# Patient Record
Sex: FEMALE | Race: WHITE | NOT HISPANIC OR LATINO | URBAN - METROPOLITAN AREA
[De-identification: names, ages, dates, MRNs, and addresses within clinical notes are randomized per-mention and may not be internally consistent; named-entity substitution may affect disease eponyms.]

---

## 2017-01-03 LAB — INR PPP: 3

## 2017-01-22 ENCOUNTER — INPATIENT (INPATIENT)
Facility: HOSPITAL | Age: 82
LOS: 2 days | Discharge: HOME HEALTH | DRG: 291 | End: 2017-01-25
Attending: FAMILY MEDICINE | Admitting: FAMILY MEDICINE
Payer: MEDICARE

## 2017-01-22 VITALS
RESPIRATION RATE: 20 BRPM | WEIGHT: 169.98 LBS | DIASTOLIC BLOOD PRESSURE: 50 MMHG | SYSTOLIC BLOOD PRESSURE: 131 MMHG | OXYGEN SATURATION: 99 % | HEART RATE: 77 BPM | TEMPERATURE: 99 F | HEIGHT: 60 IN

## 2017-01-22 DIAGNOSIS — N19 UNSPECIFIED KIDNEY FAILURE: ICD-10-CM

## 2017-01-22 DIAGNOSIS — R07.89 OTHER CHEST PAIN: ICD-10-CM

## 2017-01-22 DIAGNOSIS — R06.02 SHORTNESS OF BREATH: ICD-10-CM

## 2017-01-22 DIAGNOSIS — I50.9 HEART FAILURE, UNSPECIFIED: ICD-10-CM

## 2017-01-22 LAB
ALBUMIN SERPL ELPH-MCNC: 3.1 G/DL — LOW (ref 3.3–5)
ALP SERPL-CCNC: 119 U/L — SIGNIFICANT CHANGE UP (ref 40–120)
ALT FLD-CCNC: 23 U/L — SIGNIFICANT CHANGE UP (ref 12–78)
ANION GAP SERPL CALC-SCNC: 9 MMOL/L — SIGNIFICANT CHANGE UP (ref 5–17)
APTT BLD: 39.5 SEC — HIGH (ref 27.5–37.4)
AST SERPL-CCNC: 25 U/L — SIGNIFICANT CHANGE UP (ref 15–37)
BASOPHILS # BLD AUTO: 0 K/UL — SIGNIFICANT CHANGE UP (ref 0–0.2)
BASOPHILS NFR BLD AUTO: 0.3 % — SIGNIFICANT CHANGE UP (ref 0–2)
BILIRUB SERPL-MCNC: 0.4 MG/DL — SIGNIFICANT CHANGE UP (ref 0.2–1.2)
BUN SERPL-MCNC: 44 MG/DL — HIGH (ref 7–23)
CALCIUM SERPL-MCNC: 8.9 MG/DL — SIGNIFICANT CHANGE UP (ref 8.5–10.1)
CHLORIDE SERPL-SCNC: 98 MMOL/L — SIGNIFICANT CHANGE UP (ref 96–108)
CK MB BLD-MCNC: 3.7 % — HIGH (ref 0–3.5)
CK MB CFR SERPL CALC: 3 NG/ML — SIGNIFICANT CHANGE UP (ref 0–3.6)
CK SERPL-CCNC: 81 U/L — SIGNIFICANT CHANGE UP (ref 26–192)
CO2 SERPL-SCNC: 31 MMOL/L — SIGNIFICANT CHANGE UP (ref 22–31)
CREAT SERPL-MCNC: 1.8 MG/DL — HIGH (ref 0.5–1.3)
EOSINOPHIL # BLD AUTO: 0.3 K/UL — SIGNIFICANT CHANGE UP (ref 0–0.5)
EOSINOPHIL NFR BLD AUTO: 3.2 % — SIGNIFICANT CHANGE UP (ref 0–6)
GLUCOSE SERPL-MCNC: 170 MG/DL — HIGH (ref 70–99)
HBA1C BLD-MCNC: 7.5 % — HIGH (ref 4–5.6)
HCT VFR BLD CALC: 37.4 % — SIGNIFICANT CHANGE UP (ref 34.5–45)
HGB BLD-MCNC: 11.2 G/DL — LOW (ref 11.5–15.5)
INR BLD: 2.61 RATIO — HIGH (ref 0.88–1.16)
LYMPHOCYTES # BLD AUTO: 1.1 K/UL — SIGNIFICANT CHANGE UP (ref 1–3.3)
LYMPHOCYTES # BLD AUTO: 12.5 % — LOW (ref 13–44)
MCHC RBC-ENTMCNC: 26.5 PG — LOW (ref 27–34)
MCHC RBC-ENTMCNC: 30 GM/DL — LOW (ref 32–36)
MCV RBC AUTO: 88.3 FL — SIGNIFICANT CHANGE UP (ref 80–100)
MONOCYTES # BLD AUTO: 0.5 K/UL — SIGNIFICANT CHANGE UP (ref 0–0.9)
MONOCYTES NFR BLD AUTO: 6.2 % — SIGNIFICANT CHANGE UP (ref 1–9)
NEUTROPHILS # BLD AUTO: 6.7 K/UL — SIGNIFICANT CHANGE UP (ref 1.8–7.4)
NEUTROPHILS NFR BLD AUTO: 77.8 % — HIGH (ref 43–77)
NT-PROBNP SERPL-SCNC: 4756 PG/ML — HIGH (ref 0–450)
PLATELET # BLD AUTO: 264 K/UL — SIGNIFICANT CHANGE UP (ref 150–400)
POTASSIUM SERPL-MCNC: 3.9 MMOL/L — SIGNIFICANT CHANGE UP (ref 3.5–5.3)
POTASSIUM SERPL-SCNC: 3.9 MMOL/L — SIGNIFICANT CHANGE UP (ref 3.5–5.3)
PROT SERPL-MCNC: 7.7 G/DL — SIGNIFICANT CHANGE UP (ref 6–8.3)
PROTHROM AB SERPL-ACNC: 29.2 SEC — HIGH (ref 10–13.1)
RBC # BLD: 4.23 M/UL — SIGNIFICANT CHANGE UP (ref 3.8–5.2)
RBC # FLD: 16.6 % — HIGH (ref 10.3–14.5)
SODIUM SERPL-SCNC: 138 MMOL/L — SIGNIFICANT CHANGE UP (ref 135–145)
TROPONIN I SERPL-MCNC: 0.03 NG/ML — SIGNIFICANT CHANGE UP (ref 0.01–0.04)
TSH SERPL-MCNC: 2.66 UIU/ML — SIGNIFICANT CHANGE UP (ref 0.36–3.74)
WBC # BLD: 8.6 K/UL — SIGNIFICANT CHANGE UP (ref 3.8–10.5)
WBC # FLD AUTO: 8.6 K/UL — SIGNIFICANT CHANGE UP (ref 3.8–10.5)

## 2017-01-22 PROCEDURE — 76770 US EXAM ABDO BACK WALL COMP: CPT | Mod: 26

## 2017-01-22 PROCEDURE — 93010 ELECTROCARDIOGRAM REPORT: CPT

## 2017-01-22 PROCEDURE — 99285 EMERGENCY DEPT VISIT HI MDM: CPT | Mod: 25

## 2017-01-22 PROCEDURE — 71020: CPT | Mod: 26

## 2017-01-22 PROCEDURE — 76775 US EXAM ABDO BACK WALL LIM: CPT | Mod: 26

## 2017-01-22 RX ORDER — PANTOPRAZOLE SODIUM 20 MG/1
40 TABLET, DELAYED RELEASE ORAL
Qty: 0 | Refills: 0 | Status: DISCONTINUED | OUTPATIENT
Start: 2017-01-22 | End: 2017-01-25

## 2017-01-22 RX ORDER — WARFARIN SODIUM 2.5 MG/1
1 TABLET ORAL DAILY
Qty: 0 | Refills: 0 | Status: COMPLETED | OUTPATIENT
Start: 2017-01-22 | End: 2017-01-24

## 2017-01-22 RX ORDER — FUROSEMIDE 40 MG
40 TABLET ORAL
Qty: 0 | Refills: 0 | Status: DISCONTINUED | OUTPATIENT
Start: 2017-01-22 | End: 2017-01-23

## 2017-01-22 RX ORDER — INSULIN LISPRO 100/ML
VIAL (ML) SUBCUTANEOUS
Qty: 0 | Refills: 0 | Status: DISCONTINUED | OUTPATIENT
Start: 2017-01-22 | End: 2017-01-25

## 2017-01-22 RX ORDER — FUROSEMIDE 40 MG
40 TABLET ORAL ONCE
Qty: 0 | Refills: 0 | Status: COMPLETED | OUTPATIENT
Start: 2017-01-22 | End: 2017-01-22

## 2017-01-22 RX ORDER — PROPRANOLOL HCL 160 MG
120 CAPSULE, EXTENDED RELEASE 24HR ORAL DAILY
Qty: 0 | Refills: 0 | Status: DISCONTINUED | OUTPATIENT
Start: 2017-01-22 | End: 2017-01-25

## 2017-01-22 RX ORDER — SIMVASTATIN 20 MG/1
10 TABLET, FILM COATED ORAL AT BEDTIME
Qty: 0 | Refills: 0 | Status: DISCONTINUED | OUTPATIENT
Start: 2017-01-22 | End: 2017-01-25

## 2017-01-22 RX ORDER — MONTELUKAST 4 MG/1
10 TABLET, CHEWABLE ORAL DAILY
Qty: 0 | Refills: 0 | Status: DISCONTINUED | OUTPATIENT
Start: 2017-01-22 | End: 2017-01-25

## 2017-01-22 RX ORDER — NITROGLYCERIN 6.5 MG
1 CAPSULE, EXTENDED RELEASE ORAL DAILY
Qty: 0 | Refills: 0 | Status: DISCONTINUED | OUTPATIENT
Start: 2017-01-22 | End: 2017-01-25

## 2017-01-22 RX ORDER — SODIUM CHLORIDE 9 MG/ML
1000 INJECTION, SOLUTION INTRAVENOUS
Qty: 0 | Refills: 0 | Status: DISCONTINUED | OUTPATIENT
Start: 2017-01-22 | End: 2017-01-25

## 2017-01-22 RX ORDER — SODIUM CHLORIDE 9 MG/ML
3 INJECTION INTRAMUSCULAR; INTRAVENOUS; SUBCUTANEOUS ONCE
Qty: 0 | Refills: 0 | Status: COMPLETED | OUTPATIENT
Start: 2017-01-22 | End: 2017-01-22

## 2017-01-22 RX ORDER — GLUCAGON INJECTION, SOLUTION 0.5 MG/.1ML
1 INJECTION, SOLUTION SUBCUTANEOUS ONCE
Qty: 0 | Refills: 0 | Status: DISCONTINUED | OUTPATIENT
Start: 2017-01-22 | End: 2017-01-25

## 2017-01-22 RX ORDER — DEXTROSE 50 % IN WATER 50 %
1 SYRINGE (ML) INTRAVENOUS ONCE
Qty: 0 | Refills: 0 | Status: DISCONTINUED | OUTPATIENT
Start: 2017-01-22 | End: 2017-01-25

## 2017-01-22 RX ORDER — DEXTROSE 50 % IN WATER 50 %
12.5 SYRINGE (ML) INTRAVENOUS ONCE
Qty: 0 | Refills: 0 | Status: DISCONTINUED | OUTPATIENT
Start: 2017-01-22 | End: 2017-01-25

## 2017-01-22 RX ORDER — DEXTROSE 50 % IN WATER 50 %
25 SYRINGE (ML) INTRAVENOUS ONCE
Qty: 0 | Refills: 0 | Status: DISCONTINUED | OUTPATIENT
Start: 2017-01-22 | End: 2017-01-25

## 2017-01-22 RX ORDER — TIOTROPIUM BROMIDE 18 UG/1
1 CAPSULE ORAL; RESPIRATORY (INHALATION) AT BEDTIME
Qty: 0 | Refills: 0 | Status: DISCONTINUED | OUTPATIENT
Start: 2017-01-22 | End: 2017-01-25

## 2017-01-22 RX ADMIN — Medication 1 PATCH: at 19:59

## 2017-01-22 RX ADMIN — Medication 40 MILLIGRAM(S): at 19:21

## 2017-01-22 RX ADMIN — TIOTROPIUM BROMIDE 1 CAPSULE(S): 18 CAPSULE ORAL; RESPIRATORY (INHALATION) at 20:00

## 2017-01-22 RX ADMIN — WARFARIN SODIUM 1 MILLIGRAM(S): 2.5 TABLET ORAL at 20:36

## 2017-01-22 RX ADMIN — MONTELUKAST 10 MILLIGRAM(S): 4 TABLET, CHEWABLE ORAL at 20:36

## 2017-01-22 RX ADMIN — SIMVASTATIN 10 MILLIGRAM(S): 20 TABLET, FILM COATED ORAL at 22:09

## 2017-01-22 RX ADMIN — SODIUM CHLORIDE 3 MILLILITER(S): 9 INJECTION INTRAMUSCULAR; INTRAVENOUS; SUBCUTANEOUS at 18:29

## 2017-01-22 NOTE — H&P ADULT. - HISTORY OF PRESENT ILLNESS
presented to er after patient felt SOB / STILL for past few days, her lasix was increased lately by her cardiologist.  Also has some chest discomfort.  Denies palpitations.  History of similar previous episodes and was found to have CHF.  No other associated symptoms at this time.  Patient is being admitted for further work up and treatment.

## 2017-01-22 NOTE — PATIENT PROFILE ADULT. - VISION (WITH CORRECTIVE LENSES IF THE PATIENT USUALLY WEARS THEM):
glasses for reading/Partially impaired: cannot see medication labels or newsprint, but can see obstacles in path, and the surrounding layout; can count fingers at arm's length

## 2017-01-22 NOTE — ED ADULT NURSE REASSESSMENT NOTE - NS ED NURSE REASSESS COMMENT FT1
Patient received from Sandra SLADE . Patient is A&Ox4 , laying on the stretcher calmly , no labored breathing noted. Patient denies any pain or discomfort. Pending bed assignment. Will continue to monitor.

## 2017-01-22 NOTE — ED ADULT NURSE NOTE - PMH
Acid reflux    Afib    Asthma    CVA (cerebral infarction)    Diabetes    Hyperlipidemia    Hypertension    Myocardial infarction  1981  Raynaud disease    Renal stones    Spinal stenosis    Upper respiratory infection

## 2017-01-22 NOTE — PATIENT PROFILE ADULT. - ABILITY TO HEAR (WITH HEARING AID OR HEARING APPLIANCE IF NORMALLY USED):
Mildly to Moderately Impaired: difficulty hearing in some environments or speaker may need to increase volume or speak distinctly Mildly to Moderately Impaired: difficulty hearing in some environments or speaker may need to increase volume or speak distinctly/b/l hearing aides sent home with daughter

## 2017-01-22 NOTE — PATIENT PROFILE ADULT. - FALL HARM RISK
bones(Osteoporosis,prev fx,steroid use,metastatic bone ca/coagulation(Bleeding disorder R/T clinical cond/anti-coags)/age(85 years old or older)

## 2017-01-22 NOTE — PATIENT PROFILE ADULT. - PMH
Acid reflux    Afib    Angina effort    Asthma    CVA (cerebral infarction)    Diabetes    GERD (gastroesophageal reflux disease)    Heart failure    Hyperlipidemia    Hypertension    Myocardial infarction  1981  OAB (overactive bladder)    Raynaud disease    Renal stones    Spinal stenosis    Upper respiratory infection

## 2017-01-22 NOTE — H&P ADULT. - PMH
Acid reflux    Afib    Asthma    CVA (cerebral infarction)    Diabetes    Hyperlipidemia    Hypertension    Myocardial infarction  1981  Raynaud disease    Renal stones    Spinal stenosis    Upper respiratory infection Acid reflux    Afib    Angina effort    Asthma    CVA (cerebral infarction)    Diabetes    GERD (gastroesophageal reflux disease)    Heart failure    Hyperlipidemia    Hypertension    Myocardial infarction  1981  OAB (overactive bladder)    Raynaud disease    Renal stones    Spinal stenosis    Upper respiratory infection

## 2017-01-23 LAB
ANION GAP SERPL CALC-SCNC: 10 MMOL/L — SIGNIFICANT CHANGE UP (ref 5–17)
ANION GAP SERPL CALC-SCNC: 10 MMOL/L — SIGNIFICANT CHANGE UP (ref 5–17)
APTT BLD: 36.8 SEC — SIGNIFICANT CHANGE UP (ref 27.5–37.4)
BUN SERPL-MCNC: 40 MG/DL — HIGH (ref 7–23)
BUN SERPL-MCNC: 40 MG/DL — HIGH (ref 7–23)
CALCIUM SERPL-MCNC: 8.7 MG/DL — SIGNIFICANT CHANGE UP (ref 8.5–10.1)
CALCIUM SERPL-MCNC: 9 MG/DL — SIGNIFICANT CHANGE UP (ref 8.5–10.1)
CHLORIDE SERPL-SCNC: 100 MMOL/L — SIGNIFICANT CHANGE UP (ref 96–108)
CHLORIDE SERPL-SCNC: 99 MMOL/L — SIGNIFICANT CHANGE UP (ref 96–108)
CK MB BLD-MCNC: 3.7 % — HIGH (ref 0–3.5)
CK MB CFR SERPL CALC: 2.6 NG/ML — SIGNIFICANT CHANGE UP (ref 0–3.6)
CK SERPL-CCNC: 70 U/L — SIGNIFICANT CHANGE UP (ref 26–192)
CO2 SERPL-SCNC: 30 MMOL/L — SIGNIFICANT CHANGE UP (ref 22–31)
CO2 SERPL-SCNC: 30 MMOL/L — SIGNIFICANT CHANGE UP (ref 22–31)
CREAT SERPL-MCNC: 1.7 MG/DL — HIGH (ref 0.5–1.3)
CREAT SERPL-MCNC: 1.7 MG/DL — HIGH (ref 0.5–1.3)
GLUCOSE SERPL-MCNC: 208 MG/DL — HIGH (ref 70–99)
GLUCOSE SERPL-MCNC: 210 MG/DL — HIGH (ref 70–99)
INR BLD: 2.44 RATIO — HIGH (ref 0.88–1.16)
MAGNESIUM SERPL-MCNC: 1.8 MG/DL — SIGNIFICANT CHANGE UP (ref 1.8–2.4)
PHOSPHATE SERPL-MCNC: 2.6 MG/DL — SIGNIFICANT CHANGE UP (ref 2.5–4.5)
POTASSIUM SERPL-MCNC: 3.5 MMOL/L — SIGNIFICANT CHANGE UP (ref 3.5–5.3)
POTASSIUM SERPL-MCNC: 3.5 MMOL/L — SIGNIFICANT CHANGE UP (ref 3.5–5.3)
POTASSIUM SERPL-SCNC: 3.5 MMOL/L — SIGNIFICANT CHANGE UP (ref 3.5–5.3)
POTASSIUM SERPL-SCNC: 3.5 MMOL/L — SIGNIFICANT CHANGE UP (ref 3.5–5.3)
PROTHROM AB SERPL-ACNC: 27.3 SEC — HIGH (ref 10–13.1)
SODIUM SERPL-SCNC: 139 MMOL/L — SIGNIFICANT CHANGE UP (ref 135–145)
SODIUM SERPL-SCNC: 140 MMOL/L — SIGNIFICANT CHANGE UP (ref 135–145)
TROPONIN I SERPL-MCNC: 0.03 NG/ML — SIGNIFICANT CHANGE UP (ref 0.01–0.04)

## 2017-01-23 PROCEDURE — 93010 ELECTROCARDIOGRAM REPORT: CPT

## 2017-01-23 PROCEDURE — 99223 1ST HOSP IP/OBS HIGH 75: CPT

## 2017-01-23 PROCEDURE — 71010: CPT | Mod: 26

## 2017-01-23 RX ORDER — ASPIRIN/CALCIUM CARB/MAGNESIUM 324 MG
325 TABLET ORAL ONCE
Qty: 0 | Refills: 0 | Status: COMPLETED | OUTPATIENT
Start: 2017-01-23 | End: 2017-01-23

## 2017-01-23 RX ORDER — POTASSIUM CHLORIDE 20 MEQ
10 PACKET (EA) ORAL THREE TIMES A DAY
Qty: 0 | Refills: 0 | Status: DISCONTINUED | OUTPATIENT
Start: 2017-01-23 | End: 2017-01-24

## 2017-01-23 RX ORDER — NITROGLYCERIN 6.5 MG
0.4 CAPSULE, EXTENDED RELEASE ORAL ONCE
Qty: 0 | Refills: 0 | Status: COMPLETED | OUTPATIENT
Start: 2017-01-23 | End: 2017-01-23

## 2017-01-23 RX ORDER — FUROSEMIDE 40 MG
40 TABLET ORAL
Qty: 0 | Refills: 0 | Status: DISCONTINUED | OUTPATIENT
Start: 2017-01-23 | End: 2017-01-23

## 2017-01-23 RX ORDER — MORPHINE SULFATE 50 MG/1
1 CAPSULE, EXTENDED RELEASE ORAL ONCE
Qty: 0 | Refills: 0 | Status: DISCONTINUED | OUTPATIENT
Start: 2017-01-23 | End: 2017-01-23

## 2017-01-23 RX ORDER — FUROSEMIDE 40 MG
40 TABLET ORAL EVERY 8 HOURS
Qty: 0 | Refills: 0 | Status: DISCONTINUED | OUTPATIENT
Start: 2017-01-23 | End: 2017-01-25

## 2017-01-23 RX ADMIN — MORPHINE SULFATE 1 MILLIGRAM(S): 50 CAPSULE, EXTENDED RELEASE ORAL at 04:32

## 2017-01-23 RX ADMIN — Medication: at 12:03

## 2017-01-23 RX ADMIN — PANTOPRAZOLE SODIUM 40 MILLIGRAM(S): 20 TABLET, DELAYED RELEASE ORAL at 05:18

## 2017-01-23 RX ADMIN — Medication: at 17:54

## 2017-01-23 RX ADMIN — Medication 40 MILLIGRAM(S): at 21:38

## 2017-01-23 RX ADMIN — MORPHINE SULFATE 1 MILLIGRAM(S): 50 CAPSULE, EXTENDED RELEASE ORAL at 05:17

## 2017-01-23 RX ADMIN — TIOTROPIUM BROMIDE 1 CAPSULE(S): 18 CAPSULE ORAL; RESPIRATORY (INHALATION) at 21:38

## 2017-01-23 RX ADMIN — Medication 0.4 MILLIGRAM(S): at 04:31

## 2017-01-23 RX ADMIN — WARFARIN SODIUM 1 MILLIGRAM(S): 2.5 TABLET ORAL at 21:38

## 2017-01-23 RX ADMIN — MONTELUKAST 10 MILLIGRAM(S): 4 TABLET, CHEWABLE ORAL at 11:57

## 2017-01-23 RX ADMIN — Medication 10 MILLIEQUIVALENT(S): at 21:38

## 2017-01-23 RX ADMIN — Medication 1 PATCH: at 23:00

## 2017-01-23 RX ADMIN — Medication 1 PATCH: at 11:56

## 2017-01-23 RX ADMIN — Medication: at 08:41

## 2017-01-23 RX ADMIN — Medication 325 MILLIGRAM(S): at 05:18

## 2017-01-23 RX ADMIN — SIMVASTATIN 10 MILLIGRAM(S): 20 TABLET, FILM COATED ORAL at 21:38

## 2017-01-23 RX ADMIN — Medication 1 PATCH: at 08:41

## 2017-01-23 NOTE — DIETITIAN INITIAL EVALUATION ADULT. - ADHERENCE
fair/Usual diet low in sodium and sugar.  Pt states that recent holiday season resulted in pt consuming meals that were probably higher in salt and sugar.

## 2017-01-23 NOTE — PHYSICAL THERAPY INITIAL EVALUATION ADULT - ADL SKILLS, REHAB EVAL
Jennifer Song arrived for his session today. His map was 60, he also c/o neck area pain. We suggested he not exercise today. Will return on Monday. States otherwise he felt fine. independent

## 2017-01-23 NOTE — GOALS OF CARE CONVERSATION - PERSONAL ADVANCE DIRECTIVE - CONVERSATION DETAILS
met pt, with son, verified hcp and living will. she has had discussions w her hcp and no directives to be in place at present. pt remains a full code.  contact # given

## 2017-01-23 NOTE — PHYSICAL THERAPY INITIAL EVALUATION ADULT - ADDITIONAL COMMENTS
Pt lives with her daughter whose private home was recently renovated to be accessible. There are no stairs to enter and no stairs inside. Pt has a rollator walker. Daughter works from home.

## 2017-01-23 NOTE — DIETITIAN INITIAL EVALUATION ADULT. - OTHER INFO
Pt tolerating meals with good po intake, no GI distress or problems chewing or swallowing.  Med hx reviewed.  Pt is insulin dependent and compliant with her MD instructions to check blood sugars 2 x daily with insulin coverage as ordered.  She has had fluctuations in blood sugar over past few months resulting in adjustment to insulin regime.  Pt weighs herself daily due to hx HF.

## 2017-01-24 LAB
ANION GAP SERPL CALC-SCNC: 9 MMOL/L — SIGNIFICANT CHANGE UP (ref 5–17)
APTT BLD: 37.1 SEC — SIGNIFICANT CHANGE UP (ref 27.5–37.4)
BUN SERPL-MCNC: 36 MG/DL — HIGH (ref 7–23)
CALCIUM SERPL-MCNC: 9 MG/DL — SIGNIFICANT CHANGE UP (ref 8.5–10.1)
CHLORIDE SERPL-SCNC: 100 MMOL/L — SIGNIFICANT CHANGE UP (ref 96–108)
CO2 SERPL-SCNC: 31 MMOL/L — SIGNIFICANT CHANGE UP (ref 22–31)
CREAT SERPL-MCNC: 1.7 MG/DL — HIGH (ref 0.5–1.3)
GLUCOSE SERPL-MCNC: 202 MG/DL — HIGH (ref 70–99)
INR BLD: 2.4 RATIO — HIGH (ref 0.88–1.16)
POTASSIUM SERPL-MCNC: 3.9 MMOL/L — SIGNIFICANT CHANGE UP (ref 3.5–5.3)
POTASSIUM SERPL-SCNC: 3.9 MMOL/L — SIGNIFICANT CHANGE UP (ref 3.5–5.3)
PROTHROM AB SERPL-ACNC: 26.9 SEC — HIGH (ref 10–13.1)
SODIUM SERPL-SCNC: 140 MMOL/L — SIGNIFICANT CHANGE UP (ref 135–145)

## 2017-01-24 PROCEDURE — 99233 SBSQ HOSP IP/OBS HIGH 50: CPT

## 2017-01-24 RX ORDER — POTASSIUM CHLORIDE 20 MEQ
20 PACKET (EA) ORAL
Qty: 0 | Refills: 0 | Status: DISCONTINUED | OUTPATIENT
Start: 2017-01-24 | End: 2017-01-25

## 2017-01-24 RX ORDER — ALPRAZOLAM 0.25 MG
0.25 TABLET ORAL THREE TIMES A DAY
Qty: 0 | Refills: 0 | Status: DISCONTINUED | OUTPATIENT
Start: 2017-01-24 | End: 2017-01-25

## 2017-01-24 RX ORDER — OXYCODONE HYDROCHLORIDE 5 MG/1
5 TABLET ORAL AT BEDTIME
Qty: 0 | Refills: 0 | Status: DISCONTINUED | OUTPATIENT
Start: 2017-01-24 | End: 2017-01-25

## 2017-01-24 RX ORDER — ROPINIROLE 8 MG/1
0.25 TABLET, FILM COATED, EXTENDED RELEASE ORAL
Qty: 0 | Refills: 0 | Status: DISCONTINUED | OUTPATIENT
Start: 2017-01-24 | End: 2017-01-25

## 2017-01-24 RX ORDER — ZALEPLON 10 MG
5 CAPSULE ORAL ONCE
Qty: 0 | Refills: 0 | Status: DISCONTINUED | OUTPATIENT
Start: 2017-01-24 | End: 2017-01-24

## 2017-01-24 RX ADMIN — SIMVASTATIN 10 MILLIGRAM(S): 20 TABLET, FILM COATED ORAL at 21:55

## 2017-01-24 RX ADMIN — Medication 1 PATCH: at 11:16

## 2017-01-24 RX ADMIN — Medication 10 MILLIEQUIVALENT(S): at 05:42

## 2017-01-24 RX ADMIN — Medication 2: at 17:04

## 2017-01-24 RX ADMIN — PANTOPRAZOLE SODIUM 40 MILLIGRAM(S): 20 TABLET, DELAYED RELEASE ORAL at 05:42

## 2017-01-24 RX ADMIN — Medication 40 MILLIGRAM(S): at 21:55

## 2017-01-24 RX ADMIN — ROPINIROLE 0.25 MILLIGRAM(S): 8 TABLET, FILM COATED, EXTENDED RELEASE ORAL at 21:54

## 2017-01-24 RX ADMIN — Medication 3: at 12:29

## 2017-01-24 RX ADMIN — Medication 1: at 08:24

## 2017-01-24 RX ADMIN — Medication 1 PATCH: at 23:47

## 2017-01-24 RX ADMIN — Medication 5 MILLIGRAM(S): at 00:37

## 2017-01-24 RX ADMIN — Medication 120 MILLIGRAM(S): at 05:42

## 2017-01-24 RX ADMIN — OXYCODONE HYDROCHLORIDE 5 MILLIGRAM(S): 5 TABLET ORAL at 22:30

## 2017-01-24 RX ADMIN — MONTELUKAST 10 MILLIGRAM(S): 4 TABLET, CHEWABLE ORAL at 11:16

## 2017-01-24 RX ADMIN — Medication 10 MILLIEQUIVALENT(S): at 14:08

## 2017-01-24 RX ADMIN — WARFARIN SODIUM 1 MILLIGRAM(S): 2.5 TABLET ORAL at 21:55

## 2017-01-24 RX ADMIN — Medication 40 MILLIGRAM(S): at 05:42

## 2017-01-24 RX ADMIN — OXYCODONE HYDROCHLORIDE 5 MILLIGRAM(S): 5 TABLET ORAL at 21:54

## 2017-01-24 RX ADMIN — Medication 20 MILLIEQUIVALENT(S): at 17:06

## 2017-01-24 RX ADMIN — TIOTROPIUM BROMIDE 1 CAPSULE(S): 18 CAPSULE ORAL; RESPIRATORY (INHALATION) at 21:55

## 2017-01-24 RX ADMIN — Medication 40 MILLIGRAM(S): at 14:07

## 2017-01-25 ENCOUNTER — TRANSCRIPTION ENCOUNTER (OUTPATIENT)
Age: 82
End: 2017-01-25

## 2017-01-25 VITALS — WEIGHT: 165.13 LBS

## 2017-01-25 LAB
ANION GAP SERPL CALC-SCNC: 7 MMOL/L — SIGNIFICANT CHANGE UP (ref 5–17)
APTT BLD: 38.1 SEC — HIGH (ref 27.5–37.4)
BUN SERPL-MCNC: 39 MG/DL — HIGH (ref 7–23)
CALCIUM SERPL-MCNC: 9 MG/DL — SIGNIFICANT CHANGE UP (ref 8.5–10.1)
CHLORIDE SERPL-SCNC: 101 MMOL/L — SIGNIFICANT CHANGE UP (ref 96–108)
CO2 SERPL-SCNC: 31 MMOL/L — SIGNIFICANT CHANGE UP (ref 22–31)
CREAT SERPL-MCNC: 1.8 MG/DL — HIGH (ref 0.5–1.3)
FERRITIN SERPL-MCNC: 40.1 NG/ML — SIGNIFICANT CHANGE UP (ref 15–150)
FOLATE SERPL-MCNC: 16.2 NG/ML — SIGNIFICANT CHANGE UP (ref 4.8–24.2)
GLUCOSE SERPL-MCNC: 225 MG/DL — HIGH (ref 70–99)
HCT VFR BLD CALC: 33.7 % — LOW (ref 34.5–45)
HGB BLD-MCNC: 10.5 G/DL — LOW (ref 11.5–15.5)
INR BLD: 2.12 RATIO — HIGH (ref 0.88–1.16)
IRON SATN MFR SERPL: 49 UG/DL — SIGNIFICANT CHANGE UP (ref 30–160)
MAGNESIUM SERPL-MCNC: 2.1 MG/DL — SIGNIFICANT CHANGE UP (ref 1.8–2.4)
MCHC RBC-ENTMCNC: 26.8 PG — LOW (ref 27–34)
MCHC RBC-ENTMCNC: 31.2 GM/DL — LOW (ref 32–36)
MCV RBC AUTO: 86.1 FL — SIGNIFICANT CHANGE UP (ref 80–100)
PLATELET # BLD AUTO: 223 K/UL — SIGNIFICANT CHANGE UP (ref 150–400)
POTASSIUM SERPL-MCNC: 4.2 MMOL/L — SIGNIFICANT CHANGE UP (ref 3.5–5.3)
POTASSIUM SERPL-SCNC: 4.2 MMOL/L — SIGNIFICANT CHANGE UP (ref 3.5–5.3)
PROTHROM AB SERPL-ACNC: 23.7 SEC — HIGH (ref 10–13.1)
RBC # BLD: 3.91 M/UL — SIGNIFICANT CHANGE UP (ref 3.8–5.2)
RBC # FLD: 16.5 % — HIGH (ref 10.3–14.5)
SODIUM SERPL-SCNC: 139 MMOL/L — SIGNIFICANT CHANGE UP (ref 135–145)
TSH SERPL-MCNC: 2.98 UIU/ML — SIGNIFICANT CHANGE UP (ref 0.36–3.74)
VIT B12 SERPL-MCNC: 566 PG/ML — SIGNIFICANT CHANGE UP (ref 243–894)
WBC # BLD: 8 K/UL — SIGNIFICANT CHANGE UP (ref 3.8–10.5)
WBC # FLD AUTO: 8 K/UL — SIGNIFICANT CHANGE UP (ref 3.8–10.5)

## 2017-01-25 PROCEDURE — 84443 ASSAY THYROID STIM HORMONE: CPT

## 2017-01-25 PROCEDURE — 96374 THER/PROPH/DIAG INJ IV PUSH: CPT

## 2017-01-25 PROCEDURE — 76770 US EXAM ABDO BACK WALL COMP: CPT

## 2017-01-25 PROCEDURE — 97116 GAIT TRAINING THERAPY: CPT

## 2017-01-25 PROCEDURE — G8979: CPT | Mod: CH

## 2017-01-25 PROCEDURE — 82746 ASSAY OF FOLIC ACID SERUM: CPT

## 2017-01-25 PROCEDURE — 76775 US EXAM ABDO BACK WALL LIM: CPT

## 2017-01-25 PROCEDURE — 85730 THROMBOPLASTIN TIME PARTIAL: CPT

## 2017-01-25 PROCEDURE — 85027 COMPLETE CBC AUTOMATED: CPT

## 2017-01-25 PROCEDURE — 97161 PT EVAL LOW COMPLEX 20 MIN: CPT

## 2017-01-25 PROCEDURE — 99285 EMERGENCY DEPT VISIT HI MDM: CPT | Mod: 25

## 2017-01-25 PROCEDURE — 96376 TX/PRO/DX INJ SAME DRUG ADON: CPT

## 2017-01-25 PROCEDURE — 93005 ELECTROCARDIOGRAM TRACING: CPT

## 2017-01-25 PROCEDURE — 82728 ASSAY OF FERRITIN: CPT

## 2017-01-25 PROCEDURE — 94640 AIRWAY INHALATION TREATMENT: CPT

## 2017-01-25 PROCEDURE — 84484 ASSAY OF TROPONIN QUANT: CPT

## 2017-01-25 PROCEDURE — 71046 X-RAY EXAM CHEST 2 VIEWS: CPT

## 2017-01-25 PROCEDURE — 80053 COMPREHEN METABOLIC PANEL: CPT

## 2017-01-25 PROCEDURE — 83540 ASSAY OF IRON: CPT

## 2017-01-25 PROCEDURE — 85610 PROTHROMBIN TIME: CPT

## 2017-01-25 PROCEDURE — 82607 VITAMIN B-12: CPT

## 2017-01-25 PROCEDURE — 82550 ASSAY OF CK (CPK): CPT

## 2017-01-25 PROCEDURE — 83036 HEMOGLOBIN GLYCOSYLATED A1C: CPT

## 2017-01-25 PROCEDURE — 97530 THERAPEUTIC ACTIVITIES: CPT

## 2017-01-25 PROCEDURE — G8978: CPT | Mod: CH

## 2017-01-25 PROCEDURE — 96372 THER/PROPH/DIAG INJ SC/IM: CPT | Mod: 59

## 2017-01-25 PROCEDURE — 83735 ASSAY OF MAGNESIUM: CPT

## 2017-01-25 PROCEDURE — 84100 ASSAY OF PHOSPHORUS: CPT

## 2017-01-25 PROCEDURE — 96375 TX/PRO/DX INJ NEW DRUG ADDON: CPT

## 2017-01-25 PROCEDURE — 84145 PROCALCITONIN (PCT): CPT

## 2017-01-25 PROCEDURE — G8980: CPT | Mod: CH

## 2017-01-25 PROCEDURE — 80048 BASIC METABOLIC PNL TOTAL CA: CPT

## 2017-01-25 PROCEDURE — 71045 X-RAY EXAM CHEST 1 VIEW: CPT

## 2017-01-25 PROCEDURE — 83880 ASSAY OF NATRIURETIC PEPTIDE: CPT

## 2017-01-25 PROCEDURE — 82553 CREATINE MB FRACTION: CPT

## 2017-01-25 RX ORDER — POTASSIUM CHLORIDE 20 MEQ
1 PACKET (EA) ORAL
Qty: 30 | Refills: 0 | OUTPATIENT
Start: 2017-01-25 | End: 2017-02-24

## 2017-01-25 RX ORDER — FUROSEMIDE 40 MG
1 TABLET ORAL
Qty: 60 | Refills: 0 | OUTPATIENT
Start: 2017-01-25 | End: 2017-02-24

## 2017-01-25 RX ORDER — ROPINIROLE 8 MG/1
1 TABLET, FILM COATED, EXTENDED RELEASE ORAL
Qty: 30 | Refills: 0 | OUTPATIENT
Start: 2017-01-25

## 2017-01-25 RX ADMIN — PANTOPRAZOLE SODIUM 40 MILLIGRAM(S): 20 TABLET, DELAYED RELEASE ORAL at 05:48

## 2017-01-25 RX ADMIN — Medication 1 PATCH: at 11:12

## 2017-01-25 RX ADMIN — Medication 20 MILLIEQUIVALENT(S): at 05:48

## 2017-01-25 RX ADMIN — Medication 1: at 08:06

## 2017-01-25 RX ADMIN — MONTELUKAST 10 MILLIGRAM(S): 4 TABLET, CHEWABLE ORAL at 11:12

## 2017-01-25 RX ADMIN — Medication 40 MILLIGRAM(S): at 14:32

## 2017-01-25 RX ADMIN — Medication 4: at 13:49

## 2017-01-25 RX ADMIN — Medication 120 MILLIGRAM(S): at 05:48

## 2017-01-25 NOTE — DISCHARGE NOTE ADULT - CARE PROVIDERS DIRECT ADDRESSES
[email or web address],DirectAddress_Unknown,DirectAddress_Unknown,DirectAddress_Unknown,DirectAddress_Unknown

## 2017-01-25 NOTE — DISCHARGE NOTE ADULT - HOSPITAL COURSE
admitted for CHF exacerbation  responded well to lasix  RF- kidney function stable  ?RLS - requip per neuro  DC after cardio and neuro clearance

## 2017-01-25 NOTE — DISCHARGE NOTE ADULT - PATIENT PORTAL LINK FT
“You can access the Follow[employer] Patient Portal, offered by [facility], by registering with the following website: [email or web address]”

## 2017-01-25 NOTE — DISCHARGE NOTE ADULT - ABILITY TO HEAR (WITH HEARING AID OR HEARING APPLIANCE IF NORMALLY USED):
b/l hearing aides sent home with daughter/Mildly to Moderately Impaired: difficulty hearing in some environments or speaker may need to increase volume or speak distinctly

## 2017-01-25 NOTE — DISCHARGE NOTE ADULT - CARE PLAN
Principal Discharge DX:	CHF exacerbation  Goal:	breath better  Instructions for follow-up, activity and diet:	follow up with heart Dr. CHAMBERLAIN  Secondary Diagnosis:	Acid reflux  Secondary Diagnosis:	Afib  Secondary Diagnosis:	Chest pain, atypical  Secondary Diagnosis:	Hyperlipidemia  Secondary Diagnosis:	Renal failure

## 2017-01-25 NOTE — DISCHARGE NOTE ADULT - NS AS DC VTE INSTRUCTION
Coumadin/Warfarin - Follow-up monitoring.../Coumadin/Warfarin - Dietary Advice.../Coumadin/Warfarin - Compliance.../Coumadin/Warfarin - Potential for adverse drug reactions and interactions

## 2017-01-25 NOTE — DISCHARGE NOTE ADULT - CARE PROVIDER_API CALL
Skinny Diaz (MD), Cardiovascular Disease; Internal Medicine  [address] [place] [address]  Phone: [phone]  Fax: [phone]    armando zapata  Phone: (   )    -  Fax: (   )    -    Leonel Mcneil), Nephrology  [address] [facility] [address] [place] [id number]  Phone: [phone]  Fax: [phone]    Bakari Kiser (RJ), Neurology  [address] [place] [address]  Phone: [phone]  Fax: [phone]

## 2017-01-25 NOTE — DISCHARGE NOTE ADULT - MEDICATION SUMMARY - MEDICATIONS TO STOP TAKING
I will STOP taking the medications listed below when I get home from the hospital:    oxyCODONE 5 mg oral capsule  --  by mouth once a day (at bedtime)

## 2017-01-25 NOTE — DISCHARGE NOTE ADULT - PROVIDER TOKENS
TOKEN:'[id number]',FREE:[LAST:[benny],FIRST:[armando],PHONE:[(   )    -],FAX:[(   )    -],ADDRESS:[[facility]]],TOKEN:'[id number]',TOKEN:'[id number]'

## 2017-01-25 NOTE — DISCHARGE NOTE ADULT - MEDICATION SUMMARY - MEDICATIONS TO TAKE
I will START or STAY ON the medications listed below when I get home from the hospital:    myrebetriq  -- 25 milligram(s) by mouth once a day  -- Indication: For OAB (overactive bladder)    Nitro-Dur 0.4 mg/hr transdermal film, extended release  -- 1 patch by transdermal patch once a day  -- Indication: For Chest pain, atypical    propranolol 120 mg oral capsule, extended release  -- 1 cap(s) by mouth once a day  -- Indication: For Af    Coumadin 1 mg oral tablet  -- 1 tab(s) by mouth once a day alternating with 2mg  -- every other day with Coumadin 2mg po  -- Indication: For Af    Coumadin 2 mg oral tablet  -- 1 tab(s) by mouth once a day alternating  with 2mg  -- Indication: For Af    NovoLOG FlexPen 100 units/mL subcutaneous solution  -- 7 unit(s) subcutaneous 3 times a day (with meals)  -- Indication: For DM    Levemir 100 units/mL subcutaneous solution  -- 35 unit(s) subcutaneous once a day  -- Indication: For DM    Levemir 100 units/mL subcutaneous solution  -- 18 unit(s) subcutaneous once a day (at bedtime)  -- Indication: For DM    Vytorin 10 mg-10 mg oral tablet  -- 1 tab(s) by mouth once a day  -- Indication: For High cholesterol    rOPINIRole 0.25 mg oral tablet  -- 1 tab(s) by mouth once a day (at bedtime)  -- Indication: For RLS    tiotropium 18 mcg inhalation capsule  -- 1 cap(s) inhaled once a day  -- Indication: For SOB (shortness of breath)    furosemide 40 mg oral tablet  -- 1 tab(s) by mouth 2 times a day  -- Indication: For CHF exacerbation    potassium chloride 20 mEq oral tablet, extended release  -- 1 tab(s) by mouth once a day  -- Indication: For Suplement    omeprazole 40 mg oral delayed release capsule  -- 1 cap(s) by mouth once a day  -- Indication: For GERD (gastroesophageal reflux disease)

## 2017-01-29 ENCOUNTER — EMERGENCY (EMERGENCY)
Facility: HOSPITAL | Age: 82
LOS: 1 days | Discharge: HOME | End: 2017-01-29
Attending: EMERGENCY MEDICINE | Admitting: EMERGENCY MEDICINE
Payer: MEDICARE

## 2017-01-29 VITALS
RESPIRATION RATE: 18 BRPM | OXYGEN SATURATION: 100 % | SYSTOLIC BLOOD PRESSURE: 165 MMHG | HEART RATE: 59 BPM | WEIGHT: 164.91 LBS | TEMPERATURE: 98 F | DIASTOLIC BLOOD PRESSURE: 91 MMHG | HEIGHT: 60 IN

## 2017-01-29 VITALS
RESPIRATION RATE: 16 BRPM | DIASTOLIC BLOOD PRESSURE: 66 MMHG | SYSTOLIC BLOOD PRESSURE: 150 MMHG | HEART RATE: 60 BPM | TEMPERATURE: 98 F | OXYGEN SATURATION: 100 %

## 2017-01-29 DIAGNOSIS — E11.9 TYPE 2 DIABETES MELLITUS WITHOUT COMPLICATIONS: ICD-10-CM

## 2017-01-29 DIAGNOSIS — J45.909 UNSPECIFIED ASTHMA, UNCOMPLICATED: ICD-10-CM

## 2017-01-29 DIAGNOSIS — Z79.4 LONG TERM (CURRENT) USE OF INSULIN: ICD-10-CM

## 2017-01-29 DIAGNOSIS — E78.5 HYPERLIPIDEMIA, UNSPECIFIED: ICD-10-CM

## 2017-01-29 DIAGNOSIS — Z88.1 ALLERGY STATUS TO OTHER ANTIBIOTIC AGENTS STATUS: ICD-10-CM

## 2017-01-29 DIAGNOSIS — M54.9 DORSALGIA, UNSPECIFIED: ICD-10-CM

## 2017-01-29 DIAGNOSIS — I10 ESSENTIAL (PRIMARY) HYPERTENSION: ICD-10-CM

## 2017-01-29 DIAGNOSIS — M48.00 SPINAL STENOSIS, SITE UNSPECIFIED: ICD-10-CM

## 2017-01-29 DIAGNOSIS — K21.9 GASTRO-ESOPHAGEAL REFLUX DISEASE WITHOUT ESOPHAGITIS: ICD-10-CM

## 2017-01-29 DIAGNOSIS — Z79.01 LONG TERM (CURRENT) USE OF ANTICOAGULANTS: ICD-10-CM

## 2017-01-29 DIAGNOSIS — I48.91 UNSPECIFIED ATRIAL FIBRILLATION: ICD-10-CM

## 2017-01-29 DIAGNOSIS — Z86.73 PERSONAL HISTORY OF TRANSIENT ISCHEMIC ATTACK (TIA), AND CEREBRAL INFARCTION WITHOUT RESIDUAL DEFICITS: ICD-10-CM

## 2017-01-29 DIAGNOSIS — I50.9 HEART FAILURE, UNSPECIFIED: ICD-10-CM

## 2017-01-29 DIAGNOSIS — Z87.891 PERSONAL HISTORY OF NICOTINE DEPENDENCE: ICD-10-CM

## 2017-01-29 DIAGNOSIS — R07.9 CHEST PAIN, UNSPECIFIED: ICD-10-CM

## 2017-01-29 DIAGNOSIS — I25.2 OLD MYOCARDIAL INFARCTION: ICD-10-CM

## 2017-01-29 PROBLEM — N32.81 OVERACTIVE BLADDER: Chronic | Status: ACTIVE | Noted: 2017-01-22

## 2017-01-29 PROBLEM — I20.8 OTHER FORMS OF ANGINA PECTORIS: Chronic | Status: ACTIVE | Noted: 2017-01-22

## 2017-01-29 LAB
ALBUMIN SERPL ELPH-MCNC: 3.1 G/DL — LOW (ref 3.3–5)
ALP SERPL-CCNC: 112 U/L — SIGNIFICANT CHANGE UP (ref 40–120)
ALT FLD-CCNC: 16 U/L — SIGNIFICANT CHANGE UP (ref 12–78)
ANION GAP SERPL CALC-SCNC: 11 MMOL/L — SIGNIFICANT CHANGE UP (ref 5–17)
AST SERPL-CCNC: 22 U/L — SIGNIFICANT CHANGE UP (ref 15–37)
BASOPHILS # BLD AUTO: 0.1 K/UL — SIGNIFICANT CHANGE UP (ref 0–0.2)
BASOPHILS NFR BLD AUTO: 0.7 % — SIGNIFICANT CHANGE UP (ref 0–2)
BILIRUB SERPL-MCNC: 0.4 MG/DL — SIGNIFICANT CHANGE UP (ref 0.2–1.2)
BUN SERPL-MCNC: 47 MG/DL — HIGH (ref 7–23)
CALCIUM SERPL-MCNC: 8.8 MG/DL — SIGNIFICANT CHANGE UP (ref 8.5–10.1)
CHLORIDE SERPL-SCNC: 99 MMOL/L — SIGNIFICANT CHANGE UP (ref 96–108)
CK MB BLD-MCNC: 3.6 % — HIGH (ref 0–3.5)
CK MB CFR SERPL CALC: 2.8 NG/ML — SIGNIFICANT CHANGE UP (ref 0–3.6)
CK SERPL-CCNC: 77 U/L — SIGNIFICANT CHANGE UP (ref 26–192)
CO2 SERPL-SCNC: 28 MMOL/L — SIGNIFICANT CHANGE UP (ref 22–31)
CREAT SERPL-MCNC: 2.1 MG/DL — HIGH (ref 0.5–1.3)
EOSINOPHIL # BLD AUTO: 0.2 K/UL — SIGNIFICANT CHANGE UP (ref 0–0.5)
EOSINOPHIL NFR BLD AUTO: 2.6 % — SIGNIFICANT CHANGE UP (ref 0–6)
GLUCOSE SERPL-MCNC: 156 MG/DL — HIGH (ref 70–99)
HCT VFR BLD CALC: 35.4 % — SIGNIFICANT CHANGE UP (ref 34.5–45)
HGB BLD-MCNC: 11 G/DL — LOW (ref 11.5–15.5)
LYMPHOCYTES # BLD AUTO: 1.3 K/UL — SIGNIFICANT CHANGE UP (ref 1–3.3)
LYMPHOCYTES # BLD AUTO: 15.3 % — SIGNIFICANT CHANGE UP (ref 13–44)
MCHC RBC-ENTMCNC: 27.3 PG — SIGNIFICANT CHANGE UP (ref 27–34)
MCHC RBC-ENTMCNC: 31.2 GM/DL — LOW (ref 32–36)
MCV RBC AUTO: 87.5 FL — SIGNIFICANT CHANGE UP (ref 80–100)
MONOCYTES # BLD AUTO: 0.8 K/UL — SIGNIFICANT CHANGE UP (ref 0–0.9)
MONOCYTES NFR BLD AUTO: 8.6 % — SIGNIFICANT CHANGE UP (ref 1–9)
NEUTROPHILS # BLD AUTO: 6.4 K/UL — SIGNIFICANT CHANGE UP (ref 1.8–7.4)
NEUTROPHILS NFR BLD AUTO: 72.8 % — SIGNIFICANT CHANGE UP (ref 43–77)
NT-PROBNP SERPL-SCNC: 2996 PG/ML — HIGH (ref 0–450)
PLATELET # BLD AUTO: 249 K/UL — SIGNIFICANT CHANGE UP (ref 150–400)
POTASSIUM SERPL-MCNC: 3.9 MMOL/L — SIGNIFICANT CHANGE UP (ref 3.5–5.3)
POTASSIUM SERPL-SCNC: 3.9 MMOL/L — SIGNIFICANT CHANGE UP (ref 3.5–5.3)
PROT SERPL-MCNC: 7.7 G/DL — SIGNIFICANT CHANGE UP (ref 6–8.3)
RBC # BLD: 4.05 M/UL — SIGNIFICANT CHANGE UP (ref 3.8–5.2)
RBC # FLD: 17 % — HIGH (ref 10.3–14.5)
SODIUM SERPL-SCNC: 138 MMOL/L — SIGNIFICANT CHANGE UP (ref 135–145)
TROPONIN I SERPL-MCNC: <.015 NG/ML — SIGNIFICANT CHANGE UP (ref 0.01–0.04)
WBC # BLD: 8.8 K/UL — SIGNIFICANT CHANGE UP (ref 3.8–10.5)
WBC # FLD AUTO: 8.8 K/UL — SIGNIFICANT CHANGE UP (ref 3.8–10.5)

## 2017-01-29 PROCEDURE — 82553 CREATINE MB FRACTION: CPT

## 2017-01-29 PROCEDURE — 76700 US EXAM ABDOM COMPLETE: CPT

## 2017-01-29 PROCEDURE — 71250 CT THORAX DX C-: CPT

## 2017-01-29 PROCEDURE — 76705 ECHO EXAM OF ABDOMEN: CPT | Mod: 26

## 2017-01-29 PROCEDURE — 99285 EMERGENCY DEPT VISIT HI MDM: CPT

## 2017-01-29 PROCEDURE — 99284 EMERGENCY DEPT VISIT MOD MDM: CPT | Mod: 25

## 2017-01-29 PROCEDURE — 80053 COMPREHEN METABOLIC PANEL: CPT

## 2017-01-29 PROCEDURE — 76700 US EXAM ABDOM COMPLETE: CPT | Mod: 26

## 2017-01-29 PROCEDURE — 76705 ECHO EXAM OF ABDOMEN: CPT

## 2017-01-29 PROCEDURE — 93005 ELECTROCARDIOGRAM TRACING: CPT

## 2017-01-29 PROCEDURE — 83880 ASSAY OF NATRIURETIC PEPTIDE: CPT

## 2017-01-29 PROCEDURE — 71045 X-RAY EXAM CHEST 1 VIEW: CPT

## 2017-01-29 PROCEDURE — 71010: CPT | Mod: 26

## 2017-01-29 PROCEDURE — 82550 ASSAY OF CK (CPK): CPT

## 2017-01-29 PROCEDURE — 71250 CT THORAX DX C-: CPT | Mod: 26

## 2017-01-29 PROCEDURE — 85027 COMPLETE CBC AUTOMATED: CPT

## 2017-01-29 PROCEDURE — 84484 ASSAY OF TROPONIN QUANT: CPT

## 2017-01-29 NOTE — ED PROVIDER NOTE - PROGRESS NOTE DETAILS
dr schumacher spoke with dr berman who will see pt. -WOODY Farley pt seen by dr berman who advised ct and US. Reevaluated patient at bedside.  Patient feeling much improved.  Discussed the results of all diagnostic testing in ED and copies of all reports given.   An opportunity to ask questions was given.  Discussed the importance of prompt, close medical follow-up.  Patient will return with any changes, concerns or persistent / worsening symptoms.  Understanding of all instructions verbalized.  All results were explained to patient and family and a copy of all available results given.

## 2017-01-29 NOTE — ED ADULT NURSE NOTE - OBJECTIVE STATEMENT
received pt in bed #15B Pt A&Ox3 c/o pain on/off in back between shoulder blades since yesterday pt denies any pain @ this time denies n/v/d. EKG done CM placed Will monitor

## 2017-01-29 NOTE — ED PROVIDER NOTE - ATTENDING CONTRIBUTION TO CARE
87 yo F p/w atypical chest discomfort x past ~ 1 - 2 weeks (same as when in hospital for recent admit). Now with mild upper back pain. No abd pain. No n/v/d. Mild dyspnea, although now improved. NO recent travel. No trauma. No LE edema. NO numb/ting/focal weak. no ortiz / easy fatigue. No other co.  Exam with no acute findings. CTA bl, no W/R/R. RRR s1s2, no MRG. abd soft nt. No other acute findigns.  Pt seen by cardio, check CT non-cont chest, US abd and if no acute pt can fu as outpt.

## 2017-01-29 NOTE — ED PROVIDER NOTE - PHYSICAL EXAMINATION
Spine- no midline or paraspinal tenderness of cspine, thoracic spine or lumbar spine. No signs of back trauma, no masses, no abrasions, no lacerations, no redness, no bruising.  Neck- supple, no midline tenderness to palpation, + FROM,  no abrasions, no ecchymosis.  Neuro- EOM intact, no nystagmus. Pt able to straight leg raise BL. NVI, good distal pulses x 4 extremities, capillary refill <2 sec x 4 extremities, sensation intact throughout, 5/5 motor x 4 extremities. DTRs normal x 4 extremities.

## 2017-01-29 NOTE — ED PROVIDER NOTE - OBJECTIVE STATEMENT
87 y/o female presents to ED c/o chest pain x 2 days. States she feels the pain in between both her shoulder blades. Admits to occasional sob but states that is because of her CHF, states sob is improved since last hospital visit last week. Denies any other complaints. States she otherwise feels good. Denies n/v, f/c, numbness, tingling, dizziness, lightheadedness, visual changes. 87 y/o female presents to ED c/o chest pain x 2 days. States she feels the pain in between both her shoulder blades. Admits to occasional sob but states that is because of her CHF, states sob is improved since last hospital visit last week. Denies any other complaints. States she otherwise feels good. Denies n/v, f/c, numbness, tingling, dizziness, lightheadedness, visual changes. Denies recent fall or trauma. Denies urinary or bowel incontinence. Denies saddle paresthesia.

## 2017-01-31 DIAGNOSIS — N18.3 CHRONIC KIDNEY DISEASE, STAGE 3 (MODERATE): ICD-10-CM

## 2017-01-31 DIAGNOSIS — Z88.8 ALLERGY STATUS TO OTHER DRUGS, MEDICAMENTS AND BIOLOGICAL SUBSTANCES STATUS: ICD-10-CM

## 2017-01-31 DIAGNOSIS — J43.9 EMPHYSEMA, UNSPECIFIED: ICD-10-CM

## 2017-01-31 DIAGNOSIS — Z79.4 LONG TERM (CURRENT) USE OF INSULIN: ICD-10-CM

## 2017-01-31 DIAGNOSIS — J45.909 UNSPECIFIED ASTHMA, UNCOMPLICATED: ICD-10-CM

## 2017-01-31 DIAGNOSIS — I25.2 OLD MYOCARDIAL INFARCTION: ICD-10-CM

## 2017-01-31 DIAGNOSIS — Z87.442 PERSONAL HISTORY OF URINARY CALCULI: ICD-10-CM

## 2017-01-31 DIAGNOSIS — Z86.73 PERSONAL HISTORY OF TRANSIENT ISCHEMIC ATTACK (TIA), AND CEREBRAL INFARCTION WITHOUT RESIDUAL DEFICITS: ICD-10-CM

## 2017-01-31 DIAGNOSIS — E11.9 TYPE 2 DIABETES MELLITUS WITHOUT COMPLICATIONS: ICD-10-CM

## 2017-01-31 DIAGNOSIS — R07.89 OTHER CHEST PAIN: ICD-10-CM

## 2017-01-31 DIAGNOSIS — N32.81 OVERACTIVE BLADDER: ICD-10-CM

## 2017-01-31 DIAGNOSIS — G25.81 RESTLESS LEGS SYNDROME: ICD-10-CM

## 2017-01-31 DIAGNOSIS — I13.0 HYPERTENSIVE HEART AND CHRONIC KIDNEY DISEASE WITH HEART FAILURE AND STAGE 1 THROUGH STAGE 4 CHRONIC KIDNEY DISEASE, OR UNSPECIFIED CHRONIC KIDNEY DISEASE: ICD-10-CM

## 2017-01-31 DIAGNOSIS — Z87.891 PERSONAL HISTORY OF NICOTINE DEPENDENCE: ICD-10-CM

## 2017-01-31 DIAGNOSIS — K21.9 GASTRO-ESOPHAGEAL REFLUX DISEASE WITHOUT ESOPHAGITIS: ICD-10-CM

## 2017-01-31 DIAGNOSIS — I50.23 ACUTE ON CHRONIC SYSTOLIC (CONGESTIVE) HEART FAILURE: ICD-10-CM

## 2017-01-31 DIAGNOSIS — I73.00 RAYNAUD'S SYNDROME WITHOUT GANGRENE: ICD-10-CM

## 2017-01-31 DIAGNOSIS — I48.0 PAROXYSMAL ATRIAL FIBRILLATION: ICD-10-CM

## 2017-01-31 DIAGNOSIS — Z79.01 LONG TERM (CURRENT) USE OF ANTICOAGULANTS: ICD-10-CM

## 2017-01-31 DIAGNOSIS — E78.5 HYPERLIPIDEMIA, UNSPECIFIED: ICD-10-CM

## 2017-01-31 DIAGNOSIS — I25.10 ATHEROSCLEROTIC HEART DISEASE OF NATIVE CORONARY ARTERY WITHOUT ANGINA PECTORIS: ICD-10-CM

## 2017-01-31 DIAGNOSIS — Z88.3 ALLERGY STATUS TO OTHER ANTI-INFECTIVE AGENTS: ICD-10-CM

## 2017-02-01 ENCOUNTER — NON-APPOINTMENT (OUTPATIENT)
Age: 82
End: 2017-02-01

## 2017-02-01 ENCOUNTER — APPOINTMENT (OUTPATIENT)
Dept: CARDIOLOGY | Facility: CLINIC | Age: 82
End: 2017-02-01

## 2017-02-01 VITALS
WEIGHT: 163 LBS | HEART RATE: 69 BPM | SYSTOLIC BLOOD PRESSURE: 150 MMHG | HEIGHT: 60 IN | OXYGEN SATURATION: 97 % | BODY MASS INDEX: 32 KG/M2 | DIASTOLIC BLOOD PRESSURE: 71 MMHG

## 2017-02-01 LAB — INR PPP: 2.3 RATIO

## 2017-02-01 RX ORDER — MIRABEGRON 25 MG/1
25 TABLET, FILM COATED, EXTENDED RELEASE ORAL
Refills: 0 | Status: ACTIVE | COMMUNITY

## 2017-02-01 RX ORDER — POTASSIUM CHLORIDE 20 MEQ
20 TABLET, EXT RELEASE, PARTICLES/CRYSTALS ORAL
Refills: 0 | Status: ACTIVE | COMMUNITY

## 2017-02-01 RX ORDER — ROPINIROLE 0.25 MG/1
0.25 TABLET, FILM COATED ORAL
Refills: 0 | Status: ACTIVE | COMMUNITY

## 2017-02-02 LAB
ANION GAP SERPL CALC-SCNC: 14 MMOL/L
BUN SERPL-MCNC: 55 MG/DL
CALCIUM SERPL-MCNC: 9.7 MG/DL
CHLORIDE SERPL-SCNC: 96 MMOL/L
CO2 SERPL-SCNC: 28 MMOL/L
CREAT SERPL-MCNC: 1.98 MG/DL
GLUCOSE SERPL-MCNC: 109 MG/DL
POTASSIUM SERPL-SCNC: 4.6 MMOL/L
SODIUM SERPL-SCNC: 138 MMOL/L

## 2017-02-24 LAB — INR PPP: 2.4

## 2017-03-15 LAB — INR PPP: 1.8

## 2017-03-23 LAB — INR PPP: 2

## 2017-04-03 LAB — INR PPP: 2.7

## 2017-04-20 LAB — INR PPP: 2.1

## 2017-05-05 LAB — INR PPP: 1.8

## 2017-05-17 LAB — INR PPP: 3.4

## 2017-05-25 ENCOUNTER — RX RENEWAL (OUTPATIENT)
Age: 82
End: 2017-05-25

## 2017-06-06 LAB — INR PPP: 2.2

## 2017-06-23 LAB — INR PPP: 2.5

## 2017-06-26 ENCOUNTER — RX RENEWAL (OUTPATIENT)
Age: 82
End: 2017-06-26

## 2017-07-11 LAB — INR PPP: 2.7

## 2017-07-27 LAB — INR PPP: 2

## 2017-08-09 ENCOUNTER — APPOINTMENT (OUTPATIENT)
Dept: CARDIOLOGY | Facility: CLINIC | Age: 82
End: 2017-08-09

## 2017-08-14 LAB — INR PPP: 2.3

## 2017-08-29 LAB — INR PPP: 2.4

## 2017-09-15 LAB — INR PPP: 2.3

## 2017-10-02 LAB — INR PPP: 2.3

## 2017-10-05 ENCOUNTER — APPOINTMENT (OUTPATIENT)
Dept: CARDIOLOGY | Facility: CLINIC | Age: 82
End: 2017-10-05
Payer: MEDICARE

## 2017-10-05 VITALS
DIASTOLIC BLOOD PRESSURE: 70 MMHG | HEART RATE: 71 BPM | WEIGHT: 160 LBS | BODY MASS INDEX: 31.41 KG/M2 | SYSTOLIC BLOOD PRESSURE: 130 MMHG | HEIGHT: 60 IN | OXYGEN SATURATION: 96 %

## 2017-10-05 PROCEDURE — 99214 OFFICE O/P EST MOD 30 MIN: CPT

## 2017-10-17 LAB — INR PPP: 2.4

## 2017-11-02 LAB — INR PPP: 2.5

## 2017-11-13 ENCOUNTER — APPOINTMENT (OUTPATIENT)
Dept: CARDIOLOGY | Facility: CLINIC | Age: 82
End: 2017-11-13
Payer: MEDICARE

## 2017-11-13 PROCEDURE — 93306 TTE W/DOPPLER COMPLETE: CPT

## 2017-11-17 ENCOUNTER — MEDICATION RENEWAL (OUTPATIENT)
Age: 82
End: 2017-11-17

## 2017-11-20 LAB — INR PPP: 2.4

## 2017-11-22 ENCOUNTER — RX RENEWAL (OUTPATIENT)
Age: 82
End: 2017-11-22

## 2017-12-04 LAB — INR PPP: 2.7

## 2017-12-20 NOTE — DIETITIAN INITIAL EVALUATION ADULT. - FACTORS AFF FOOD INTAKE
Detail Level: Detailed
Add 31243 Cpt? (Important Note: In 2017 The Use Of 43982 Is Being Tracked By Cms To Determine Future Global Period Reimbursement For Global Periods): yes
none

## 2018-01-05 ENCOUNTER — CHART COPY (OUTPATIENT)
Age: 83
End: 2018-01-05

## 2018-01-08 LAB — INR PPP: 2.4

## 2018-01-22 ENCOUNTER — NON-APPOINTMENT (OUTPATIENT)
Age: 83
End: 2018-01-22

## 2018-01-22 ENCOUNTER — APPOINTMENT (OUTPATIENT)
Dept: CARDIOLOGY | Facility: CLINIC | Age: 83
End: 2018-01-22
Payer: MEDICARE

## 2018-01-22 VITALS
DIASTOLIC BLOOD PRESSURE: 73 MMHG | HEART RATE: 64 BPM | WEIGHT: 161 LBS | BODY MASS INDEX: 31.61 KG/M2 | SYSTOLIC BLOOD PRESSURE: 167 MMHG | HEIGHT: 60 IN

## 2018-01-22 DIAGNOSIS — N18.9 CHRONIC KIDNEY DISEASE, UNSPECIFIED: ICD-10-CM

## 2018-01-22 PROCEDURE — 99215 OFFICE O/P EST HI 40 MIN: CPT

## 2018-01-22 PROCEDURE — 93000 ELECTROCARDIOGRAM COMPLETE: CPT

## 2018-01-25 ENCOUNTER — MEDICATION RENEWAL (OUTPATIENT)
Age: 83
End: 2018-01-25

## 2018-01-29 LAB — INR PPP: 2.9

## 2018-02-14 LAB — INR PPP: 2.5

## 2018-03-12 LAB — INR PPP: 2.3

## 2018-03-30 LAB — INR PPP: 2.9

## 2018-04-12 ENCOUNTER — INPATIENT (INPATIENT)
Facility: HOSPITAL | Age: 83
LOS: 1 days | Discharge: HOSPICE | DRG: 552 | End: 2018-04-14
Attending: FAMILY MEDICINE | Admitting: FAMILY MEDICINE
Payer: MEDICARE

## 2018-04-12 ENCOUNTER — RX RENEWAL (OUTPATIENT)
Age: 83
End: 2018-04-12

## 2018-04-12 VITALS
TEMPERATURE: 98 F | RESPIRATION RATE: 12 BRPM | WEIGHT: 154.1 LBS | OXYGEN SATURATION: 95 % | HEART RATE: 87 BPM | DIASTOLIC BLOOD PRESSURE: 77 MMHG | SYSTOLIC BLOOD PRESSURE: 162 MMHG

## 2018-04-12 DIAGNOSIS — R53.1 WEAKNESS: ICD-10-CM

## 2018-04-12 LAB
ALBUMIN SERPL ELPH-MCNC: 3 G/DL — LOW (ref 3.3–5)
ALP SERPL-CCNC: 480 U/L — HIGH (ref 40–120)
ALT FLD-CCNC: 54 U/L — SIGNIFICANT CHANGE UP (ref 12–78)
ANION GAP SERPL CALC-SCNC: 10 MMOL/L — SIGNIFICANT CHANGE UP (ref 5–17)
APPEARANCE UR: CLEAR — SIGNIFICANT CHANGE UP
APTT BLD: 55.1 SEC — HIGH (ref 27.5–37.4)
AST SERPL-CCNC: 234 U/L — HIGH (ref 15–37)
BASOPHILS # BLD AUTO: 0 K/UL — SIGNIFICANT CHANGE UP (ref 0–0.2)
BASOPHILS NFR BLD AUTO: 0.3 % — SIGNIFICANT CHANGE UP (ref 0–2)
BILIRUB SERPL-MCNC: 0.6 MG/DL — SIGNIFICANT CHANGE UP (ref 0.2–1.2)
BILIRUB UR-MCNC: NEGATIVE — SIGNIFICANT CHANGE UP
BUN SERPL-MCNC: 44 MG/DL — HIGH (ref 7–23)
CALCIUM SERPL-MCNC: 9 MG/DL — SIGNIFICANT CHANGE UP (ref 8.5–10.1)
CHLORIDE SERPL-SCNC: 99 MMOL/L — SIGNIFICANT CHANGE UP (ref 96–108)
CK MB BLD-MCNC: 3.7 % — HIGH (ref 0–3.5)
CK MB CFR SERPL CALC: 3.4 NG/ML — SIGNIFICANT CHANGE UP (ref 0–3.6)
CK SERPL-CCNC: 91 U/L — SIGNIFICANT CHANGE UP (ref 26–192)
CO2 SERPL-SCNC: 28 MMOL/L — SIGNIFICANT CHANGE UP (ref 22–31)
COLOR SPEC: YELLOW — SIGNIFICANT CHANGE UP
CREAT SERPL-MCNC: 2 MG/DL — HIGH (ref 0.5–1.3)
DIFF PNL FLD: ABNORMAL
EOSINOPHIL # BLD AUTO: 0.1 K/UL — SIGNIFICANT CHANGE UP (ref 0–0.5)
EOSINOPHIL NFR BLD AUTO: 0.8 % — SIGNIFICANT CHANGE UP (ref 0–6)
GLUCOSE SERPL-MCNC: 135 MG/DL — HIGH (ref 70–99)
GLUCOSE UR QL: NEGATIVE — SIGNIFICANT CHANGE UP
HCT VFR BLD CALC: 43.9 % — SIGNIFICANT CHANGE UP (ref 34.5–45)
HGB BLD-MCNC: 13.7 G/DL — SIGNIFICANT CHANGE UP (ref 11.5–15.5)
INR BLD: 3.26 RATIO — HIGH (ref 0.88–1.16)
KETONES UR-MCNC: NEGATIVE — SIGNIFICANT CHANGE UP
LACTATE SERPL-SCNC: 1.8 MMOL/L — SIGNIFICANT CHANGE UP (ref 0.7–2)
LEUKOCYTE ESTERASE UR-ACNC: NEGATIVE — SIGNIFICANT CHANGE UP
LYMPHOCYTES # BLD AUTO: 1.5 K/UL — SIGNIFICANT CHANGE UP (ref 1–3.3)
LYMPHOCYTES # BLD AUTO: 13.1 % — SIGNIFICANT CHANGE UP (ref 13–44)
MCHC RBC-ENTMCNC: 29.5 PG — SIGNIFICANT CHANGE UP (ref 27–34)
MCHC RBC-ENTMCNC: 31.3 GM/DL — LOW (ref 32–36)
MCV RBC AUTO: 94.4 FL — SIGNIFICANT CHANGE UP (ref 80–100)
MONOCYTES # BLD AUTO: 0.6 K/UL — SIGNIFICANT CHANGE UP (ref 0–0.9)
MONOCYTES NFR BLD AUTO: 5.7 % — SIGNIFICANT CHANGE UP (ref 1–9)
NEUTROPHILS # BLD AUTO: 9.1 K/UL — HIGH (ref 1.8–7.4)
NEUTROPHILS NFR BLD AUTO: 80.1 % — HIGH (ref 43–77)
NITRITE UR-MCNC: NEGATIVE — SIGNIFICANT CHANGE UP
NT-PROBNP SERPL-SCNC: 5615 PG/ML — HIGH (ref 0–450)
PH UR: 8 — SIGNIFICANT CHANGE UP (ref 5–8)
PLATELET # BLD AUTO: 230 K/UL — SIGNIFICANT CHANGE UP (ref 150–400)
POTASSIUM SERPL-MCNC: 4.1 MMOL/L — SIGNIFICANT CHANGE UP (ref 3.5–5.3)
POTASSIUM SERPL-SCNC: 4.1 MMOL/L — SIGNIFICANT CHANGE UP (ref 3.5–5.3)
PROT SERPL-MCNC: 8.3 G/DL — SIGNIFICANT CHANGE UP (ref 6–8.3)
PROT UR-MCNC: NEGATIVE — SIGNIFICANT CHANGE UP
PROTHROM AB SERPL-ACNC: 36.4 SEC — HIGH (ref 9.8–12.7)
RBC # BLD: 4.65 M/UL — SIGNIFICANT CHANGE UP (ref 3.8–5.2)
RBC # FLD: 15.9 % — HIGH (ref 10.3–14.5)
SODIUM SERPL-SCNC: 137 MMOL/L — SIGNIFICANT CHANGE UP (ref 135–145)
SP GR SPEC: 1.01 — SIGNIFICANT CHANGE UP (ref 1.01–1.02)
TROPONIN I SERPL-MCNC: 0.04 NG/ML — SIGNIFICANT CHANGE UP (ref 0.01–0.04)
UROBILINOGEN FLD QL: NEGATIVE — SIGNIFICANT CHANGE UP
WBC # BLD: 11.4 K/UL — HIGH (ref 3.8–10.5)
WBC # FLD AUTO: 11.4 K/UL — HIGH (ref 3.8–10.5)

## 2018-04-12 PROCEDURE — 99285 EMERGENCY DEPT VISIT HI MDM: CPT

## 2018-04-12 PROCEDURE — 93010 ELECTROCARDIOGRAM REPORT: CPT

## 2018-04-12 PROCEDURE — 71045 X-RAY EXAM CHEST 1 VIEW: CPT | Mod: 26

## 2018-04-12 PROCEDURE — 73502 X-RAY EXAM HIP UNI 2-3 VIEWS: CPT | Mod: 26,RT

## 2018-04-12 RX ORDER — DEXTROSE 50 % IN WATER 50 %
1 SYRINGE (ML) INTRAVENOUS ONCE
Qty: 0 | Refills: 0 | Status: DISCONTINUED | OUTPATIENT
Start: 2018-04-12 | End: 2018-04-14

## 2018-04-12 RX ORDER — DEXTROSE 50 % IN WATER 50 %
25 SYRINGE (ML) INTRAVENOUS ONCE
Qty: 0 | Refills: 0 | Status: DISCONTINUED | OUTPATIENT
Start: 2018-04-12 | End: 2018-04-14

## 2018-04-12 RX ORDER — TIOTROPIUM BROMIDE 18 UG/1
1 CAPSULE ORAL; RESPIRATORY (INHALATION) DAILY
Qty: 0 | Refills: 0 | Status: DISCONTINUED | OUTPATIENT
Start: 2018-04-12 | End: 2018-04-14

## 2018-04-12 RX ORDER — PROPRANOLOL HCL 160 MG
120 CAPSULE, EXTENDED RELEASE 24HR ORAL DAILY
Qty: 0 | Refills: 0 | Status: DISCONTINUED | OUTPATIENT
Start: 2018-04-12 | End: 2018-04-14

## 2018-04-12 RX ORDER — INSULIN LISPRO 100/ML
VIAL (ML) SUBCUTANEOUS
Qty: 0 | Refills: 0 | Status: DISCONTINUED | OUTPATIENT
Start: 2018-04-12 | End: 2018-04-14

## 2018-04-12 RX ORDER — DEXTROSE 50 % IN WATER 50 %
12.5 SYRINGE (ML) INTRAVENOUS ONCE
Qty: 0 | Refills: 0 | Status: DISCONTINUED | OUTPATIENT
Start: 2018-04-12 | End: 2018-04-14

## 2018-04-12 RX ORDER — ROPINIROLE 8 MG/1
0.25 TABLET, FILM COATED, EXTENDED RELEASE ORAL AT BEDTIME
Qty: 0 | Refills: 0 | Status: DISCONTINUED | OUTPATIENT
Start: 2018-04-12 | End: 2018-04-14

## 2018-04-12 RX ORDER — SODIUM CHLORIDE 9 MG/ML
500 INJECTION INTRAMUSCULAR; INTRAVENOUS; SUBCUTANEOUS ONCE
Qty: 0 | Refills: 0 | Status: COMPLETED | OUTPATIENT
Start: 2018-04-12 | End: 2018-04-12

## 2018-04-12 RX ORDER — GLUCAGON INJECTION, SOLUTION 0.5 MG/.1ML
1 INJECTION, SOLUTION SUBCUTANEOUS ONCE
Qty: 0 | Refills: 0 | Status: DISCONTINUED | OUTPATIENT
Start: 2018-04-12 | End: 2018-04-14

## 2018-04-12 RX ORDER — INSULIN LISPRO 100/ML
5 VIAL (ML) SUBCUTANEOUS
Qty: 0 | Refills: 0 | Status: DISCONTINUED | OUTPATIENT
Start: 2018-04-12 | End: 2018-04-14

## 2018-04-12 RX ORDER — SODIUM CHLORIDE 9 MG/ML
1000 INJECTION, SOLUTION INTRAVENOUS
Qty: 0 | Refills: 0 | Status: DISCONTINUED | OUTPATIENT
Start: 2018-04-12 | End: 2018-04-14

## 2018-04-12 RX ORDER — SODIUM CHLORIDE 9 MG/ML
1000 INJECTION INTRAMUSCULAR; INTRAVENOUS; SUBCUTANEOUS
Qty: 0 | Refills: 0 | Status: DISCONTINUED | OUTPATIENT
Start: 2018-04-12 | End: 2018-04-14

## 2018-04-12 RX ORDER — PANTOPRAZOLE SODIUM 20 MG/1
40 TABLET, DELAYED RELEASE ORAL
Qty: 0 | Refills: 0 | Status: DISCONTINUED | OUTPATIENT
Start: 2018-04-12 | End: 2018-04-14

## 2018-04-12 RX ORDER — ACETAMINOPHEN 500 MG
650 TABLET ORAL EVERY 6 HOURS
Qty: 0 | Refills: 0 | Status: DISCONTINUED | OUTPATIENT
Start: 2018-04-12 | End: 2018-04-12

## 2018-04-12 RX ORDER — NITROGLYCERIN 6.5 MG
1 CAPSULE, EXTENDED RELEASE ORAL DAILY
Qty: 0 | Refills: 0 | Status: DISCONTINUED | OUTPATIENT
Start: 2018-04-12 | End: 2018-04-14

## 2018-04-12 RX ORDER — INSULIN GLARGINE 100 [IU]/ML
10 INJECTION, SOLUTION SUBCUTANEOUS AT BEDTIME
Qty: 0 | Refills: 0 | Status: DISCONTINUED | OUTPATIENT
Start: 2018-04-12 | End: 2018-04-14

## 2018-04-12 RX ORDER — SIMVASTATIN 20 MG/1
10 TABLET, FILM COATED ORAL AT BEDTIME
Qty: 0 | Refills: 0 | Status: DISCONTINUED | OUTPATIENT
Start: 2018-04-12 | End: 2018-04-12

## 2018-04-12 RX ORDER — INSULIN LISPRO 100/ML
VIAL (ML) SUBCUTANEOUS AT BEDTIME
Qty: 0 | Refills: 0 | Status: DISCONTINUED | OUTPATIENT
Start: 2018-04-12 | End: 2018-04-14

## 2018-04-12 RX ORDER — INSULIN GLARGINE 100 [IU]/ML
20 INJECTION, SOLUTION SUBCUTANEOUS EVERY MORNING
Qty: 0 | Refills: 0 | Status: DISCONTINUED | OUTPATIENT
Start: 2018-04-12 | End: 2018-04-14

## 2018-04-12 RX ADMIN — INSULIN GLARGINE 10 UNIT(S): 100 INJECTION, SOLUTION SUBCUTANEOUS at 22:46

## 2018-04-12 RX ADMIN — SODIUM CHLORIDE 500 MILLILITER(S): 9 INJECTION INTRAMUSCULAR; INTRAVENOUS; SUBCUTANEOUS at 21:37

## 2018-04-12 RX ADMIN — Medication 1 PATCH: at 22:46

## 2018-04-12 NOTE — ED ADULT NURSE NOTE - OBJECTIVE STATEMENT
89yo female walked into ED, pt c/o "im so weak, I can barely stand" pt denies any pain/ dizziness, nausea, diarrhea. pt has son at the bed side

## 2018-04-12 NOTE — H&P ADULT - NSHPLABSRESULTS_GEN_ALL_CORE
12.5   9.7   )-----------( 175      ( 2018 06:58 )             39.3     2018 06:58    143    |  104    |  41     ----------------------------<  84     3.8     |  31     |  1.70     Ca    9.0        2018 06:58  Phos  2.7       2018 06:58  Mg     1.8       2018 06:58    TPro  7.1    /  Alb  2.6    /  TBili  0.7    /  DBili  .20    /  AST  212    /  ALT  47     /  AlkPhos  393    2018 06:58    LIVER FUNCTIONS - ( 2018 06:58 )  Alb: 2.6 g/dL / Pro: 7.1 g/dL / ALK PHOS: 393 U/L / ALT: 47 U/L / AST: 212 U/L / GGT: x           PT/INR - ( 2018 06:58 )   PT: 30.8 sec;   INR: 2.77 ratio         PTT - ( 2018 20:45 )  PTT:55.1 sec  CAPILLARY BLOOD GLUCOSE      POCT Blood Glucose.: 228 mg/dL (2018 21:43)  POCT Blood Glucose.: 146 mg/dL (2018 16:35)  POCT Blood Glucose.: 158 mg/dL (2018 11:49)  POCT Blood Glucose.: 100 mg/dL (2018 08:00)  POCT Blood Glucose.: 118 mg/dL (2018 22:09)    CARDIAC MARKERS ( 2018 20:45 )  .044 ng/mL / x     / 91 U/L / x     / 3.4 ng/mL      Urinalysis Basic - ( 2018 20:45 )    Color: Yellow / Appearance: Clear / S.010 / pH: x  Gluc: x / Ketone: Negative  / Bili: Negative / Urobili: Negative   Blood: x / Protein: Negative / Nitrite: Negative   Leuk Esterase: Negative / RBC: 0-2 /HPF / WBC 0-2   Sq Epi: x / Non Sq Epi: Occasional / Bacteria: Negative

## 2018-04-12 NOTE — H&P ADULT - HISTORY OF PRESENT ILLNESS
Allison Gunter is an 89 YO W female brought to ER because of generalized weakness for 2 days. Patient states she is unable to stand due to weakness.  Normally walks with a walker. She denies fever or chills. denies chest pain. +sob, which patient states is chronic. No abdominal pain, vomiting or diarrhea.  She c/o pain to right hip/groin since this am, radiating down right leg. Denies any fall or back pain. No dysuria or hematuria. Patient states she recently took doxycycline for sinus infection which she believes resulted in dehydration. She had similar episodes in the past. Allison Gunter is an 89 YO W female brought to ER because of generalized weakness for 2 days. Patient states she is unable to stand due to weakness.  Normally walks with a walker. She denies fever or chills.  No chest pain.  Patient has SOB, which she states is chronic. No abdominal pain, vomiting or diarrhea.  She c/o pain to right hip/groin since this am, radiating down right leg. Denies any fall or back pain. No dysuria or hematuria. Patient states she recently took doxycycline for sinus infection which she believes resulted in dehydration. She had similar episodes in the past.

## 2018-04-12 NOTE — ED PROVIDER NOTE - PROGRESS NOTE DETAILS
patient able to ambulate; but still weak  dehydration, with renal insufficiency  hydration  d./w Dr. Giron who will admit on behalf of Dr. Go

## 2018-04-12 NOTE — ED PROVIDER NOTE - OBJECTIVE STATEMENT
89 yo female with hx htn, dm, a fib, cad, chf c/o generalized weakness x 2 days. Patient states she is unable to stand due to weakness. normally walks with a walker. denies fever or chills. denies chest pain. +sob, which patient states is chronic. denies abdominal pain, vomiting or diarrhea. c/o pain to right hip/groin since this am, radiating down right leg. denies any fall. denies back pain. denies dysuria or hematuria. patient states she recently took doxycycline for sinus infeciton, which she believes resulted in dehydration. patient reports similar episodes in the past  pmd [employer]  cards Emily

## 2018-04-12 NOTE — H&P ADULT - NEUROLOGICAL DETAILS
cranial nerves intact/no spontaneous movement/deep reflexes intact/superficial reflexes intact/alert and oriented x 3

## 2018-04-12 NOTE — H&P ADULT - PROBLEM SELECTOR PROBLEM 4
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

## 2018-04-13 ENCOUNTER — TRANSCRIPTION ENCOUNTER (OUTPATIENT)
Age: 83
End: 2018-04-13

## 2018-04-13 DIAGNOSIS — N32.81 OVERACTIVE BLADDER: ICD-10-CM

## 2018-04-13 DIAGNOSIS — E11.52 TYPE 2 DIABETES MELLITUS WITH DIABETIC PERIPHERAL ANGIOPATHY WITH GANGRENE: ICD-10-CM

## 2018-04-13 DIAGNOSIS — R26.81 UNSTEADINESS ON FEET: ICD-10-CM

## 2018-04-13 DIAGNOSIS — R53.1 WEAKNESS: ICD-10-CM

## 2018-04-13 DIAGNOSIS — I10 ESSENTIAL (PRIMARY) HYPERTENSION: ICD-10-CM

## 2018-04-13 DIAGNOSIS — I48.2 CHRONIC ATRIAL FIBRILLATION: ICD-10-CM

## 2018-04-13 LAB
ALBUMIN SERPL ELPH-MCNC: 2.6 G/DL — LOW (ref 3.3–5)
ALP SERPL-CCNC: 393 U/L — HIGH (ref 40–120)
ALT FLD-CCNC: 47 U/L — SIGNIFICANT CHANGE UP (ref 12–78)
ANION GAP SERPL CALC-SCNC: 8 MMOL/L — SIGNIFICANT CHANGE UP (ref 5–17)
AST SERPL-CCNC: 212 U/L — HIGH (ref 15–37)
BASOPHILS # BLD AUTO: 0 K/UL — SIGNIFICANT CHANGE UP (ref 0–0.2)
BASOPHILS NFR BLD AUTO: 0.3 % — SIGNIFICANT CHANGE UP (ref 0–2)
BILIRUB DIRECT SERPL-MCNC: 0.2 MG/DL — SIGNIFICANT CHANGE UP (ref 0.05–0.2)
BILIRUB INDIRECT FLD-MCNC: 0.5 MG/DL — SIGNIFICANT CHANGE UP (ref 0.2–1)
BILIRUB SERPL-MCNC: 0.7 MG/DL — SIGNIFICANT CHANGE UP (ref 0.2–1.2)
BUN SERPL-MCNC: 41 MG/DL — HIGH (ref 7–23)
CALCIUM SERPL-MCNC: 9 MG/DL — SIGNIFICANT CHANGE UP (ref 8.5–10.1)
CHLORIDE SERPL-SCNC: 104 MMOL/L — SIGNIFICANT CHANGE UP (ref 96–108)
CHOLEST SERPL-MCNC: 139 MG/DL — SIGNIFICANT CHANGE UP (ref 10–199)
CO2 SERPL-SCNC: 31 MMOL/L — SIGNIFICANT CHANGE UP (ref 22–31)
CREAT SERPL-MCNC: 1.7 MG/DL — HIGH (ref 0.5–1.3)
EOSINOPHIL # BLD AUTO: 0.1 K/UL — SIGNIFICANT CHANGE UP (ref 0–0.5)
EOSINOPHIL NFR BLD AUTO: 0.8 % — SIGNIFICANT CHANGE UP (ref 0–6)
GLUCOSE SERPL-MCNC: 84 MG/DL — SIGNIFICANT CHANGE UP (ref 70–99)
HBA1C BLD-MCNC: 7.5 % — HIGH (ref 4–5.6)
HCT VFR BLD CALC: 39.3 % — SIGNIFICANT CHANGE UP (ref 34.5–45)
HDLC SERPL-MCNC: 38 MG/DL — LOW (ref 40–125)
HGB BLD-MCNC: 12.5 G/DL — SIGNIFICANT CHANGE UP (ref 11.5–15.5)
INR BLD: 2.77 RATIO — HIGH (ref 0.88–1.16)
LIPID PNL WITH DIRECT LDL SERPL: 72 MG/DL — SIGNIFICANT CHANGE UP
LYMPHOCYTES # BLD AUTO: 1.1 K/UL — SIGNIFICANT CHANGE UP (ref 1–3.3)
LYMPHOCYTES # BLD AUTO: 11.4 % — LOW (ref 13–44)
MAGNESIUM SERPL-MCNC: 1.8 MG/DL — SIGNIFICANT CHANGE UP (ref 1.6–2.6)
MCHC RBC-ENTMCNC: 29.9 PG — SIGNIFICANT CHANGE UP (ref 27–34)
MCHC RBC-ENTMCNC: 31.9 GM/DL — LOW (ref 32–36)
MCV RBC AUTO: 93.8 FL — SIGNIFICANT CHANGE UP (ref 80–100)
MONOCYTES # BLD AUTO: 0.8 K/UL — SIGNIFICANT CHANGE UP (ref 0–0.9)
MONOCYTES NFR BLD AUTO: 8.3 % — SIGNIFICANT CHANGE UP (ref 1–9)
NEUTROPHILS # BLD AUTO: 7.7 K/UL — HIGH (ref 1.8–7.4)
NEUTROPHILS NFR BLD AUTO: 79.3 % — HIGH (ref 43–77)
PHOSPHATE SERPL-MCNC: 2.7 MG/DL — SIGNIFICANT CHANGE UP (ref 2.5–4.5)
PLATELET # BLD AUTO: 175 K/UL — SIGNIFICANT CHANGE UP (ref 150–400)
POTASSIUM SERPL-MCNC: 3.8 MMOL/L — SIGNIFICANT CHANGE UP (ref 3.5–5.3)
POTASSIUM SERPL-SCNC: 3.8 MMOL/L — SIGNIFICANT CHANGE UP (ref 3.5–5.3)
PROT SERPL-MCNC: 7.1 G/DL — SIGNIFICANT CHANGE UP (ref 6–8.3)
PROTHROM AB SERPL-ACNC: 30.8 SEC — HIGH (ref 9.8–12.7)
RBC # BLD: 4.19 M/UL — SIGNIFICANT CHANGE UP (ref 3.8–5.2)
RBC # FLD: 15.8 % — HIGH (ref 10.3–14.5)
SODIUM SERPL-SCNC: 143 MMOL/L — SIGNIFICANT CHANGE UP (ref 135–145)
T3 SERPL-MCNC: 130 NG/DL — SIGNIFICANT CHANGE UP (ref 80–200)
T4 AB SER-ACNC: 9.8 UG/DL — SIGNIFICANT CHANGE UP (ref 4.6–12)
TOTAL CHOLESTEROL/HDL RATIO MEASUREMENT: 3.7 RATIO — SIGNIFICANT CHANGE UP (ref 3.3–7.1)
TRIGL SERPL-MCNC: 143 MG/DL — SIGNIFICANT CHANGE UP (ref 10–149)
TSH SERPL-MCNC: 1.5 UIU/ML — SIGNIFICANT CHANGE UP (ref 0.36–3.74)
WBC # BLD: 9.7 K/UL — SIGNIFICANT CHANGE UP (ref 3.8–10.5)
WBC # FLD AUTO: 9.7 K/UL — SIGNIFICANT CHANGE UP (ref 3.8–10.5)

## 2018-04-13 PROCEDURE — 72148 MRI LUMBAR SPINE W/O DYE: CPT | Mod: 26

## 2018-04-13 PROCEDURE — 72100 X-RAY EXAM L-S SPINE 2/3 VWS: CPT | Mod: 26

## 2018-04-13 PROCEDURE — 76700 US EXAM ABDOM COMPLETE: CPT | Mod: 26

## 2018-04-13 RX ORDER — WARFARIN SODIUM 2.5 MG/1
1 TABLET ORAL DAILY
Qty: 0 | Refills: 0 | Status: DISCONTINUED | OUTPATIENT
Start: 2018-04-13 | End: 2018-04-14

## 2018-04-13 RX ADMIN — Medication 1 PATCH: at 12:42

## 2018-04-13 RX ADMIN — Medication 5 UNIT(S): at 17:10

## 2018-04-13 RX ADMIN — Medication 2: at 08:00

## 2018-04-13 RX ADMIN — Medication 120 MILLIGRAM(S): at 05:34

## 2018-04-13 RX ADMIN — INSULIN GLARGINE 20 UNIT(S): 100 INJECTION, SOLUTION SUBCUTANEOUS at 08:00

## 2018-04-13 RX ADMIN — SODIUM CHLORIDE 40 MILLILITER(S): 9 INJECTION INTRAMUSCULAR; INTRAVENOUS; SUBCUTANEOUS at 05:33

## 2018-04-13 RX ADMIN — ROPINIROLE 0.25 MILLIGRAM(S): 8 TABLET, FILM COATED, EXTENDED RELEASE ORAL at 20:57

## 2018-04-13 RX ADMIN — WARFARIN SODIUM 1 MILLIGRAM(S): 2.5 TABLET ORAL at 20:57

## 2018-04-13 RX ADMIN — Medication 5 UNIT(S): at 08:00

## 2018-04-13 RX ADMIN — Medication 5 UNIT(S): at 12:19

## 2018-04-13 RX ADMIN — INSULIN GLARGINE 10 UNIT(S): 100 INJECTION, SOLUTION SUBCUTANEOUS at 22:43

## 2018-04-13 RX ADMIN — Medication 2: at 12:18

## 2018-04-13 RX ADMIN — Medication 24 MILLIGRAM(S): at 17:10

## 2018-04-13 RX ADMIN — PANTOPRAZOLE SODIUM 40 MILLIGRAM(S): 20 TABLET, DELAYED RELEASE ORAL at 05:33

## 2018-04-13 NOTE — DISCHARGE NOTE ADULT - HOSPITAL COURSE
admitted for weakness  found to have TRISH  gentle IV hydration per renal  medrol for sciatica  DC after PT and renal clearance

## 2018-04-13 NOTE — DISCHARGE NOTE ADULT - CARE PROVIDER_API CALL
Kwadwo Cannon), Medicine  [address] [facility]  [address] [place] [id number]  Phone: [phone]  Fax: [phone]    Skinny Daiz), Cardiovascular Disease; Internal Medicine  [address] [place] [address]  Phone: [phone]  Fax: [phone]    armando zapata / [facility]  Phone: (   )    -  Fax: (   )    -    Bakari Kiser (RJ), Neurology  [address] [place] [address]  Phone: [phone]  Fax: [phone]

## 2018-04-13 NOTE — DISCHARGE NOTE ADULT - MEDICATION SUMMARY - MEDICATIONS TO TAKE
I will START or STAY ON the medications listed below when I get home from the hospital:    myrebetriq  -- 25 milligram(s) by mouth once a day  -- Indication: For OAB (overactive bladder)    Medrol Dosepak 4 mg oral tablet  -- 1 packet(s) by mouth once   dispense 1 packet  take as directed on packet  -- It is very important that you take or use this exactly as directed.  Do not skip doses or discontinue unless directed by your doctor.  Obtain medical advice before taking any non-prescription drugs as some may affect the action of this medication.  Take with food or milk.    -- Indication: For sciatica    Nitro-Dur 0.4 mg/hr transdermal film, extended release  -- 1 patch by transdermal patch once a day  -- Indication: For Essential hypertension    propranolol 120 mg oral capsule, extended release  -- 1 cap(s) by mouth once a day  -- Indication: For Essential hypertension    Coumadin 1 mg oral tablet  -- 1 tab(s) by mouth once a day alternating with 2mg  -- every other day with Coumadin 2mg po  -- Indication: For Chronic atrial fibrillation    Coumadin 2 mg oral tablet  -- 1 tab(s) by mouth once a day alternating  with 2mg  -- Indication: For Chronic atrial fibrillation    NovoLOG FlexPen 100 units/mL subcutaneous solution  -- 7 unit(s) subcutaneous 3 times a day (with meals)  -- Indication: For DM    Levemir 100 units/mL subcutaneous solution  -- 35 unit(s) subcutaneous once a day  -- Indication: For DM    Levemir 100 units/mL subcutaneous solution  -- 18 unit(s) subcutaneous once a day (at bedtime)  -- Indication: For DM    Vytorin 10 mg-10 mg oral tablet  -- 1 tab(s) by mouth once a day  -- Indication: For high lipids    rOPINIRole 0.25 mg oral tablet  -- 1 tab(s) by mouth once a day (at bedtime)  -- Indication: For home med    tiotropium 18 mcg inhalation capsule  -- 1 cap(s) inhaled once a day  -- Indication: For breathing    furosemide 40 mg oral tablet  -- 1 tab(s) by mouth once a day   -- Indication: For HF    potassium chloride 20 mEq oral tablet, extended release  -- 1 tab(s) by mouth once a day  -- Indication: For suplement    omeprazole 40 mg oral delayed release capsule  -- 1 cap(s) by mouth once a day  -- Indication: For gerd

## 2018-04-13 NOTE — CONSULT NOTE ADULT - ASSESSMENT
·	Prerenal azotemia, CKD 3  ·	Hypertension  ·	Diabetes  ·	Weakness, ? Unsteady gait.     Gentle IV hydration. Improving renal indices. To continue current treatment. Enourage PO intake as tolerated.   Check repeat labs and monitor renal function trend. Physical therapy.   Avoid nephrotoxic meds as possible. Monitor BP trend. Titrate BP meds as needed. Salt restriction.   Monitor blood sugar levels. Coverage as needed. Dietary restriction.  D/w pt's daughter at bedside.   Further recommendations pending clinical course. Thank you for the courtesy of this referral.

## 2018-04-13 NOTE — CONSULT NOTE ADULT - SUBJECTIVE AND OBJECTIVE BOX
Patient is a 89y old  Female who presents with a chief complaint of Weakness (2018 00:34)    HPI:  Allison Gunter is an 88 YO W female brought to ER because of generalized weakness for 2 days. Patient states she is unable to stand due to weakness.  Normally walks with a walker. She denies fever or chills. denies chest pain. +sob, which patient states is chronic. No abdominal pain, vomiting or diarrhea.  She c/o pain to right hip/groin since this am, radiating down right leg. Denies any fall or back pain. No dysuria or hematuria. Patient states she recently took doxycycline for sinus infection which she believes resulted in dehydration. She had similar episodes in the past. (2018 22:24)    Renal consult called for CKD 3.       PAST MEDICAL HISTORY:  OAB (overactive bladder)  GERD (gastroesophageal reflux disease)  Angina effort  Heart failure  Upper respiratory infection  Diabetes  Renal stones  Myocardial infarction  Spinal stenosis  CVA (cerebral infarction)  Afib  Raynaud disease  Acid reflux  Hypertension  Hyperlipidemia  Asthma      PAST SURGICAL HISTORY:  Cataract  S/P hysterectomy      FAMILY HISTORY:  No pertinent family history in first degree relatives      SOCIAL HISTORY: No smoking or alcohol use     Allergies    Levaquin (Other)  ramipril (Other)    Intolerances      Home Medications:  Coumadin 1 mg oral tablet: 1 tab(s) orally once a day alternating with 2mg (2016 21:44)  Coumadin 2 mg oral tablet: 1 tab(s) orally once a day alternating  with 2mg (2016 21:43)  Levemir 100 units/mL subcutaneous solution: 35 unit(s) subcutaneous once a day (2016 21:39)  Levemir 100 units/mL subcutaneous solution: 18 unit(s) subcutaneous once a day (at bedtime) (2016 21:42)  myrebetriq: 25 milligram(s) orally once a day (2016 16:45)  Nitro-Dur 0.4 mg/hr transdermal film, extended release: 1 patch transdermal once a day (2016 13:22)  NovoLOG FlexPen 100 units/mL subcutaneous solution: 7 unit(s) subcutaneous 3 times a day (with meals) (2016 16:45)  omeprazole 40 mg oral delayed release capsule: 1 cap(s) orally once a day (2016 16:45)  tiotropium 18 mcg inhalation capsule: 1 cap(s) inhaled once a day (01 Dec 2016 14:59)  Vytorin 10 mg-10 mg oral tablet: 1 tab(s) orally once a day (2014 18:15)    MEDICATIONS  (STANDING):  dextrose 5%. 1000 milliLiter(s) (50 mL/Hr) IV Continuous <Continuous>  dextrose 50% Injectable 12.5 Gram(s) IV Push once  dextrose 50% Injectable 25 Gram(s) IV Push once  dextrose 50% Injectable 25 Gram(s) IV Push once  insulin glargine Injectable (LANTUS) 10 Unit(s) SubCutaneous at bedtime  insulin glargine Injectable (LANTUS) 20 Unit(s) SubCutaneous every morning  insulin lispro (HumaLOG) corrective regimen sliding scale   SubCutaneous three times a day before meals  insulin lispro (HumaLOG) corrective regimen sliding scale   SubCutaneous at bedtime  insulin lispro Injectable (HumaLOG) 5 Unit(s) SubCutaneous three times a day before meals  nitroglycerin    Patch 0.4 mG/Hr(s) 1 patch Transdermal daily  pantoprazole    Tablet 40 milliGRAM(s) Oral before breakfast  propranolol  milliGRAM(s) Oral daily  rOPINIRole 0.25 milliGRAM(s) Oral at bedtime  sodium chloride 0.9%. 1000 milliLiter(s) (40 mL/Hr) IV Continuous <Continuous>  tiotropium 18 MICROgram(s) Capsule 1 Capsule(s) Inhalation daily  warfarin 1 milliGRAM(s) Oral daily    MEDICATIONS  (PRN):  dextrose Gel 1 Dose(s) Oral once PRN Blood Glucose LESS THAN 70 milliGRAM(s)/deciliter  glucagon  Injectable 1 milliGRAM(s) IntraMuscular once PRN Glucose LESS THAN 70 milligrams/deciliter      REVIEW OF SYSTEMS:  General: NAD, Feeling weak  Respiratory: No cough, SOB  Cardiovascular: No CP or Palpitations	  Gastrointestinal: No nausea, Vomiting. No diarrhea  Genitourinary: No urinary complaints	  Musculoskeletal: No leg swelling, No new rash or lesions	  all other systems negative    T(F): 98.4 (18 @ 05:23), Max: 99.4 (18 @ 20:30)  HR: 74 (18 @ 05:23) (58 - 87)  BP: 136/64 (18 @ 05:23) (129/66 - 162/77)  RR: 18 (18 @ 05:23) ( - )  SpO2: 98% (18 @ 05:23) (95% - 99%)  Wt(kg): --    PHYSICAL EXAM:  General: NAD  Respiratory: b/l air entry  Cardiovascular: S1 S2  Gastrointestinal: soft  Extremities: no edema            143  |  104  |  41<H>  ----------------------------<  84  3.8   |  31  |  1.70<H>    Ca    9.0      2018 06:58  Phos  2.7       Mg     1.8         TPro  7.1  /  Alb  2.6<L>  /  TBili  0.7  /  DBili  .20  /  AST  212<H>  /  ALT  47  /  AlkPhos  393<H>                            12.5   9.7   )-----------( 175      ( 2018 06:58 )             39.3       Potassium, Serum: 3.8 mmol/L ( @ 06:58)  Blood Urea Nitrogen, Serum: 41 mg/dL ( @ 06:58)  Calcium, Total Serum: 9.0 mg/dL ( @ 06:58)  Hemoglobin: 12.5 g/dL ( @ 06:58)      Creatinine, Serum: 1.70 ( @ 06:58)  Creatinine, Serum: 2.00 ( @ 20:45)      Urinalysis Basic - ( 2018 20:45 )    Color: Yellow / Appearance: Clear / S.010 / pH: x  Gluc: x / Ketone: Negative  / Bili: Negative / Urobili: Negative   Blood: x / Protein: Negative / Nitrite: Negative   Leuk Esterase: Negative / RBC: 0-2 /HPF / WBC 0-2   Sq Epi: x / Non Sq Epi: Occasional / Bacteria: Negative      LIVER FUNCTIONS - ( 2018 06:58 )  Alb: 2.6 g/dL / Pro: 7.1 g/dL / ALK PHOS: 393 U/L / ALT: 47 U/L / AST: 212 U/L / GGT: x           CARDIAC MARKERS ( 2018 20:45 )  .044 ng/mL / x     / 91 U/L / x     / 3.4 ng/mL      Creatine Kinase, Serum: 91 U/L (18 @ 20:45)          I&O's Detail    2018 07:01  -  2018 07:00  --------------------------------------------------------  IN:    sodium chloride 0.9%.: 350 mL  Total IN: 350 mL    OUT:  Total OUT: 0 mL    Total NET: 350 mL

## 2018-04-13 NOTE — PHYSICAL THERAPY INITIAL EVALUATION ADULT - PERTINENT HX OF CURRENT PROBLEM, REHAB EVAL
Allison Gunter is an 89 YO W female brought to ER because of generalized weakness for 2 days. Patient states she is unable to stand due to weakness.  Normally walks with a walker. She denies fever or chills. denies chest pain. +sob, which patient states is chronic.

## 2018-04-13 NOTE — DISCHARGE NOTE ADULT - CARE PROVIDERS DIRECT ADDRESSES
,DirectAddress_Unknown,[email or web address],DirectAddress_Unknown,DirectAddress_Unknown

## 2018-04-13 NOTE — GOALS OF CARE CONVERSATION - PERSONAL ADVANCE DIRECTIVE - CONVERSATION DETAILS
met pt, confirmed hcp from prior hospitalization EMR. daughter, Yue is hcp, she is going away today. pt son, Behzad is alt hcp. son to be visiting in a while, to speak w pt and son regarding pt wishes. pt indicated she may want to be dnr. contact # given, PC will follow

## 2018-04-13 NOTE — DISCHARGE NOTE ADULT - PATIENT PORTAL LINK FT
You can access [facility], offered by [facility], by registering with the following website: [email or web address]

## 2018-04-13 NOTE — DISCHARGE NOTE ADULT - NS AS DC STROKE ED MATERIALS
Need for Followup After Discharge/Call 911 for Stroke/Risk Factors for Stroke/Atrial Fibrillation and Diabetes are risk factors for strokes/Stroke Education Booklet/Prescribed Medications/Stroke Warning Signs and Symptoms Atrial Fibrillation and Diabetes are risk factors for strokes

## 2018-04-13 NOTE — PATIENT PROFILE ADULT. - ABILITY TO HEAR (WITH HEARING AID OR HEARING APPLIANCE IF NORMALLY USED):
Mildly to Moderately Impaired: difficulty hearing in some environments or speaker may need to increase volume or speak distinctly/b/l hearing aides

## 2018-04-13 NOTE — DISCHARGE NOTE ADULT - CARE PLAN
Principal Discharge DX:	Weakness  Goal:	free from falling  Assessment and plan of treatment:	follow up with Neurologist Dr. REED  Secondary Diagnosis:	Acid reflux  Secondary Diagnosis:	Afib  Secondary Diagnosis:	Chronic atrial fibrillation  Secondary Diagnosis:	Essential hypertension  Secondary Diagnosis:	Heart failure  Secondary Diagnosis:	Hyperlipidemia

## 2018-04-13 NOTE — DISCHARGE NOTE ADULT - PROVIDER TOKENS
TOKEN:'[id number]:'[id number]',FREE:[LAST:[benny],FIRST:[armando],PHONE:[(   )    -],FAX:[(   )    -],ADDRESS:[[facility]]],TOKEN:'[id number]'

## 2018-04-14 VITALS
RESPIRATION RATE: 18 BRPM | TEMPERATURE: 98 F | HEART RATE: 80 BPM | DIASTOLIC BLOOD PRESSURE: 52 MMHG | SYSTOLIC BLOOD PRESSURE: 125 MMHG | OXYGEN SATURATION: 97 %

## 2018-04-14 LAB
-  AMIKACIN: SIGNIFICANT CHANGE UP
-  AMOXICILLIN/CLAVULANIC ACID: SIGNIFICANT CHANGE UP
-  AMPICILLIN/SULBACTAM: SIGNIFICANT CHANGE UP
-  AMPICILLIN: SIGNIFICANT CHANGE UP
-  AZTREONAM: SIGNIFICANT CHANGE UP
-  CEFAZOLIN: SIGNIFICANT CHANGE UP
-  CEFEPIME: SIGNIFICANT CHANGE UP
-  CEFOXITIN: SIGNIFICANT CHANGE UP
-  CEFTRIAXONE: SIGNIFICANT CHANGE UP
-  CIPROFLOXACIN: SIGNIFICANT CHANGE UP
-  ERTAPENEM: SIGNIFICANT CHANGE UP
-  GENTAMICIN: SIGNIFICANT CHANGE UP
-  IMIPENEM: SIGNIFICANT CHANGE UP
-  LEVOFLOXACIN: SIGNIFICANT CHANGE UP
-  MEROPENEM: SIGNIFICANT CHANGE UP
-  NITROFURANTOIN: SIGNIFICANT CHANGE UP
-  PIPERACILLIN/TAZOBACTAM: SIGNIFICANT CHANGE UP
-  TIGECYCLINE: SIGNIFICANT CHANGE UP
-  TOBRAMYCIN: SIGNIFICANT CHANGE UP
-  TRIMETHOPRIM/SULFAMETHOXAZOLE: SIGNIFICANT CHANGE UP
ANION GAP SERPL CALC-SCNC: 9 MMOL/L — SIGNIFICANT CHANGE UP (ref 5–17)
BUN SERPL-MCNC: 42 MG/DL — HIGH (ref 7–23)
CALCIUM SERPL-MCNC: 9.4 MG/DL — SIGNIFICANT CHANGE UP (ref 8.5–10.1)
CHLORIDE SERPL-SCNC: 102 MMOL/L — SIGNIFICANT CHANGE UP (ref 96–108)
CO2 SERPL-SCNC: 28 MMOL/L — SIGNIFICANT CHANGE UP (ref 22–31)
CREAT SERPL-MCNC: 1.8 MG/DL — HIGH (ref 0.5–1.3)
CULTURE RESULTS: SIGNIFICANT CHANGE UP
GLUCOSE SERPL-MCNC: 209 MG/DL — HIGH (ref 70–99)
METHOD TYPE: SIGNIFICANT CHANGE UP
ORGANISM # SPEC MICROSCOPIC CNT: SIGNIFICANT CHANGE UP
ORGANISM # SPEC MICROSCOPIC CNT: SIGNIFICANT CHANGE UP
POTASSIUM SERPL-MCNC: 4.5 MMOL/L — SIGNIFICANT CHANGE UP (ref 3.5–5.3)
POTASSIUM SERPL-SCNC: 4.5 MMOL/L — SIGNIFICANT CHANGE UP (ref 3.5–5.3)
SODIUM SERPL-SCNC: 139 MMOL/L — SIGNIFICANT CHANGE UP (ref 135–145)
SPECIMEN SOURCE: SIGNIFICANT CHANGE UP

## 2018-04-14 PROCEDURE — G8979: CPT | Mod: CK

## 2018-04-14 PROCEDURE — 87086 URINE CULTURE/COLONY COUNT: CPT

## 2018-04-14 PROCEDURE — 96372 THER/PROPH/DIAG INJ SC/IM: CPT

## 2018-04-14 PROCEDURE — 93005 ELECTROCARDIOGRAM TRACING: CPT

## 2018-04-14 PROCEDURE — 84100 ASSAY OF PHOSPHORUS: CPT

## 2018-04-14 PROCEDURE — 83036 HEMOGLOBIN GLYCOSYLATED A1C: CPT

## 2018-04-14 PROCEDURE — 85730 THROMBOPLASTIN TIME PARTIAL: CPT

## 2018-04-14 PROCEDURE — 99285 EMERGENCY DEPT VISIT HI MDM: CPT | Mod: 25

## 2018-04-14 PROCEDURE — 84443 ASSAY THYROID STIM HORMONE: CPT

## 2018-04-14 PROCEDURE — 84480 ASSAY TRIIODOTHYRONINE (T3): CPT

## 2018-04-14 PROCEDURE — 93306 TTE W/DOPPLER COMPLETE: CPT

## 2018-04-14 PROCEDURE — 97530 THERAPEUTIC ACTIVITIES: CPT

## 2018-04-14 PROCEDURE — 80053 COMPREHEN METABOLIC PANEL: CPT

## 2018-04-14 PROCEDURE — 93306 TTE W/DOPPLER COMPLETE: CPT | Mod: 26

## 2018-04-14 PROCEDURE — 83605 ASSAY OF LACTIC ACID: CPT

## 2018-04-14 PROCEDURE — 84436 ASSAY OF TOTAL THYROXINE: CPT

## 2018-04-14 PROCEDURE — 85610 PROTHROMBIN TIME: CPT

## 2018-04-14 PROCEDURE — 97116 GAIT TRAINING THERAPY: CPT

## 2018-04-14 PROCEDURE — 80048 BASIC METABOLIC PNL TOTAL CA: CPT

## 2018-04-14 PROCEDURE — 36415 COLL VENOUS BLD VENIPUNCTURE: CPT

## 2018-04-14 PROCEDURE — 83735 ASSAY OF MAGNESIUM: CPT

## 2018-04-14 PROCEDURE — 73502 X-RAY EXAM HIP UNI 2-3 VIEWS: CPT

## 2018-04-14 PROCEDURE — 80061 LIPID PANEL: CPT

## 2018-04-14 PROCEDURE — 71045 X-RAY EXAM CHEST 1 VIEW: CPT

## 2018-04-14 PROCEDURE — 82550 ASSAY OF CK (CPK): CPT

## 2018-04-14 PROCEDURE — G8978: CPT | Mod: CK

## 2018-04-14 PROCEDURE — 84484 ASSAY OF TROPONIN QUANT: CPT

## 2018-04-14 PROCEDURE — 87186 SC STD MICRODIL/AGAR DIL: CPT

## 2018-04-14 PROCEDURE — 97162 PT EVAL MOD COMPLEX 30 MIN: CPT

## 2018-04-14 PROCEDURE — 85027 COMPLETE CBC AUTOMATED: CPT

## 2018-04-14 PROCEDURE — 87040 BLOOD CULTURE FOR BACTERIA: CPT

## 2018-04-14 PROCEDURE — 76700 US EXAM ABDOM COMPLETE: CPT

## 2018-04-14 PROCEDURE — 72148 MRI LUMBAR SPINE W/O DYE: CPT

## 2018-04-14 PROCEDURE — 82962 GLUCOSE BLOOD TEST: CPT

## 2018-04-14 PROCEDURE — 80076 HEPATIC FUNCTION PANEL: CPT

## 2018-04-14 PROCEDURE — 72100 X-RAY EXAM L-S SPINE 2/3 VWS: CPT

## 2018-04-14 PROCEDURE — 83880 ASSAY OF NATRIURETIC PEPTIDE: CPT

## 2018-04-14 PROCEDURE — 82553 CREATINE MB FRACTION: CPT

## 2018-04-14 PROCEDURE — G8980: CPT | Mod: CK

## 2018-04-14 PROCEDURE — 81001 URINALYSIS AUTO W/SCOPE: CPT

## 2018-04-14 RX ORDER — FUROSEMIDE 40 MG
1 TABLET ORAL
Qty: 30 | Refills: 0 | OUTPATIENT
Start: 2018-04-14 | End: 2018-05-13

## 2018-04-14 RX ORDER — WARFARIN SODIUM 2.5 MG/1
1.5 TABLET ORAL DAILY
Qty: 0 | Refills: 0 | Status: DISCONTINUED | OUTPATIENT
Start: 2018-04-14 | End: 2018-04-14

## 2018-04-14 RX ADMIN — Medication 5 UNIT(S): at 17:18

## 2018-04-14 RX ADMIN — Medication 4 MILLIGRAM(S): at 17:32

## 2018-04-14 RX ADMIN — Medication 1 PATCH: at 01:55

## 2018-04-14 RX ADMIN — Medication 1 PATCH: at 14:18

## 2018-04-14 RX ADMIN — Medication 4 MILLIGRAM(S): at 14:18

## 2018-04-14 RX ADMIN — PANTOPRAZOLE SODIUM 40 MILLIGRAM(S): 20 TABLET, DELAYED RELEASE ORAL at 05:51

## 2018-04-14 RX ADMIN — Medication 2: at 12:02

## 2018-04-14 RX ADMIN — Medication 5 UNIT(S): at 12:02

## 2018-04-14 RX ADMIN — INSULIN GLARGINE 20 UNIT(S): 100 INJECTION, SOLUTION SUBCUTANEOUS at 10:52

## 2018-04-14 RX ADMIN — Medication 5 UNIT(S): at 08:21

## 2018-04-14 RX ADMIN — Medication 4: at 17:18

## 2018-04-14 RX ADMIN — Medication 4: at 08:21

## 2018-04-14 RX ADMIN — Medication 4 MILLIGRAM(S): at 08:21

## 2018-04-14 RX ADMIN — Medication 120 MILLIGRAM(S): at 05:51

## 2018-04-14 NOTE — PROGRESS NOTE ADULT - SUBJECTIVE AND OBJECTIVE BOX
Neurology Follow up note    ALLIOSN [id number]    HPI:  Allison Gunter is an 89 YO W female brought to ER because of generalized weakness for 2 days. Patient states she is unable to stand due to weakness.  Normally walks with a walker. She denies fever or chills.  No chest pain.  Patient has SOB, which she states is chronic. No abdominal pain, vomiting or diarrhea.  She c/o pain to right hip/groin since this am, radiating down right leg. Denies any fall or back pain. No dysuria or hematuria. Patient states she recently took doxycycline for sinus infection which she believes resulted in dehydration. She had similar episodes in the past. (2018 22:24)      Interval History - back, hip and right thigh pain better.    Patient is seen, chart was reviewed and case was discussed with the treatment team.  Pt is not in any distress.   Lying on bed comfortably.   No events reported overnight.   No clinical seizure was reported.  Sitting on chair bed comfortably.    is at bedside.    Vital Signs Last 24 Hrs  T(C): 36.5 (2018 05:00), Max: 36.8 (2018 21:32)  T(F): 97.7 (2018 05:00), Max: 98.3 (2018 21:32)  HR: 70 (2018 05:00) (70 - 80)  BP: 147/76 (2018 05:00) (97/58 - 147/76)  BP(mean): --  RR: 18 (2018 05:00) (18 - 18)  SpO2: 96% (2018 05:00) (95% - 96%)        REVIEW OF SYSTEMS:    Constitutional: No fever, weight loss or fatigue  Eyes: No eye pain, visual disturbances, or discharge  ENT:  No difficulty hearing, tinnitus, vertigo; No sinus or throat pain  Neck: No pain or stiffness  Respiratory: No cough, wheezing, chills or hemoptysis  Cardiovascular: No chest pain, palpitations, shortness of breath, dizziness or leg swelling  Gastrointestinal: No abdominal or epigastric pain. No nausea, vomiting or hematemesis; No diarrhea or constipation. No melena or hematochezia.  Genitourinary: No dysuria, frequency, hematuria or  Neurological: No headaches, loss of strength, numbness or tremors  Psychiatric: No depression, anxiety, mood swings or difficulty sleeping    Skin: No itching, burning, rashes or lesions   Lymph Nodes: No enlarged glands  Endocrine: No heat or cold intolerance; No hair loss, No h/o diabetes or thyroid dysfunction  Allergy and Immunologic: No hives or eczema    On Neurological Examination:    Mental Status - Pt is alert, awake, oriented X3.  Follows commands well and able to answer questions appropriately.Mood and affect  normal    Speech -  Normal.     Cranial Nerves - Pupils 3 mm equal and reactive to light, extraocular eye movements intact. Pt has no visual field deficit.  Pt has no facial asymmetry. Facial sensation is intact.Tongue - is in midline.    Muscle tone - atrophy.    Motor Exam - 5-/5 of UE ; 4/5 OF LE.    Sensory Exam -Pt withdraws all extremities equally on stimulation.     coordination:    Finger to nose: normal.    Deep tendon Reflexes - 2 plus all over.    Neck Supple -  Yes.     MEDICATIONS    dextrose 5%. 1000 milliLiter(s) IV Continuous <Continuous>  dextrose 50% Injectable 12.5 Gram(s) IV Push once  dextrose 50% Injectable 25 Gram(s) IV Push once  dextrose 50% Injectable 25 Gram(s) IV Push once  dextrose Gel 1 Dose(s) Oral once PRN  glucagon  Injectable 1 milliGRAM(s) IntraMuscular once PRN  insulin glargine Injectable (LANTUS) 10 Unit(s) SubCutaneous at bedtime  insulin glargine Injectable (LANTUS) 20 Unit(s) SubCutaneous every morning  insulin lispro (HumaLOG) corrective regimen sliding scale   SubCutaneous three times a day before meals  insulin lispro (HumaLOG) corrective regimen sliding scale   SubCutaneous at bedtime  insulin lispro Injectable (HumaLOG) 5 Unit(s) SubCutaneous three times a day before meals  methylPREDNISolone   Oral   methylPREDNISolone 4 milliGRAM(s) Oral before breakfast  methylPREDNISolone 4 milliGRAM(s) Oral after lunch  methylPREDNISolone 4 milliGRAM(s) Oral after dinner  methylPREDNISolone 8 milliGRAM(s) Oral at bedtime  nitroglycerin    Patch 0.4 mG/Hr(s) 1 patch Transdermal daily  pantoprazole    Tablet 40 milliGRAM(s) Oral before breakfast  propranolol  milliGRAM(s) Oral daily  rOPINIRole 0.25 milliGRAM(s) Oral at bedtime  sodium chloride 0.9%. 1000 milliLiter(s) IV Continuous <Continuous>  tiotropium 18 MICROgram(s) Capsule 1 Capsule(s) Inhalation daily  warfarin 1.5 milliGRAM(s) Oral daily      Allergies    Levaquin (Other)  ramipril (Other)    Intolerances        LABS:  CBC Full  -  ( 2018 06:58 )  WBC Count : 9.7 K/uL  Hemoglobin : 12.5 g/dL  Hematocrit : 39.3 %  Platelet Count - Automated : 175 K/uL  Mean Cell Volume : 93.8 fl  Mean Cell Hemoglobin : 29.9 pg  Mean Cell Hemoglobin Concentration : 31.9 gm/dL  Auto Neutrophil # : 7.7 K/uL  Auto Lymphocyte # : 1.1 K/uL  Auto Monocyte # : 0.8 K/uL      Urinalysis Basic - ( 2018 20:45 )    Color: Yellow / Appearance: Clear / S.010 / pH: x  Gluc: x / Ketone: Negative  / Bili: Negative / Urobili: Negative   Blood: x / Protein: Negative / Nitrite: Negative   Leuk Esterase: Negative / RBC: 0-2 /HPF / WBC 0-2   Sq Epi: x / Non Sq Epi: Occasional / Bacteria: Negative          139  |  102  |  42<H>  ----------------------------<  209<H>  4.5   |  28  |  1.80<H>    Ca    9.4      2018 10:04  Phos  2.7       Mg     1.8         TPro  7.1  /  Alb  2.6<L>  /  TBili  0.7  /  DBili  .20  /  AST  212<H>  /  ALT  47  /  AlkPhos  393<H>      Hemoglobin A1C:     Vitamin B12     RADIOLOGY  < from: MR Lumbar Spine No Cont (18 @ 18:22) >    EXAM:  MR SPINE LUMBAR                            PROCEDURE DATE:  2018          INTERPRETATION:  INDICATION:  Back pain. Spinal stenosis.  TECHNIQUE:  Multiplanar lumbar imaging was conducted using a 1.5 Emilia   magnet.  T1 and T2 techniqueswere incorporated.    COMPARISON EXAMINATION:  No prior    FINDINGS:  ALIGNMENT:  There are subluxations and degenerative changes most   prevalent from L3 to S1. Also there is a mild thoracolumbar scoliosis..  VERTEBRAL BODIES:  There is fatty marrowconversion. Discogenic endplate   changes are noted at multiple levels. No acute fracture or destructive   lesion is noted.  DISC SPACES:   L1-2:   A bulge is noted  L2-3:   A small subligamentous herniation is noted.  L3-4:   A broad-based herniation is noted with moderate thickening of   facets and ligaments, left greater than right. There is moderate stenosis.  L4-5:  A broad-based herniation is noted with facet and ligamentous   thickening. There is mild to moderate canal narrowing.   L5-S1: A central herniation is noted.  POSTERIOR ELEMENTS:  Thickened from L3 to S1.  CANAL AND FORAMINA:  Narrowed from L3 to S1 most stenotic at L3-4.  INTRADURAL SPACE:  No intradural abnormality.  The distal cord is   unremarkable in contour and signal.  MISCELLANEOUS:     The prevertebral and paraspinal soft tissues are   unremarkable.    IMPRESSION:        1. Multilevel disc disease and degenerative changes. Herniations are   noted from L2 to S1.  2. Facet and ligamentous thickening most prominent from L3 to S1 with   moderate stenosis at L3-4 and mild to moderate recess and foraminal   narrowing at L4-S1.                JACKSON WOMACK M.D., ATTENDING RADIOLOGIST  This document has been electronically signed. 2018  6:38PM           ASSESSMENT AND PLAN:      LUMBAR RADICULOPATHY  DUE LUMBAR SPINAL STENOSIS.  BACK/HIP  PAIN BETTER WITH STEROID.    Physical therapy evaluation.  OOB to chair/ambulation with assistance only.  Advanced care planning was discussed with family.  Pain is accessed and addressed.  Plan of care was discussed with family. Questions answered.  Would continue to follow.
neuro cons dict.  right hip/leg/back pain likely  lumbar radiculopathy.  mri of lumbar spine.
Patient is a 89y old  Female who presents with a chief complaint of Weakness (2018 00:34)      INTERVAL /OVERNIGHT EVENTS: still feels weak    MEDICATIONS  (STANDING):  dextrose 5%. 1000 milliLiter(s) (50 mL/Hr) IV Continuous <Continuous>  dextrose 50% Injectable 12.5 Gram(s) IV Push once  dextrose 50% Injectable 25 Gram(s) IV Push once  dextrose 50% Injectable 25 Gram(s) IV Push once  insulin glargine Injectable (LANTUS) 10 Unit(s) SubCutaneous at bedtime  insulin glargine Injectable (LANTUS) 20 Unit(s) SubCutaneous every morning  insulin lispro (HumaLOG) corrective regimen sliding scale   SubCutaneous three times a day before meals  insulin lispro (HumaLOG) corrective regimen sliding scale   SubCutaneous at bedtime  insulin lispro Injectable (HumaLOG) 5 Unit(s) SubCutaneous three times a day before meals  nitroglycerin    Patch 0.4 mG/Hr(s) 1 patch Transdermal daily  pantoprazole    Tablet 40 milliGRAM(s) Oral before breakfast  propranolol  milliGRAM(s) Oral daily  rOPINIRole 0.25 milliGRAM(s) Oral at bedtime  sodium chloride 0.9%. 1000 milliLiter(s) (40 mL/Hr) IV Continuous <Continuous>  tiotropium 18 MICROgram(s) Capsule 1 Capsule(s) Inhalation daily  warfarin 1 milliGRAM(s) Oral daily    MEDICATIONS  (PRN):  dextrose Gel 1 Dose(s) Oral once PRN Blood Glucose LESS THAN 70 milliGRAM(s)/deciliter  glucagon  Injectable 1 milliGRAM(s) IntraMuscular once PRN Glucose LESS THAN 70 milligrams/deciliter      Allergies    Levaquin (Other)  ramipril (Other)    Intolerances        REVIEW OF SYSTEMS:  CONSTITUTIONAL: No fever, weight loss, or fatigue  EYES: No eye pain, visual disturbances, or discharge  ENMT:  No difficulty hearing, tinnitus, vertigo; No sinus or throat pain  NECK: No pain or stiffness  RESPIRATORY: No cough, wheezing, chills or hemoptysis; No shortness of breath  CARDIOVASCULAR: No chest pain, palpitations, dizziness, or leg swelling  GASTROINTESTINAL: No abdominal or epigastric pain. No nausea, vomiting, or hematemesis; No diarrhea or constipation. No melena or hematochezia.  GENITOURINARY: No dysuria, frequency, hematuria, or incontinence  NEUROLOGICAL: No headaches, memory loss, loss of strength, numbness, or tremors  SKIN: No itching, burning, rashes, or lesions   LYMPH NODES: No enlarged glands  ENDOCRINE: No heat or cold intolerance; No hair loss; No polydipsia or polyuria  MUSCULOSKELETAL: No joint pain or swelling; No muscle, back, or extremity pain  PSYCHIATRIC: No depression, anxiety, mood swings, or difficulty sleeping  HEME/LYMPH: No easy bruising, or bleeding gums  ALLERGY AND IMMUNOLOGIC: No hives or eczema    Vital Signs Last 24 Hrs  T(C): 37 (2018 13:03), Max: 37.4 (2018 20:30)  T(F): 98.6 (2018 13:03), Max: 99.4 (2018 20:30)  HR: 66 (:03) (58 - 87)  BP: 102/69 (2018 13:03) (102/69 - 162/77)  BP(mean): --  RR: 17 (2018 13:03) (12 - 18)  SpO2: 96% (2018 13:03) (95% - 99%)    PHYSICAL EXAM:  GENERAL: NAD, well-groomed, well-developed  HEAD:  Atraumatic, Normocephalic  EYES: EOMI, PERRLA, conjunctiva and sclera clear  ENMT: No tonsillar erythema, exudates, or enlargement; Moist mucous membranes, Good dentition, No lesions  NECK: Supple, No JVD, Normal thyroid  NERVOUS SYSTEM:  Alert & Oriented X3, Good concentration; Motor Strength 5/5 B/L upper and lower extremities; DTRs 2+ intact and symmetric  CHEST/LUNG: Clear to auscultation bilaterally; No rales, rhonchi, wheezing, or rubs  HEART: Regular rate and rhythm; No murmurs, rubs, or gallops  ABDOMEN: Soft, Nontender, Nondistended; Bowel sounds present  EXTREMITIES:  2+ Peripheral Pulses, No clubbing, cyanosis, or edema  LYMPH: No lymphadenopathy noted  SKIN: No rashes or lesions    LABS:                        12.5   9.7   )-----------( 175      ( 2018 06:58 )             39.3     2018 06:58    143    |  104    |  41     ----------------------------<  84     3.8     |  31     |  1.70     Ca    9.0        2018 06:58  Phos  2.7       2018 06:58  Mg     1.8       2018 06:58    TPro  7.1    /  Alb  2.6    /  TBili  0.7    /  DBili  .20    /  AST  212    /  ALT  47     /  AlkPhos  393    2018 06:58    PT/INR - ( 2018 06:58 )   PT: 30.8 sec;   INR: 2.77 ratio         PTT - ( 2018 20:45 )  PTT:55.1 sec  Urinalysis Basic - ( 2018 20:45 )    Color: Yellow / Appearance: Clear / S.010 / pH: x  Gluc: x / Ketone: Negative  / Bili: Negative / Urobili: Negative   Blood: x / Protein: Negative / Nitrite: Negative   Leuk Esterase: Negative / RBC: 0-2 /HPF / WBC 0-2   Sq Epi: x / Non Sq Epi: Occasional / Bacteria: Negative      CAPILLARY BLOOD GLUCOSE      POCT Blood Glucose.: 158 mg/dL (2018 11:49)  POCT Blood Glucose.: 100 mg/dL (2018 08:00)  POCT Blood Glucose.: 118 mg/dL (2018 22:09)      RADIOLOGY & ADDITIONAL TESTS:    Notes Reviewed:  [x ] YES  [ ] NO    Care Discussed with Consultants/Other Providers [x ] YES  [ ] NO

## 2018-04-14 NOTE — CHART NOTE - NSCHARTNOTEFT_GEN_A_CORE
Do you have Advance Directives (HCP / LV / Organ donation / Documentation of oral advance Directive):   ( x   )  yes    (      )    NO                                                                            Do you have LV - Living will :                                                                                                                                             (    )  yes    (   x   )   No    Do you have HCP - Health Care Proxy:                                                                                                                            (  x   )  yes   (       ) N0    Do you have DNR- Do Not Resuscitate :                                                                                                                           (      )  yes  (    x    )  No    Do you have DNI- Do Not intubate  :                                                                                                                               (      )  yes   (    x   ) No    Do you have MOLST - Medical orders for Life sustaining treatment  :                                                                    (      ) yes    (   x    ) No    Decision Maker :  (   x  ) Patient     (      )  HCA   (     ) Public Health Law Surrogate     (      ) Surrogate  (       ) Guardian    Goals of Care :  (   x   )   Complete Care     (       ) No Limitations                              (       )   Comfort Care       (       )  Hospice                               (      )   Limited medical Intervention / s    Medical Interventions :   (    x    )   CPR       (        )  DNR                                               (   x     )  Intubation with MV - Mechanical Ventilation  (   x   ) BIPAP/CPAP    (         )   DNI                                               (  x       )  Artificial Nutrition -  IVF, TPN / PPN, Tube Feeds             (         )   No Feeding Tube                                                (   x     ) Use Antibiotics                         (          ) No Antibiotics                                                (    x     ) Blood and Blood Products     (         )   No Blood or Blood products                                                (     x     )  Dialysis                                    (         )  No Dialysis                                                (          )  Medical Management only  (         )  No Invasive Interventions or Surgery  Time spent :                        (   x    ) up to 30 minutes                       (           )   more than 30 minutes  Goals of care by palliative RN reviewed and discussed with patient

## 2018-04-16 LAB — INR PPP: 2.2

## 2018-04-18 LAB
CULTURE RESULTS: SIGNIFICANT CHANGE UP
CULTURE RESULTS: SIGNIFICANT CHANGE UP
SPECIMEN SOURCE: SIGNIFICANT CHANGE UP
SPECIMEN SOURCE: SIGNIFICANT CHANGE UP

## 2018-04-23 ENCOUNTER — APPOINTMENT (OUTPATIENT)
Dept: CARDIOLOGY | Facility: CLINIC | Age: 83
End: 2018-04-23
Payer: MEDICARE

## 2018-04-23 VITALS
HEART RATE: 63 BPM | SYSTOLIC BLOOD PRESSURE: 108 MMHG | HEIGHT: 60 IN | BODY MASS INDEX: 31.02 KG/M2 | DIASTOLIC BLOOD PRESSURE: 68 MMHG | OXYGEN SATURATION: 98 % | WEIGHT: 158 LBS

## 2018-04-23 DIAGNOSIS — I48.0 PAROXYSMAL ATRIAL FIBRILLATION: ICD-10-CM

## 2018-04-23 DIAGNOSIS — I51.9 HEART DISEASE, UNSPECIFIED: ICD-10-CM

## 2018-04-23 PROCEDURE — 99214 OFFICE O/P EST MOD 30 MIN: CPT

## 2018-05-02 ENCOUNTER — CHART COPY (OUTPATIENT)
Age: 83
End: 2018-05-02

## 2018-05-02 LAB — INR PPP: 3

## 2018-05-17 LAB — INR PPP: 3.6

## 2018-06-04 LAB — INR PPP: 2.9

## 2018-06-13 ENCOUNTER — RX RENEWAL (OUTPATIENT)
Age: 83
End: 2018-06-13

## 2018-06-13 RX ORDER — PROPRANOLOL HYDROCHLORIDE 120 MG/1
120 CAPSULE, EXTENDED RELEASE ORAL
Qty: 90 | Refills: 3 | Status: ACTIVE | COMMUNITY
Start: 2017-06-26 | End: 1900-01-01

## 2018-06-21 LAB — INR PPP: 3.7

## 2018-06-25 ENCOUNTER — APPOINTMENT (OUTPATIENT)
Dept: CARDIOLOGY | Facility: CLINIC | Age: 83
End: 2018-06-25
Payer: MEDICARE

## 2018-06-25 VITALS
WEIGHT: 157 LBS | OXYGEN SATURATION: 97 % | HEART RATE: 65 BPM | SYSTOLIC BLOOD PRESSURE: 91 MMHG | DIASTOLIC BLOOD PRESSURE: 55 MMHG | BODY MASS INDEX: 30.82 KG/M2 | HEIGHT: 60 IN

## 2018-06-25 DIAGNOSIS — I10 ESSENTIAL (PRIMARY) HYPERTENSION: ICD-10-CM

## 2018-06-25 DIAGNOSIS — E11.9 TYPE 2 DIABETES MELLITUS W/OUT COMPLICATIONS: ICD-10-CM

## 2018-06-25 DIAGNOSIS — E78.5 HYPERLIPIDEMIA, UNSPECIFIED: ICD-10-CM

## 2018-06-25 DIAGNOSIS — I25.10 ATHEROSCLEROTIC HEART DISEASE OF NATIVE CORONARY ARTERY W/OUT ANGINA PECTORIS: ICD-10-CM

## 2018-06-25 DIAGNOSIS — R06.02 SHORTNESS OF BREATH: ICD-10-CM

## 2018-06-25 PROCEDURE — 99214 OFFICE O/P EST MOD 30 MIN: CPT

## 2018-07-09 LAB — INR PPP: 2.2

## 2018-07-24 LAB — INR PPP: 2.8

## 2018-08-13 ENCOUNTER — RESULT REVIEW (OUTPATIENT)
Age: 83
End: 2018-08-13

## 2018-08-14 ENCOUNTER — INPATIENT (INPATIENT)
Facility: HOSPITAL | Age: 83
LOS: 13 days | Discharge: ANOTHER HOSPITAL | DRG: 854 | End: 2018-08-28
Attending: FAMILY MEDICINE | Admitting: FAMILY MEDICINE
Payer: MEDICARE

## 2018-08-14 VITALS
WEIGHT: 149.91 LBS | SYSTOLIC BLOOD PRESSURE: 151 MMHG | HEIGHT: 60 IN | DIASTOLIC BLOOD PRESSURE: 86 MMHG | RESPIRATION RATE: 18 BRPM | HEART RATE: 82 BPM | TEMPERATURE: 99 F | OXYGEN SATURATION: 94 %

## 2018-08-14 DIAGNOSIS — I48.91 UNSPECIFIED ATRIAL FIBRILLATION: ICD-10-CM

## 2018-08-14 DIAGNOSIS — I10 ESSENTIAL (PRIMARY) HYPERTENSION: ICD-10-CM

## 2018-08-14 DIAGNOSIS — E11.622 TYPE 2 DIABETES MELLITUS WITH OTHER SKIN ULCER: ICD-10-CM

## 2018-08-14 DIAGNOSIS — E11.621 TYPE 2 DIABETES MELLITUS WITH FOOT ULCER: ICD-10-CM

## 2018-08-14 DIAGNOSIS — M25.551 PAIN IN RIGHT HIP: ICD-10-CM

## 2018-08-14 DIAGNOSIS — Z29.9 ENCOUNTER FOR PROPHYLACTIC MEASURES, UNSPECIFIED: ICD-10-CM

## 2018-08-14 DIAGNOSIS — E78.5 HYPERLIPIDEMIA, UNSPECIFIED: ICD-10-CM

## 2018-08-14 LAB
ALBUMIN SERPL ELPH-MCNC: 2.4 G/DL — LOW (ref 3.3–5)
ALP SERPL-CCNC: 416 U/L — HIGH (ref 40–120)
ALT FLD-CCNC: 32 U/L — SIGNIFICANT CHANGE UP (ref 12–78)
ANION GAP SERPL CALC-SCNC: 8 MMOL/L — SIGNIFICANT CHANGE UP (ref 5–17)
APPEARANCE UR: CLEAR — SIGNIFICANT CHANGE UP
AST SERPL-CCNC: 109 U/L — HIGH (ref 15–37)
BASOPHILS # BLD AUTO: 0.02 K/UL — SIGNIFICANT CHANGE UP (ref 0–0.2)
BASOPHILS NFR BLD AUTO: 0.2 % — SIGNIFICANT CHANGE UP (ref 0–2)
BILIRUB SERPL-MCNC: 0.6 MG/DL — SIGNIFICANT CHANGE UP (ref 0.2–1.2)
BILIRUB UR-MCNC: NEGATIVE — SIGNIFICANT CHANGE UP
BUN SERPL-MCNC: 34 MG/DL — HIGH (ref 7–23)
CALCIUM SERPL-MCNC: 9.4 MG/DL — SIGNIFICANT CHANGE UP (ref 8.5–10.1)
CHLORIDE SERPL-SCNC: 93 MMOL/L — LOW (ref 96–108)
CO2 SERPL-SCNC: 32 MMOL/L — HIGH (ref 22–31)
COLOR SPEC: YELLOW — SIGNIFICANT CHANGE UP
CREAT SERPL-MCNC: 1.5 MG/DL — HIGH (ref 0.5–1.3)
DIFF PNL FLD: ABNORMAL
EOSINOPHIL # BLD AUTO: 0.01 K/UL — SIGNIFICANT CHANGE UP (ref 0–0.5)
EOSINOPHIL NFR BLD AUTO: 0.1 % — SIGNIFICANT CHANGE UP (ref 0–6)
GLUCOSE SERPL-MCNC: 68 MG/DL — LOW (ref 70–99)
GLUCOSE UR QL: NEGATIVE — SIGNIFICANT CHANGE UP
HCT VFR BLD CALC: 38.4 % — SIGNIFICANT CHANGE UP (ref 34.5–45)
HGB BLD-MCNC: 12.3 G/DL — SIGNIFICANT CHANGE UP (ref 11.5–15.5)
IMM GRANULOCYTES NFR BLD AUTO: 0.5 % — SIGNIFICANT CHANGE UP (ref 0–1.5)
KETONES UR-MCNC: NEGATIVE — SIGNIFICANT CHANGE UP
LACTATE SERPL-SCNC: 1.4 MMOL/L — SIGNIFICANT CHANGE UP (ref 0.7–2)
LEUKOCYTE ESTERASE UR-ACNC: ABNORMAL
LYMPHOCYTES # BLD AUTO: 0.86 K/UL — LOW (ref 1–3.3)
LYMPHOCYTES # BLD AUTO: 6.5 % — LOW (ref 13–44)
MCHC RBC-ENTMCNC: 29.4 PG — SIGNIFICANT CHANGE UP (ref 27–34)
MCHC RBC-ENTMCNC: 32 GM/DL — SIGNIFICANT CHANGE UP (ref 32–36)
MCV RBC AUTO: 91.9 FL — SIGNIFICANT CHANGE UP (ref 80–100)
MONOCYTES # BLD AUTO: 1.03 K/UL — HIGH (ref 0–0.9)
MONOCYTES NFR BLD AUTO: 7.8 % — SIGNIFICANT CHANGE UP (ref 2–14)
NEUTROPHILS # BLD AUTO: 11.27 K/UL — HIGH (ref 1.8–7.4)
NEUTROPHILS NFR BLD AUTO: 84.9 % — HIGH (ref 43–77)
NITRITE UR-MCNC: NEGATIVE — SIGNIFICANT CHANGE UP
PH UR: 7 — SIGNIFICANT CHANGE UP (ref 5–8)
PLATELET # BLD AUTO: 208 K/UL — SIGNIFICANT CHANGE UP (ref 150–400)
POTASSIUM SERPL-MCNC: 4.3 MMOL/L — SIGNIFICANT CHANGE UP (ref 3.5–5.3)
POTASSIUM SERPL-SCNC: 4.3 MMOL/L — SIGNIFICANT CHANGE UP (ref 3.5–5.3)
PROT SERPL-MCNC: 7.4 G/DL — SIGNIFICANT CHANGE UP (ref 6–8.3)
PROT UR-MCNC: NEGATIVE — SIGNIFICANT CHANGE UP
RBC # BLD: 4.18 M/UL — SIGNIFICANT CHANGE UP (ref 3.8–5.2)
RBC # FLD: 16.8 % — HIGH (ref 10.3–14.5)
SODIUM SERPL-SCNC: 133 MMOL/L — LOW (ref 135–145)
SP GR SPEC: 1 — LOW (ref 1.01–1.02)
UROBILINOGEN FLD QL: NEGATIVE — SIGNIFICANT CHANGE UP
WBC # BLD: 13.25 K/UL — HIGH (ref 3.8–10.5)
WBC # FLD AUTO: 13.25 K/UL — HIGH (ref 3.8–10.5)

## 2018-08-14 PROCEDURE — 73630 X-RAY EXAM OF FOOT: CPT | Mod: 26,LT

## 2018-08-14 PROCEDURE — 73552 X-RAY EXAM OF FEMUR 2/>: CPT | Mod: 26,RT

## 2018-08-14 PROCEDURE — 73502 X-RAY EXAM HIP UNI 2-3 VIEWS: CPT | Mod: 26,RT

## 2018-08-14 PROCEDURE — 93010 ELECTROCARDIOGRAM REPORT: CPT

## 2018-08-14 PROCEDURE — 99285 EMERGENCY DEPT VISIT HI MDM: CPT

## 2018-08-14 PROCEDURE — 71250 CT THORAX DX C-: CPT | Mod: 26

## 2018-08-14 PROCEDURE — 72192 CT PELVIS W/O DYE: CPT | Mod: 26

## 2018-08-14 PROCEDURE — 71045 X-RAY EXAM CHEST 1 VIEW: CPT | Mod: 26

## 2018-08-14 RX ORDER — MORPHINE SULFATE 50 MG/1
2 CAPSULE, EXTENDED RELEASE ORAL EVERY 6 HOURS
Qty: 0 | Refills: 0 | Status: DISCONTINUED | OUTPATIENT
Start: 2018-08-14 | End: 2018-08-21

## 2018-08-14 RX ORDER — WARFARIN SODIUM 2.5 MG/1
2 TABLET ORAL ONCE
Qty: 0 | Refills: 0 | Status: DISCONTINUED | OUTPATIENT
Start: 2018-08-14 | End: 2018-08-14

## 2018-08-14 RX ORDER — FUROSEMIDE 40 MG
40 TABLET ORAL DAILY
Qty: 0 | Refills: 0 | Status: DISCONTINUED | OUTPATIENT
Start: 2018-08-14 | End: 2018-08-15

## 2018-08-14 RX ORDER — PANTOPRAZOLE SODIUM 20 MG/1
40 TABLET, DELAYED RELEASE ORAL
Qty: 0 | Refills: 0 | Status: DISCONTINUED | OUTPATIENT
Start: 2018-08-14 | End: 2018-08-21

## 2018-08-14 RX ORDER — SIMVASTATIN 20 MG/1
10 TABLET, FILM COATED ORAL AT BEDTIME
Qty: 0 | Refills: 0 | Status: DISCONTINUED | OUTPATIENT
Start: 2018-08-14 | End: 2018-08-21

## 2018-08-14 RX ORDER — OXYCODONE HYDROCHLORIDE 5 MG/1
10 TABLET ORAL
Qty: 0 | Refills: 0 | Status: DISCONTINUED | OUTPATIENT
Start: 2018-08-14 | End: 2018-08-21

## 2018-08-14 RX ORDER — PIPERACILLIN AND TAZOBACTAM 4; .5 G/20ML; G/20ML
3.38 INJECTION, POWDER, LYOPHILIZED, FOR SOLUTION INTRAVENOUS ONCE
Qty: 0 | Refills: 0 | Status: COMPLETED | OUTPATIENT
Start: 2018-08-14 | End: 2018-08-15

## 2018-08-14 RX ORDER — VANCOMYCIN HCL 1 G
1000 VIAL (EA) INTRAVENOUS EVERY 24 HOURS
Qty: 0 | Refills: 0 | Status: DISCONTINUED | OUTPATIENT
Start: 2018-08-15 | End: 2018-08-17

## 2018-08-14 RX ORDER — SIMVASTATIN 20 MG/1
1 TABLET, FILM COATED ORAL
Qty: 0 | Refills: 0 | COMMUNITY

## 2018-08-14 RX ORDER — INSULIN LISPRO 100/ML
VIAL (ML) SUBCUTANEOUS
Qty: 0 | Refills: 0 | Status: DISCONTINUED | OUTPATIENT
Start: 2018-08-14 | End: 2018-08-21

## 2018-08-14 RX ORDER — PROPRANOLOL HCL 160 MG
1 CAPSULE, EXTENDED RELEASE 24HR ORAL
Qty: 0 | Refills: 0 | COMMUNITY

## 2018-08-14 RX ORDER — DEXTROSE 50 % IN WATER 50 %
25 SYRINGE (ML) INTRAVENOUS ONCE
Qty: 0 | Refills: 0 | Status: DISCONTINUED | OUTPATIENT
Start: 2018-08-14 | End: 2018-08-21

## 2018-08-14 RX ORDER — DEXTROSE 50 % IN WATER 50 %
15 SYRINGE (ML) INTRAVENOUS ONCE
Qty: 0 | Refills: 0 | Status: DISCONTINUED | OUTPATIENT
Start: 2018-08-14 | End: 2018-08-21

## 2018-08-14 RX ORDER — INSULIN NPH HUM/REG INSULIN HM 70-30/ML
10 VIAL (ML) SUBCUTANEOUS
Qty: 0 | Refills: 0 | Status: DISCONTINUED | OUTPATIENT
Start: 2018-08-14 | End: 2018-08-15

## 2018-08-14 RX ORDER — GLUCAGON INJECTION, SOLUTION 0.5 MG/.1ML
1 INJECTION, SOLUTION SUBCUTANEOUS ONCE
Qty: 0 | Refills: 0 | Status: DISCONTINUED | OUTPATIENT
Start: 2018-08-14 | End: 2018-08-21

## 2018-08-14 RX ORDER — PROPRANOLOL HCL 160 MG
120 CAPSULE, EXTENDED RELEASE 24HR ORAL DAILY
Qty: 0 | Refills: 0 | Status: DISCONTINUED | OUTPATIENT
Start: 2018-08-14 | End: 2018-08-15

## 2018-08-14 RX ORDER — INSULIN NPH HUM/REG INSULIN HM 70-30/ML
25 VIAL (ML) SUBCUTANEOUS
Qty: 0 | Refills: 0 | Status: DISCONTINUED | OUTPATIENT
Start: 2018-08-14 | End: 2018-08-15

## 2018-08-14 RX ORDER — MORPHINE SULFATE 50 MG/1
2 CAPSULE, EXTENDED RELEASE ORAL ONCE
Qty: 0 | Refills: 0 | Status: DISCONTINUED | OUTPATIENT
Start: 2018-08-14 | End: 2018-08-14

## 2018-08-14 RX ORDER — OMEPRAZOLE 10 MG/1
1 CAPSULE, DELAYED RELEASE ORAL
Qty: 0 | Refills: 0 | COMMUNITY

## 2018-08-14 RX ORDER — ONDANSETRON 8 MG/1
4 TABLET, FILM COATED ORAL ONCE
Qty: 0 | Refills: 0 | Status: COMPLETED | OUTPATIENT
Start: 2018-08-14 | End: 2018-08-14

## 2018-08-14 RX ORDER — INSULIN LISPRO 100/ML
VIAL (ML) SUBCUTANEOUS AT BEDTIME
Qty: 0 | Refills: 0 | Status: DISCONTINUED | OUTPATIENT
Start: 2018-08-14 | End: 2018-08-21

## 2018-08-14 RX ORDER — HYDROCODONE BITARTRATE 50 MG/1
1 CAPSULE, EXTENDED RELEASE ORAL
Qty: 0 | Refills: 0 | COMMUNITY

## 2018-08-14 RX ORDER — CHOLECALCIFEROL (VITAMIN D3) 125 MCG
1 CAPSULE ORAL
Qty: 0 | Refills: 0 | COMMUNITY

## 2018-08-14 RX ORDER — NITROGLYCERIN 6.5 MG
1 CAPSULE, EXTENDED RELEASE ORAL DAILY
Qty: 0 | Refills: 0 | Status: DISCONTINUED | OUTPATIENT
Start: 2018-08-14 | End: 2018-08-15

## 2018-08-14 RX ORDER — SODIUM CHLORIDE 9 MG/ML
1000 INJECTION INTRAMUSCULAR; INTRAVENOUS; SUBCUTANEOUS ONCE
Qty: 0 | Refills: 0 | Status: COMPLETED | OUTPATIENT
Start: 2018-08-14 | End: 2018-08-14

## 2018-08-14 RX ORDER — VANCOMYCIN HCL 1 G
VIAL (EA) INTRAVENOUS
Qty: 0 | Refills: 0 | Status: DISCONTINUED | OUTPATIENT
Start: 2018-08-14 | End: 2018-08-17

## 2018-08-14 RX ORDER — VANCOMYCIN HCL 1 G
1000 VIAL (EA) INTRAVENOUS ONCE
Qty: 0 | Refills: 0 | Status: COMPLETED | OUTPATIENT
Start: 2018-08-14 | End: 2018-08-14

## 2018-08-14 RX ORDER — SODIUM CHLORIDE 9 MG/ML
1000 INJECTION, SOLUTION INTRAVENOUS
Qty: 0 | Refills: 0 | Status: DISCONTINUED | OUTPATIENT
Start: 2018-08-14 | End: 2018-08-21

## 2018-08-14 RX ORDER — DEXTROSE 50 % IN WATER 50 %
12.5 SYRINGE (ML) INTRAVENOUS ONCE
Qty: 0 | Refills: 0 | Status: DISCONTINUED | OUTPATIENT
Start: 2018-08-14 | End: 2018-08-21

## 2018-08-14 RX ORDER — CHOLECALCIFEROL (VITAMIN D3) 125 MCG
1000 CAPSULE ORAL DAILY
Qty: 0 | Refills: 0 | Status: DISCONTINUED | OUTPATIENT
Start: 2018-08-14 | End: 2018-08-21

## 2018-08-14 RX ORDER — PIPERACILLIN AND TAZOBACTAM 4; .5 G/20ML; G/20ML
3.38 INJECTION, POWDER, LYOPHILIZED, FOR SOLUTION INTRAVENOUS EVERY 8 HOURS
Qty: 0 | Refills: 0 | Status: DISCONTINUED | OUTPATIENT
Start: 2018-08-15 | End: 2018-08-17

## 2018-08-14 RX ADMIN — OXYCODONE HYDROCHLORIDE 10 MILLIGRAM(S): 5 TABLET ORAL at 22:39

## 2018-08-14 RX ADMIN — ONDANSETRON 4 MILLIGRAM(S): 8 TABLET, FILM COATED ORAL at 20:27

## 2018-08-14 RX ADMIN — Medication 250 MILLIGRAM(S): at 22:57

## 2018-08-14 RX ADMIN — Medication 100 MILLIGRAM(S): at 16:28

## 2018-08-14 RX ADMIN — MORPHINE SULFATE 2 MILLIGRAM(S): 50 CAPSULE, EXTENDED RELEASE ORAL at 20:26

## 2018-08-14 RX ADMIN — Medication 600 MILLIGRAM(S): at 20:21

## 2018-08-14 RX ADMIN — SODIUM CHLORIDE 1000 MILLILITER(S): 9 INJECTION INTRAMUSCULAR; INTRAVENOUS; SUBCUTANEOUS at 20:21

## 2018-08-14 RX ADMIN — SODIUM CHLORIDE 2000 MILLILITER(S): 9 INJECTION INTRAMUSCULAR; INTRAVENOUS; SUBCUTANEOUS at 19:17

## 2018-08-14 RX ADMIN — MORPHINE SULFATE 2 MILLIGRAM(S): 50 CAPSULE, EXTENDED RELEASE ORAL at 22:32

## 2018-08-14 NOTE — ED PROVIDER NOTE - OBJECTIVE STATEMENT
89 y/o F from home with c/o right sided rib pain, hip, and right thigh pain x 3 days.  Pt's daughter states pt usually ambulated with a walker but over the past 3 days she has not been able to stand due to pain.  Pt denies any known trauma.  Pt also has a wound to the left foot treated with oral antibiotics x 5 days with some improvement of symptoms.

## 2018-08-14 NOTE — ED ADULT NURSE NOTE - OBJECTIVE STATEMENT
pt from home, c/o non traumatic right hip/buttocks/groin pain, unable to bear wt or ambulate pt from home, c/o non traumatic right hip/buttocks/groin pain, unable to bear wt or ambulate  "diabetic ulcer noted to left bunion"  pt failed PO ABX 1.5x1.5

## 2018-08-14 NOTE — ED ADULT NURSE NOTE - NSIMPLEMENTINTERV_GEN_ALL_ED
Implemented All Fall with Harm Risk Interventions:  [place] to call system. Call bell, personal items and telephone within reach. Instruct patient to call for assistance. Room bathroom lighting operational. Non-slip footwear when patient is off stretcher. Physically safe environment: no spills, clutter or unnecessary equipment. Stretcher in lowest position, wheels locked, appropriate side rails in place. Provide visual cue, wrist band, yellow gown, etc. Monitor gait and stability. Monitor for mental status changes and reorient to person, place, and time. Review medications for side effects contributing to fall risk. Reinforce activity limits and safety measures with patient and family. Provide visual clues: red socks.

## 2018-08-14 NOTE — H&P ADULT - PROBLEM SELECTOR PLAN 1
Admit  R/O bone mets/neoplasm  Check bone scan  CT A/P r/o mass  Bed rest for now  Pain meds prn  May need bone biopsy  Oncology consult  Further work-up/management pending clinical course.

## 2018-08-14 NOTE — H&P ADULT - HISTORY OF PRESENT ILLNESS
89 y/o F from home with PMH of HTN DM HLD AF PVD angina who came in  c/o right sided rib pain, hip, and right thigh pain x several weeks, worse over the past 3 days.  Pt's daughter states pt usually ambulated with a walker but over the past 3 days she has not even been able to stand due to pain today.  Pt denies any known trauma.  Pt also has a wound to the left foot treated with oral antibiotics (Sivextro) x 5 days with some improvement of symptoms. Patient denies fevers, chills, n/v/d, cp, cough or sob.

## 2018-08-14 NOTE — H&P ADULT - PROBLEM SELECTOR PLAN 2
Pan-culture  IV vanco/zosyn  Check ESR/CRP  Podiatry and ID consults  Further work-up/management pending clinical course.

## 2018-08-15 LAB
% ALBUMIN: 40.6 % — SIGNIFICANT CHANGE UP
% ALPHA 1: 11.9 % — SIGNIFICANT CHANGE UP
% ALPHA 2: 19.3 % — SIGNIFICANT CHANGE UP
% BETA: 12 % — SIGNIFICANT CHANGE UP
% GAMMA: 16.2 % — SIGNIFICANT CHANGE UP
ALBUMIN SERPL ELPH-MCNC: 3 G/DL — LOW (ref 3.6–5.5)
ALBUMIN/GLOB SERPL ELPH: 0.7 RATIO — SIGNIFICANT CHANGE UP
ALPHA1 GLOB SERPL ELPH-MCNC: 0.9 G/DL — HIGH (ref 0.1–0.4)
ALPHA2 GLOB SERPL ELPH-MCNC: 1.4 G/DL — HIGH (ref 0.5–1)
ANION GAP SERPL CALC-SCNC: 9 MMOL/L — SIGNIFICANT CHANGE UP (ref 5–17)
B-GLOBULIN SERPL ELPH-MCNC: 0.9 G/DL — SIGNIFICANT CHANGE UP (ref 0.5–1)
BASOPHILS # BLD AUTO: 0.01 K/UL — SIGNIFICANT CHANGE UP (ref 0–0.2)
BASOPHILS NFR BLD AUTO: 0.1 % — SIGNIFICANT CHANGE UP (ref 0–2)
BUN SERPL-MCNC: 32 MG/DL — HIGH (ref 7–23)
CALCIUM SERPL-MCNC: 9.1 MG/DL — SIGNIFICANT CHANGE UP (ref 8.5–10.1)
CHLORIDE SERPL-SCNC: 94 MMOL/L — LOW (ref 96–108)
CO2 SERPL-SCNC: 31 MMOL/L — SIGNIFICANT CHANGE UP (ref 22–31)
CREAT SERPL-MCNC: 1.7 MG/DL — HIGH (ref 0.5–1.3)
CRP SERPL-MCNC: 21.63 MG/DL — HIGH (ref 0–0.4)
EOSINOPHIL # BLD AUTO: 0.01 K/UL — SIGNIFICANT CHANGE UP (ref 0–0.5)
EOSINOPHIL NFR BLD AUTO: 0.1 % — SIGNIFICANT CHANGE UP (ref 0–6)
ERYTHROCYTE [SEDIMENTATION RATE] IN BLOOD: 92 MM/HR — HIGH (ref 0–20)
GAMMA GLOBULIN: 1.2 G/DL — SIGNIFICANT CHANGE UP (ref 0.6–1.6)
GLUCOSE SERPL-MCNC: 129 MG/DL — HIGH (ref 70–99)
GRAM STN FLD: SIGNIFICANT CHANGE UP
HBA1C BLD-MCNC: 6.7 % — HIGH (ref 4–5.6)
HCT VFR BLD CALC: 36.1 % — SIGNIFICANT CHANGE UP (ref 34.5–45)
HGB BLD-MCNC: 11.6 G/DL — SIGNIFICANT CHANGE UP (ref 11.5–15.5)
IGA FLD-MCNC: 242 MG/DL — SIGNIFICANT CHANGE UP (ref 84–499)
IGG FLD-MCNC: 979 MG/DL — SIGNIFICANT CHANGE UP (ref 610–1660)
IGM SERPL-MCNC: 129 MG/DL — SIGNIFICANT CHANGE UP (ref 35–242)
IMM GRANULOCYTES NFR BLD AUTO: 0.5 % — SIGNIFICANT CHANGE UP (ref 0–1.5)
INR BLD: 3.49 RATIO — HIGH (ref 0.88–1.16)
INR BLD: 3.71 RATIO — HIGH (ref 0.88–1.16)
INTERPRETATION SERPL IFE-IMP: SIGNIFICANT CHANGE UP
KAPPA LC SER QL IFE: 7.18 MG/DL — HIGH (ref 0.33–1.94)
KAPPA LC SER QL IFE: 7.18 MG/DL — HIGH (ref 0.33–1.94)
KAPPA/LAMBDA FREE LIGHT CHAIN RATIO, SERUM: 1.04 RATIO — SIGNIFICANT CHANGE UP (ref 0.26–1.65)
KAPPA/LAMBDA FREE LIGHT CHAIN RATIO, SERUM: 1.04 RATIO — SIGNIFICANT CHANGE UP (ref 0.26–1.65)
LAMBDA LC SER QL IFE: 6.93 MG/DL — HIGH (ref 0.57–2.63)
LAMBDA LC SER QL IFE: 6.93 MG/DL — HIGH (ref 0.57–2.63)
LYMPHOCYTES # BLD AUTO: 0.69 K/UL — LOW (ref 1–3.3)
LYMPHOCYTES # BLD AUTO: 5.9 % — LOW (ref 13–44)
MAGNESIUM SERPL-MCNC: 1.9 MG/DL — SIGNIFICANT CHANGE UP (ref 1.6–2.6)
MCHC RBC-ENTMCNC: 29.4 PG — SIGNIFICANT CHANGE UP (ref 27–34)
MCHC RBC-ENTMCNC: 32.1 GM/DL — SIGNIFICANT CHANGE UP (ref 32–36)
MCV RBC AUTO: 91.6 FL — SIGNIFICANT CHANGE UP (ref 80–100)
METHOD TYPE: SIGNIFICANT CHANGE UP
MONOCYTES # BLD AUTO: 0.91 K/UL — HIGH (ref 0–0.9)
MONOCYTES NFR BLD AUTO: 7.8 % — SIGNIFICANT CHANGE UP (ref 2–14)
MSSA DNA SPEC QL NAA+PROBE: SIGNIFICANT CHANGE UP
NEUTROPHILS # BLD AUTO: 9.95 K/UL — HIGH (ref 1.8–7.4)
NEUTROPHILS NFR BLD AUTO: 85.6 % — HIGH (ref 43–77)
NRBC # BLD: 0 /100 WBCS — SIGNIFICANT CHANGE UP (ref 0–0)
PLATELET # BLD AUTO: 177 K/UL — SIGNIFICANT CHANGE UP (ref 150–400)
POTASSIUM SERPL-MCNC: 3.9 MMOL/L — SIGNIFICANT CHANGE UP (ref 3.5–5.3)
POTASSIUM SERPL-SCNC: 3.9 MMOL/L — SIGNIFICANT CHANGE UP (ref 3.5–5.3)
PROT PATTERN SERPL ELPH-IMP: SIGNIFICANT CHANGE UP
PROT SERPL-MCNC: 7.4 G/DL — SIGNIFICANT CHANGE UP (ref 6–8.3)
PROTHROM AB SERPL-ACNC: 39 SEC — HIGH (ref 9.8–12.7)
PROTHROM AB SERPL-ACNC: 41.5 SEC — HIGH (ref 9.8–12.7)
RBC # BLD: 3.94 M/UL — SIGNIFICANT CHANGE UP (ref 3.8–5.2)
RBC # FLD: 16.8 % — HIGH (ref 10.3–14.5)
SODIUM SERPL-SCNC: 134 MMOL/L — LOW (ref 135–145)
SPECIMEN SOURCE: SIGNIFICANT CHANGE UP
WBC # BLD: 11.63 K/UL — HIGH (ref 3.8–10.5)
WBC # FLD AUTO: 11.63 K/UL — HIGH (ref 3.8–10.5)

## 2018-08-15 PROCEDURE — 78306 BONE IMAGING WHOLE BODY: CPT | Mod: 26

## 2018-08-15 PROCEDURE — 73721 MRI JNT OF LWR EXTRE W/O DYE: CPT | Mod: 26,RT

## 2018-08-15 PROCEDURE — 74176 CT ABD & PELVIS W/O CONTRAST: CPT | Mod: 26

## 2018-08-15 RX ORDER — FUROSEMIDE 40 MG
20 TABLET ORAL DAILY
Qty: 0 | Refills: 0 | Status: DISCONTINUED | OUTPATIENT
Start: 2018-08-17 | End: 2018-08-21

## 2018-08-15 RX ORDER — ASCORBIC ACID 60 MG
500 TABLET,CHEWABLE ORAL DAILY
Qty: 0 | Refills: 0 | Status: CANCELLED | OUTPATIENT
Start: 2018-08-15 | End: 2018-08-21

## 2018-08-15 RX ORDER — INSULIN LISPRO 100/ML
4 VIAL (ML) SUBCUTANEOUS
Qty: 0 | Refills: 0 | Status: DISCONTINUED | OUTPATIENT
Start: 2018-08-16 | End: 2018-08-21

## 2018-08-15 RX ORDER — PROPRANOLOL HCL 160 MG
120 CAPSULE, EXTENDED RELEASE 24HR ORAL DAILY
Qty: 0 | Refills: 0 | Status: DISCONTINUED | OUTPATIENT
Start: 2018-08-15 | End: 2018-08-21

## 2018-08-15 RX ORDER — INSULIN GLARGINE 100 [IU]/ML
12 INJECTION, SOLUTION SUBCUTANEOUS AT BEDTIME
Qty: 0 | Refills: 0 | Status: DISCONTINUED | OUTPATIENT
Start: 2018-08-15 | End: 2018-08-21

## 2018-08-15 RX ORDER — ZINC SULFATE TAB 220 MG (50 MG ZINC EQUIVALENT) 220 (50 ZN) MG
220 TAB ORAL DAILY
Qty: 0 | Refills: 0 | Status: CANCELLED | OUTPATIENT
Start: 2018-08-15 | End: 2018-08-21

## 2018-08-15 RX ORDER — MULTIVIT-MIN/FERROUS GLUCONATE 9 MG/15 ML
1 LIQUID (ML) ORAL DAILY
Qty: 0 | Refills: 0 | Status: CANCELLED | OUTPATIENT
Start: 2018-08-15 | End: 2018-08-21

## 2018-08-15 RX ORDER — LACTOBACILLUS ACIDOPHILUS 100MM CELL
1 CAPSULE ORAL DAILY
Qty: 0 | Refills: 0 | Status: DISCONTINUED | OUTPATIENT
Start: 2018-08-15 | End: 2018-08-16

## 2018-08-15 RX ORDER — MORPHINE SULFATE 50 MG/1
2 CAPSULE, EXTENDED RELEASE ORAL ONCE
Qty: 0 | Refills: 0 | Status: DISCONTINUED | OUTPATIENT
Start: 2018-08-15 | End: 2018-08-15

## 2018-08-15 RX ADMIN — PIPERACILLIN AND TAZOBACTAM 200 GRAM(S): 4; .5 INJECTION, POWDER, LYOPHILIZED, FOR SOLUTION INTRAVENOUS at 01:55

## 2018-08-15 RX ADMIN — Medication 40 MILLIGRAM(S): at 06:17

## 2018-08-15 RX ADMIN — MORPHINE SULFATE 2 MILLIGRAM(S): 50 CAPSULE, EXTENDED RELEASE ORAL at 12:42

## 2018-08-15 RX ADMIN — INSULIN GLARGINE 12 UNIT(S): 100 INJECTION, SOLUTION SUBCUTANEOUS at 22:14

## 2018-08-15 RX ADMIN — PANTOPRAZOLE SODIUM 40 MILLIGRAM(S): 20 TABLET, DELAYED RELEASE ORAL at 06:17

## 2018-08-15 RX ADMIN — Medication 1000 UNIT(S): at 12:44

## 2018-08-15 RX ADMIN — PIPERACILLIN AND TAZOBACTAM 25 GRAM(S): 4; .5 INJECTION, POWDER, LYOPHILIZED, FOR SOLUTION INTRAVENOUS at 17:29

## 2018-08-15 RX ADMIN — PIPERACILLIN AND TAZOBACTAM 25 GRAM(S): 4; .5 INJECTION, POWDER, LYOPHILIZED, FOR SOLUTION INTRAVENOUS at 10:09

## 2018-08-15 RX ADMIN — OXYCODONE HYDROCHLORIDE 10 MILLIGRAM(S): 5 TABLET ORAL at 22:30

## 2018-08-15 RX ADMIN — Medication 120 MILLIGRAM(S): at 06:17

## 2018-08-15 RX ADMIN — Medication 25 UNIT(S): at 08:56

## 2018-08-15 RX ADMIN — Medication 1 PATCH: at 12:44

## 2018-08-15 RX ADMIN — Medication 1: at 17:45

## 2018-08-15 RX ADMIN — Medication 250 MILLIGRAM(S): at 22:13

## 2018-08-15 RX ADMIN — SIMVASTATIN 10 MILLIGRAM(S): 20 TABLET, FILM COATED ORAL at 22:13

## 2018-08-15 RX ADMIN — OXYCODONE HYDROCHLORIDE 10 MILLIGRAM(S): 5 TABLET ORAL at 22:13

## 2018-08-15 RX ADMIN — MORPHINE SULFATE 2 MILLIGRAM(S): 50 CAPSULE, EXTENDED RELEASE ORAL at 13:00

## 2018-08-15 RX ADMIN — MORPHINE SULFATE 2 MILLIGRAM(S): 50 CAPSULE, EXTENDED RELEASE ORAL at 18:19

## 2018-08-15 NOTE — CONSULT NOTE ADULT - ATTENDING COMMENTS
Patient seen and evaluated.  Wound cleansed and flushed with normal saline until all purulent fluid was drained, about 1cc was expressed  Wound dressed with silver alginate, DSD  Recommend MRI to rule out OM  Recommend continuation of IV antibiotics and ID consult  Recommend vascular consult due to non-palpable pulses  Case to be discussed with attending Dr. Killian    Wound care instructions:  1. cleanse wound with normal saline, pat dry  2. apply silver alginate to wound, secure with 4x4 gauze, abd pad and janessa wrap  3. change daily  Please have patient follow up in Dr. Killian's wound care clinic within 1 week of discharge

## 2018-08-15 NOTE — PATIENT PROFILE ADULT. - ABILITY TO HEAR (WITH HEARING AID OR HEARING APPLIANCE IF NORMALLY USED):
hearing aids with pt/Mildly to Moderately Impaired: difficulty hearing in some environments or speaker may need to increase volume or speak distinctly

## 2018-08-15 NOTE — GOALS OF CARE CONVERSATION - PERSONAL ADVANCE DIRECTIVE - CONVERSATION DETAILS
attempted to speak to pt with her son at bedside regarding advance directives but she seemed unable to focus and it is uncertain if pt has capacity at this moment to discuss. The pts son thinks that she would not want CPR. He will discuss this with his family. PC will follow up attempted to speak to pt with her son at bedside regarding advance directives but she seemed unable to focus and it is uncertain if pt has capacity at this moment to discuss. The pts son thinks that she would not want CPR. He will discuss this with his family. PC will follow up    Addendum: 8/16/18: 1400hrs, spoke to son as follow up, at this time pt remains full code. contact # given if need assistance.

## 2018-08-15 NOTE — CONSULT NOTE ADULT - SUBJECTIVE AND OBJECTIVE BOX
Pt is a 89 F, community ambulator who uses a walker as an assistant device. Patient came into the ED yesterday due to R Hip Pain which acutely started around 2 months ago. She states the pain has been recenlty increasing in severity and that in the past 1-2 days she was unable to ambulate due to pain in her RLE. Denies Trauma, numbness or tingling. Patient does have diabetic neuropathy and an ulcer on her left foot that is being treated with antibiotics.         PAST MEDICAL & SURGICAL HISTORY:  OAB (overactive bladder)  GERD (gastroesophageal reflux disease)  Angina effort  Heart failure  Upper respiratory infection  Diabetes  Renal stones  Myocardial infarction: 1981  Spinal stenosis  CVA (cerebral infarction)  Afib  Raynaud disease  Acid reflux  Hypertension  Hyperlipidemia  Asthma  Cataract  S/P hysterectomy    Home Medications:  Coumadin 1 mg oral tablet: 1 tab(s) orally once a day alternating with 2mg (14 Aug 2018 22:02)  ezetimibe 10 mg oral tablet: 1 tab(s) orally once a day (14 Aug 2018 22:02)  HYDROcodone 10 mg oral capsule, extended release: 5 milligram(s) orally once a day (at bedtime) (14 Aug 2018 22:02)  Myrbetriq 25 mg oral tablet, extended release: 1 tab(s) orally once a day (14 Aug 2018 22:02)  Nitro-Dur 0.4 mg/hr transdermal film, extended release: 1 patch transdermal once a day (14 Aug 2018 22:02)  NovoLOG Mix 70/30 FlexPen subcutaneous suspension: 26 unit(s) subcutaneous once a day in the morning (14 Aug 2018 22:02)  NovoLOG Mix 70/30 FlexPen subcutaneous suspension: 11 unit(s) subcutaneous once a day (at bedtime) (14 Aug 2018 22:02)  omeprazole 40 mg oral delayed release capsule: 1 cap(s) orally once a day (14 Aug 2018 22:02)  potassium chloride 10 mEq oral capsule, extended release: 1 cap(s) orally once a day (14 Aug 2018 22:02)  Tradjenta 5 mg oral tablet: 1 tab(s) orally once a day (14 Aug 2018 22:02)  Vitamin D3 1000 intl units oral capsule: 1 cap(s) orally once a day (14 Aug 2018 22:02)  Vytorin 10 mg-10 mg oral tablet: 1 tab(s) orally once a day (14 Aug 2018 22:02)    Allergies    Levaquin (Other)  ramipril (Other)  tetracycline (Hives; Rash)    Intolerances        Imaging:    < from: Xray Hip w/ Pelvis 2 or 3 Views, Right (08.14.18 @ 16:09) >  EXAM:  XR HIP WITH PELV 2-3V RT                          EXAM:  XR FEMUR 2 VIEWS RT                            PROCEDURE DATE:  08/14/2018          INTERPRETATION:  Clinical information: Pain. Evaluate for fracture.    Technique: AP and crosstable lateral views of the right femur. Single AP   view of the pelvis. AP and cross table lateral views of the right hip.    Comparison: None available.    Findings: There is generalized osteopenia, limiting evaluation for acute   fracture. No gross acute fractures or dislocations are noted. Mild   osteoarthritis of the right hip is notable. Arterial vascular   calcifications are visualized.    IMPRESSION: Osteopenia limiting evaluation for acute fracture.    No gross acute fractures or dislocations. If there remains clinical   concern for occult fracture, recommend further evaluation with   cross-sectional CT imaging.              < end of copied text >  < from: NM Bone Imaging Total (08.15.18 @ 11:23) >  EXAM:  NM BONE IMG WHOLE BODY                            PROCEDURE DATE:  08/15/2018          INTERPRETATION:  RADIOPHARMACEUTICAL: 19.4 mCi Tc-99m HDP, I.V.     CLINICAL INFORMATION: 89 year old female with right hip lesion on CT;   referred to evaluate for additional osseous abnormalities.    TECHNIQUE:  Anterior and posterior whole body images were obtained   approximately 2 hours following radiopharmaceutical administration.   Additional static images of the chest and pelvis also were obtained.    COMPARISON: No previous bone scans were available for comparison.    FINDINGS:  There is increased radiopharmaceutical accumulation in the   lower right iliac bone extending through the acetabulum into the ischium,   corresponding in part to the abnormality identified on the CT scan of   8/14/2018. The pubic rami are obscured by bladder activity. There is a   small focus of increased radiopharmaceutical accumulation along the   anterior axillary line of a left mid rib, approximately the fifth. There   is focally increased labeled leukocyte accumulation in the region of the   left great toe. There are degenerative changes in the spine and major   joints. There is physiologic distribution of radiopharmaceutical in the   remainder of the imaged osseous structures.    Both kidneys are visualized and are symmetric in appearance.    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    Probable old trauma left mid rib.    Focally increased radiopharmaceutical uptake in the left great toe is   nonspecific, but given the history of left foot ulcer, if osteomyelitis   is a clinical consideration labeled leukocyte imaging is suggested for   further evaluation.    < from: CT Abdomen and Pelvis No Cont (08.15.18 @ 08:01) >  EXAM:  CT ABDOMEN AND PELVIS                            PROCEDURE DATE:  08/15/2018          INTERPRETATION:  History: Destructive lesion right hip.    CT abdomen and pelvis no contrast. Prior 4/26/2014.    Limited by lack of oral and IV contrast.  Small layering basilar effusions. Prominent interlobular septa at the   lung bases suggests interstitial edema. No substantial coronary artery   calcification. No calcified gallstones or biliary dilatation.  Scattered poorly defined low-attenuation foci throughout the hepatic   parenchyma concerning for metastases. Correlate with contrast-enhanced CT   or MR. Unenhanced pancreas spleen not remarkable.  No adrenal nodules.  No hydronephrosis. Left renal cysts.  Atherosclerotic nonaneurysmal abdominal aorta.  Colonic diverticula, no diverticulitis or other active bowel inflammation.  Trace ascites.  Status post hysterectomy.  Distended urinary bladder without wall thickening.  No aggressive lytic foci right acetabulum and right pubis consistentwith    neoplasm.    Impression:    Limited by lack of oral and IV contrast.    < end of copied text >          < end of copied text >      PE:      Vital Signs Last 24 Hrs  T(C): 37.2 (15 Aug 2018 14:10), Max: 37.2 (14 Aug 2018 22:43)  T(F): 99 (15 Aug 2018 14:10), Max: 99 (15 Aug 2018 14:10)  HR: 80 (15 Aug 2018 14:10) (72 - 83)  BP: 90/55 (15 Aug 2018 14:10) (90/55 - 129/75)  RR: 17 (15 Aug 2018 14:10) (16 - 18)  SpO2: 95% (15 Aug 2018 14:10) (93% - 96%)    RLE:  Unable to SLR, Pain with attempt   Pain with Log Roll  SILT L3-S1  DP1+  TTP over the groin/pubic rami   EHL/FHL/GSC/TA intact  Full ROM Knee  Full passive ROM Hip, elicited pain   No calf tenderness  Decreased sensation to light tough dorsal/volar foot, hx diabetic neuropathy     Secondary Survey: No TTP over bony prominences, SILT, palpable pulses, full/painless range of motion, compartments soft  Pain at Right Hand CMC joint, full ROM 1st Digit Right Hand.

## 2018-08-15 NOTE — PROGRESS NOTE ADULT - SUBJECTIVE AND OBJECTIVE BOX
Patient is a 89y old  Female who presents with a chief complaint of diabetic foot ulcer/ambulatory disfunction (15 Aug 2018 02:29)      INTERVAL /OVERNIGHT EVENTS: denies pain, feels depressed    MEDICATIONS  (STANDING):  cholecalciferol 1000 Unit(s) Oral daily  dextrose 5%. 1000 milliLiter(s) (50 mL/Hr) IV Continuous <Continuous>  dextrose 50% Injectable 12.5 Gram(s) IV Push once  dextrose 50% Injectable 25 Gram(s) IV Push once  dextrose 50% Injectable 25 Gram(s) IV Push once  insulin glargine Injectable (LANTUS) 12 Unit(s) SubCutaneous at bedtime  insulin lispro (HumaLOG) corrective regimen sliding scale   SubCutaneous three times a day before meals  insulin lispro (HumaLOG) corrective regimen sliding scale   SubCutaneous at bedtime  nitroglycerin    Patch 0.4 mG/Hr(s) 1 patch Transdermal daily  oxyCODONE  ER Tablet 10 milliGRAM(s) Oral <User Schedule>  pantoprazole    Tablet 40 milliGRAM(s) Oral before breakfast  piperacillin/tazobactam IVPB. 3.375 Gram(s) IV Intermittent every 8 hours  propranolol  milliGRAM(s) Oral daily  simvastatin 10 milliGRAM(s) Oral at bedtime  vancomycin  IVPB 1000 milliGRAM(s) IV Intermittent every 24 hours  vancomycin  IVPB        MEDICATIONS  (PRN):  dextrose 40% Gel 15 Gram(s) Oral once PRN Blood Glucose LESS THAN 70 milliGRAM(s)/deciLiter  glucagon  Injectable 1 milliGRAM(s) IntraMuscular once PRN Glucose <70 milliGRAM(s)/deciLiter  morphine  - Injectable 2 milliGRAM(s) IV Push every 6 hours PRN Severe Pain (7 - 10)      Allergies    Levaquin (Other)  ramipril (Other)  tetracycline (Hives; Rash)    Intolerances        REVIEW OF SYSTEMS:  CONSTITUTIONAL: No fever, weight loss, or fatigue  EYES: No eye pain, visual disturbances, or discharge  ENMT:  No difficulty hearing, tinnitus, vertigo; No sinus or throat pain  NECK: No pain or stiffness  RESPIRATORY: No cough, wheezing, chills or hemoptysis; No shortness of breath  CARDIOVASCULAR: No chest pain, palpitations, dizziness, or leg swelling  GASTROINTESTINAL: No abdominal or epigastric pain. No nausea, vomiting, or hematemesis; No diarrhea or constipation. No melena or hematochezia.  GENITOURINARY: No dysuria, frequency, hematuria, or incontinence  NEUROLOGICAL: No headaches, memory loss, loss of strength, numbness, or tremors  SKIN: No itching, burning, rashes, or lesions   LYMPH NODES: No enlarged glands  ENDOCRINE: No heat or cold intolerance; No hair loss; No polydipsia or polyuria  MUSCULOSKELETAL: No joint pain or swelling; No muscle, back, or extremity pain  PSYCHIATRIC: No depression, anxiety, mood swings, or difficulty sleeping  HEME/LYMPH: No easy bruising, or bleeding gums  ALLERGY AND IMMUNOLOGIC: No hives or eczema    Vital Signs Last 24 Hrs  T(C): 37.2 (15 Aug 2018 14:10), Max: 37.2 (14 Aug 2018 22:43)  T(F): 99 (15 Aug 2018 14:10), Max: 99 (15 Aug 2018 14:10)  HR: 80 (15 Aug 2018 14:10) (72 - 83)  BP: 90/55 (15 Aug 2018 14:10) (90/55 - 129/75)  BP(mean): --  RR: 17 (15 Aug 2018 14:10) (16 - 18)  SpO2: 95% (15 Aug 2018 14:10) (93% - 96%)    PHYSICAL EXAM:  GENERAL: NAD, well-groomed, well-developed  HEAD:  Atraumatic, Normocephalic  EYES: EOMI, PERRLA, conjunctiva and sclera clear  ENMT: No tonsillar erythema, exudates, or enlargement; Moist mucous membranes, Good dentition, No lesions  NECK: Supple, No JVD, Normal thyroid  NERVOUS SYSTEM:  Alert & Oriented X3, Good concentration; Motor Strength 5/5 B/L upper and lower extremities; DTRs 2+ intact and symmetric  CHEST/LUNG: Clear to auscultation bilaterally; No rales, rhonchi, wheezing, or rubs  HEART: Regular rate and rhythm; No murmurs, rubs, or gallops  ABDOMEN: Soft, Nontender, Nondistended; Bowel sounds present  EXTREMITIES:  2+ Peripheral Pulses, No clubbing, cyanosis, or edema  LYMPH: No lymphadenopathy noted  SKIN: No rashes or lesions    LABS:                        11.6   11.63 )-----------( 177      ( 15 Aug 2018 09:19 )             36.1     15 Aug 2018 09:19    134    |  94     |  32     ----------------------------<  129    3.9     |  31     |  1.70     Ca    9.1        15 Aug 2018 09:19  Mg     1.9       15 Aug 2018 09:19      PT/INR - ( 15 Aug 2018 09:19 )   PT: 41.5 sec;   INR: 3.71 ratio           Urinalysis Basic - ( 14 Aug 2018 16:03 )    Color: Yellow / Appearance: Clear / S.005 / pH: x  Gluc: x / Ketone: Negative  / Bili: Negative / Urobili: Negative   Blood: x / Protein: Negative / Nitrite: Negative   Leuk Esterase: Trace / RBC: 0-2 /HPF / WBC 3-5   Sq Epi: x / Non Sq Epi: Occasional / Bacteria: Moderate      CAPILLARY BLOOD GLUCOSE      POCT Blood Glucose.: 100 mg/dL (15 Aug 2018 11:59)  POCT Blood Glucose.: 86 mg/dL (15 Aug 2018 08:15)  POCT Blood Glucose.: 100 mg/dL (14 Aug 2018 23:41)      RADIOLOGY & ADDITIONAL TESTS:    Notes Reviewed:  [x ] YES  [ ] NO    Care Discussed with Consultants/Other Providers [ x] YES  [ ] NO

## 2018-08-15 NOTE — CONSULT NOTE ADULT - PROBLEM SELECTOR RECOMMENDATION 9
hold twice daily biphasic insulin regimen during hospitalization  start lantus 12 units qhs  start humalog 4 units tid before meals  cont low dose humalog scale coverage  cont cons cho diet; goal bg 100-180 in hosp setting

## 2018-08-15 NOTE — CONSULT NOTE ADULT - SUBJECTIVE AND OBJECTIVE BOX
89yo diabetic female seen bedside for left foot wound which she was self-treating at home for the past week with betadine and bacitracin and also with oral antibiotics (Sivextro) for 5 days. Patient states she would not like any sort of surgical intervention at this time.    ICU Vital Signs Last 24 Hrs  T(C): 36.8 (15 Aug 2018 05:54), Max: 37.2 (14 Aug 2018 22:43)  T(F): 98.3 (15 Aug 2018 05:54), Max: 98.9 (14 Aug 2018 22:43)  HR: 72 (15 Aug 2018 05:54) (72 - 83)  BP: 112/65 (15 Aug 2018 05:54) (110/64 - 151/86)  BP(mean): --  ABP: --  ABP(mean): --  RR: 16 (15 Aug 2018 05:54) (16 - 18)  SpO2: 96% (15 Aug 2018 05:54) (93% - 96%)                          11.6   11.63 )-----------( 177      ( 15 Aug 2018 09:19 )             36.1     08-15    134<L>  |  94<L>  |  32<H>  ----------------------------<  129<H>  3.9   |  31  |  1.70<H>    Ca    9.1      15 Aug 2018 09:19  Mg     1.9     08-15    TPro  7.4  /  Alb  2.4<L>  /  TBili  0.6  /  DBili  x   /  AST  109<H>  /  ALT  32  /  AlkPhos  416<H>  08-14    Objective: left foot focused  Vasc: DP and PT non-palpable; CFT < 3 seconds x5; TG WNL; no edema noted  Derm:  -wound to medial aspect of 1st MPJ measuring approximately 0.5cm x 0.5cm x 0.3cm with probing to bone and mild purulent drainage noted at this time with periwound erythema, hyperkeratotic wound borders and no malodor noted  Neuro: epicritic sensation diminished  Ortho: hallux abducto valgus noted

## 2018-08-15 NOTE — CONSULT NOTE ADULT - ASSESSMENT
·	CKD 3  ·	Diabetes  ·	Hypertension  ·	? Metastatic disease  ·	Diabetic foot ulcer    Renal function close to baseline. On lasix. Will lower dose for now. ? Metastatic disease. On abx.  Monitor vanco levels. Monitor blood sugar levels. Insulin coverage as needed. Dietary restriction.   Monitor BP trend. Titrate BP meds as needed. Salt restriction. Will follow electrolytes and renal function trend.   D/w family at bedside. Further recommendations pending clinical course. Thank you for the courtesy of this referral.

## 2018-08-15 NOTE — CONSULT NOTE ADULT - SUBJECTIVE AND OBJECTIVE BOX
Patient is a 89y old  Female who presents with a chief complaint of diabetic foot ulcer/ambulatory disfunction (15 Aug 2018 02:29)      HPI:  88 y/o F from home with PMH of HTN DM HLD AF PVD angina who came in  c/o right sided rib pain, hip, and right thigh pain x several weeks, worse over the past 3 days.  Pt's daughter states pt usually ambulated with a walker but over the past 3 days she has not even been able to stand due to pain today.  Pt denies any known trauma.  Pt also has a wound to the left foot treated with oral antibiotics (Sivextro) x 5 days with some improvement of symptoms. Patient denies fevers, chills, n/v/d, cp, cough or sob. (14 Aug 2018 22:28)       ROS:    CONSTITUTIONAL: No fever, weight loss, or fatigue  EYES: No eye pain, visual disturbances, or discharge  ENMT:  No difficulty hearing, tinnitus, vertigo; No sinus or throat pain  NECK: No pain or stiffness  BREASTS: No pain, masses, or nipple discharge  RESPIRATORY: No cough, wheezing, chills or hemoptysis; No shortness of breath  CARDIOVASCULAR: No chest pain, palpitations, dizziness, or leg swelling, no decreased exercise tolerance  GASTROINTESTINAL: No abdominal or epigastric pain. No nausea, vomiting, or hematemesis; No diarrhea or constipation. No melena or hematochezia.  GENITOURINARY: No dysuria, frequency, hematuria, or incontinence  NEUROLOGICAL: No headaches, memory loss, loss of strength, numbness, or tremors  SKIN: No itching, burning, rashes, or lesions   LYMPH NODES: No enlargement or tenderness noted  ENDOCRINE: No heat or cold intolerance; No hair loss  MUSCULOSKELETAL: No muscle or back pain  PSYCHIATRIC: No depression, anxiety, mood swings, or difficulty sleeping  HEME/LYMPH: No easy bruising, or bleeding gums  ALLERGY AND IMMUNOLOGIC: No hives or eczema      PAST MEDICAL & SURGICAL HISTORY:  OAB (overactive bladder)  GERD (gastroesophageal reflux disease)  Angina effort  Heart failure  Upper respiratory infection  Diabetes  Renal stones  Myocardial infarction: 1981  Spinal stenosis  CVA (cerebral infarction)  Afib  Raynaud disease  Acid reflux  Hypertension  Hyperlipidemia  Asthma  Cataract  S/P hysterectomy      SOCIAL HISTORY:    FAMILY HISTORY:  No pertinent family history in first degree relatives      MEDICATIONS  (STANDING):  cholecalciferol 1000 Unit(s) Oral daily  dextrose 5%. 1000 milliLiter(s) (50 mL/Hr) IV Continuous <Continuous>  dextrose 50% Injectable 12.5 Gram(s) IV Push once  dextrose 50% Injectable 25 Gram(s) IV Push once  dextrose 50% Injectable 25 Gram(s) IV Push once  furosemide    Tablet 40 milliGRAM(s) Oral daily  insulin glargine Injectable (LANTUS) 12 Unit(s) SubCutaneous at bedtime  insulin lispro (HumaLOG) corrective regimen sliding scale   SubCutaneous three times a day before meals  insulin lispro (HumaLOG) corrective regimen sliding scale   SubCutaneous at bedtime  nitroglycerin    Patch 0.4 mG/Hr(s) 1 patch Transdermal daily  oxyCODONE  ER Tablet 10 milliGRAM(s) Oral <User Schedule>  pantoprazole    Tablet 40 milliGRAM(s) Oral before breakfast  piperacillin/tazobactam IVPB. 3.375 Gram(s) IV Intermittent every 8 hours  propranolol  milliGRAM(s) Oral daily  simvastatin 10 milliGRAM(s) Oral at bedtime  vancomycin  IVPB 1000 milliGRAM(s) IV Intermittent every 24 hours  vancomycin  IVPB        MEDICATIONS  (PRN):  dextrose 40% Gel 15 Gram(s) Oral once PRN Blood Glucose LESS THAN 70 milliGRAM(s)/deciLiter  glucagon  Injectable 1 milliGRAM(s) IntraMuscular once PRN Glucose <70 milliGRAM(s)/deciLiter  morphine  - Injectable 2 milliGRAM(s) IV Push every 6 hours PRN Severe Pain (7 - 10)      Allergies    Levaquin (Other)  ramipril (Other)  tetracycline (Hives; Rash)    Intolerances        Vital Signs Last 24 Hrs  T(C): 36.8 (15 Aug 2018 05:54), Max: 37.2 (14 Aug 2018 22:43)  T(F): 98.3 (15 Aug 2018 05:54), Max: 98.9 (14 Aug 2018 22:43)  HR: 72 (15 Aug 2018 05:54) (72 - 83)  BP: 112/65 (15 Aug 2018 05:54) (110/64 - 151/86)  BP(mean): --  RR: 16 (15 Aug 2018 05:54) (16 - 18)  SpO2: 96% (15 Aug 2018 05:54) (93% - 96%)    PHYSICAL EXAM  General: adult in NAD  HEENT: clear oropharynx, anicteric sclera, pink conjunctivae  Neck: supple  CV: normal S1S2 with no murmur rubs or gallops  Lungs: clear to auscultation, no wheezes, no rhales  Abdomen: soft non-tender non-distended, no hepato/splenomegaly  Ext: no clubbing cyanosis or edema  Skin: no rashes and no petichiae  Neuro: alert and oriented X3 no focal deficits      LABS:    CBC Full  -  ( 15 Aug 2018 09:19 )  WBC Count : 11.63 K/uL  Hemoglobin : 11.6 g/dL  Hematocrit : 36.1 %  Platelet Count - Automated : 177 K/uL  Mean Cell Volume : 91.6 fl  Mean Cell Hemoglobin : 29.4 pg  Mean Cell Hemoglobin Concentration : 32.1 gm/dL  Auto Neutrophil # : 9.95 K/uL  Auto Lymphocyte # : 0.69 K/uL  Auto Monocyte # : 0.91 K/uL  Auto Eosinophil # : 0.01 K/uL  Auto Basophil # : 0.01 K/uL  Auto Neutrophil % : 85.6 %  Auto Lymphocyte % : 5.9 %  Auto Monocyte % : 7.8 %  Auto Eosinophil % : 0.1 %  Auto Basophil % : 0.1 %    08-15    134<L>  |  94<L>  |  32<H>  ----------------------------<  129<H>  3.9   |  31  |  1.70<H>    Ca    9.1      15 Aug 2018 09:19  Mg     1.9     08-15    TPro  7.4  /  Alb  2.4<L>  /  TBili  0.6  /  DBili  x   /  AST  109<H>  /  ALT  32  /  AlkPhos  416<H>  08-14    PT/INR - ( 15 Aug 2018 09:19 )   PT: 41.5 sec;   INR: 3.71 ratio               BLOOD SMEAR INTERPRETATION:  RBC: normocytic , normochromic with poikiloanizocytosis no significant raulauex  WBC:adequate in number , normal morphology  plt: adequate in number no clumps , giant/ large plt     RADIOLOGY & ADDITIONAL STUDIES:  < from: CT Abdomen and Pelvis No Cont (08.15.18 @ 08:01) >  Scattered poorly defined low-attenuation foci throughout the hepatic   parenchyma concerning for metastases. Correlate with contrast-enhanced CT   or MR. Unenhanced pancreas spleen not remarkable.  No adrenal nodules.  No hydronephrosis. Left renal cysts.  Atherosclerotic nonaneurysmal abdominal aorta.  Colonic diverticula, no diverticulitis or other active bowel inflammation.  Trace ascites.  Status post hysterectomy.  Distended urinary bladder without wall thickening.  No aggressive lytic foci right acetabulum and right pubis consistentwith    neoplasm.      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    Probable old trauma left mid rib.    Focally increased radiopharmaceutical uptake in the left great toe is   nonspecific, but given the history of left foot ulcer, if osteomyelitis   is a clinical consideration labeled leukocyte imaging is suggested for   further evaluation.      < end of copied text > Patient is a 89y old  Female who presents with a chief complaint of diabetic foot ulcer/ambulatory disfunction (15 Aug 2018 02:29)      HPI:  88 y/o F from home with PMH of HTN DM HLD AF PVD angina who came in  c/o right sided rib pain, hip, and right thigh pain x several weeks, worse over the past 3 days.  Pt's daughter states pt usually ambulated with a walker but over the past 3 days she has not even been able to stand due to pain today.  Pt denies any known trauma.  Pt also has a wound to the left foot treated with oral antibiotics (Sivextro) x 5 days with some improvement of symptoms. Patient denies fevers, chills, n/v/d, cp, cough or sob. (14 Aug 2018 22:28)       ROS:    CONSTITUTIONAL: No fever,+ weight loss, + fatigue  EYES: No eye pain, visual disturbances, or discharge  ENMT:  No difficulty hearing, tinnitus, vertigo; No sinus or throat pain  NECK: No pain or stiffness  BREASTS: No pain, masses, or nipple discharge  RESPIRATORY: No cough, wheezing, chills or hemoptysis; No shortness of breath  CARDIOVASCULAR: No chest pain, palpitations, dizziness, or leg swelling, no decreased exercise tolerance  GASTROINTESTINAL: No abdominal or epigastric pain. No nausea, vomiting, or hematemesis; No diarrhea or constipation. No melena or hematochezia.  GENITOURINARY: No dysuria, frequency, hematuria, or incontinence  NEUROLOGICAL: No headaches, memory loss, loss of strength, numbness, or tremors  SKIN: No itching, burning, rashes, or lesions   LYMPH NODES: No enlargement or tenderness noted  ENDOCRINE: No heat or cold intolerance; No hair loss  MUSCULOSKELETAL: bone pains  PSYCHIATRIC: No depression, anxiety, mood swings, or difficulty sleeping  HEME/LYMPH: No easy bruising, or bleeding gums  ALLERGY AND IMMUNOLOGIC: No hives or eczema      PAST MEDICAL & SURGICAL HISTORY:  OAB (overactive bladder)  GERD (gastroesophageal reflux disease)  Angina effort  Heart failure  Upper respiratory infection  Diabetes  Renal stones  Myocardial infarction: 1981  Spinal stenosis  CVA (cerebral infarction)  Afib  Raynaud disease  Acid reflux  Hypertension  Hyperlipidemia  Asthma  Cataract  S/P hysterectomy      SOCIAL HISTORY:non smoker    lives with daughtr and son , 5 children     FAMILY HISTORY:  son RCC ,daughter had cancer       MEDICATIONS  (STANDING):  cholecalciferol 1000 Unit(s) Oral daily  dextrose 5%. 1000 milliLiter(s) (50 mL/Hr) IV Continuous <Continuous>  dextrose 50% Injectable 12.5 Gram(s) IV Push once  dextrose 50% Injectable 25 Gram(s) IV Push once  dextrose 50% Injectable 25 Gram(s) IV Push once  furosemide    Tablet 40 milliGRAM(s) Oral daily  insulin glargine Injectable (LANTUS) 12 Unit(s) SubCutaneous at bedtime  insulin lispro (HumaLOG) corrective regimen sliding scale   SubCutaneous three times a day before meals  insulin lispro (HumaLOG) corrective regimen sliding scale   SubCutaneous at bedtime  nitroglycerin    Patch 0.4 mG/Hr(s) 1 patch Transdermal daily  oxyCODONE  ER Tablet 10 milliGRAM(s) Oral <User Schedule>  pantoprazole    Tablet 40 milliGRAM(s) Oral before breakfast  piperacillin/tazobactam IVPB. 3.375 Gram(s) IV Intermittent every 8 hours  propranolol  milliGRAM(s) Oral daily  simvastatin 10 milliGRAM(s) Oral at bedtime  vancomycin  IVPB 1000 milliGRAM(s) IV Intermittent every 24 hours  vancomycin  IVPB        MEDICATIONS  (PRN):  dextrose 40% Gel 15 Gram(s) Oral once PRN Blood Glucose LESS THAN 70 milliGRAM(s)/deciLiter  glucagon  Injectable 1 milliGRAM(s) IntraMuscular once PRN Glucose <70 milliGRAM(s)/deciLiter  morphine  - Injectable 2 milliGRAM(s) IV Push every 6 hours PRN Severe Pain (7 - 10)      Allergies    Levaquin (Other)  ramipril (Other)  tetracycline (Hives; Rash)    Intolerances        Vital Signs Last 24 Hrs  T(C): 36.8 (15 Aug 2018 05:54), Max: 37.2 (14 Aug 2018 22:43)  T(F): 98.3 (15 Aug 2018 05:54), Max: 98.9 (14 Aug 2018 22:43)  HR: 72 (15 Aug 2018 05:54) (72 - 83)  BP: 112/65 (15 Aug 2018 05:54) (110/64 - 151/86)  BP(mean): --  RR: 16 (15 Aug 2018 05:54) (16 - 18)  SpO2: 96% (15 Aug 2018 05:54) (93% - 96%)    PHYSICAL EXAM  General: adult in NAD  HEENT: clear oropharynx, anicteric sclera, pink conjunctivae  Neck: supple  CV: normal S1S2 with no murmur rubs or gallops  Lungs: clear to auscultation, no wheezes, no rhales  Abdomen: soft non-tender non-distended, no hepato/splenomegaly  Ext: no clubbing cyanosis or edema  Skin: no rashes and no petichiae  Neuro: alert and oriented X3 no focal deficits      LABS:    CBC Full  -  ( 15 Aug 2018 09:19 )  WBC Count : 11.63 K/uL  Hemoglobin : 11.6 g/dL  Hematocrit : 36.1 %  Platelet Count - Automated : 177 K/uL  Mean Cell Volume : 91.6 fl  Mean Cell Hemoglobin : 29.4 pg  Mean Cell Hemoglobin Concentration : 32.1 gm/dL  Auto Neutrophil # : 9.95 K/uL  Auto Lymphocyte # : 0.69 K/uL  Auto Monocyte # : 0.91 K/uL  Auto Eosinophil # : 0.01 K/uL  Auto Basophil # : 0.01 K/uL  Auto Neutrophil % : 85.6 %  Auto Lymphocyte % : 5.9 %  Auto Monocyte % : 7.8 %  Auto Eosinophil % : 0.1 %  Auto Basophil % : 0.1 %    08-15    134<L>  |  94<L>  |  32<H>  ----------------------------<  129<H>  3.9   |  31  |  1.70<H>    Ca    9.1      15 Aug 2018 09:19  Mg     1.9     08-15    TPro  7.4  /  Alb  2.4<L>  /  TBili  0.6  /  DBili  x   /  AST  109<H>  /  ALT  32  /  AlkPhos  416<H>  08-14    PT/INR - ( 15 Aug 2018 09:19 )   PT: 41.5 sec;   INR: 3.71 ratio               BLOOD SMEAR INTERPRETATION:  RBC: normocytic , normochromic with mild poikiloanizocytosis no significant raulauex  WBC:adequate in number , normal morphology  plt: adequate in number no clumps , +giant/ large plt     RADIOLOGY & ADDITIONAL STUDIES:  < from: CT Abdomen and Pelvis No Cont (08.15.18 @ 08:01) >  Scattered poorly defined low-attenuation foci throughout the hepatic   parenchyma concerning for metastases. Correlate with contrast-enhanced CT   or MR. Unenhanced pancreas spleen not remarkable.  No adrenal nodules.  No hydronephrosis. Left renal cysts.  Atherosclerotic nonaneurysmal abdominal aorta.  Colonic diverticula, no diverticulitis or other active bowel inflammation.  Trace ascites.  Status post hysterectomy.  Distended urinary bladder without wall thickening.  No aggressive lytic foci right acetabulum and right pubis consistent with    neoplasm.      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    Probable old trauma left mid rib.    Focally increased radiopharmaceutical uptake in the left great toe is   nonspecific, but given the history of left foot ulcer, if osteomyelitis   is a clinical consideration labeled leukocyte imaging is suggested for   further evaluation.      < end of copied text >

## 2018-08-15 NOTE — CONSULT NOTE ADULT - SUBJECTIVE AND OBJECTIVE BOX
Patient is a 89y old  Female who presents with a chief complaint of diabetic foot ulcer/ambulatory disfunction (15 Aug 2018 02:29)      HPI:  88 y/o F from home with PMH of HTN DM HLD AF PVD angina who came in  c/o right sided rib pain, hip, and right thigh pain x several weeks, worse over the past 3 days.  Pt's daughter states pt usually ambulated with a walker but over the past 3 days she has not even been able to stand due to pain today.  Pt denies any known trauma.  Pt also has a wound to the left foot treated with oral antibiotics (Sivextro) x 5 days with some improvement of symptoms. Patient denies fevers, chills, n/v/d, cp, cough or sob. (14 Aug 2018 22:28)      PAST MEDICAL & SURGICAL HISTORY:  OAB (overactive bladder)  GERD (gastroesophageal reflux disease)  Angina effort  Heart failure  Upper respiratory infection  Diabetes  Renal stones  Myocardial infarction:   Spinal stenosis  CVA (cerebral infarction)  Afib  Raynaud disease  Acid reflux  Hypertension  Hyperlipidemia  Asthma  Cataract  S/P hysterectomy            ECHO  FINDINGS:      Home Medications:  Coumadin 1 mg oral tablet: 1 tab(s) orally once a day alternating with 2mg (14 Aug 2018 22:02)  ezetimibe 10 mg oral tablet: 1 tab(s) orally once a day (14 Aug 2018 22:02)  HYDROcodone 10 mg oral capsule, extended release: 5 milligram(s) orally once a day (at bedtime) (14 Aug 2018 22:02)  Myrbetriq 25 mg oral tablet, extended release: 1 tab(s) orally once a day (14 Aug 2018 22:02)  Nitro-Dur 0.4 mg/hr transdermal film, extended release: 1 patch transdermal once a day (14 Aug 2018 22:02)  NovoLOG Mix 70/30 FlexPen subcutaneous suspension: 26 unit(s) subcutaneous once a day in the morning (14 Aug 2018 22:02)  NovoLOG Mix 70/30 FlexPen subcutaneous suspension: 11 unit(s) subcutaneous once a day (at bedtime) (14 Aug 2018 22:02)  omeprazole 40 mg oral delayed release capsule: 1 cap(s) orally once a day (14 Aug 2018 22:02)  potassium chloride 10 mEq oral capsule, extended release: 1 cap(s) orally once a day (14 Aug 2018 22:02)  Tradjenta 5 mg oral tablet: 1 tab(s) orally once a day (14 Aug 2018 22:02)  Vitamin D3 1000 intl units oral capsule: 1 cap(s) orally once a day (14 Aug 2018 22:02)  Vytorin 10 mg-10 mg oral tablet: 1 tab(s) orally once a day (14 Aug 2018 22:02)    MEDICATIONS  (STANDING):  cholecalciferol 1000 Unit(s) Oral daily  dextrose 5%. 1000 milliLiter(s) (50 mL/Hr) IV Continuous <Continuous>  dextrose 50% Injectable 12.5 Gram(s) IV Push once  dextrose 50% Injectable 25 Gram(s) IV Push once  dextrose 50% Injectable 25 Gram(s) IV Push once  furosemide    Tablet 40 milliGRAM(s) Oral daily  insulin lispro (HumaLOG) corrective regimen sliding scale   SubCutaneous three times a day before meals  insulin lispro (HumaLOG) corrective regimen sliding scale   SubCutaneous at bedtime  insulin NPH/regular 70/30 Injectable 25 Unit(s) SubCutaneous before breakfast  insulin NPH/regular 70/30 Injectable 10 Unit(s) SubCutaneous before dinner  nitroglycerin    Patch 0.4 mG/Hr(s) 1 patch Transdermal daily  oxyCODONE  ER Tablet 10 milliGRAM(s) Oral <User Schedule>  pantoprazole    Tablet 40 milliGRAM(s) Oral before breakfast  piperacillin/tazobactam IVPB. 3.375 Gram(s) IV Intermittent every 8 hours  propranolol  milliGRAM(s) Oral daily  simvastatin 10 milliGRAM(s) Oral at bedtime  vancomycin  IVPB 1000 milliGRAM(s) IV Intermittent every 24 hours  vancomycin  IVPB        MEDICATIONS  (PRN):  dextrose 40% Gel 15 Gram(s) Oral once PRN Blood Glucose LESS THAN 70 milliGRAM(s)/deciLiter  glucagon  Injectable 1 milliGRAM(s) IntraMuscular once PRN Glucose <70 milliGRAM(s)/deciLiter  morphine  - Injectable 2 milliGRAM(s) IV Push every 6 hours PRN Severe Pain (7 - 10)      FAMILY HISTORY:  No pertinent family history in first degree relatives          REVIEW OF SYSTEMS    Constitutional: denies fever, chills, diaphoresis   HEENT: denies blurry vision, difficulty hearing  Respiratory: denies SOB, STILL, cough, sputum production, wheezing, hemoptysis  Cardiovascular: denies chest pain, palpitations, SOB, dyspnea, PND, orthopnea, near syncope, syncope, or lower extremity edema.  Gastrointestinal: denies nausea, vomiting, diarrhea, constipation, abdominal pain, melena, hematochezia   Genitourinary: denies dysuria, frequency, urgency, hematuria   Skin/Breast: denies rash, itching  Musculoskeletal: denies myalgias, joint swelling, muscle weakness  Neurologic: denies headache, weakness, dizziness, paresthesias, numbness/tingling  Psychiatric: denies feeling anxious, depressed, suicidal, homicidal thoughts  Hematology/Oncology: denies bruising, tender or enlarged lymph nodes   ROS negative except as noted above    Allergic/Immunologic:	  Allergies    Levaquin (Other)  ramipril (Other)  tetracycline (Hives; Rash)    Intolerances    SOCIAL HISTORY:      Vital Signs Last 24 Hrs  T(C): 36.8 (15 Aug 2018 05:54), Max: 37.2 (14 Aug 2018 22:43)  T(F): 98.3 (15 Aug 2018 05:54), Max: 98.9 (14 Aug 2018 22:43)  HR: 72 (15 Aug 2018 05:54) (72 - 83)  BP: 112/65 (15 Aug 2018 05:54) (110/64 - 151/86)  BP(mean): --  RR: 16 (15 Aug 2018 05:54) (16 - 18)  SpO2: 96% (15 Aug 2018 05:54) (93% - 96%)    PHYSICAL EXAM:    Physical Exam:  General: Well developed, well nourished, NAD  HEENT: NCAT, PERRLA, EOMI bl, moist mucous membranes   Neck: Supple, nontender, no mass  Neurology: A&Ox3, nonfocal, CN II-XII grossly intact, sensation intact, no gait abnormalities   Respiratory: CTA B/L, No W/R/R  CV: RRR, +S1/S2, no murmurs, rubs or gallops  Abdominal: Soft, NT, ND +BSx4  Extremities: No C/C/E, + peripheral pulses  MSK: Normal ROM, no joint erythema or warmth, no joint swelling   Skin: warm, dry, normal color, no rash or abnormal lesions        ECG:    I&O's Detail      LABS:                        11.6   11.63 )-----------( 177      ( 15 Aug 2018 09:19 )             36.1     08-15    134<L>  |  94<L>  |  32<H>  ----------------------------<  129<H>  3.9   |  31  |  1.70<H>    Ca    9.1      15 Aug 2018 09:19  Mg     1.9     08-15    TPro  7.4  /  Alb  2.4<L>  /  TBili  0.6  /  DBili  x   /  AST  109<H>  /  ALT  32  /  AlkPhos  416<H>          PT/INR - ( 15 Aug 2018 09:19 )   PT: 41.5 sec;   INR: 3.71 ratio           Urinalysis Basic - ( 14 Aug 2018 16:03 )    Color: Yellow / Appearance: Clear / S.005 / pH: x  Gluc: x / Ketone: Negative  / Bili: Negative / Urobili: Negative   Blood: x / Protein: Negative / Nitrite: Negative   Leuk Esterase: Trace / RBC: 0-2 /HPF / WBC 3-5   Sq Epi: x / Non Sq Epi: Occasional / Bacteria: Moderate      I&O's Summary    BNP  RADIOLOGY & ADDITIONAL STUDIES: Patient is a 89y old  Female who presents with a chief complaint of diabetic foot ulcer/ambulatory disfunction (15 Aug 2018 02:29)      HPI:  88 y/o F from home with PMH of HTN DM HLD AF PVD angina who came in  c/o right sided rib pain, hip, and right thigh pain x several weeks, worse over the past 3 days after receiving PT. Pt's daughter states pt usually ambulated with a walker but over the past 3 days she has not even been able to stand due to pain today.  Pt denies any known trauma.  Pt also has a wound to the left foot treated with oral antibiotics (Sivextro) x 5 days with some improvement of symptoms. Patient has a cardiac history of HTN managed with propanolol and Afib managed with Coumadin 1mg 5 days a week and 2mg 2 days a week. Report having a cardiology workup including echo and stress test with her cardiologist Dr. Diaz. She last saw cardiology about 4 weeks ago and checks her PT/INR at home every 2 weeks. Admits mild SOB which is worse with activity. Patient is able to walk less than 1/2 city block before becoming symptomatic.  Patient denies fevers, chills, n/v/d, cp, cough       PAST MEDICAL & SURGICAL HISTORY:  OAB (overactive bladder)  GERD (gastroesophageal reflux disease)  Angina effort  Heart failure  Upper respiratory infection  Diabetes  Renal stones  Myocardial infarction:   Spinal stenosis  CVA (cerebral infarction)  Afib  Raynaud disease  Acid reflux  Hypertension  Hyperlipidemia  Asthma  Cataract  S/P hysterectomy      Home Medications:  Coumadin 1 mg oral tablet: 1 tab(s) orally once a day alternating with 2mg (14 Aug 2018 22:02)  ezetimibe 10 mg oral tablet: 1 tab(s) orally once a day (14 Aug 2018 22:02)  HYDROcodone 10 mg oral capsule, extended release: 5 milligram(s) orally once a day (at bedtime) (14 Aug 2018 22:02)  Myrbetriq 25 mg oral tablet, extended release: 1 tab(s) orally once a day (14 Aug 2018 22:02)  Nitro-Dur 0.4 mg/hr transdermal film, extended release: 1 patch transdermal once a day (14 Aug 2018 22:02)  NovoLOG Mix 70/30 FlexPen subcutaneous suspension: 26 unit(s) subcutaneous once a day in the morning (14 Aug 2018 22:02)  NovoLOG Mix 70/30 FlexPen subcutaneous suspension: 11 unit(s) subcutaneous once a day (at bedtime) (14 Aug 2018 22:02)  omeprazole 40 mg oral delayed release capsule: 1 cap(s) orally once a day (14 Aug 2018 22:02)  potassium chloride 10 mEq oral capsule, extended release: 1 cap(s) orally once a day (14 Aug 2018 22:02)  Tradjenta 5 mg oral tablet: 1 tab(s) orally once a day (14 Aug 2018 22:02)  Vitamin D3 1000 intl units oral capsule: 1 cap(s) orally once a day (14 Aug 2018 22:02)  Vytorin 10 mg-10 mg oral tablet: 1 tab(s) orally once a day (14 Aug 2018 22:02)    MEDICATIONS  (STANDING):  cholecalciferol 1000 Unit(s) Oral daily  dextrose 5%. 1000 milliLiter(s) (50 mL/Hr) IV Continuous <Continuous>  dextrose 50% Injectable 12.5 Gram(s) IV Push once  dextrose 50% Injectable 25 Gram(s) IV Push once  dextrose 50% Injectable 25 Gram(s) IV Push once  furosemide    Tablet 40 milliGRAM(s) Oral daily  insulin lispro (HumaLOG) corrective regimen sliding scale   SubCutaneous three times a day before meals  insulin lispro (HumaLOG) corrective regimen sliding scale   SubCutaneous at bedtime  insulin NPH/regular 70/30 Injectable 25 Unit(s) SubCutaneous before breakfast  insulin NPH/regular 70/30 Injectable 10 Unit(s) SubCutaneous before dinner  nitroglycerin    Patch 0.4 mG/Hr(s) 1 patch Transdermal daily  oxyCODONE  ER Tablet 10 milliGRAM(s) Oral <User Schedule>  pantoprazole    Tablet 40 milliGRAM(s) Oral before breakfast  piperacillin/tazobactam IVPB. 3.375 Gram(s) IV Intermittent every 8 hours  propranolol  milliGRAM(s) Oral daily  simvastatin 10 milliGRAM(s) Oral at bedtime  vancomycin  IVPB 1000 milliGRAM(s) IV Intermittent every 24 hours  vancomycin  IVPB        MEDICATIONS  (PRN):  dextrose 40% Gel 15 Gram(s) Oral once PRN Blood Glucose LESS THAN 70 milliGRAM(s)/deciLiter  glucagon  Injectable 1 milliGRAM(s) IntraMuscular once PRN Glucose <70 milliGRAM(s)/deciLiter  morphine  - Injectable 2 milliGRAM(s) IV Push every 6 hours PRN Severe Pain (7 - 10)      FAMILY HISTORY:  No pertinent family history in first degree relatives          REVIEW OF SYSTEMS    Constitutional: denies fever, chills, diaphoresis   HEENT: admits difficulty hearing, denies blurry vision  Respiratory: admits SOB, STILL,  denies cough, sputum production, wheezing, hemoptysis  Cardiovascular: denies chest pain, palpitations, PND, orthopnea, near syncope, syncope, or lower extremity edema.  Gastrointestinal: denies nausea, vomiting, diarrhea, constipation, abdominal pain, melena, hematochezia   Genitourinary: denies dysuria, frequency, urgency, hematuria   Skin/Breast: admits frequent bruising  Musculoskeletal: Admits right sided shoulder pain, hip pain, thigh pain  Neurologic: denies headache, weakness, dizziness, paresthesias, numbness/tingling  Hematology/Oncology: denies bruising, tender or enlarged lymph nodes   ROS negative except as noted above    Allergic/Immunologic:	  Allergies    Levaquin (Other)  ramipril (Other)  tetracycline (Hives; Rash)    Intolerances    Vital Signs Last 24 Hrs  T(C): 36.8 (15 Aug 2018 05:54), Max: 37.2 (14 Aug 2018 22:43)  T(F): 98.3 (15 Aug 2018 05:54), Max: 98.9 (14 Aug 2018 22:43)  HR: 72 (15 Aug 2018 05:54) (72 - 83)  BP: 112/65 (15 Aug 2018 05:54) (110/64 - 151/86)  BP(mean): --  RR: 16 (15 Aug 2018 05:54) (16 - 18)  SpO2: 96% (15 Aug 2018 05:54) (93% - 96%)    Social history: Denies tobacco, alochol or drug use      PHYSICAL EXAM:    Physical Exam:  General: Appears chronically, in distress due to pain  HEENT: NCAT, PERRLA, EOMI bl, moist mucous membranes   Neurology: A&Ox3, nonfocal, CN II-XII grossly intact, sensation intact  Respiratory: Minimal bibasilar rales  CV: RRR, +S1/S2, no murmurs, rubs or gallops  Abdominal: Soft, NT, ND +BSx4  Extremities: No C/C/E, + peripheral pulses  Skin: warm, dry, multiple echymotic bruises         ECG:    I&O's Detail      LABS:                        11.6   11.63 )-----------( 177      ( 15 Aug 2018 09:19 )             36.1     08-15    134<L>  |  94<L>  |  32<H>  ----------------------------<  129<H>  3.9   |  31  |  1.70<H>    Ca    9.1      15 Aug 2018 09:19  Mg     1.9     08-15    TPro  7.4  /  Alb  2.4<L>  /  TBili  0.6  /  DBili  x   /  AST  109<H>  /  ALT  32  /  AlkPhos  416<H>          PT/INR - ( 15 Aug 2018 09:19 )   PT: 41.5 sec;   INR: 3.71 ratio           Urinalysis Basic - ( 14 Aug 2018 16:03 )    Color: Yellow / Appearance: Clear / S.005 / pH: x  Gluc: x / Ketone: Negative  / Bili: Negative / Urobili: Negative   Blood: x / Protein: Negative / Nitrite: Negative   Leuk Esterase: Trace / RBC: 0-2 /HPF / WBC 3-5   Sq Epi: x / Non Sq Epi: Occasional / Bacteria: Moderate      I&O's Summary    BNP  RADIOLOGY & ADDITIONAL STUDIES: Patient is a 89y old  Female who presents with a chief complaint of diabetic foot ulcer/ambulatory disfunction (15 Aug 2018 02:29)      HPI:  89 y/o F from home with PMH of HTN DM HLD AF, MI, PVD angina who came in  c/o right sided rib pain, hip, and right thigh pain x several weeks, worse over the past 3 days after receiving PT. Pt's daughter states pt usually ambulated with a walker but over the past 3 days she has not even been able to stand due to pain today.  Pt denies any known trauma.  Pt also has a wound to the left foot treated with oral antibiotics (Sivextro) x 5 days with some improvement of symptoms. Patient has a cardiac history of HTN managed with propanolol and Afib managed with Coumadin 1mg 5 days a week and 2mg 2 days a week. Report having a cardiology workup including echo and stress test with her cardiologist Dr. Diaz. She last saw cardiology about 4 weeks ago and checks her PT/INR at home every 2 weeks. Admits mild SOB which is worse with activity. Patient is able to walk less than 1/2 city block before becoming symptomatic.  Patient denies fevers, chills, n/v/d, cp, cough       PAST MEDICAL & SURGICAL HISTORY:  OAB (overactive bladder)  GERD (gastroesophageal reflux disease)  Angina effort  Heart failure  Upper respiratory infection  Diabetes  Renal stones  Myocardial infarction:   Spinal stenosis  CVA (cerebral infarction)  Afib  Raynaud disease  Acid reflux  Hypertension  Hyperlipidemia  Asthma  Cataract  S/P hysterectomy      Home Medications:  Coumadin 1 mg oral tablet: 1 tab(s) orally once a day alternating with 2mg (14 Aug 2018 22:02)  ezetimibe 10 mg oral tablet: 1 tab(s) orally once a day (14 Aug 2018 22:02)  HYDROcodone 10 mg oral capsule, extended release: 5 milligram(s) orally once a day (at bedtime) (14 Aug 2018 22:02)  Myrbetriq 25 mg oral tablet, extended release: 1 tab(s) orally once a day (14 Aug 2018 22:02)  Nitro-Dur 0.4 mg/hr transdermal film, extended release: 1 patch transdermal once a day (14 Aug 2018 22:02)  NovoLOG Mix 70/30 FlexPen subcutaneous suspension: 26 unit(s) subcutaneous once a day in the morning (14 Aug 2018 22:02)  NovoLOG Mix 70/30 FlexPen subcutaneous suspension: 11 unit(s) subcutaneous once a day (at bedtime) (14 Aug 2018 22:02)  omeprazole 40 mg oral delayed release capsule: 1 cap(s) orally once a day (14 Aug 2018 22:02)  potassium chloride 10 mEq oral capsule, extended release: 1 cap(s) orally once a day (14 Aug 2018 22:02)  Tradjenta 5 mg oral tablet: 1 tab(s) orally once a day (14 Aug 2018 22:02)  Vitamin D3 1000 intl units oral capsule: 1 cap(s) orally once a day (14 Aug 2018 22:02)  Vytorin 10 mg-10 mg oral tablet: 1 tab(s) orally once a day (14 Aug 2018 22:02)    MEDICATIONS  (STANDING):  cholecalciferol 1000 Unit(s) Oral daily  dextrose 5%. 1000 milliLiter(s) (50 mL/Hr) IV Continuous <Continuous>  dextrose 50% Injectable 12.5 Gram(s) IV Push once  dextrose 50% Injectable 25 Gram(s) IV Push once  dextrose 50% Injectable 25 Gram(s) IV Push once  furosemide    Tablet 40 milliGRAM(s) Oral daily  insulin lispro (HumaLOG) corrective regimen sliding scale   SubCutaneous three times a day before meals  insulin lispro (HumaLOG) corrective regimen sliding scale   SubCutaneous at bedtime  insulin NPH/regular 70/30 Injectable 25 Unit(s) SubCutaneous before breakfast  insulin NPH/regular 70/30 Injectable 10 Unit(s) SubCutaneous before dinner  nitroglycerin    Patch 0.4 mG/Hr(s) 1 patch Transdermal daily  oxyCODONE  ER Tablet 10 milliGRAM(s) Oral <User Schedule>  pantoprazole    Tablet 40 milliGRAM(s) Oral before breakfast  piperacillin/tazobactam IVPB. 3.375 Gram(s) IV Intermittent every 8 hours  propranolol  milliGRAM(s) Oral daily  simvastatin 10 milliGRAM(s) Oral at bedtime  vancomycin  IVPB 1000 milliGRAM(s) IV Intermittent every 24 hours  vancomycin  IVPB        MEDICATIONS  (PRN):  dextrose 40% Gel 15 Gram(s) Oral once PRN Blood Glucose LESS THAN 70 milliGRAM(s)/deciLiter  glucagon  Injectable 1 milliGRAM(s) IntraMuscular once PRN Glucose <70 milliGRAM(s)/deciLiter  morphine  - Injectable 2 milliGRAM(s) IV Push every 6 hours PRN Severe Pain (7 - 10)      FAMILY HISTORY:  No pertinent family history in first degree relatives          REVIEW OF SYSTEMS    Constitutional: denies fever, chills, diaphoresis   HEENT: admits difficulty hearing, denies blurry vision  Respiratory: admits SOB, STILL,  denies cough, sputum production, wheezing, hemoptysis  Cardiovascular: denies chest pain, palpitations, PND, orthopnea, near syncope, syncope, or lower extremity edema.  Gastrointestinal: denies nausea, vomiting, diarrhea, constipation, abdominal pain, melena, hematochezia   Genitourinary: denies dysuria, frequency, urgency, hematuria   Skin/Breast: admits frequent bruising  Musculoskeletal: Admits right sided shoulder pain, hip pain, thigh pain  Neurologic: denies headache, weakness, dizziness, paresthesias, numbness/tingling  Hematology/Oncology: denies bruising, tender or enlarged lymph nodes   ROS negative except as noted above    Allergic/Immunologic:	  Allergies    Levaquin (Other)  ramipril (Other)  tetracycline (Hives; Rash)    Intolerances    Vital Signs Last 24 Hrs  T(C): 36.8 (15 Aug 2018 05:54), Max: 37.2 (14 Aug 2018 22:43)  T(F): 98.3 (15 Aug 2018 05:54), Max: 98.9 (14 Aug 2018 22:43)  HR: 72 (15 Aug 2018 05:54) (72 - 83)  BP: 112/65 (15 Aug 2018 05:54) (110/64 - 151/86)  BP(mean): --  RR: 16 (15 Aug 2018 05:54) (16 - 18)  SpO2: 96% (15 Aug 2018 05:54) (93% - 96%)    Social history: Denies tobacco, alochol or drug use      PHYSICAL EXAM:    Physical Exam:  General: Appears chronically, in distress due to pain  HEENT: NCAT, PERRLA, EOMI bl, moist mucous membranes   Neurology: A&Ox3, nonfocal, CN II-XII grossly intact, sensation intact  Respiratory: Minimal bibasilar rales  CV: RRR, +S1/S2, no murmurs, rubs or gallops  Abdominal: Soft, NT, ND +BSx4  Extremities: No C/C/E, + peripheral pulses  Skin: warm, dry, multiple echymotic bruises                    11.6   11.63 )-----------( 177      ( 15 Aug 2018 09:19 )             36.1     08-15    134<L>  |  94<L>  |  32<H>  ----------------------------<  129<H>  3.9   |  31  |  1.70<H>    Ca    9.1      15 Aug 2018 09:19  Mg     1.9     08-15    TPro  7.4  /  Alb  2.4<L>  /  TBili  0.6  /  DBili  x   /  AST  109<H>  /  ALT  32  /  AlkPhos  416<H>  0814        PT/INR - ( 15 Aug 2018 09:19 )   PT: 41.5 sec;   INR: 3.71 ratio           Urinalysis Basic - ( 14 Aug 2018 16:03 )    Color: Yellow / Appearance: Clear / S.005 / pH: x  Gluc: x / Ketone: Negative  / Bili: Negative / Urobili: Negative   Blood: x / Protein: Negative / Nitrite: Negative   Leuk Esterase: Trace / RBC: 0-2 /HPF / WBC 3-5   Sq Epi: x / Non Sq Epi: Occasional / Bacteria: Moderate      I&O's Summary    BNP  RADIOLOGY & ADDITIONAL STUDIES: Patient is a 89y old  Female who presents with a chief complaint of diabetic foot ulcer/ambulatory disfunction (15 Aug 2018 02:29)      HPI:  89 y/o F from home with PMH of HTN DM HLD PAF on ac, MI, PVD angina who came in  c/o right sided rib pain, hip, and right thigh pain x several weeks, worse over the past 3 days after receiving PT. Pt's daughter states pt usually ambulated with a walker but over the past 3 days she has not even been able to stand due to pain today.  Pt denies any known trauma.  Pt also has a wound to the left foot treated with oral antibiotics (Sivextro) x 5 days with some improvement of symptoms. Patient has a cardiac history of HTN managed with propanolol and Afib managed with Coumadin 1mg 5 days a week and 2mg 2 days a week. Report having a cardiology workup including echo and stress test with her cardiologist Dr. Diaz. She last saw cardiology about 4 weeks ago and checks her PT/INR at home every 2 weeks. Admits mild SOB which is worse with activity. Patient is able to walk less than 1/2 city block before becoming symptomatic.  Patient denies fevers, chills, n/v/d, cp, cough       PAST MEDICAL & SURGICAL HISTORY:  OAB (overactive bladder)  GERD (gastroesophageal reflux disease)  Angina effort  Heart failure  Upper respiratory infection  Diabetes  Renal stones  Myocardial infarction:   Spinal stenosis  CVA (cerebral infarction)  Afib  Raynaud disease  Acid reflux  Hypertension  Hyperlipidemia  Asthma  Cataract  S/P hysterectomy      Home Medications:  Coumadin 1 mg oral tablet: 1 tab(s) orally once a day alternating with 2mg (14 Aug 2018 22:02)  ezetimibe 10 mg oral tablet: 1 tab(s) orally once a day (14 Aug 2018 22:02)  HYDROcodone 10 mg oral capsule, extended release: 5 milligram(s) orally once a day (at bedtime) (14 Aug 2018 22:02)  Myrbetriq 25 mg oral tablet, extended release: 1 tab(s) orally once a day (14 Aug 2018 22:02)  Nitro-Dur 0.4 mg/hr transdermal film, extended release: 1 patch transdermal once a day (14 Aug 2018 22:02)  NovoLOG Mix 70/30 FlexPen subcutaneous suspension: 26 unit(s) subcutaneous once a day in the morning (14 Aug 2018 22:02)  NovoLOG Mix 70/30 FlexPen subcutaneous suspension: 11 unit(s) subcutaneous once a day (at bedtime) (14 Aug 2018 22:02)  omeprazole 40 mg oral delayed release capsule: 1 cap(s) orally once a day (14 Aug 2018 22:02)  potassium chloride 10 mEq oral capsule, extended release: 1 cap(s) orally once a day (14 Aug 2018 22:02)  Tradjenta 5 mg oral tablet: 1 tab(s) orally once a day (14 Aug 2018 22:02)  Vitamin D3 1000 intl units oral capsule: 1 cap(s) orally once a day (14 Aug 2018 22:02)  Vytorin 10 mg-10 mg oral tablet: 1 tab(s) orally once a day (14 Aug 2018 22:02)    MEDICATIONS  (STANDING):  cholecalciferol 1000 Unit(s) Oral daily  dextrose 5%. 1000 milliLiter(s) (50 mL/Hr) IV Continuous <Continuous>  dextrose 50% Injectable 12.5 Gram(s) IV Push once  dextrose 50% Injectable 25 Gram(s) IV Push once  dextrose 50% Injectable 25 Gram(s) IV Push once  furosemide    Tablet 40 milliGRAM(s) Oral daily  insulin lispro (HumaLOG) corrective regimen sliding scale   SubCutaneous three times a day before meals  insulin lispro (HumaLOG) corrective regimen sliding scale   SubCutaneous at bedtime  insulin NPH/regular 70/30 Injectable 25 Unit(s) SubCutaneous before breakfast  insulin NPH/regular 70/30 Injectable 10 Unit(s) SubCutaneous before dinner  nitroglycerin    Patch 0.4 mG/Hr(s) 1 patch Transdermal daily  oxyCODONE  ER Tablet 10 milliGRAM(s) Oral <User Schedule>  pantoprazole    Tablet 40 milliGRAM(s) Oral before breakfast  piperacillin/tazobactam IVPB. 3.375 Gram(s) IV Intermittent every 8 hours  propranolol  milliGRAM(s) Oral daily  simvastatin 10 milliGRAM(s) Oral at bedtime  vancomycin  IVPB 1000 milliGRAM(s) IV Intermittent every 24 hours  vancomycin  IVPB        MEDICATIONS  (PRN):  dextrose 40% Gel 15 Gram(s) Oral once PRN Blood Glucose LESS THAN 70 milliGRAM(s)/deciLiter  glucagon  Injectable 1 milliGRAM(s) IntraMuscular once PRN Glucose <70 milliGRAM(s)/deciLiter  morphine  - Injectable 2 milliGRAM(s) IV Push every 6 hours PRN Severe Pain (7 - 10)      FAMILY HISTORY:  No pertinent family history in first degree relatives          REVIEW OF SYSTEMS    Constitutional: denies fever, chills, diaphoresis   HEENT: admits difficulty hearing, denies blurry vision  Respiratory: admits SOB, STILL,  denies cough, sputum production, wheezing, hemoptysis  Cardiovascular: denies chest pain, palpitations, PND, orthopnea, near syncope, syncope, or lower extremity edema.  Gastrointestinal: denies nausea, vomiting, diarrhea, constipation, abdominal pain, melena, hematochezia   Genitourinary: denies dysuria, frequency, urgency, hematuria   Skin/Breast: admits frequent bruising  Musculoskeletal: Admits right sided shoulder pain, hip pain, thigh pain  Neurologic: denies headache, weakness, dizziness, paresthesias, numbness/tingling  Hematology/Oncology: denies bruising, tender or enlarged lymph nodes   ROS negative except as noted above    Allergic/Immunologic:	  Allergies    Levaquin (Other)  ramipril (Other)  tetracycline (Hives; Rash)    Intolerances    Vital Signs Last 24 Hrs  T(C): 36.8 (15 Aug 2018 05:54), Max: 37.2 (14 Aug 2018 22:43)  T(F): 98.3 (15 Aug 2018 05:54), Max: 98.9 (14 Aug 2018 22:43)  HR: 72 (15 Aug 2018 05:54) (72 - 83)  BP: 112/65 (15 Aug 2018 05:54) (110/64 - 151/86)  BP(mean): --  RR: 16 (15 Aug 2018 05:54) (16 - 18)  SpO2: 96% (15 Aug 2018 05:54) (93% - 96%)    Social history: Denies tobacco, alochol or drug use      PHYSICAL EXAM:    Physical Exam:  General: Appears chronically, in distress due to pain  HEENT: NCAT, PERRLA, EOMI bl, moist mucous membranes   Neurology: A&Ox3, nonfocal, CN II-XII grossly intact, sensation intact  Respiratory: Minimal bibasilar rales  CV: RRR, +S1/S2, no murmurs, rubs or gallops  Abdominal: Soft, NT, ND +BSx4  Extremities: No C/C/E, + peripheral pulses  Skin: warm, dry, multiple echymotic bruises                    11.6   11.63 )-----------( 177      ( 15 Aug 2018 09:19 )             36.1     08-15    134<L>  |  94<L>  |  32<H>  ----------------------------<  129<H>  3.9   |  31  |  1.70<H>    Ca    9.1      15 Aug 2018 09:19  Mg     1.9     08-15    TPro  7.4  /  Alb  2.4<L>  /  TBili  0.6  /  DBili  x   /  AST  109<H>  /  ALT  32  /  AlkPhos  416<H>          PT/INR - ( 15 Aug 2018 09:19 )   PT: 41.5 sec;   INR: 3.71 ratio           Urinalysis Basic - ( 14 Aug 2018 16:03 )    Color: Yellow / Appearance: Clear / S.005 / pH: x  Gluc: x / Ketone: Negative  / Bili: Negative / Urobili: Negative   Blood: x / Protein: Negative / Nitrite: Negative   Leuk Esterase: Trace / RBC: 0-2 /HPF / WBC 3-5   Sq Epi: x / Non Sq Epi: Occasional / Bacteria: Moderate      I&O's Summary  ekg sr first deg avb  BNP  RADIOLOGY & ADDITIONAL STUDIES:    < from: CT Abdomen and Pelvis No Cont (08.15.18 @ 08:01) >    EXAM:  CT ABDOMEN AND PELVIS                            PROCEDURE DATE:  08/15/2018          INTERPRETATION:  History: Destructive lesion right hip.    CT abdomen and pelvis no contrast. Prior 2014.    Limited by lack of oral and IV contrast.  Small layering basilar effusions. Prominent interlobular septa at the   lung bases suggests interstitial edema. No substantial coronary artery   calcification. No calcified gallstones or biliary dilatation.  Scattered poorly defined low-attenuation foci throughout the hepatic   parenchyma concerning for metastases. Correlate with contrast-enhanced CT   or MR. Unenhanced pancreas spleen not remarkable.  No adrenal nodules.  No hydronephrosis. Left renal cysts.  Atherosclerotic nonaneurysmal abdominal aorta.  Colonic diverticula, no diverticulitis or other active bowel inflammation.  Trace ascites.  Status post hysterectomy.  Distended urinary bladder without wall thickening.  No aggressive lytic foci right acetabulum and right pubis consistentwith    neoplasm.    Impression:    Limited by lack of oral and IV contrast.  Inhomogeneous hepatic parenchyma suspicious for metastatic involvement.   Correlate with contrast-enhanced CT or MR.  Destructive osseous lesions right hip and pubis consistent with   neoplastic involvement.  Additional findings as discussed.                  LOCO SANCHEZ M.D., ATTENDING RADIOLOGIST  This document has been electronically signed. Aug 15 2018  9:00AM                < end of copied text >    < from: NM Bone Imaging Total (08.15.18 @ 11:23) >    EXAM:  NM BONE IMG WHOLE BODY                            PROCEDURE DATE:  08/15/2018          INTERPRETATION:  RADIOPHARMACEUTICAL: 19.4 mCi Tc-99m HDP, I.V.     CLINICAL INFORMATION: 89 year old female with right hip lesion on CT;   referred to evaluate for additional osseous abnormalities.    TECHNIQUE:  Anterior and posterior whole body images were obtained   approximately 2 hours following radiopharmaceutical administration.   Additional static images of the chest and pelvis also were obtained.    COMPARISON: No previous bone scans were available for comparison.    FINDINGS:  There is increased radiopharmaceutical accumulation in the   lower right iliac bone extending through the acetabulum into the ischium,   corresponding in part to the abnormality identified on the CT scan of   2018. The pubic rami are obscured by bladder activity. There is a   small focus of increased radiopharmaceutical accumulation along the   anterior axillary line of a left mid rib, approximately the fifth. There   is focally increased labeled leukocyte accumulation in the region of the   left great toe. There are degenerative changes in the spine and major   joints. There is physiologic distribution of radiopharmaceutical in the   remainder of the imaged osseous structures.    Both kidneys are visualized and are symmetric in appearance.    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    Probable old trauma left mid rib.    Focally increased radiopharmaceutical uptake in the left great toe is   nonspecific, but given the history of left foot ulcer, if osteomyelitis   is a clinical consideration labeled leukocyte imaging is suggested for   further evaluation.                  LEONARDO OLIVO M.D., CHIEF OF NUCLEAR MEDICINE  This document has been electronically signed. Aug 15 2018 11:50AM                < end of copied text >

## 2018-08-15 NOTE — CONSULT NOTE ADULT - ASSESSMENT
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       PT/OT      Will discuss with attending and advise if plan changes.

## 2018-08-15 NOTE — CHART NOTE - NSCHARTNOTEFT_GEN_A_CORE
Upon Nutritional Assessment by the Registered Dietitian your patient was determined to meet criteria / has evidence of the following diagnosis/diagnoses:          [ ]  Mild Protein Calorie Malnutrition        [X ]  Moderate Protein Calorie Malnutrition        [ ] Severe Protein Calorie Malnutrition        [ ] Unspecified Protein Calorie Malnutrition        [ ] Underweight / BMI <19        [ ] Morbid Obesity / BMI > 40      Findings as based on:  •  Comprehensive nutrition assessment and consultation  •  Calorie counts (nutrient intake analysis)  •  Food acceptance and intake status from observations by staff  •  Follow up  •  Patient education  •  Intervention secondary to interdisciplinary rounds  •   concerns  po intake < 75% of energy needs for > 7 days  mild to moderate temporal wasting  mild to moderate loss of body fat in tricep region       Treatment:    The following diet has been recommended:  add HS snack to diet rx  Glucerna supplement      PROVIDER Section:     By signing this assessment you are acknowledging and agree with the diagnosis/diagnoses assigned by the Registered Dietitian    Comments:

## 2018-08-15 NOTE — DIETITIAN INITIAL EVALUATION ADULT. - ETIOLOGY
possibly metastatic disease ( being ruled out) , advanced age with multiple medical problems pressure injury  ( stage II buttocks ) and infected L foot ulcer

## 2018-08-15 NOTE — CONSULT NOTE ADULT - ASSESSMENT
Patient is a 89y old  Female who presents with a chief complaint of diabetic foot ulcer/ambulatory disfunction     Called to see patient for possible surgery pending workup    - No clear evidence of acute ischemia  - No evidence of volume overload  - EKG Rate 78 BPM, P-R Interval 250 ms QRS Duration 94 ms    Q-T Interval 374 ms NSR 1st degree AV block  - BP well controlled, continue home BP meds, monitor routine hemodynamics  - monitor and replete lytes, keep K>4, Mg>2  - INR 3.71 today. Hold warfarin   - Bone scan and CT abdomen revealing of possible metastasis in right pelvis and hepatic paranchyma   - Other cardiovascular workup will depend on clinical course.  - All other workup per primary team  - Will follow Patient is a 89y old  Female who presents with a chief complaint of diabetic foot ulcer/ambulatory disfunction     Called to see patient for possible surgery pending workup    - No clear evidence of acute ischemia  - No evidence of volume overload  - EKG compared with previous Rate 78 BPM, P-R Interval 250 ms QRS Duration 94 ms    Q-T Interval 374 ms NSR 1st degree AV block  - BP well controlled, continue home BP meds, monitor routine hemodynamics  - monitor and replete lytes, keep K>4, Mg>2  - INR 3.71 today  - Bone scan and CT abdomen revealing of possible metastasis in right pelvis and hepatic parenchyma   - Other cardiovascular workup will depend on clinical course.  - All other workup per primary team  - Will follow 89y old  Female who presents with a chief complaint of diabetic foot ulcer/ambulatory disfunction     Called to see patient for possible surgery pending workup    - No evidence of acute ischemia  - No evidence of volume overload  - EKG compared with previous Rate 78 BPM, P-R Interval 250 ms QRS Duration 94 ms    Q-T Interval 374 ms NSR 1st degree AV block  - BP well controlled, continue home BP meds, monitor routine hemodynamics  - monitor and replete lytes, keep K>4, Mg>2  - INR 3.71 today, hold ac, goal inr 2-3  - Bone scan and CT abdomen revealing of possible metastasis in right pelvis and hepatic parenchyma. would anticipate that she will need bx for tissue dx from some site.  - Other cardiovascular workup will depend on clinical course.  - All other workup per primary team  -- discussed some results with the son at the bedside, highlighting concerns about malignancy, but with residual uncertainty.  - Will follow

## 2018-08-15 NOTE — CONSULT NOTE ADULT - ASSESSMENT
89 y.o woman with multiple comorbidities presented with several weeks h/./o of bony pain and recent inability to ambulate du to pain in the foot. Ct scan revealed destructive bony lesions concerning for metastasis and liver lesions.  Patient was on coumdin for A fib and is on ab for presumable osteo of the left foot      Consult called for recommendation regarding further workup.      lytic lesions   Cr is stable since 2016  Ca is borderline   Bx is needed   would favor Bx from the liver over Bx from the bone (will be d/w IR)  hold AC prior to Bx ( INR is 3 today)    Leukocytosis is most likely reactive, monitor 89 y.o woman with multiple comorbidities presented with several weeks h/./o of bony pain and recent inability to ambulate du to pain in the foot. Ct scan revealed destructive bony lesions concerning for metastasis and liver lesions.  Patient was on coumdin for A fib and is on ab for presumable osteo of the left foot      Consult called for recommendation regarding further workup.      lytic lesions   Cr is stable since 2016  Ca is borderline   ordered MM panel and CEA  spoke with IR , tentatively scheuled for Bx of the bony lesion 8/17/18  hold AC , may need vit K / FFP ( INR is 3 today)    Leukocytosis is most likely reactive, monitor     Goals of care d/w pt and pt's family ( 2 sons and daughter)

## 2018-08-15 NOTE — DIETITIAN INITIAL EVALUATION ADULT. - OTHER INFO
Elderly female with pmhx as below admitted with R hip, rib and thigh pain , metastatic disease to be ruled out. Pt reports she lives with a daughter, has nka  to food, takes vit D supplement , hasn't been as hungry over the last 3 weeks, eating smaller portions, unable to quantify wt loss. Pt reports she ate 1/2 of entree at lunch, al of cake and a water , anticipates eating dinner. Pt with mild to moderate temporal wasting and mild to  moderate depletion of body fat in tricep region, and based on  diet hx provided a < 75%  of estimated energy needs consumed over th last > 7 days, at a minimum, pt meets criteria of moderate malnutrition in context of acute illness ( further physical exam may reveal acute on chronic or severe malnutrition) . Pt would likely benefit from a nutritional supplement such as Glucerna. This will help ensure adequate macro-and micronutrient intake especially with ^ needs due to a stage II R pressure injury .and an infected L foot ulcer . A1c c/w good control for a person with DM  . Might see improvement in na an cl level as pt continues to receive lasix, K is 3.9 and wnl, would watch closely as this is a potassium wasting diuretic. In view of small appetite at meals , would change diet to include and HS snack

## 2018-08-15 NOTE — CONSULT NOTE ADULT - CONSULT REQUESTED DATE/TIME
15-Aug-2018 11:41
15-Aug-2018
15-Aug-2018
15-Aug-2018 10:57
15-Aug-2018 11:00
15-Aug-2018 14:14
15-Aug-2018 17:37

## 2018-08-15 NOTE — CONSULT NOTE ADULT - SUBJECTIVE AND OBJECTIVE BOX
Patient is a 89y old  Female who presents with a chief complaint of diabetic foot ulcer/ambulatory disfunction (15 Aug 2018 02:29)      Reason For Consult: dm2 uncontrolled    HPI:  88 y/o F from home with PMH of HTN DM HLD AF PVD angina who came in  c/o right sided rib pain, hip, and right thigh pain x several weeks, worse over the past 3 days.  Pt's daughter states pt usually ambulated with a walker but over the past 3 days she has not even been able to stand due to pain today.  Pt denies any known trauma.  Pt also has a wound to the left foot treated with oral antibiotics (Sivextro) x 5 days with some improvement of symptoms. Patient denies fevers, chills, n/v/d, cp, cough or sob. (14 Aug 2018 22:28)      PAST MEDICAL & SURGICAL HISTORY:  OAB (overactive bladder)  GERD (gastroesophageal reflux disease)  Angina effort  Heart failure  Upper respiratory infection  Diabetes  Renal stones  Myocardial infarction: 1981  Spinal stenosis  CVA (cerebral infarction)  Afib  Raynaud disease  Acid reflux  Hypertension  Hyperlipidemia  Asthma  Cataract  S/P hysterectomy      FAMILY HISTORY:  No pertinent family history in first degree relatives        Social History:    MEDICATIONS  (STANDING):  cholecalciferol 1000 Unit(s) Oral daily  dextrose 5%. 1000 milliLiter(s) (50 mL/Hr) IV Continuous <Continuous>  dextrose 50% Injectable 12.5 Gram(s) IV Push once  dextrose 50% Injectable 25 Gram(s) IV Push once  dextrose 50% Injectable 25 Gram(s) IV Push once  furosemide    Tablet 40 milliGRAM(s) Oral daily  insulin lispro (HumaLOG) corrective regimen sliding scale   SubCutaneous three times a day before meals  insulin lispro (HumaLOG) corrective regimen sliding scale   SubCutaneous at bedtime  insulin NPH/regular 70/30 Injectable 25 Unit(s) SubCutaneous before breakfast  insulin NPH/regular 70/30 Injectable 10 Unit(s) SubCutaneous before dinner  nitroglycerin    Patch 0.4 mG/Hr(s) 1 patch Transdermal daily  oxyCODONE  ER Tablet 10 milliGRAM(s) Oral <User Schedule>  pantoprazole    Tablet 40 milliGRAM(s) Oral before breakfast  piperacillin/tazobactam IVPB. 3.375 Gram(s) IV Intermittent every 8 hours  propranolol  milliGRAM(s) Oral daily  simvastatin 10 milliGRAM(s) Oral at bedtime  vancomycin  IVPB 1000 milliGRAM(s) IV Intermittent every 24 hours  vancomycin  IVPB        MEDICATIONS  (PRN):  dextrose 40% Gel 15 Gram(s) Oral once PRN Blood Glucose LESS THAN 70 milliGRAM(s)/deciLiter  glucagon  Injectable 1 milliGRAM(s) IntraMuscular once PRN Glucose <70 milliGRAM(s)/deciLiter  morphine  - Injectable 2 milliGRAM(s) IV Push every 6 hours PRN Severe Pain (7 - 10)        T(C): 36.8 (08-15-18 @ 05:54), Max: 37.2 (08-14-18 @ 22:43)  HR: 72 (08-15-18 @ 05:54) (72 - 83)  BP: 112/65 (08-15-18 @ 05:54) (110/64 - 151/86)  RR: 16 (08-15-18 @ 05:54) (16 - 18)  SpO2: 96% (08-15-18 @ 05:54) (93% - 96%)  Wt(kg): --    PHYSICAL EXAM:  GENERAL: NAD, well-groomed, well-developed  HEAD:  Atraumatic, Normocephalic  NECK: Supple, No JVD, Normal thyroid  CHEST/LUNG: Clear to percussion bilaterally; No rales, rhonchi, wheezing, or rubs  HEART: Regular rate and rhythm; No murmurs, rubs, or gallops  ABDOMEN: Soft, Nontender, Nondistended; Bowel sounds present  EXTREMITIES: left le foot dsg intact    CAPILLARY BLOOD GLUCOSE      POCT Blood Glucose.: 86 mg/dL (15 Aug 2018 08:15)  POCT Blood Glucose.: 100 mg/dL (14 Aug 2018 23:41)                            11.6   11.63 )-----------( 177      ( 15 Aug 2018 09:19 )             36.1       CMP:  08-15 @ 09:19  SGPT --  Albumin --   Alk Phos --   Anion Gap 9   SGOT --   Total Bili --   BUN 32   Calcium Total 9.1   CO2 31   Chloride 94   Creatinine 1.70   eGFR if AA 30   eGFR if non AA 26   Glucose 129   Potassium 3.9   Protein --   Sodium 134      Thyroid Function Tests:      Diabetes Tests: 08-15 @ 07:58 HbA1c 6.7 C-Peptide --         Radiology:

## 2018-08-15 NOTE — CONSULT NOTE ADULT - SUBJECTIVE AND OBJECTIVE BOX
Patient is a 89y old  Female who presents with a chief complaint of diabetic foot ulcer/ambulatory disfunction (15 Aug 2018 02:29)    HPI:  89 y/o F from home with PMH of HTN DM HLD AF PVD angina who came in  c/o right sided rib pain, hip, and right thigh pain x several weeks, worse over the past 3 days.  Pt's daughter states pt usually ambulated with a walker but over the past 3 days she has not even been able to stand due to pain today.  Pt denies any known trauma.  Pt also has a wound to the left foot treated with oral antibiotics (Sivextro) x 5 days with some improvement of symptoms. Patient denies fevers, chills, n/v/d, cp, cough or sob. (14 Aug 2018 22:28)    Renal consult called for CKD 3.       PAST MEDICAL HISTORY:  OAB (overactive bladder)  GERD (gastroesophageal reflux disease)  Angina effort  Heart failure  Upper respiratory infection  Diabetes  Renal stones  Myocardial infarction  Spinal stenosis  CVA (cerebral infarction)  Afib  Raynaud disease  Acid reflux  Hypertension  Hyperlipidemia  Asthma  CKD 3      PAST SURGICAL HISTORY:  Cataract  S/P hysterectomy      FAMILY HISTORY:  No pertinent family history in first degree relatives      SOCIAL HISTORY: No smoking or alcohol use     Allergies    Levaquin (Other)  ramipril (Other)  tetracycline (Hives; Rash)    Intolerances      Home Medications:  Coumadin 1 mg oral tablet: 1 tab(s) orally once a day alternating with 2mg (14 Aug 2018 22:02)  ezetimibe 10 mg oral tablet: 1 tab(s) orally once a day (14 Aug 2018 22:02)  HYDROcodone 10 mg oral capsule, extended release: 5 milligram(s) orally once a day (at bedtime) (14 Aug 2018 22:02)  Myrbetriq 25 mg oral tablet, extended release: 1 tab(s) orally once a day (14 Aug 2018 22:02)  Nitro-Dur 0.4 mg/hr transdermal film, extended release: 1 patch transdermal once a day (14 Aug 2018 22:02)  NovoLOG Mix 70/30 FlexPen subcutaneous suspension: 26 unit(s) subcutaneous once a day in the morning (14 Aug 2018 22:02)  NovoLOG Mix 70/30 FlexPen subcutaneous suspension: 11 unit(s) subcutaneous once a day (at bedtime) (14 Aug 2018 22:02)  omeprazole 40 mg oral delayed release capsule: 1 cap(s) orally once a day (14 Aug 2018 22:02)  potassium chloride 10 mEq oral capsule, extended release: 1 cap(s) orally once a day (14 Aug 2018 22:02)  Tradjenta 5 mg oral tablet: 1 tab(s) orally once a day (14 Aug 2018 22:02)  Vitamin D3 1000 intl units oral capsule: 1 cap(s) orally once a day (14 Aug 2018 22:02)  Vytorin 10 mg-10 mg oral tablet: 1 tab(s) orally once a day (14 Aug 2018 22:02)    MEDICATIONS  (STANDING):  cholecalciferol 1000 Unit(s) Oral daily  dextrose 5%. 1000 milliLiter(s) (50 mL/Hr) IV Continuous <Continuous>  dextrose 50% Injectable 12.5 Gram(s) IV Push once  dextrose 50% Injectable 25 Gram(s) IV Push once  dextrose 50% Injectable 25 Gram(s) IV Push once  furosemide    Tablet 40 milliGRAM(s) Oral daily  insulin glargine Injectable (LANTUS) 12 Unit(s) SubCutaneous at bedtime  insulin lispro (HumaLOG) corrective regimen sliding scale   SubCutaneous three times a day before meals  insulin lispro (HumaLOG) corrective regimen sliding scale   SubCutaneous at bedtime  nitroglycerin    Patch 0.4 mG/Hr(s) 1 patch Transdermal daily  oxyCODONE  ER Tablet 10 milliGRAM(s) Oral <User Schedule>  pantoprazole    Tablet 40 milliGRAM(s) Oral before breakfast  piperacillin/tazobactam IVPB. 3.375 Gram(s) IV Intermittent every 8 hours  propranolol  milliGRAM(s) Oral daily  simvastatin 10 milliGRAM(s) Oral at bedtime  vancomycin  IVPB 1000 milliGRAM(s) IV Intermittent every 24 hours  vancomycin  IVPB        MEDICATIONS  (PRN):  dextrose 40% Gel 15 Gram(s) Oral once PRN Blood Glucose LESS THAN 70 milliGRAM(s)/deciLiter  glucagon  Injectable 1 milliGRAM(s) IntraMuscular once PRN Glucose <70 milliGRAM(s)/deciLiter  morphine  - Injectable 2 milliGRAM(s) IV Push every 6 hours PRN Severe Pain (7 - 10)      REVIEW OF SYSTEMS:  General: NAD  Respiratory: No cough, SOB  Cardiovascular: No CP or Palpitations	  Gastrointestinal: No nausea, Vomiting. No diarrhea  Genitourinary: No urinary complaints	  Musculoskeletal: No leg swelling, No new rash or lesions	      T(F): 99 (08-15-18 @ 14:10), Max: 99 (08-15-18 @ 14:10)  HR: 80 (08-15-18 @ 14:10) (72 - 83)  BP: 90/55 (08-15-18 @ 14:10) (90/55 - 151/86)  RR: 17 (08-15-18 @ 14:10) (16 - 18)  SpO2: 95% (08-15-18 @ 14:10) (93% - 96%)  Wt(kg): --    PHYSICAL EXAM:  General: NAD  Respiratory: b/l air entry  Cardiovascular: S1 S2  Gastrointestinal: soft  Extremities: edema        08-15    134<L>  |  94<L>  |  32<H>  ----------------------------<  129<H>  3.9   |  31  |  1.70<H>    Ca    9.1      15 Aug 2018 09:19  Mg     1.9     08-15    TPro  7.4  /  Alb  2.4<L>  /  TBili  0.6  /  DBili  x   /  AST  109<H>  /  ALT  32  /  AlkPhos  416<H>                            11.6   11.63 )-----------( 177      ( 15 Aug 2018 09:19 )             36.1       Potassium, Serum: 3.9 mmol/L (08-15 @ 09:19)  Blood Urea Nitrogen, Serum: 32 mg/dL (08-15 @ 09:19)  Calcium, Total Serum: 9.1 mg/dL (08-15 @ 09:19)  Hemoglobin: 11.6 g/dL (08-15 @ 09:19)      Creatinine, Serum: 1.70 (08-15 @ 09:19)  Creatinine, Serum: 1.50 ( @ 16:03)      Urinalysis Basic - ( 14 Aug 2018 16:03 )    Color: Yellow / Appearance: Clear / S.005 / pH: x  Gluc: x / Ketone: Negative  / Bili: Negative / Urobili: Negative   Blood: x / Protein: Negative / Nitrite: Negative   Leuk Esterase: Trace / RBC: 0-2 /HPF / WBC 3-5   Sq Epi: x / Non Sq Epi: Occasional / Bacteria: Moderate      LIVER FUNCTIONS - ( 14 Aug 2018 16:03 )  Alb: 2.4 g/dL / Pro: 7.4 g/dL / ALK PHOS: 416 U/L / ALT: 32 U/L / AST: 109 U/L / GGT: x               < from: NM Bone Imaging Total (08.15.18 @ 11:23) >  EXAM:  NM BONE IMG WHOLE BODY                            PROCEDURE DATE:  08/15/2018          INTERPRETATION:  RADIOPHARMACEUTICAL: 19.4 mCi Tc-99m HDP, I.V.     CLINICAL INFORMATION: 89 year old female with right hip lesion on CT;   referred to evaluate for additional osseous abnormalities.    TECHNIQUE:  Anterior and posterior whole body images were obtained   approximately 2 hours following radiopharmaceutical administration.   Additional static images of the chest and pelvis also were obtained.    COMPARISON: No previous bone scans were available for comparison.    FINDINGS:  There is increased radiopharmaceutical accumulation in the   lower right iliac bone extending through the acetabulum into the ischium,   corresponding in part to the abnormality identified on the CT scan of   2018. The pubic rami are obscured by bladder activity. There is a   small focus of increased radiopharmaceutical accumulation along the   anterior axillary line of a left mid rib, approximately the fifth. There   is focally increased labeled leukocyte accumulation in the region of the   left great toe. There are degenerative changes in the spine and major   joints. There is physiologic distribution of radiopharmaceutical in the   remainder of the imaged osseous structures.    Both kidneys are visualized and are symmetric in appearance.    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    Probable old trauma left mid rib.    Focally increased radiopharmaceutical uptake in the left great toe is   nonspecific, but given the history of left foot ulcer, if osteomyelitis   is a clinical consideration labeled leukocyte imaging is suggested for   further evaluation.    < end of copied text >    < from: CT Abdomen and Pelvis No Cont (08.15.18 @ 08:01) >  EXAM:  CT ABDOMEN AND PELVIS                            PROCEDURE DATE:  08/15/2018          INTERPRETATION:  History: Destructive lesion right hip.    CT abdomen and pelvis no contrast. Prior 2014.    Limited by lack of oral and IV contrast.  Small layering basilar effusions. Prominent interlobular septa at the   lung bases suggests interstitial edema. No substantial coronary artery   calcification. No calcified gallstones or biliary dilatation.  Scattered poorly defined low-attenuation foci throughout the hepatic   parenchyma concerning for metastases. Correlate with contrast-enhanced CT   or MR. Unenhanced pancreas spleen not remarkable.  No adrenal nodules.  No hydronephrosis. Left renal cysts.  Atherosclerotic nonaneurysmal abdominal aorta.  Colonic diverticula, no diverticulitis or other active bowel inflammation.  Trace ascites.  Status post hysterectomy.  Distended urinary bladder without wall thickening.  No aggressive lytic foci right acetabulum and right pubis consistentwith    neoplasm.    Impression:    Limited by lack of oral and IV contrast.  Inhomogeneous hepatic parenchyma suspicious for metastatic involvement.   Correlate with contrast-enhanced CT or MR.  Destructive osseous lesions right hip and pubis consistent with   neoplastic involvement.  Additional findings as discussed.    < end of copied text >

## 2018-08-15 NOTE — CONSULT NOTE ADULT - SUBJECTIVE AND OBJECTIVE BOX
Infectious Diseases Consult by William Armendariz MD    Reason for Consult :    HPI:  88 y/o F from home with PMH of HTN DM HLD AF PVD angina who came in  c/o right sided rib pain, hip, and right thigh pain x several weeks, worse over the past 3 days.  Pt's daughter states pt usually ambulated with a walker but over the past 3 days she has not even been able to stand due to pain today.  Pt denies any known trauma.  Pt also has a wound to the left foot treated with oral antibiotics (Sivextro) x 5 days with some improvement of symptoms. Patient denies fevers, chills, n/v/d, cp, cough or sob. She has hx of peripheral neuropathy from DM     She denies any fall , no hx of weight loss       Past Medical & Surgical Hx:  PAST MEDICAL & SURGICAL HISTORY:  OAB (overactive bladder)  GERD (gastroesophageal reflux disease)  Angina effort  Heart failure  Upper respiratory infection  Diabetes  Renal stones  Myocardial infarction:   Spinal stenosis  CVA (cerebral infarction)  Afib  Raynaud disease  Acid reflux  Hypertension  Hyperlipidemia  Asthma  Cataract  S/P hysterectomy      Social History-- Retired lives at home   EtOH: denies  Tobacco: ex smoker quit 40 years ago   Drug Use: denies     FAMILY HISTORY:  Son RCC ,daughter had cancer     Allergies    Levaquin (Other)  ramipril (Other)  tetracycline (Hives; Rash)    Intolerances        Home/ Out patient  Medications :  Home Medications:  Coumadin 1 mg oral tablet: 1 tab(s) orally once a day alternating with 2mg (14 Aug 2018 22:02)  ezetimibe 10 mg oral tablet: 1 tab(s) orally once a day (14 Aug 2018 22:02)  HYDROcodone 10 mg oral capsule, extended release: 5 milligram(s) orally once a day (at bedtime) (14 Aug 2018 22:02)  Myrbetriq 25 mg oral tablet, extended release: 1 tab(s) orally once a day (14 Aug 2018 22:02)  Nitro-Dur 0.4 mg/hr transdermal film, extended release: 1 patch transdermal once a day (14 Aug 2018 22:02)  NovoLOG Mix 70/30 FlexPen subcutaneous suspension: 26 unit(s) subcutaneous once a day in the morning (14 Aug 2018 22:02)  NovoLOG Mix 70/30 FlexPen subcutaneous suspension: 11 unit(s) subcutaneous once a day (at bedtime) (14 Aug 2018 22:02)  omeprazole 40 mg oral delayed release capsule: 1 cap(s) orally once a day (14 Aug 2018 22:02)  potassium chloride 10 mEq oral capsule, extended release: 1 cap(s) orally once a day (14 Aug 2018 22:02)  Tradjenta 5 mg oral tablet: 1 tab(s) orally once a day (14 Aug 2018 22:02)  Vitamin D3 1000 intl units oral capsule: 1 cap(s) orally once a day (14 Aug 2018 22:02)  Vytorin 10 mg-10 mg oral tablet: 1 tab(s) orally once a day (14 Aug 2018 22:02)      Current Inpatient Medications :    ANTIBIOTICS:   piperacillin/tazobactam IVPB. 3.375 Gram(s) IV Intermittent every 8 hours  vancomycin  IVPB 1000 milliGRAM(s) IV Intermittent every 24 hours  vancomycin  IVPB          OTHER RELEVANT MEDICATIONS :  cholecalciferol 1000 Unit(s) Oral daily  dextrose 40% Gel 15 Gram(s) Oral once PRN  dextrose 5%. 1000 milliLiter(s) IV Continuous <Continuous>  dextrose 50% Injectable 12.5 Gram(s) IV Push once  dextrose 50% Injectable 25 Gram(s) IV Push once  dextrose 50% Injectable 25 Gram(s) IV Push once  glucagon  Injectable 1 milliGRAM(s) IntraMuscular once PRN  insulin glargine Injectable (LANTUS) 12 Unit(s) SubCutaneous at bedtime  insulin lispro (HumaLOG) corrective regimen sliding scale   SubCutaneous three times a day before meals  insulin lispro (HumaLOG) corrective regimen sliding scale   SubCutaneous at bedtime  morphine  - Injectable 2 milliGRAM(s) IV Push every 6 hours PRN  nitroglycerin    Patch 0.4 mG/Hr(s) 1 patch Transdermal daily  oxyCODONE  ER Tablet 10 milliGRAM(s) Oral <User Schedule>  pantoprazole    Tablet 40 milliGRAM(s) Oral before breakfast  propranolol  milliGRAM(s) Oral daily  simvastatin 10 milliGRAM(s) Oral at bedtime      ROS:  Unable to obtain due to :     ROS:  CONSTITUTIONAL:  Negative fever or chills, feels weak,. weight loss , poor appetite  EYES:  Negative  blurry vision or double vision  CARDIOVASCULAR:  Negative for chest pain or palpitations  RESPIRATORY:  Negative for cough, wheezing, or SOB   GASTROINTESTINAL:  Negative for nausea, vomiting, diarrhea, constipation, or abdominal pain  GENITOURINARY:  Negative frequency, urgency , dysuria or hematuria   NEUROLOGIC:  No headache, confusion, dizziness, lightheadedness  All other systems were reviewed and are negative          I&O's Detail      Physical Exam:  Vital Signs Last 24 Hrs  T(C): 37.2 (15 Aug 2018 14:10), Max: 37.2 (14 Aug 2018 22:43)  T(F): 99 (15 Aug 2018 14:10), Max: 99 (15 Aug 2018 14:10)  HR: 80 (15 Aug 2018 14:10) (72 - 83)  BP: 90/55 (15 Aug 2018 14:10) (90/55 - 151/86)  BP(mean): --  RR: 17 (15 Aug 2018 14:10) (16 - 18)  SpO2: 95% (15 Aug 2018 14:10) (93% - 96%)  Height (cm): 152.4 ( @ 14:43)  Weight (kg): 68 ( @ 14:43)  BMI (kg/m2): 29.3 ( @ 14:43)  BSA (m2): 1.65 ( @ 14:43)    General: well developed well nourished, in no acute distress  Eyes: sclera anicteric, pupils equal and reactive to light  ENMT: buccal mucosa moist, pharynx not injected  Neck: supple, trachea midline  Lungs: clear, no wheeze/rhonchi  Cardiovascular: regular rate and rhythm, S1 S2  Abdomen: soft, nontender, no organomegaly present, bowel sounds normal  Neurological:  alert and oriented x3, Cranial Nerves II-XII grossly intact  Skin: no increased ecchymosis/petechiae/purpura  Lymph Nodes: no palpable cervical/supraclavicular lymph nodes enlargements  Extremities: no cyanosis/clubbing, left foot- wound to medial aspect of 1st MPJ measuring approximately 0.5cm x 0.5cm x 0.3cm with probing to bone and mild purulent drainage noted at this time with periwound erythema, hyperkeratotic wound borders and no malodor noted. Vasc: DP and PT non-palpable; CFT < 3 seconds x5; TG WNL; no edema noted    Labs:                  11.6   11.63  )----------(  177       ( 15 Aug 2018 09:19 )               36.1      134    |  94     |  32     ----------------------------<  129        ( 15 Aug 2018 09:19 )  3.9     |  31     |  1.70     Ca    9.1        ( 15 Aug 2018 09:19 )  Mg     1.9       ( 15 Aug 2018 09:19 )        PT/INR -  41.5 sec / 3.71 ratio   ( 15 Aug 2018 09:19 )     CAPILLARY BLOOD GLUCOSE      POCT Blood Glucose.: 100 mg/dL (15 Aug 2018 11:59)  POCT Blood Glucose.: 86 mg/dL (15 Aug 2018 08:15)  POCT Blood Glucose.: 100 mg/dL (14 Aug 2018 23:41)      Urinalysis Basic - ( 14 Aug 2018 16:03 )    Color: Yellow / Appearance: Clear / S.005 / pH: x  Gluc: x / Ketone: Negative  / Bili: Negative / Urobili: Negative   Blood: x / Protein: Negative / Nitrite: Negative   Leuk Esterase: Trace / RBC: 0-2 /HPF / WBC 3-5   Sq Epi: x / Non Sq Epi: Occasional / Bacteria: Moderate    Sedimentation Rate, Erythrocyte (08.15.18 @ 09:19)    Sedimentation Rate, Erythrocyte: 92 mm/hr    C-Reactive Protein, Serum (08.15.18 @ 11:49)    C-Reactive Protein, Serum: 21.63 mg/dL      Prothrombin Time and INR, Plasma (08.15.18 @ 09:19)    Prothrombin Time, Plasma: 41.5 sec    INR: 3.71 ratio          RECENT CULTURES:          RADIOLOGY & ADDITIONAL STUDIES:  NM Bone Imaging Total (08.15.18 @ 11:23) >  IMPRESSION: Abnormal bone scan most consistent with neoplasm involving   the right pelvis.    Probable old trauma left mid rib.    Focally increased radiopharmaceutical uptake in the left great toe is   nonspecific, but given the history of left foot ulcer, if osteomyelitis   is a clinical consideration labeled leukocyte imaging is suggested for   further evaluation.    CT Abdomen and Pelvis No Cont (08.15.18 @ 08:01) >  Impression:    Limited by lack of oral and IV contrast.  Inhomogeneous hepatic parenchyma suspicious for metastatic involvement.   Correlate with contrast-enhanced CT or MR.  Destructive osseous lesions right hip and pubis consistent with   neoplastic involvement.  Additional findings as discussed.      CT Chest No Cont (18 @ 18:22) >    IMPRESSION: Trace right pleural effusion. See additional findings as   described above.    CT Pelvis Bony Only No Cont (18 @ 18:40) >   Destructive lesion present in the right hip. Follow-up with MRI to   further evaluate this finding. A portion of the right acetabulum is   absent. Similar findings visible in the right pubic bones, two  locations.    Femoral heads, femoral necks intact. SI joints intact.    Urinary bladder is filled. Diverticulosis present sigmoid region.   Inguinal regions intact. Multilevel disc degenerative disease lumbar   region.    IMPRESSION: Evidence of bone destruction in the right acetabulum, right   pubic bones suspicious for neoplasm. Follow-up with MRI.      Assessment :   89 y.o woman with multiple comorbidities presented with several weeks h/./o of bony pain and recent inability to ambulate du to pain in the foot. Ct scan revealed destructive bony lesions concerning for metastasis and liver lesions. 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    The primary source of malignancy is unclear, she is to have biopsy of the hip lesion on Friday provided INR is below 1.5 . Anticoagulation is stopped . 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  Appropriate use of antibiotics and adverse effects reviewed.      I have discussed the above plan of care with patient and her family in detail. They expressed understanding of the treatment plan . Risks, benefits and alternatives discussed in detail. I have asked if they have any questions or concerns and appropriately addressed them to the best of my ability . Family discussed Goals of care with palliative care RN, family may consider DNR/DNI       >55  minutes spent in direct patient care reviewing  the notes, lab data/ imaging , discussion with multidisciplinary team. All questions were addressed and answered to the best of my capacity .    Thank you for allowing me to participate in the care of your patient .      William Armendariz MD  [phone]

## 2018-08-15 NOTE — PATIENT PROFILE ADULT. - TEACHING/LEARNING LEARNING PREFERENCES
written material/skill demonstration/verbal instruction/group instruction/video/audio/computer/internet/individual instruction/pictorial

## 2018-08-16 LAB
-  AMPICILLIN/SULBACTAM: SIGNIFICANT CHANGE UP
-  CEFAZOLIN: SIGNIFICANT CHANGE UP
-  CLINDAMYCIN: SIGNIFICANT CHANGE UP
-  ERYTHROMYCIN: SIGNIFICANT CHANGE UP
-  GENTAMICIN: SIGNIFICANT CHANGE UP
-  OXACILLIN: SIGNIFICANT CHANGE UP
-  PENICILLIN: SIGNIFICANT CHANGE UP
-  RIFAMPIN: SIGNIFICANT CHANGE UP
-  TETRACYCLINE: SIGNIFICANT CHANGE UP
-  TRIMETHOPRIM/SULFAMETHOXAZOLE: SIGNIFICANT CHANGE UP
-  VANCOMYCIN: SIGNIFICANT CHANGE UP
ANION GAP SERPL CALC-SCNC: 9 MMOL/L — SIGNIFICANT CHANGE UP (ref 5–17)
APTT BLD: 37.7 SEC — HIGH (ref 27.5–37.4)
BASOPHILS # BLD AUTO: 0.03 K/UL — SIGNIFICANT CHANGE UP (ref 0–0.2)
BASOPHILS NFR BLD AUTO: 0.3 % — SIGNIFICANT CHANGE UP (ref 0–2)
BUN SERPL-MCNC: 40 MG/DL — HIGH (ref 7–23)
CALCIUM SERPL-MCNC: 8.9 MG/DL — SIGNIFICANT CHANGE UP (ref 8.5–10.1)
CEA SERPL-MCNC: 3.8 NG/ML — SIGNIFICANT CHANGE UP (ref 0–3.8)
CHLORIDE SERPL-SCNC: 94 MMOL/L — LOW (ref 96–108)
CO2 SERPL-SCNC: 31 MMOL/L — SIGNIFICANT CHANGE UP (ref 22–31)
CREAT SERPL-MCNC: 1.8 MG/DL — HIGH (ref 0.5–1.3)
CULTURE RESULTS: SIGNIFICANT CHANGE UP
EOSINOPHIL # BLD AUTO: 0.04 K/UL — SIGNIFICANT CHANGE UP (ref 0–0.5)
EOSINOPHIL NFR BLD AUTO: 0.3 % — SIGNIFICANT CHANGE UP (ref 0–6)
FERRITIN SERPL-MCNC: 281 NG/ML — HIGH (ref 15–150)
GLUCOSE SERPL-MCNC: 134 MG/DL — HIGH (ref 70–99)
GRAM STN FLD: SIGNIFICANT CHANGE UP
GRAM STN FLD: SIGNIFICANT CHANGE UP
HCT VFR BLD CALC: 34.7 % — SIGNIFICANT CHANGE UP (ref 34.5–45)
HGB BLD-MCNC: 11.2 G/DL — LOW (ref 11.5–15.5)
IMM GRANULOCYTES NFR BLD AUTO: 0.4 % — SIGNIFICANT CHANGE UP (ref 0–1.5)
INR BLD: 2.54 RATIO — HIGH (ref 0.88–1.16)
INR PPP: 4.8
LYMPHOCYTES # BLD AUTO: 1.04 K/UL — SIGNIFICANT CHANGE UP (ref 1–3.3)
LYMPHOCYTES # BLD AUTO: 8.9 % — LOW (ref 13–44)
MCHC RBC-ENTMCNC: 29.6 PG — SIGNIFICANT CHANGE UP (ref 27–34)
MCHC RBC-ENTMCNC: 32.3 GM/DL — SIGNIFICANT CHANGE UP (ref 32–36)
MCV RBC AUTO: 91.6 FL — SIGNIFICANT CHANGE UP (ref 80–100)
METHOD TYPE: SIGNIFICANT CHANGE UP
MONOCYTES # BLD AUTO: 0.97 K/UL — HIGH (ref 0–0.9)
MONOCYTES NFR BLD AUTO: 8.3 % — SIGNIFICANT CHANGE UP (ref 2–14)
NEUTROPHILS # BLD AUTO: 9.56 K/UL — HIGH (ref 1.8–7.4)
NEUTROPHILS NFR BLD AUTO: 81.8 % — HIGH (ref 43–77)
NRBC # BLD: 0 /100 WBCS — SIGNIFICANT CHANGE UP (ref 0–0)
ORGANISM # SPEC MICROSCOPIC CNT: SIGNIFICANT CHANGE UP
ORGANISM # SPEC MICROSCOPIC CNT: SIGNIFICANT CHANGE UP
PLATELET # BLD AUTO: 212 K/UL — SIGNIFICANT CHANGE UP (ref 150–400)
POTASSIUM SERPL-MCNC: 4.4 MMOL/L — SIGNIFICANT CHANGE UP (ref 3.5–5.3)
POTASSIUM SERPL-SCNC: 4.4 MMOL/L — SIGNIFICANT CHANGE UP (ref 3.5–5.3)
PROCALCITONIN SERPL-MCNC: 2.6 NG/ML — HIGH (ref 0–0.04)
PROTHROM AB SERPL-ACNC: 28.2 SEC — HIGH (ref 9.8–12.7)
RBC # BLD: 3.79 M/UL — LOW (ref 3.8–5.2)
RBC # FLD: 16.7 % — HIGH (ref 10.3–14.5)
SODIUM SERPL-SCNC: 134 MMOL/L — LOW (ref 135–145)
SPECIMEN SOURCE: SIGNIFICANT CHANGE UP
VIT B12 SERPL-MCNC: 821 PG/ML — SIGNIFICANT CHANGE UP (ref 232–1245)
WBC # BLD: 11.69 K/UL — HIGH (ref 3.8–10.5)
WBC # FLD AUTO: 11.69 K/UL — HIGH (ref 3.8–10.5)

## 2018-08-16 PROCEDURE — 99232 SBSQ HOSP IP/OBS MODERATE 35: CPT

## 2018-08-16 RX ORDER — LACTOBACILLUS ACIDOPHILUS 100MM CELL
1 CAPSULE ORAL
Qty: 0 | Refills: 0 | Status: DISCONTINUED | OUTPATIENT
Start: 2018-08-16 | End: 2018-08-20

## 2018-08-16 RX ADMIN — Medication 1000 UNIT(S): at 11:15

## 2018-08-16 RX ADMIN — OXYCODONE HYDROCHLORIDE 10 MILLIGRAM(S): 5 TABLET ORAL at 22:00

## 2018-08-16 RX ADMIN — Medication 250 MILLIGRAM(S): at 21:49

## 2018-08-16 RX ADMIN — PIPERACILLIN AND TAZOBACTAM 25 GRAM(S): 4; .5 INJECTION, POWDER, LYOPHILIZED, FOR SOLUTION INTRAVENOUS at 01:04

## 2018-08-16 RX ADMIN — MORPHINE SULFATE 2 MILLIGRAM(S): 50 CAPSULE, EXTENDED RELEASE ORAL at 20:32

## 2018-08-16 RX ADMIN — PIPERACILLIN AND TAZOBACTAM 25 GRAM(S): 4; .5 INJECTION, POWDER, LYOPHILIZED, FOR SOLUTION INTRAVENOUS at 13:18

## 2018-08-16 RX ADMIN — INSULIN GLARGINE 12 UNIT(S): 100 INJECTION, SOLUTION SUBCUTANEOUS at 21:50

## 2018-08-16 RX ADMIN — Medication 1 PATCH: at 00:01

## 2018-08-16 RX ADMIN — Medication 1 TABLET(S): at 11:15

## 2018-08-16 RX ADMIN — Medication 4 UNIT(S): at 12:41

## 2018-08-16 RX ADMIN — Medication 1: at 12:41

## 2018-08-16 RX ADMIN — PIPERACILLIN AND TAZOBACTAM 25 GRAM(S): 4; .5 INJECTION, POWDER, LYOPHILIZED, FOR SOLUTION INTRAVENOUS at 22:58

## 2018-08-16 RX ADMIN — Medication 4 UNIT(S): at 08:49

## 2018-08-16 RX ADMIN — OXYCODONE HYDROCHLORIDE 10 MILLIGRAM(S): 5 TABLET ORAL at 21:49

## 2018-08-16 RX ADMIN — Medication 1 TABLET(S): at 17:28

## 2018-08-16 RX ADMIN — MORPHINE SULFATE 2 MILLIGRAM(S): 50 CAPSULE, EXTENDED RELEASE ORAL at 20:45

## 2018-08-16 RX ADMIN — Medication 2: at 18:14

## 2018-08-16 RX ADMIN — SIMVASTATIN 10 MILLIGRAM(S): 20 TABLET, FILM COATED ORAL at 21:50

## 2018-08-16 RX ADMIN — Medication 4 UNIT(S): at 18:15

## 2018-08-16 NOTE — PROGRESS NOTE ADULT - ASSESSMENT
89y old  Female who presents with a chief complaint of diabetic foot ulcer/ambulatory disfunction     - No evidence of acute ischemia  - No evidence of volume overload  - EKG compared with previous Rate 78 BPM, P-R Interval 250 ms QRS Duration 94 ms    Q-T Interval 374 ms NSR 1st degree AV block  - BP well controlled, continue home BP meds, monitor routine hemodynamics  - monitor and replete lytes, keep K>4, Mg>2  - INR 2.54 today, goal inr 2-3  - MM panel and CEA  - Bone scan and CT abdomen revealing of possible metastasis in right pelvis and hepatic parenchyma. Plan for IR biopsy of bony lesions tentatively 8/17/18.  - Other cardiovascular workup will depend on clinical course.  - All other workup per primary team  - Will follow

## 2018-08-16 NOTE — PROGRESS NOTE ADULT - PROBLEM SELECTOR PLAN 1
cont lantus 12 units qhs  cont humalog 4 units tid before meals  cont mod dose humalog scale coverage  goal bg 100-180 in hosp setting

## 2018-08-16 NOTE — PROGRESS NOTE ADULT - SUBJECTIVE AND OBJECTIVE BOX
Chief Complaint: Patient is a 89y old  Female who presents with a chief complaint of diabetic foot ulcer/ambulatory disfunction     Interval Events: Still c/o pain but improved since yesterday. Scheduled for bone biopsy. Admits chronic SOB which is at baseline since admission. Denies chest pain , palpitations, nausea, vomiting.     Review of Systems:  General: No fevers, chills, weight loss or gain  Skin: No rashes, color changes  Cardiovascular: Admits chronic SOB, no chest pain, orthopnea  Respiratory: No shortness of breath, cough  Gastrointestinal: No nausea, abdominal pain  Genitourinary: No incontinence, pain with urination  Musculoskeletal: Pain and myalgia on rt  Neurological: No headache, weakness  Psychiatric: Upset due to general state of health   Endocrine: No weight loss or gain, increased thirst  All other systems are comprehensively negative.    Physical Exam:  Vitals:        Vital Signs Last 24 Hrs  T(C): 37.1 (16 Aug 2018 05:04), Max: 37.2 (15 Aug 2018 14:10)  T(F): 98.8 (16 Aug 2018 05:04), Max: 99 (15 Aug 2018 14:10)  HR: 77 (16 Aug 2018 05:04) (77 - 80)  BP: 114/62 (16 Aug 2018 05:04) (90/55 - 114/62)  BP(mean): --  RR: 18 (16 Aug 2018 05:04) (17 - 18)  SpO2: 95% (16 Aug 2018 05:04) (95% - 95%)    MEDICATIONS  (STANDING):  cholecalciferol 1000 Unit(s) Oral daily  dextrose 5%. 1000 milliLiter(s) (50 mL/Hr) IV Continuous <Continuous>  dextrose 50% Injectable 12.5 Gram(s) IV Push once  dextrose 50% Injectable 25 Gram(s) IV Push once  dextrose 50% Injectable 25 Gram(s) IV Push once  insulin glargine Injectable (LANTUS) 12 Unit(s) SubCutaneous at bedtime  insulin lispro (HumaLOG) corrective regimen sliding scale   SubCutaneous three times a day before meals  insulin lispro (HumaLOG) corrective regimen sliding scale   SubCutaneous at bedtime  insulin lispro Injectable (HumaLOG) 4 Unit(s) SubCutaneous three times a day before meals  lactobacillus acidophilus 1 Tablet(s) Oral daily  oxyCODONE  ER Tablet 10 milliGRAM(s) Oral <User Schedule>  pantoprazole    Tablet 40 milliGRAM(s) Oral before breakfast  piperacillin/tazobactam IVPB. 3.375 Gram(s) IV Intermittent every 8 hours  propranolol  milliGRAM(s) Oral daily  simvastatin 10 milliGRAM(s) Oral at bedtime  vancomycin  IVPB 1000 milliGRAM(s) IV Intermittent every 24 hours  vancomycin  IVPB        MEDICATIONS  (PRN):  dextrose 40% Gel 15 Gram(s) Oral once PRN Blood Glucose LESS THAN 70 milliGRAM(s)/deciLiter  glucagon  Injectable 1 milliGRAM(s) IntraMuscular once PRN Glucose <70 milliGRAM(s)/deciLiter  morphine  - Injectable 2 milliGRAM(s) IV Push every 6 hours PRN Severe Pain (7 - 10)      EXAM:  NM BONE IMG WHOLE BODY                            PROCEDURE DATE:  08/15/2018          INTERPRETATION:  RADIOPHARMACEUTICAL: 19.4 mCi Tc-99m HDP, I.V.     CLINICAL INFORMATION: 89 year old female with right hip lesion on CT;   referred to evaluate for additional osseous abnormalities.    TECHNIQUE:  Anterior and posterior whole body images were obtained   approximately 2 hours following radiopharmaceutical administration.   Additional static images of the chest and pelvis also were obtained.    COMPARISON: No previous bone scans were available for comparison.    FINDINGS:  There is increased radiopharmaceutical accumulation in the   lower right iliac bone extending through the acetabulum into the ischium,   corresponding in part to the abnormality identified on the CT scan of   8/14/2018. The pubic rami are obscured by bladder activity. There is a   small focus of increased radiopharmaceutical accumulation along the   anterior axillary line of a left mid rib, approximately the fifth. There   is focally increased labeled leukocyte accumulation in the region of the   left great toe. There are degenerative changes in the spine and major   joints. There is physiologic distribution of radiopharmaceutical in the   remainder of the imaged osseous structures.    Both kidneys are visualized and are symmetric in appearance.    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    Probable old trauma left mid rib.    Focally increased radiopharmaceutical uptake in the left great toe is   nonspecific, but given the history of left foot ulcer, if osteomyelitis   is a clinical consideration labeled leukocyte imaging is suggested for   further evaluation.      General: NAD  HEENT: MMM  Neck: No JVD, no carotid bruit  Lungs: CTAB no wheezing   CV: RRR, nl S1/S2, no M/R/G  Abdomen: S/NT/ND, +BS  Extremities: No LE edema, no cyanosis  Neuro: AAOx3, non-focal  Skin: No rash    Labs:                        11.6   11.63 )-----------( 177      ( 15 Aug 2018 09:19 )             36.1     08-16    134<L>  |  94<L>  |  40<H>  ----------------------------<  134<H>  4.4   |  31  |  1.80<H>    Ca    8.9      16 Aug 2018 08:00  Mg     1.9     08-15    TPro  x   /  Alb  3.0<L>  /  TBili  x   /  DBili  x   /  AST  x   /  ALT  x   /  AlkPhos  x   08-15        PT/INR - ( 16 Aug 2018 08:00 )   PT: 28.2 sec;   INR: 2.54 ratio         PTT - ( 16 Aug 2018 08:00 )  PTT:37.7 sec

## 2018-08-16 NOTE — PROGRESS NOTE ADULT - SUBJECTIVE AND OBJECTIVE BOX
ID progress note     Name: DWAYNE DONAHUE  Age: 89y  Gender: Female  MRN: [id number]    Interval History-- events noted, blood cs positive for staph aureus . Seen with podiatry , noted ot have purulent drainage from left hallux , likely has OM an abscess . Son at bedside   Notes reviewed    Past Medical History--  OAB (overactive bladder)  GERD (gastroesophageal reflux disease)  Angina effort  Heart failure  Upper respiratory infection  Diabetes  Renal stones  Myocardial infarction  Spinal stenosis  CVA (cerebral infarction)  Afib  Raynaud disease  Acid reflux  Hypertension  Hyperlipidemia  Asthma  Cataract  S/P hysterectomy      For details regarding the patient's social history, family history, and other miscellaneous elements, please refer the initial infectious diseases consultation and/or the admitting history and physical examination for this admission.    Allergies--  Allergies    Levaquin (Other)  ramipril (Other)  tetracycline (Hives; Rash)    Intolerances        Medications--  Antibiotics:  piperacillin/tazobactam IVPB. 3.375 Gram(s) IV Intermittent every 8 hours  vancomycin  IVPB 1000 milliGRAM(s) IV Intermittent every 24 hours  vancomycin  IVPB        Immunologic:    Other:  cholecalciferol  dextrose 40% Gel PRN  dextrose 5%.  dextrose 50% Injectable  dextrose 50% Injectable  dextrose 50% Injectable  glucagon  Injectable PRN  insulin glargine Injectable (LANTUS)  insulin lispro (HumaLOG) corrective regimen sliding scale  insulin lispro (HumaLOG) corrective regimen sliding scale  insulin lispro Injectable (HumaLOG)  lactobacillus acidophilus  morphine  - Injectable PRN  oxyCODONE  ER Tablet  pantoprazole    Tablet  propranolol LA  simvastatin      Review of Systems--  A 10-point review of systems was obtained.     Pertinent positives and negatives--  Constitutional: No fevers. No Chills. No Rigors.   Cardiovascular: No chest pain. No palpitations.  Respiratory: No shortness of breath. No cough.  Gastrointestinal: No nausea or vomiting. No diarrhea or constipation.   Psychiatric: Pleasant. Appropriate affect.    Review of systems otherwise negative except as previously noted.    Physical Examination--  Vital Signs: T(F): 98.8 (08-16-18 @ 05:04), Max: 99 (08-15-18 @ 14:10)  HR: 77 (08-16-18 @ 05:04)  BP: 114/62 (08-16-18 @ 05:04)  RR: 18 (08-16-18 @ 05:04)  SpO2: 95% (08-16-18 @ 05:04)  Wt(kg): --  General: Nontoxic-appearing Female in no acute distress.  HEENT: AT/NC. PERRL. EOMI. Anicteric. Conjunctiva pink and moist. Oropharynx clear. Dentition fair.  Neck: Not rigid. No sense of mass.  Nodes: None palpable.  Lungs: Clear bilaterally without rales, wheezing or rhonchi  Heart: Regular rate and rhythm. No Murmur. No rub. No gallop. No palpable thrill.  Abdomen: Bowel sounds present and normoactive. Soft. Nondistended. Nontender. No sense of mass. No organomegaly.  Back: No spinal tenderness. No costovertebral angle tenderness.   Extremities: No cyanosis or clubbing. No edema. left foot- wound to medial aspect of 1st MPJ measuring approximately 0.5cm x 0.5cm x 0.3cm with probing to bone and mild purulent drainage noted at this time with periwound erythema, hyperkeratotic wound borders and no malodor noted. Vasc: DP and PT non-palpable; CFT < 3 seconds x5; TG WNL; no edema noted  Skin: Warm. Dry. Good turgor. No rash. No vasculitic stigmata.  Psychiatric: Appropriate affect and mood for situation.         Laboratory Studies--  CBC                        11.2   11.69 )-----------( 212      ( 16 Aug 2018 08:00 )             34.7       Chemistries  08-16    134<L>  |  94<L>  |  40<H>  ----------------------------<  134<H>  4.4   |  31  |  1.80<H>    Ca    8.9      16 Aug 2018 08:00  Mg     1.9     08-15    TPro  x   /  Alb  3.0<L>  /  TBili  x   /  DBili  x   /  AST  x   /  ALT  x   /  AlkPhos  x   08-15    Sedimentation Rate, Erythrocyte (08.15.18 @ 09:19)    Sedimentation Rate, Erythrocyte: 92 mm/hr    CAPILLARY BLOOD GLUCOSE      POCT Blood Glucose.: 141 mg/dL (16 Aug 2018 08:39)  POCT Blood Glucose.: 175 mg/dL (15 Aug 2018 22:12)  POCT Blood Glucose.: 151 mg/dL (15 Aug 2018 17:43)  POCT Blood Glucose.: 100 mg/dL (15 Aug 2018 11:59)    Culture Data    Culture - Other (collected 15 Aug 2018 00:41)  Source: .Other left toe  Preliminary Report (15 Aug 2018 21:07):    Moderate Staphylococcus aureus    Culture - Blood (collected 14 Aug 2018 21:28)  Source: .Blood Blood  Gram Stain (16 Aug 2018 05:57):    Growth in anaerobic bottle: Gram Positive Cocci in Clusters  Preliminary Report (16 Aug 2018 05:57):    Growth in anaerobic bottle: Gram Positive Cocci in Clusters    Culture - Blood (08.14.18 @ 21:27)    -  Staphylococcus aureus: Detec Any isolate of Staphylococcus aureus from a blood culture is NOT considered a contaminant.    Gram Stain:   Growth in aerobic bottle: Gram Positive Cocci in Clusters  Growth in anaerobic bottle: Gram Positive Cocci in Clusters    Specimen Source: .Blood Blood    Organism: Blood Culture PCR    Culture Results:   Growth in aerobic bottle: Gram Positive Cocci in Clusters  "Due to technical problems, Proteus sp. will Not be reported as part of  the BCID panel until further notice"  ***Blood Panel PCR results on this specimen are available  approximately 3 hours after the Gram stain result.***  Gram stain, PCR, and/or culture results may not always  correspond due to difference in methodologies.  ************************************************************  This PCR assay was performed using [employer].  The following targets are tested for: Enterococcus,  vancomycin resistant enterococci, Listeria monocytogenes,  coagulase negative staphylococci, S. aureus,  methicillin resistant S. aureus, Streptococcus agalactiae  (Group B), S. pneumoniae, S.pyogenes (Group A),  Acinetobacter baumannii, Enterobacter cloacae, E. coli,  Klebsiella oxytoca, K. pneumoniae, Proteus sp.,  Serratia marcescens, Haemophilus influenzae,  Neisseria meningitidis, Pseudomonas aeruginosa, Candida  albicans, C. glabrata, C krusei, C parapsilosis,  C. tropicalis and the KPC resistance gene.  Growth in anaerobic bottle: Gram Positive Cocci in Clusters    Organism Identification: Blood Culture PCR    Method Type: PCR          RADIOLOGY:  MR Hip No Cont, Right (08.15.18 @ 19:21) >  Findings:      As noted on the prior CT there is a metastatic lesion involving the   anterior wall of the right acetabulum which extends into the right   superior pubic ramus. This lesion is mildly expansile and measures   approximately 2.4 x 4.1 x 2.2 cm. An additional metastatic lesion is   noted at the right ischial tuberosity. There is also a lesion within the   right inferior pubic ramus. There is prominent edema about the midportion   of the right inferior pubic ramus which is likely related to a   superimposed pathologic stress fracture at this site. There is prominent   soft tissue edema within the right adductor muscles which is likely   related to a combination of reactive changes secondary to pathologic   stress fracture of the right inferior pubic ramus, muscle strain, and/or   possibly extension of metastatic disease. Small nonspecific rounded foci   of hypointense T1 and hyperintense T2 signal are noted within the left   femoral head and the base of the left femoral neck which may be related   to additional metastatic foci. No additional lesions are demonstrated.    There is mild bilateral hip arthrosis. There is moderate pubic symphysis   arthrosis. There is moderate bilateral sacroiliac joint arthrosis. There   is moderate lumbar spondylosis.    There is no evidence of acute tendinous injury. The sciatic nerves are   normal in course, signal, and morphology.    Colonic diverticulosis is noted.    Impression:    Mildly expansile metastatic lesion within the anterior wall of the right   acetabulum with extension to the right superior pubic ramus. Additional   metastatic lesions are noted within the right ischium and midportion of   the right inferior pubic ramus. There is prominent edema within the right   inferior pubic ramus likely related to a superimposed pathologic stress   fracture. Prominent edema throughout the right adductor muscles about the   right inferior pubic ramus is may be related to underlying reactive   edema, muscle strain, and/or extension of metastatic disease.    Nonspecific small lesions within the left femoral head and left femoral   neck which may be related to additional metastatic foci.     Xray Foot AP + Lateral + Oblique, Left (08.14.18 @ 16:07) >  IMPRESSION: Soft tissue swelling around the first metatarsal head along   with a skin defect. Underlying focal osteopenia of the left first   metatarsal head for which osteomyelitis cannot be excluded. Recommend   further evaluation with MRI examination or bone scan.    Assessment :     89 y.o woman with multiple comorbidities presented with several weeks h/o of bony pain and recent inability to ambulate du to pain in the foot. Ct scan revealed destructive bony lesions concerning for metastasis and liver lesions. 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 Noted to have staph aurues bacteremia likely from left hallux abscess    The primary source of malignancy is unclear, she is to have biopsy of the hip lesion on Friday provided INR is below 1.5 . Anticoagulation is stopped . She was on Sivextro at home which was covering MRSA     Plan :   - will repeat blood cs x 2 , will change antibiotics to Ancef if its MSSA   - she needs I& D and resection of the left 1st met head , no need for MRI as clinically she has OM   - get echo to rule out endocarditis   - she is undergoing work up to find the primary cancer , she already has liver and destructive bone mets so she has advanced diseases  - overall prognosis is poor   - goals of care conversation with her Son, does not want aggressive care   - podiatry follow up     Continue with present regime .  Appropriate use of antibiotics and adverse effects reviewed.    I have discussed the above plan of care with patient and her family in detail. They expressed understanding of the treatment plan . Risks, benefits and alternatives discussed in detail. I have asked if they have any questions or concerns and appropriately addressed them to the best of my ability .      > 35 minutes spent in direct patient care reviewing  the notes, lab data/ imaging , discussion with multidisciplinary team. All questions were addressed and answered to the best of my capacity .    Thank you for allowing me to participate in the care of your patient .        William Armendariz MD  [phone]    89 y.o woman with multiple comorbidities presented with several weeks h/./o of bony pain and recent inability to ambulate du to pain in the foot. Ct scan revealed destructive bony lesions concerning for metastasis and liver lesions. 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    The primary source of malignancy is unclear, she is to have biopsy of the hip lesion on Friday provided INR is below 1.5 . Anticoagulation is stopped . 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  Appropriate use of antibiotics and adverse effects reviewed.    89 y.o woman with multiple comorbidities presented with several weeks h/./o of bony pain and recent inability to ambulate du to pain in the foot. Ct scan revealed destructive bony lesions concerning for metastasis and liver lesions. 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    The primary source of malignancy is unclear, she is to have biopsy of the hip lesion on Friday provided INR is below 1.5 . Anticoagulation is stopped . 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  Appropriate use of antibiotics and adverse effects reviewed.

## 2018-08-16 NOTE — PROGRESS NOTE ADULT - SUBJECTIVE AND OBJECTIVE BOX
NEPHROLOGY PROGRESS NOTE    CHIEF COMPLAINT:  CKD    HPI:  Renal function slightly worse.  She is on IV vanco/zosyn for Staph bactremia felt to originate from neuropathic foot ulcer.    ROS:  denies SOB    EXAM:  T(F): 98.8 (18 @ 05:04)  HR: 77 (18 @ 05:04)  BP: 114/62 (18 @ 05:04)  RR: 18 (18 @ 05:04)  SpO2: 95% (18 @ 05:04)    Conversant, in no apparent distress  Normal respiratory effort, lungs clear bilaterally  Heart RRR with no murmur, no peripheral edema         LABS                             11.2   11.69 )-----------( 212      ( 16 Aug 2018 08:00 )             34.7          08-16    134<L>  |  94<L>  |  40<H>  ----------------------------<  134<H>  4.4   |  31  |  1.80<H>    Ca    8.9      16 Aug 2018 08:00  Mg     1.9     08-15    TPro  x   /  Alb  3.0<L>  /  TBili  x   /  DBili  x   /  AST  x   /  ALT  x   /  AlkPhos  x   08-15         Urinalysis Basic - ( 14 Aug 2018 16:03 )    Color: Yellow / Appearance: Clear / S.005 / pH: x  Gluc: x / Ketone: Negative  / Bili: Negative / Urobili: Negative   Blood: x / Protein: Negative / Nitrite: Negative   Leuk Esterase: Trace / RBC: 0-2 /HPF / WBC 3-5   Sq Epi: x / Non Sq Epi: Occasional / Bacteria: Moderate    ASSESSMENT:  1.  TRISH, mild, due to sepsis  2.  CKD - stage 3, baseline Cr 1.5    PLAN:  Continue to monitor daily BMP while inpatient  Monitor vancomycin levels  Will question need for diuretic on daily basis

## 2018-08-16 NOTE — PROGRESS NOTE ADULT - SUBJECTIVE AND OBJECTIVE BOX
Patient is a 89y old  Female who presents with a chief complaint of diabetic foot ulcer/ambulatory disfunction (15 Aug 2018 02:29)      INTERVAL /OVERNIGHT EVENTS: pain better today    MEDICATIONS  (STANDING):  cholecalciferol 1000 Unit(s) Oral daily  dextrose 5%. 1000 milliLiter(s) (50 mL/Hr) IV Continuous <Continuous>  dextrose 50% Injectable 12.5 Gram(s) IV Push once  dextrose 50% Injectable 25 Gram(s) IV Push once  dextrose 50% Injectable 25 Gram(s) IV Push once  insulin glargine Injectable (LANTUS) 12 Unit(s) SubCutaneous at bedtime  insulin lispro (HumaLOG) corrective regimen sliding scale   SubCutaneous three times a day before meals  insulin lispro (HumaLOG) corrective regimen sliding scale   SubCutaneous at bedtime  insulin lispro Injectable (HumaLOG) 4 Unit(s) SubCutaneous three times a day before meals  lactobacillus acidophilus 1 Tablet(s) Oral two times a day with meals  oxyCODONE  ER Tablet 10 milliGRAM(s) Oral <User Schedule>  pantoprazole    Tablet 40 milliGRAM(s) Oral before breakfast  piperacillin/tazobactam IVPB. 3.375 Gram(s) IV Intermittent every 8 hours  propranolol  milliGRAM(s) Oral daily  simvastatin 10 milliGRAM(s) Oral at bedtime  vancomycin  IVPB 1000 milliGRAM(s) IV Intermittent every 24 hours  vancomycin  IVPB        MEDICATIONS  (PRN):  dextrose 40% Gel 15 Gram(s) Oral once PRN Blood Glucose LESS THAN 70 milliGRAM(s)/deciLiter  glucagon  Injectable 1 milliGRAM(s) IntraMuscular once PRN Glucose <70 milliGRAM(s)/deciLiter  morphine  - Injectable 2 milliGRAM(s) IV Push every 6 hours PRN Severe Pain (7 - 10)      Allergies    Levaquin (Other)  ramipril (Other)  tetracycline (Hives; Rash)    Intolerances        REVIEW OF SYSTEMS:  CONSTITUTIONAL: No fever, weight loss, or fatigue  EYES: No eye pain, visual disturbances, or discharge  ENMT:  No difficulty hearing, tinnitus, vertigo; No sinus or throat pain  NECK: No pain or stiffness  RESPIRATORY: No cough, wheezing, chills or hemoptysis; No shortness of breath  CARDIOVASCULAR: No chest pain, palpitations, dizziness, or leg swelling  GASTROINTESTINAL: No abdominal or epigastric pain. No nausea, vomiting, or hematemesis; No diarrhea or constipation. No melena or hematochezia.  GENITOURINARY: No dysuria, frequency, hematuria, or incontinence  NEUROLOGICAL: No headaches, memory loss, loss of strength, numbness, or tremors  SKIN: No itching, burning, rashes, or lesions   LYMPH NODES: No enlarged glands  ENDOCRINE: No heat or cold intolerance; No hair loss; No polydipsia or polyuria  MUSCULOSKELETAL: No joint pain or swelling; No muscle, back, or extremity pain  PSYCHIATRIC: No depression, anxiety, mood swings, or difficulty sleeping  HEME/LYMPH: No easy bruising, or bleeding gums  ALLERGY AND IMMUNOLOGIC: No hives or eczema    Vital Signs Last 24 Hrs  T(C): 37.6 (16 Aug 2018 14:27), Max: 37.6 (16 Aug 2018 14:27)  T(F): 99.6 (16 Aug 2018 14:27), Max: 99.6 (16 Aug 2018 14:27)  HR: 90 (16 Aug 2018 14:27) (77 - 90)  BP: 99/48 (16 Aug 2018 14:27) (99/48 - 114/62)  BP(mean): --  RR: 18 (16 Aug 2018 14:27) (18 - 18)  SpO2: 94% (16 Aug 2018 14:27) (94% - 95%)    PHYSICAL EXAM:  GENERAL: NAD, well-groomed, well-developed  HEAD:  Atraumatic, Normocephalic  EYES: EOMI, PERRLA, conjunctiva and sclera clear  ENMT: No tonsillar erythema, exudates, or enlargement; Moist mucous membranes, Good dentition, No lesions  NECK: Supple, No JVD, Normal thyroid  NERVOUS SYSTEM:  Alert & Oriented X3, Good concentration; Motor Strength 5/5 B/L upper and lower extremities; DTRs 2+ intact and symmetric  CHEST/LUNG: Clear to auscultation bilaterally; No rales, rhonchi, wheezing, or rubs  HEART: Regular rate and rhythm; No murmurs, rubs, or gallops  ABDOMEN: Soft, Nontender, Nondistended; Bowel sounds present  EXTREMITIES:  2+ Peripheral Pulses, No clubbing, cyanosis, or edema  LYMPH: No lymphadenopathy noted  SKIN: No rashes or lesions    LABS:                        11.2   11.69 )-----------( 212      ( 16 Aug 2018 08:00 )             34.7     16 Aug 2018 08:00    134    |  94     |  40     ----------------------------<  134    4.4     |  31     |  1.80     Ca    8.9        16 Aug 2018 08:00    TPro  x      /  Alb  3.0    /  TBili  x      /  DBili  x      /  AST  x      /  ALT  x      /  AlkPhos  x      15 Aug 2018 19:12    PT/INR - ( 16 Aug 2018 08:00 )   PT: 28.2 sec;   INR: 2.54 ratio         PTT - ( 16 Aug 2018 08:00 )  PTT:37.7 sec    CAPILLARY BLOOD GLUCOSE      POCT Blood Glucose.: 159 mg/dL (16 Aug 2018 12:36)  POCT Blood Glucose.: 141 mg/dL (16 Aug 2018 08:39)  POCT Blood Glucose.: 175 mg/dL (15 Aug 2018 22:12)  POCT Blood Glucose.: 151 mg/dL (15 Aug 2018 17:43)      RADIOLOGY & ADDITIONAL TESTS:    Notes Reviewed:  [ x] YES  [ ] NO    Care Discussed with Consultants/Other Providers [x ] YES  [ ] NO

## 2018-08-16 NOTE — PROGRESS NOTE ADULT - ASSESSMENT
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 This is a partial weight bearing area. Once IR biopsy complete, will re address the need for prophylactic surgical intervention.     PT/OT      Will discuss with attending and advise if plan changes.

## 2018-08-16 NOTE — PROGRESS NOTE ADULT - SUBJECTIVE AND OBJECTIVE BOX
Patient seen an examined at bedside. Pain is suboptiomally controlled. No acute overnight events. No other complaints at this time    PE:      Vital Signs Last 24 Hrs  T(C): 37.1 (16 Aug 2018 05:04), Max: 37.2 (15 Aug 2018 14:10)  T(F): 98.8 (16 Aug 2018 05:04), Max: 99 (15 Aug 2018 14:10)  HR: 77 (16 Aug 2018 05:04) (77 - 80)  BP: 114/62 (16 Aug 2018 05:04) (90/55 - 114/62)    RR: 18 (16 Aug 2018 05:04) (17 - 18)  SpO2: 95% (16 Aug 2018 05:04) (95% - 95%) Patient seen an examined at bedside. Pain is suboptiomally controlled. No acute overnight events. No other complaints at this time    PE:      Vital Signs Last 24 Hrs  T(C): 37.1 (16 Aug 2018 05:04), Max: 37.2 (15 Aug 2018 14:10)  T(F): 98.8 (16 Aug 2018 05:04), Max: 99 (15 Aug 2018 14:10)  HR: 77 (16 Aug 2018 05:04) (77 - 80)  BP: 114/62 (16 Aug 2018 05:04) (90/55 - 114/62)    RR: 18 (16 Aug 2018 05:04) (17 - 18)  SpO2: 95% (16 Aug 2018 05:04) (95% - 95%)    RLE:    Unable to SLR, Pain with attempt   Pain with Log Roll  Pain with IR/ER R hip  SILT L3-S1  DP1+  TTP over the groin/pubic rami   EHL/FHL/GSC/TA intact  Full ROM Knee  Full passive ROM Hip, elicited pain   No calf tenderness  Decreased sensation to light tough dorsal/volar foot, hx diabetic neuropathy

## 2018-08-16 NOTE — PROGRESS NOTE ADULT - SUBJECTIVE AND OBJECTIVE BOX
88yo diabetic female seen bedside with attending Dr. Killian for left foot wound. Patient has mild pain to her wound and states she does not want any surgical intervention at this time.    ICU Vital Signs Last 24 Hrs  T(C): 37.1 (16 Aug 2018 05:04), Max: 37.2 (15 Aug 2018 14:10)  T(F): 98.8 (16 Aug 2018 05:04), Max: 99 (15 Aug 2018 14:10)  HR: 77 (16 Aug 2018 05:04) (77 - 80)  BP: 114/62 (16 Aug 2018 05:04) (90/55 - 114/62)  BP(mean): --  ABP: --  ABP(mean): --  RR: 18 (16 Aug 2018 05:04) (17 - 18)  SpO2: 95% (16 Aug 2018 05:04) (95% - 95%)                          11.2   11.69 )-----------( 212      ( 16 Aug 2018 08:00 )             34.7     08-16    134<L>  |  94<L>  |  40<H>  ----------------------------<  134<H>  4.4   |  31  |  1.80<H>    Ca    8.9      16 Aug 2018 08:00  Mg     1.9     08-15    TPro  x   /  Alb  3.0<L>  /  TBili  x   /  DBili  x   /  AST  x   /  ALT  x   /  AlkPhos  x   08-15          Objective: left foot focused  Vasc: DP and PT non-palpable; CFT < 3 seconds x5; TG WNL; no edema noted  Derm:  -wound to medial aspect of 1st MPJ measuring approximately 0.5cm x 0.5cm x 0.3cm with probing to bone and mild purulent drainage noted at this time with periwound erythema which is resolving, hyperkeratotic wound borders and no malodor noted  Neuro: epicritic sensation diminished  Ortho: hallux abducto valgus noted

## 2018-08-16 NOTE — PROGRESS NOTE ADULT - SUBJECTIVE AND OBJECTIVE BOX
CAPILLARY BLOOD GLUCOSE      POCT Blood Glucose.: 141 mg/dL (16 Aug 2018 08:39)  POCT Blood Glucose.: 175 mg/dL (15 Aug 2018 22:12)  POCT Blood Glucose.: 151 mg/dL (15 Aug 2018 17:43)  POCT Blood Glucose.: 100 mg/dL (15 Aug 2018 11:59)      Vital Signs Last 24 Hrs  T(C): 37.1 (16 Aug 2018 05:04), Max: 37.2 (15 Aug 2018 14:10)  T(F): 98.8 (16 Aug 2018 05:04), Max: 99 (15 Aug 2018 14:10)  HR: 77 (16 Aug 2018 05:04) (77 - 80)  BP: 114/62 (16 Aug 2018 05:04) (90/55 - 114/62)  BP(mean): --  RR: 18 (16 Aug 2018 05:04) (17 - 18)  SpO2: 95% (16 Aug 2018 05:04) (95% - 95%)    General: WN/WD NAD  Respiratory: CTA B/L  CV: RRR, S1S2, no murmurs, rubs or gallops  Abdominal: Soft, NT, ND +BS, Last BM  Extremities: No edema, + peripheral pulses    Hemoglobin A1C, Whole Blood: 6.7 % (08-15 @ 07:58)   08-16    134<L>  |  94<L>  |  40<H>  ----------------------------<  134<H>  4.4   |  31  |  1.80<H>    Ca    8.9      16 Aug 2018 08:00  Mg     1.9     08-15    TPro  x   /  Alb  3.0<L>  /  TBili  x   /  DBili  x   /  AST  x   /  ALT  x   /  AlkPhos  x   08-15      dextrose 40% Gel 15 Gram(s) Oral once PRN  dextrose 50% Injectable 12.5 Gram(s) IV Push once  dextrose 50% Injectable 25 Gram(s) IV Push once  dextrose 50% Injectable 25 Gram(s) IV Push once  glucagon  Injectable 1 milliGRAM(s) IntraMuscular once PRN  insulin glargine Injectable (LANTUS) 12 Unit(s) SubCutaneous at bedtime  insulin lispro (HumaLOG) corrective regimen sliding scale   SubCutaneous three times a day before meals  insulin lispro (HumaLOG) corrective regimen sliding scale   SubCutaneous at bedtime  insulin lispro Injectable (HumaLOG) 4 Unit(s) SubCutaneous three times a day before meals  simvastatin 10 milliGRAM(s) Oral at bedtime

## 2018-08-16 NOTE — PROGRESS NOTE ADULT - ASSESSMENT
89 y.o woman with multiple comorbidities presented with several weeks h/./o of bony pain and recent inability to ambulate du to pain in the foot. Ct scan revealed destructive bony lesions concerning for metastasis and liver lesions.  Patient was on coumdin for A fib and is on ab for presumable osteo of the left foot      Consult called for recommendation regarding further workup.      lytic lesions   Cr is stable since 2016  Ca is borderline   ordered MM panel and CEA  spoke with IR , tentatively scheuled for Bx of the bony lesion 8/17/18  hold AC , may need vit K / FFP ( INR is 3 today)    Leukocytosis is most likely reactive, monitor     Goals of care d/w pt and pt's family ( 2 sons and daughter) 89 y.o woman with multiple comorbidities presented with several weeks h/./o of bony pain and recent inability to ambulate du to pain in the foot. Ct scan revealed destructive bony lesions concerning for metastasis and liver lesions.  Patient was on coumdin for A fib and is on ab for presumable osteo of the left foot      Consult called for recommendation regarding further workup.      lytic lesions   Cr is stable since 2016  Ca is borderline   ordered MM panel and CEA  spoke with IR , tentatively scheuled for Bx of the bony lesion 8/17/18  hold AC , may need vit K / FFP ( INR is 3 today)    Leukocytosis is most likely reactive, monitor     Goals of care d/w pt and pt's family ( 2 sons and daughter)   remains clinically stable   IR biopsy in am if INR <1.6,

## 2018-08-16 NOTE — PROGRESS NOTE ADULT - SUBJECTIVE AND OBJECTIVE BOX
Interval History:    Chart reviewed and events noted;   Overnight events:    MEDICATIONS  (STANDING):  cholecalciferol 1000 Unit(s) Oral daily  dextrose 5%. 1000 milliLiter(s) (50 mL/Hr) IV Continuous <Continuous>  dextrose 50% Injectable 12.5 Gram(s) IV Push once  dextrose 50% Injectable 25 Gram(s) IV Push once  dextrose 50% Injectable 25 Gram(s) IV Push once  insulin glargine Injectable (LANTUS) 12 Unit(s) SubCutaneous at bedtime  insulin lispro (HumaLOG) corrective regimen sliding scale   SubCutaneous three times a day before meals  insulin lispro (HumaLOG) corrective regimen sliding scale   SubCutaneous at bedtime  insulin lispro Injectable (HumaLOG) 4 Unit(s) SubCutaneous three times a day before meals  lactobacillus acidophilus 1 Tablet(s) Oral two times a day with meals  oxyCODONE  ER Tablet 10 milliGRAM(s) Oral <User Schedule>  pantoprazole    Tablet 40 milliGRAM(s) Oral before breakfast  piperacillin/tazobactam IVPB. 3.375 Gram(s) IV Intermittent every 8 hours  propranolol  milliGRAM(s) Oral daily  simvastatin 10 milliGRAM(s) Oral at bedtime  vancomycin  IVPB 1000 milliGRAM(s) IV Intermittent every 24 hours  vancomycin  IVPB        MEDICATIONS  (PRN):  dextrose 40% Gel 15 Gram(s) Oral once PRN Blood Glucose LESS THAN 70 milliGRAM(s)/deciLiter  glucagon  Injectable 1 milliGRAM(s) IntraMuscular once PRN Glucose <70 milliGRAM(s)/deciLiter  morphine  - Injectable 2 milliGRAM(s) IV Push every 6 hours PRN Severe Pain (7 - 10)      Vital Signs Last 24 Hrs  T(C): 37.3 (16 Aug 2018 21:01), Max: 37.6 (16 Aug 2018 14:27)  T(F): 99.2 (16 Aug 2018 21:01), Max: 99.6 (16 Aug 2018 14:27)  HR: 87 (16 Aug 2018 21:01) (77 - 90)  BP: 118/75 (16 Aug 2018 21:01) (99/48 - 118/75)  BP(mean): --  RR: 19 (16 Aug 2018 21:01) (18 - 20)  SpO2: 90% (16 Aug 2018 21:01) (90% - 95%)    PHYSICAL EXAM  General: adult in NAD  HEENT: clear oropharynx, anicteric sclera, pink conjunctivae  Neck: supple  CV: normal S1S2 with no murmur rubs or gallops  Lungs: clear to auscultation, no wheezes, no rhales  Abdomen: soft non-tender non-distended, no hepato/splenomegaly  Ext: no clubbing cyanosis or edema  Skin: no rashes and no petichiae  Neuro: alert and oriented X3 no focal deficits      LABS:  CBC Full  -  ( 16 Aug 2018 08:00 )  WBC Count : 11.69 K/uL  Hemoglobin : 11.2 g/dL  Hematocrit : 34.7 %  Platelet Count - Automated : 212 K/uL  Mean Cell Volume : 91.6 fl  Mean Cell Hemoglobin : 29.6 pg  Mean Cell Hemoglobin Concentration : 32.3 gm/dL  Auto Neutrophil # : 9.56 K/uL  Auto Lymphocyte # : 1.04 K/uL  Auto Monocyte # : 0.97 K/uL  Auto Eosinophil # : 0.04 K/uL  Auto Basophil # : 0.03 K/uL  Auto Neutrophil % : 81.8 %  Auto Lymphocyte % : 8.9 %  Auto Monocyte % : 8.3 %  Auto Eosinophil % : 0.3 %  Auto Basophil % : 0.3 %    08-16    134<L>  |  94<L>  |  40<H>  ----------------------------<  134<H>  4.4   |  31  |  1.80<H>    Ca    8.9      16 Aug 2018 08:00  Mg     1.9     08-15    TPro  x   /  Alb  3.0<L>  /  TBili  x   /  DBili  x   /  AST  x   /  ALT  x   /  AlkPhos  x   08-15    PT/INR - ( 16 Aug 2018 08:00 )   PT: 28.2 sec;   INR: 2.54 ratio         PTT - ( 16 Aug 2018 08:00 )  PTT:37.7 sec    fe studies  Ferritin, Serum: 281 ng/mL (08-15 @ 19:09)      WBC trend  11.69 K/uL (08-16-18 @ 08:00)  11.63 K/uL (08-15-18 @ 09:19)  13.25 K/uL (08-14-18 @ 16:03)      Hgb trend  11.2 g/dL (08-16-18 @ 08:00)  11.6 g/dL (08-15-18 @ 09:19)  12.3 g/dL (08-14-18 @ 16:03)      plt trend  212 K/uL (08-16-18 @ 08:00)  177 K/uL (08-15-18 @ 09:19)  208 K/uL (08-14-18 @ 16:03)        RADIOLOGY & ADDITIONAL STUDIES: Interval History:  still with pelvic discomfort    Chart reviewed and events noted;   Overnight events:    MEDICATIONS  (STANDING):  cholecalciferol 1000 Unit(s) Oral daily  dextrose 5%. 1000 milliLiter(s) (50 mL/Hr) IV Continuous <Continuous>  dextrose 50% Injectable 12.5 Gram(s) IV Push once  dextrose 50% Injectable 25 Gram(s) IV Push once  dextrose 50% Injectable 25 Gram(s) IV Push once  insulin glargine Injectable (LANTUS) 12 Unit(s) SubCutaneous at bedtime  insulin lispro (HumaLOG) corrective regimen sliding scale   SubCutaneous three times a day before meals  insulin lispro (HumaLOG) corrective regimen sliding scale   SubCutaneous at bedtime  insulin lispro Injectable (HumaLOG) 4 Unit(s) SubCutaneous three times a day before meals  lactobacillus acidophilus 1 Tablet(s) Oral two times a day with meals  oxyCODONE  ER Tablet 10 milliGRAM(s) Oral <User Schedule>  pantoprazole    Tablet 40 milliGRAM(s) Oral before breakfast  piperacillin/tazobactam IVPB. 3.375 Gram(s) IV Intermittent every 8 hours  propranolol  milliGRAM(s) Oral daily  simvastatin 10 milliGRAM(s) Oral at bedtime  vancomycin  IVPB 1000 milliGRAM(s) IV Intermittent every 24 hours  vancomycin  IVPB        MEDICATIONS  (PRN):  dextrose 40% Gel 15 Gram(s) Oral once PRN Blood Glucose LESS THAN 70 milliGRAM(s)/deciLiter  glucagon  Injectable 1 milliGRAM(s) IntraMuscular once PRN Glucose <70 milliGRAM(s)/deciLiter  morphine  - Injectable 2 milliGRAM(s) IV Push every 6 hours PRN Severe Pain (7 - 10)      Vital Signs Last 24 Hrs  T(C): 37.3 (16 Aug 2018 21:01), Max: 37.6 (16 Aug 2018 14:27)  T(F): 99.2 (16 Aug 2018 21:01), Max: 99.6 (16 Aug 2018 14:27)  HR: 87 (16 Aug 2018 21:01) (77 - 90)  BP: 118/75 (16 Aug 2018 21:01) (99/48 - 118/75)  BP(mean): --  RR: 19 (16 Aug 2018 21:01) (18 - 20)  SpO2: 90% (16 Aug 2018 21:01) (90% - 95%)    PHYSICAL EXAM  General: adult in NAD  HEENT: clear oropharynx, anicteric sclera, pink conjunctivae  Neck: supple  CV: normal S1S2 with no murmur rubs or gallops  Lungs: clear to auscultation, no wheezes, no rhales  Abdomen: soft non-tender non-distended, no hepato/splenomegaly  Ext: no clubbing cyanosis or edema  Skin: no rashes and no petichiae  Neuro: alert and oriented X3 no focal deficits      LABS:  CBC Full  -  ( 16 Aug 2018 08:00 )  WBC Count : 11.69 K/uL  Hemoglobin : 11.2 g/dL  Hematocrit : 34.7 %  Platelet Count - Automated : 212 K/uL  Mean Cell Volume : 91.6 fl  Mean Cell Hemoglobin : 29.6 pg  Mean Cell Hemoglobin Concentration : 32.3 gm/dL  Auto Neutrophil # : 9.56 K/uL  Auto Lymphocyte # : 1.04 K/uL  Auto Monocyte # : 0.97 K/uL  Auto Eosinophil # : 0.04 K/uL  Auto Basophil # : 0.03 K/uL  Auto Neutrophil % : 81.8 %  Auto Lymphocyte % : 8.9 %  Auto Monocyte % : 8.3 %  Auto Eosinophil % : 0.3 %  Auto Basophil % : 0.3 %    08-16    134<L>  |  94<L>  |  40<H>  ----------------------------<  134<H>  4.4   |  31  |  1.80<H>    Ca    8.9      16 Aug 2018 08:00  Mg     1.9     08-15    TPro  x   /  Alb  3.0<L>  /  TBili  x   /  DBili  x   /  AST  x   /  ALT  x   /  AlkPhos  x   08-15    PT/INR - ( 16 Aug 2018 08:00 )   PT: 28.2 sec;   INR: 2.54 ratio         PTT - ( 16 Aug 2018 08:00 )  PTT:37.7 sec    fe studies  Ferritin, Serum: 281 ng/mL (08-15 @ 19:09)      WBC trend  11.69 K/uL (08-16-18 @ 08:00)  11.63 K/uL (08-15-18 @ 09:19)  13.25 K/uL (08-14-18 @ 16:03)      Hgb trend  11.2 g/dL (08-16-18 @ 08:00)  11.6 g/dL (08-15-18 @ 09:19)  12.3 g/dL (08-14-18 @ 16:03)      plt trend  212 K/uL (08-16-18 @ 08:00)  177 K/uL (08-15-18 @ 09:19)  208 K/uL (08-14-18 @ 16:03)        RADIOLOGY & ADDITIONAL STUDIES:

## 2018-08-17 DIAGNOSIS — E11.65 TYPE 2 DIABETES MELLITUS WITH HYPERGLYCEMIA: ICD-10-CM

## 2018-08-17 DIAGNOSIS — R78.81 BACTEREMIA: ICD-10-CM

## 2018-08-17 LAB
-  AMPICILLIN/SULBACTAM: SIGNIFICANT CHANGE UP
-  CEFAZOLIN: SIGNIFICANT CHANGE UP
-  CLINDAMYCIN: SIGNIFICANT CHANGE UP
-  ERYTHROMYCIN: SIGNIFICANT CHANGE UP
-  GENTAMICIN: SIGNIFICANT CHANGE UP
-  OXACILLIN: SIGNIFICANT CHANGE UP
-  PENICILLIN: SIGNIFICANT CHANGE UP
-  RIFAMPIN: SIGNIFICANT CHANGE UP
-  TETRACYCLINE: SIGNIFICANT CHANGE UP
-  TRIMETHOPRIM/SULFAMETHOXAZOLE: SIGNIFICANT CHANGE UP
-  VANCOMYCIN: SIGNIFICANT CHANGE UP
APTT BLD: 32.5 SEC — SIGNIFICANT CHANGE UP (ref 27.5–37.4)
BASOPHILS # BLD AUTO: 0.01 K/UL — SIGNIFICANT CHANGE UP (ref 0–0.2)
BASOPHILS NFR BLD AUTO: 0.1 % — SIGNIFICANT CHANGE UP (ref 0–2)
CULTURE RESULTS: SIGNIFICANT CHANGE UP
EOSINOPHIL # BLD AUTO: 0.06 K/UL — SIGNIFICANT CHANGE UP (ref 0–0.5)
EOSINOPHIL NFR BLD AUTO: 0.6 % — SIGNIFICANT CHANGE UP (ref 0–6)
HCT VFR BLD CALC: 33 % — LOW (ref 34.5–45)
HGB BLD-MCNC: 10.6 G/DL — LOW (ref 11.5–15.5)
IMM GRANULOCYTES NFR BLD AUTO: 0.4 % — SIGNIFICANT CHANGE UP (ref 0–1.5)
INR BLD: 1.63 RATIO — HIGH (ref 0.88–1.16)
LACTATE SERPL-SCNC: 1.8 MMOL/L — SIGNIFICANT CHANGE UP (ref 0.7–2)
LYMPHOCYTES # BLD AUTO: 0.77 K/UL — LOW (ref 1–3.3)
LYMPHOCYTES # BLD AUTO: 7.6 % — LOW (ref 13–44)
MCHC RBC-ENTMCNC: 29.2 PG — SIGNIFICANT CHANGE UP (ref 27–34)
MCHC RBC-ENTMCNC: 32.1 GM/DL — SIGNIFICANT CHANGE UP (ref 32–36)
MCV RBC AUTO: 90.9 FL — SIGNIFICANT CHANGE UP (ref 80–100)
METHOD TYPE: SIGNIFICANT CHANGE UP
MONOCYTES # BLD AUTO: 0.93 K/UL — HIGH (ref 0–0.9)
MONOCYTES NFR BLD AUTO: 9.2 % — SIGNIFICANT CHANGE UP (ref 2–14)
NEUTROPHILS # BLD AUTO: 8.32 K/UL — HIGH (ref 1.8–7.4)
NEUTROPHILS NFR BLD AUTO: 82.1 % — HIGH (ref 43–77)
ORGANISM # SPEC MICROSCOPIC CNT: SIGNIFICANT CHANGE UP
PLATELET # BLD AUTO: 204 K/UL — SIGNIFICANT CHANGE UP (ref 150–400)
PROTHROM AB SERPL-ACNC: 17.9 SEC — HIGH (ref 9.8–12.7)
RBC # BLD: 3.63 M/UL — LOW (ref 3.8–5.2)
RBC # FLD: 16.3 % — HIGH (ref 10.3–14.5)
SPECIMEN SOURCE: SIGNIFICANT CHANGE UP
WBC # BLD: 10.13 K/UL — SIGNIFICANT CHANGE UP (ref 3.8–10.5)
WBC # FLD AUTO: 10.13 K/UL — SIGNIFICANT CHANGE UP (ref 3.8–10.5)

## 2018-08-17 PROCEDURE — 93306 TTE W/DOPPLER COMPLETE: CPT | Mod: 26

## 2018-08-17 PROCEDURE — 71045 X-RAY EXAM CHEST 1 VIEW: CPT | Mod: 26

## 2018-08-17 PROCEDURE — 99232 SBSQ HOSP IP/OBS MODERATE 35: CPT

## 2018-08-17 RX ORDER — CEFAZOLIN SODIUM 1 G
2000 VIAL (EA) INJECTION EVERY 12 HOURS
Qty: 0 | Refills: 0 | Status: DISCONTINUED | OUTPATIENT
Start: 2018-08-17 | End: 2018-08-21

## 2018-08-17 RX ORDER — ACETAMINOPHEN 500 MG
650 TABLET ORAL ONCE
Qty: 0 | Refills: 0 | Status: COMPLETED | OUTPATIENT
Start: 2018-08-17 | End: 2018-08-17

## 2018-08-17 RX ORDER — ENOXAPARIN SODIUM 100 MG/ML
30 INJECTION SUBCUTANEOUS DAILY
Qty: 0 | Refills: 0 | Status: DISCONTINUED | OUTPATIENT
Start: 2018-08-17 | End: 2018-08-19

## 2018-08-17 RX ADMIN — Medication 20 MILLIGRAM(S): at 06:06

## 2018-08-17 RX ADMIN — PANTOPRAZOLE SODIUM 40 MILLIGRAM(S): 20 TABLET, DELAYED RELEASE ORAL at 06:08

## 2018-08-17 RX ADMIN — Medication 100 MILLIGRAM(S): at 17:37

## 2018-08-17 RX ADMIN — PIPERACILLIN AND TAZOBACTAM 25 GRAM(S): 4; .5 INJECTION, POWDER, LYOPHILIZED, FOR SOLUTION INTRAVENOUS at 13:19

## 2018-08-17 RX ADMIN — MORPHINE SULFATE 2 MILLIGRAM(S): 50 CAPSULE, EXTENDED RELEASE ORAL at 05:39

## 2018-08-17 RX ADMIN — Medication 1: at 08:19

## 2018-08-17 RX ADMIN — MORPHINE SULFATE 2 MILLIGRAM(S): 50 CAPSULE, EXTENDED RELEASE ORAL at 05:50

## 2018-08-17 RX ADMIN — Medication 2: at 11:59

## 2018-08-17 RX ADMIN — SIMVASTATIN 10 MILLIGRAM(S): 20 TABLET, FILM COATED ORAL at 22:18

## 2018-08-17 RX ADMIN — Medication 4 UNIT(S): at 11:59

## 2018-08-17 RX ADMIN — PIPERACILLIN AND TAZOBACTAM 25 GRAM(S): 4; .5 INJECTION, POWDER, LYOPHILIZED, FOR SOLUTION INTRAVENOUS at 06:06

## 2018-08-17 RX ADMIN — Medication 1000 UNIT(S): at 11:22

## 2018-08-17 RX ADMIN — Medication 2: at 17:34

## 2018-08-17 RX ADMIN — Medication 650 MILLIGRAM(S): at 20:25

## 2018-08-17 RX ADMIN — OXYCODONE HYDROCHLORIDE 10 MILLIGRAM(S): 5 TABLET ORAL at 22:18

## 2018-08-17 RX ADMIN — Medication 1 TABLET(S): at 08:20

## 2018-08-17 RX ADMIN — OXYCODONE HYDROCHLORIDE 10 MILLIGRAM(S): 5 TABLET ORAL at 23:18

## 2018-08-17 RX ADMIN — INSULIN GLARGINE 12 UNIT(S): 100 INJECTION, SOLUTION SUBCUTANEOUS at 22:23

## 2018-08-17 RX ADMIN — Medication 4 UNIT(S): at 17:34

## 2018-08-17 RX ADMIN — Medication 4 UNIT(S): at 08:19

## 2018-08-17 RX ADMIN — Medication 1 TABLET(S): at 17:38

## 2018-08-17 NOTE — PROGRESS NOTE ADULT - PROBLEM SELECTOR PLAN 1
cont lantus 12 units qhs  cont humalog 4 units tid before meals  cont humalog mod dose scale coverage  goal bg 100-180 in hosp setting

## 2018-08-17 NOTE — PROGRESS NOTE ADULT - ASSESSMENT
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 This is a partial weight bearing area. Once IR biopsy complete, will re address the need for prophylactic surgical intervention.     PT/OT      Will discuss with attending and advise if plan changes.

## 2018-08-17 NOTE — PROGRESS NOTE ADULT - ASSESSMENT
89y old  Female who presents with a chief complaint of diabetic foot ulcer/ambulatory disfunction     - Patient offers no complaints of chest pain, sob  - No evidence of acute ischemia  - No evidence of volume overload  - EKG compared with previous Rate 78 BPM, P-R Interval 250 ms QRS Duration 94 ms    Q-T Interval 374 ms NSR 1st degree AV block  - BP on low side, nitro patch was stopped and patient was inquiring about restarting. Risks vs benefits explained.   - monitor and replete lytes, keep K>4, Mg>2  - INR 1.63 today patient is scheduled to go for IR guided biopsy on Mon 8/20. Lovenox ppx over weekend in anticipation of procedure  - MM panel and CEA results pending   - Bone scan and CT abdomen revealing of possible metastasis in right pelvis and hepatic parenchyma.  - Cultutres positive for MSSA, patient is on Vancomycin  - Seen by podiatry recommend vascular consult and foot MRI   - Other cardiovascular workup will depend on clinical course.  - All other workup per primary team  - Will follow 89f htn, dm, chol, af pvd, adm with hip pain with ?metastatic disease    - No evidence of acute ischemia  - No evidence of volume overload  - EKG compared with previous Rate 78 BPM, P-R Interval 250 ms QRS Duration 94 ms    Q-T Interval 374 ms NSR 1st degree AV block  - BP on low side, nitro patch was stopped and patient was inquiring about restarting. Risks vs benefits explained.   - monitor and replete lytes, keep K>4, Mg>2  - INR 1.63 today patient is scheduled to go for IR guided biopsy on Mon 8/20.    - Would cont with prophylaxis dose of lovenox.   - MM panel and CEA results pending   - Bone scan and CT abdomen revealing of possible metastasis in right pelvis and hepatic parenchyma.  - Cultutres positive for MSSA, patient is on Vancomycin  - Seen by podiatry recommend vascular consult and foot MRI   - Other cardiovascular workup will depend on clinical course.  - All other workup per primary team  - Will follow

## 2018-08-17 NOTE — PROGRESS NOTE ADULT - ASSESSMENT
89 y.o woman with CKD. Ct scan with destructive bony lesions concerning for metastasis and liver lesions. The primary source of malignancy is unclear, she is to have biopsy of the hip lesion.   Staph aureus bacteremia likely from left hallux abscess, on antibiotics by I.D. Vanco discontinued.  Renal function at baseline. No clinical CHF.  To monitor renal indices, avoid hypovolemia and nephrotoxins.

## 2018-08-17 NOTE — PROGRESS NOTE ADULT - ASSESSMENT
89 y.o woman with multiple comorbidities presented with several weeks h/./o of bony pain and recent inability to ambulate du to pain in the foot. Ct scan revealed destructive bony lesions concerning for metastasis and liver lesions.  Patient was on coumdin for A fib and is on antibiotics  for presumed osteomyelitis  of the left foot    Consult called for recommendation regarding further workup for lytic lesion.     lytic lesions   Cr is stable since 2016  Ca is borderline   ordered MM panel and CEA  spoke with IR again this AM, was tentatively scheduled for Bx of the bony lesion 8/17/18 AM but INR suboptimal and pt had breakfast. Will arrange for Mon AM.   Hold AC. Consider Lovenox prophylaxis over weekend  NPO after midnight Sun.     Leukocytosis is most likely reactive, monitor     Goals of care d/w pt and pt's family ( 2 sons and daughter)   remains clinically stable   IR biopsy Mon ~10Am if INR <1.5

## 2018-08-17 NOTE — PROGRESS NOTE ADULT - SUBJECTIVE AND OBJECTIVE BOX
CAPILLARY BLOOD GLUCOSE      POCT Blood Glucose.: 152 mg/dL (17 Aug 2018 07:43)  POCT Blood Glucose.: 184 mg/dL (16 Aug 2018 21:13)  POCT Blood Glucose.: 205 mg/dL (16 Aug 2018 17:32)  POCT Blood Glucose.: 159 mg/dL (16 Aug 2018 12:36)  POCT Blood Glucose.: 141 mg/dL (16 Aug 2018 08:39)      Vital Signs Last 24 Hrs  T(C): 36.8 (17 Aug 2018 04:46), Max: 37.6 (16 Aug 2018 14:27)  T(F): 98.3 (17 Aug 2018 04:46), Max: 99.6 (16 Aug 2018 14:27)  HR: 95 (17 Aug 2018 04:46) (87 - 95)  BP: 101/66 (17 Aug 2018 04:46) (99/48 - 118/75)  BP(mean): --  RR: 18 (17 Aug 2018 04:46) (18 - 20)  SpO2: 95% (17 Aug 2018 04:46) (90% - 95%)    General: WN/WD NAD  Respiratory: CTA B/L  CV: RRR, S1S2, no murmurs, rubs or gallops  Abdominal: Soft, NT, ND +BS, Last BM  Extremities: No edema, + peripheral pulses    Hemoglobin A1C, Whole Blood: 6.7 % (08-15 @ 07:58)   08-16    134<L>  |  94<L>  |  40<H>  ----------------------------<  134<H>  4.4   |  31  |  1.80<H>    Ca    8.9      16 Aug 2018 08:00  Mg     1.9     08-15    TPro  x   /  Alb  3.0<L>  /  TBili  x   /  DBili  x   /  AST  x   /  ALT  x   /  AlkPhos  x   08-15      dextrose 40% Gel 15 Gram(s) Oral once PRN  dextrose 50% Injectable 12.5 Gram(s) IV Push once  dextrose 50% Injectable 25 Gram(s) IV Push once  dextrose 50% Injectable 25 Gram(s) IV Push once  glucagon  Injectable 1 milliGRAM(s) IntraMuscular once PRN  insulin glargine Injectable (LANTUS) 12 Unit(s) SubCutaneous at bedtime  insulin lispro (HumaLOG) corrective regimen sliding scale   SubCutaneous three times a day before meals  insulin lispro (HumaLOG) corrective regimen sliding scale   SubCutaneous at bedtime  insulin lispro Injectable (HumaLOG) 4 Unit(s) SubCutaneous three times a day before meals  simvastatin 10 milliGRAM(s) Oral at bedtime

## 2018-08-17 NOTE — PROGRESS NOTE ADULT - SUBJECTIVE AND OBJECTIVE BOX
[INTERVAL HX: ]  Patient seen and examined;  Chart reviewed and events noted;   anxsious. Son at bedside.   No CP, no SOB at rest, +STILL.   Ate bfast. INR 1.63 this AM. Off coumadin    MEDICATIONS  (STANDING):  cholecalciferol 1000 Unit(s) Oral daily  dextrose 5%. 1000 milliLiter(s) (50 mL/Hr) IV Continuous <Continuous>  dextrose 50% Injectable 12.5 Gram(s) IV Push once  dextrose 50% Injectable 25 Gram(s) IV Push once  dextrose 50% Injectable 25 Gram(s) IV Push once  furosemide    Tablet 20 milliGRAM(s) Oral daily  insulin glargine Injectable (LANTUS) 12 Unit(s) SubCutaneous at bedtime  insulin lispro (HumaLOG) corrective regimen sliding scale   SubCutaneous three times a day before meals  insulin lispro (HumaLOG) corrective regimen sliding scale   SubCutaneous at bedtime  insulin lispro Injectable (HumaLOG) 4 Unit(s) SubCutaneous three times a day before meals  lactobacillus acidophilus 1 Tablet(s) Oral two times a day with meals  oxyCODONE  ER Tablet 10 milliGRAM(s) Oral <User Schedule>  pantoprazole    Tablet 40 milliGRAM(s) Oral before breakfast  piperacillin/tazobactam IVPB. 3.375 Gram(s) IV Intermittent every 8 hours  propranolol  milliGRAM(s) Oral daily  simvastatin 10 milliGRAM(s) Oral at bedtime  vancomycin  IVPB 1000 milliGRAM(s) IV Intermittent every 24 hours  vancomycin  IVPB        MEDICATIONS  (PRN):  dextrose 40% Gel 15 Gram(s) Oral once PRN Blood Glucose LESS THAN 70 milliGRAM(s)/deciLiter  glucagon  Injectable 1 milliGRAM(s) IntraMuscular once PRN Glucose <70 milliGRAM(s)/deciLiter  morphine  - Injectable 2 milliGRAM(s) IV Push every 6 hours PRN Severe Pain (7 - 10)      Vital Signs Last 24 Hrs  T(C): 36.8 (17 Aug 2018 04:46), Max: 37.6 (16 Aug 2018 14:27)  T(F): 98.3 (17 Aug 2018 04:46), Max: 99.6 (16 Aug 2018 14:27)  HR: 95 (17 Aug 2018 04:46) (87 - 95)  BP: 101/66 (17 Aug 2018 04:46) (99/48 - 118/75)  BP(mean): --  RR: 18 (17 Aug 2018 04:46) (18 - 20)  SpO2: 95% (17 Aug 2018 04:46) (90% - 95%)    [PHYSICAL EXAM]  General: adult in NAD,  WN,  WD, mild anxious  HEENT: clear oropharynx, anicteric sclera, pink conjunctivae.  Neck: supple, no masses.  CV: normal S1S2, no murmur, no rubs, no gallops.  Lungs: clear to auscultation, no wheezes, no rales, no rhonchi.  Abdomen: soft, non-tender, non-distended, no hepatosplenomegaly, normal BS, no guarding.  Ext: no clubbing, no cyanosis, no edema.  Skin: no rashes,  no petechiae, no venous stasis changes.  Neuro: alert and oriented X3, no focal motor deficits.  LN: no LUCIANA.      [LABS:]                        11.2   11.69 )-----------( 212      ( 16 Aug 2018 08:00 )             34.7     08-16    134<L>  |  94<L>  |  40<H>  ----------------------------<  134<H>  4.4   |  31  |  1.80<H>    Ca    8.9      16 Aug 2018 08:00    TPro  x   /  Alb  3.0<L>  /  TBili  x   /  DBili  x   /  AST  x   /  ALT  x   /  AlkPhos  x   08-15    PT/INR - ( 17 Aug 2018 11:04 )   PT: 17.9 sec;   INR: 1.63 ratio    PTT - ( 17 Aug 2018 11:04 )  PTT:32.5 sec      Immunoelectrophoresis, Serum (08.15.18 @ 19:12)    Total Protein, Serum: 7.4 g/dL    Albumin, Serum: 3.0 g/dL    Albumin/Globulin Ratio: 0.7 Ratio    Alpha 1: 0.9 g/dL    Alpha 2: 1.4 g/dL    Beta Globulin: 0.9 g/dL    Gamma Globulin: 1.2 g/dL    Serum Protein Electrophoresis Interp: Normal Electrophoresis Pattern    % Albumin: 40.6 %    % Alpha 1: 11.9 %    % Alpha 2: 19.3 %    % Beta: 12.0 %    % Gamma: 16.2 %    Immunofixation, Serum: No Monoclonal Band Identified    Quantitative Ig    Quantitative IgA: 242    Quantitative IgM: 129    DOMINGA Kappa: 7.18 mg/dL    DOMINGA Lambda: 6.93 mg/dL    [place]/Lambda Free Light Chain Ratio, Serum: 1.04 Ratio      Protein Electrophoresis, Serum (14 @ 20:22)    Protein Total, Serum: 6.2 g/dL    Total Protein, Serum: 6.2 g/dL    Albumin, Serum: 2.8 g/dL    Alpha 1: 0.3 g/dL    Alpha 2: 1.2 g/dL    Beta Globulin: 0.9 g/dL    Gamma Globulin: 1.0 g/dL    % Albumin: 44.6 %    % Alpha 1: 4.8 %    % Alpha 2: 19.4 %    % Beta: 15.3 %    % Gamma: 16.0 %    Albumin/Globulin Ratio: 0.8 Ratio    Serum Protein Electrophoresis Interp: Normal Electrophoresis Pattern          [RADIOLOGY STUDIES:]

## 2018-08-17 NOTE — PROGRESS NOTE ADULT - SUBJECTIVE AND OBJECTIVE BOX
ID progress note     Name: DWAYNE DONAHUE  Age: 89y  Gender: Female  MRN: [id number]    Interval History-- Events noted, afebrile, still with left hallux drainage , No bone biopsy done today as INR still high. Now scheduled for bone biopsy and left hallux resection on Monday   Notes reviewed    Past Medical History--  OAB (overactive bladder)  GERD (gastroesophageal reflux disease)  Angina effort  Heart failure  Upper respiratory infection  Diabetes  Renal stones  Myocardial infarction  Spinal stenosis  CVA (cerebral infarction)  Afib  Raynaud disease  Acid reflux  Hypertension  Hyperlipidemia  Asthma  Cataract  S/P hysterectomy      For details regarding the patient's social history, family history, and other miscellaneous elements, please refer the initial infectious diseases consultation and/or the admitting history and physical examination for this admission.    Allergies--  Allergies    Levaquin (Other)  ramipril (Other)  tetracycline (Hives; Rash)    Intolerances        Medications--  Antibiotics:  piperacillin/tazobactam IVPB. 3.375 Gram(s) IV Intermittent every 8 hours  vancomycin  IVPB 1000 milliGRAM(s) IV Intermittent every 24 hours  vancomycin  IVPB        Immunologic:    Other:  cholecalciferol  dextrose 40% Gel PRN  dextrose 5%.  dextrose 50% Injectable  dextrose 50% Injectable  dextrose 50% Injectable  enoxaparin Injectable  furosemide    Tablet  glucagon  Injectable PRN  insulin glargine Injectable (LANTUS)  insulin lispro (HumaLOG) corrective regimen sliding scale  insulin lispro (HumaLOG) corrective regimen sliding scale  insulin lispro Injectable (HumaLOG)  lactobacillus acidophilus  morphine  - Injectable PRN  oxyCODONE  ER Tablet  pantoprazole    Tablet  propranolol LA  simvastatin      Review of Systems--  A 10-point review of systems was obtained.     Pertinent positives and negatives--  Constitutional: No fevers. No Chills. No Rigors.   Cardiovascular: No chest pain. No palpitations.  Respiratory: No shortness of breath. No cough.  Gastrointestinal: No nausea or vomiting. No diarrhea or constipation.   Psychiatric: Pleasant. Appropriate affect.    Review of systems otherwise negative except as previously noted.    Physical Examination--  Vital Signs: T(F): 98 (08-17-18 @ 14:01), Max: 99.2 (08-16-18 @ 21:01)  HR: 84 (08-17-18 @ 14:01)  BP: 103/61 (08-17-18 @ 14:01)  RR: 17 (08-17-18 @ 14:01)  SpO2: 96% (08-17-18 @ 14:01)  Wt(kg): --  General: Nontoxic-appearing Female in no acute distress.  HEENT: AT/NC. PERRL. EOMI. Anicteric. Conjunctiva pink and moist. Oropharynx clear. Dentition fair.  Neck: Not rigid. No sense of mass.  Nodes: None palpable.  Lungs: Clear bilaterally without rales, wheezing or rhonchi  Heart: Regular rate and rhythm. No Murmur. No rub. No gallop. No palpable thrill.  Abdomen: Bowel sounds present and normoactive. Soft. Nondistended. Nontender. No sense of mass. No organomegaly.  Back: No spinal tenderness. No costovertebral angle tenderness.   Extremities: No cyanosis or clubbing. No edema. left foot- wound to medial aspect of 1st MPJ measuring approximately 0.5cm x 0.5cm x 0.3cm with probing to bone and mild purulent drainage noted at this time with periwound erythema, hyperkeratotic wound borders and no malodor noted. Vasc: DP and PT non-palpable; CFT < 3 seconds x5; TG WNL; no edema noted  Skin: Warm. Dry. Good turgor. No rash. No vasculitic stigmata.  Psychiatric: Appropriate affect and mood for situation.       Laboratory Studies--  CBC                        11.2   11.69 )-----------( 212      ( 16 Aug 2018 08:00 )             34.7       Chemistries  08-16    134<L>  |  94<L>  |  40<H>  ----------------------------<  134<H>  4.4   |  31  |  1.80<H>    Ca    8.9      16 Aug 2018 08:00    TPro  x   /  Alb  3.0<L>  /  TBili  x   /  DBili  x   /  AST  x   /  ALT  x   /  AlkPhos  x   08-15    CAPILLARY BLOOD GLUCOSE      POCT Blood Glucose.: 234 mg/dL (17 Aug 2018 11:39)  POCT Blood Glucose.: 152 mg/dL (17 Aug 2018 07:43)  POCT Blood Glucose.: 184 mg/dL (16 Aug 2018 21:13)  POCT Blood Glucose.: 205 mg/dL (16 Aug 2018 17:32)      RECENT CULTURES    Culture - Blood (collected 16 Aug 2018 08:42)  Source: .Blood Blood-Peripheral  Preliminary Report (17 Aug 2018 09:01):    No growth to date.    Culture - Blood (collected 16 Aug 2018 08:42)  Source: .Blood Blood-Venous  Preliminary Report (17 Aug 2018 09:01):    No growth to date.    Culture - Other (08.15.18 @ 00:41)    -  Gentamicin: S 2 Should not be used as monotherapy    -  Oxacillin: S <=0.25    -  Penicillin: R >8    -  Trimethoprim/Sulfamethoxazole: S <=0.5/9.5    -  Vancomycin: S 2    -  RIF- Rifampin: S <=1 Should not be used as monotherapy    -  Tetra/Doxy: S <=1    -  Ampicillin/Sulbactam: S <=8/4    -  Cefazolin: S <=4    -  Clindamycin: R >4    -  Erythromycin: R >4    Specimen Source: .Other left toe    Culture Results:   Moderate Staphylococcus aureus    Organism Identification: Staphylococcus aureus    Organism: Staphylococcus aureus    Method Type: BUCK      Culture - Blood (collected 14 Aug 2018 21:28)  Source: .Blood Blood  Gram Stain (16 Aug 2018 05:57):    Growth in anaerobic bottle: Gram Positive Cocci in Clusters  Preliminary Report (17 Aug 2018 10:32):    Growth in anaerobic bottle: Staphylococcus aureus    See previous culture [id number]    Culture - Blood (08.14.18 @ 21:27)    -  Staphylococcus aureus: Detec Any isolate of Staphylococcus aureus from a blood culture is NOT considered a contaminant.    Gram Stain:   Growth in aerobic bottle: Gram Positive Cocci in Clusters  Growth in anaerobic bottle: Gram Positive Cocci in Clusters    Specimen Source: .Blood Blood    Organism: Blood Culture PCR    Culture Results:   Growth in aerobic bottle: Staphylococcus aureus  "Due to technical problems, Proteus sp. will Not be reported as part of  the BCID panel until further notice"  ***Blood Panel PCR results on this specimen are available  approximately 3 hours after the Gram stain result.***  Gram stain, PCR, and/or culture results may not always  correspond due to difference in methodologies.  ************************************************************  This PCR assay was performed using [employer].  The following targets are tested for: Enterococcus,  vancomycin resistant enterococci, Listeria monocytogenes,  coagulase negative staphylococci, S. aureus,  methicillin resistant S. aureus, Streptococcus agalactiae  (Group B), S. pneumoniae, S. pyogenes (Group A),  Acinetobacter baumannii, Enterobacter cloacae, E. coli,  Klebsiella oxytoca, K. pneumoniae, Proteus sp.,  Serratia marcescens, Haemophilus influenzae,  Neisseria meningitidis, Pseudomonas aeruginosa, Candida  albicans, C. glabrata, C krusei, C parapsilosis,  C. tropicalis and the KPC resistance gene.  Growth in anaerobic bottle: Gram Positive Cocci in Clusters    Organism Identification: Blood Culture PCR    Method Type: PCR          RADIOLOGY:  MR Hip No Cont, Right (08.15.18 @ 19:21) >  Findings:  Impression:    Mildly expansile metastatic lesion within the anterior wall of the right   acetabulum with extension to the right superior pubic ramus. Additional   metastatic lesions are noted within the right ischium and midportion of   the right inferior pubic ramus. There is prominent edema within the right   inferior pubic ramus likely related to a superimposed pathologic stress   fracture. Prominent edema throughout the right adductor muscles about the   right inferior pubic ramus is may be related to underlying reactive   edema, muscle strain, and/or extension of metastatic disease.    Nonspecific small lesions within the left femoral head and left femoral   neck which may be related to additional metastatic foci.     Xray Foot AP + Lateral + Oblique, Left (08.14.18 @ 16:07) >  IMPRESSION: Soft tissue swelling around the first metatarsal head along   with a skin defect. Underlying focal osteopenia of the left first   metatarsal head for which osteomyelitis cannot be excluded. Recommend   further evaluation with MRI examination or bone scan.    Assessment :     89 y.o woman with multiple comorbidities presented with several weeks h/o of bony pain and recent inability to ambulate du to pain in the foot. Ct scan revealed destructive bony lesions concerning for metastasis and liver lesions. 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 Noted to have staph aurues bacteremia likely from left hallux abscess    The primary source of malignancy is unclear, she is to have biopsy of the hip lesion on Friday provided INR is below 1.5 . Anticoagulation is stopped . 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  Appropriate use of antibiotics and adverse effects reviewed.    I have discussed the above plan of care with patient and her daughter present at bedside in detail. They expressed understanding of the treatment plan . Risks, benefits and alternatives discussed in detail. I have asked if they have any questions or concerns and appropriately addressed them to the best of my ability .      > 35 minutes spent in direct patient care reviewing  the notes, lab data/ imaging , discussion with multidisciplinary team. All questions were addressed and answered to the best of my capacity .    Thank you for allowing me to participate in the care of your patient .        William Armendariz MD  [phone]

## 2018-08-17 NOTE — PROGRESS NOTE ADULT - SUBJECTIVE AND OBJECTIVE BOX
Chief Complaint: Patient is a 89y old  Female who presents with a chief complaint of diabetic foot ulcer/ambulatory disfunction     Interval Events: Patient anxious because of her current condition. IR guided biopsy was scheduled for today but past postponed due to INR and patient eating past midnight. Pain has improved since yesterday. No acute events overnight Denies chest pain , palpitations, nausea, vomiting.     Review of Systems:  General: No fevers, chills, weight loss or gain  Skin: No rashes, color changes  Cardiovascular: Admits chronic SOB, no chest pain, orthopnea  Respiratory: No shortness of breath, cough  Gastrointestinal: No nausea, abdominal pain  Genitourinary: No incontinence, pain with urination  Musculoskeletal: Pain and myalgia on rt  Neurological: No headache, weakness  Psychiatric: Upset due to general state of health   Endocrine: No weight loss or gain, increased thirst  All other systems are comprehensively negative.    Physical Exam:  Vitals:        Vital Signs Last 24 Hrs  T(C): 37.1 (16 Aug 2018 05:04), Max: 37.2 (15 Aug 2018 14:10)  T(F): 98.8 (16 Aug 2018 05:04), Max: 99 (15 Aug 2018 14:10)  HR: 77 (16 Aug 2018 05:04) (77 - 80)  BP: 114/62 (16 Aug 2018 05:04) (90/55 - 114/62)  BP(mean): --  RR: 18 (16 Aug 2018 05:04) (17 - 18)  SpO2: 95% (16 Aug 2018 05:04) (95% - 95%)    MEDICATIONS  (STANDING):  cholecalciferol 1000 Unit(s) Oral daily  dextrose 5%. 1000 milliLiter(s) (50 mL/Hr) IV Continuous <Continuous>  dextrose 50% Injectable 12.5 Gram(s) IV Push once  dextrose 50% Injectable 25 Gram(s) IV Push once  dextrose 50% Injectable 25 Gram(s) IV Push once  insulin glargine Injectable (LANTUS) 12 Unit(s) SubCutaneous at bedtime  insulin lispro (HumaLOG) corrective regimen sliding scale   SubCutaneous three times a day before meals  insulin lispro (HumaLOG) corrective regimen sliding scale   SubCutaneous at bedtime  insulin lispro Injectable (HumaLOG) 4 Unit(s) SubCutaneous three times a day before meals  lactobacillus acidophilus 1 Tablet(s) Oral daily  oxyCODONE  ER Tablet 10 milliGRAM(s) Oral <User Schedule>  pantoprazole    Tablet 40 milliGRAM(s) Oral before breakfast  piperacillin/tazobactam IVPB. 3.375 Gram(s) IV Intermittent every 8 hours  propranolol  milliGRAM(s) Oral daily  simvastatin 10 milliGRAM(s) Oral at bedtime  vancomycin  IVPB 1000 milliGRAM(s) IV Intermittent every 24 hours  vancomycin  IVPB        MEDICATIONS  (PRN):  dextrose 40% Gel 15 Gram(s) Oral once PRN Blood Glucose LESS THAN 70 milliGRAM(s)/deciLiter  glucagon  Injectable 1 milliGRAM(s) IntraMuscular once PRN Glucose <70 milliGRAM(s)/deciLiter  morphine  - Injectable 2 milliGRAM(s) IV Push every 6 hours PRN Severe Pain (7 - 10)      EXAM:  NM BONE IMG WHOLE BODY                            PROCEDURE DATE:  08/15/2018          INTERPRETATION:  RADIOPHARMACEUTICAL: 19.4 mCi Tc-99m HDP, I.V.     CLINICAL INFORMATION: 89 year old female with right hip lesion on CT;   referred to evaluate for additional osseous abnormalities.    TECHNIQUE:  Anterior and posterior whole body images were obtained   approximately 2 hours following radiopharmaceutical administration.   Additional static images of the chest and pelvis also were obtained.    COMPARISON: No previous bone scans were available for comparison.    FINDINGS:  There is increased radiopharmaceutical accumulation in the   lower right iliac bone extending through the acetabulum into the ischium,   corresponding in part to the abnormality identified on the CT scan of   8/14/2018. The pubic rami are obscured by bladder activity. There is a   small focus of increased radiopharmaceutical accumulation along the   anterior axillary line of a left mid rib, approximately the fifth. There   is focally increased labeled leukocyte accumulation in the region of the   left great toe. There are degenerative changes in the spine and major   joints. There is physiologic distribution of radiopharmaceutical in the   remainder of the imaged osseous structures.    Both kidneys are visualized and are symmetric in appearance.    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    Probable old trauma left mid rib.    Focally increased radiopharmaceutical uptake in the left great toe is   nonspecific, but given the history of left foot ulcer, if osteomyelitis   is a clinical consideration labeled leukocyte imaging is suggested for   further evaluation.      General: NAD  HEENT: MMM  Neck: No JVD, no carotid bruit  Lungs: CTAB no wheezing   CV: RRR, nl S1/S2, no M/R/G  Abdomen: S/NT/ND, +BS  Extremities: No LE edema, no cyanosis  Neuro: AAOx3, non-focal  Skin: No rash    Labs:                        11.6   11.63 )-----------( 177      ( 15 Aug 2018 09:19 )             36.1     08-16    134<L>  |  94<L>  |  40<H>  ----------------------------<  134<H>  4.4   |  31  |  1.80<H>    Ca    8.9      16 Aug 2018 08:00  Mg     1.9     08-15    TPro  x   /  Alb  3.0<L>  /  TBili  x   /  DBili  x   /  AST  x   /  ALT  x   /  AlkPhos  x   08-15        PT/INR - ( 16 Aug 2018 08:00 )   PT: 28.2 sec;   INR: 2.54 ratio         PTT - ( 16 Aug 2018 08:00 )  PTT:37.7 sec Chief Complaint: Patient is a 89y old  Female who presents with a chief complaint of diabetic foot ulcer/ambulatory disfunction     Interval Events: Patient anxious because of her current condition. IR guided biopsy was scheduled for today but postponed due to INR and patient eating past midnight. Pain has improved since yesterday. No acute events overnight Denies chest pain , palpitations, nausea, vomiting.     Review of Systems:  General: No fevers, chills, weight loss or gain  Skin: No rashes, color changes  Cardiovascular: Admits chronic SOB, no chest pain, orthopnea  Respiratory: No shortness of breath, cough  Gastrointestinal: No nausea, abdominal pain  Genitourinary: No incontinence, pain with urination  Musculoskeletal: Pain and myalgia on rt  Neurological: No headache, weakness  Psychiatric: Upset due to general state of health   Endocrine: No weight loss or gain, increased thirst  All other systems are comprehensively negative.    Physical Exam:  Vitals:        Vital Signs Last 24 Hrs  T(C): 37.1 (16 Aug 2018 05:04), Max: 37.2 (15 Aug 2018 14:10)  T(F): 98.8 (16 Aug 2018 05:04), Max: 99 (15 Aug 2018 14:10)  HR: 77 (16 Aug 2018 05:04) (77 - 80)  BP: 114/62 (16 Aug 2018 05:04) (90/55 - 114/62)  BP(mean): --  RR: 18 (16 Aug 2018 05:04) (17 - 18)  SpO2: 95% (16 Aug 2018 05:04) (95% - 95%)    MEDICATIONS  (STANDING):  cholecalciferol 1000 Unit(s) Oral daily  dextrose 5%. 1000 milliLiter(s) (50 mL/Hr) IV Continuous <Continuous>  dextrose 50% Injectable 12.5 Gram(s) IV Push once  dextrose 50% Injectable 25 Gram(s) IV Push once  dextrose 50% Injectable 25 Gram(s) IV Push once  insulin glargine Injectable (LANTUS) 12 Unit(s) SubCutaneous at bedtime  insulin lispro (HumaLOG) corrective regimen sliding scale   SubCutaneous three times a day before meals  insulin lispro (HumaLOG) corrective regimen sliding scale   SubCutaneous at bedtime  insulin lispro Injectable (HumaLOG) 4 Unit(s) SubCutaneous three times a day before meals  lactobacillus acidophilus 1 Tablet(s) Oral daily  oxyCODONE  ER Tablet 10 milliGRAM(s) Oral <User Schedule>  pantoprazole    Tablet 40 milliGRAM(s) Oral before breakfast  piperacillin/tazobactam IVPB. 3.375 Gram(s) IV Intermittent every 8 hours  propranolol  milliGRAM(s) Oral daily  simvastatin 10 milliGRAM(s) Oral at bedtime  vancomycin  IVPB 1000 milliGRAM(s) IV Intermittent every 24 hours  vancomycin  IVPB        MEDICATIONS  (PRN):  dextrose 40% Gel 15 Gram(s) Oral once PRN Blood Glucose LESS THAN 70 milliGRAM(s)/deciLiter  glucagon  Injectable 1 milliGRAM(s) IntraMuscular once PRN Glucose <70 milliGRAM(s)/deciLiter  morphine  - Injectable 2 milliGRAM(s) IV Push every 6 hours PRN Severe Pain (7 - 10)      EXAM:  NM BONE IMG WHOLE BODY                            PROCEDURE DATE:  08/15/2018          INTERPRETATION:  RADIOPHARMACEUTICAL: 19.4 mCi Tc-99m HDP, I.V.     CLINICAL INFORMATION: 89 year old female with right hip lesion on CT;   referred to evaluate for additional osseous abnormalities.    TECHNIQUE:  Anterior and posterior whole body images were obtained   approximately 2 hours following radiopharmaceutical administration.   Additional static images of the chest and pelvis also were obtained.    COMPARISON: No previous bone scans were available for comparison.    FINDINGS:  There is increased radiopharmaceutical accumulation in the   lower right iliac bone extending through the acetabulum into the ischium,   corresponding in part to the abnormality identified on the CT scan of   8/14/2018. The pubic rami are obscured by bladder activity. There is a   small focus of increased radiopharmaceutical accumulation along the   anterior axillary line of a left mid rib, approximately the fifth. There   is focally increased labeled leukocyte accumulation in the region of the   left great toe. There are degenerative changes in the spine and major   joints. There is physiologic distribution of radiopharmaceutical in the   remainder of the imaged osseous structures.    Both kidneys are visualized and are symmetric in appearance.    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    Probable old trauma left mid rib.    Focally increased radiopharmaceutical uptake in the left great toe is   nonspecific, but given the history of left foot ulcer, if osteomyelitis   is a clinical consideration labeled leukocyte imaging is suggested for   further evaluation.      General: NAD  HEENT: MMM, anicteric  Neck: No JVD, no carotid bruit  Lungs: CTAB no wheezing   CV: RRR, nl S1/S2, no M/R/G  Abdomen: S/NT/ND, +BS  Extremities: No LE edema, no cyanosis  Neuro: AAOx3, non-focal  Skin: No rash    Labs:                        11.6   11.63 )-----------( 177      ( 15 Aug 2018 09:19 )             36.1     08-16    134<L>  |  94<L>  |  40<H>  ----------------------------<  134<H>  4.4   |  31  |  1.80<H>    Ca    8.9      16 Aug 2018 08:00  Mg     1.9     08-15    TPro  x   /  Alb  3.0<L>  /  TBili  x   /  DBili  x   /  AST  x   /  ALT  x   /  AlkPhos  x   08-15        PT/INR - ( 16 Aug 2018 08:00 )   PT: 28.2 sec;   INR: 2.54 ratio         PTT - ( 16 Aug 2018 08:00 )  PTT:37.7 sec        < from: TTE Echo Doppler w/o Cont (08.17.18 @ 10:38) >     EXAM:  ECHO TTE W/O CON COMP W/DOPPLR         PROCEDURE DATE:  08/17/2018        INTERPRETATION:  INDICATION: Endocarditis  Referring M.D.:Armendariz  Blood Pressure 101/66        Weight (kg) :68     Height (cm):152       BSA (sq m): 1.65  Technician: PHILIP    Dimensions:    LA 2.9       Normal Values: 2.0 - 4.0 cm    Ao 2.9        Normal Values: 2.0 - 3.8 cm  SEPTUM 0.9       Normal Values: 0.6 - 1.2 cm  PWT 1.2       Normal Values: 0.6 - 1.1 cm  LVIDd 4.0         Normal Values: 3.0 - 5.6 cm  LVIDs 3.1         Normal Values: 1.8 - 4.0 cm      OBSERVATIONS:  Technically difficult and limited study.  Mitral Valve: Calcified mitral annulus and mitral valve leaflets. Normal   opening of the mitral valve leaflets. Trace mitral regurgitation.  Aortic Valve/Aorta: Not well visualized  Tricuspid Valve: Calcified tricuspid annulus with normally opening valve.   Trace tricuspid regurgitation.  Pulmonic Valve: The pulmonic valve is not well visualized. Probably   normal.  Left Atrium: Moderate left atrial enlargement  Right Atrium: Normal  Left Ventricle: The endocardium is not well-visualized. Overall there is   preserved left ventricular systolic function. Unable to rule out wall   motion abnormalities. The EF is approximately 65%.  Right Ventricle: Normal right ventricular size and function.  Pericardium/Pleura: No pericardial effusion noted.  Pulmonary/RV Pressure: The right ventricular systolic pressure is   estimated to be 35mmHg, assuming that the right atrial pressure is   estimated to be8 mmHg. This is consistent with normal pulmonary   pressures.  LV Diastolic Function: Stage 2 diastolic dysfunction    Conclusion:   Technically difficult and limited study. Overall preserved left   ventricular systolic function. Stage II diastolic dysfunction. No   definitive vegetations noted on this study. If clinically warranted would   recommend a JOSE GUADALUPE to rule out endocarditis                  PEPITO BUTT M.D., ATTENDING CARDIOLOGIST  This document has been electronically signed. Aug 17 2018 1:09PM                < end of copied text >

## 2018-08-17 NOTE — PROGRESS NOTE ADULT - SUBJECTIVE AND OBJECTIVE BOX
89yo diabetic female seen bedside for her left foot wound. Patient states the wound is still painful and is considering surgical intervention at this time    ICU Vital Signs Last 24 Hrs  T(C): 36.8 (17 Aug 2018 04:46), Max: 37.6 (16 Aug 2018 14:27)  T(F): 98.3 (17 Aug 2018 04:46), Max: 99.6 (16 Aug 2018 14:27)  HR: 95 (17 Aug 2018 04:46) (87 - 95)  BP: 101/66 (17 Aug 2018 04:46) (99/48 - 118/75)  BP(mean): --  ABP: --  ABP(mean): --  RR: 18 (17 Aug 2018 04:46) (18 - 20)  SpO2: 95% (17 Aug 2018 04:46) (90% - 95%)                          11.2   11.69 )-----------( 212      ( 16 Aug 2018 08:00 )             34.7     08-16    134<L>  |  94<L>  |  40<H>  ----------------------------<  134<H>  4.4   |  31  |  1.80<H>    Ca    8.9      16 Aug 2018 08:00    TPro  x   /  Alb  3.0<L>  /  TBili  x   /  DBili  x   /  AST  x   /  ALT  x   /  AlkPhos  x   08-15        Objective: left foot focused  Vasc: DP and PT non-palpable; CFT < 3 seconds x5; TG WNL; no edema noted  Derm:  -wound to medial aspect of 1st MPJ measuring approximately 0.5cm x 0.5cm x 0.3cm with probing to bone and mild purulent drainage noted at this time with periwound erythema which is resolving, hyperkeratotic wound borders and no malodor noted  Neuro: epicritic sensation diminished  Ortho: hallux abducto valgus noted

## 2018-08-17 NOTE — PROGRESS NOTE ADULT - SUBJECTIVE AND OBJECTIVE BOX
Patient is a 89y old  Female who presents with a chief complaint of diabetic foot ulcer/ambulatory disfunction (15 Aug 2018 02:29)      INTERVAL HPI/OVERNIGHT EVENTS: Patient seen and examined. NAD. No complaints.    Vital Signs Last 24 Hrs  T(C): 36.8 (17 Aug 2018 04:46), Max: 37.6 (16 Aug 2018 14:27)  T(F): 98.3 (17 Aug 2018 04:46), Max: 99.6 (16 Aug 2018 14:27)  HR: 95 (17 Aug 2018 04:46) (87 - 95)  BP: 101/66 (17 Aug 2018 04:46) (99/48 - 118/75)  BP(mean): --  RR: 18 (17 Aug 2018 04:46) (18 - 20)  SpO2: 95% (17 Aug 2018 04:46) (90% - 95%)    08-16    134<L>  |  94<L>  |  40<H>  ----------------------------<  134<H>  4.4   |  31  |  1.80<H>    Ca    8.9      16 Aug 2018 08:00    TPro  x   /  Alb  3.0<L>  /  TBili  x   /  DBili  x   /  AST  x   /  ALT  x   /  AlkPhos  x   08-15                          11.2   11.69 )-----------( 212      ( 16 Aug 2018 08:00 )             34.7     PT/INR - ( 17 Aug 2018 11:04 )   PT: 17.9 sec;   INR: 1.63 ratio         PTT - ( 17 Aug 2018 11:04 )  PTT:32.5 sec  CAPILLARY BLOOD GLUCOSE      POCT Blood Glucose.: 234 mg/dL (17 Aug 2018 11:39)  POCT Blood Glucose.: 152 mg/dL (17 Aug 2018 07:43)  POCT Blood Glucose.: 184 mg/dL (16 Aug 2018 21:13)  POCT Blood Glucose.: 205 mg/dL (16 Aug 2018 17:32)              cholecalciferol 1000 Unit(s) Oral daily  dextrose 40% Gel 15 Gram(s) Oral once PRN  dextrose 5%. 1000 milliLiter(s) IV Continuous <Continuous>  dextrose 50% Injectable 12.5 Gram(s) IV Push once  dextrose 50% Injectable 25 Gram(s) IV Push once  dextrose 50% Injectable 25 Gram(s) IV Push once  furosemide    Tablet 20 milliGRAM(s) Oral daily  glucagon  Injectable 1 milliGRAM(s) IntraMuscular once PRN  insulin glargine Injectable (LANTUS) 12 Unit(s) SubCutaneous at bedtime  insulin lispro (HumaLOG) corrective regimen sliding scale   SubCutaneous three times a day before meals  insulin lispro (HumaLOG) corrective regimen sliding scale   SubCutaneous at bedtime  insulin lispro Injectable (HumaLOG) 4 Unit(s) SubCutaneous three times a day before meals  lactobacillus acidophilus 1 Tablet(s) Oral two times a day with meals  morphine  - Injectable 2 milliGRAM(s) IV Push every 6 hours PRN  oxyCODONE  ER Tablet 10 milliGRAM(s) Oral <User Schedule>  pantoprazole    Tablet 40 milliGRAM(s) Oral before breakfast  piperacillin/tazobactam IVPB. 3.375 Gram(s) IV Intermittent every 8 hours  propranolol  milliGRAM(s) Oral daily  simvastatin 10 milliGRAM(s) Oral at bedtime  vancomycin  IVPB 1000 milliGRAM(s) IV Intermittent every 24 hours  vancomycin  IVPB                  REVIEW OF SYSTEMS:  CONSTITUTIONAL: No fever, no weight loss, or no fatigue  NECK: No pain, no stiffness  RESPIRATORY: No cough, no wheezing, no chills, no hemoptysis, No shortness of breath  CARDIOVASCULAR: No chest pain, no palpitations, no dizziness, no leg swelling  GASTROINTESTINAL: No abdominal pain. No nausea, no vomiting, no hematemesis; No diarrhea, no constipation. No melena, no hematochezia.  GENITOURINARY: No dysuria, no frequency, no hematuria, no incontinence  NEUROLOGICAL: No headaches, no loss of strength, no numbness, no tremors  SKIN: No itching, no burning  MUSCULOSKELETAL: No joint pain, no swelling; No muscle, no back, no extremity pain  PSYCHIATRIC: No depression, no mood swings,   HEME/LYMPH: No easy bruising, no bleeding gums  ALLERY AND IMMUNOLOGIC: No hives       Consultant(s) Notes Reviewed:  [X] YES  [ ] NO    PHYSICAL EXAM:  GENERAL: NAD  HEAD:  Atraumatic, Normocephalic  EYES: EOMI, PERRLA, conjunctiva and sclera clear  ENMT: No tonsillar erythema, exudates, or enlargement; Moist mucous membranes  NECK: Supple, No JVD  NERVOUS SYSTEM:  Awake & alert  CHEST/LUNG: Clear to auscultation bilaterally; No rales, rhonchi, wheezing,  HEART: Regular rate and rhythm  ABDOMEN: Soft, Nontender, Nondistended; Bowel sounds present  EXTREMITIES:  No clubbing, cyanosis, or edema  LYMPH: No lymphadenopathy noted  SKIN: No rashes      Advanced care planning discussed with patient/family [X] YES   [ ] NO    Advanced care planning discussed with patient/family. Advanced care planning forms reviewed/discussed/completed. 20 minutes spent.

## 2018-08-17 NOTE — CHART NOTE - NSCHARTNOTEFT_GEN_A_CORE
RN called because patient had a fever. The patient is a 89 y.o woman with multiple comorbidities presented with several weeks h/./o of bony pain and recent inability to ambulate du to pain in the foot. Ct scan revealed destructive bony lesions concerning for metastasis and liver lesions. 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 The patient was seen and examined at bedside. No new complaints. The patient states that she feels well. Denies fever, chills, N/V/D/C, chest pain, palpitations, abdominal pain, melena, hematuria. Admits to right hip pain. Planned for bone biopsy of right hip. RN called because patient had a fever. The patient is a 89 y.o woman with multiple comorbidities presented with several weeks h/o of bony pain and recent inability to ambulate du to pain in the foot. Ct scan revealed destructive bony lesions concerning for metastasis and liver lesions. 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 The patient was seen and examined at bedside. No new complaints. The patient states that she feels well. Denies fever, chills, N/V/D/C, chest pain, palpitations, abdominal pain, melena, hematuria. Admits to right hip pain. Planned for bone biopsy of right hip.    Vital Signs Last 24 Hrs  T(C): 38.1 (17 Aug 2018 20:15), Max: 38.1 (17 Aug 2018 20:15)  T(F): 100.5 (17 Aug 2018 20:15), Max: 100.5 (17 Aug 2018 20:15)  HR: 91 (17 Aug 2018 20:15) (84 - 95)  BP: 106/68 (17 Aug 2018 20:15) (101/66 - 106/68)  BP(mean): --  RR: 17 (17 Aug 2018 20:15) (17 - 18)  SpO2: 93% (17 Aug 2018 20:15) (93% - 96%)    PHYSICAL EXAM:  GENERAL: NAD, well-groomed, well-developed  HEAD:  Atraumatic, Normocephalic  EYES: EOMI, PERRLA, conjunctiva and sclera clear  ENMT: Moist mucous membranes  NERVOUS SYSTEM:  Alert & Oriented X3  CHEST/LUNG: Clear to auscultation bilaterally; No rales, rhonchi, wheezing, or rubs  HEART: Regular rate and rhythm; No murmurs, rubs, or gallops  ABDOMEN: Soft, Nontender, Nondistended; Bowel sounds present  EXTREMITIES:  2+ Peripheral Pulses, No clubbing, cyanosis, or edema    A/P: 89 y.o woman with multiple comorbidities presented with several weeks h/o of bony pain and recent inability to ambulate du to pain in the foot. Ct scan revealed destructive bony lesions concerning for metastasis and liver lesions. 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  -New fever  -STAT CBC, UA, UCx, Blood Cultures x 2, lactate, and CXR ordered  -STAT Tylenol ordered  -Will follow up.

## 2018-08-17 NOTE — PROGRESS NOTE ADULT - SUBJECTIVE AND OBJECTIVE BOX
Patient seen an examined at bedside. Pain is suboptiomally controlled. No acute overnight events. No other complaints at this time    PE:      Vital Signs Last 24 Hrs  T(C): 36.8 (17 Aug 2018 04:46), Max: 37.6 (16 Aug 2018 14:27)  T(F): 98.3 (17 Aug 2018 04:46), Max: 99.6 (16 Aug 2018 14:27)  HR: 95 (17 Aug 2018 04:46) (87 - 95)  BP: 101/66 (17 Aug 2018 04:46) (99/48 - 118/75)    RR: 18 (17 Aug 2018 04:46) (18 - 20)  SpO2: 95% (17 Aug 2018 04:46) (90% - 95%))    RLE:    Unable to SLR, Pain with attempt   Pain with Log Roll  Pain with IR/ER R hip  SILT L3-S1  DP1+  TTP over the groin/pubic rami   EHL/FHL/GSC/TA intact  Full ROM Knee  Full passive ROM Hip, elicited pain   No calf tenderness  Decreased sensation to light tough dorsal/volar foot, hx diabetic neuropathy

## 2018-08-18 DIAGNOSIS — C79.9 SECONDARY MALIGNANT NEOPLASM OF UNSPECIFIED SITE: ICD-10-CM

## 2018-08-18 LAB
ANION GAP SERPL CALC-SCNC: 9 MMOL/L — SIGNIFICANT CHANGE UP (ref 5–17)
APPEARANCE UR: CLEAR — SIGNIFICANT CHANGE UP
BILIRUB UR-MCNC: NEGATIVE — SIGNIFICANT CHANGE UP
BUN SERPL-MCNC: 43 MG/DL — HIGH (ref 7–23)
CALCIUM SERPL-MCNC: 8.9 MG/DL — SIGNIFICANT CHANGE UP (ref 8.5–10.1)
CHLORIDE SERPL-SCNC: 96 MMOL/L — SIGNIFICANT CHANGE UP (ref 96–108)
CO2 SERPL-SCNC: 30 MMOL/L — SIGNIFICANT CHANGE UP (ref 22–31)
COLOR SPEC: SIGNIFICANT CHANGE UP
CREAT SERPL-MCNC: 1.8 MG/DL — HIGH (ref 0.5–1.3)
CULTURE RESULTS: SIGNIFICANT CHANGE UP
DIFF PNL FLD: NEGATIVE — SIGNIFICANT CHANGE UP
GLUCOSE SERPL-MCNC: 149 MG/DL — HIGH (ref 70–99)
GLUCOSE UR QL: NEGATIVE — SIGNIFICANT CHANGE UP
KETONES UR-MCNC: NEGATIVE — SIGNIFICANT CHANGE UP
LEUKOCYTE ESTERASE UR-ACNC: NEGATIVE — SIGNIFICANT CHANGE UP
NITRITE UR-MCNC: NEGATIVE — SIGNIFICANT CHANGE UP
PH UR: 5 — SIGNIFICANT CHANGE UP (ref 5–8)
POTASSIUM SERPL-MCNC: 3.8 MMOL/L — SIGNIFICANT CHANGE UP (ref 3.5–5.3)
POTASSIUM SERPL-SCNC: 3.8 MMOL/L — SIGNIFICANT CHANGE UP (ref 3.5–5.3)
PROT UR-MCNC: NEGATIVE — SIGNIFICANT CHANGE UP
SODIUM SERPL-SCNC: 135 MMOL/L — SIGNIFICANT CHANGE UP (ref 135–145)
SP GR SPEC: 1.01 — SIGNIFICANT CHANGE UP (ref 1.01–1.02)
SPECIMEN SOURCE: SIGNIFICANT CHANGE UP
UROBILINOGEN FLD QL: NEGATIVE — SIGNIFICANT CHANGE UP

## 2018-08-18 PROCEDURE — 99232 SBSQ HOSP IP/OBS MODERATE 35: CPT

## 2018-08-18 RX ADMIN — Medication 4 UNIT(S): at 12:13

## 2018-08-18 RX ADMIN — Medication 100 MILLIGRAM(S): at 17:45

## 2018-08-18 RX ADMIN — Medication 100 MILLIGRAM(S): at 05:27

## 2018-08-18 RX ADMIN — SIMVASTATIN 10 MILLIGRAM(S): 20 TABLET, FILM COATED ORAL at 21:25

## 2018-08-18 RX ADMIN — Medication 1: at 08:12

## 2018-08-18 RX ADMIN — PANTOPRAZOLE SODIUM 40 MILLIGRAM(S): 20 TABLET, DELAYED RELEASE ORAL at 05:27

## 2018-08-18 RX ADMIN — Medication 120 MILLIGRAM(S): at 05:27

## 2018-08-18 RX ADMIN — INSULIN GLARGINE 12 UNIT(S): 100 INJECTION, SOLUTION SUBCUTANEOUS at 21:25

## 2018-08-18 RX ADMIN — OXYCODONE HYDROCHLORIDE 10 MILLIGRAM(S): 5 TABLET ORAL at 21:25

## 2018-08-18 RX ADMIN — ENOXAPARIN SODIUM 30 MILLIGRAM(S): 100 INJECTION SUBCUTANEOUS at 11:29

## 2018-08-18 RX ADMIN — Medication 1: at 12:13

## 2018-08-18 RX ADMIN — Medication 4 UNIT(S): at 17:21

## 2018-08-18 RX ADMIN — Medication 3: at 17:22

## 2018-08-18 RX ADMIN — OXYCODONE HYDROCHLORIDE 10 MILLIGRAM(S): 5 TABLET ORAL at 22:00

## 2018-08-18 RX ADMIN — Medication 20 MILLIGRAM(S): at 05:27

## 2018-08-18 RX ADMIN — Medication 1: at 21:26

## 2018-08-18 RX ADMIN — Medication 4 UNIT(S): at 08:12

## 2018-08-18 RX ADMIN — Medication 1 TABLET(S): at 17:22

## 2018-08-18 RX ADMIN — Medication 1 TABLET(S): at 08:13

## 2018-08-18 RX ADMIN — MORPHINE SULFATE 2 MILLIGRAM(S): 50 CAPSULE, EXTENDED RELEASE ORAL at 11:36

## 2018-08-18 RX ADMIN — Medication 1000 UNIT(S): at 11:38

## 2018-08-18 RX ADMIN — MORPHINE SULFATE 2 MILLIGRAM(S): 50 CAPSULE, EXTENDED RELEASE ORAL at 11:26

## 2018-08-18 NOTE — PROGRESS NOTE ADULT - SUBJECTIVE AND OBJECTIVE BOX
[facility] -- Cooper Diaz, Don Noriega Pannella, Patel, Savella  Office # [phone]      Follow Up:  AF, CAD    Subjective/Observations: Patient seen and examined. Events noted. Resting comfortably in bed. No complaints of chest pain, dyspnea, or palpitations reported. No signs of orthopnea or PND.       REVIEW OF SYSTEMS: All other review of systems is negative unless indicated above    PAST MEDICAL & SURGICAL HISTORY:  OAB (overactive bladder)  GERD (gastroesophageal reflux disease)  Angina effort  Heart failure  Upper respiratory infection  Diabetes  Renal stones  Myocardial infarction: 1981  Spinal stenosis  CVA (cerebral infarction)  Afib  Raynaud disease  Acid reflux  Hypertension  Hyperlipidemia  Asthma  Cataract  S/P hysterectomy      MEDICATIONS  (STANDING):  ceFAZolin   IVPB 2000 milliGRAM(s) IV Intermittent every 12 hours  cholecalciferol 1000 Unit(s) Oral daily  dextrose 5%. 1000 milliLiter(s) (50 mL/Hr) IV Continuous <Continuous>  dextrose 50% Injectable 12.5 Gram(s) IV Push once  dextrose 50% Injectable 25 Gram(s) IV Push once  dextrose 50% Injectable 25 Gram(s) IV Push once  enoxaparin Injectable 30 milliGRAM(s) SubCutaneous daily  furosemide    Tablet 20 milliGRAM(s) Oral daily  insulin glargine Injectable (LANTUS) 12 Unit(s) SubCutaneous at bedtime  insulin lispro (HumaLOG) corrective regimen sliding scale   SubCutaneous three times a day before meals  insulin lispro (HumaLOG) corrective regimen sliding scale   SubCutaneous at bedtime  insulin lispro Injectable (HumaLOG) 4 Unit(s) SubCutaneous three times a day before meals  lactobacillus acidophilus 1 Tablet(s) Oral two times a day with meals  oxyCODONE  ER Tablet 10 milliGRAM(s) Oral <User Schedule>  pantoprazole    Tablet 40 milliGRAM(s) Oral before breakfast  propranolol  milliGRAM(s) Oral daily  simvastatin 10 milliGRAM(s) Oral at bedtime    MEDICATIONS  (PRN):  dextrose 40% Gel 15 Gram(s) Oral once PRN Blood Glucose LESS THAN 70 milliGRAM(s)/deciLiter  glucagon  Injectable 1 milliGRAM(s) IntraMuscular once PRN Glucose <70 milliGRAM(s)/deciLiter  morphine  - Injectable 2 milliGRAM(s) IV Push every 6 hours PRN Severe Pain (7 - 10)      Allergies    Levaquin (Other)  ramipril (Other)  tetracycline (Hives; Rash)    Intolerances            Vital Signs Last 24 Hrs  T(C): 36.4 (18 Aug 2018 04:57), Max: 38.1 (17 Aug 2018 20:15)  T(F): 97.5 (18 Aug 2018 04:57), Max: 100.5 (17 Aug 2018 20:15)  HR: 80 (18 Aug 2018 04:57) (80 - 91)  BP: 130/74 (18 Aug 2018 04:57) (103/61 - 130/74)  BP(mean): --  RR: 16 (18 Aug 2018 04:57) (16 - 17)  SpO2: 97% (18 Aug 2018 04:57) (93% - 97%)    I&O's Summary    17 Aug 2018 07:01  -  18 Aug 2018 07:00  --------------------------------------------------------  IN: 150 mL / OUT: 0 mL / NET: 150 mL          PHYSICAL EXAM:    Constitutional: NAD, awake and alert, well-developed  HEENT: Moist Mucous Membranes, Anicteric  Pulmonary: Decreased breath sounds b/l. No rales, crackles or wheeze appreciated.   Cardiovascular: Regular, S1 and S2, No murmurs, rubs, gallops or clicks  Gastrointestinal: Bowel Sounds present, soft, nontender.   Lymph: No peripheral edema. No lymphadenopathy.  Skin: No visible rashes or ulcers.  Psych:  Mood & affect appropriate for situation    LABS: All Labs Reviewed:                        10.6   10.13 )-----------( 204      ( 17 Aug 2018 21:48 )             33.0                         11.2   11.69 )-----------( 212      ( 16 Aug 2018 08:00 )             34.7     18 Aug 2018 06:48    135    |  96     |  43     ----------------------------<  149    3.8     |  30     |  1.80   16 Aug 2018 08:00    134    |  94     |  40     ----------------------------<  134    4.4     |  31     |  1.80     Ca    8.9        18 Aug 2018 06:48  Ca    8.9        16 Aug 2018 08:00    TPro  x      /  Alb  3.0    /  TBili  x      /  DBili  x      /  AST  x      /  ALT  x      /  AlkPhos  x      15 Aug 2018 19:12    PT/INR - ( 17 Aug 2018 11:04 )   PT: 17.9 sec;   INR: 1.63 ratio         PTT - ( 17 Aug 2018 11:04 )  PTT:32.5 sec

## 2018-08-18 NOTE — PROGRESS NOTE ADULT - SUBJECTIVE AND OBJECTIVE BOX
ID progress note     Name: DWAYNE DONAHUE  Age: 89y  Gender: Female  MRN: [id number]    Interval History-- Events noted, doing well. daughter at bedside .she is now scheduled for bone biopsy on Monday and also left hallux resection by podiatry on monday   Notes reviewed    Past Medical History--  OAB (overactive bladder)  GERD (gastroesophageal reflux disease)  Angina effort  Heart failure  Upper respiratory infection  Diabetes  Renal stones  Myocardial infarction  Spinal stenosis  CVA (cerebral infarction)  Afib  Raynaud disease  Acid reflux  Hypertension  Hyperlipidemia  Asthma  Cataract  S/P hysterectomy      For details regarding the patient's social history, family history, and other miscellaneous elements, please refer the initial infectious diseases consultation and/or the admitting history and physical examination for this admission.    Allergies--  Allergies    Levaquin (Other)  ramipril (Other)  tetracycline (Hives; Rash)    Intolerances        Medications--  Antibiotics:  ceFAZolin   IVPB 2000 milliGRAM(s) IV Intermittent every 12 hours    Immunologic:    Other:  cholecalciferol  dextrose 40% Gel PRN  dextrose 5%.  dextrose 50% Injectable  dextrose 50% Injectable  dextrose 50% Injectable  enoxaparin Injectable  furosemide    Tablet  glucagon  Injectable PRN  insulin glargine Injectable (LANTUS)  insulin lispro (HumaLOG) corrective regimen sliding scale  insulin lispro (HumaLOG) corrective regimen sliding scale  insulin lispro Injectable (HumaLOG)  lactobacillus acidophilus  morphine  - Injectable PRN  oxyCODONE  ER Tablet  pantoprazole    Tablet  propranolol LA  simvastatin      Review of Systems--  A 10-point review of systems was obtained.     Pertinent positives and negatives--  Constitutional: No fevers. No Chills. No Rigors.   Cardiovascular: No chest pain. No palpitations.  Respiratory: No shortness of breath. No cough.  Gastrointestinal: No nausea or vomiting. No diarrhea or constipation.   Psychiatric: Pleasant. Appropriate affect.    Review of systems otherwise negative except as previously noted.    Physical Examination--  Vital Signs: T(F): 98.1 (08-18-18 @ 13:56), Max: 100.5 (08-17-18 @ 20:15)  HR: 75 (08-18-18 @ 13:56)  BP: 107/66 (08-18-18 @ 13:56)  RR: 17 (08-18-18 @ 13:56)  SpO2: 98% (08-18-18 @ 13:56)  Wt(kg): --  General: Nontoxic-appearing Female in no acute distress.  HEENT: AT/NC. PERRL. EOMI. Anicteric. Conjunctiva pink and moist. Oropharynx clear. Dentition fair.  Neck: Not rigid. No sense of mass.  Nodes: None palpable.  Lungs: Clear bilaterally without rales, wheezing or rhonchi  Heart: Regular rate and rhythm. No Murmur. No rub. No gallop. No palpable thrill.  Abdomen: Bowel sounds present and normoactive. Soft. Nondistended. Nontender. No sense of mass. No organomegaly.  Back: No spinal tenderness. No costovertebral angle tenderness.   Extremities: No cyanosis or clubbing. No edema.   Skin: Warm. Dry. Good turgor. No rash. No vasculitic stigmata.  Psychiatric: Appropriate affect and mood for situation.         Laboratory Studies--  CBC                        10.6   10.13 )-----------( 204      ( 17 Aug 2018 21:48 )             33.0       Chemistries  08-18    135  |  96  |  43<H>  ----------------------------<  149<H>  3.8   |  30  |  1.80<H>    Ca    8.9      18 Aug 2018 06:48    CAPILLARY BLOOD GLUCOSE      POCT Blood Glucose.: 165 mg/dL (18 Aug 2018 11:41)  POCT Blood Glucose.: 176 mg/dL (18 Aug 2018 08:04)  POCT Blood Glucose.: 207 mg/dL (17 Aug 2018 22:21)  POCT Blood Glucose.: 249 mg/dL (17 Aug 2018 16:45)      Culture Data    Culture - Blood (collected 17 Aug 2018 10:59)  Source: .Blood Blood-Peripheral  Preliminary Report (18 Aug 2018 11:01):    No growth to date.    Culture - Blood (collected 17 Aug 2018 10:59)  Source: .Blood Blood-Peripheral  Preliminary Report (18 Aug 2018 11:01):    No growth to date.    Culture - Blood (collected 16 Aug 2018 08:42)  Source: .Blood Blood-Peripheral  Preliminary Report (17 Aug 2018 09:01):    No growth to date.    Culture - Blood (collected 16 Aug 2018 08:42)  Source: .Blood Blood-Venous  Preliminary Report (17 Aug 2018 09:01):    No growth to date.    Culture - Other (collected 15 Aug 2018 00:41)  Source: .Other left toe  Final Report (16 Aug 2018 18:36):    Moderate Staphylococcus aureus  Organism: Staphylococcus aureus (16 Aug 2018 18:36)  Organism: Staphylococcus aureus (16 Aug 2018 18:36)    Culture - Blood (collected 14 Aug 2018 21:28)  Source: .Blood Blood  Gram Stain (16 Aug 2018 05:57):    Growth in anaerobic bottle: Gram Positive Cocci in Clusters  Final Report (18 Aug 2018 06:49):    Growth in anaerobic bottle: Staphylococcus aureus    See previous culture [id number]    Culture - Blood (08.14.18 @ 21:27)    -  Staphylococcus aureus: Detec Any isolate of Staphylococcus aureus from a blood culture is NOT considered a contaminant.    -  Vancomycin: S 2    -  Trimethoprim/Sulfamethoxazole: S <=0.5/9.5    -  Tetra/Doxy: S <=1    -  RIF- Rifampin: S <=1 Should not be used as monotherapy    -  Oxacillin: S <=0.25    -  Penicillin: R >8    -  Gentamicin: S <=1 Should not be used as monotherapy    Gram Stain:   Growth in aerobic bottle: Gram Positive Cocci in Clusters  Growth in anaerobic bottle: Gram Positive Cocci in Clusters    -  Ampicillin/Sulbactam: S <=8/4    -  Erythromycin: R >4    -  Clindamycin: R >4    -  Cefazolin: S <=4    Specimen Source: .Blood Blood    Organism: Blood Culture PCR    Organism: Staphylococcus aureus    Culture Results:   Growth in aerobic and anaerobic bottles: Staphylococcus aureus  "Due to technical problems, Proteus sp. will Not be reported as part of  the BCID panel until further notice"  ***Blood Panel PCR results on this specimen are available  approximately 3 hours after the Gram stain result.***  Gram stain, PCR, and/or culture results may not always  correspond due to difference in methodologies.  ************************************************************  This PCR assay was performed using [employer].  The following targets are tested for: Enterococcus,  vancomycin resistant enterococci, Listeria monocytogenes,  coagulase negative staphylococci, S. aureus,  methicillin resistant S. aureus, Streptococcus agalactiae  (Group B), S. pneumoniae, S. pyogenes (Group A),  Acinetobacter baumannii, Enterobacter cloacae, E. coli,  Klebsiella oxytoca, K. pneumoniae, Proteus sp.,  Serratia marcescens, Haemophilus influenzae,  Neisseria meningitidis, Pseudomonas aeruginosa, Candida  albicans, C. glabrata, C krusei, C parapsilosis,  C. tropicalis and the KPC resistance gene.    Organism Identification: Blood Culture PCR  Staphylococcus aureus    Method Type: PCR    Method Type: BUCK        RADIOLOGY:  MR Hip No Cont, Right (08.15.18 @ 19:21) >  Findings:  Impression:    Mildly expansile metastatic lesion within the anterior wall of the right   acetabulum with extension to the right superior pubic ramus. Additional   metastatic lesions are noted within the right ischium and midportion of   the right inferior pubic ramus. There is prominent edema within the right   inferior pubic ramus likely related to a superimposed pathologic stress   fracture. Prominent edema throughout the right adductor muscles about the   right inferior pubic ramus is may be related to underlying reactive   edema, muscle strain, and/or extension of metastatic disease.    Nonspecific small lesions within the left femoral head and left femoral   neck which may be related to additional metastatic foci.     Xray Foot AP + Lateral + Oblique, Left (08.14.18 @ 16:07) >  IMPRESSION: Soft tissue swelling around the first metatarsal head along   with a skin defect. Underlying focal osteopenia of the left first   metatarsal head for which osteomyelitis cannot be excluded. Recommend   further evaluation with MRI examination or bone scan.    Assessment :     89 y.o woman with multiple comorbidities presented with several weeks h/o of bony pain and recent inability to ambulate du to pain in the foot. Ct scan revealed destructive bony lesions concerning for metastasis and liver lesions. 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 Noted to have staph aurues bacteremia likely from left hallux abscess    The primary source of malignancy is unclear, she is to have biopsy of the hip lesion on Friday provided INR is below 1.5 . Anticoagulation is stopped . She was on Sivextro at home which was covering MRSA     Plan :   - will continue with  Ancef 2 grams q 12 as blood and wound cs is MSSA  - she needs I& D and resection of the left 1st met head , no need for MRI as clinically she has OM   - get echo to rule out endocarditis   - she is undergoing work up to find the primary cancer , she already has liver and destructive bone mets so she has advanced diseases  - overall prognosis is poor   - goals of care conversation with her Son, does not want aggressive care   - podiatry follow up     Continue with present regime .  Appropriate use of antibiotics and adverse effects reviewed.    I have discussed the above plan of care with patient and her daughter present at bedside  in detail. They expressed understanding of the treatment plan . Risks, benefits and alternatives discussed in detail. I have asked if they have any questions or concerns and appropriately addressed them to the best of my ability .      > 35 minutes spent in direct patient care reviewing  the notes, lab data/ imaging , discussion with multidisciplinary team. All questions were addressed and answered to the best of my capacity .    Thank you for allowing me to participate in the care of your patient .        William Armendariz MD  [phone]

## 2018-08-18 NOTE — PROGRESS NOTE ADULT - SUBJECTIVE AND OBJECTIVE BOX
88yo diabetic female seen bedside for her left foot wound. Patient is booked for surgical intervention on Monday 8/20/18 for left 1st metatarsal head resection.    ICU Vital Signs Last 24 Hrs  T(C): 36.4 (18 Aug 2018 04:57), Max: 38.1 (17 Aug 2018 20:15)  T(F): 97.5 (18 Aug 2018 04:57), Max: 100.5 (17 Aug 2018 20:15)  HR: 80 (18 Aug 2018 04:57) (80 - 91)  BP: 130/74 (18 Aug 2018 04:57) (103/61 - 130/74)  BP(mean): --  ABP: --  ABP(mean): --  RR: 16 (18 Aug 2018 04:57) (16 - 17)  SpO2: 97% (18 Aug 2018 04:57) (93% - 97%)                          10.6   10.13 )-----------( 204      ( 17 Aug 2018 21:48 )             33.0     08-18    135  |  96  |  43<H>  ----------------------------<  149<H>  3.8   |  30  |  1.80<H>    Ca    8.9      18 Aug 2018 06:48        Objective: left foot focused  Vasc: DP and PT non-palpable; CFT < 3 seconds x5; TG WNL; no edema noted  Derm:  -wound to medial aspect of 1st MPJ measuring approximately 0.5cm x 0.5cm x 0.3cm with probing to bone and mild purulent drainage noted at this time with periwound erythema which is resolving, hyperkeratotic wound borders and no malodor noted  Neuro: epicritic sensation diminished  Ortho: hallux abducto valgus noted

## 2018-08-18 NOTE — PROGRESS NOTE ADULT - SUBJECTIVE AND OBJECTIVE BOX
[INTERVAL HX: ]  Patient seen and examined;  Chart reviewed and events noted;   c/o pain.   On morphine PRN.   dtr at bedside. Apparently to go for foot surgery Mon now per dtr, and podiatry note.   Discussed with dtr this is new plan since I last saw pt, advised dtr to speak with either podiatry or hosp to obtain details on plan for this.   On Lovenox for prophylaxis in meanwhile.       MEDICATIONS  (STANDING):  ceFAZolin   IVPB 2000 milliGRAM(s) IV Intermittent every 12 hours  cholecalciferol 1000 Unit(s) Oral daily  dextrose 5%. 1000 milliLiter(s) (50 mL/Hr) IV Continuous <Continuous>  dextrose 50% Injectable 12.5 Gram(s) IV Push once  dextrose 50% Injectable 25 Gram(s) IV Push once  dextrose 50% Injectable 25 Gram(s) IV Push once  enoxaparin Injectable 30 milliGRAM(s) SubCutaneous daily  furosemide    Tablet 20 milliGRAM(s) Oral daily  insulin glargine Injectable (LANTUS) 12 Unit(s) SubCutaneous at bedtime  insulin lispro (HumaLOG) corrective regimen sliding scale   SubCutaneous three times a day before meals  insulin lispro (HumaLOG) corrective regimen sliding scale   SubCutaneous at bedtime  insulin lispro Injectable (HumaLOG) 4 Unit(s) SubCutaneous three times a day before meals  lactobacillus acidophilus 1 Tablet(s) Oral two times a day with meals  oxyCODONE  ER Tablet 10 milliGRAM(s) Oral <User Schedule>  pantoprazole    Tablet 40 milliGRAM(s) Oral before breakfast  propranolol  milliGRAM(s) Oral daily  simvastatin 10 milliGRAM(s) Oral at bedtime    MEDICATIONS  (PRN):  dextrose 40% Gel 15 Gram(s) Oral once PRN Blood Glucose LESS THAN 70 milliGRAM(s)/deciLiter  glucagon  Injectable 1 milliGRAM(s) IntraMuscular once PRN Glucose <70 milliGRAM(s)/deciLiter  morphine  - Injectable 2 milliGRAM(s) IV Push every 6 hours PRN Severe Pain (7 - 10)      Vital Signs Last 24 Hrs  T(C): 36.4 (18 Aug 2018 04:57), Max: 38.1 (17 Aug 2018 20:15)  T(F): 97.5 (18 Aug 2018 04:57), Max: 100.5 (17 Aug 2018 20:15)  HR: 80 (18 Aug 2018 04:57) (80 - 91)  BP: 130/74 (18 Aug 2018 04:57) (103/61 - 130/74)  BP(mean): --  RR: 16 (18 Aug 2018 04:57) (16 - 17)  SpO2: 97% (18 Aug 2018 04:57) (93% - 97%)    [PHYSICAL EXAM]  General: adult in NAD,  WN,  WD.  HEENT: clear oropharynx, anicteric sclera, pink conjunctivae.  Neck: supple, no masses.  CV: normal S1S2, no murmur, no rubs, no gallops.  Lungs: clear to auscultation, no wheezes, no rales, no rhonchi.  Abdomen: soft, non-tender, non-distended, no hepatosplenomegaly, normal BS, no guarding.  Ext: no clubbing, no cyanosis, no edema.  Skin: no rashes,  no petechiae, no venous stasis changes.  Neuro: alert and oriented X3, no focal motor deficits.  LN: no LUCIANA.      [LABS:]                        10.6   10.13 )-----------( 204      ( 17 Aug 2018 21:48 )             33.0         135  |  96  |  43<H>  ----------------------------<  149<H>  3.8   |  30  |  1.80<H>    Ca    8.9      18 Aug 2018 06:48      PT/INR - ( 17 Aug 2018 11:04 )   PT: 17.9 sec;   INR: 1.63 ratio         PTT - ( 17 Aug 2018 11:04 )  PTT:32.5 sec      Immunoelectrophoresis, Serum (08.15.18 @ 19:12)    Total Protein, Serum: 7.4 g/dL    Albumin, Serum: 3.0 g/dL    Albumin/Globulin Ratio: 0.7 Ratio    Alpha 1: 0.9 g/dL    Alpha 2: 1.4 g/dL    Beta Globulin: 0.9 g/dL    Gamma Globulin: 1.2 g/dL    Serum Protein Electrophoresis Interp: Normal Electrophoresis Pattern    % Albumin: 40.6 %    % Alpha 1: 11.9 %    % Alpha 2: 19.3 %    % Beta: 12.0 %    % Gamma: 16.2 %    Immunofixation, Serum: No Monoclonal Band Identified    Quantitative Ig: Please Note: The Reference range has changed for this test due to an  upgrade in the testing methodology ( Turbidometry - Optilite Instrument).  Results are flagged as In Range  or Out of Range based upon the updated  reference range as of 2018. mg/dL    Quantitative IgA: 242: Please Note: The Reference range has changed for this test due to an  upgrade in the testing methodology ( Turbidometry - Optilite Instrument).  Results are flagged as In Range  or Out of Range based upon the updated  reference range as of 2018. mg/dL    Quantitative IgM: 129: Please Note: The Reference range has changed for this test due to an  upgrade in the testing methodology ( Turbidometry - Optilite Instrument).  Results are flagged as In Range  or Out of Range based upon the updated  reference range as of 2018. mg/dL    DOMINGA Kappa: 7.18 mg/dL    DOMINGA Lambda: 6.93 mg/dL    McCook/Lambda Free Light Chain Ratio, Serum: 1.04 Ratio          Carcinoembryonic Antigen (08.15.18 @ 19:09)    Carcinoembryonic Antigen: 3.8  METHOD: Roche EIA   The CEA assay should not be used as a cancer screening test. Serum CEA  concentrations should only be used in conjunction with   information available from the clinical evaluation of the patien and  from other diagnostic procedures.   CEA Normal Ranges   _________________   Non-smoker: less than 3.9 ng/mL       Smoker: less than 5.5 ng/mL ng/mL        [RADIOLOGY STUDIES:]

## 2018-08-18 NOTE — PROGRESS NOTE ADULT - ASSESSMENT
89f htn, dm, chol, af pvd, adm with hip pain with ?metastatic disease    - No evidence of acute ischemia  - No evidence of volume overload  - AF controlled. Cont propanolol  - BP on low side, nitro patch was stopped and patient was inquiring about restarting. Risks vs benefits explained.   - monitor and replete lytes, keep K>4, Mg>2  - Hold AC for IR guided biopsy on Mon 8/20.    - Would cont with prophylaxis dose of lovenox.   - MM panel and CEA results pending   - Bone scan and CT abdomen revealing of possible metastasis in right pelvis and hepatic parenchyma.  - Cultutres positive for MSSA, patient is on Vancomycin  - Other cardiovascular workup will depend on clinical course.  - All other workup per primary team  - Will follow

## 2018-08-18 NOTE — PROGRESS NOTE ADULT - SUBJECTIVE AND OBJECTIVE BOX
Patient is a 89y old  Female who presents with a chief complaint of diabetic foot ulcer/ambulatory disfunction (15 Aug 2018 02:29)      INTERVAL HPI/OVERNIGHT EVENTS: Patient seen and examined. NAD. No complaints.    Vital Signs Last 24 Hrs  T(C): 36.4 (18 Aug 2018 04:57), Max: 38.1 (17 Aug 2018 20:15)  T(F): 97.5 (18 Aug 2018 04:57), Max: 100.5 (17 Aug 2018 20:15)  HR: 80 (18 Aug 2018 04:57) (80 - 91)  BP: 130/74 (18 Aug 2018 04:57) (103/61 - 130/74)  BP(mean): --  RR: 16 (18 Aug 2018 04:57) (16 - 17)  SpO2: 97% (18 Aug 2018 04:57) (93% - 97%)        135  |  96  |  43<H>  ----------------------------<  149<H>  3.8   |  30  |  1.80<H>    Ca    8.9      18 Aug 2018 06:48                            10.6   10.13 )-----------( 204      ( 17 Aug 2018 21:48 )             33.0     PT/INR - ( 17 Aug 2018 11:04 )   PT: 17.9 sec;   INR: 1.63 ratio         PTT - ( 17 Aug 2018 11:04 )  PTT:32.5 sec  CAPILLARY BLOOD GLUCOSE      POCT Blood Glucose.: 176 mg/dL (18 Aug 2018 08:04)  POCT Blood Glucose.: 207 mg/dL (17 Aug 2018 22:21)  POCT Blood Glucose.: 249 mg/dL (17 Aug 2018 16:45)  POCT Blood Glucose.: 234 mg/dL (17 Aug 2018 11:39)    Urinalysis Basic - ( 18 Aug 2018 03:47 )    Color: Pale Yellow / Appearance: Clear / S.015 / pH: x  Gluc: x / Ketone: Negative  / Bili: Negative / Urobili: Negative   Blood: x / Protein: Negative / Nitrite: Negative   Leuk Esterase: Negative / RBC: x / WBC x   Sq Epi: x / Non Sq Epi: x / Bacteria: x      Culture - Blood in AM (18 @ 10:59)    Specimen Source: .Blood Blood-Peripheral    Culture Results:   No growth to date.            ceFAZolin   IVPB 2000 milliGRAM(s) IV Intermittent every 12 hours  cholecalciferol 1000 Unit(s) Oral daily  dextrose 40% Gel 15 Gram(s) Oral once PRN  dextrose 5%. 1000 milliLiter(s) IV Continuous <Continuous>  dextrose 50% Injectable 12.5 Gram(s) IV Push once  dextrose 50% Injectable 25 Gram(s) IV Push once  dextrose 50% Injectable 25 Gram(s) IV Push once  enoxaparin Injectable 30 milliGRAM(s) SubCutaneous daily  furosemide    Tablet 20 milliGRAM(s) Oral daily  glucagon  Injectable 1 milliGRAM(s) IntraMuscular once PRN  insulin glargine Injectable (LANTUS) 12 Unit(s) SubCutaneous at bedtime  insulin lispro (HumaLOG) corrective regimen sliding scale   SubCutaneous three times a day before meals  insulin lispro (HumaLOG) corrective regimen sliding scale   SubCutaneous at bedtime  insulin lispro Injectable (HumaLOG) 4 Unit(s) SubCutaneous three times a day before meals  lactobacillus acidophilus 1 Tablet(s) Oral two times a day with meals  morphine  - Injectable 2 milliGRAM(s) IV Push every 6 hours PRN  oxyCODONE  ER Tablet 10 milliGRAM(s) Oral <User Schedule>  pantoprazole    Tablet 40 milliGRAM(s) Oral before breakfast  propranolol  milliGRAM(s) Oral daily  simvastatin 10 milliGRAM(s) Oral at bedtime        REVIEW OF SYSTEMS:  CONSTITUTIONAL: No fever, no weight loss, or no fatigue  NECK: No pain, no stiffness  RESPIRATORY: No cough, no wheezing, no chills, no hemoptysis, No shortness of breath  CARDIOVASCULAR: No chest pain, no palpitations, no dizziness, no leg swelling  GASTROINTESTINAL: No abdominal pain. No nausea, no vomiting, no hematemesis; No diarrhea, no constipation. No melena, no hematochezia.  GENITOURINARY: No dysuria, no frequency, no hematuria, no incontinence  NEUROLOGICAL: No headaches, no loss of strength, no numbness, no tremors  SKIN: No itching, no burning  MUSCULOSKELETAL: No joint pain, no swelling; No muscle, no back, no extremity pain  PSYCHIATRIC: No depression, no mood swings,   HEME/LYMPH: No easy bruising, no bleeding gums  ALLERY AND IMMUNOLOGIC: No hives       Consultant(s) Notes Reviewed:  [X] YES  [ ] NO    PHYSICAL EXAM:  GENERAL: NAD  HEAD:  Atraumatic, Normocephalic  EYES: EOMI, PERRLA, conjunctiva and sclera clear  ENMT: No tonsillar erythema, exudates, or enlargement; Moist mucous membranes  NECK: Supple, No JVD  NERVOUS SYSTEM:  Awake & alert  CHEST/LUNG: Clear to auscultation bilaterally; No rales, rhonchi, wheezing,  HEART: Regular rate and rhythm  ABDOMEN: Soft, Nontender, Nondistended; Bowel sounds present  EXTREMITIES:  No clubbing, cyanosis, or edema  LYMPH: No lymphadenopathy noted  SKIN: No rashes      Advanced care planning discussed with patient/family [X] YES   [ ] NO    Advanced care planning discussed with patient/family. Advanced care planning forms reviewed/discussed/completed. 20 minutes spent.

## 2018-08-18 NOTE — PROGRESS NOTE ADULT - ASSESSMENT
88 yo woman with multiple comorbidities presented with several weeks h/./o of bony pain and recent inability to ambulate du to pain in the foot. CT scan revealed destructive bony lesions concerning for metastasis and liver lesions.  Patient was on coumdin for A fib and is on antibiotics  for presumed osteomyelitis  of the left foot    Consult called for recommendation regarding further workup for lytic lesion.     lytic lesions   Cr is stable since 2016. Ca is borderline.  Serum Immunofixation negative for monoclonal protein.  CEA normal 3.8.   Spoke with IR again Fri AM, was tentatively scheduled for Bx of the bony lesion 8/17/18 AM but INR suboptimal and pt had breakfast.  Had arranged for Mon bx 10AM.   Hold AC. Continue Lovenox prophylaxis over weekend  NPO after midnight Sun. [ordered]    Leukocytosis is most likely reactive, monitor     Goals of care d/w pt and pt's family ( 2 sons and daughter prior).   Remains clinically stable.   IR biopsy Mon ~10Am if INR <1.5.   Apparently for Foot surgery Mon afternoon.   To be determined most likely Mon if procedure can be done together. Depending on availability of involved physicians, and events on Mon.     Continue pain meds for pain control.

## 2018-08-18 NOTE — CHART NOTE - NSCHARTNOTEFT_GEN_A_CORE
Assessment: Pt tolerating diet, reports ate 1/2 burger and all of glucerna, piece of cake. Encouraged HBV protein foods. Pt c.p decreased appetite, no BM since admission, but only because will not use bedpan. RN made aware, will inquire with MD if pt can use commiode or toilet with assist(currently has orders for no wt bearing). For OR monday, r/o malignancy.     Factors impacting intake: [ ] none [ ] nausea  [ ] vomiting [ ] diarrhea [ ] constipation  [ ]chewing problems [ ] swallowing issues  [ ] other:     Diet Presciption: Diet, DASH/TLC:   Sodium & Cholesterol Restricted  Consistent Carbohydrate {No Snacks}  Supplement Feeding Modality:  Oral  Glucerna Shake Cans or Servings Per Day:  1       Frequency:  Three Times a day (08-16-18 @ 13:20)    Intake: variable, taking supplements well    Current Weight: Weight (kg): 68 (08-14 @ 14:43)      Pertinent Medications: MEDICATIONS  (STANDING):  ceFAZolin   IVPB 2000 milliGRAM(s) IV Intermittent every 12 hours  cholecalciferol 1000 Unit(s) Oral daily  dextrose 5%. 1000 milliLiter(s) (50 mL/Hr) IV Continuous <Continuous>  dextrose 50% Injectable 12.5 Gram(s) IV Push once  dextrose 50% Injectable 25 Gram(s) IV Push once  dextrose 50% Injectable 25 Gram(s) IV Push once  enoxaparin Injectable 30 milliGRAM(s) SubCutaneous daily  furosemide    Tablet 20 milliGRAM(s) Oral daily  insulin glargine Injectable (LANTUS) 12 Unit(s) SubCutaneous at bedtime  insulin lispro (HumaLOG) corrective regimen sliding scale   SubCutaneous three times a day before meals  insulin lispro (HumaLOG) corrective regimen sliding scale   SubCutaneous at bedtime  insulin lispro Injectable (HumaLOG) 4 Unit(s) SubCutaneous three times a day before meals  lactobacillus acidophilus 1 Tablet(s) Oral two times a day with meals  oxyCODONE  ER Tablet 10 milliGRAM(s) Oral <User Schedule>  pantoprazole    Tablet 40 milliGRAM(s) Oral before breakfast  propranolol  milliGRAM(s) Oral daily  simvastatin 10 milliGRAM(s) Oral at bedtime    MEDICATIONS  (PRN):  dextrose 40% Gel 15 Gram(s) Oral once PRN Blood Glucose LESS THAN 70 milliGRAM(s)/deciLiter  glucagon  Injectable 1 milliGRAM(s) IntraMuscular once PRN Glucose <70 milliGRAM(s)/deciLiter  morphine  - Injectable 2 milliGRAM(s) IV Push every 6 hours PRN Severe Pain (7 - 10)    Pertinent Labs: 08-18 Na135 mmol/L Glu 149 mg/dL<H> K+ 3.8 mmol/L Cr  1.80 mg/dL<H> BUN 43 mg/dL<H> 08-15 Alb 3.0 g/dL<L> 08-15 [id number] 6.7 %<H>     CAPILLARY BLOOD GLUCOSE      POCT Blood Glucose.: 165 mg/dL (18 Aug 2018 11:41)  POCT Blood Glucose.: 176 mg/dL (18 Aug 2018 08:04)  POCT Blood Glucose.: 207 mg/dL (17 Aug 2018 22:21)  POCT Blood Glucose.: 249 mg/dL (17 Aug 2018 16:45)    Skin: st 2 buttock pressure injury    Estimated Needs:   [x ] no change since previous assessment  [ ] recalculated:     Previous Nutrition Diagnosis:   [ ] Inadequate Energy Intake [ ]Inadequate Oral Intake [ ] Excessive Energy Intake   [ ] Underweight [ ] Increased Nutrient Needs [ ] Overweight/Obesity   [ ] Altered GI Function [ ] Unintended Weight Loss [ ] Food & Nutrition Related Knowledge Deficit [x ] Malnutrition     Nutrition Diagnosis is [x ] ongoing  [ ] resolved [ ] not applicable     New Nutrition Diagnosis: [ ] not applicable       Interventions: Recommend HBV protein foods, small frequent snacks  Recommend  [ ] Change Diet To:  [x ] Nutrition Supplement continue glucerna, dtr to trial lower CHO hi pro po supplement when available  [ ] Nutrition Support  [ ] Other:     Monitoring and Evaluation:   [ ] PO intake [ x ] Tolerance to diet prescription [ x ] weights [ x ] labs[ x ] follow up per protocol  [ ] other:

## 2018-08-18 NOTE — PROGRESS NOTE ADULT - SUBJECTIVE AND OBJECTIVE BOX
Patient seen an examined at bedside. Pain is well controlled. Patient had a fever of 100.5 overnight. No other complaints at this time    PE:  Vital Signs Last 24 Hrs  T(C): 36.4 (18 Aug 2018 04:57), Max: 38.1 (17 Aug 2018 20:15)  T(F): 97.5 (18 Aug 2018 04:57), Max: 100.5 (17 Aug 2018 20:15)  HR: 80 (18 Aug 2018 04:57) (80 - 91)  BP: 130/74 (18 Aug 2018 04:57) (103/61 - 130/74)  RR: 16 (18 Aug 2018 04:57) (16 - 17)  SpO2: 97% (18 Aug 2018 04:57) (93% - 97%)    RLE:    No gross deformity noted  Skin intact; No erythema or ecchymosis  SILT L3-S1  DP1+  EHL/FHL/GSC/TA intact  Compartments soft and compressible  No calf tenderness  Decreased sensation to light tough dorsal/volar foot, hx diabetic neuropathy

## 2018-08-18 NOTE — PROGRESS NOTE ADULT - ASSESSMENT
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     PT/OT      Will discuss with attending and advise if plan changes.

## 2018-08-19 LAB
AFP-TM SERPL-MCNC: 114.5 NG/ML — HIGH
ANION GAP SERPL CALC-SCNC: 10 MMOL/L — SIGNIFICANT CHANGE UP (ref 5–17)
BUN SERPL-MCNC: 46 MG/DL — HIGH (ref 7–23)
CALCIUM SERPL-MCNC: 9.1 MG/DL — SIGNIFICANT CHANGE UP (ref 8.5–10.1)
CHLORIDE SERPL-SCNC: 95 MMOL/L — LOW (ref 96–108)
CO2 SERPL-SCNC: 32 MMOL/L — HIGH (ref 22–31)
CREAT SERPL-MCNC: 1.7 MG/DL — HIGH (ref 0.5–1.3)
CULTURE RESULTS: NO GROWTH — SIGNIFICANT CHANGE UP
GLUCOSE SERPL-MCNC: 144 MG/DL — HIGH (ref 70–99)
HCT VFR BLD CALC: 35.7 % — SIGNIFICANT CHANGE UP (ref 34.5–45)
HGB BLD-MCNC: 11.4 G/DL — LOW (ref 11.5–15.5)
INR BLD: 1.38 RATIO — HIGH (ref 0.88–1.16)
MAGNESIUM SERPL-MCNC: 2.2 MG/DL — SIGNIFICANT CHANGE UP (ref 1.6–2.6)
MCHC RBC-ENTMCNC: 29.3 PG — SIGNIFICANT CHANGE UP (ref 27–34)
MCHC RBC-ENTMCNC: 31.9 GM/DL — LOW (ref 32–36)
MCV RBC AUTO: 91.8 FL — SIGNIFICANT CHANGE UP (ref 80–100)
NRBC # BLD: 0 /100 WBCS — SIGNIFICANT CHANGE UP (ref 0–0)
PLATELET # BLD AUTO: 218 K/UL — SIGNIFICANT CHANGE UP (ref 150–400)
POTASSIUM SERPL-MCNC: 4.6 MMOL/L — SIGNIFICANT CHANGE UP (ref 3.5–5.3)
POTASSIUM SERPL-SCNC: 4.6 MMOL/L — SIGNIFICANT CHANGE UP (ref 3.5–5.3)
PROTHROM AB SERPL-ACNC: 15.1 SEC — HIGH (ref 9.8–12.7)
RBC # BLD: 3.89 M/UL — SIGNIFICANT CHANGE UP (ref 3.8–5.2)
RBC # FLD: 16.5 % — HIGH (ref 10.3–14.5)
SODIUM SERPL-SCNC: 137 MMOL/L — SIGNIFICANT CHANGE UP (ref 135–145)
SPECIMEN SOURCE: SIGNIFICANT CHANGE UP
WBC # BLD: 11.01 K/UL — HIGH (ref 3.8–10.5)
WBC # FLD AUTO: 11.01 K/UL — HIGH (ref 3.8–10.5)

## 2018-08-19 PROCEDURE — 99232 SBSQ HOSP IP/OBS MODERATE 35: CPT

## 2018-08-19 RX ADMIN — Medication 1 TABLET(S): at 08:46

## 2018-08-19 RX ADMIN — Medication 100 MILLIGRAM(S): at 05:35

## 2018-08-19 RX ADMIN — ENOXAPARIN SODIUM 30 MILLIGRAM(S): 100 INJECTION SUBCUTANEOUS at 11:06

## 2018-08-19 RX ADMIN — Medication 120 MILLIGRAM(S): at 05:34

## 2018-08-19 RX ADMIN — Medication 1000 UNIT(S): at 11:06

## 2018-08-19 RX ADMIN — Medication 1: at 08:45

## 2018-08-19 RX ADMIN — Medication 4 UNIT(S): at 12:42

## 2018-08-19 RX ADMIN — INSULIN GLARGINE 12 UNIT(S): 100 INJECTION, SOLUTION SUBCUTANEOUS at 21:50

## 2018-08-19 RX ADMIN — Medication 20 MILLIGRAM(S): at 05:35

## 2018-08-19 RX ADMIN — Medication 2: at 12:41

## 2018-08-19 RX ADMIN — Medication 1: at 17:29

## 2018-08-19 RX ADMIN — SIMVASTATIN 10 MILLIGRAM(S): 20 TABLET, FILM COATED ORAL at 21:41

## 2018-08-19 RX ADMIN — Medication 100 MILLIGRAM(S): at 17:31

## 2018-08-19 RX ADMIN — PANTOPRAZOLE SODIUM 40 MILLIGRAM(S): 20 TABLET, DELAYED RELEASE ORAL at 05:34

## 2018-08-19 RX ADMIN — OXYCODONE HYDROCHLORIDE 10 MILLIGRAM(S): 5 TABLET ORAL at 22:41

## 2018-08-19 RX ADMIN — Medication 1 TABLET(S): at 17:30

## 2018-08-19 RX ADMIN — Medication 4 UNIT(S): at 08:46

## 2018-08-19 RX ADMIN — Medication 4 UNIT(S): at 17:30

## 2018-08-19 RX ADMIN — OXYCODONE HYDROCHLORIDE 10 MILLIGRAM(S): 5 TABLET ORAL at 21:41

## 2018-08-19 NOTE — PROGRESS NOTE ADULT - SUBJECTIVE AND OBJECTIVE BOX
[INTERVAL HX: ]  Patient seen and examined;  Chart reviewed and events noted;   c/o pain that is at times positional.   no CP, no SOB, no LE edema    MEDICATIONS  (STANDING):  ceFAZolin   IVPB 2000 milliGRAM(s) IV Intermittent every 12 hours  cholecalciferol 1000 Unit(s) Oral daily  dextrose 5%. 1000 milliLiter(s) (50 mL/Hr) IV Continuous <Continuous>  dextrose 50% Injectable 12.5 Gram(s) IV Push once  dextrose 50% Injectable 25 Gram(s) IV Push once  dextrose 50% Injectable 25 Gram(s) IV Push once  enoxaparin Injectable 30 milliGRAM(s) SubCutaneous daily  furosemide    Tablet 20 milliGRAM(s) Oral daily  insulin glargine Injectable (LANTUS) 12 Unit(s) SubCutaneous at bedtime  insulin lispro (HumaLOG) corrective regimen sliding scale   SubCutaneous three times a day before meals  insulin lispro (HumaLOG) corrective regimen sliding scale   SubCutaneous at bedtime  insulin lispro Injectable (HumaLOG) 4 Unit(s) SubCutaneous three times a day before meals  lactobacillus acidophilus 1 Tablet(s) Oral two times a day with meals  oxyCODONE  ER Tablet 10 milliGRAM(s) Oral <User Schedule>  pantoprazole    Tablet 40 milliGRAM(s) Oral before breakfast  propranolol  milliGRAM(s) Oral daily  simvastatin 10 milliGRAM(s) Oral at bedtime    MEDICATIONS  (PRN):  dextrose 40% Gel 15 Gram(s) Oral once PRN Blood Glucose LESS THAN 70 milliGRAM(s)/deciLiter  glucagon  Injectable 1 milliGRAM(s) IntraMuscular once PRN Glucose <70 milliGRAM(s)/deciLiter  morphine  - Injectable 2 milliGRAM(s) IV Push every 6 hours PRN Severe Pain (7 - 10)      Vital Signs Last 24 Hrs  T(C): 36.7 (19 Aug 2018 05:30), Max: 37.6 (18 Aug 2018 21:09)  T(F): 98 (19 Aug 2018 05:30), Max: 99.7 (18 Aug 2018 21:09)  HR: 83 (19 Aug 2018 05:30) (75 - 83)  BP: 113/64 (19 Aug 2018 05:30) (104/57 - 113/64)  BP(mean): --  RR: 18 (19 Aug 2018 05:30) (17 - 18)  SpO2: 97% (19 Aug 2018 05:30) (97% - 98%)    [PHYSICAL EXAM]  General: adult in NAD,  WN,  WD. elderly F  HEENT: clear oropharynx, anicteric sclera, pink conjunctivae. no thrush, +dentures  Neck: supple, no masses.  CV: normal S1S2, no murmur, no rubs, no gallops.  Lungs: clear to auscultation, no wheezes, no rales, no rhonchi.  Abdomen: soft, non-tender, non-distended, no hepatosplenomegaly, normal BS, no guarding.  Ext: no clubbing, no cyanosis, no edema.  Skin: no rashes,  no petechiae, no venous stasis changes.  Neuro: alert and oriented X3, no focal motor deficits.  LN: no LUCIANA.      [LABS:]                        10.6   10.13 )-----------( 204      ( 17 Aug 2018 21:48 )             33.0     08-19    137  |  95<L>  |  46<H>  ----------------------------<  144<H>  4.6   |  32<H>  |  1.70<H>    Ca    9.1      19 Aug 2018 08:05  Mg     2.2     08-19      PT/INR - ( 19 Aug 2018 08:05 )   PT: 15.1 sec;   INR: 1.38 ratio               [RADIOLOGY STUDIES:]    EXAM:  MR HIP RT                        PROCEDURE DATE:  08/15/2018    INTERPRETATION:  History: Right hip pain.  Hip MRI: Mildly expansile metastatic lesion within the anterior wall of the right acetabulum with extension to the right superior pubic ramus. Additional metastatic lesions are noted within the right ischium and midportion of the right inferior pubic ramus. There is prominent edema within the right inferior pubic ramus likely related to a superimposed pathologic stress fracture. Prominent edema throughout the right adductor muscles about the right inferior pubic ramus is may be related to underlying reactive edema, muscle strain, and/or extension of metastatic disease.  Nonspecific small lesions within the left femoral head and left femoral neck which may be related to additional metastatic foci.           EXAM:  NM BONE IMG WHOLE BODY                        PROCEDURE DATE:  08/15/2018    FINDINGS:  There is increased radiopharmaceutical accumulation in the   lower right iliac bone extending through the acetabulum into the ischium,   corresponding in part to the abnormality identified on the CT scan of   8/14/2018. The pubic rami are obscured by bladder activity. There is a   small focus of increased radiopharmaceutical accumulation along the   anterior axillary line of a left mid rib, approximately the fifth. There   is focally increased labeled leukocyte accumulation in the region of the   left great toe. There are degenerative changes in the spine and major   joints. There is physiologic distribution of radiopharmaceutical in the   remainder of the imaged osseous structures.    Both kidneys are visualized and are symmetric in appearance.    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    Probable old trauma left mid rib.    Focally increased radiopharmaceutical uptake in the left great toe is   nonspecific, but given the history of left foot ulcer, if osteomyelitis   is a clinical consideration labeled leukocyte imaging is suggested for   further evaluation.              EXAM:  CT ABDOMEN AND PELVIS                        *** ADDENDUM 08/16/2018  ***  Please note the body the report contains a voice transcription error. The   corrected line should read as follows: There is substantial coronary   artery calcification.  *** END OF ADDENDUM 08/16/2018  ***  PROCEDURE DATE:  08/15/2018    INTERPRETATION:  History: Destructive lesion right hip.  CT abdomen and pelvis no contrast. Prior 4/26/2014.  Limited by lack of oral and IV contrast.    Small layering basilar effusions. Prominent interlobular septa at the lung bases suggests interstitial edema. No substantial coronary artery calcification. No calcified gallstones or biliary dilatation.  Scattered poorly defined low-attenuation foci throughout the hepatic parenchyma concerning for metastases. Correlate with contrast-enhanced CT or MR. Unenhanced pancreas spleen not remarkable.  No adrenal nodules.  No hydronephrosis. Left renal cysts.  Atherosclerotic nonaneurysmal abdominal aorta.  Colonic diverticula, no diverticulitis or other active bowel inflammation.  Trace ascites.  Status post hysterectomy.  Distended urinary bladder without wall thickening.  No aggressive lytic foci right acetabulum and right pubis consistent with  neoplasm.    Impression:  Limited by lack of oral and IV contrast.  Inhomogeneous hepatic parenchyma suspicious for metastatic involvement.   Correlate with contrast-enhanced CT or MR.  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  Additional findings as discussed.  ***Please see the addendum at the top of this report. It may contain   additional important information or changes.****          LOCO SANCHEZ M.D., ATTENDING RADIOLOGIST  This document has been electronically signed. Aug 15 2018  9:00AM  Addend:  LOCO SANCHEZ M.D., ATTENDING RADIOLOGIST  This addendum was electronically signed on: Aug 16 2018  1:19PM.

## 2018-08-19 NOTE — PROGRESS NOTE ADULT - SUBJECTIVE AND OBJECTIVE BOX
[facility] -- Cooper Diaz, Don Noriega Pannella, Patel, Savella  Office # [phone]      Follow Up:  AF, CAD    Subjective/Observations: Seen and examined.  Resting in bed appears a bit restless NAD but is scratching, with no rash noted.  Does not report cp, sob or palpitations.  Pt sitting up in bed.        REVIEW OF SYSTEMS: All other review of systems is negative unless indicated above    PAST MEDICAL & SURGICAL HISTORY:  OAB (overactive bladder)  GERD (gastroesophageal reflux disease)  Angina effort  Heart failure  Upper respiratory infection  Diabetes  Renal stones  Myocardial infarction: 1981  Spinal stenosis  CVA (cerebral infarction)  Afib  Raynaud disease  Acid reflux  Hypertension  Hyperlipidemia  Asthma  Cataract  S/P hysterectomy      MEDICATIONS  (STANDING):  ceFAZolin   IVPB 2000 milliGRAM(s) IV Intermittent every 12 hours  cholecalciferol 1000 Unit(s) Oral daily  dextrose 5%. 1000 milliLiter(s) (50 mL/Hr) IV Continuous <Continuous>  dextrose 50% Injectable 12.5 Gram(s) IV Push once  dextrose 50% Injectable 25 Gram(s) IV Push once  dextrose 50% Injectable 25 Gram(s) IV Push once  enoxaparin Injectable 30 milliGRAM(s) SubCutaneous daily  furosemide    Tablet 20 milliGRAM(s) Oral daily  insulin glargine Injectable (LANTUS) 12 Unit(s) SubCutaneous at bedtime  insulin lispro (HumaLOG) corrective regimen sliding scale   SubCutaneous three times a day before meals  insulin lispro (HumaLOG) corrective regimen sliding scale   SubCutaneous at bedtime  insulin lispro Injectable (HumaLOG) 4 Unit(s) SubCutaneous three times a day before meals  lactobacillus acidophilus 1 Tablet(s) Oral two times a day with meals  oxyCODONE  ER Tablet 10 milliGRAM(s) Oral <User Schedule>  pantoprazole    Tablet 40 milliGRAM(s) Oral before breakfast  propranolol  milliGRAM(s) Oral daily  simvastatin 10 milliGRAM(s) Oral at bedtime    MEDICATIONS  (PRN):  dextrose 40% Gel 15 Gram(s) Oral once PRN Blood Glucose LESS THAN 70 milliGRAM(s)/deciLiter  glucagon  Injectable 1 milliGRAM(s) IntraMuscular once PRN Glucose <70 milliGRAM(s)/deciLiter  morphine  - Injectable 2 milliGRAM(s) IV Push every 6 hours PRN Severe Pain (7 - 10)      Allergies    Levaquin (Other)  ramipril (Other)  tetracycline (Hives; Rash)    Intolerances            Vital Signs Last 24 Hrs  T(C): 36.7 (19 Aug 2018 05:30), Max: 37.6 (18 Aug 2018 21:09)  T(F): 98 (19 Aug 2018 05:30), Max: 99.7 (18 Aug 2018 21:09)  HR: 83 (19 Aug 2018 05:30) (75 - 83)  BP: 113/64 (19 Aug 2018 05:30) (104/57 - 113/64)  BP(mean): --  RR: 18 (19 Aug 2018 05:30) (17 - 18)  SpO2: 97% (19 Aug 2018 05:30) (97% - 98%)    I&O's Summary    18 Aug 2018 07:01  -  19 Aug 2018 07:00  --------------------------------------------------------  IN: 390 mL / OUT: 200 mL / NET: 190 mL          PHYSICAL EXAM:  TELE: Not on tele.   Constitutional: NAD, awake and alert, frail  HEENT: Moist Mucous Membranes, Anicteric  Pulmonary: Non-labored, breath sounds with bilateral expiratory wheezing.    Cardiovascular: Regular, S1 and S2, No murmurs, rubs, gallops or clicks  Gastrointestinal: Bowel Sounds present, soft, nontender.   Lymph: No peripheral edema. No lymphadenopathy.  Skin: No visible rashes or ulcers.  Psych:  Mood & affect flat, restless    LABS: All Labs Reviewed:                        10.6   10.13 )-----------( 204      ( 17 Aug 2018 21:48 )             33.0     19 Aug 2018 08:05    137    |  95     |  46     ----------------------------<  144    4.6     |  32     |  1.70   18 Aug 2018 06:48    135    |  96     |  43     ----------------------------<  149    3.8     |  30     |  1.80     Ca    9.1        19 Aug 2018 08:05  Ca    8.9        18 Aug 2018 06:48  Mg     2.2       19 Aug 2018 08:05      PT/INR - ( 19 Aug 2018 08:05 )   PT: 15.1 sec;   INR: 1.38 ratio        < from: 12 Lead ECG (08.14.18 @ 16:17) >  Ventricular Rate 78 BPM    Atrial Rate 78 BPM    P-R Interval 250 ms    QRS Duration 94 ms    Q-T Interval 374 ms    QTC Calculation(Bezet) 426 ms    P Axis 63 degrees    R Axis -52 degrees    T Axis 169 degrees    Diagnosis Line Sinus rhythm with 1st degree AV block  Left anterior fascicular block  Marked ST abnormality, possible anterolateral subendocardial injury  Abnormal ECG    Confirmed by Gregory Rios MD ([id number]) on 8/15/2018 8:30:03 AM    < end of copied text >  < from: TTE Echo Doppler w/o Cont (08.17.18 @ 10:38) >     EXAM:  ECHO TTE W/O CON COMP W/DOPPLR         PROCEDURE DATE:  08/17/2018        INTERPRETATION:  INDICATION: Endocarditis  Referring M.D.:Armendariz  Blood Pressure 101/66        Weight (kg) :68     Height (cm):152       BSA (sq m): 1.65  Technician: PHILIP    Dimensions:    LA 2.9       Normal Values: 2.0 - 4.0 cm    Ao 2.9        Normal Values: 2.0 - 3.8 cm  SEPTUM 0.9       Normal Values: 0.6 - 1.2 cm  PWT 1.2       Normal Values: 0.6 - 1.1 cm  LVIDd 4.0         Normal Values: 3.0 - 5.6 cm  LVIDs 3.1         Normal Values: 1.8 - 4.0 cm      OBSERVATIONS:  Technically difficult and limited study.  Mitral Valve: Calcified mitral annulus and mitral valve leaflets. Normal   opening of the mitral valve leaflets. Trace mitral regurgitation.  Aortic Valve/Aorta: Not well visualized  Tricuspid Valve: Calcified tricuspid annulus with normally opening valve.   Trace tricuspid regurgitation.  Pulmonic Valve: The pulmonic valve is not well visualized. Probably   normal.  Left Atrium: Moderate left atrial enlargement  Right Atrium: Normal  Left Ventricle: The endocardium is not well-visualized. Overall there is   preserved left ventricular systolic function. Unable to rule out wall   motion abnormalities. The EF is approximately 65%.  Right Ventricle: Normal right ventricular size and function.  Pericardium/Pleura: No pericardial effusion noted.  Pulmonary/RV Pressure: The right ventricular systolic pressure is   estimated to be 35mmHg, assuming that the right atrial pressure is   estimated to be8 mmHg. This is consistent with normal pulmonary   pressures.  LV Diastolic Function: Stage 2 diastolic dysfunction    Conclusion:   Technically difficult and limited study. Overall preserved left   ventricular systolic function. Stage II diastolic dysfunction. No   definitive vegetations noted on this study. If clinically warranted would   recommend a JOSE GUADALUPE to rule out endocarditis                  PEPITO BUTT M.D., ATTENDING CARDIOLOGIST  This document has been electronically signed. Aug 17 2018 1:09PM          < end of copied text >     < from: Xray Chest 1 View- PORTABLE-Urgent (08.17.18 @ 21:23) >  EXAM:  XR CHEST PORTABLE URGENT 1V                            PROCEDURE DATE:  08/17/2018          INTERPRETATION:  Chest portable.    Clinical History: Fever.    Comparison: 8/14/2018.    Single AP view submitted.  The patient is rotated.    The evaluation of the cardiomediastinal silhouette is limited on portable   technique.    There are low lung volumes resulting in crowding of the bronchovascular   markings at the lung bases.  There is minimal blunting at the right costophrenic angle either   representing trace pleural effusion and/or pleural thickening.  There is subsegmental atelectasis at both lung bases.    Impression:    Findings as discussed above.                      GHAZALA ARAGON M.D., ATTENDING RADIOLOGIST  This document has been electronically signed. Aug 18 2018  8:09AM                < end of copied text >

## 2018-08-19 NOTE — PROGRESS NOTE ADULT - ASSESSMENT
89f htn, dm, chol, af pvd, adm with hip pain with ?metastatic disease    - No evidence of acute ischemia  - No evidence of volume overload.  Pt on exam seemed restless with pruritus no evidence of rash and had expiratory wheezing.  Pt with hx of asthma and mult allergies.  Pox stable on RA.  Would monitor closely.  Consider nebs prn.    - AF controlled. Cont propanolol  - BP on low side, nitro patch was stopped and patient was inquiring about restarting. Risks vs benefits explained.   - monitor and replete lytes, keep K>4, Mg>2  - Hold AC for IR guided biopsy on Mon 8/20.    - Would cont with prophylaxis dose of lovenox.   - MM panel and CEA results pending   - Bone scan and CT abdomen revealing of possible metastasis in right pelvis and hepatic parenchyma.  - Her LFTs were elevated on admission added to am labs.  - Cultures positive for MSSA, patient is on Vancomycin  - Other cardiovascular workup will depend on clinical course.  - All other workup per primary team  - Will follow     Carolyne Quinones NP-C  Cardiology

## 2018-08-19 NOTE — PROGRESS NOTE ADULT - ASSESSMENT
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     PT/OT      Will discuss with attending and advise if plan changes.

## 2018-08-19 NOTE — PROGRESS NOTE ADULT - ASSESSMENT
88 yo woman with multiple comorbidities presented with several weeks hx of hip/leg pain with  inability to ambulate due to pain in the foot. CT scan revealed destructive bony pelvic lesions concerning for bone metastasis and liver lesions. Dx also with osteomyelitis of great toe.        Hip MRI: Mildly expansile metastatic lesion within the anterior wall of the right acetabulum with extension to the right superior pubic ramus. Additional metastatic lesions are noted within the right ischium and midportion of the right inferior pubic ramus. Nonspecific small lesions within the left femoral head and left femoral neck which may be related to additional metastatic foci.        Bone scan with  increased radiopharmaceutical accumulation in the lower right iliac bone extending through the acetabulum into the ischium. Focally increased uptake in the left great toe is nonspecific, but given the history of left foot ulcer, osteomyelitis needs to be considered.        CT abdomen and pelvis no contrast. Prior 4/26/2014.  Limited by lack of oral and IV contrast. Small layering basilar effusions. Prominent interlobular septa at the   lung bases suggests interstitial edema. Substantial coronary artery calcification.   Scattered poorly defined low-attenuation foci throughout the hepatic parenchyma concerning for metastases.    Patient was on Coumdin for A fib and is on antibiotics  for presumed osteomyelitis  of the left foot    Consult called for recommendation regarding further workup for lytic lesion.     lytic lesions   Cr is stable since 2016. Ca is borderline.  Serum Immunofixation negative for monoclonal protein.    CEA normal 3.8.   Had planned bx on Fri AM 08/17/18, but cancelled due to bfast food intake and high INR.   Spoke with IR again Fri AM, is now tentatively scheduled for Bx of the bony lesion Mon 10AM.   Hold AC. Continue Lovenox prophylaxis over weekend.   NPO after midnight Sun. [ordered]    Coagulopathy due to coumadin. On hold, Oked, per cardiology. On DVT prophylaxis with SQ Lovenox, last dose this AM.   Will DC today. INR 1.38, adequate for planned procedures.     Goals of care d/w pt and pt's family ( 2 sons and daughter prior).   Remains clinically stable.   IR biopsy Mon ~10Am if INR <1.5.   Apparently for Foot surgery Mon afternoon also. Spoke with dtdulce Turner Sat 08/18/18, that regarding procedure, most likely on Mon with involved MD depending on availability of involved physicians, and events on Mon, will determine if podiatry and IR procedure can be coordinated.      Leukocytosis is most likely reactive, monitor     Pain  Continue pain meds for pain control.

## 2018-08-19 NOTE — PROGRESS NOTE ADULT - SUBJECTIVE AND OBJECTIVE BOX
Patient is a 89y old  Female who presents with a chief complaint of diabetic foot ulcer/ambulatory disfunction (15 Aug 2018 02:29)      INTERVAL HPI/OVERNIGHT EVENTS: Patient seen and examined. NAD. No complaints.      Vital Signs Last 24 Hrs  T(C): 36.7 (19 Aug 2018 05:30), Max: 37.6 (18 Aug 2018 21:09)  T(F): 98 (19 Aug 2018 05:30), Max: 99.7 (18 Aug 2018 21:09)  HR: 83 (19 Aug 2018 05:30) (75 - 83)  BP: 113/64 (19 Aug 2018 05:30) (104/57 - 113/64)  BP(mean): --  RR: 18 (19 Aug 2018 05:30) (17 - 18)  SpO2: 97% (19 Aug 2018 05:30) (97% - 98%)        137  |  95<L>  |  46<H>  ----------------------------<  144<H>  4.6   |  32<H>  |  1.70<H>    Ca    9.1      19 Aug 2018 08:05  Mg     2.2                                 10.6   10.13 )-----------( 204      ( 17 Aug 2018 21:48 )             33.0     PT/INR - ( 19 Aug 2018 08:05 )   PT: 15.1 sec;   INR: 1.38 ratio           CAPILLARY BLOOD GLUCOSE      POCT Blood Glucose.: 163 mg/dL (19 Aug 2018 08:04)  POCT Blood Glucose.: 279 mg/dL (18 Aug 2018 21:22)  POCT Blood Glucose.: 254 mg/dL (18 Aug 2018 16:33)    Urinalysis Basic - ( 18 Aug 2018 03:47 )    Color: Pale Yellow / Appearance: Clear / S.015 / pH: x  Gluc: x / Ketone: Negative  / Bili: Negative / Urobili: Negative   Blood: x / Protein: Negative / Nitrite: Negative   Leuk Esterase: Negative / RBC: x / WBC x   Sq Epi: x / Non Sq Epi: x / Bacteria: x              ceFAZolin   IVPB 2000 milliGRAM(s) IV Intermittent every 12 hours  cholecalciferol 1000 Unit(s) Oral daily  dextrose 40% Gel 15 Gram(s) Oral once PRN  dextrose 5%. 1000 milliLiter(s) IV Continuous <Continuous>  dextrose 50% Injectable 12.5 Gram(s) IV Push once  dextrose 50% Injectable 25 Gram(s) IV Push once  dextrose 50% Injectable 25 Gram(s) IV Push once  furosemide    Tablet 20 milliGRAM(s) Oral daily  glucagon  Injectable 1 milliGRAM(s) IntraMuscular once PRN  insulin glargine Injectable (LANTUS) 12 Unit(s) SubCutaneous at bedtime  insulin lispro (HumaLOG) corrective regimen sliding scale   SubCutaneous three times a day before meals  insulin lispro (HumaLOG) corrective regimen sliding scale   SubCutaneous at bedtime  insulin lispro Injectable (HumaLOG) 4 Unit(s) SubCutaneous three times a day before meals  lactobacillus acidophilus 1 Tablet(s) Oral two times a day with meals  morphine  - Injectable 2 milliGRAM(s) IV Push every 6 hours PRN  oxyCODONE  ER Tablet 10 milliGRAM(s) Oral <User Schedule>  pantoprazole    Tablet 40 milliGRAM(s) Oral before breakfast  propranolol  milliGRAM(s) Oral daily  simvastatin 10 milliGRAM(s) Oral at bedtime        Culture - Blood in AM (18 @ 10:59)    Specimen Source: .Blood Blood-Peripheral    Culture Results:   No growth to date.                  REVIEW OF SYSTEMS:  CONSTITUTIONAL: No fever, no weight loss, or no fatigue  NECK: No pain, no stiffness  RESPIRATORY: No cough, no wheezing, no chills, no hemoptysis, No shortness of breath  CARDIOVASCULAR: No chest pain, no palpitations, no dizziness, no leg swelling  GASTROINTESTINAL: No abdominal pain. No nausea, no vomiting, no hematemesis; No diarrhea, no constipation. No melena, no hematochezia.  GENITOURINARY: No dysuria, no frequency, no hematuria, no incontinence  NEUROLOGICAL: No headaches, no loss of strength, no numbness, no tremors  SKIN: No itching, no burning  MUSCULOSKELETAL: No joint pain, no swelling; No muscle, no back, no extremity pain  PSYCHIATRIC: No depression, no mood swings,   HEME/LYMPH: No easy bruising, no bleeding gums  ALLERY AND IMMUNOLOGIC: No hives       Consultant(s) Notes Reviewed:  [X] YES  [ ] NO    PHYSICAL EXAM:  GENERAL: NAD  HEAD:  Atraumatic, Normocephalic  EYES: EOMI, PERRLA, conjunctiva and sclera clear  ENMT: No tonsillar erythema, exudates, or enlargement; Moist mucous membranes  NECK: Supple, No JVD  NERVOUS SYSTEM:  Awake & alert  CHEST/LUNG: Clear to auscultation bilaterally; No rales, rhonchi, wheezing,  HEART: Regular rate and rhythm  ABDOMEN: Soft, Nontender, Nondistended; Bowel sounds present  EXTREMITIES:  No clubbing, cyanosis, or edema  LYMPH: No lymphadenopathy noted  SKIN: No rashes      Advanced care planning discussed with patient/family [X] YES   [ ] NO    Advanced care planning discussed with patient/family. Advanced care planning forms reviewed/discussed/completed. 20 minutes spent.

## 2018-08-19 NOTE — PROGRESS NOTE ADULT - SUBJECTIVE AND OBJECTIVE BOX
ID progress note     Name: DWAYNE DONAHUE  Age: 89y  Gender: Female  MRN: [id number]    Interval History-- Events noted, doing well . For bone biopsy and left hallux amputation tomorrow   Notes reviewed    Past Medical History--  OAB (overactive bladder)  GERD (gastroesophageal reflux disease)  Angina effort  Heart failure  Upper respiratory infection  Diabetes  Renal stones  Myocardial infarction  Spinal stenosis  CVA (cerebral infarction)  Afib  Raynaud disease  Acid reflux  Hypertension  Hyperlipidemia  Asthma  Cataract  S/P hysterectomy      For details regarding the patient's social history, family history, and other miscellaneous elements, please refer the initial infectious diseases consultation and/or the admitting history and physical examination for this admission.    Allergies--  Allergies    Levaquin (Other)  ramipril (Other)  tetracycline (Hives; Rash)    Intolerances        Medications--  Antibiotics:  ceFAZolin   IVPB 2000 milliGRAM(s) IV Intermittent every 12 hours    Immunologic:    Other:  cholecalciferol  dextrose 40% Gel PRN  dextrose 5%.  dextrose 50% Injectable  dextrose 50% Injectable  dextrose 50% Injectable  furosemide    Tablet  glucagon  Injectable PRN  insulin glargine Injectable (LANTUS)  insulin lispro (HumaLOG) corrective regimen sliding scale  insulin lispro (HumaLOG) corrective regimen sliding scale  insulin lispro Injectable (HumaLOG)  lactobacillus acidophilus  morphine  - Injectable PRN  oxyCODONE  ER Tablet  pantoprazole    Tablet  propranolol LA  simvastatin      Review of Systems--  A 10-point review of systems was obtained.     Pertinent positives and negatives--  Constitutional: No fevers. No Chills. No Rigors.   Cardiovascular: No chest pain. No palpitations.  Respiratory: No shortness of breath. No cough.  Gastrointestinal: No nausea or vomiting. No diarrhea or constipation.   Psychiatric: Pleasant. Appropriate affect.    Review of systems otherwise negative except as previously noted.    Physical Examination--  Vital Signs: T(F): 98.6 (08-19-18 @ 13:54), Max: 99.7 (08-18-18 @ 21:09)  HR: 74 (08-19-18 @ 13:54)  BP: 118/67 (08-19-18 @ 13:54)  RR: 18 (08-19-18 @ 13:54)  SpO2: 96% (08-19-18 @ 13:54)  Wt(kg): --  General: Nontoxic-appearing Female in no acute distress.  HEENT: AT/NC. PERRL. EOMI. Anicteric. Conjunctiva pink and moist. Oropharynx clear. Dentition fair.  Neck: Not rigid. No sense of mass.  Nodes: None palpable.  Lungs: Clear bilaterally without rales, wheezing or rhonchi  Heart: Regular rate and rhythm. No Murmur. No rub. No gallop. No palpable thrill.  Abdomen: Bowel sounds present and normoactive. Soft. Nondistended. Nontender. No sense of mass. No organomegaly.  Back: No spinal tenderness. No costovertebral angle tenderness.   Extremities: No cyanosis or clubbing. No edema.   Skin: Warm. Dry. Good turgor. No rash. No vasculitic stigmata.  Psychiatric: Appropriate affect and mood for situation.         Laboratory Studies--  CBC                        11.4   11.01 )-----------( 218      ( 19 Aug 2018 12:12 )             35.7       Chemistries  08-19    137  |  95<L>  |  46<H>  ----------------------------<  144<H>  4.6   |  32<H>  |  1.70<H>    Ca    9.1      19 Aug 2018 08:05  Mg     2.2     08-19    TPro  6.3  /  Alb  1.8<L>  /  TBili  0.4  /  DBili  .30<H>  /  AST  134<H>  /  ALT  24  /  AlkPhos  415<H>  08-19    CAPILLARY BLOOD GLUCOSE      POCT Blood Glucose.: 234 mg/dL (19 Aug 2018 12:26)  POCT Blood Glucose.: 163 mg/dL (19 Aug 2018 08:04)  POCT Blood Glucose.: 279 mg/dL (18 Aug 2018 21:22)  POCT Blood Glucose.: 254 mg/dL (18 Aug 2018 16:33)      RECENT CULTURES    Culture - Urine (collected 18 Aug 2018 10:12)  Source: .Urine Clean Catch (Midstream)  Final Report (19 Aug 2018 11:19):    No growth    Culture - Blood (collected 17 Aug 2018 23:48)  Source: .Blood Blood-Peripheral  Preliminary Report (19 Aug 2018 01:03):    No growth to date.    Culture - Blood (collected 17 Aug 2018 23:48)  Source: .Blood Blood-Peripheral  Preliminary Report (19 Aug 2018 01:03):    No growth to date.    Culture - Blood (collected 17 Aug 2018 10:59)  Source: .Blood Blood-Peripheral  Preliminary Report (18 Aug 2018 11:01):    No growth to date.    Culture - Blood (collected 17 Aug 2018 10:59)  Source: .Blood Blood-Peripheral  Preliminary Report (18 Aug 2018 11:01):    No growth to date.        RADIOLOGY:  MR Hip No Cont, Right (08.15.18 @ 19:21) >  Findings:  Impression:    Mildly expansile metastatic lesion within the anterior wall of the right   acetabulum with extension to the right superior pubic ramus. Additional   metastatic lesions are noted within the right ischium and midportion of   the right inferior pubic ramus. There is prominent edema within the right   inferior pubic ramus likely related to a superimposed pathologic stress   fracture. Prominent edema throughout the right adductor muscles about the   right inferior pubic ramus is may be related to underlying reactive   edema, muscle strain, and/or extension of metastatic disease.    Nonspecific small lesions within the left femoral head and left femoral   neck which may be related to additional metastatic foci.     Xray Foot AP + Lateral + Oblique, Left (08.14.18 @ 16:07) >  IMPRESSION: Soft tissue swelling around the first metatarsal head along   with a skin defect. Underlying focal osteopenia of the left first   metatarsal head for which osteomyelitis cannot be excluded. Recommend   further evaluation with MRI examination or bone scan.    Assessment :     89 y.o woman with multiple comorbidities presented with several weeks h/o of bony pain and recent inability to ambulate du to pain in the foot. Ct scan revealed destructive bony lesions concerning for metastasis and liver lesions. 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 Noted to have staph aurues bacteremia likely from left hallux abscess    The primary source of malignancy is unclear, she is to have biopsy of the hip lesion on Friday provided INR is below 1.5 . Anticoagulation is stopped . She was on Sivextro at home which was covering MRSA     Plan :   - will continue with  Ancef 2 grams q 12 as blood and wound cs is MSSA  - she needs I& D and resection of the left 1st met head , no need for MRI as clinically she has OM   - get echo to rule out endocarditis   - she is undergoing work up to find the primary cancer , she already has liver and destructive bone mets so she has advanced diseases  - overall prognosis is poor   - goals of care conversation with her Son, does not want aggressive care   - podiatry follow up     Continue with present regime .  Appropriate use of antibiotics and adverse effects reviewed.    I have discussed the above plan of care with patient and her family in detail. They expressed understanding of the treatment plan . Risks, benefits and alternatives discussed in detail. I have asked if they have any questions or concerns and appropriately addressed them to the best of my ability .      > 25 minutes spent in direct patient care reviewing  the notes, lab data/ imaging , discussion with multidisciplinary team. All questions were addressed and answered to the best of my capacity .    Thank you for allowing me to participate in the care of your patient .        William Armendariz MD  [phone]

## 2018-08-20 ENCOUNTER — RESULT REVIEW (OUTPATIENT)
Age: 83
End: 2018-08-20

## 2018-08-20 ENCOUNTER — TRANSCRIPTION ENCOUNTER (OUTPATIENT)
Age: 83
End: 2018-08-20

## 2018-08-20 DIAGNOSIS — K59.00 CONSTIPATION, UNSPECIFIED: ICD-10-CM

## 2018-08-20 LAB
ANION GAP SERPL CALC-SCNC: 8 MMOL/L — SIGNIFICANT CHANGE UP (ref 5–17)
APTT BLD: 30 SEC — SIGNIFICANT CHANGE UP (ref 27.5–37.4)
BUN SERPL-MCNC: 47 MG/DL — HIGH (ref 7–23)
CALCIUM SERPL-MCNC: 9 MG/DL — SIGNIFICANT CHANGE UP (ref 8.5–10.1)
CHLORIDE SERPL-SCNC: 95 MMOL/L — LOW (ref 96–108)
CO2 SERPL-SCNC: 31 MMOL/L — SIGNIFICANT CHANGE UP (ref 22–31)
CREAT SERPL-MCNC: 1.7 MG/DL — HIGH (ref 0.5–1.3)
GLUCOSE SERPL-MCNC: 125 MG/DL — HIGH (ref 70–99)
HCT VFR BLD CALC: 35.3 % — SIGNIFICANT CHANGE UP (ref 34.5–45)
HGB BLD-MCNC: 11.2 G/DL — LOW (ref 11.5–15.5)
INR BLD: 1.26 RATIO — HIGH (ref 0.88–1.16)
MCHC RBC-ENTMCNC: 29.1 PG — SIGNIFICANT CHANGE UP (ref 27–34)
MCHC RBC-ENTMCNC: 31.7 GM/DL — LOW (ref 32–36)
MCV RBC AUTO: 91.7 FL — SIGNIFICANT CHANGE UP (ref 80–100)
NRBC # BLD: 0 /100 WBCS — SIGNIFICANT CHANGE UP (ref 0–0)
PLATELET # BLD AUTO: 240 K/UL — SIGNIFICANT CHANGE UP (ref 150–400)
POTASSIUM SERPL-MCNC: 4.6 MMOL/L — SIGNIFICANT CHANGE UP (ref 3.5–5.3)
POTASSIUM SERPL-SCNC: 4.6 MMOL/L — SIGNIFICANT CHANGE UP (ref 3.5–5.3)
PROTHROM AB SERPL-ACNC: 13.8 SEC — HIGH (ref 9.8–12.7)
RBC # BLD: 3.85 M/UL — SIGNIFICANT CHANGE UP (ref 3.8–5.2)
RBC # FLD: 16.7 % — HIGH (ref 10.3–14.5)
SODIUM SERPL-SCNC: 134 MMOL/L — LOW (ref 135–145)
WBC # BLD: 12.16 K/UL — HIGH (ref 3.8–10.5)
WBC # FLD AUTO: 12.16 K/UL — HIGH (ref 3.8–10.5)

## 2018-08-20 PROCEDURE — 77012 CT SCAN FOR NEEDLE BIOPSY: CPT | Mod: 26

## 2018-08-20 PROCEDURE — 88305 TISSUE EXAM BY PATHOLOGIST: CPT | Mod: 26

## 2018-08-20 PROCEDURE — 99233 SBSQ HOSP IP/OBS HIGH 50: CPT

## 2018-08-20 PROCEDURE — 20225 BONE BIOPSY TROCAR/NDL DEEP: CPT

## 2018-08-20 RX ORDER — HEPARIN SODIUM 5000 [USP'U]/ML
2500 INJECTION INTRAVENOUS; SUBCUTANEOUS EVERY 6 HOURS
Qty: 0 | Refills: 0 | Status: DISCONTINUED | OUTPATIENT
Start: 2018-08-20 | End: 2018-08-21

## 2018-08-20 RX ORDER — SENNA PLUS 8.6 MG/1
2 TABLET ORAL AT BEDTIME
Qty: 0 | Refills: 0 | Status: DISCONTINUED | OUTPATIENT
Start: 2018-08-20 | End: 2018-08-21

## 2018-08-20 RX ORDER — POLYETHYLENE GLYCOL 3350 17 G/17G
17 POWDER, FOR SOLUTION ORAL DAILY
Qty: 0 | Refills: 0 | Status: DISCONTINUED | OUTPATIENT
Start: 2018-08-20 | End: 2018-08-21

## 2018-08-20 RX ORDER — HEPARIN SODIUM 5000 [USP'U]/ML
5500 INJECTION INTRAVENOUS; SUBCUTANEOUS EVERY 6 HOURS
Qty: 0 | Refills: 0 | Status: DISCONTINUED | OUTPATIENT
Start: 2018-08-20 | End: 2018-08-21

## 2018-08-20 RX ORDER — LACTOBACILLUS ACIDOPHILUS 100MM CELL
1 CAPSULE ORAL
Qty: 0 | Refills: 0 | Status: DISCONTINUED | OUTPATIENT
Start: 2018-08-20 | End: 2018-08-21

## 2018-08-20 RX ORDER — GLYCERIN ADULT
1 SUPPOSITORY, RECTAL RECTAL DAILY
Qty: 0 | Refills: 0 | Status: DISCONTINUED | OUTPATIENT
Start: 2018-08-20 | End: 2018-08-21

## 2018-08-20 RX ORDER — DOCUSATE SODIUM 100 MG
100 CAPSULE ORAL THREE TIMES A DAY
Qty: 0 | Refills: 0 | Status: DISCONTINUED | OUTPATIENT
Start: 2018-08-20 | End: 2018-08-21

## 2018-08-20 RX ORDER — HEPARIN SODIUM 5000 [USP'U]/ML
INJECTION INTRAVENOUS; SUBCUTANEOUS
Qty: 25000 | Refills: 0 | Status: DISCONTINUED | OUTPATIENT
Start: 2018-08-20 | End: 2018-08-21

## 2018-08-20 RX ORDER — PSYLLIUM SEED (WITH DEXTROSE)
1 POWDER (GRAM) ORAL DAILY
Qty: 0 | Refills: 0 | Status: DISCONTINUED | OUTPATIENT
Start: 2018-08-20 | End: 2018-08-21

## 2018-08-20 RX ADMIN — Medication 20 MILLIGRAM(S): at 05:52

## 2018-08-20 RX ADMIN — Medication 100 MILLIGRAM(S): at 05:52

## 2018-08-20 RX ADMIN — POLYETHYLENE GLYCOL 3350 17 GRAM(S): 17 POWDER, FOR SOLUTION ORAL at 17:14

## 2018-08-20 RX ADMIN — SENNA PLUS 2 TABLET(S): 8.6 TABLET ORAL at 21:58

## 2018-08-20 RX ADMIN — OXYCODONE HYDROCHLORIDE 10 MILLIGRAM(S): 5 TABLET ORAL at 21:58

## 2018-08-20 RX ADMIN — Medication 100 MILLIGRAM(S): at 17:14

## 2018-08-20 RX ADMIN — Medication 1 TABLET(S): at 08:04

## 2018-08-20 RX ADMIN — INSULIN GLARGINE 12 UNIT(S): 100 INJECTION, SOLUTION SUBCUTANEOUS at 21:58

## 2018-08-20 RX ADMIN — Medication 100 MILLIGRAM(S): at 21:58

## 2018-08-20 RX ADMIN — Medication 1 TABLET(S): at 12:50

## 2018-08-20 RX ADMIN — HEPARIN SODIUM 1200 UNIT(S)/HR: 5000 INJECTION INTRAVENOUS; SUBCUTANEOUS at 18:14

## 2018-08-20 RX ADMIN — Medication 1 PACKET(S): at 17:17

## 2018-08-20 RX ADMIN — Medication 4 UNIT(S): at 18:26

## 2018-08-20 RX ADMIN — OXYCODONE HYDROCHLORIDE 10 MILLIGRAM(S): 5 TABLET ORAL at 22:58

## 2018-08-20 RX ADMIN — Medication 1 TABLET(S): at 17:14

## 2018-08-20 RX ADMIN — PANTOPRAZOLE SODIUM 40 MILLIGRAM(S): 20 TABLET, DELAYED RELEASE ORAL at 05:52

## 2018-08-20 RX ADMIN — Medication 1000 UNIT(S): at 13:28

## 2018-08-20 RX ADMIN — SIMVASTATIN 10 MILLIGRAM(S): 20 TABLET, FILM COATED ORAL at 21:58

## 2018-08-20 NOTE — PROGRESS NOTE ADULT - SUBJECTIVE AND OBJECTIVE BOX
[facility] -- Cooper Diaz, Don Noriega Pannella, Patel, Savella  Office # [phone]      Follow Up:  AF, CAD    Subjective/Observations: Seen and examined.  Resting in bed appears a bit restless. Does not report cp, sob or palpitations.        REVIEW OF SYSTEMS: All other review of systems is negative unless indicated above    PAST MEDICAL & SURGICAL HISTORY:  OAB (overactive bladder)  GERD (gastroesophageal reflux disease)  Angina effort  Heart failure  Upper respiratory infection  Diabetes  Renal stones  Myocardial infarction: 1981  Spinal stenosis  CVA (cerebral infarction)  Afib  Raynaud disease  Acid reflux  Hypertension  Hyperlipidemia  Asthma  Cataract  S/P hysterectomy      MEDICATIONS  (STANDING):  ceFAZolin   IVPB 2000 milliGRAM(s) IV Intermittent every 12 hours  cholecalciferol 1000 Unit(s) Oral daily  dextrose 5%. 1000 milliLiter(s) (50 mL/Hr) IV Continuous <Continuous>  dextrose 50% Injectable 12.5 Gram(s) IV Push once  dextrose 50% Injectable 25 Gram(s) IV Push once  dextrose 50% Injectable 25 Gram(s) IV Push once  enoxaparin Injectable 30 milliGRAM(s) SubCutaneous daily  furosemide    Tablet 20 milliGRAM(s) Oral daily  insulin glargine Injectable (LANTUS) 12 Unit(s) SubCutaneous at bedtime  insulin lispro (HumaLOG) corrective regimen sliding scale   SubCutaneous three times a day before meals  insulin lispro (HumaLOG) corrective regimen sliding scale   SubCutaneous at bedtime  insulin lispro Injectable (HumaLOG) 4 Unit(s) SubCutaneous three times a day before meals  lactobacillus acidophilus 1 Tablet(s) Oral two times a day with meals  oxyCODONE  ER Tablet 10 milliGRAM(s) Oral <User Schedule>  pantoprazole    Tablet 40 milliGRAM(s) Oral before breakfast  propranolol  milliGRAM(s) Oral daily  simvastatin 10 milliGRAM(s) Oral at bedtime    MEDICATIONS  (PRN):  dextrose 40% Gel 15 Gram(s) Oral once PRN Blood Glucose LESS THAN 70 milliGRAM(s)/deciLiter  glucagon  Injectable 1 milliGRAM(s) IntraMuscular once PRN Glucose <70 milliGRAM(s)/deciLiter  morphine  - Injectable 2 milliGRAM(s) IV Push every 6 hours PRN Severe Pain (7 - 10)      Allergies    Levaquin (Other)  ramipril (Other)  tetracycline (Hives; Rash)    Intolerances            Vital Signs Last 24 Hrs  T(C): 36.7 (19 Aug 2018 05:30), Max: 37.6 (18 Aug 2018 21:09)  T(F): 98 (19 Aug 2018 05:30), Max: 99.7 (18 Aug 2018 21:09)  HR: 83 (19 Aug 2018 05:30) (75 - 83)  BP: 113/64 (19 Aug 2018 05:30) (104/57 - 113/64)  BP(mean): --  RR: 18 (19 Aug 2018 05:30) (17 - 18)  SpO2: 97% (19 Aug 2018 05:30) (97% - 98%)    I&O's Summary    18 Aug 2018 07:01  -  19 Aug 2018 07:00  --------------------------------------------------------  IN: 390 mL / OUT: 200 mL / NET: 190 mL          PHYSICAL EXAM:  TELE: Not on tele.   Constitutional: NAD, awake and alert, frail  HEENT: Moist Mucous Membranes, Anicteric  Pulmonary: Non-labored, breath sounds with bilateral expiratory wheezing.    Cardiovascular: Regular, S1 and S2, No murmurs, rubs, gallops or clicks  Gastrointestinal: Bowel Sounds present, soft, nontender.   Lymph: No peripheral edema. No lymphadenopathy.  Skin: No visible rashes or ulcers.  Psych:  Mood & affect flat, restless    LABS: All Labs Reviewed:                        10.6   10.13 )-----------( 204      ( 17 Aug 2018 21:48 )             33.0     19 Aug 2018 08:05    137    |  95     |  46     ----------------------------<  144    4.6     |  32     |  1.70   18 Aug 2018 06:48    135    |  96     |  43     ----------------------------<  149    3.8     |  30     |  1.80     Ca    9.1        19 Aug 2018 08:05  Ca    8.9        18 Aug 2018 06:48  Mg     2.2       19 Aug 2018 08:05      PT/INR - ( 19 Aug 2018 08:05 )   PT: 15.1 sec;   INR: 1.38 ratio        < from: 12 Lead ECG (08.14.18 @ 16:17) >  Ventricular Rate 78 BPM    Atrial Rate 78 BPM    P-R Interval 250 ms    QRS Duration 94 ms    Q-T Interval 374 ms    QTC Calculation(Bezet) 426 ms    P Axis 63 degrees    R Axis -52 degrees    T Axis 169 degrees    Diagnosis Line Sinus rhythm with 1st degree AV block  Left anterior fascicular block  Marked ST abnormality, possible anterolateral subendocardial injury  Abnormal ECG    Confirmed by Gregory Rios MD ([id number]) on 8/15/2018 8:30:03 AM    < end of copied text >  < from: TTE Echo Doppler w/o Cont (08.17.18 @ 10:38) >     EXAM:  ECHO TTE W/O CON COMP W/DOPPLR         PROCEDURE DATE:  08/17/2018        INTERPRETATION:  INDICATION: Endocarditis  Referring M.D.:Kala  Blood Pressure 101/66        Weight (kg) :68     Height (cm):152       BSA (sq m): 1.65  Technician: PHILIP    Dimensions:    LA 2.9       Normal Values: 2.0 - 4.0 cm    Ao 2.9        Normal Values: 2.0 - 3.8 cm  SEPTUM 0.9       Normal Values: 0.6 - 1.2 cm  PWT 1.2       Normal Values: 0.6 - 1.1 cm  LVIDd 4.0         Normal Values: 3.0 - 5.6 cm  LVIDs 3.1         Normal Values: 1.8 - 4.0 cm      OBSERVATIONS:  Technically difficult and limited study.  Mitral Valve: Calcified mitral annulus and mitral valve leaflets. Normal   opening of the mitral valve leaflets. Trace mitral regurgitation.  Aortic Valve/Aorta: Not well visualized  Tricuspid Valve: Calcified tricuspid annulus with normally opening valve.   Trace tricuspid regurgitation.  Pulmonic Valve: The pulmonic valve is not well visualized. Probably   normal.  Left Atrium: Moderate left atrial enlargement  Right Atrium: Normal  Left Ventricle: The endocardium is not well-visualized. Overall there is   preserved left ventricular systolic function. Unable to rule out wall   motion abnormalities. The EF is approximately 65%.  Right Ventricle: Normal right ventricular size and function.  Pericardium/Pleura: No pericardial effusion noted.  Pulmonary/RV Pressure: The right ventricular systolic pressure is   estimated to be 35mmHg, assuming that the right atrial pressure is   estimated to be8 mmHg. This is consistent with normal pulmonary   pressures.  LV Diastolic Function: Stage 2 diastolic dysfunction    Conclusion:   Technically difficult and limited study. Overall preserved left   ventricular systolic function. Stage II diastolic dysfunction. No   definitive vegetations noted on this study. If clinically warranted would   recommend a JOSE GUADALUPE to rule out endocarditis                  PEPITO BUTT M.D., ATTENDING CARDIOLOGIST  This document has been electronically signed. Aug 17 2018 1:09PM          < end of copied text >     < from: Xray Chest 1 View- PORTABLE-Urgent (08.17.18 @ 21:23) >  EXAM:  XR CHEST PORTABLE URGENT 1V                            PROCEDURE DATE:  08/17/2018          INTERPRETATION:  Chest portable.    Clinical History: Fever.    Comparison: 8/14/2018.    Single AP view submitted.  The patient is rotated.    The evaluation of the cardiomediastinal silhouette is limited on portable   technique.    There are low lung volumes resulting in crowding of the bronchovascular   markings at the lung bases.  There is minimal blunting at the right costophrenic angle either   representing trace pleural effusion and/or pleural thickening.  There is subsegmental atelectasis at both lung bases.    Impression:    Findings as discussed above.                      GHAZALA ARAGON M.D., ATTENDING RADIOLOGIST  This document has been electronically signed. Aug 18 2018  8:09AM                < end of copied text >

## 2018-08-20 NOTE — PROGRESS NOTE ADULT - SUBJECTIVE AND OBJECTIVE BOX
CAPILLARY BLOOD GLUCOSE      POCT Blood Glucose.: 123 mg/dL (20 Aug 2018 05:55)  POCT Blood Glucose.: 170 mg/dL (19 Aug 2018 21:44)  POCT Blood Glucose.: 194 mg/dL (19 Aug 2018 17:01)  POCT Blood Glucose.: 234 mg/dL (19 Aug 2018 12:26)  POCT Blood Glucose.: 163 mg/dL (19 Aug 2018 08:04)      Vital Signs Last 24 Hrs  T(C): 37 (20 Aug 2018 05:06), Max: 37.1 (19 Aug 2018 20:26)  T(F): 98.6 (20 Aug 2018 05:06), Max: 98.8 (19 Aug 2018 20:26)  HR: 70 (20 Aug 2018 05:06) (70 - 74)  BP: 106/65 (20 Aug 2018 05:06) (106/65 - 118/67)  BP(mean): --  RR: 18 (20 Aug 2018 05:06) (17 - 18)  SpO2: 94% (20 Aug 2018 05:06) (94% - 98%)    General: WN/WD NAD  Respiratory: CTA B/L  CV: RRR, S1S2, no murmurs, rubs or gallops  Abdominal: Soft, NT, ND +BS, Last BM  Extremities: le foot dsg intact     08-20    134<L>  |  95<L>  |  47<H>  ----------------------------<  125<H>  4.6   |  31  |  1.70<H>    Ca    9.0      20 Aug 2018 07:13  Mg     2.2     08-19    TPro  6.3  /  Alb  1.8<L>  /  TBili  0.4  /  DBili  .30<H>  /  AST  134<H>  /  ALT  24  /  AlkPhos  415<H>  08-19      dextrose 40% Gel 15 Gram(s) Oral once PRN  dextrose 50% Injectable 12.5 Gram(s) IV Push once  dextrose 50% Injectable 25 Gram(s) IV Push once  dextrose 50% Injectable 25 Gram(s) IV Push once  glucagon  Injectable 1 milliGRAM(s) IntraMuscular once PRN  insulin glargine Injectable (LANTUS) 12 Unit(s) SubCutaneous at bedtime  insulin lispro (HumaLOG) corrective regimen sliding scale   SubCutaneous three times a day before meals  insulin lispro (HumaLOG) corrective regimen sliding scale   SubCutaneous at bedtime  insulin lispro Injectable (HumaLOG) 4 Unit(s) SubCutaneous three times a day before meals  simvastatin 10 milliGRAM(s) Oral at bedtime

## 2018-08-20 NOTE — PROVIDER CONTACT NOTE (OTHER) - RECOMMENDATIONS
bowel protocol
Per Dr Go reinstated diet orders same as before and NPO after Midnight . No other new orders at this time.

## 2018-08-20 NOTE — PROGRESS NOTE ADULT - ASSESSMENT
89 yo woman with multiple comorbidities presented with several weeks hx of hip/leg pain with  inability to ambulate due to pain in the foot. CT scan revealed destructive bony pelvic lesions in right lisa-pelvis concerning for bone metastasis and liver lesions. Dx also with osteomyelitis of left great toe.        Hip MRI: Mildly expansile metastatic lesion within the anterior wall of the right acetabulum with extension to the right superior pubic ramus. Additional metastatic lesions are noted within the right ischium and midportion of the right inferior pubic ramus. Nonspecific small lesions within the left femoral head and left femoral neck which may be related to additional metastatic foci.        Bone scan with  increased radiopharmaceutical accumulation in the lower right iliac bone extending through the acetabulum into the ischium. Focally increased uptake in the left great toe is nonspecific, but given the history of left foot ulcer, osteomyelitis needs to be considered.        CT abdomen and pelvis no contrast. Prior 4/26/2014.  Limited by lack of oral and IV contrast. Small layering basilar effusions. Prominent interlobular septa at the   lung bases suggests interstitial edema. Substantial coronary artery calcification.   Scattered poorly defined low-attenuation foci throughout the hepatic parenchyma concerning for metastases.    Patient was on Coumdin for A fib and is on antibiotics  for presumed osteomyelitis  of the left foot    Consult called for recommendation regarding further workup for lytic lesion.     lytic vs. metastatic lesions in pelvis  Cr is stable since 2016. Ca is borderline.  Serum Immunofixation negative for monoclonal protein rules out plasma cell disorder  CEA normal 3.8.   Scheduled for biopsy of right hip lesion in IR today (8/20/18); further oncology management pending pathology results    Leukocytosis with osteomyelitis of the left great toe  most likely reactive,  continued on antibiotics  planned for surgical debridment by podiatry today as wel

## 2018-08-20 NOTE — PROGRESS NOTE ADULT - SUBJECTIVE AND OBJECTIVE BOX
Patient is a 89y old  Female who presents with a chief complaint of diabetic foot ulcer/ambulatory disfunction (15 Aug 2018 02:29)      INTERVAL /OVERNIGHT EVENTS: upset about surgery cancellation    MEDICATIONS  (STANDING):  ceFAZolin   IVPB 2000 milliGRAM(s) IV Intermittent every 12 hours  cholecalciferol 1000 Unit(s) Oral daily  dextrose 5%. 1000 milliLiter(s) (50 mL/Hr) IV Continuous <Continuous>  dextrose 50% Injectable 12.5 Gram(s) IV Push once  dextrose 50% Injectable 25 Gram(s) IV Push once  dextrose 50% Injectable 25 Gram(s) IV Push once  docusate sodium 100 milliGRAM(s) Oral three times a day  furosemide    Tablet 20 milliGRAM(s) Oral daily  glycerin Suppository - Adult 1 Suppository(s) Rectal daily  insulin glargine Injectable (LANTUS) 12 Unit(s) SubCutaneous at bedtime  insulin lispro (HumaLOG) corrective regimen sliding scale   SubCutaneous three times a day before meals  insulin lispro (HumaLOG) corrective regimen sliding scale   SubCutaneous at bedtime  insulin lispro Injectable (HumaLOG) 4 Unit(s) SubCutaneous three times a day before meals  lactobacillus acidophilus 1 Tablet(s) Oral three times a day with meals  oxyCODONE  ER Tablet 10 milliGRAM(s) Oral <User Schedule>  pantoprazole    Tablet 40 milliGRAM(s) Oral before breakfast  polyethylene glycol 3350 17 Gram(s) Oral daily  propranolol  milliGRAM(s) Oral daily  psyllium Powder 1 Packet(s) Oral daily  senna 2 Tablet(s) Oral at bedtime  simvastatin 10 milliGRAM(s) Oral at bedtime    MEDICATIONS  (PRN):  dextrose 40% Gel 15 Gram(s) Oral once PRN Blood Glucose LESS THAN 70 milliGRAM(s)/deciLiter  glucagon  Injectable 1 milliGRAM(s) IntraMuscular once PRN Glucose <70 milliGRAM(s)/deciLiter  guaiFENesin    Syrup 100 milliGRAM(s) Oral every 6 hours PRN Cough  morphine  - Injectable 2 milliGRAM(s) IV Push every 6 hours PRN Severe Pain (7 - 10)      Allergies    Levaquin (Other)  ramipril (Other)  tetracycline (Hives; Rash)    Intolerances        REVIEW OF SYSTEMS:  CONSTITUTIONAL: No fever, weight loss, or fatigue  EYES: No eye pain, visual disturbances, or discharge  ENMT:  No difficulty hearing, tinnitus, vertigo; No sinus or throat pain  NECK: No pain or stiffness  RESPIRATORY: No cough, wheezing, chills or hemoptysis; No shortness of breath  CARDIOVASCULAR: No chest pain, palpitations, dizziness, or leg swelling  GASTROINTESTINAL: No abdominal or epigastric pain. No nausea, vomiting, or hematemesis; No diarrhea or constipation. No melena or hematochezia.  GENITOURINARY: No dysuria, frequency, hematuria, or incontinence  NEUROLOGICAL: No headaches, memory loss, loss of strength, numbness, or tremors  SKIN: No itching, burning, rashes, or lesions   LYMPH NODES: No enlarged glands  ENDOCRINE: No heat or cold intolerance; No hair loss; No polydipsia or polyuria  MUSCULOSKELETAL: No joint pain or swelling; No muscle, back, or extremity pain  PSYCHIATRIC: No depression, anxiety, mood swings, or difficulty sleeping  HEME/LYMPH: No easy bruising, or bleeding gums  ALLERGY AND IMMUNOLOGIC: No hives or eczema    Vital Signs Last 24 Hrs  T(C): 37.2 (20 Aug 2018 12:23), Max: 37.3 (20 Aug 2018 10:35)  T(F): 98.9 (20 Aug 2018 12:23), Max: 99.1 (20 Aug 2018 10:35)  HR: 57 (20 Aug 2018 12:23) (57 - 73)  BP: 117/56 (20 Aug 2018 12:23) (105/65 - 136/106)  BP(mean): --  RR: 16 (20 Aug 2018 12:23) (16 - 18)  SpO2: 96% (20 Aug 2018 12:23) (94% - 98%)    PHYSICAL EXAM:  GENERAL: NAD, well-groomed, well-developed  HEAD:  Atraumatic, Normocephalic  EYES: EOMI, PERRLA, conjunctiva and sclera clear  ENMT: No tonsillar erythema, exudates, or enlargement; Moist mucous membranes, Good dentition, No lesions  NECK: Supple, No JVD, Normal thyroid  NERVOUS SYSTEM:  Alert & Oriented X3, Good concentration; Motor Strength 5/5 B/L upper and lower extremities; DTRs 2+ intact and symmetric  CHEST/LUNG: Clear to auscultation bilaterally; No rales, rhonchi, wheezing, or rubs  HEART: Regular rate and rhythm; No murmurs, rubs, or gallops  ABDOMEN: Soft, Nontender, Nondistended; Bowel sounds present  EXTREMITIES:  2+ Peripheral Pulses, No clubbing, cyanosis, or edema  LYMPH: No lymphadenopathy noted  SKIN: No rashes or lesions    LABS:                        11.2   12.16 )-----------( 240      ( 20 Aug 2018 07:13 )             35.3     20 Aug 2018 07:13    134    |  95     |  47     ----------------------------<  125    4.6     |  31     |  1.70     Ca    9.0        20 Aug 2018 07:13      PT/INR - ( 20 Aug 2018 07:13 )   PT: 13.8 sec;   INR: 1.26 ratio         PTT - ( 20 Aug 2018 07:13 )  PTT:30.0 sec    CAPILLARY BLOOD GLUCOSE      POCT Blood Glucose.: 130 mg/dL (20 Aug 2018 12:21)  POCT Blood Glucose.: 123 mg/dL (20 Aug 2018 05:55)  POCT Blood Glucose.: 170 mg/dL (19 Aug 2018 21:44)  POCT Blood Glucose.: 194 mg/dL (19 Aug 2018 17:01)      RADIOLOGY & ADDITIONAL TESTS:    Notes Reviewed:  [x ] YES  [ ] NO    Care Discussed with Consultants/Other Providers [ x] YES  [ ] NO

## 2018-08-20 NOTE — PROGRESS NOTE ADULT - SUBJECTIVE AND OBJECTIVE BOX
Patient seen and examined;  Chart reviewed and events noted;   hard of hearing; family at bedside    MEDICATIONS  (STANDING):  ceFAZolin   IVPB 2000 milliGRAM(s) IV Intermittent every 12 hours  cholecalciferol 1000 Unit(s) Oral daily  dextrose 5%. 1000 milliLiter(s) (50 mL/Hr) IV Continuous <Continuous>  dextrose 50% Injectable 12.5 Gram(s) IV Push once  dextrose 50% Injectable 25 Gram(s) IV Push once  dextrose 50% Injectable 25 Gram(s) IV Push once  furosemide    Tablet 20 milliGRAM(s) Oral daily  insulin glargine Injectable (LANTUS) 12 Unit(s) SubCutaneous at bedtime  insulin lispro (HumaLOG) corrective regimen sliding scale   SubCutaneous three times a day before meals  insulin lispro (HumaLOG) corrective regimen sliding scale   SubCutaneous at bedtime  insulin lispro Injectable (HumaLOG) 4 Unit(s) SubCutaneous three times a day before meals  lactobacillus acidophilus 1 Tablet(s) Oral three times a day with meals  oxyCODONE  ER Tablet 10 milliGRAM(s) Oral <User Schedule>  pantoprazole    Tablet 40 milliGRAM(s) Oral before breakfast  propranolol  milliGRAM(s) Oral daily  simvastatin 10 milliGRAM(s) Oral at bedtime    MEDICATIONS  (PRN):  dextrose 40% Gel 15 Gram(s) Oral once PRN Blood Glucose LESS THAN 70 milliGRAM(s)/deciLiter  glucagon  Injectable 1 milliGRAM(s) IntraMuscular once PRN Glucose <70 milliGRAM(s)/deciLiter  morphine  - Injectable 2 milliGRAM(s) IV Push every 6 hours PRN Severe Pain (7 - 10)      Vital Signs Last 24 Hrs  T(C): 37.2 (20 Aug 2018 12:23), Max: 37.3 (20 Aug 2018 10:35)  T(F): 98.9 (20 Aug 2018 12:23), Max: 99.1 (20 Aug 2018 10:35)  HR: 57 (20 Aug 2018 12:23) (57 - 74)  BP: 117/56 (20 Aug 2018 12:23) (105/65 - 136/106)  RR: 16 (20 Aug 2018 12:23) (16 - 18)  SpO2: 96% (20 Aug 2018 12:23) (94% - 98%)    PHYSICAL EXAM  General: adult in NAD  HEENT: clear oropharynx, anicteric sclera, pink conjunctivae  Neck: supple  CV: normal S1S2 with no murmur rubs or gallops  Lungs: clear to auscultation, no wheezes, no rhales  Abdomen: soft non-tender non-distended, no hepato/splenomegaly  Ext: left foot with bandages in place  Skin: no rashes and no petichiae  Neuro: alert and oriented X3 no focal deficits      LABS:                        11.2   12.16 )-----------( 240      ( 20 Aug 2018 07:13 )             35.3     Hemoglobin: 11.2 g/dL (08-20 @ 07:13)  Hemoglobin: 11.4 g/dL (08-19 @ 12:12)  Hemoglobin: 10.6 g/dL (08-17 @ 21:48)  Hemoglobin: 11.2 g/dL (08-16 @ 08:00)    08-20    134<L>  |  95<L>  |  47<H>  ----------------------------<  125<H>  4.6   |  31  |  1.70<H>    Ca    9.0      20 Aug 2018 07:13  Mg     2.2     08-19    TPro  6.3  /  Alb  1.8<L>  /  TBili  0.4  /  DBili  .30<H>  /  AST  134<H>  /  ALT  24  /  AlkPhos  415<H>  08-19    PT/INR - ( 20 Aug 2018 07:13 )   PT: 13.8 sec;   INR: 1.26 ratio    PTT - ( 20 Aug 2018 07:13 )  PTT:30.0 sec      RADIOLOGY STUDIES:  < from: CT Abdomen and Pelvis No Cont (08.15.18 @ 08:01) >  Impression:    Limited by lack of oral and IV contrast.  Inhomogeneous hepatic parenchyma suspicious for metastatic involvement.   Correlate with contrast-enhanced CT or MR.  Destructive osseous lesions right hip and pubis consistentwith   neoplastic involvement.  Additional findings as discussed.    < end of copied text >      < from: MR Hip No Cont, Right (08.15.18 @ 19:21) >    Impression:    Mildly expansile metastatic lesion within the anterior wall of the right   acetabulum with extension to the right superior pubic ramus. Additional   metastatic lesions are noted within the right ischium and midportion of   the right inferior pubic ramus. There is prominent edema within the right   inferior pubic ramus likely related to a superimposed pathologic stress   fracture. Prominent edema throughout the right adductor muscles about the   right inferior pubic ramus is may be related to underlying reactive   edema, muscle strain, and/or extension of metastatic disease.    Nonspecific small lesions within the left femoral head and left femoral   neck which may be related to additional metastatic foci.       < end of copied text >

## 2018-08-20 NOTE — PROGRESS NOTE ADULT - PROBLEM SELECTOR PLAN 1
cont lantus 12 units qhs  cont humalog 4 units tid before meals  cont humalog scale coverage  goal bg 100-180 in hosp setting  cont cons cho diet

## 2018-08-20 NOTE — PROGRESS NOTE ADULT - SUBJECTIVE AND OBJECTIVE BOX
Patient seen an examined at bedside. Pain is well controlled. No other complaints at this time.    PE:    Vital Signs Last 24 Hrs  T(C): 37 (20 Aug 2018 05:06), Max: 37.1 (19 Aug 2018 20:26)  T(F): 98.6 (20 Aug 2018 05:06), Max: 98.8 (19 Aug 2018 20:26)  HR: 70 (20 Aug 2018 05:06) (70 - 74)  BP: 106/65 (20 Aug 2018 05:06) (106/65 - 118/67)  RR: 18 (20 Aug 2018 05:06) (17 - 18)  SpO2: 94% (20 Aug 2018 05:06) (94% - 98%)    RLE:    No gross deformity noted  Skin intact; No erythema or ecchymosis  SILT L3-S1  DP1+  EHL/FHL/GSC/TA intact  Compartments soft and compressible  No calf tenderness  Decreased sensation to light tough dorsal/volar foot, hx diabetic neuropathy

## 2018-08-20 NOTE — PROGRESS NOTE ADULT - ASSESSMENT
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     PT/OT      Will discuss with attending and advise if plan changes.

## 2018-08-21 ENCOUNTER — RESULT REVIEW (OUTPATIENT)
Age: 83
End: 2018-08-21

## 2018-08-21 LAB
ANION GAP SERPL CALC-SCNC: 9 MMOL/L — SIGNIFICANT CHANGE UP (ref 5–17)
APTT BLD: 128.9 SEC — CRITICAL HIGH (ref 27.5–37.4)
APTT BLD: 86.9 SEC — HIGH (ref 27.5–37.4)
BUN SERPL-MCNC: 49 MG/DL — HIGH (ref 7–23)
CALCIUM SERPL-MCNC: 8.7 MG/DL — SIGNIFICANT CHANGE UP (ref 8.5–10.1)
CHLORIDE SERPL-SCNC: 97 MMOL/L — SIGNIFICANT CHANGE UP (ref 96–108)
CO2 SERPL-SCNC: 30 MMOL/L — SIGNIFICANT CHANGE UP (ref 22–31)
CREAT SERPL-MCNC: 1.6 MG/DL — HIGH (ref 0.5–1.3)
CULTURE RESULTS: SIGNIFICANT CHANGE UP
CULTURE RESULTS: SIGNIFICANT CHANGE UP
GLUCOSE SERPL-MCNC: 142 MG/DL — HIGH (ref 70–99)
HCT VFR BLD CALC: 33 % — LOW (ref 34.5–45)
HCT VFR BLD CALC: 33.7 % — LOW (ref 34.5–45)
HGB BLD-MCNC: 10.7 G/DL — LOW (ref 11.5–15.5)
HGB BLD-MCNC: 10.9 G/DL — LOW (ref 11.5–15.5)
INR BLD: 1.37 RATIO — HIGH (ref 0.88–1.16)
MCHC RBC-ENTMCNC: 29.3 PG — SIGNIFICANT CHANGE UP (ref 27–34)
MCHC RBC-ENTMCNC: 29.4 PG — SIGNIFICANT CHANGE UP (ref 27–34)
MCHC RBC-ENTMCNC: 32.3 GM/DL — SIGNIFICANT CHANGE UP (ref 32–36)
MCHC RBC-ENTMCNC: 32.4 GM/DL — SIGNIFICANT CHANGE UP (ref 32–36)
MCV RBC AUTO: 90.6 FL — SIGNIFICANT CHANGE UP (ref 80–100)
MCV RBC AUTO: 90.7 FL — SIGNIFICANT CHANGE UP (ref 80–100)
NRBC # BLD: 0 /100 WBCS — SIGNIFICANT CHANGE UP (ref 0–0)
NRBC # BLD: 0 /100 WBCS — SIGNIFICANT CHANGE UP (ref 0–0)
PLATELET # BLD AUTO: 207 K/UL — SIGNIFICANT CHANGE UP (ref 150–400)
PLATELET # BLD AUTO: 226 K/UL — SIGNIFICANT CHANGE UP (ref 150–400)
POTASSIUM SERPL-MCNC: 4.2 MMOL/L — SIGNIFICANT CHANGE UP (ref 3.5–5.3)
POTASSIUM SERPL-SCNC: 4.2 MMOL/L — SIGNIFICANT CHANGE UP (ref 3.5–5.3)
PROTHROM AB SERPL-ACNC: 15 SEC — HIGH (ref 9.8–12.7)
RBC # BLD: 3.64 M/UL — LOW (ref 3.8–5.2)
RBC # BLD: 3.72 M/UL — LOW (ref 3.8–5.2)
RBC # FLD: 16.9 % — HIGH (ref 10.3–14.5)
RBC # FLD: 16.9 % — HIGH (ref 10.3–14.5)
SODIUM SERPL-SCNC: 136 MMOL/L — SIGNIFICANT CHANGE UP (ref 135–145)
SPECIMEN SOURCE: SIGNIFICANT CHANGE UP
SPECIMEN SOURCE: SIGNIFICANT CHANGE UP
SURGICAL PATHOLOGY FINAL REPORT - CH: SIGNIFICANT CHANGE UP
WBC # BLD: 10.68 K/UL — HIGH (ref 3.8–10.5)
WBC # BLD: 10.73 K/UL — HIGH (ref 3.8–10.5)
WBC # FLD AUTO: 10.68 K/UL — HIGH (ref 3.8–10.5)
WBC # FLD AUTO: 10.73 K/UL — HIGH (ref 3.8–10.5)

## 2018-08-21 PROCEDURE — 88311 DECALCIFY TISSUE: CPT | Mod: 26

## 2018-08-21 PROCEDURE — 88304 TISSUE EXAM BY PATHOLOGIST: CPT | Mod: 26

## 2018-08-21 PROCEDURE — 99232 SBSQ HOSP IP/OBS MODERATE 35: CPT

## 2018-08-21 PROCEDURE — 73630 X-RAY EXAM OF FOOT: CPT | Mod: 26,LT

## 2018-08-21 RX ORDER — INSULIN LISPRO 100/ML
VIAL (ML) SUBCUTANEOUS AT BEDTIME
Qty: 0 | Refills: 0 | Status: DISCONTINUED | OUTPATIENT
Start: 2018-08-21 | End: 2018-08-28

## 2018-08-21 RX ORDER — FUROSEMIDE 40 MG
20 TABLET ORAL DAILY
Qty: 0 | Refills: 0 | Status: DISCONTINUED | OUTPATIENT
Start: 2018-08-21 | End: 2018-08-24

## 2018-08-21 RX ORDER — INSULIN LISPRO 100/ML
VIAL (ML) SUBCUTANEOUS EVERY 6 HOURS
Qty: 0 | Refills: 0 | Status: DISCONTINUED | OUTPATIENT
Start: 2018-08-21 | End: 2018-08-22

## 2018-08-21 RX ORDER — MULTIVIT-MIN/FERROUS GLUCONATE 9 MG/15 ML
1 LIQUID (ML) ORAL DAILY
Qty: 0 | Refills: 0 | Status: DISCONTINUED | OUTPATIENT
Start: 2018-08-21 | End: 2018-08-28

## 2018-08-21 RX ORDER — OXYCODONE HYDROCHLORIDE 5 MG/1
10 TABLET ORAL
Qty: 0 | Refills: 0 | Status: DISCONTINUED | OUTPATIENT
Start: 2018-08-21 | End: 2018-08-27

## 2018-08-21 RX ORDER — SIMVASTATIN 20 MG/1
10 TABLET, FILM COATED ORAL AT BEDTIME
Qty: 0 | Refills: 0 | Status: DISCONTINUED | OUTPATIENT
Start: 2018-08-21 | End: 2018-08-28

## 2018-08-21 RX ORDER — POLYETHYLENE GLYCOL 3350 17 G/17G
17 POWDER, FOR SOLUTION ORAL DAILY
Qty: 0 | Refills: 0 | Status: DISCONTINUED | OUTPATIENT
Start: 2018-08-21 | End: 2018-08-23

## 2018-08-21 RX ORDER — SODIUM CHLORIDE 9 MG/ML
1000 INJECTION, SOLUTION INTRAVENOUS
Qty: 0 | Refills: 0 | Status: DISCONTINUED | OUTPATIENT
Start: 2018-08-21 | End: 2018-08-28

## 2018-08-21 RX ORDER — SENNA PLUS 8.6 MG/1
2 TABLET ORAL AT BEDTIME
Qty: 0 | Refills: 0 | Status: DISCONTINUED | OUTPATIENT
Start: 2018-08-21 | End: 2018-08-28

## 2018-08-21 RX ORDER — DOCUSATE SODIUM 100 MG
100 CAPSULE ORAL THREE TIMES A DAY
Qty: 0 | Refills: 0 | Status: DISCONTINUED | OUTPATIENT
Start: 2018-08-21 | End: 2018-08-28

## 2018-08-21 RX ORDER — MORPHINE SULFATE 50 MG/1
2 CAPSULE, EXTENDED RELEASE ORAL EVERY 6 HOURS
Qty: 0 | Refills: 0 | Status: DISCONTINUED | OUTPATIENT
Start: 2018-08-21 | End: 2018-08-28

## 2018-08-21 RX ORDER — PSYLLIUM SEED (WITH DEXTROSE)
1 POWDER (GRAM) ORAL DAILY
Qty: 0 | Refills: 0 | Status: DISCONTINUED | OUTPATIENT
Start: 2018-08-21 | End: 2018-08-28

## 2018-08-21 RX ORDER — CEFAZOLIN SODIUM 1 G
2 VIAL (EA) INJECTION
Qty: 70 | Refills: 0 | OUTPATIENT
Start: 2018-08-21 | End: 2018-09-24

## 2018-08-21 RX ORDER — DEXTROSE 50 % IN WATER 50 %
25 SYRINGE (ML) INTRAVENOUS ONCE
Qty: 0 | Refills: 0 | Status: DISCONTINUED | OUTPATIENT
Start: 2018-08-21 | End: 2018-08-28

## 2018-08-21 RX ORDER — INSULIN LISPRO 100/ML
VIAL (ML) SUBCUTANEOUS
Qty: 0 | Refills: 0 | Status: DISCONTINUED | OUTPATIENT
Start: 2018-08-21 | End: 2018-08-28

## 2018-08-21 RX ORDER — GLYCERIN ADULT
1 SUPPOSITORY, RECTAL RECTAL DAILY
Qty: 0 | Refills: 0 | Status: DISCONTINUED | OUTPATIENT
Start: 2018-08-21 | End: 2018-08-28

## 2018-08-21 RX ORDER — INSULIN LISPRO 100/ML
VIAL (ML) SUBCUTANEOUS EVERY 6 HOURS
Qty: 0 | Refills: 0 | Status: DISCONTINUED | OUTPATIENT
Start: 2018-08-21 | End: 2018-08-21

## 2018-08-21 RX ORDER — DEXTROSE 50 % IN WATER 50 %
15 SYRINGE (ML) INTRAVENOUS ONCE
Qty: 0 | Refills: 0 | Status: DISCONTINUED | OUTPATIENT
Start: 2018-08-21 | End: 2018-08-28

## 2018-08-21 RX ORDER — SODIUM CHLORIDE 9 MG/ML
1000 INJECTION, SOLUTION INTRAVENOUS
Qty: 0 | Refills: 0 | Status: DISCONTINUED | OUTPATIENT
Start: 2018-08-21 | End: 2018-08-21

## 2018-08-21 RX ORDER — PROPRANOLOL HCL 160 MG
120 CAPSULE, EXTENDED RELEASE 24HR ORAL DAILY
Qty: 0 | Refills: 0 | Status: DISCONTINUED | OUTPATIENT
Start: 2018-08-21 | End: 2018-08-24

## 2018-08-21 RX ORDER — HYDROMORPHONE HYDROCHLORIDE 2 MG/ML
0.5 INJECTION INTRAMUSCULAR; INTRAVENOUS; SUBCUTANEOUS
Qty: 0 | Refills: 0 | Status: DISCONTINUED | OUTPATIENT
Start: 2018-08-21 | End: 2018-08-21

## 2018-08-21 RX ORDER — CEFAZOLIN SODIUM 1 G
2000 VIAL (EA) INJECTION EVERY 12 HOURS
Qty: 0 | Refills: 0 | Status: COMPLETED | OUTPATIENT
Start: 2018-08-21 | End: 2018-08-27

## 2018-08-21 RX ORDER — INSULIN LISPRO 100/ML
4 VIAL (ML) SUBCUTANEOUS
Qty: 0 | Refills: 0 | Status: DISCONTINUED | OUTPATIENT
Start: 2018-08-21 | End: 2018-08-28

## 2018-08-21 RX ORDER — CHOLECALCIFEROL (VITAMIN D3) 125 MCG
1000 CAPSULE ORAL DAILY
Qty: 0 | Refills: 0 | Status: DISCONTINUED | OUTPATIENT
Start: 2018-08-21 | End: 2018-08-28

## 2018-08-21 RX ORDER — INSULIN GLARGINE 100 [IU]/ML
12 INJECTION, SOLUTION SUBCUTANEOUS AT BEDTIME
Qty: 0 | Refills: 0 | Status: DISCONTINUED | OUTPATIENT
Start: 2018-08-21 | End: 2018-08-28

## 2018-08-21 RX ORDER — GLUCAGON INJECTION, SOLUTION 0.5 MG/.1ML
1 INJECTION, SOLUTION SUBCUTANEOUS ONCE
Qty: 0 | Refills: 0 | Status: DISCONTINUED | OUTPATIENT
Start: 2018-08-21 | End: 2018-08-28

## 2018-08-21 RX ORDER — PANTOPRAZOLE SODIUM 20 MG/1
40 TABLET, DELAYED RELEASE ORAL
Qty: 0 | Refills: 0 | Status: DISCONTINUED | OUTPATIENT
Start: 2018-08-21 | End: 2018-08-28

## 2018-08-21 RX ORDER — ONDANSETRON 8 MG/1
4 TABLET, FILM COATED ORAL ONCE
Qty: 0 | Refills: 0 | Status: DISCONTINUED | OUTPATIENT
Start: 2018-08-21 | End: 2018-08-21

## 2018-08-21 RX ORDER — LACTOBACILLUS ACIDOPHILUS 100MM CELL
1 CAPSULE ORAL
Qty: 0 | Refills: 0 | Status: DISCONTINUED | OUTPATIENT
Start: 2018-08-21 | End: 2018-08-28

## 2018-08-21 RX ORDER — DEXTROSE 50 % IN WATER 50 %
12.5 SYRINGE (ML) INTRAVENOUS ONCE
Qty: 0 | Refills: 0 | Status: DISCONTINUED | OUTPATIENT
Start: 2018-08-21 | End: 2018-08-28

## 2018-08-21 RX ADMIN — Medication 20 MILLIGRAM(S): at 05:07

## 2018-08-21 RX ADMIN — Medication 100 MILLIGRAM(S): at 05:08

## 2018-08-21 RX ADMIN — OXYCODONE HYDROCHLORIDE 10 MILLIGRAM(S): 5 TABLET ORAL at 23:15

## 2018-08-21 RX ADMIN — HEPARIN SODIUM 1200 UNIT(S)/HR: 5000 INJECTION INTRAVENOUS; SUBCUTANEOUS at 00:44

## 2018-08-21 RX ADMIN — SENNA PLUS 2 TABLET(S): 8.6 TABLET ORAL at 22:52

## 2018-08-21 RX ADMIN — Medication 120 MILLIGRAM(S): at 05:07

## 2018-08-21 RX ADMIN — HEPARIN SODIUM 0 UNIT(S)/HR: 5000 INJECTION INTRAVENOUS; SUBCUTANEOUS at 07:15

## 2018-08-21 RX ADMIN — SIMVASTATIN 10 MILLIGRAM(S): 20 TABLET, FILM COATED ORAL at 22:53

## 2018-08-21 RX ADMIN — PANTOPRAZOLE SODIUM 40 MILLIGRAM(S): 20 TABLET, DELAYED RELEASE ORAL at 05:07

## 2018-08-21 RX ADMIN — Medication 100 MILLIGRAM(S): at 05:07

## 2018-08-21 RX ADMIN — Medication 100 MILLIGRAM(S): at 18:25

## 2018-08-21 RX ADMIN — Medication 100 MILLIGRAM(S): at 22:53

## 2018-08-21 RX ADMIN — OXYCODONE HYDROCHLORIDE 10 MILLIGRAM(S): 5 TABLET ORAL at 22:52

## 2018-08-21 RX ADMIN — SODIUM CHLORIDE 100 MILLILITER(S): 9 INJECTION, SOLUTION INTRAVENOUS at 17:48

## 2018-08-21 NOTE — BRIEF OPERATIVE NOTE - PROCEDURE
<<-----Click on this checkbox to enter Procedure Amputation of left first metatarsal  08/21/2018  1st metatarsal head resection  Active  SPANCHAL

## 2018-08-21 NOTE — PROGRESS NOTE ADULT - ASSESSMENT
89 female with a history of HTN, CAD, DM. PVD, CKD now with sepsis and diabetic foot ulcer.   CKD stage 3 is close to baseline. Continue the low dose lasix for now.   will follow closely.

## 2018-08-21 NOTE — PROGRESS NOTE ADULT - SUBJECTIVE AND OBJECTIVE BOX
[facility]    Cooper Diaz, Hari, Don, Kenton, Med, Jimi      [phone]    CHIEF COMPLAINT: Patient is a 89y old  Female who presents with a chief complaint of diabetic foot ulcer/ambulatory disfunction (15 Aug 2018 02:29)      Follow Up: af, pvd, possible met ca, for  metatarsal rsxn    Interim history: The patient reports no new symptoms.  Denies chest discomfort and shortness of breath.  No abdominal pain.  No new neurologic symptoms.      MEDICATIONS  (STANDING):  ceFAZolin   IVPB 2000 milliGRAM(s) IV Intermittent every 12 hours  cholecalciferol 1000 Unit(s) Oral daily  dextrose 5%. 1000 milliLiter(s) (50 mL/Hr) IV Continuous <Continuous>  dextrose 50% Injectable 12.5 Gram(s) IV Push once  dextrose 50% Injectable 25 Gram(s) IV Push once  dextrose 50% Injectable 25 Gram(s) IV Push once  docusate sodium 100 milliGRAM(s) Oral three times a day  furosemide    Tablet 20 milliGRAM(s) Oral daily  glycerin Suppository - Adult 1 Suppository(s) Rectal daily  insulin glargine Injectable (LANTUS) 12 Unit(s) SubCutaneous at bedtime  insulin lispro (HumaLOG) corrective regimen sliding scale   SubCutaneous every 6 hours  insulin lispro Injectable (HumaLOG) 4 Unit(s) SubCutaneous three times a day before meals  lactobacillus acidophilus 1 Tablet(s) Oral three times a day with meals  oxyCODONE  ER Tablet 10 milliGRAM(s) Oral <User Schedule>  pantoprazole    Tablet 40 milliGRAM(s) Oral before breakfast  polyethylene glycol 3350 17 Gram(s) Oral daily  propranolol  milliGRAM(s) Oral daily  psyllium Powder 1 Packet(s) Oral daily  senna 2 Tablet(s) Oral at bedtime  simvastatin 10 milliGRAM(s) Oral at bedtime    MEDICATIONS  (PRN):  bisacodyl Suppository 10 milliGRAM(s) Rectal daily PRN Constipation  dextrose 40% Gel 15 Gram(s) Oral once PRN Blood Glucose LESS THAN 70 milliGRAM(s)/deciLiter  glucagon  Injectable 1 milliGRAM(s) IntraMuscular once PRN Glucose <70 milliGRAM(s)/deciLiter  guaiFENesin    Syrup 100 milliGRAM(s) Oral every 6 hours PRN Cough  morphine  - Injectable 2 milliGRAM(s) IV Push every 6 hours PRN Severe Pain (7 - 10)      REVIEW OF SYSTEMS:  eye, ent, GI, , allergic, dermatologic, musculoskeletal and neurologic are negative except as described above    Vital Signs Last 24 Hrs  T(C): 36.7 (21 Aug 2018 04:55), Max: 37.3 (20 Aug 2018 20:29)  T(F): 98.1 (21 Aug 2018 04:55), Max: 99.1 (20 Aug 2018 20:29)  HR: 79 (21 Aug 2018 04:55) (57 - 86)  BP: 126/73 (21 Aug 2018 04:55) (103/76 - 126/73)  BP(mean): --  RR: 18 (21 Aug 2018 04:55) (16 - 18)  SpO2: 96% (21 Aug 2018 04:55) (94% - 96%)    I&O's Summary    20 Aug 2018 07:01  -  21 Aug 2018 07:00  --------------------------------------------------------  IN: 112 mL / OUT: 0 mL / NET: 112 mL    21 Aug 2018 07:01  -  21 Aug 2018 12:01  --------------------------------------------------------  IN: 0 mL / OUT: 0 mL / NET: 0 mL        Telemetry past 24h:    PHYSICAL EXAM:    Constitutional: well-nourished, well-developed, NAD   HEENT:  MMM, sclerae anicteric, conjunctivae clear, no oral cyanosis.  Pulmonary: Non-labored, breath sounds are clear bilaterally, No wheezing, rales or rhonchi  Cardiovascular: Regular, S1 and S2.  No murmur.  No rubs, gallops or clicks  Gastrointestinal: Bowel Sounds present, soft, nontender.   Lymph: No peripheral edema.   Neurological: Alert, no focal deficits  Skin: No rashes.  Psych:  Mood & affect appropriate    LABS: All Labs Reviewed:                        10.9   10.68 )-----------( 207      ( 21 Aug 2018 06:35 )             33.7                         10.7   10.73 )-----------( 226      ( 21 Aug 2018 00:20 )             33.0                         11.2   12.16 )-----------( 240      ( 20 Aug 2018 07:13 )             35.3     21 Aug 2018 06:35    136    |  97     |  49     ----------------------------<  142    4.2     |  30     |  1.60   20 Aug 2018 07:13    134    |  95     |  47     ----------------------------<  125    4.6     |  31     |  1.70   19 Aug 2018 08:05    137    |  95     |  46     ----------------------------<  144    4.6     |  32     |  1.70     Ca    8.7        21 Aug 2018 06:35  Ca    9.0        20 Aug 2018 07:13  Ca    9.1        19 Aug 2018 08:05  Mg     2.2       19 Aug 2018 08:05    TPro  6.3    /  Alb  1.8    /  TBili  0.4    /  DBili  .30    /  AST  134    /  ALT  24     /  AlkPhos  415    19 Aug 2018 08:05    PT/INR - ( 21 Aug 2018 06:35 )   PT: 15.0 sec;   INR: 1.37 ratio         PTT - ( 21 Aug 2018 06:35 )  PTT:128.9 sec      Blood Culture: Organism --  Gram Stain Blood -- Gram Stain --  Specimen Source .Urine Clean Catch (Midstream)  Culture-Blood --    Organism --  Gram Stain Blood -- Gram Stain --  Specimen Source .Blood Blood-Peripheral  Culture-Blood --    Organism --  Gram Stain Blood -- Gram Stain --  Specimen Source .Blood Blood-Peripheral  Culture-Blood --            RADIOLOGY:    EKG:    Echo:

## 2018-08-21 NOTE — PROGRESS NOTE ADULT - SUBJECTIVE AND OBJECTIVE BOX
ID progress note     Name: DWAYNE DONAHUE  Age: 89y  Gender: Female  MRN: [id number]    Interval History-- Events noted, scheduled for left hallux amputation today , has pain in left foot.   Notes reviewed    Past Medical History--  OAB (overactive bladder)  GERD (gastroesophageal reflux disease)  Angina effort  Heart failure  Upper respiratory infection  Diabetes  Renal stones  Myocardial infarction  Spinal stenosis  CVA (cerebral infarction)  Afib  Raynaud disease  Acid reflux  Hypertension  Hyperlipidemia  Asthma  Cataract  S/P hysterectomy      For details regarding the patient's social history, family history, and other miscellaneous elements, please refer the initial infectious diseases consultation and/or the admitting history and physical examination for this admission.    Allergies--  Allergies    Levaquin (Other)  ramipril (Other)  tetracycline (Hives; Rash)    Intolerances        Medications--  Antibiotics:  ceFAZolin   IVPB 2000 milliGRAM(s) IV Intermittent every 12 hours    Immunologic:    Other:  bisacodyl Suppository PRN  cholecalciferol  dextrose 40% Gel PRN  dextrose 5%.  dextrose 50% Injectable  dextrose 50% Injectable  dextrose 50% Injectable  docusate sodium  furosemide    Tablet  glucagon  Injectable PRN  glycerin Suppository - Adult  guaiFENesin    Syrup PRN  insulin glargine Injectable (LANTUS)  insulin lispro (HumaLOG) corrective regimen sliding scale  insulin lispro Injectable (HumaLOG)  lactobacillus acidophilus  morphine  - Injectable PRN  oxyCODONE  ER Tablet  pantoprazole    Tablet  polyethylene glycol 3350  propranolol LA  psyllium Powder  senna  simvastatin      Review of Systems--  A 10-point review of systems was obtained.     Pertinent positives and negatives--  Constitutional: No fevers. No Chills. No Rigors.   Cardiovascular: No chest pain. No palpitations.  Respiratory: No shortness of breath. No cough.  Gastrointestinal: No nausea or vomiting. No diarrhea or constipation.   Psychiatric: Pleasant. Appropriate affect.    Review of systems otherwise negative except as previously noted.    Physical Examination--  Vital Signs: T(F): 98.1 (08-21-18 @ 04:55), Max: 99.1 (08-20-18 @ 20:29)  HR: 79 (08-21-18 @ 04:55)  BP: 126/73 (08-21-18 @ 04:55)  RR: 18 (08-21-18 @ 04:55)  SpO2: 96% (08-21-18 @ 04:55)  Wt(kg): --    General: Nontoxic-appearing Female in no acute distress.  HEENT: AT/NC. PERRL. EOMI. Anicteric. Conjunctiva pink and moist. Oropharynx clear. Dentition fair.  Neck: Not rigid. No sense of mass.  Nodes: None palpable.  Lungs: Clear bilaterally without rales, wheezing or rhonchi  Heart: Regular rate and rhythm. No Murmur. No rub. No gallop. No palpable thrill.  Abdomen: Bowel sounds present and normoactive. Soft. Nondistended. Nontender. No sense of mass. No organomegaly.  Back: No spinal tenderness. No costovertebral angle tenderness.   Extremities: No cyanosis or clubbing. left foot dressing in place   Skin: Warm. Dry. Good turgor. No rash. No vasculitic stigmata.  Psychiatric: Appropriate affect and mood for situation.     Laboratory Studies--  CBC                        10.9   10.68 )-----------( 207      ( 21 Aug 2018 06:35 )             33.7       Chemistries  08-21    136  |  97  |  49<H>  ----------------------------<  142<H>  4.2   |  30  |  1.60<H>    Ca    8.7      21 Aug 2018 06:35    CAPILLARY BLOOD GLUCOSE      POCT Blood Glucose.: 142 mg/dL (21 Aug 2018 05:58)  POCT Blood Glucose.: 188 mg/dL (20 Aug 2018 21:05)  POCT Blood Glucose.: 83 mg/dL (20 Aug 2018 17:08)  POCT Blood Glucose.: 130 mg/dL (20 Aug 2018 12:21)      Culture Data    Culture - Urine (collected 18 Aug 2018 10:12)  Source: .Urine Clean Catch (Midstream)  Final Report (19 Aug 2018 11:19):    No growth    Culture - Blood (collected 17 Aug 2018 23:48)  Source: .Blood Blood-Peripheral  Preliminary Report (19 Aug 2018 01:03):    No growth to date.    Culture - Blood (collected 17 Aug 2018 23:48)  Source: .Blood Blood-Peripheral  Preliminary Report (19 Aug 2018 01:03):    No growth to date.    Culture - Blood (collected 17 Aug 2018 10:59)  Source: .Blood Blood-Peripheral  Preliminary Report (18 Aug 2018 11:01):    No growth to date.    Culture - Blood (collected 17 Aug 2018 10:59)  Source: .Blood Blood-Peripheral  Preliminary Report (18 Aug 2018 11:01):    No growth to date.    Culture - Blood (collected 16 Aug 2018 08:42)  Source: .Blood Blood-Peripheral  Final Report (21 Aug 2018 09:00):    No growth at 5 days.    Culture - Blood (collected 16 Aug 2018 08:42)  Source: .Blood Blood-Venous  Final Report (21 Aug 2018 09:00):    No growth at 5 days.    Culture - Other (collected 15 Aug 2018 00:41)  Source: .Other left toe  Final Report (16 Aug 2018 18:36):    Moderate Staphylococcus aureus  Organism: Staphylococcus aureus (16 Aug 2018 18:36)  Organism: Staphylococcus aureus (16 Aug 2018 18:36)    Culture - Blood (collected 14 Aug 2018 21:28)  Source: .Blood Blood  Gram Stain (16 Aug 2018 05:57):    Growth in anaerobic bottle: Gram Positive Cocci in Clusters  Final Report (18 Aug 2018 06:49):    Growth in anaerobic bottle: Staphylococcus aureus    See previous culture [id number]    Culture - Blood (collected 14 Aug 2018 21:27)  Source: .Blood Blood  Gram Stain (16 Aug 2018 05:55):    Growth in aerobic bottle: Gram Positive Cocci in Clusters    Growth in anaerobic bottle: Gram Positive Cocci in Clusters  Final Report (17 Aug 2018 19:27):    Growth in aerobic and anaerobic bottles: Staphylococcus aureus    "Due to technical problems, Proteus sp. will Not be reported as part of    the BCID panel until further notice"    ***Blood Panel PCR results on this specimen are available    approximately 3 hours after the Gram stain result.***    Gram stain, PCR, and/or culture results may not always    correspond due to difference in methodologies.    ************************************************************    This PCR assay was performed using [employer].    The following targets are tested for: Enterococcus,    vancomycin resistant enterococci, Listeria monocytogenes,    coagulase negative staphylococci, S. aureus,    methicillin resistant S. aureus, Streptococcus agalactiae    (Group B), S. pneumoniae, S. pyogenes (Group A),    Acinetobacter baumannii, Enterobacter cloacae, E. coli,    Klebsiella oxytoca, K. pneumoniae, Proteus sp.,    Serratia marcescens, Haemophilus influenzae,    Neisseria meningitidis, Pseudomonas aeruginosa, Candida    albicans, C. glabrata, C krusei, C parapsilosis,    C. tropicalis and the KPC resistance gene.  Organism: Blood Culture PCR  Staphylococcus aureus (17 Aug 2018 19:27)  Organism: Staphylococcus aureus (17 Aug 2018 19:27)  Organism: Blood Culture PCR (17 Aug 2018 19:27)      RADIOLOGY:  Radiology :  TTE Echo Doppler w/o Cont (08.17.18 @ 10:38) >    Conclusion:   Technically difficult and limited study. Overall preserved left   ventricular systolic function. Stage II diastolic dysfunction. No   definitive vegetations noted on this study. If clinically warranted would   recommend a JOSE GUADALUPE to rule out endocarditis        Assessment :     89 y.o woman with multiple comorbidities presented with several weeks h/o of bony pain and recent inability to ambulate du to pain in the foot. Ct scan revealed destructive bony lesions concerning for metastasis and liver lesions. 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 Noted to have staph aurues bacteremia likely from left hallux abscess    The primary source of malignancy is unclear, she is to have biopsy of the hip lesion on Friday provided INR is below 1.5 . Anticoagulation is stopped . 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  Appropriate use of antibiotics and adverse effects reviewed.    I have discussed the above plan of care with patient and her family in detail. They expressed understanding of the treatment plan . Risks, benefits and alternatives discussed in detail. I have asked if they have any questions or concerns and appropriately addressed them to the best of my ability .      > 35 minutes spent in direct patient care reviewing  the notes, lab data/ imaging , discussion with multidisciplinary team. All questions were addressed and answered to the best of my capacity .    Thank you for allowing me to participate in the care of your patient .        William Armendariz MD  [phone]

## 2018-08-21 NOTE — PROGRESS NOTE ADULT - SUBJECTIVE AND OBJECTIVE BOX
DWAYNE DONAHUE  89y  Female    Patient is a 89y old  Female who presents with a chief complaint of diabetic foot ulcer/ambulatory disfunction (15 Aug 2018 02:29)      denies any chest pain or shortness of breath.       PAST MEDICAL & SURGICAL HISTORY:  OAB (overactive bladder)  GERD (gastroesophageal reflux disease)  Angina effort  Heart failure  Upper respiratory infection  Diabetes  Renal stones  Myocardial infarction: 1981  Spinal stenosis  CVA (cerebral infarction)  Afib  Raynaud disease  Acid reflux  Hypertension  Hyperlipidemia  Asthma  Cataract  S/P hysterectomy    PHYSICAL EXAM:    T(C): 36.7 (08-21-18 @ 13:40), Max: 37.3 (08-20-18 @ 20:29)  HR: 66 (08-21-18 @ 13:40) (66 - 86)  BP: 100/60 (08-21-18 @ 13:40) (100/60 - 126/73)  RR: 16 (08-21-18 @ 13:40) (16 - 18)  SpO2: 97% (08-21-18 @ 13:40) (95% - 97%)  Wt(kg): --    I&O's Detail    20 Aug 2018 07:01  -  21 Aug 2018 07:00  --------------------------------------------------------  IN:    heparin  Infusion.: 12 mL    Solution: 100 mL  Total IN: 112 mL    OUT:  Total OUT: 0 mL    Total NET: 112 mL      21 Aug 2018 07:01  -  21 Aug 2018 14:51  --------------------------------------------------------  IN:  Total IN: 0 mL    OUT:  Total OUT: 0 mL    Total NET: 0 mL    Respiratory: clear anteriorly, decreased BS at bases  Cardiovascular: S1 S2  Gastrointestinal: soft NT ND +BS  Extremities: trace edema   Neuro: Awake and alert    MEDICATIONS  (STANDING):  ceFAZolin   IVPB 2000 milliGRAM(s) IV Intermittent every 12 hours  cholecalciferol 1000 Unit(s) Oral daily  dextrose 5%. 1000 milliLiter(s) (50 mL/Hr) IV Continuous <Continuous>  dextrose 50% Injectable 12.5 Gram(s) IV Push once  dextrose 50% Injectable 25 Gram(s) IV Push once  dextrose 50% Injectable 25 Gram(s) IV Push once  docusate sodium 100 milliGRAM(s) Oral three times a day  furosemide    Tablet 20 milliGRAM(s) Oral daily  glycerin Suppository - Adult 1 Suppository(s) Rectal daily  insulin glargine Injectable (LANTUS) 12 Unit(s) SubCutaneous at bedtime  insulin lispro (HumaLOG) corrective regimen sliding scale   SubCutaneous every 6 hours  insulin lispro Injectable (HumaLOG) 4 Unit(s) SubCutaneous three times a day before meals  lactobacillus acidophilus 1 Tablet(s) Oral three times a day with meals  oxyCODONE  ER Tablet 10 milliGRAM(s) Oral <User Schedule>  pantoprazole    Tablet 40 milliGRAM(s) Oral before breakfast  polyethylene glycol 3350 17 Gram(s) Oral daily  propranolol  milliGRAM(s) Oral daily  psyllium Powder 1 Packet(s) Oral daily  senna 2 Tablet(s) Oral at bedtime  simvastatin 10 milliGRAM(s) Oral at bedtime    MEDICATIONS  (PRN):  bisacodyl Suppository 10 milliGRAM(s) Rectal daily PRN Constipation  dextrose 40% Gel 15 Gram(s) Oral once PRN Blood Glucose LESS THAN 70 milliGRAM(s)/deciLiter  glucagon  Injectable 1 milliGRAM(s) IntraMuscular once PRN Glucose <70 milliGRAM(s)/deciLiter  guaiFENesin    Syrup 100 milliGRAM(s) Oral every 6 hours PRN Cough  morphine  - Injectable 2 milliGRAM(s) IV Push every 6 hours PRN Severe Pain (7 - 10)                            10.9   10.68 )-----------( 207      ( 21 Aug 2018 06:35 )             33.7       08-21    136  |  97  |  49<H>  ----------------------------<  142<H>  4.2   |  30  |  1.60<H>    Ca    8.7      21 Aug 2018 06:35

## 2018-08-21 NOTE — PROGRESS NOTE ADULT - SUBJECTIVE AND OBJECTIVE BOX
[INTERVAL HX: ]  Patient seen and examined;  Chart reviewed and events noted;   s/p bone biopsy.   Awaiting foot surgery today.       MEDICATIONS  (STANDING):  ceFAZolin   IVPB 2000 milliGRAM(s) IV Intermittent every 12 hours  cholecalciferol 1000 Unit(s) Oral daily  dextrose 5%. 1000 milliLiter(s) (50 mL/Hr) IV Continuous <Continuous>  dextrose 50% Injectable 12.5 Gram(s) IV Push once  dextrose 50% Injectable 25 Gram(s) IV Push once  dextrose 50% Injectable 25 Gram(s) IV Push once  docusate sodium 100 milliGRAM(s) Oral three times a day  furosemide    Tablet 20 milliGRAM(s) Oral daily  glycerin Suppository - Adult 1 Suppository(s) Rectal daily  insulin glargine Injectable (LANTUS) 12 Unit(s) SubCutaneous at bedtime  insulin lispro (HumaLOG) corrective regimen sliding scale   SubCutaneous every 6 hours  insulin lispro Injectable (HumaLOG) 4 Unit(s) SubCutaneous three times a day before meals  lactobacillus acidophilus 1 Tablet(s) Oral three times a day with meals  oxyCODONE  ER Tablet 10 milliGRAM(s) Oral <User Schedule>  pantoprazole    Tablet 40 milliGRAM(s) Oral before breakfast  polyethylene glycol 3350 17 Gram(s) Oral daily  propranolol  milliGRAM(s) Oral daily  psyllium Powder 1 Packet(s) Oral daily  senna 2 Tablet(s) Oral at bedtime  simvastatin 10 milliGRAM(s) Oral at bedtime    MEDICATIONS  (PRN):  bisacodyl Suppository 10 milliGRAM(s) Rectal daily PRN Constipation  dextrose 40% Gel 15 Gram(s) Oral once PRN Blood Glucose LESS THAN 70 milliGRAM(s)/deciLiter  glucagon  Injectable 1 milliGRAM(s) IntraMuscular once PRN Glucose <70 milliGRAM(s)/deciLiter  guaiFENesin    Syrup 100 milliGRAM(s) Oral every 6 hours PRN Cough  morphine  - Injectable 2 milliGRAM(s) IV Push every 6 hours PRN Severe Pain (7 - 10)      Vital Signs Last 24 Hrs  T(C): 36.7 (21 Aug 2018 13:40), Max: 37.3 (20 Aug 2018 20:29)  T(F): 98 (21 Aug 2018 13:40), Max: 99.1 (20 Aug 2018 20:29)  HR: 66 (21 Aug 2018 13:40) (66 - 86)  BP: 100/60 (21 Aug 2018 13:40) (100/60 - 126/73)  BP(mean): --  RR: 16 (21 Aug 2018 13:40) (16 - 18)  SpO2: 97% (21 Aug 2018 13:40) (95% - 97%)    [PHYSICAL EXAM]  General: adult in NAD,  WN,  WD, elderly  HEENT: clear oropharynx, anicteric sclera, pink conjunctivae.  Neck: supple, no masses.  CV: normal S1S2, no murmur, no rubs, no gallops.  Lungs: clear to auscultation, no wheezes, no rales, no rhonchi.  Abdomen: soft, non-tender, non-distended, no hepatosplenomegaly, normal BS, no guarding.  Ext: no clubbing, no cyanosis, no edema.  Skin: no rashes,  no petechiae, no venous stasis changes.  Neuro: alert and oriented X2, no focal motor deficits.  LN: no LUCIANA.      [LABS:]                        10.9   10.68 )-----------( 207      ( 21 Aug 2018 06:35 )             33.7     08-21    136  |  97  |  49<H>  ----------------------------<  142<H>  4.2   |  30  |  1.60<H>    Ca    8.7      21 Aug 2018 06:35      PT/INR - ( 21 Aug 2018 06:35 )   PT: 15.0 sec;   INR: 1.37 ratio         PTT - ( 21 Aug 2018 06:35 )  PTT:128.9 sec    Alpha Fetoprotein - Tumor Marker (08.19.18 @ 10:07)    Alpha Fetoprotein - Tumor Marker: 114.5 ng/mL  Method: Roche Electrochemilumenescence Values obtained with different assay methods or kits cannot be used interchangeably.  Results cannot be interpreted as absolute evidence of the presence or absence of malignant disease.  AFP values are not interpretable in pregnant females.         [RADIOLOGY STUDIES:]

## 2018-08-21 NOTE — PROGRESS NOTE ADULT - ASSESSMENT
89 F htn, dm, chol, af pvd, adm with hip pain with ?metastatic disease now s/p iliac crest biopsy for malignancy    -there is no evidence of acute ischemia.    -there is no evidence of significant arrhythmia.  -af on ac  -remains on hep gtt  -continue heparin drip, with goal ptt 60-90 per protocol.  Monitor for toxicity/bleeding.  -cont propranolol    -there is no evidence for meaningful  volume overload.    -planned for podiatric procedure today  - Pt has no active CAD, ADHF or severe valvular disease and in the setting of planned low risk  procedure, she is considered optimized from cardiovascular standpoint.  She may proceed with planned procedure with routine hemodynamic monitoring.     -monitor electrolytes, keep k>4, Mg>2      - All other workup per primary team  - Will follow

## 2018-08-21 NOTE — PROGRESS NOTE ADULT - SUBJECTIVE AND OBJECTIVE BOX
Patient is a 89y old  Female who presents with a chief complaint of diabetic foot ulcer/ambulatory disfunction (15 Aug 2018 02:29)      INTERVAL /OVERNIGHT EVENTS: feels ok    MEDICATIONS  (STANDING):  ceFAZolin   IVPB 2000 milliGRAM(s) IV Intermittent every 12 hours  cholecalciferol 1000 Unit(s) Oral daily  dextrose 5%. 1000 milliLiter(s) (50 mL/Hr) IV Continuous <Continuous>  dextrose 50% Injectable 12.5 Gram(s) IV Push once  dextrose 50% Injectable 25 Gram(s) IV Push once  dextrose 50% Injectable 25 Gram(s) IV Push once  docusate sodium 100 milliGRAM(s) Oral three times a day  furosemide    Tablet 20 milliGRAM(s) Oral daily  glycerin Suppository - Adult 1 Suppository(s) Rectal daily  insulin glargine Injectable (LANTUS) 12 Unit(s) SubCutaneous at bedtime  insulin lispro (HumaLOG) corrective regimen sliding scale   SubCutaneous every 6 hours  insulin lispro Injectable (HumaLOG) 4 Unit(s) SubCutaneous three times a day before meals  lactobacillus acidophilus 1 Tablet(s) Oral three times a day with meals  oxyCODONE  ER Tablet 10 milliGRAM(s) Oral <User Schedule>  pantoprazole    Tablet 40 milliGRAM(s) Oral before breakfast  polyethylene glycol 3350 17 Gram(s) Oral daily  propranolol  milliGRAM(s) Oral daily  psyllium Powder 1 Packet(s) Oral daily  senna 2 Tablet(s) Oral at bedtime  simvastatin 10 milliGRAM(s) Oral at bedtime    MEDICATIONS  (PRN):  bisacodyl Suppository 10 milliGRAM(s) Rectal daily PRN Constipation  dextrose 40% Gel 15 Gram(s) Oral once PRN Blood Glucose LESS THAN 70 milliGRAM(s)/deciLiter  glucagon  Injectable 1 milliGRAM(s) IntraMuscular once PRN Glucose <70 milliGRAM(s)/deciLiter  guaiFENesin    Syrup 100 milliGRAM(s) Oral every 6 hours PRN Cough  morphine  - Injectable 2 milliGRAM(s) IV Push every 6 hours PRN Severe Pain (7 - 10)      Allergies    Levaquin (Other)  ramipril (Other)  tetracycline (Hives; Rash)    Intolerances        REVIEW OF SYSTEMS:  CONSTITUTIONAL: No fever, weight loss, or fatigue  EYES: No eye pain, visual disturbances, or discharge  ENMT:  No difficulty hearing, tinnitus, vertigo; No sinus or throat pain  NECK: No pain or stiffness  RESPIRATORY: No cough, wheezing, chills or hemoptysis; No shortness of breath  CARDIOVASCULAR: No chest pain, palpitations, dizziness, or leg swelling  GASTROINTESTINAL: No abdominal or epigastric pain. No nausea, vomiting, or hematemesis; No diarrhea or constipation. No melena or hematochezia.  GENITOURINARY: No dysuria, frequency, hematuria, or incontinence  NEUROLOGICAL: No headaches, memory loss, loss of strength, numbness, or tremors  SKIN: No itching, burning, rashes, or lesions   LYMPH NODES: No enlarged glands  ENDOCRINE: No heat or cold intolerance; No hair loss; No polydipsia or polyuria  MUSCULOSKELETAL: No joint pain or swelling; No muscle, back, or extremity pain  PSYCHIATRIC: No depression, anxiety, mood swings, or difficulty sleeping  HEME/LYMPH: No easy bruising, or bleeding gums  ALLERGY AND IMMUNOLOGIC: No hives or eczema    Vital Signs Last 24 Hrs  T(C): 36.8 (21 Aug 2018 15:27), Max: 37.3 (20 Aug 2018 20:29)  T(F): 98.2 (21 Aug 2018 15:27), Max: 99.1 (20 Aug 2018 20:29)  HR: 66 (21 Aug 2018 13:40) (64 - 86)  BP: 101/68 (21 Aug 2018 15:27) (100/60 - 126/73)  BP(mean): --  RR: 64 (21 Aug 2018 15:27) (14 - 64)  SpO2: 95% (21 Aug 2018 15:27) (95% - 97%)    PHYSICAL EXAM:  GENERAL: NAD, well-groomed, well-developed  HEAD:  Atraumatic, Normocephalic  EYES: EOMI, PERRLA, conjunctiva and sclera clear  ENMT: No tonsillar erythema, exudates, or enlargement; Moist mucous membranes, Good dentition, No lesions  NECK: Supple, No JVD, Normal thyroid  NERVOUS SYSTEM:  Alert & Oriented X3, Good concentration; Motor Strength 5/5 B/L upper and lower extremities; DTRs 2+ intact and symmetric  CHEST/LUNG: Clear to auscultation bilaterally; No rales, rhonchi, wheezing, or rubs  HEART: Regular rate and rhythm; No murmurs, rubs, or gallops  ABDOMEN: Soft, Nontender, Nondistended; Bowel sounds present  EXTREMITIES:  2+ Peripheral Pulses, No clubbing, cyanosis, or edema  LYMPH: No lymphadenopathy noted  SKIN: No rashes or lesions    LABS:                        10.9   10.68 )-----------( 207      ( 21 Aug 2018 06:35 )             33.7     21 Aug 2018 06:35    136    |  97     |  49     ----------------------------<  142    4.2     |  30     |  1.60     Ca    8.7        21 Aug 2018 06:35      PT/INR - ( 21 Aug 2018 06:35 )   PT: 15.0 sec;   INR: 1.37 ratio         PTT - ( 21 Aug 2018 06:35 )  PTT:128.9 sec    CAPILLARY BLOOD GLUCOSE      POCT Blood Glucose.: 129 mg/dL (21 Aug 2018 11:58)  POCT Blood Glucose.: 142 mg/dL (21 Aug 2018 05:58)  POCT Blood Glucose.: 188 mg/dL (20 Aug 2018 21:05)  POCT Blood Glucose.: 83 mg/dL (20 Aug 2018 17:08)      RADIOLOGY & ADDITIONAL TESTS:    Notes Reviewed:  [x ] YES  [ ] NO    Care Discussed with Consultants/Other Providers [x ] YES  [ ] NO

## 2018-08-21 NOTE — BRIEF OPERATIVE NOTE - PRE-OP DX
Bone tumor  08/20/2018    Active  Skinny Keller  Osteomyelitis of ankle or foot  08/21/2018  left 1st metatarsal osteomyelitis  Active  Geetha Ga
Bone tumor  08/20/2018    Active  Skinny Keller

## 2018-08-21 NOTE — PROGRESS NOTE ADULT - ASSESSMENT
89 yo woman with multiple comorbidities presented with several weeks hx of hip/leg pain with  inability to ambulate due to pain in the foot. CT scan revealed destructive bony pelvic lesions in right lisa-pelvis concerning for bone metastasis and liver lesions. Dx also with osteomyelitis of left great toe.        Hip MRI: Mildly expansile metastatic lesion within the anterior wall of the right acetabulum with extension to the right superior pubic ramus. Additional metastatic lesions are noted within the right ischium and midportion of the right inferior pubic ramus. Nonspecific small lesions within the left femoral head and left femoral neck which may be related to additional metastatic foci.        Bone scan with  increased radiopharmaceutical accumulation in the lower right iliac bone extending through the acetabulum into the ischium. Focally increased uptake in the left great toe is nonspecific, but given the history of left foot ulcer, osteomyelitis needs to be considered.        CT abdomen and pelvis no contrast. Prior 4/26/2014.  Limited by lack of oral and IV contrast. Small layering basilar effusions. Prominent interlobular septa at the   lung bases suggests interstitial edema. Substantial coronary artery calcification.   Scattered poorly defined low-attenuation foci throughout the hepatic parenchyma concerning for metastases.    Patient was on Coumdin for A fib and is on antibiotics  for presumed osteomyelitis  of the left foot    Consult called for recommendation regarding further workup for lytic lesion.     Bone metastatic lesions in pelvis  Cr is stable since 2016. Ca is borderline.  Serum Immunofixation negative for monoclonal protein rules out plasma cell disorder  CEA normal 3.8.   Scheduled for biopsy of right hip lesion in IR today (8/20/18); further oncology management pending pathology results  .   Bx prelim showing metastatic adenocarcinoma, unknown primary.   Results noted after my eval with pt and family. Family aware that highly suspected cancer.       Leukocytosis with osteomyelitis of the left great toe  most likely reactive,  continued on antibiotics  planned for surgical debridement by podiatry today (Tue)

## 2018-08-21 NOTE — BRIEF OPERATIVE NOTE - POST-OP DX
Bone tumor  08/20/2018    Active  Skniny Keller  Osteomyelitis of ankle or foot  08/21/2018  left 1st metatarsal osteomyelitis  Active  Geetha Ga
Bone tumor  08/20/2018    Active  Skinny Keller

## 2018-08-21 NOTE — CHART NOTE - NSCHARTNOTEFT_GEN_A_CORE
Assessment: As per chart 89 year old female with a PMH of HTN, DM, chol, af pvd, adm with hip pain with ?metastatic disease now s/p (8/20) iliac crest biopsy for malignancy. Pt currently NPO as she is planned for podiatric procedure today. Pt reports fair appetite, pt's daughter reports that pt has been consuming small frequent meals, per chart consuming about 75-95% of meals in house. Pt reports she was consuming Glucerna. Pt's daughter reports that she is going to begin bringing the pt Premier protein shakes once pt's diet is advanced. Pt denies any nausea, vomiting, diarrhea, or constipation, + BM 8/20.     Factors impacting intake: [ x] none    Diet Presciption: Diet, NPO after Midnight:      NPO Start Date: 20-Aug-2018,   NPO Start Time: 23:59  Except Medications (08-21-18 @ 03:26)  Previous Diet: Consistent CHO, DASH/TLC, Glucerna, TID    Intake: fair     Current Weight:(8/21) 159.8lbs  Previous Weight: (8/15) 156.3lbs   2.2% Weight Change since previous RD note-pt is currently on Lasix    Pertinent Medications: MEDICATIONS  (STANDING):  ceFAZolin   IVPB 2000 milliGRAM(s) IV Intermittent every 12 hours  cholecalciferol 1000 Unit(s) Oral daily  dextrose 5%. 1000 milliLiter(s) (50 mL/Hr) IV Continuous <Continuous>  dextrose 50% Injectable 12.5 Gram(s) IV Push once  dextrose 50% Injectable 25 Gram(s) IV Push once  dextrose 50% Injectable 25 Gram(s) IV Push once  docusate sodium 100 milliGRAM(s) Oral three times a day  furosemide    Tablet 20 milliGRAM(s) Oral daily  glycerin Suppository - Adult 1 Suppository(s) Rectal daily  insulin glargine Injectable (LANTUS) 12 Unit(s) SubCutaneous at bedtime  insulin lispro (HumaLOG) corrective regimen sliding scale   SubCutaneous every 6 hours  insulin lispro Injectable (HumaLOG) 4 Unit(s) SubCutaneous three times a day before meals  lactobacillus acidophilus 1 Tablet(s) Oral three times a day with meals  oxyCODONE  ER Tablet 10 milliGRAM(s) Oral <User Schedule>  pantoprazole    Tablet 40 milliGRAM(s) Oral before breakfast  polyethylene glycol 3350 17 Gram(s) Oral daily  propranolol  milliGRAM(s) Oral daily  psyllium Powder 1 Packet(s) Oral daily  senna 2 Tablet(s) Oral at bedtime  simvastatin 10 milliGRAM(s) Oral at bedtime    MEDICATIONS  (PRN):  bisacodyl Suppository 10 milliGRAM(s) Rectal daily PRN Constipation  dextrose 40% Gel 15 Gram(s) Oral once PRN Blood Glucose LESS THAN 70 milliGRAM(s)/deciLiter  glucagon  Injectable 1 milliGRAM(s) IntraMuscular once PRN Glucose <70 milliGRAM(s)/deciLiter  guaiFENesin    Syrup 100 milliGRAM(s) Oral every 6 hours PRN Cough  morphine  - Injectable 2 milliGRAM(s) IV Push every 6 hours PRN Severe Pain (7 - 10)    Pertinent Labs: 08-21 Na136 mmol/L Glu 142 mg/dL<H> K+ 4.2 mmol/L Cr  1.60 mg/dL<H> BUN 49 mg/dL<H> 08-19 Alb 1.8 g/dL<L> 08-15 [id number] 6.7 %<H>     CAPILLARY BLOOD GLUCOSE      POCT Blood Glucose.: 129 mg/dL (21 Aug 2018 11:58)  POCT Blood Glucose.: 142 mg/dL (21 Aug 2018 05:58)  POCT Blood Glucose.: 188 mg/dL (20 Aug 2018 21:05)  POCT Blood Glucose.: 83 mg/dL (20 Aug 2018 17:08)    Skin: Stage 2 right buttock  No edema noted at this time    Estimated Needs:   [x ] no change since previous assessment  [ ] recalculated:     Previous Nutrition Diagnosis:   [x ] Malnutrition    Nutrition Diagnosis is [x ] ongoing- being addressed with oral nutritional supplements     New Nutrition Diagnosis: [ x] not applicable       Interventions:   Recommend  [x ] Change Diet To: Advance diet to clears then Consistent CHO, DASH/TLC  [x ] Nutrition Supplement: Continue with Glucerna, TID when diet is advanced   [ ] Nutrition Support  [x ] Other:  1) Encourage good PO intake   2) RD to remain available      Monitoring and Evaluation:   [ x] PO intake [ x ] Tolerance to diet prescription [ x ] weights [ x ] labs[ x ] follow up per protocol  [ ] other: Assessment: As per chart 89 year old female with a PMH of HTN, DM, chol, af pvd, adm with hip pain with ?metastatic disease now s/p (8/20) iliac crest biopsy for malignancy. Pt currently NPO as she is planned for podiatric procedure today. Pt reports fair appetite, pt's daughter reports that pt has been consuming small frequent meals, per chart consuming about 75-95% of meals in house. Pt reports she was consuming Glucerna. Pt's daughter reports that she is going to begin bringing the pt Premier protein shakes once pt's diet is advanced. Pt denies any nausea, vomiting, diarrhea, or constipation, + BM 8/20.     Factors impacting intake: [ x] none    Diet Presciption: Diet, NPO after Midnight:      NPO Start Date: 20-Aug-2018,   NPO Start Time: 23:59  Except Medications (08-21-18 @ 03:26)  Previous Diet: Consistent CHO, DASH/TLC, Glucerna, TID    Intake: fair     Current Weight:(8/21) 159.8lbs  Previous Weight: (8/15) 156.3lbs   2.2% Weight Change since previous RD note-pt is currently on Lasix    Pertinent Medications: MEDICATIONS  (STANDING):  ceFAZolin   IVPB 2000 milliGRAM(s) IV Intermittent every 12 hours  cholecalciferol 1000 Unit(s) Oral daily  dextrose 5%. 1000 milliLiter(s) (50 mL/Hr) IV Continuous <Continuous>  dextrose 50% Injectable 12.5 Gram(s) IV Push once  dextrose 50% Injectable 25 Gram(s) IV Push once  dextrose 50% Injectable 25 Gram(s) IV Push once  docusate sodium 100 milliGRAM(s) Oral three times a day  furosemide    Tablet 20 milliGRAM(s) Oral daily  glycerin Suppository - Adult 1 Suppository(s) Rectal daily  insulin glargine Injectable (LANTUS) 12 Unit(s) SubCutaneous at bedtime  insulin lispro (HumaLOG) corrective regimen sliding scale   SubCutaneous every 6 hours  insulin lispro Injectable (HumaLOG) 4 Unit(s) SubCutaneous three times a day before meals  lactobacillus acidophilus 1 Tablet(s) Oral three times a day with meals  oxyCODONE  ER Tablet 10 milliGRAM(s) Oral <User Schedule>  pantoprazole    Tablet 40 milliGRAM(s) Oral before breakfast  polyethylene glycol 3350 17 Gram(s) Oral daily  propranolol  milliGRAM(s) Oral daily  psyllium Powder 1 Packet(s) Oral daily  senna 2 Tablet(s) Oral at bedtime  simvastatin 10 milliGRAM(s) Oral at bedtime    MEDICATIONS  (PRN):  bisacodyl Suppository 10 milliGRAM(s) Rectal daily PRN Constipation  dextrose 40% Gel 15 Gram(s) Oral once PRN Blood Glucose LESS THAN 70 milliGRAM(s)/deciLiter  glucagon  Injectable 1 milliGRAM(s) IntraMuscular once PRN Glucose <70 milliGRAM(s)/deciLiter  guaiFENesin    Syrup 100 milliGRAM(s) Oral every 6 hours PRN Cough  morphine  - Injectable 2 milliGRAM(s) IV Push every 6 hours PRN Severe Pain (7 - 10)    Pertinent Labs: 08-21 Na136 mmol/L Glu 142 mg/dL<H> K+ 4.2 mmol/L Cr  1.60 mg/dL<H> BUN 49 mg/dL<H> 08-19 Alb 1.8 g/dL<L> 08-15 [id number] 6.7 %<H>     CAPILLARY BLOOD GLUCOSE      POCT Blood Glucose.: 129 mg/dL (21 Aug 2018 11:58)  POCT Blood Glucose.: 142 mg/dL (21 Aug 2018 05:58)  POCT Blood Glucose.: 188 mg/dL (20 Aug 2018 21:05)  POCT Blood Glucose.: 83 mg/dL (20 Aug 2018 17:08)    Skin: Stage 2 right buttock  No edema noted at this time    Estimated Needs:   [x ] no change since previous assessment  [ ] recalculated:     Previous Nutrition Diagnosis:   [x ] Malnutrition    Nutrition Diagnosis is [x ] ongoing- being addressed with oral nutritional supplements     New Nutrition Diagnosis: [ x] not applicable       Interventions:   Recommend  [x ] Change Diet To: Advance diet to clears then Consistent CHO, DASH/TLC as medically feasible   [x ] Nutrition Supplement: Continue with Glucerna, TID when diet is advanced   [ ] Nutrition Support  [x ] Other:  1) Encourage good PO intake   2) RD to remain available      Monitoring and Evaluation:   [ x] PO intake [ x ] Tolerance to diet prescription [ x ] weights [ x ] labs[ x ] follow up per protocol  [ ] other:

## 2018-08-21 NOTE — PROGRESS NOTE ADULT - SUBJECTIVE AND OBJECTIVE BOX
CAPILLARY BLOOD GLUCOSE      POCT Blood Glucose.: 142 mg/dL (21 Aug 2018 05:58)  POCT Blood Glucose.: 188 mg/dL (20 Aug 2018 21:05)  POCT Blood Glucose.: 83 mg/dL (20 Aug 2018 17:08)  POCT Blood Glucose.: 130 mg/dL (20 Aug 2018 12:21)      Vital Signs Last 24 Hrs  T(C): 36.7 (21 Aug 2018 04:55), Max: 37.3 (20 Aug 2018 10:35)  T(F): 98.1 (21 Aug 2018 04:55), Max: 99.1 (20 Aug 2018 10:35)  HR: 79 (21 Aug 2018 04:55) (57 - 86)  BP: 126/73 (21 Aug 2018 04:55) (103/76 - 136/106)  BP(mean): --  RR: 18 (21 Aug 2018 04:55) (16 - 18)  SpO2: 96% (21 Aug 2018 04:55) (94% - 96%)    General: WN/WD NAD  Respiratory: CTA B/L  CV: RRR, S1S2, no murmurs, rubs or gallops  Abdominal: Soft, NT, ND +BS, Last BM  Extremities: No edema, + peripheral pulses     08-21    136  |  97  |  49<H>  ----------------------------<  142<H>  4.2   |  30  |  1.60<H>    Ca    8.7      21 Aug 2018 06:35  Mg     2.2     08-19    TPro  6.3  /  Alb  1.8<L>  /  TBili  0.4  /  DBili  .30<H>  /  AST  134<H>  /  ALT  24  /  AlkPhos  415<H>  08-19      dextrose 40% Gel 15 Gram(s) Oral once PRN  dextrose 50% Injectable 12.5 Gram(s) IV Push once  dextrose 50% Injectable 25 Gram(s) IV Push once  dextrose 50% Injectable 25 Gram(s) IV Push once  glucagon  Injectable 1 milliGRAM(s) IntraMuscular once PRN  insulin glargine Injectable (LANTUS) 12 Unit(s) SubCutaneous at bedtime  insulin lispro (HumaLOG) corrective regimen sliding scale   SubCutaneous every 6 hours  insulin lispro Injectable (HumaLOG) 4 Unit(s) SubCutaneous three times a day before meals  simvastatin 10 milliGRAM(s) Oral at bedtime

## 2018-08-22 DIAGNOSIS — M86.9 OSTEOMYELITIS, UNSPECIFIED: ICD-10-CM

## 2018-08-22 LAB
ANION GAP SERPL CALC-SCNC: 8 MMOL/L — SIGNIFICANT CHANGE UP (ref 5–17)
BASOPHILS # BLD AUTO: 0.02 K/UL — SIGNIFICANT CHANGE UP (ref 0–0.2)
BASOPHILS NFR BLD AUTO: 0.2 % — SIGNIFICANT CHANGE UP (ref 0–2)
BUN SERPL-MCNC: 51 MG/DL — HIGH (ref 7–23)
CALCIUM SERPL-MCNC: 9 MG/DL — SIGNIFICANT CHANGE UP (ref 8.5–10.1)
CHLORIDE SERPL-SCNC: 95 MMOL/L — LOW (ref 96–108)
CO2 SERPL-SCNC: 31 MMOL/L — SIGNIFICANT CHANGE UP (ref 22–31)
CREAT SERPL-MCNC: 1.8 MG/DL — HIGH (ref 0.5–1.3)
CULTURE RESULTS: SIGNIFICANT CHANGE UP
CULTURE RESULTS: SIGNIFICANT CHANGE UP
EOSINOPHIL # BLD AUTO: 0.07 K/UL — SIGNIFICANT CHANGE UP (ref 0–0.5)
EOSINOPHIL NFR BLD AUTO: 0.7 % — SIGNIFICANT CHANGE UP (ref 0–6)
GLUCOSE SERPL-MCNC: 136 MG/DL — HIGH (ref 70–99)
GRAM STN FLD: SIGNIFICANT CHANGE UP
HCT VFR BLD CALC: 34.8 % — SIGNIFICANT CHANGE UP (ref 34.5–45)
HGB BLD-MCNC: 10.9 G/DL — LOW (ref 11.5–15.5)
IMM GRANULOCYTES NFR BLD AUTO: 0.5 % — SIGNIFICANT CHANGE UP (ref 0–1.5)
LYMPHOCYTES # BLD AUTO: 0.9 K/UL — LOW (ref 1–3.3)
LYMPHOCYTES # BLD AUTO: 9.2 % — LOW (ref 13–44)
MCHC RBC-ENTMCNC: 29 PG — SIGNIFICANT CHANGE UP (ref 27–34)
MCHC RBC-ENTMCNC: 31.3 GM/DL — LOW (ref 32–36)
MCV RBC AUTO: 92.6 FL — SIGNIFICANT CHANGE UP (ref 80–100)
MONOCYTES # BLD AUTO: 0.89 K/UL — SIGNIFICANT CHANGE UP (ref 0–0.9)
MONOCYTES NFR BLD AUTO: 9.1 % — SIGNIFICANT CHANGE UP (ref 2–14)
NEUTROPHILS # BLD AUTO: 7.84 K/UL — HIGH (ref 1.8–7.4)
NEUTROPHILS NFR BLD AUTO: 80.3 % — HIGH (ref 43–77)
NRBC # BLD: 0 /100 WBCS — SIGNIFICANT CHANGE UP (ref 0–0)
PLATELET # BLD AUTO: 252 K/UL — SIGNIFICANT CHANGE UP (ref 150–400)
POTASSIUM SERPL-MCNC: 4.6 MMOL/L — SIGNIFICANT CHANGE UP (ref 3.5–5.3)
POTASSIUM SERPL-SCNC: 4.6 MMOL/L — SIGNIFICANT CHANGE UP (ref 3.5–5.3)
RBC # BLD: 3.76 M/UL — LOW (ref 3.8–5.2)
RBC # FLD: 16.7 % — HIGH (ref 10.3–14.5)
SODIUM SERPL-SCNC: 134 MMOL/L — LOW (ref 135–145)
SPECIMEN SOURCE: SIGNIFICANT CHANGE UP
WBC # BLD: 9.77 K/UL — SIGNIFICANT CHANGE UP (ref 3.8–10.5)
WBC # FLD AUTO: 9.77 K/UL — SIGNIFICANT CHANGE UP (ref 3.8–10.5)

## 2018-08-22 PROCEDURE — 99232 SBSQ HOSP IP/OBS MODERATE 35: CPT

## 2018-08-22 RX ORDER — ENOXAPARIN SODIUM 100 MG/ML
70 INJECTION SUBCUTANEOUS DAILY
Qty: 0 | Refills: 0 | Status: DISCONTINUED | OUTPATIENT
Start: 2018-08-22 | End: 2018-08-24

## 2018-08-22 RX ORDER — ALBUTEROL 90 UG/1
2 AEROSOL, METERED ORAL EVERY 6 HOURS
Qty: 0 | Refills: 0 | Status: DISCONTINUED | OUTPATIENT
Start: 2018-08-22 | End: 2018-08-28

## 2018-08-22 RX ADMIN — Medication 1 TABLET(S): at 11:44

## 2018-08-22 RX ADMIN — POLYETHYLENE GLYCOL 3350 17 GRAM(S): 17 POWDER, FOR SOLUTION ORAL at 11:45

## 2018-08-22 RX ADMIN — Medication 100 MILLIGRAM(S): at 17:05

## 2018-08-22 RX ADMIN — PANTOPRAZOLE SODIUM 40 MILLIGRAM(S): 20 TABLET, DELAYED RELEASE ORAL at 06:04

## 2018-08-22 RX ADMIN — SIMVASTATIN 10 MILLIGRAM(S): 20 TABLET, FILM COATED ORAL at 22:21

## 2018-08-22 RX ADMIN — Medication 1: at 00:04

## 2018-08-22 RX ADMIN — Medication 120 MILLIGRAM(S): at 06:04

## 2018-08-22 RX ADMIN — Medication 100 MILLIGRAM(S): at 13:53

## 2018-08-22 RX ADMIN — Medication 100 MILLIGRAM(S): at 06:05

## 2018-08-22 RX ADMIN — INSULIN GLARGINE 12 UNIT(S): 100 INJECTION, SOLUTION SUBCUTANEOUS at 00:05

## 2018-08-22 RX ADMIN — SENNA PLUS 2 TABLET(S): 8.6 TABLET ORAL at 22:20

## 2018-08-22 RX ADMIN — Medication 2: at 17:05

## 2018-08-22 RX ADMIN — Medication 1 PACKET(S): at 11:46

## 2018-08-22 RX ADMIN — ENOXAPARIN SODIUM 70 MILLIGRAM(S): 100 INJECTION SUBCUTANEOUS at 11:43

## 2018-08-22 RX ADMIN — Medication 1 TABLET(S): at 17:05

## 2018-08-22 RX ADMIN — Medication 4 UNIT(S): at 07:52

## 2018-08-22 RX ADMIN — INSULIN GLARGINE 12 UNIT(S): 100 INJECTION, SOLUTION SUBCUTANEOUS at 22:19

## 2018-08-22 RX ADMIN — Medication 100 MILLIGRAM(S): at 22:19

## 2018-08-22 RX ADMIN — Medication 100 MILLIGRAM(S): at 06:04

## 2018-08-22 RX ADMIN — Medication 1: at 07:51

## 2018-08-22 RX ADMIN — Medication 1 SUPPOSITORY(S): at 11:44

## 2018-08-22 RX ADMIN — Medication 4 UNIT(S): at 17:06

## 2018-08-22 RX ADMIN — Medication 20 MILLIGRAM(S): at 06:05

## 2018-08-22 RX ADMIN — Medication 4 UNIT(S): at 12:09

## 2018-08-22 RX ADMIN — Medication 1 TABLET(S): at 07:51

## 2018-08-22 RX ADMIN — Medication 1000 UNIT(S): at 11:44

## 2018-08-22 NOTE — PROGRESS NOTE ADULT - SUBJECTIVE AND OBJECTIVE BOX
STACYPRASHANTDWAYNE  89y  Female    Patient is a 89y old  Female who presents with a chief complaint of diabetic foot ulcer/ambulatory disfunction (15 Aug 2018 02:29)      comfortable  out of bed   family at bed side.      PAST MEDICAL & SURGICAL HISTORY:  OAB (overactive bladder)  GERD (gastroesophageal reflux disease)  Angina effort  Heart failure  Upper respiratory infection  Diabetes  Renal stones  Myocardial infarction: 1981  Spinal stenosis  CVA (cerebral infarction)  Afib  Raynaud disease  Acid reflux  Hypertension  Hyperlipidemia  Asthma  Cataract  S/P hysterectomy          PHYSICAL EXAM:    T(C): 36.8 (08-22-18 @ 05:14), Max: 36.9 (08-21-18 @ 16:00)  HR: 65 (08-22-18 @ 05:14) (65 - 77)  BP: 104/57 (08-22-18 @ 05:14) (86/42 - 136/46)  RR: 17 (08-22-18 @ 05:14) (14 - 64)  SpO2: 93% (08-22-18 @ 05:14) (93% - 98%)  Wt(kg): --    I&O's Detail    21 Aug 2018 07:01  -  22 Aug 2018 07:00  --------------------------------------------------------  IN:    lactated ringers.: 250 mL    Oral Fluid: 480 mL    Solution: 50 mL  Total IN: 780 mL    OUT:    Voided: 1450 mL  Total OUT: 1450 mL    Total NET: -670 mL          Respiratory: clear anteriorly, decreased BS at bases  Cardiovascular: S1 S2  Gastrointestinal: soft NT ND +BS  Extremities: edema   Neuro: Awake and alert    MEDICATIONS  (STANDING):  ceFAZolin   IVPB 2000 milliGRAM(s) IV Intermittent every 12 hours  cholecalciferol 1000 Unit(s) Oral daily  dextrose 5%. 1000 milliLiter(s) (50 mL/Hr) IV Continuous <Continuous>  dextrose 50% Injectable 25 Gram(s) IV Push once  dextrose 50% Injectable 12.5 Gram(s) IV Push once  dextrose 50% Injectable 25 Gram(s) IV Push once  docusate sodium 100 milliGRAM(s) Oral three times a day  enoxaparin Injectable 70 milliGRAM(s) SubCutaneous daily  furosemide    Tablet 20 milliGRAM(s) Oral daily  glycerin Suppository - Adult 1 Suppository(s) Rectal daily  insulin glargine Injectable (LANTUS) 12 Unit(s) SubCutaneous at bedtime  insulin lispro (HumaLOG) corrective regimen sliding scale   SubCutaneous at bedtime  insulin lispro (HumaLOG) corrective regimen sliding scale   SubCutaneous three times a day before meals  insulin lispro Injectable (HumaLOG) 4 Unit(s) SubCutaneous three times a day before meals  lactobacillus acidophilus 1 Tablet(s) Oral three times a day with meals  multivitamin/minerals 1 Tablet(s) Oral daily  oxyCODONE  ER Tablet 10 milliGRAM(s) Oral <User Schedule>  pantoprazole    Tablet 40 milliGRAM(s) Oral before breakfast  polyethylene glycol 3350 17 Gram(s) Oral daily  propranolol  milliGRAM(s) Oral daily  psyllium Powder 1 Packet(s) Oral daily  senna 2 Tablet(s) Oral at bedtime  simvastatin 10 milliGRAM(s) Oral at bedtime    MEDICATIONS  (PRN):  bisacodyl Suppository 10 milliGRAM(s) Rectal daily PRN Constipation  dextrose 40% Gel 15 Gram(s) Oral once PRN Blood Glucose LESS THAN 70 milliGRAM(s)/deciLiter  glucagon  Injectable 1 milliGRAM(s) IntraMuscular once PRN Glucose <70 milliGRAM(s)/deciLiter  guaiFENesin    Syrup 100 milliGRAM(s) Oral every 6 hours PRN Cough  morphine  - Injectable 2 milliGRAM(s) IV Push every 6 hours PRN Severe Pain (7 - 10)                            10.9   9.77  )-----------( 252      ( 22 Aug 2018 08:42 )             34.8       08-22    134<L>  |  95<L>  |  51<H>  ----------------------------<  136<H>  4.6   |  31  |  1.80<H>    Ca    9.0      22 Aug 2018 08:42

## 2018-08-22 NOTE — PROGRESS NOTE ADULT - ASSESSMENT
89 female with a history of HTN, CAD, DM. PVD, CKD now with sepsis and diabetic foot ulcer.   S/P bone biopsy to rule out malignancy and metastatic disease.   CKD stage 3 is close to baseline. Continue the low dose lasix for now.   spoke to family   will follow closely.

## 2018-08-22 NOTE — PROGRESS NOTE ADULT - ASSESSMENT
89 yo woman with multiple comorbidities presented with several weeks hx of hip/leg pain with  inability to ambulate due to pain in the foot. CT scan revealed destructive bony pelvic lesions in right lisa-pelvis concerning for bone metastasis and liver lesions. Dx also with osteomyelitis of left great toe.        Hip MRI: Mildly expansile metastatic lesion within the anterior wall of the right acetabulum with extension to the right superior pubic ramus. Additional metastatic lesions are noted within the right ischium and midportion of the right inferior pubic ramus. Nonspecific small lesions within the left femoral head and left femoral neck which may be related to additional metastatic foci.        Bone scan with  increased radiopharmaceutical accumulation in the lower right iliac bone extending through the acetabulum into the ischium. Focally increased uptake in the left great toe is nonspecific, but given the history of left foot ulcer, osteomyelitis needs to be considered.        CT abdomen and pelvis no contrast. Prior 4/26/2014.  Limited by lack of oral and IV contrast. Small layering basilar effusions. Prominent interlobular septa at the   lung bases suggests interstitial edema. Substantial coronary artery calcification.   Scattered poorly defined low-attenuation foci throughout the hepatic parenchyma concerning for metastases.    Patient was on Coumdin for A fib and is on antibiotics  for presumed osteomyelitis  of the left foot    Consult called for recommendation regarding further workup for lytic lesion.     Bone metastatic lesions in pelvis  Cr is stable since 2016. Ca is borderline.  Serum Immunofixation negative for monoclonal protein rules out plasma cell disorder  CEA normal 3.8.   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   Leukocytosis with osteomyelitis of the left great toe  most likely reactive,  continued on antibiotics  planned for surgical debridement by podiatry 88 yo woman with multiple comorbidities presented with several weeks hx of hip/leg pain with  inability to ambulate due to pain in the foot. CT scan revealed destructive bony pelvic lesions in right lisa-pelvis concerning for bone metastasis and liver lesions. Dx also with osteomyelitis of left great toe.        Hip MRI: Mildly expansile metastatic lesion within the anterior wall of the right acetabulum with extension to the right superior pubic ramus. Additional metastatic lesions are noted within the right ischium and midportion of the right inferior pubic ramus. Nonspecific small lesions within the left femoral head and left femoral neck which may be related to additional metastatic foci.        Bone scan with  increased radiopharmaceutical accumulation in the lower right iliac bone extending through the acetabulum into the ischium. Focally increased uptake in the left great toe is nonspecific, but given the history of left foot ulcer, osteomyelitis needs to be considered.        CT abdomen and pelvis no contrast. Prior 4/26/2014.  Limited by lack of oral and IV contrast. Small layering basilar effusions. Prominent interlobular septa at the   lung bases suggests interstitial edema. Substantial coronary artery calcification.   Scattered poorly defined low-attenuation foci throughout the hepatic parenchyma concerning for metastases.    Patient was on Coumdin for A fib and is on antibiotics  for presumed osteomyelitis  of the left foot    Consult called for recommendation regarding further workup for lytic lesion.     Bone metastatic lesions in pelvis  Cr is stable since 2016. Ca is borderline.  Serum Immunofixation negative for monoclonal protein rules out plasma cell disorder  CEA normal 3.8.   (8/20/18);  Right pelvic bone, CT-guided biopsy:  Metastatic adenocarcinoma,moderately differentiated  to poorly differentiated,unknown primary.IHC pending   further oncology management pending pathology results  .   Leukocytosis with osteomyelitis of the left great toe  reactive, improved on antibiotics  s/p surgical debridement by podiatry 8/21/18    PLAN:  awaiting final report on path   then  family will make decision regarding  proceeding  with any antineoplastic Tx ( could be eligible for immunotherapy in the right settings)   if family decides proceed with palliative Tx,  may need outpt radiation   needs adequate pain control   ortho to comment on plan for stabilization and ambulation for d/c planning

## 2018-08-22 NOTE — PROGRESS NOTE ADULT - SUBJECTIVE AND OBJECTIVE BOX
89yo female seen bedside 1 day s/p left 1st metatarsal head resection (DOS 8/21/18) with attending. Patient denies pain to her foot at this time.    ICU Vital Signs Last 24 Hrs  T(C): 36.8 (22 Aug 2018 13:55), Max: 36.8 (22 Aug 2018 05:14)  T(F): 98.3 (22 Aug 2018 13:55), Max: 98.3 (22 Aug 2018 13:55)  HR: 72 (22 Aug 2018 13:55) (65 - 77)  BP: 104/57 (22 Aug 2018 13:55) (86/42 - 136/46)  BP(mean): --  ABP: --  ABP(mean): --  RR: 16 (22 Aug 2018 13:55) (14 - 18)  SpO2: 93% (22 Aug 2018 13:55) (93% - 98%)                          10.9   9.77  )-----------( 252      ( 22 Aug 2018 08:42 )             34.8     08-22    134<L>  |  95<L>  |  51<H>  ----------------------------<  136<H>  4.6   |  31  |  1.80<H>    Ca    9.0      22 Aug 2018 08:42      Objective: left foot focused  Vasc: DP and PT 2/4; CFT < 3 seconds x5; TG WNL; no edema noted  Derm:  -sutures intact to medial aspect of 1st MPJ well coapted without dehiscence   Neuro: epicritic sensation intact  Ortho:  1st MPJ extensor contracture noted

## 2018-08-22 NOTE — PROGRESS NOTE ADULT - SUBJECTIVE AND OBJECTIVE BOX
The patient was interviewed and evaluated.    89y Female    T(C): 36.8 (08-22-18 @ 05:14), Max: 36.9 (08-21-18 @ 16:00)  HR: 65 (08-22-18 @ 05:14) (65 - 77)  BP: 104/57 (08-22-18 @ 05:14) (86/42 - 136/46)  RR: 17 (08-22-18 @ 05:14) (14 - 64)  SpO2: 93% (08-22-18 @ 05:14) (93% - 98%)  Wt(kg): --    No Nausea/vomiting, recall, sore throat or headache.    No anesthesia related complaints or sequelae.

## 2018-08-22 NOTE — CHART NOTE - NSCHARTNOTEFT_GEN_A_CORE
consult dictated    Impression:    Hx Asthma  Metastatic cancer to bone - uncertain primary  Hx Diabetes  Hx Atrial Fibrillation  Hx CHF  Hx Coronary Artery disease    Plan:    Check pathology results  Continue present treatment  Oxygen PRN

## 2018-08-22 NOTE — PROGRESS NOTE ADULT - SUBJECTIVE AND OBJECTIVE BOX
ID Progress note     Name: DWAYNE DONAHUE  Age: 89y  Gender: Female  MRN: [id number]    Interval History-- Events  noted, s/p left hallux amputation   Notes reviewed    Past Medical History--  OAB (overactive bladder)  GERD (gastroesophageal reflux disease)  Angina effort  Heart failure  Upper respiratory infection  Diabetes  Renal stones  Myocardial infarction  Spinal stenosis  CVA (cerebral infarction)  Afib  Raynaud disease  Acid reflux  Hypertension  Hyperlipidemia  Asthma  Cataract  S/P hysterectomy      For details regarding the patient's social history, family history, and other miscellaneous elements, please refer the initial infectious diseases consultation and/or the admitting history and physical examination for this admission.    Allergies--  Allergies    Levaquin (Other)  ramipril (Other)  tetracycline (Hives; Rash)    Intolerances        Medications--  Antibiotics:  ceFAZolin   IVPB 2000 milliGRAM(s) IV Intermittent every 12 hours    Immunologic:    Other:  bisacodyl Suppository PRN  cholecalciferol  dextrose 40% Gel PRN  dextrose 5%.  dextrose 50% Injectable  dextrose 50% Injectable  dextrose 50% Injectable  docusate sodium  enoxaparin Injectable  furosemide    Tablet  glucagon  Injectable PRN  glycerin Suppository - Adult  guaiFENesin    Syrup PRN  insulin glargine Injectable (LANTUS)  insulin lispro (HumaLOG) corrective regimen sliding scale  insulin lispro (HumaLOG) corrective regimen sliding scale  insulin lispro Injectable (HumaLOG)  lactobacillus acidophilus  morphine  - Injectable PRN  multivitamin/minerals  oxyCODONE  ER Tablet  pantoprazole    Tablet  polyethylene glycol 3350  propranolol LA  psyllium Powder  senna  simvastatin      Review of Systems--  Review of systems unable to be obtained secondary to clinical condition.    Physical Examination--    Vital Signs: T(F): 98.3 (08-22-18 @ 13:55), Max: 98.4 (08-21-18 @ 16:00)  HR: 72 (08-22-18 @ 13:55)  BP: 104/57 (08-22-18 @ 13:55)  RR: 16 (08-22-18 @ 13:55)  SpO2: 93% (08-22-18 @ 13:55)  Wt(kg): --  General: Nontoxic-appearing Female in no acute distress.  HEENT: AT/NC. PERRL. EOMI. Anicteric. Conjunctiva pink and moist. Oropharynx clear. Dentition fair.  Neck: Not rigid. No sense of mass.  Nodes: None palpable.  Lungs: Clear bilaterally without rales, wheezing or rhonchi  Heart: Regular rate and rhythm. No Murmur. No rub. No gallop. No palpable thrill.  Abdomen: Bowel sounds present and normoactive. Soft. Nondistended. Nontender. No sense of mass. No organomegaly.  Back: No spinal tenderness. No costovertebral angle tenderness.   Extremities: No cyanosis or clubbing. No edema. left foot - dressing in place   Skin: Warm. Dry. Good turgor. No rash. No vasculitic stigmata.  Psychiatric: Appropriate affect and mood for situation.         Laboratory Studies--  CBC                        10.9   9.77  )-----------( 252      ( 22 Aug 2018 08:42 )             34.8       Chemistries  08-22    134<L>  |  95<L>  |  51<H>  ----------------------------<  136<H>  4.6   |  31  |  1.80<H>    Ca    9.0      22 Aug 2018 08:42        Culture Data    Culture - Tissue with Gram Stain (collected 21 Aug 2018 21:28)  Source: .Tissue left 1st metatarsal head  Gram Stain (22 Aug 2018 02:40):    No polymorphonuclear cells seen    No organisms seen    Culture - Urine (collected 18 Aug 2018 10:12)  Source: .Urine Clean Catch (Midstream)  Final Report (19 Aug 2018 11:19):    No growth    Culture - Blood (collected 17 Aug 2018 23:48)  Source: .Blood Blood-Peripheral  Preliminary Report (19 Aug 2018 01:03):    No growth to date.    Culture - Blood (collected 17 Aug 2018 23:48)  Source: .Blood Blood-Peripheral  Preliminary Report (19 Aug 2018 01:03):    No growth to date.    Culture - Blood (collected 17 Aug 2018 10:59)  Source: .Blood Blood-Peripheral  Final Report (22 Aug 2018 11:00):    No growth at 5 days.    Culture - Blood (collected 17 Aug 2018 10:59)  Source: .Blood Blood-Peripheral  Final Report (22 Aug 2018 11:00):    No growth at 5 days.    Culture - Blood (collected 16 Aug 2018 08:42)  Source: .Blood Blood-Peripheral  Final Report (21 Aug 2018 09:00):    No growth at 5 days.    Culture - Blood (collected 16 Aug 2018 08:42)  Source: .Blood Blood-Venous  Final Report (21 Aug 2018 09:00):    No growth at 5 days.    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    Note: Report pending immunohistochemical stain workup to evaluate  for primary origin of tumor.  Results to follow in an addendum.      Key portions of this case were reviewed in intradepartmental  consultation with Dr. Cordova, with concurrence of  diagnosis.    Cari Florentino MD  (Electronic Signature)  Reported on: 08/21/18    Clinical Information  Bone lesion, leg pain  Procedure: CT-guided biopsy right pelvic bone    Specimen Description  Right pelvic bone    Gross Description  The specimen is received in formalin and the specimen container  is labeled: Right pelvic bone. It consists of two cores of white-  tan soft tissue fragments measuring 1.6 and 1.7 cm in length,  each averaging 0.1 cm in diameter. Entirely submitted. One  cassette.    In addition to other data that may appear on the specimen  container, the label has been inspected to confirm the presence  of the patient's name and date of birth.  DN 8/21/2018 6:51 AM (08.20.18 @ 11:29)    Assessment :     89 y.o woman with multiple comorbidities presented with several weeks h/o of bony pain and recent inability to ambulate du to pain in the foot. Ct scan revealed destructive bony lesions concerning for metastasis and liver lesions. 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 Noted to have staph aurues bacteremia likely from left hallux abscess    The primary source of malignancy is unclear, she is to have biopsy of the hip lesion on Friday provided INR is below 1.5 . Anticoagulation is stopped . 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, primary unknown     she is s/p left hallux amputation pOD # 1    Plan :   - will continue with  Ancef 2 grams q 12 as blood and wound cs is MSSA  - oncology follow up   - no need for JOSE GUADALUPE as repeat blood cs negative and her source is the foot infection   - she is undergoing work up to find the primary cancer , she already has liver and destructive bone mets so she has advanced diseases  - overall prognosis is poor   - goals of care conversation with her Son, does not want aggressive care   - podiatry follow up to schedule surgery   - she will need long term IV abx , will order PICC line for am , may need GREGORIO    Continue with present regime .  Appropriate use of antibiotics and adverse effects reviewed.    I have discussed the above plan of care with patient/family in detail. They expressed understanding of the treatment plan . Risks, benefits and alternatives discussed in detail. I have asked if they have any questions or concerns and appropriately addressed them to the best of my ability .      > 35 minutes spent in direct patient care reviewing  the notes, lab data/ imaging , discussion with multidisciplinary team. All questions were addressed and answered to the best of my capacity .    Thank you for allowing me to participate in the care of your patient .        William Armendariz MD  [phone]

## 2018-08-22 NOTE — PROGRESS NOTE ADULT - SUBJECTIVE AND OBJECTIVE BOX
Patient seen an examined at bedside. Pain is well controlled. No other complaints at this time.    PE:    Vital Signs Last 24 Hrs  T(C): 36.8 (22 Aug 2018 05:14), Max: 36.9 (21 Aug 2018 16:00)  T(F): 98.2 (22 Aug 2018 05:14), Max: 98.4 (21 Aug 2018 16:00)  HR: 65 (22 Aug 2018 05:14) (64 - 77)  BP: 104/57 (22 Aug 2018 05:14) (86/42 - 136/46)    RR: 17 (22 Aug 2018 05:14) (14 - 64)  SpO2: 93% (22 Aug 2018 05:14) (93% - 98%)    RLE:    No gross deformity noted  Skin intact; No erythema or ecchymosis  SILT L3-S1  DP1+  EHL/FHL/GSC/TA intact  Compartments soft and compressible  No calf tenderness  Decreased sensation to light tough dorsal/volar foot, hx diabetic neuropathy

## 2018-08-22 NOTE — PROGRESS NOTE ADULT - SUBJECTIVE AND OBJECTIVE BOX
Patient is a 89y old  Female who presents with a chief complaint of diabetic foot ulcer/ambulatory disfunction (15 Aug 2018 02:29)      INTERVAL /OVERNIGHT EVENTS: feels ok, was hypoxic per RN    MEDICATIONS  (STANDING):  ceFAZolin   IVPB 2000 milliGRAM(s) IV Intermittent every 12 hours  cholecalciferol 1000 Unit(s) Oral daily  dextrose 5%. 1000 milliLiter(s) (50 mL/Hr) IV Continuous <Continuous>  dextrose 50% Injectable 25 Gram(s) IV Push once  dextrose 50% Injectable 12.5 Gram(s) IV Push once  dextrose 50% Injectable 25 Gram(s) IV Push once  docusate sodium 100 milliGRAM(s) Oral three times a day  enoxaparin Injectable 70 milliGRAM(s) SubCutaneous daily  furosemide    Tablet 20 milliGRAM(s) Oral daily  glycerin Suppository - Adult 1 Suppository(s) Rectal daily  insulin glargine Injectable (LANTUS) 12 Unit(s) SubCutaneous at bedtime  insulin lispro (HumaLOG) corrective regimen sliding scale   SubCutaneous at bedtime  insulin lispro (HumaLOG) corrective regimen sliding scale   SubCutaneous three times a day before meals  insulin lispro Injectable (HumaLOG) 4 Unit(s) SubCutaneous three times a day before meals  lactobacillus acidophilus 1 Tablet(s) Oral three times a day with meals  multivitamin/minerals 1 Tablet(s) Oral daily  oxyCODONE  ER Tablet 10 milliGRAM(s) Oral <User Schedule>  pantoprazole    Tablet 40 milliGRAM(s) Oral before breakfast  polyethylene glycol 3350 17 Gram(s) Oral daily  propranolol  milliGRAM(s) Oral daily  psyllium Powder 1 Packet(s) Oral daily  senna 2 Tablet(s) Oral at bedtime  simvastatin 10 milliGRAM(s) Oral at bedtime    MEDICATIONS  (PRN):  ALBUTerol    90 MICROgram(s) HFA Inhaler 2 Puff(s) Inhalation every 6 hours PRN Shortness of Breath and/or Wheezing  bisacodyl Suppository 10 milliGRAM(s) Rectal daily PRN Constipation  dextrose 40% Gel 15 Gram(s) Oral once PRN Blood Glucose LESS THAN 70 milliGRAM(s)/deciLiter  glucagon  Injectable 1 milliGRAM(s) IntraMuscular once PRN Glucose <70 milliGRAM(s)/deciLiter  guaiFENesin    Syrup 100 milliGRAM(s) Oral every 6 hours PRN Cough  morphine  - Injectable 2 milliGRAM(s) IV Push every 6 hours PRN Severe Pain (7 - 10)      Allergies    Levaquin (Other)  ramipril (Other)  tetracycline (Hives; Rash)    Intolerances        REVIEW OF SYSTEMS:  CONSTITUTIONAL: No fever, weight loss, or fatigue  EYES: No eye pain, visual disturbances, or discharge  ENMT:  No difficulty hearing, tinnitus, vertigo; No sinus or throat pain  NECK: No pain or stiffness  RESPIRATORY: No cough, wheezing, chills or hemoptysis; No shortness of breath  CARDIOVASCULAR: No chest pain, palpitations, dizziness, or leg swelling  GASTROINTESTINAL: No abdominal or epigastric pain. No nausea, vomiting, or hematemesis; No diarrhea or constipation. No melena or hematochezia.  GENITOURINARY: No dysuria, frequency, hematuria, or incontinence  NEUROLOGICAL: No headaches, memory loss, loss of strength, numbness, or tremors  SKIN: No itching, burning, rashes, or lesions   LYMPH NODES: No enlarged glands  ENDOCRINE: No heat or cold intolerance; No hair loss; No polydipsia or polyuria  MUSCULOSKELETAL: No joint pain or swelling; No muscle, back, or extremity pain  PSYCHIATRIC: No depression, anxiety, mood swings, or difficulty sleeping  HEME/LYMPH: No easy bruising, or bleeding gums  ALLERGY AND IMMUNOLOGIC: No hives or eczema    Vital Signs Last 24 Hrs  T(C): 36.8 (22 Aug 2018 13:55), Max: 36.8 (22 Aug 2018 05:14)  T(F): 98.3 (22 Aug 2018 13:55), Max: 98.3 (22 Aug 2018 13:55)  HR: 72 (22 Aug 2018 13:55) (65 - 77)  BP: 104/57 (22 Aug 2018 13:55) (86/42 - 136/46)  BP(mean): --  RR: 16 (22 Aug 2018 13:55) (14 - 18)  SpO2: 93% (22 Aug 2018 13:55) (93% - 98%)    PHYSICAL EXAM:  GENERAL: NAD, well-groomed, well-developed  HEAD:  Atraumatic, Normocephalic  EYES: EOMI, PERRLA, conjunctiva and sclera clear  ENMT: No tonsillar erythema, exudates, or enlargement; Moist mucous membranes, Good dentition, No lesions  NECK: Supple, No JVD, Normal thyroid  NERVOUS SYSTEM:  Alert & Oriented X3, Good concentration; Motor Strength 5/5 B/L upper and lower extremities; DTRs 2+ intact and symmetric  CHEST/LUNG: Clear to auscultation bilaterally; No rales, rhonchi, wheezing, or rubs  HEART: Regular rate and rhythm; No murmurs, rubs, or gallops  ABDOMEN: Soft, Nontender, Nondistended; Bowel sounds present  EXTREMITIES:  2+ Peripheral Pulses, No clubbing, cyanosis, or edema  LYMPH: No lymphadenopathy noted  SKIN: No rashes or lesions    LABS:                        10.9   9.77  )-----------( 252      ( 22 Aug 2018 08:42 )             34.8     22 Aug 2018 08:42    134    |  95     |  51     ----------------------------<  136    4.6     |  31     |  1.80     Ca    9.0        22 Aug 2018 08:42      PT/INR - ( 21 Aug 2018 06:35 )   PT: 15.0 sec;   INR: 1.37 ratio         PTT - ( 21 Aug 2018 06:35 )  PTT:128.9 sec    CAPILLARY BLOOD GLUCOSE      POCT Blood Glucose.: 137 mg/dL (22 Aug 2018 12:02)  POCT Blood Glucose.: 159 mg/dL (22 Aug 2018 07:29)  POCT Blood Glucose.: 181 mg/dL (21 Aug 2018 23:48)  POCT Blood Glucose.: 189 mg/dL (21 Aug 2018 22:28)  POCT Blood Glucose.: 92 mg/dL (21 Aug 2018 17:43)      RADIOLOGY & ADDITIONAL TESTS:    Notes Reviewed:  [x ] YES  [ ] NO    Care Discussed with Consultants/Other Providers [x ] YES  [ ] NO

## 2018-08-22 NOTE — PROGRESS NOTE ADULT - SUBJECTIVE AND OBJECTIVE BOX
Interval History:    Chart reviewed and events noted;       MEDICATIONS  (STANDING):  ceFAZolin   IVPB 2000 milliGRAM(s) IV Intermittent every 12 hours  cholecalciferol 1000 Unit(s) Oral daily  dextrose 5%. 1000 milliLiter(s) (50 mL/Hr) IV Continuous <Continuous>  dextrose 50% Injectable 25 Gram(s) IV Push once  dextrose 50% Injectable 12.5 Gram(s) IV Push once  dextrose 50% Injectable 25 Gram(s) IV Push once  docusate sodium 100 milliGRAM(s) Oral three times a day  enoxaparin Injectable 70 milliGRAM(s) SubCutaneous daily  furosemide    Tablet 20 milliGRAM(s) Oral daily  glycerin Suppository - Adult 1 Suppository(s) Rectal daily  insulin glargine Injectable (LANTUS) 12 Unit(s) SubCutaneous at bedtime  insulin lispro (HumaLOG) corrective regimen sliding scale   SubCutaneous every 6 hours  insulin lispro (HumaLOG) corrective regimen sliding scale   SubCutaneous at bedtime  insulin lispro (HumaLOG) corrective regimen sliding scale   SubCutaneous three times a day before meals  insulin lispro Injectable (HumaLOG) 4 Unit(s) SubCutaneous three times a day before meals  lactobacillus acidophilus 1 Tablet(s) Oral three times a day with meals  multivitamin/minerals 1 Tablet(s) Oral daily  oxyCODONE  ER Tablet 10 milliGRAM(s) Oral <User Schedule>  pantoprazole    Tablet 40 milliGRAM(s) Oral before breakfast  polyethylene glycol 3350 17 Gram(s) Oral daily  propranolol  milliGRAM(s) Oral daily  psyllium Powder 1 Packet(s) Oral daily  senna 2 Tablet(s) Oral at bedtime  simvastatin 10 milliGRAM(s) Oral at bedtime    MEDICATIONS  (PRN):  bisacodyl Suppository 10 milliGRAM(s) Rectal daily PRN Constipation  dextrose 40% Gel 15 Gram(s) Oral once PRN Blood Glucose LESS THAN 70 milliGRAM(s)/deciLiter  glucagon  Injectable 1 milliGRAM(s) IntraMuscular once PRN Glucose <70 milliGRAM(s)/deciLiter  guaiFENesin    Syrup 100 milliGRAM(s) Oral every 6 hours PRN Cough  morphine  - Injectable 2 milliGRAM(s) IV Push every 6 hours PRN Severe Pain (7 - 10)      Vital Signs Last 24 Hrs  T(C): 36.8 (22 Aug 2018 05:14), Max: 36.9 (21 Aug 2018 16:00)  T(F): 98.2 (22 Aug 2018 05:14), Max: 98.4 (21 Aug 2018 16:00)  HR: 65 (22 Aug 2018 05:14) (65 - 77)  BP: 104/57 (22 Aug 2018 05:14) (86/42 - 136/46)  BP(mean): --  RR: 17 (22 Aug 2018 05:14) (14 - 64)  SpO2: 93% (22 Aug 2018 05:14) (93% - 98%)    PHYSICAL EXAM  General: frail elderly lady  in NAD  HEENT: clear oropharynx, anicteric sclera, pink conjunctivae  CV: irregular rhythm S1S2 with no murmur rubs or gallops  Lungs: clear to auscultation, no wheezes, no rhales  Abdomen: soft non-tender non-distended, no hepato/splenomegaly  Ext: gangrenous toe debridement   Skin: no rashes and no petichiae  Neuro: alert and oriented X3 no focal deficits      LABS:                        10.9   9.77  )-----------( 252      ( 22 Aug 2018 08:42 )             34.8     08-22    134<L>  |  95<L>  |  51<H>  ----------------------------<  136<H>  4.6   |  31  |  1.80<H>    Ca    9.0      22 Aug 2018 08:42      PT/INR - ( 21 Aug 2018 06:35 )   PT: 15.0 sec;   INR: 1.37 ratio         PTT - ( 21 Aug 2018 06:35 )  PTT:128.9 sec      RADIOLOGY & ADDITIONAL STUDIES:    IMPRESSION/RECOMMENDATIONS: Interval History:    Chart reviewed and events noted;   as per Ortho no weight bearing   pt remains in bed,  pain with any movement    daughter is at bedside     MEDICATIONS  (STANDING):  ceFAZolin   IVPB 2000 milliGRAM(s) IV Intermittent every 12 hours  cholecalciferol 1000 Unit(s) Oral daily  dextrose 5%. 1000 milliLiter(s) (50 mL/Hr) IV Continuous <Continuous>  dextrose 50% Injectable 25 Gram(s) IV Push once  dextrose 50% Injectable 12.5 Gram(s) IV Push once  dextrose 50% Injectable 25 Gram(s) IV Push once  docusate sodium 100 milliGRAM(s) Oral three times a day  enoxaparin Injectable 70 milliGRAM(s) SubCutaneous daily  furosemide    Tablet 20 milliGRAM(s) Oral daily  glycerin Suppository - Adult 1 Suppository(s) Rectal daily  insulin glargine Injectable (LANTUS) 12 Unit(s) SubCutaneous at bedtime  insulin lispro (HumaLOG) corrective regimen sliding scale   SubCutaneous every 6 hours  insulin lispro (HumaLOG) corrective regimen sliding scale   SubCutaneous at bedtime  insulin lispro (HumaLOG) corrective regimen sliding scale   SubCutaneous three times a day before meals  insulin lispro Injectable (HumaLOG) 4 Unit(s) SubCutaneous three times a day before meals  lactobacillus acidophilus 1 Tablet(s) Oral three times a day with meals  multivitamin/minerals 1 Tablet(s) Oral daily  oxyCODONE  ER Tablet 10 milliGRAM(s) Oral <User Schedule>  pantoprazole    Tablet 40 milliGRAM(s) Oral before breakfast  polyethylene glycol 3350 17 Gram(s) Oral daily  propranolol  milliGRAM(s) Oral daily  psyllium Powder 1 Packet(s) Oral daily  senna 2 Tablet(s) Oral at bedtime  simvastatin 10 milliGRAM(s) Oral at bedtime    MEDICATIONS  (PRN):  bisacodyl Suppository 10 milliGRAM(s) Rectal daily PRN Constipation  dextrose 40% Gel 15 Gram(s) Oral once PRN Blood Glucose LESS THAN 70 milliGRAM(s)/deciLiter  glucagon  Injectable 1 milliGRAM(s) IntraMuscular once PRN Glucose <70 milliGRAM(s)/deciLiter  guaiFENesin    Syrup 100 milliGRAM(s) Oral every 6 hours PRN Cough  morphine  - Injectable 2 milliGRAM(s) IV Push every 6 hours PRN Severe Pain (7 - 10)      Vital Signs Last 24 Hrs  T(C): 36.8 (22 Aug 2018 05:14), Max: 36.9 (21 Aug 2018 16:00)  T(F): 98.2 (22 Aug 2018 05:14), Max: 98.4 (21 Aug 2018 16:00)  HR: 65 (22 Aug 2018 05:14) (65 - 77)  BP: 104/57 (22 Aug 2018 05:14) (86/42 - 136/46)  BP(mean): --  RR: 17 (22 Aug 2018 05:14) (14 - 64)  SpO2: 93% (22 Aug 2018 05:14) (93% - 98%)    PHYSICAL EXAM  General: frail elderly lady  in NAD  HEENT: clear oropharynx, anicteric sclera, pink conjunctivae  CV: irregular rhythm S1S2 with no murmur rubs or gallops  Lungs: clear to auscultation, no wheezes, no rales  Abdomen: soft non-tender non-distended, no hepatosplenomegaly  Ext: gangrenous toe , s/p debridement   Skin: no rashes and no petechiae  Neuro: alert and oriented X3 no focal deficits      LABS:                        10.9   9.77  )-----------( 252      ( 22 Aug 2018 08:42 )             34.8     08-22    134<L>  |  95<L>  |  51<H>  ----------------------------<  136<H>  4.6   |  31  |  1.80<H>    Ca    9.0      22 Aug 2018 08:42      PT/INR - ( 21 Aug 2018 06:35 )   PT: 15.0 sec;   INR: 1.37 ratio         PTT - ( 21 Aug 2018 06:35 )  PTT:128.9 sec

## 2018-08-22 NOTE — PROGRESS NOTE ADULT - ASSESSMENT
89 F htn, dm, chol, af pvd, adm with hip pain with ?metastatic disease now s/p iliac crest biopsy for malignancy    -there is no evidence of acute ischemia.    -there is no evidence of significant arrhythmia.  -af on ac  -remains on full dose Lovenox.  Continue to monitor closely for bleeding post operatively  -cont propranolol    -there is no evidence for meaningful  volume overload.    -s/p podiatric procedure.  Tolerated well with no cardiac complications  -now for PICC    -monitor electrolytes, keep k>4, Mg>2      - All other workup per primary team  - Will follow

## 2018-08-22 NOTE — PROGRESS NOTE ADULT - SUBJECTIVE AND OBJECTIVE BOX
CAPILLARY BLOOD GLUCOSE      POCT Blood Glucose.: 159 mg/dL (22 Aug 2018 07:29)  POCT Blood Glucose.: 181 mg/dL (21 Aug 2018 23:48)  POCT Blood Glucose.: 189 mg/dL (21 Aug 2018 22:28)  POCT Blood Glucose.: 92 mg/dL (21 Aug 2018 17:43)  POCT Blood Glucose.: 129 mg/dL (21 Aug 2018 11:58)      Vital Signs Last 24 Hrs  T(C): 36.8 (22 Aug 2018 05:14), Max: 36.9 (21 Aug 2018 16:00)  T(F): 98.2 (22 Aug 2018 05:14), Max: 98.4 (21 Aug 2018 16:00)  HR: 65 (22 Aug 2018 05:14) (65 - 77)  BP: 104/57 (22 Aug 2018 05:14) (86/42 - 136/46)  BP(mean): --  RR: 17 (22 Aug 2018 05:14) (14 - 64)  SpO2: 93% (22 Aug 2018 05:14) (93% - 98%)    General: WN/WD NAD  Respiratory: CTA B/L  CV: RRR, S1S2, no murmurs, rubs or gallops  Abdominal: Soft, NT, ND +BS, Last BM  Extremities: No edema, + peripheral pulses     08-22    134<L>  |  95<L>  |  51<H>  ----------------------------<  136<H>  4.6   |  31  |  1.80<H>    Ca    9.0      22 Aug 2018 08:42        dextrose 40% Gel 15 Gram(s) Oral once PRN  dextrose 50% Injectable 25 Gram(s) IV Push once  dextrose 50% Injectable 12.5 Gram(s) IV Push once  dextrose 50% Injectable 25 Gram(s) IV Push once  glucagon  Injectable 1 milliGRAM(s) IntraMuscular once PRN  insulin glargine Injectable (LANTUS) 12 Unit(s) SubCutaneous at bedtime  insulin lispro (HumaLOG) corrective regimen sliding scale   SubCutaneous every 6 hours  insulin lispro (HumaLOG) corrective regimen sliding scale   SubCutaneous at bedtime  insulin lispro (HumaLOG) corrective regimen sliding scale   SubCutaneous three times a day before meals  insulin lispro Injectable (HumaLOG) 4 Unit(s) SubCutaneous three times a day before meals  simvastatin 10 milliGRAM(s) Oral at bedtime

## 2018-08-22 NOTE — PROGRESS NOTE ADULT - ASSESSMENT
A/P: 89 F s/p Destructive Right pelvis lesion, suspected neoplasm w/ metastasis, Left Toe ulcer     Analgesia  DVT ppx, FU INR  NWB RLE  FU IR, CT bone biopsy 8/20, results pending  FU Heme/Onc, recommendations appreciated  FU Labs and CEA results   SPEP Normal  Podiatry plan for OR 8/20 for Left 1st Metatarsal Head Resection   Cardiology/Endocrine recommendations appreciated    Once CT bone biopsy results reviewed will re address the need for prophylactic surgical intervention.     PT/OT      Will discuss with attending and advise if plan changes. A/P: 89 F s/p Destructive Right pelvis lesion, suspected neoplasm w/ metastasis, s/p Left 1st Metatarsal Head Resection POD 2    Analgesia  DVT ppx   NWB RLE  CT bone biopsy 8/20; Metastatic Adenocarcinoma. IHC Pending to determine primary source  FU Heme/Onc, recommendations appreciated  FU Labs   Cardiology/Endocrine recommendations appreciated  PT/OT  Once IHC reviewed, will re address the need for prophylactic surgical intervention.   Appreciate H/O recommendations    Will discuss with attending and advise if plan changes.

## 2018-08-22 NOTE — PROGRESS NOTE ADULT - PROBLEM SELECTOR PLAN 1
cont humalog 4 units tid before meals  cont lantus 12 units qhs  cont cons cho diet  goal bg 100-180 in hosp setting

## 2018-08-22 NOTE — PROGRESS NOTE ADULT - SUBJECTIVE AND OBJECTIVE BOX
[facility] - Cooper Diaz, Hari, Don, [place], Jimi Jovel  Office Number:  [phone]    Patient resting comfortably in bed in NAD.  Laying flat with no respiratory distress.  No complaints of chest pain, dyspnea, palpitations, PND, or orthopnea.    F/U for:  HTN, AF    MEDICATIONS  (STANDING):  ceFAZolin   IVPB 2000 milliGRAM(s) IV Intermittent every 12 hours  cholecalciferol 1000 Unit(s) Oral daily  dextrose 5%. 1000 milliLiter(s) (50 mL/Hr) IV Continuous <Continuous>  dextrose 50% Injectable 25 Gram(s) IV Push once  dextrose 50% Injectable 12.5 Gram(s) IV Push once  dextrose 50% Injectable 25 Gram(s) IV Push once  docusate sodium 100 milliGRAM(s) Oral three times a day  enoxaparin Injectable 70 milliGRAM(s) SubCutaneous daily  furosemide    Tablet 20 milliGRAM(s) Oral daily  glycerin Suppository - Adult 1 Suppository(s) Rectal daily  insulin glargine Injectable (LANTUS) 12 Unit(s) SubCutaneous at bedtime  insulin lispro (HumaLOG) corrective regimen sliding scale   SubCutaneous every 6 hours  insulin lispro (HumaLOG) corrective regimen sliding scale   SubCutaneous at bedtime  insulin lispro (HumaLOG) corrective regimen sliding scale   SubCutaneous three times a day before meals  insulin lispro Injectable (HumaLOG) 4 Unit(s) SubCutaneous three times a day before meals  lactobacillus acidophilus 1 Tablet(s) Oral three times a day with meals  multivitamin/minerals 1 Tablet(s) Oral daily  oxyCODONE  ER Tablet 10 milliGRAM(s) Oral <User Schedule>  pantoprazole    Tablet 40 milliGRAM(s) Oral before breakfast  polyethylene glycol 3350 17 Gram(s) Oral daily  propranolol  milliGRAM(s) Oral daily  psyllium Powder 1 Packet(s) Oral daily  senna 2 Tablet(s) Oral at bedtime  simvastatin 10 milliGRAM(s) Oral at bedtime    MEDICATIONS  (PRN):  bisacodyl Suppository 10 milliGRAM(s) Rectal daily PRN Constipation  dextrose 40% Gel 15 Gram(s) Oral once PRN Blood Glucose LESS THAN 70 milliGRAM(s)/deciLiter  glucagon  Injectable 1 milliGRAM(s) IntraMuscular once PRN Glucose <70 milliGRAM(s)/deciLiter  guaiFENesin    Syrup 100 milliGRAM(s) Oral every 6 hours PRN Cough  morphine  - Injectable 2 milliGRAM(s) IV Push every 6 hours PRN Severe Pain (7 - 10)      Allergies    Levaquin (Other)  ramipril (Other)  tetracycline (Hives; Rash)    Intolerances        Vital Signs Last 24 Hrs  T(C): 36.8 (22 Aug 2018 05:14), Max: 36.9 (21 Aug 2018 16:00)  T(F): 98.2 (22 Aug 2018 05:14), Max: 98.4 (21 Aug 2018 16:00)  HR: 65 (22 Aug 2018 05:14) (65 - 77)  BP: 104/57 (22 Aug 2018 05:14) (86/42 - 136/46)  BP(mean): --  RR: 17 (22 Aug 2018 05:14) (14 - 64)  SpO2: 93% (22 Aug 2018 05:14) (93% - 98%)    I&O's Summary    21 Aug 2018 07:01  -  22 Aug 2018 07:00  --------------------------------------------------------  IN: 780 mL / OUT: 1450 mL / NET: -670 mL        ON EXAM:    General: NAD, awake and alert, oriented x 3  HEENT: Mucous membranes are moist, anicteric  Lungs: Non-labored, breath sounds are clear bilaterally, No wheezing, rales or rhonchi  Cardiovascular: Regular, S1 and S2, no murmurs, rubs, or gallops  Gastrointestinal: Bowel Sounds present, soft, nontender.   Lymph: No peripheral edema. No lymphadenopathy.  Skin: No rashes or ulcers  Psych:  Mood & affect appropriate    LABS: All Labs Reviewed:                        10.9   9.77  )-----------( 252      ( 22 Aug 2018 08:42 )             34.8                         10.9   10.68 )-----------( 207      ( 21 Aug 2018 06:35 )             33.7                         10.7   10.73 )-----------( 226      ( 21 Aug 2018 00:20 )             33.0     22 Aug 2018 08:42    134    |  95     |  51     ----------------------------<  136    4.6     |  31     |  1.80   21 Aug 2018 06:35    136    |  97     |  49     ----------------------------<  142    4.2     |  30     |  1.60   20 Aug 2018 07:13    134    |  95     |  47     ----------------------------<  125    4.6     |  31     |  1.70     Ca    9.0        22 Aug 2018 08:42  Ca    8.7        21 Aug 2018 06:35  Ca    9.0        20 Aug 2018 07:13      PT/INR - ( 21 Aug 2018 06:35 )   PT: 15.0 sec;   INR: 1.37 ratio         PTT - ( 21 Aug 2018 06:35 )  PTT:128.9 sec      Blood Culture: Organism --  Gram Stain Blood -- Gram Stain   No polymorphonuclear cells seen  No organisms seen  Specimen Source .Tissue left 1st metatarsal head  Culture-Blood --    Organism --  Gram Stain Blood -- Gram Stain --  Specimen Source .Urine Clean Catch (Midstream)  Culture-Blood --    Organism --  Gram Stain Blood -- Gram Stain --  Specimen Source .Blood Blood-Peripheral  Culture-Blood --

## 2018-08-23 ENCOUNTER — TRANSCRIPTION ENCOUNTER (OUTPATIENT)
Age: 83
End: 2018-08-23

## 2018-08-23 LAB
-  AMPICILLIN/SULBACTAM: SIGNIFICANT CHANGE UP
-  CEFAZOLIN: SIGNIFICANT CHANGE UP
-  CLINDAMYCIN: SIGNIFICANT CHANGE UP
-  ERYTHROMYCIN: SIGNIFICANT CHANGE UP
-  GENTAMICIN: SIGNIFICANT CHANGE UP
-  OXACILLIN: SIGNIFICANT CHANGE UP
-  PENICILLIN: SIGNIFICANT CHANGE UP
-  RIFAMPIN: SIGNIFICANT CHANGE UP
-  TETRACYCLINE: SIGNIFICANT CHANGE UP
-  TRIMETHOPRIM/SULFAMETHOXAZOLE: SIGNIFICANT CHANGE UP
-  VANCOMYCIN: SIGNIFICANT CHANGE UP
ANION GAP SERPL CALC-SCNC: 8 MMOL/L — SIGNIFICANT CHANGE UP (ref 5–17)
APTT BLD: 36.6 SEC — SIGNIFICANT CHANGE UP (ref 27.5–37.4)
BUN SERPL-MCNC: 52 MG/DL — HIGH (ref 7–23)
CALCIUM SERPL-MCNC: 9.1 MG/DL — SIGNIFICANT CHANGE UP (ref 8.5–10.1)
CHLORIDE SERPL-SCNC: 97 MMOL/L — SIGNIFICANT CHANGE UP (ref 96–108)
CO2 SERPL-SCNC: 31 MMOL/L — SIGNIFICANT CHANGE UP (ref 22–31)
CREAT SERPL-MCNC: 1.9 MG/DL — HIGH (ref 0.5–1.3)
CULTURE RESULTS: SIGNIFICANT CHANGE UP
CULTURE RESULTS: SIGNIFICANT CHANGE UP
GLUCOSE SERPL-MCNC: 146 MG/DL — HIGH (ref 70–99)
HCT VFR BLD CALC: 32.8 % — LOW (ref 34.5–45)
HGB BLD-MCNC: 10.6 G/DL — LOW (ref 11.5–15.5)
INR BLD: 1.29 RATIO — HIGH (ref 0.88–1.16)
MCHC RBC-ENTMCNC: 29.5 PG — SIGNIFICANT CHANGE UP (ref 27–34)
MCHC RBC-ENTMCNC: 32.3 GM/DL — SIGNIFICANT CHANGE UP (ref 32–36)
MCV RBC AUTO: 91.4 FL — SIGNIFICANT CHANGE UP (ref 80–100)
METHOD TYPE: SIGNIFICANT CHANGE UP
NRBC # BLD: 0 /100 WBCS — SIGNIFICANT CHANGE UP (ref 0–0)
PLATELET # BLD AUTO: 249 K/UL — SIGNIFICANT CHANGE UP (ref 150–400)
POTASSIUM SERPL-MCNC: 4.7 MMOL/L — SIGNIFICANT CHANGE UP (ref 3.5–5.3)
POTASSIUM SERPL-SCNC: 4.7 MMOL/L — SIGNIFICANT CHANGE UP (ref 3.5–5.3)
PROTHROM AB SERPL-ACNC: 14.1 SEC — HIGH (ref 9.8–12.7)
RBC # BLD: 3.59 M/UL — LOW (ref 3.8–5.2)
RBC # FLD: 16.9 % — HIGH (ref 10.3–14.5)
SODIUM SERPL-SCNC: 136 MMOL/L — SIGNIFICANT CHANGE UP (ref 135–145)
SPECIMEN SOURCE: SIGNIFICANT CHANGE UP
SPECIMEN SOURCE: SIGNIFICANT CHANGE UP
SURGICAL PATHOLOGY FINAL REPORT - CH: SIGNIFICANT CHANGE UP
WBC # BLD: 11.03 K/UL — HIGH (ref 3.8–10.5)
WBC # FLD AUTO: 11.03 K/UL — HIGH (ref 3.8–10.5)

## 2018-08-23 PROCEDURE — 77001 FLUOROGUIDE FOR VEIN DEVICE: CPT | Mod: 26

## 2018-08-23 PROCEDURE — 99232 SBSQ HOSP IP/OBS MODERATE 35: CPT

## 2018-08-23 PROCEDURE — 76937 US GUIDE VASCULAR ACCESS: CPT | Mod: 26

## 2018-08-23 PROCEDURE — 36569 INSJ PICC 5 YR+ W/O IMAGING: CPT

## 2018-08-23 RX ORDER — SODIUM CHLORIDE 9 MG/ML
1000 INJECTION INTRAMUSCULAR; INTRAVENOUS; SUBCUTANEOUS
Qty: 0 | Refills: 0 | Status: DISCONTINUED | OUTPATIENT
Start: 2018-08-23 | End: 2018-08-28

## 2018-08-23 RX ORDER — WARFARIN SODIUM 2.5 MG/1
2 TABLET ORAL DAILY
Qty: 0 | Refills: 0 | Status: COMPLETED | OUTPATIENT
Start: 2018-08-23 | End: 2018-08-25

## 2018-08-23 RX ORDER — POLYETHYLENE GLYCOL 3350 17 G/17G
17 POWDER, FOR SOLUTION ORAL
Qty: 0 | Refills: 0 | Status: DISCONTINUED | OUTPATIENT
Start: 2018-08-23 | End: 2018-08-28

## 2018-08-23 RX ADMIN — SIMVASTATIN 10 MILLIGRAM(S): 20 TABLET, FILM COATED ORAL at 21:28

## 2018-08-23 RX ADMIN — Medication 1 TABLET(S): at 08:04

## 2018-08-23 RX ADMIN — POLYETHYLENE GLYCOL 3350 17 GRAM(S): 17 POWDER, FOR SOLUTION ORAL at 11:59

## 2018-08-23 RX ADMIN — POLYETHYLENE GLYCOL 3350 17 GRAM(S): 17 POWDER, FOR SOLUTION ORAL at 18:37

## 2018-08-23 RX ADMIN — Medication 100 MILLIGRAM(S): at 21:28

## 2018-08-23 RX ADMIN — OXYCODONE HYDROCHLORIDE 10 MILLIGRAM(S): 5 TABLET ORAL at 22:30

## 2018-08-23 RX ADMIN — Medication 1: at 08:04

## 2018-08-23 RX ADMIN — SODIUM CHLORIDE 75 MILLILITER(S): 9 INJECTION INTRAMUSCULAR; INTRAVENOUS; SUBCUTANEOUS at 21:48

## 2018-08-23 RX ADMIN — ENOXAPARIN SODIUM 70 MILLIGRAM(S): 100 INJECTION SUBCUTANEOUS at 11:58

## 2018-08-23 RX ADMIN — MORPHINE SULFATE 2 MILLIGRAM(S): 50 CAPSULE, EXTENDED RELEASE ORAL at 23:55

## 2018-08-23 RX ADMIN — Medication 1 TABLET(S): at 17:09

## 2018-08-23 RX ADMIN — Medication 100 MILLIGRAM(S): at 05:18

## 2018-08-23 RX ADMIN — Medication 4 UNIT(S): at 08:04

## 2018-08-23 RX ADMIN — Medication 1 TABLET(S): at 11:59

## 2018-08-23 RX ADMIN — Medication 4 UNIT(S): at 11:58

## 2018-08-23 RX ADMIN — Medication 1 SUPPOSITORY(S): at 17:10

## 2018-08-23 RX ADMIN — Medication 4 UNIT(S): at 17:08

## 2018-08-23 RX ADMIN — Medication 100 MILLIGRAM(S): at 17:09

## 2018-08-23 RX ADMIN — WARFARIN SODIUM 2 MILLIGRAM(S): 2.5 TABLET ORAL at 21:28

## 2018-08-23 RX ADMIN — Medication 1 PACKET(S): at 11:59

## 2018-08-23 RX ADMIN — INSULIN GLARGINE 12 UNIT(S): 100 INJECTION, SOLUTION SUBCUTANEOUS at 22:22

## 2018-08-23 RX ADMIN — Medication 20 MILLIGRAM(S): at 05:18

## 2018-08-23 RX ADMIN — Medication 1000 UNIT(S): at 11:59

## 2018-08-23 RX ADMIN — Medication 120 MILLIGRAM(S): at 05:19

## 2018-08-23 RX ADMIN — Medication 5 MILLIGRAM(S): at 21:28

## 2018-08-23 RX ADMIN — Medication 1: at 11:59

## 2018-08-23 RX ADMIN — PANTOPRAZOLE SODIUM 40 MILLIGRAM(S): 20 TABLET, DELAYED RELEASE ORAL at 06:20

## 2018-08-23 RX ADMIN — OXYCODONE HYDROCHLORIDE 10 MILLIGRAM(S): 5 TABLET ORAL at 21:28

## 2018-08-23 RX ADMIN — SENNA PLUS 2 TABLET(S): 8.6 TABLET ORAL at 21:28

## 2018-08-23 NOTE — PHYSICAL THERAPY INITIAL EVALUATION ADULT - ADDITIONAL COMMENTS
Pt lives with her daughter in a Private no [facility]LLY. Pt ambulated independently w/ a RW. Pt has been unable to stand 2/2 right hip pain this past week.

## 2018-08-23 NOTE — PHYSICAL THERAPY INITIAL EVALUATION ADULT - PERTINENT HX OF CURRENT PROBLEM, REHAB EVAL
89 y/o F from home with PMH of HTN DM HLD AF PVD angina who came in  c/o right sided rib pain, hip, and right thigh pain x several weeks.  Pt's daughter states pt usually ambulated with a walker, she has not even been able to stand due to pain.

## 2018-08-23 NOTE — PROGRESS NOTE ADULT - SUBJECTIVE AND OBJECTIVE BOX
Patient seen an examined at bedside. Pain is well controlled. No other complaints at this time.    PE:    Vital Signs Last 24 Hrs  T(C): 36.6 (23 Aug 2018 04:43), Max: 36.8 (22 Aug 2018 13:55)  T(F): 97.8 (23 Aug 2018 04:43), Max: 98.3 (22 Aug 2018 13:55)  HR: 72 (23 Aug 2018 04:43) (72 - 74)  BP: 118/67 (23 Aug 2018 04:43) (104/57 - 118/67)  RR: 16 (23 Aug 2018 04:43) (16 - 16)  SpO2: 94% (23 Aug 2018 04:43) (92% - 94%)    RLE:    No gross deformity noted  Skin intact; No erythema or ecchymosis  SILT L3-S1  DP1+  EHL/FHL/GSC/TA intact  Compartments soft and compressible  No calf tenderness  Decreased sensation to light tough dorsal/volar foot, hx diabetic neuropathy

## 2018-08-23 NOTE — PROGRESS NOTE ADULT - SUBJECTIVE AND OBJECTIVE BOX
Patient is a 89y old  Female who presents with a chief complaint of diabetic foot ulcer/ambulatory disfunction (15 Aug 2018 02:29)      Patient seen in follow up for CKD 3. s/p bone biopsy    PAST MEDICAL HISTORY:  OAB (overactive bladder)  GERD (gastroesophageal reflux disease)  Angina effort  Heart failure  Upper respiratory infection  Diabetes  Renal stones  Myocardial infarction  Spinal stenosis  CVA (cerebral infarction)  Afib  Raynaud disease  Acid reflux  Hypertension  Hyperlipidemia  Asthma    MEDICATIONS  (STANDING):  ceFAZolin   IVPB 2000 milliGRAM(s) IV Intermittent every 12 hours  cholecalciferol 1000 Unit(s) Oral daily  dextrose 5%. 1000 milliLiter(s) (50 mL/Hr) IV Continuous <Continuous>  dextrose 50% Injectable 25 Gram(s) IV Push once  dextrose 50% Injectable 12.5 Gram(s) IV Push once  dextrose 50% Injectable 25 Gram(s) IV Push once  docusate sodium 100 milliGRAM(s) Oral three times a day  enoxaparin Injectable 70 milliGRAM(s) SubCutaneous daily  furosemide    Tablet 20 milliGRAM(s) Oral daily  glycerin Suppository - Adult 1 Suppository(s) Rectal daily  insulin glargine Injectable (LANTUS) 12 Unit(s) SubCutaneous at bedtime  insulin lispro (HumaLOG) corrective regimen sliding scale   SubCutaneous at bedtime  insulin lispro (HumaLOG) corrective regimen sliding scale   SubCutaneous three times a day before meals  insulin lispro Injectable (HumaLOG) 4 Unit(s) SubCutaneous three times a day before meals  lactobacillus acidophilus 1 Tablet(s) Oral three times a day with meals  multivitamin/minerals 1 Tablet(s) Oral daily  oxyCODONE  ER Tablet 10 milliGRAM(s) Oral <User Schedule>  pantoprazole    Tablet 40 milliGRAM(s) Oral before breakfast  polyethylene glycol 3350 17 Gram(s) Oral daily  propranolol  milliGRAM(s) Oral daily  psyllium Powder 1 Packet(s) Oral daily  senna 2 Tablet(s) Oral at bedtime  simvastatin 10 milliGRAM(s) Oral at bedtime    MEDICATIONS  (PRN):  ALBUTerol    90 MICROgram(s) HFA Inhaler 2 Puff(s) Inhalation every 6 hours PRN Shortness of Breath and/or Wheezing  bisacodyl Suppository 10 milliGRAM(s) Rectal daily PRN Constipation  dextrose 40% Gel 15 Gram(s) Oral once PRN Blood Glucose LESS THAN 70 milliGRAM(s)/deciLiter  glucagon  Injectable 1 milliGRAM(s) IntraMuscular once PRN Glucose <70 milliGRAM(s)/deciLiter  guaiFENesin    Syrup 100 milliGRAM(s) Oral every 6 hours PRN Cough  morphine  - Injectable 2 milliGRAM(s) IV Push every 6 hours PRN Severe Pain (7 - 10)    T(C): 36.7 (08-23-18 @ 14:21), Max: 36.8 (08-22-18 @ 05:14)  HR: 76 (08-23-18 @ 14:21) (65 - 76)  BP: 100/65 (08-23-18 @ 14:21) (100/65 - 118/67)  RR: 15 (08-23-18 @ 14:21) (15 - 17)  SpO2: 96% (08-23-18 @ 14:21) (92% - 96%)  Wt(kg): --  I&O's Detail      PHYSICAL EXAM:  General: NAD  Respiratory: b/l air entry  Cardiovascular: S1 S2  Gastrointestinal: soft  Extremities:  left foot dressing                          10.6   11.03 )-----------( 249      ( 23 Aug 2018 07:12 )             32.8     08-23    136  |  97  |  52<H>  ----------------------------<  146<H>  4.7   |  31  |  1.90<H>    Ca    9.1      23 Aug 2018 07:12                Sodium, Serum: 136 (08-23 @ 07:12)  Sodium, Serum: 134 (08-22 @ 08:42)  Sodium, Serum: 136 (08-21 @ 06:35)  Sodium, Serum: 134 (08-20 @ 07:13)    Creatinine, Serum: 1.90 (08-23 @ 07:12)  Creatinine, Serum: 1.80 (08-22 @ 08:42)  Creatinine, Serum: 1.60 (08-21 @ 06:35)  Creatinine, Serum: 1.70 (08-20 @ 07:13)    Potassium, Serum: 4.7 (08-23 @ 07:12)  Potassium, Serum: 4.6 (08-22 @ 08:42)  Potassium, Serum: 4.2 (08-21 @ 06:35)  Potassium, Serum: 4.6 (08-20 @ 07:13)    Hemoglobin: 10.6 (08-23 @ 07:12)  Hemoglobin: 10.9 (08-22 @ 08:42)  Hemoglobin: 10.9 (08-21 @ 06:35)  Hemoglobin: 10.7 (08-21 @ 00:20)

## 2018-08-23 NOTE — PROGRESS NOTE ADULT - SUBJECTIVE AND OBJECTIVE BOX
Patient is a 89y old  Female who presents with a chief complaint of diabetic foot ulcer/ambulatory disfunction (15 Aug 2018 02:29)      INTERVAL HPI/OVERNIGHT EVENTS:    Denies shortness of breath    MEDICATIONS  (STANDING):  bisacodyl 5 milliGRAM(s) Oral at bedtime  ceFAZolin   IVPB 2000 milliGRAM(s) IV Intermittent every 12 hours  cholecalciferol 1000 Unit(s) Oral daily  dextrose 5%. 1000 milliLiter(s) (50 mL/Hr) IV Continuous <Continuous>  dextrose 50% Injectable 25 Gram(s) IV Push once  dextrose 50% Injectable 12.5 Gram(s) IV Push once  dextrose 50% Injectable 25 Gram(s) IV Push once  docusate sodium 100 milliGRAM(s) Oral three times a day  enoxaparin Injectable 70 milliGRAM(s) SubCutaneous daily  furosemide    Tablet 20 milliGRAM(s) Oral daily  glycerin Suppository - Adult 1 Suppository(s) Rectal daily  insulin glargine Injectable (LANTUS) 12 Unit(s) SubCutaneous at bedtime  insulin lispro (HumaLOG) corrective regimen sliding scale   SubCutaneous at bedtime  insulin lispro (HumaLOG) corrective regimen sliding scale   SubCutaneous three times a day before meals  insulin lispro Injectable (HumaLOG) 4 Unit(s) SubCutaneous three times a day before meals  lactobacillus acidophilus 1 Tablet(s) Oral three times a day with meals  multivitamin/minerals 1 Tablet(s) Oral daily  oxyCODONE  ER Tablet 10 milliGRAM(s) Oral <User Schedule>  pantoprazole    Tablet 40 milliGRAM(s) Oral before breakfast  polyethylene glycol 3350 17 Gram(s) Oral two times a day  propranolol  milliGRAM(s) Oral daily  psyllium Powder 1 Packet(s) Oral daily  senna 2 Tablet(s) Oral at bedtime  simvastatin 10 milliGRAM(s) Oral at bedtime  sodium chloride 0.9%. 1000 milliLiter(s) (75 mL/Hr) IV Continuous <Continuous>  warfarin 2 milliGRAM(s) Oral daily      MEDICATIONS  (PRN):  ALBUTerol    90 MICROgram(s) HFA Inhaler 2 Puff(s) Inhalation every 6 hours PRN Shortness of Breath and/or Wheezing  dextrose 40% Gel 15 Gram(s) Oral once PRN Blood Glucose LESS THAN 70 milliGRAM(s)/deciLiter  glucagon  Injectable 1 milliGRAM(s) IntraMuscular once PRN Glucose <70 milliGRAM(s)/deciLiter  guaiFENesin    Syrup 100 milliGRAM(s) Oral every 6 hours PRN Cough  morphine  - Injectable 2 milliGRAM(s) IV Push every 6 hours PRN Severe Pain (7 - 10)      Allergies    Levaquin (Other)  ramipril (Other)  tetracycline (Hives; Rash)    Intolerances        PAST MEDICAL & SURGICAL HISTORY:  OAB (overactive bladder)  GERD (gastroesophageal reflux disease)  Angina effort  Heart failure  Upper respiratory infection  Diabetes  Renal stones  Myocardial infarction: 1981  Spinal stenosis  CVA (cerebral infarction)  Afib  Raynaud disease  Acid reflux  Hypertension  Hyperlipidemia  Asthma  Cataract  S/P hysterectomy      Vital Signs Last 24 Hrs  T(C): 36.8 (23 Aug 2018 22:24), Max: 36.8 (23 Aug 2018 22:24)  T(F): 98.2 (23 Aug 2018 22:24), Max: 98.2 (23 Aug 2018 22:24)  HR: 71 (23 Aug 2018 22:24) (71 - 76)  BP: 82/56 (23 Aug 2018 22:24) (82/56 - 118/67)  BP(mean): --  RR: 16 (23 Aug 2018 22:24) (15 - 16)  SpO2: 97% (23 Aug 2018 22:24) (94% - 97%)    PHYSICAL EXAMINATION:    GENERAL: The patient is awake and alert in no apparent distress.     HEENT: Head is normocephalic and atraumatic. Extraocular muscles are intact. Mucous membranes are moist.    NECK: Supple.    LUNGS: Clear to auscultation without wheezing, rales or rhonchi; respirations unlabored    HEART: Regular rate and rhythm without murmur.    ABDOMEN: Soft, nontender, and nondistended.      EXTREMITIES: Without any cyanosis, clubbing, rash, lesions or edema.    NEUROLOGIC: Grossly intact.    SKIN: No ulceration or induration present.      LABS:                        10.6   11.03 )-----------( 249      ( 23 Aug 2018 07:12 )             32.8     08-23    136  |  97  |  52<H>  ----------------------------<  146<H>  4.7   |  31  |  1.90<H>    Ca    9.1      23 Aug 2018 07:12      PT/INR - ( 23 Aug 2018 17:34 )   PT: 14.1 sec;   INR: 1.29 ratio         PTT - ( 23 Aug 2018 17:34 )  PTT:36.6 sec                    MICROBIOLOGY:  Culture Results:   Rare Staphylococcus aureus (08-21 @ 21:28)      RADIOLOGY & ADDITIONAL STUDIES:    Assessment:    Staph Bacteremia  Hx Asthma  Chronic Osteomyelitis - (bone biopsy negative for malignancy)    Plan:    Continue oxygen + antibiotics  Albuterol PRN

## 2018-08-23 NOTE — PHYSICAL THERAPY INITIAL EVALUATION ADULT - MD ORDER
Bone tumor  08/20/2018    Active  Skinny Keller  Osteomyelitis of ankle or foot  08/21/2018  left 1st metatarsal osteomyelitis  Active  Geetha Ga.  Amputation of left first metatarsal  08/21/2018  1st metatarsal head resection  Active  DIANNA.

## 2018-08-23 NOTE — PROGRESS NOTE ADULT - SUBJECTIVE AND OBJECTIVE BOX
CAPILLARY BLOOD GLUCOSE      POCT Blood Glucose.: 156 mg/dL (23 Aug 2018 07:22)  POCT Blood Glucose.: 156 mg/dL (22 Aug 2018 22:28)  POCT Blood Glucose.: 215 mg/dL (22 Aug 2018 16:44)  POCT Blood Glucose.: 137 mg/dL (22 Aug 2018 12:02)      Vital Signs Last 24 Hrs  T(C): 36.6 (23 Aug 2018 04:43), Max: 36.8 (22 Aug 2018 13:55)  T(F): 97.8 (23 Aug 2018 04:43), Max: 98.3 (22 Aug 2018 13:55)  HR: 72 (23 Aug 2018 04:43) (72 - 74)  BP: 118/67 (23 Aug 2018 04:43) (104/57 - 118/67)  BP(mean): --  RR: 16 (23 Aug 2018 04:43) (16 - 16)  SpO2: 94% (23 Aug 2018 04:43) (92% - 94%)    General: WN/WD NAD  Respiratory: CTA B/L  CV: RRR, S1S2, no murmurs, rubs or gallops  Abdominal: Soft, NT, ND +BS, Last BM  Extremities: No edema, + peripheral pulses     08-23    136  |  97  |  52<H>  ----------------------------<  146<H>  4.7   |  31  |  1.90<H>    Ca    9.1      23 Aug 2018 07:12        dextrose 40% Gel 15 Gram(s) Oral once PRN  dextrose 50% Injectable 25 Gram(s) IV Push once  dextrose 50% Injectable 12.5 Gram(s) IV Push once  dextrose 50% Injectable 25 Gram(s) IV Push once  glucagon  Injectable 1 milliGRAM(s) IntraMuscular once PRN  insulin glargine Injectable (LANTUS) 12 Unit(s) SubCutaneous at bedtime  insulin lispro (HumaLOG) corrective regimen sliding scale   SubCutaneous at bedtime  insulin lispro (HumaLOG) corrective regimen sliding scale   SubCutaneous three times a day before meals  insulin lispro Injectable (HumaLOG) 4 Unit(s) SubCutaneous three times a day before meals  simvastatin 10 milliGRAM(s) Oral at bedtime

## 2018-08-23 NOTE — PROGRESS NOTE ADULT - SUBJECTIVE AND OBJECTIVE BOX
ID progress note     Name: DWAYNE DONAHUE  Age: 89y  Gender: Female  MRN: [id number]    Interval History-- vents noted, no new complaints . Daughter at bedside, wants orthopedic follow up for weight bearing status and ambulation , POD # 2 s/p left hallux resection. Oncology follow up noted    Notes reviewed    Past Medical History--  OAB (overactive bladder)  GERD (gastroesophageal reflux disease)  Angina effort  Heart failure  Upper respiratory infection  Diabetes  Renal stones  Myocardial infarction  Spinal stenosis  CVA (cerebral infarction)  Afib  Raynaud disease  Acid reflux  Hypertension  Hyperlipidemia  Asthma  Cataract  S/P hysterectomy      For details regarding the patient's social history, family history, and other miscellaneous elements, please refer the initial infectious diseases consultation and/or the admitting history and physical examination for this admission.    Allergies--  Allergies    Levaquin (Other)  ramipril (Other)  tetracycline (Hives; Rash)    Intolerances        Medications--  Antibiotics:  ceFAZolin   IVPB 2000 milliGRAM(s) IV Intermittent every 12 hours    Immunologic:    Other:  ALBUTerol    90 MICROgram(s) HFA Inhaler PRN  bisacodyl Suppository PRN  cholecalciferol  dextrose 40% Gel PRN  dextrose 5%.  dextrose 50% Injectable  dextrose 50% Injectable  dextrose 50% Injectable  docusate sodium  enoxaparin Injectable  furosemide    Tablet  glucagon  Injectable PRN  glycerin Suppository - Adult  guaiFENesin    Syrup PRN  insulin glargine Injectable (LANTUS)  insulin lispro (HumaLOG) corrective regimen sliding scale  insulin lispro (HumaLOG) corrective regimen sliding scale  insulin lispro Injectable (HumaLOG)  lactobacillus acidophilus  morphine  - Injectable PRN  multivitamin/minerals  oxyCODONE  ER Tablet  pantoprazole    Tablet  polyethylene glycol 3350  propranolol LA  psyllium Powder  senna  simvastatin      Review of Systems--  A 10-point review of systems was obtained.     Pertinent positives and negatives--  Constitutional: No fevers. No Chills. No Rigors.   Cardiovascular: No chest pain. No palpitations.  Respiratory: No shortness of breath. No cough.  Gastrointestinal: No nausea or vomiting. No diarrhea or constipation.   Psychiatric: Pleasant. Appropriate affect.    Review of systems otherwise negative except as previously noted.    Physical Examination--  Vital Signs: T(F): 97.8 (08-23-18 @ 04:43), Max: 98.3 (08-22-18 @ 13:55)  HR: 72 (08-23-18 @ 04:43)  BP: 118/67 (08-23-18 @ 04:43)  RR: 16 (08-23-18 @ 04:43)  SpO2: 94% (08-23-18 @ 04:43)  Wt(kg): --  General: Nontoxic-appearing Female in no acute distress.  HEENT: AT/NC. PERRL. EOMI. Anicteric. Conjunctiva pink and moist. Oropharynx clear. Dentition fair.  Neck: Not rigid. No sense of mass.  Nodes: None palpable.  Lungs: Clear bilaterally without rales, wheezing or rhonchi  Heart: Regular rate and rhythm. No Murmur. No rub. No gallop. No palpable thrill.  Abdomen: Bowel sounds present and normoactive. Soft. Nondistended. Nontender. No sense of mass. No organomegaly.  Back: No spinal tenderness. No costovertebral angle tenderness.   Extremities: No cyanosis or clubbing. No edema.   Skin: Warm. Dry. Good turgor. No rash. No vasculitic stigmata.  Psychiatric: Appropriate affect and mood for situation.         Laboratory Studies--  CBC                        10.6   11.03 )-----------( 249      ( 23 Aug 2018 07:12 )             32.8       Chemistries  08-23    136  |  97  |  52<H>  ----------------------------<  146<H>  4.7   |  31  |  1.90<H>    Ca    9.1      23 Aug 2018 07:12        RECENT CULTURES    Culture - Tissue with Gram Stain (collected 21 Aug 2018 21:28)  Source: .Tissue left 1st metatarsal head  Gram Stain (22 Aug 2018 02:40):    No polymorphonuclear cells seen    No organisms seen  Preliminary Report (22 Aug 2018 16:58):    Rare Gram positive cocci in pairs    Surgical Pathology Final Report - :   ACCESSION No:  [id number]    DWAYNE DONAHUE                     1  Surgical Final Report  Final Diagnosis  Right pelvic bone, CT-guided biopsy:  - Metastatic adenocarcinoma, 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    Note: Report pending immunohistochemical stain workup to evaluate  for primary origin of tumor.  Results to follow in an addendum.  Key portions of this case were reviewed in intradepartmental  consultation with Dr. Cordova, with concurrence of  diagnosis.    Cari Florentino MD  (Electronic Signature)    RADIOLOGY:  Xray Foot AP + Lateral + Oblique, Left (08.21.18 @ 17:52) >  FINDINGS:  The resection of the left first metatarsal with postsurgical changes.  There is no fracture or dislocation.  The joint spaces are preserved.  There are no lytic or blastic bony lesions.    IMPRESSION:   Resection of the left first metatarsal with postsurgical changes      Assessment--  89 y.o woman with multiple comorbidities presented with several weeks h/o of bony pain and recent inability to ambulate du to pain in the foot. Ct scan revealed destructive bony lesions concerning for metastasis and liver lesions. 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 Noted to have staph aurues bacteremia likely from left hallux abscess    The primary source of malignancy is unclear, she is to have biopsy of the hip lesion on Friday provided INR is below 1.5 . Anticoagulation is stopped . 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, primary unknown , await final report    she is s/p left hallux amputation pOD # 2    Plan :   - will continue with  Ancef 2 grams q 12 as blood and wound cs is MSSA  - oncology follow up   - no need for JOSE GUADALUPE as repeat blood cs negative and her source is the foot infection   - overall prognosis is poor   - goals of care conversation with her Son, does not want aggressive care   - she will need long term IV abx , for PICC line today  , may need GREGORIO  - orthopedic follow up     Continue with present regime .  Appropriate use of antibiotics and adverse effects reviewed.    I have discussed the above plan of care with patient and family in detail. They expressed understanding of the treatment plan . Risks, benefits and alternatives discussed in detail. I have asked if they have any questions or concerns and appropriately addressed them to the best of my ability .      > 35 minutes spent in direct patient care reviewing  the notes, lab data/ imaging , discussion with multidisciplinary team. All questions were addressed and answered to the best of my capacity .    Thank you for allowing me to participate in the care of your patient .        William Armendariz MD  [phone]

## 2018-08-23 NOTE — PROGRESS NOTE ADULT - SUBJECTIVE AND OBJECTIVE BOX
[facility] -- Cooper Diaz, Don Noriega Pannella, Patel, Savella  Office # [phone]      Follow Up:  AF    Subjective/Observations: Patient seen and examined. Events noted. Resting comfortably in bed. No complaints of chest pain, dyspnea, or palpitations reported. No signs of orthopnea or PND. Still complaining of right hip pain      REVIEW OF SYSTEMS: All other review of systems is negative unless indicated above    PAST MEDICAL & SURGICAL HISTORY:  OAB (overactive bladder)  GERD (gastroesophageal reflux disease)  Angina effort  Heart failure  Upper respiratory infection  Diabetes  Renal stones  Myocardial infarction: 1981  Spinal stenosis  CVA (cerebral infarction)  Afib  Raynaud disease  Acid reflux  Hypertension  Hyperlipidemia  Asthma  Cataract  S/P hysterectomy      MEDICATIONS  (STANDING):  ceFAZolin   IVPB 2000 milliGRAM(s) IV Intermittent every 12 hours  cholecalciferol 1000 Unit(s) Oral daily  dextrose 5%. 1000 milliLiter(s) (50 mL/Hr) IV Continuous <Continuous>  dextrose 50% Injectable 25 Gram(s) IV Push once  dextrose 50% Injectable 12.5 Gram(s) IV Push once  dextrose 50% Injectable 25 Gram(s) IV Push once  docusate sodium 100 milliGRAM(s) Oral three times a day  enoxaparin Injectable 70 milliGRAM(s) SubCutaneous daily  furosemide    Tablet 20 milliGRAM(s) Oral daily  glycerin Suppository - Adult 1 Suppository(s) Rectal daily  insulin glargine Injectable (LANTUS) 12 Unit(s) SubCutaneous at bedtime  insulin lispro (HumaLOG) corrective regimen sliding scale   SubCutaneous at bedtime  insulin lispro (HumaLOG) corrective regimen sliding scale   SubCutaneous three times a day before meals  insulin lispro Injectable (HumaLOG) 4 Unit(s) SubCutaneous three times a day before meals  lactobacillus acidophilus 1 Tablet(s) Oral three times a day with meals  multivitamin/minerals 1 Tablet(s) Oral daily  oxyCODONE  ER Tablet 10 milliGRAM(s) Oral <User Schedule>  pantoprazole    Tablet 40 milliGRAM(s) Oral before breakfast  polyethylene glycol 3350 17 Gram(s) Oral daily  propranolol  milliGRAM(s) Oral daily  psyllium Powder 1 Packet(s) Oral daily  senna 2 Tablet(s) Oral at bedtime  simvastatin 10 milliGRAM(s) Oral at bedtime    MEDICATIONS  (PRN):  ALBUTerol    90 MICROgram(s) HFA Inhaler 2 Puff(s) Inhalation every 6 hours PRN Shortness of Breath and/or Wheezing  bisacodyl Suppository 10 milliGRAM(s) Rectal daily PRN Constipation  dextrose 40% Gel 15 Gram(s) Oral once PRN Blood Glucose LESS THAN 70 milliGRAM(s)/deciLiter  glucagon  Injectable 1 milliGRAM(s) IntraMuscular once PRN Glucose <70 milliGRAM(s)/deciLiter  guaiFENesin    Syrup 100 milliGRAM(s) Oral every 6 hours PRN Cough  morphine  - Injectable 2 milliGRAM(s) IV Push every 6 hours PRN Severe Pain (7 - 10)      Allergies    Levaquin (Other)  ramipril (Other)  tetracycline (Hives; Rash)    Intolerances            Vital Signs Last 24 Hrs  T(C): 36.6 (23 Aug 2018 04:43), Max: 36.8 (22 Aug 2018 13:55)  T(F): 97.8 (23 Aug 2018 04:43), Max: 98.3 (22 Aug 2018 13:55)  HR: 72 (23 Aug 2018 04:43) (72 - 74)  BP: 118/67 (23 Aug 2018 04:43) (104/57 - 118/67)  BP(mean): --  RR: 16 (23 Aug 2018 04:43) (16 - 16)  SpO2: 94% (23 Aug 2018 04:43) (92% - 94%)    I&O's Summary        PHYSICAL EXAM:      Constitutional: NAD, awake and alert, well-developed  HEENT: Moist Mucous Membranes, Anicteric  Pulmonary: Non-labored, breath sounds are clear bilaterally, No wheezing, rales or rhonchi  Cardiovascular: Regular, S1 and S2, No murmurs, rubs, gallops or clicks  Gastrointestinal: Bowel Sounds present, soft, nontender.   Lymph: No peripheral edema. No lymphadenopathy.  Skin: No visible rashes or ulcers.  Psych:  Mood & affect appropriate for situation    LABS: All Labs Reviewed:                        10.6   11.03 )-----------( 249      ( 23 Aug 2018 07:12 )             32.8                         10.9   9.77  )-----------( 252      ( 22 Aug 2018 08:42 )             34.8                         10.9   10.68 )-----------( 207      ( 21 Aug 2018 06:35 )             33.7     23 Aug 2018 07:12    136    |  97     |  52     ----------------------------<  146    4.7     |  31     |  1.90   22 Aug 2018 08:42    134    |  95     |  51     ----------------------------<  136    4.6     |  31     |  1.80   21 Aug 2018 06:35    136    |  97     |  49     ----------------------------<  142    4.2     |  30     |  1.60     Ca    9.1        23 Aug 2018 07:12  Ca    9.0        22 Aug 2018 08:42  Ca    8.7        21 Aug 2018 06:35

## 2018-08-23 NOTE — PROGRESS NOTE ADULT - PROBLEM SELECTOR PLAN 1
cont lantus 12 units qhs  cont humalog 4 units 3x/day  cont humalog scale coverage  goal bg 100-180 in hosp setting  cont cons cho diet

## 2018-08-23 NOTE — CHART NOTE - NSCHARTNOTEFT_GEN_A_CORE
Notified by Rn that patient has manual BP 88/50. Patient seen and examined at bedside. HR 71, RR 18, SpO2 97 on 2 L NC.    Vital Signs Last 24 Hrs  T(C): 36.7 (23 Aug 2018 14:21), Max: 36.7 (22 Aug 2018 22:40)  T(F): 98.1 (23 Aug 2018 14:21), Max: 98.1 (22 Aug 2018 22:40)  HR: 76 (23 Aug 2018 14:21) (72 - 76)  BP: 100/65 (23 Aug 2018 14:21) (100/65 - 118/67)  BP(mean): --  RR: 15 (23 Aug 2018 14:21) (15 - 16)  SpO2: 96% (23 Aug 2018 14:21) (92% - 96%)    Will add IVF at 75 cc/hr Notified by Rn that patient has manual BP 88/50. Patient seen and examined at bedside. HR 71, RR 18, SpO2 97 on 2 L NC. Patient denies SOB, CP, palpitations, lightheadedness, abdominal pain, numbness/tingling and all other complaints. States that she is just waiting for test results and wishes she was not in the hospital.     Vital Signs Last 24 Hrs  T(C): 36.7 (23 Aug 2018 14:21), Max: 36.7 (22 Aug 2018 22:40)  T(F): 98.1 (23 Aug 2018 14:21), Max: 98.1 (22 Aug 2018 22:40)  HR: 76 (23 Aug 2018 14:21) (72 - 76)  BP: 100/65 (23 Aug 2018 14:21) (100/65 - 118/67)  BP(mean): --  RR: 15 (23 Aug 2018 14:21) (15 - 16)  SpO2: 96% (23 Aug 2018 14:21) (92% - 96%)      Physical Exam:  General: Well developed, well nourished, NAD, on NC   HEENT: NCAT, PERRLA, EOMI bl, moist mucous membranes   Neck: Supple, nontender  Neurology: A&Ox3, nonfocal, CN II-XII grossly intact  Respiratory: CTA B/L, No W/R/R  CV: RRR, +S1/S2, no murmurs, rubs or gallops  Abdominal: Soft, NT, ND +BSx4  Extremities: No C/C/E, + peripheral pulses, dressing in place on left leg, PICC line in place in left arm      89 F htn, dm, chol, af pvd, adm with hip pain with ?metastatic disease now s/p iliac crest biopsy for malignancy. She is s/p left hallux amputation pOD # 2. Now with hypotension 88/50.    -Hypotension likely 2/2 dehydration   -As per nephro note, recommended IVF for TRISH. Due to hx of CHF, will add IVF at 75 cc/hr to be continued for 6 hours.   -Will continue to monitor. RN to call if any changes.

## 2018-08-23 NOTE — PROGRESS NOTE ADULT - ASSESSMENT
·	CKD 3  ·	S/p bone biopsy  ·	s/p foot surgery  ·	Diabetes  ·	Hypertension    Mild increase in creatinine trend. Will follow trend. Add IVF if creatinine rising.  D/w family at bedside. s/p PICC line. On Abx. Monitor blood sugar levels. Insulin coverage as needed. Dietary restriction. Monitor BP trend. Titrate BP meds as needed. Salt restriction.

## 2018-08-23 NOTE — PROGRESS NOTE ADULT - SUBJECTIVE AND OBJECTIVE BOX
88yo female seen bedside 2 day s/p left 1st metatarsal head resection (DOS 8/21/18), in bed and in NAD. Patient denies pain to her foot at this time.    Vital Signs Last 24 Hrs  T(C): 36.6 (23 Aug 2018 04:43), Max: 36.8 (22 Aug 2018 13:55)  T(F): 97.8 (23 Aug 2018 04:43), Max: 98.3 (22 Aug 2018 13:55)  HR: 72 (23 Aug 2018 04:43) (72 - 74)  BP: 118/67 (23 Aug 2018 04:43) (104/57 - 118/67)  BP(mean): --  RR: 16 (23 Aug 2018 04:43) (16 - 16)  SpO2: 94% (23 Aug 2018 04:43) (92% - 94%)                          10.6   11.03 )-----------( 249      ( 23 Aug 2018 07:12 )             32.8     08-23    136  |  97  |  52<H>  ----------------------------<  146<H>  4.7   |  31  |  1.90<H>    Ca    9.1      23 Aug 2018 07:12            Objective: left foot focused  Vasc: DP and PT 2/4; CFT < 3 seconds x5; TG WNL; no edema noted  Derm:  -sutures intact to medial aspect of 1st MPJ well coapted without dehiscence   Neuro: epicritic sensation intact  Ortho:  1st MPJ extensor contracture noted

## 2018-08-23 NOTE — PROGRESS NOTE ADULT - ASSESSMENT
88 yo woman with multiple comorbidities presented with several weeks hx of hip/leg pain with  inability to ambulate due to pain in the foot. CT scan revealed destructive bony pelvic lesions in right lisa-pelvis concerning for bone metastasis and liver lesions. Dx also with osteomyelitis of left great toe.        Hip MRI: Mildly expansile metastatic lesion within the anterior wall of the right acetabulum with extension to the right superior pubic ramus. Additional metastatic lesions are noted within the right ischium and midportion of the right inferior pubic ramus. Nonspecific small lesions within the left femoral head and left femoral neck which may be related to additional metastatic foci.        Bone scan with  increased radiopharmaceutical accumulation in the lower right iliac bone extending through the acetabulum into the ischium. Focally increased uptake in the left great toe is nonspecific, but given the history of left foot ulcer, osteomyelitis needs to be considered.        CT abdomen and pelvis no contrast. Prior 4/26/2014.  Limited by lack of oral and IV contrast. Small layering basilar effusions. Prominent interlobular septa at the   lung bases suggests interstitial edema. Substantial coronary artery calcification.   Scattered poorly defined low-attenuation foci throughout the hepatic parenchyma concerning for metastases.    Patient was on Coumdin for A fib and is on antibiotics  for presumed osteomyelitis  of the left foot    Consult called for recommendation regarding further workup for lytic lesion.     Bone metastatic lesions in pelvis  Cr is stable since 2016. Ca is borderline.  Serum Immunofixation negative for monoclonal protein rules out plasma cell disorder  CEA normal 3.8.   (8/20/18);  Right pelvic bone, CT-guided biopsy:  Metastatic adenocarcinoma,moderately differentiated  to poorly differentiated,unknown primary.IHC pending   further oncology management pending pathology results  .   Leukocytosis with osteomyelitis of the left great toe  reactive, improved on antibiotics  s/p surgical debridement by podiatry 8/21/18    PLAN:  awaiting final report on path. await IHC  then  family will make decision regarding  proceeding  with any antineoplastic Tx ( could be eligible for immunotherapy in the right settings)   if family decides proceed with palliative Tx,  may need outpt radiation   needs adequate pain control   continue ortho evaluation to comment on plan for stabilization and ambulation for d/c planning

## 2018-08-23 NOTE — PROGRESS NOTE ADULT - SUBJECTIVE AND OBJECTIVE BOX
Patient is a 89y old  Female who presents with a chief complaint of diabetic foot ulcer/ambulatory disfunction (15 Aug 2018 02:29)      INTERVAL /OVERNIGHT EVENTS: c/o hip discomfort    MEDICATIONS  (STANDING):  bisacodyl 5 milliGRAM(s) Oral at bedtime  ceFAZolin   IVPB 2000 milliGRAM(s) IV Intermittent every 12 hours  cholecalciferol 1000 Unit(s) Oral daily  dextrose 5%. 1000 milliLiter(s) (50 mL/Hr) IV Continuous <Continuous>  dextrose 50% Injectable 25 Gram(s) IV Push once  dextrose 50% Injectable 12.5 Gram(s) IV Push once  dextrose 50% Injectable 25 Gram(s) IV Push once  docusate sodium 100 milliGRAM(s) Oral three times a day  enoxaparin Injectable 70 milliGRAM(s) SubCutaneous daily  furosemide    Tablet 20 milliGRAM(s) Oral daily  glycerin Suppository - Adult 1 Suppository(s) Rectal daily  insulin glargine Injectable (LANTUS) 12 Unit(s) SubCutaneous at bedtime  insulin lispro (HumaLOG) corrective regimen sliding scale   SubCutaneous at bedtime  insulin lispro (HumaLOG) corrective regimen sliding scale   SubCutaneous three times a day before meals  insulin lispro Injectable (HumaLOG) 4 Unit(s) SubCutaneous three times a day before meals  lactobacillus acidophilus 1 Tablet(s) Oral three times a day with meals  multivitamin/minerals 1 Tablet(s) Oral daily  oxyCODONE  ER Tablet 10 milliGRAM(s) Oral <User Schedule>  pantoprazole    Tablet 40 milliGRAM(s) Oral before breakfast  polyethylene glycol 3350 17 Gram(s) Oral two times a day  propranolol  milliGRAM(s) Oral daily  psyllium Powder 1 Packet(s) Oral daily  senna 2 Tablet(s) Oral at bedtime  simvastatin 10 milliGRAM(s) Oral at bedtime  warfarin 2 milliGRAM(s) Oral daily    MEDICATIONS  (PRN):  ALBUTerol    90 MICROgram(s) HFA Inhaler 2 Puff(s) Inhalation every 6 hours PRN Shortness of Breath and/or Wheezing  dextrose 40% Gel 15 Gram(s) Oral once PRN Blood Glucose LESS THAN 70 milliGRAM(s)/deciLiter  glucagon  Injectable 1 milliGRAM(s) IntraMuscular once PRN Glucose <70 milliGRAM(s)/deciLiter  guaiFENesin    Syrup 100 milliGRAM(s) Oral every 6 hours PRN Cough  morphine  - Injectable 2 milliGRAM(s) IV Push every 6 hours PRN Severe Pain (7 - 10)      Allergies    Levaquin (Other)  ramipril (Other)  tetracycline (Hives; Rash)    Intolerances        REVIEW OF SYSTEMS:  CONSTITUTIONAL: No fever, weight loss, or fatigue  EYES: No eye pain, visual disturbances, or discharge  ENMT:  No difficulty hearing, tinnitus, vertigo; No sinus or throat pain  NECK: No pain or stiffness  RESPIRATORY: No cough, wheezing, chills or hemoptysis; No shortness of breath  CARDIOVASCULAR: No chest pain, palpitations, dizziness, or leg swelling  GASTROINTESTINAL: No abdominal or epigastric pain. No nausea, vomiting, or hematemesis; No diarrhea or constipation. No melena or hematochezia.  GENITOURINARY: No dysuria, frequency, hematuria, or incontinence  NEUROLOGICAL: No headaches, memory loss, loss of strength, numbness, or tremors  SKIN: No itching, burning, rashes, or lesions   LYMPH NODES: No enlarged glands  ENDOCRINE: No heat or cold intolerance; No hair loss; No polydipsia or polyuria  MUSCULOSKELETAL: + hip joint pain or swelling; No muscle, back, or extremity pain  PSYCHIATRIC: No depression, anxiety, mood swings, or difficulty sleeping  HEME/LYMPH: No easy bruising, or bleeding gums  ALLERGY AND IMMUNOLOGIC: No hives or eczema    Vital Signs Last 24 Hrs  T(C): 36.7 (23 Aug 2018 14:21), Max: 36.7 (22 Aug 2018 22:40)  T(F): 98.1 (23 Aug 2018 14:21), Max: 98.1 (22 Aug 2018 22:40)  HR: 76 (23 Aug 2018 14:21) (72 - 76)  BP: 100/65 (23 Aug 2018 14:21) (100/65 - 118/67)  BP(mean): --  RR: 15 (23 Aug 2018 14:21) (15 - 16)  SpO2: 96% (23 Aug 2018 14:21) (92% - 96%)    PHYSICAL EXAM:  GENERAL: NAD, well-groomed, well-developed  HEAD:  Atraumatic, Normocephalic  EYES: EOMI, PERRLA, conjunctiva and sclera clear  ENMT: No tonsillar erythema, exudates, or enlargement; Moist mucous membranes, Good dentition, No lesions  NECK: Supple, No JVD, Normal thyroid  NERVOUS SYSTEM:  Alert & Oriented X3, Good concentration; Motor Strength 5/5 B/L upper and lower extremities; DTRs 2+ intact and symmetric  CHEST/LUNG: Clear to auscultation bilaterally; No rales, rhonchi, wheezing, or rubs  HEART: Regular rate and rhythm; No murmurs, rubs, or gallops  ABDOMEN: Soft, Nontender, Nondistended; Bowel sounds present  EXTREMITIES:  2+ Peripheral Pulses, No clubbing, cyanosis, or edema  LYMPH: No lymphadenopathy noted  SKIN: No rashes or lesions    LABS:                        10.6   11.03 )-----------( 249      ( 23 Aug 2018 07:12 )             32.8     23 Aug 2018 07:12    136    |  97     |  52     ----------------------------<  146    4.7     |  31     |  1.90     Ca    9.1        23 Aug 2018 07:12          CAPILLARY BLOOD GLUCOSE      POCT Blood Glucose.: 183 mg/dL (23 Aug 2018 11:43)  POCT Blood Glucose.: 156 mg/dL (23 Aug 2018 07:22)  POCT Blood Glucose.: 156 mg/dL (22 Aug 2018 22:28)  POCT Blood Glucose.: 215 mg/dL (22 Aug 2018 16:44)      RADIOLOGY & ADDITIONAL TESTS:    Notes Reviewed:  [x ] YES  [ ] NO    Care Discussed with Consultants/Other Providers [x ] YES  [ ] NO

## 2018-08-23 NOTE — PROGRESS NOTE ADULT - ASSESSMENT
A/P: 89 F s/p Destructive Right pelvis lesion, suspected neoplasm w/ metastasis, s/p Left 1st Metatarsal Head Resection POD 3    Analgesia  DVT ppx   NWB RLE  CT bone biopsy 8/20; Metastatic Adenocarcinoma. IHC Pending to determine primary source  FU Heme/Onc, recommendations appreciated  FU Labs   Cardiology/Endocrine recommendations appreciated  PT/OT  Once IHC reviewed, will re address the need for prophylactic surgical intervention.   Appreciate H/O reccommendations    Will discuss with attending and advise if plan changes.

## 2018-08-23 NOTE — PROGRESS NOTE ADULT - ASSESSMENT
89 F htn, dm, chol, af pvd, adm with hip pain with ?metastatic disease now s/p iliac crest biopsy for malignancy    -s/p podiatric procedure.  Tolerated well with no cardiac complications  -there is no evidence of acute ischemia.  -there is no evidence of significant arrhythmia.  -af on ac  -remains on full dose Lovenox.  Continue to monitor closely for bleeding post operatively  -cont propranolol  -there is no evidence for meaningful  volume overload.  - pain controll  - Awaiting pathology. F/U heme/onc  -monitor electrolytes, keep k>4, Mg>2    -All other workup per primary team  - Will follow

## 2018-08-23 NOTE — PROGRESS NOTE ADULT - SUBJECTIVE AND OBJECTIVE BOX
Interval History:  no new complaints    Chart reviewed and events noted;   Overnight events:    MEDICATIONS  (STANDING):  ceFAZolin   IVPB 2000 milliGRAM(s) IV Intermittent every 12 hours  cholecalciferol 1000 Unit(s) Oral daily  dextrose 5%. 1000 milliLiter(s) (50 mL/Hr) IV Continuous <Continuous>  dextrose 50% Injectable 25 Gram(s) IV Push once  dextrose 50% Injectable 12.5 Gram(s) IV Push once  dextrose 50% Injectable 25 Gram(s) IV Push once  docusate sodium 100 milliGRAM(s) Oral three times a day  enoxaparin Injectable 70 milliGRAM(s) SubCutaneous daily  furosemide    Tablet 20 milliGRAM(s) Oral daily  glycerin Suppository - Adult 1 Suppository(s) Rectal daily  insulin glargine Injectable (LANTUS) 12 Unit(s) SubCutaneous at bedtime  insulin lispro (HumaLOG) corrective regimen sliding scale   SubCutaneous at bedtime  insulin lispro (HumaLOG) corrective regimen sliding scale   SubCutaneous three times a day before meals  insulin lispro Injectable (HumaLOG) 4 Unit(s) SubCutaneous three times a day before meals  lactobacillus acidophilus 1 Tablet(s) Oral three times a day with meals  multivitamin/minerals 1 Tablet(s) Oral daily  oxyCODONE  ER Tablet 10 milliGRAM(s) Oral <User Schedule>  pantoprazole    Tablet 40 milliGRAM(s) Oral before breakfast  polyethylene glycol 3350 17 Gram(s) Oral daily  propranolol  milliGRAM(s) Oral daily  psyllium Powder 1 Packet(s) Oral daily  senna 2 Tablet(s) Oral at bedtime  simvastatin 10 milliGRAM(s) Oral at bedtime    MEDICATIONS  (PRN):  ALBUTerol    90 MICROgram(s) HFA Inhaler 2 Puff(s) Inhalation every 6 hours PRN Shortness of Breath and/or Wheezing  bisacodyl Suppository 10 milliGRAM(s) Rectal daily PRN Constipation  dextrose 40% Gel 15 Gram(s) Oral once PRN Blood Glucose LESS THAN 70 milliGRAM(s)/deciLiter  glucagon  Injectable 1 milliGRAM(s) IntraMuscular once PRN Glucose <70 milliGRAM(s)/deciLiter  guaiFENesin    Syrup 100 milliGRAM(s) Oral every 6 hours PRN Cough  morphine  - Injectable 2 milliGRAM(s) IV Push every 6 hours PRN Severe Pain (7 - 10)      Vital Signs Last 24 Hrs  T(C): 36.6 (23 Aug 2018 04:43), Max: 36.8 (22 Aug 2018 13:55)  T(F): 97.8 (23 Aug 2018 04:43), Max: 98.3 (22 Aug 2018 13:55)  HR: 72 (23 Aug 2018 04:43) (72 - 74)  BP: 118/67 (23 Aug 2018 04:43) (104/57 - 118/67)  BP(mean): --  RR: 16 (23 Aug 2018 04:43) (16 - 16)  SpO2: 94% (23 Aug 2018 04:43) (92% - 94%)    PHYSICAL EXAM  General: adult in NAD, chronically ill appearing  HEENT: clear oropharynx, anicteric sclera, pink conjunctivae  Neck: supple  CV: normal S1S2 with no murmur rubs or gallops  Lungs: clear to auscultation, no wheezes, no rhales  Abdomen: soft non-tender non-distended, no hepato/splenomegaly  Ext: no clubbing cyanosis or edema  Skin: no rashes and no petichiae  Neuro: alert and oriented X3 no focal deficits      LABS:  CBC Full  -  ( 23 Aug 2018 07:12 )  WBC Count : 11.03 K/uL  Hemoglobin : 10.6 g/dL  Hematocrit : 32.8 %  Platelet Count - Automated : 249 K/uL  Mean Cell Volume : 91.4 fl  Mean Cell Hemoglobin : 29.5 pg  Mean Cell Hemoglobin Concentration : 32.3 gm/dL  Auto Neutrophil # : x  Auto Lymphocyte # : x  Auto Monocyte # : x  Auto Eosinophil # : x  Auto Basophil # : x  Auto Neutrophil % : x  Auto Lymphocyte % : x  Auto Monocyte % : x  Auto Eosinophil % : x  Auto Basophil % : x    08-23    136  |  97  |  52<H>  ----------------------------<  146<H>  4.7   |  31  |  1.90<H>    Ca    9.1      23 Aug 2018 07:12          fe studies      WBC trend  11.03 K/uL (08-23-18 @ 07:12)  9.77 K/uL (08-22-18 @ 08:42)  10.68 K/uL (08-21-18 @ 06:35)  10.73 K/uL (08-21-18 @ 00:20)      Hgb trend  10.6 g/dL (08-23-18 @ 07:12)  10.9 g/dL (08-22-18 @ 08:42)  10.9 g/dL (08-21-18 @ 06:35)  10.7 g/dL (08-21-18 @ 00:20)      plt trend  249 K/uL (08-23-18 @ 07:12)  252 K/uL (08-22-18 @ 08:42)  207 K/uL (08-21-18 @ 06:35)  226 K/uL (08-21-18 @ 00:20)        RADIOLOGY & ADDITIONAL STUDIES:

## 2018-08-24 LAB
APTT BLD: 31.7 SEC — SIGNIFICANT CHANGE UP (ref 27.5–37.4)
HCT VFR BLD CALC: 35.8 % — SIGNIFICANT CHANGE UP (ref 34.5–45)
HGB BLD-MCNC: 11 G/DL — LOW (ref 11.5–15.5)
INR BLD: 1.22 RATIO — HIGH (ref 0.88–1.16)
MCHC RBC-ENTMCNC: 29 PG — SIGNIFICANT CHANGE UP (ref 27–34)
MCHC RBC-ENTMCNC: 30.7 GM/DL — LOW (ref 32–36)
MCV RBC AUTO: 94.5 FL — SIGNIFICANT CHANGE UP (ref 80–100)
NRBC # BLD: 0 /100 WBCS — SIGNIFICANT CHANGE UP (ref 0–0)
OB PNL STL: NEGATIVE — SIGNIFICANT CHANGE UP
PLATELET # BLD AUTO: 297 K/UL — SIGNIFICANT CHANGE UP (ref 150–400)
PROTHROM AB SERPL-ACNC: 13.4 SEC — HIGH (ref 9.8–12.7)
RBC # BLD: 3.79 M/UL — LOW (ref 3.8–5.2)
RBC # FLD: 17.2 % — HIGH (ref 10.3–14.5)
WBC # BLD: 11.62 K/UL — HIGH (ref 3.8–10.5)
WBC # FLD AUTO: 11.62 K/UL — HIGH (ref 3.8–10.5)

## 2018-08-24 PROCEDURE — 99232 SBSQ HOSP IP/OBS MODERATE 35: CPT

## 2018-08-24 RX ORDER — PSYLLIUM SEED (WITH DEXTROSE)
0 POWDER (GRAM) ORAL
Qty: 0 | Refills: 0 | COMMUNITY
Start: 2018-08-24

## 2018-08-24 RX ORDER — FENTANYL CITRATE 50 UG/ML
1 INJECTION INTRAVENOUS
Qty: 0 | Refills: 0 | Status: DISCONTINUED | OUTPATIENT
Start: 2018-08-24 | End: 2018-08-27

## 2018-08-24 RX ORDER — POLYETHYLENE GLYCOL 3350 17 G/17G
17 POWDER, FOR SOLUTION ORAL
Qty: 0 | Refills: 0 | COMMUNITY
Start: 2018-08-24

## 2018-08-24 RX ORDER — FUROSEMIDE 40 MG
1 TABLET ORAL
Qty: 0 | Refills: 0 | COMMUNITY
Start: 2018-08-24

## 2018-08-24 RX ORDER — DOCUSATE SODIUM 100 MG
1 CAPSULE ORAL
Qty: 0 | Refills: 0 | COMMUNITY
Start: 2018-08-24

## 2018-08-24 RX ORDER — LIDOCAINE 4 G/100G
1 CREAM TOPICAL DAILY
Qty: 0 | Refills: 0 | Status: DISCONTINUED | OUTPATIENT
Start: 2018-08-24 | End: 2018-08-28

## 2018-08-24 RX ORDER — SENNA PLUS 8.6 MG/1
2 TABLET ORAL
Qty: 0 | Refills: 0 | COMMUNITY
Start: 2018-08-24

## 2018-08-24 RX ORDER — LACTOBACILLUS ACIDOPHILUS 100MM CELL
0 CAPSULE ORAL
Qty: 0 | Refills: 0 | COMMUNITY
Start: 2018-08-24

## 2018-08-24 RX ORDER — PROPRANOLOL HCL 160 MG
60 CAPSULE, EXTENDED RELEASE 24HR ORAL DAILY
Qty: 0 | Refills: 0 | Status: DISCONTINUED | OUTPATIENT
Start: 2018-08-24 | End: 2018-08-28

## 2018-08-24 RX ORDER — GLYCERIN ADULT
1 SUPPOSITORY, RECTAL RECTAL
Qty: 0 | Refills: 0 | COMMUNITY
Start: 2018-08-24

## 2018-08-24 RX ORDER — ALBUTEROL 90 UG/1
2 AEROSOL, METERED ORAL
Qty: 0 | Refills: 0 | COMMUNITY
Start: 2018-08-24

## 2018-08-24 RX ORDER — LANOLIN ALCOHOL/MO/W.PET/CERES
3 CREAM (GRAM) TOPICAL AT BEDTIME
Qty: 0 | Refills: 0 | Status: DISCONTINUED | OUTPATIENT
Start: 2018-08-24 | End: 2018-08-28

## 2018-08-24 RX ORDER — ENOXAPARIN SODIUM 100 MG/ML
70 INJECTION SUBCUTANEOUS DAILY
Qty: 0 | Refills: 0 | Status: DISCONTINUED | OUTPATIENT
Start: 2018-08-24 | End: 2018-08-27

## 2018-08-24 RX ORDER — MULTIVIT-MIN/FERROUS GLUCONATE 9 MG/15 ML
1 LIQUID (ML) ORAL
Qty: 0 | Refills: 0 | COMMUNITY
Start: 2018-08-24

## 2018-08-24 RX ADMIN — LIDOCAINE 1 PATCH: 4 CREAM TOPICAL at 12:29

## 2018-08-24 RX ADMIN — Medication 120 MILLIGRAM(S): at 05:19

## 2018-08-24 RX ADMIN — Medication 4 UNIT(S): at 17:05

## 2018-08-24 RX ADMIN — Medication 100 MILLIGRAM(S): at 05:19

## 2018-08-24 RX ADMIN — Medication 4 UNIT(S): at 08:28

## 2018-08-24 RX ADMIN — Medication 1 TABLET(S): at 12:19

## 2018-08-24 RX ADMIN — INSULIN GLARGINE 12 UNIT(S): 100 INJECTION, SOLUTION SUBCUTANEOUS at 21:56

## 2018-08-24 RX ADMIN — FENTANYL CITRATE 1 PATCH: 50 INJECTION INTRAVENOUS at 21:52

## 2018-08-24 RX ADMIN — OXYCODONE HYDROCHLORIDE 10 MILLIGRAM(S): 5 TABLET ORAL at 22:50

## 2018-08-24 RX ADMIN — Medication 4 UNIT(S): at 12:17

## 2018-08-24 RX ADMIN — SIMVASTATIN 10 MILLIGRAM(S): 20 TABLET, FILM COATED ORAL at 21:50

## 2018-08-24 RX ADMIN — Medication 1 TABLET(S): at 08:43

## 2018-08-24 RX ADMIN — Medication 100 MILLIGRAM(S): at 17:32

## 2018-08-24 RX ADMIN — Medication 1 TABLET(S): at 17:32

## 2018-08-24 RX ADMIN — Medication 1: at 12:17

## 2018-08-24 RX ADMIN — ENOXAPARIN SODIUM 70 MILLIGRAM(S): 100 INJECTION SUBCUTANEOUS at 12:18

## 2018-08-24 RX ADMIN — Medication 20 MILLIGRAM(S): at 05:19

## 2018-08-24 RX ADMIN — Medication 3 MILLIGRAM(S): at 21:50

## 2018-08-24 RX ADMIN — MORPHINE SULFATE 2 MILLIGRAM(S): 50 CAPSULE, EXTENDED RELEASE ORAL at 00:20

## 2018-08-24 RX ADMIN — OXYCODONE HYDROCHLORIDE 10 MILLIGRAM(S): 5 TABLET ORAL at 21:50

## 2018-08-24 RX ADMIN — WARFARIN SODIUM 2 MILLIGRAM(S): 2.5 TABLET ORAL at 21:50

## 2018-08-24 RX ADMIN — POLYETHYLENE GLYCOL 3350 17 GRAM(S): 17 POWDER, FOR SOLUTION ORAL at 05:19

## 2018-08-24 RX ADMIN — Medication 1000 UNIT(S): at 12:19

## 2018-08-24 RX ADMIN — POLYETHYLENE GLYCOL 3350 17 GRAM(S): 17 POWDER, FOR SOLUTION ORAL at 17:32

## 2018-08-24 RX ADMIN — Medication 1: at 17:04

## 2018-08-24 RX ADMIN — PANTOPRAZOLE SODIUM 40 MILLIGRAM(S): 20 TABLET, DELAYED RELEASE ORAL at 05:20

## 2018-08-24 RX ADMIN — Medication 1 PACKET(S): at 12:18

## 2018-08-24 RX ADMIN — Medication 1 TABLET(S): at 12:18

## 2018-08-24 NOTE — PROGRESS NOTE ADULT - SUBJECTIVE AND OBJECTIVE BOX
[facility] - Cooper Diaz, Hari, Don, Kenton, Jimi Jovel  Office Number:  [phone]    Patient resting comfortably in bed in NAD. Was found to be hypotensive last night. Events notes. Anxious about results of bone biopsy. Denies complaints of chest pain, dyspnea, palpitations, PND, or orthopnea.    F/U for:  hypotension, AF    All other ROS negative unless indicated above      MEDICATIONS  (STANDING):  bisacodyl 5 milliGRAM(s) Oral at bedtime  ceFAZolin   IVPB 2000 milliGRAM(s) IV Intermittent every 12 hours  cholecalciferol 1000 Unit(s) Oral daily  dextrose 5%. 1000 milliLiter(s) (50 mL/Hr) IV Continuous <Continuous>  dextrose 50% Injectable 25 Gram(s) IV Push once  dextrose 50% Injectable 12.5 Gram(s) IV Push once  dextrose 50% Injectable 25 Gram(s) IV Push once  docusate sodium 100 milliGRAM(s) Oral three times a day  enoxaparin Injectable 70 milliGRAM(s) SubCutaneous daily  glycerin Suppository - Adult 1 Suppository(s) Rectal daily  insulin glargine Injectable (LANTUS) 12 Unit(s) SubCutaneous at bedtime  insulin lispro (HumaLOG) corrective regimen sliding scale   SubCutaneous at bedtime  insulin lispro (HumaLOG) corrective regimen sliding scale   SubCutaneous three times a day before meals  insulin lispro Injectable (HumaLOG) 4 Unit(s) SubCutaneous three times a day before meals  lactobacillus acidophilus 1 Tablet(s) Oral three times a day with meals  lidocaine   Patch 1 Patch Transdermal daily  multivitamin/minerals 1 Tablet(s) Oral daily  oxyCODONE  ER Tablet 10 milliGRAM(s) Oral <User Schedule>  pantoprazole    Tablet 40 milliGRAM(s) Oral before breakfast  polyethylene glycol 3350 17 Gram(s) Oral two times a day  propranolol LA 60 milliGRAM(s) Oral daily  psyllium Powder 1 Packet(s) Oral daily  senna 2 Tablet(s) Oral at bedtime  simvastatin 10 milliGRAM(s) Oral at bedtime  sodium chloride 0.9%. 1000 milliLiter(s) (75 mL/Hr) IV Continuous <Continuous>  warfarin 2 milliGRAM(s) Oral daily    MEDICATIONS  (PRN):  ALBUTerol    90 MICROgram(s) HFA Inhaler 2 Puff(s) Inhalation every 6 hours PRN Shortness of Breath and/or Wheezing  dextrose 40% Gel 15 Gram(s) Oral once PRN Blood Glucose LESS THAN 70 milliGRAM(s)/deciLiter  glucagon  Injectable 1 milliGRAM(s) IntraMuscular once PRN Glucose <70 milliGRAM(s)/deciLiter  guaiFENesin    Syrup 100 milliGRAM(s) Oral every 6 hours PRN Cough  morphine  - Injectable 2 milliGRAM(s) IV Push every 6 hours PRN Severe Pain (7 - 10)      Allergies    Levaquin (Other)  ramipril (Other)  tetracycline (Hives; Rash)    Intolerances        Vital Signs Last 24 Hrs  T(C): 36.4 (24 Aug 2018 04:21), Max: 36.8 (23 Aug 2018 22:24)  T(F): 97.5 (24 Aug 2018 04:21), Max: 98.2 (23 Aug 2018 22:24)  HR: 67 (24 Aug 2018 04:21) (67 - 76)  BP: 124/80 (24 Aug 2018 04:21) (82/56 - 124/80)  BP(mean): --  RR: 16 (24 Aug 2018 04:21) (15 - 16)  SpO2: 100% (24 Aug 2018 04:21) (96% - 100%)    I&O's Summary    23 Aug 2018 07:01  -  24 Aug 2018 07:00  --------------------------------------------------------  IN: 525 mL / OUT: 0 mL / NET: 525 mL    24 Aug 2018 07:01  -  24 Aug 2018 12:34  --------------------------------------------------------  IN: 240 mL / OUT: 0 mL / NET: 240 mL        ON EXAM:    General: Anxious, awake and alert, oriented x 3  HEENT: Mucous membranes are moist, anicteric  Lungs: Non-labored, breath sounds are clear bilaterally. No wheezing, rales or rhonchi  Cardiovascular: Regular, S1 and S2, no murmurs, rubs, or gallops  Gastrointestinal: Bowel Sounds present, soft, nontender.   Lymph: No peripheral edema. No lymphadenopathy.  Skin: Warm, dry  Psych:  Mood & affect appropriate    LABS: All Labs Reviewed:                        11.0   11.62 )-----------( 297      ( 24 Aug 2018 07:58 )             35.8                         10.6   11.03 )-----------( 249      ( 23 Aug 2018 07:12 )             32.8                         10.9   9.77  )-----------( 252      ( 22 Aug 2018 08:42 )             34.8     23 Aug 2018 07:12    136    |  97     |  52     ----------------------------<  146    4.7     |  31     |  1.90   22 Aug 2018 08:42    134    |  95     |  51     ----------------------------<  136    4.6     |  31     |  1.80     Ca    9.1        23 Aug 2018 07:12  Ca    9.0        22 Aug 2018 08:42      PT/INR - ( 24 Aug 2018 07:58 )   PT: 13.4 sec;   INR: 1.22 ratio         PTT - ( 24 Aug 2018 07:58 )  PTT:31.7 sec      Blood Culture: Organism Staphylococcus aureus  Gram Stain Blood -- Gram Stain   No polymorphonuclear cells seen  No organisms seen  Specimen Source .Tissue left 1st metatarsal head  Culture-Blood --

## 2018-08-24 NOTE — DISCHARGE NOTE ADULT - SECONDARY DIAGNOSIS.
Acid reflux Ambulatory dysfunction Asthma Atrial fibrillation, unspecified type Constipation Diabetes MSSA bacteremia Other acute osteomyelitis of left foot

## 2018-08-24 NOTE — DISCHARGE NOTE ADULT - MEDICATION SUMMARY - MEDICATIONS TO CHANGE
I will SWITCH the dose or number of times a day I take the medications listed below when I get home from the hospital:    propranolol 120 mg oral capsule, extended release  -- 1 cap(s) by mouth once a day

## 2018-08-24 NOTE — PROGRESS NOTE ADULT - SUBJECTIVE AND OBJECTIVE BOX
Patient seen an examined at bedside. Pain is well controlled. Reports having some R forearm/wrist pain since yesterday evening. Had an episode of hypotension (88/50) last night and was given IVF.    PE:  Vital Signs Last 24 Hrs  T(C): 36.4 (24 Aug 2018 04:21), Max: 36.8 (23 Aug 2018 22:24)  T(F): 97.5 (24 Aug 2018 04:21), Max: 98.2 (23 Aug 2018 22:24)  HR: 67 (24 Aug 2018 04:21) (67 - 76)  BP: 124/80 (24 Aug 2018 04:21) (82/56 - 124/80)  RR: 16 (24 Aug 2018 04:21) (15 - 16)  SpO2: 100% (24 Aug 2018 04:21) (96% - 100%)    RLE:    No gross deformity noted  Skin intact; No erythema or ecchymosis  SILT L3-S1  DP1+  EHL/FHL/GSC/TA intact  Compartments soft and compressible  No calf tenderness  Decreased sensation to light tough dorsal/volar foot, hx diabetic neuropathy     RUE:  No gross deformity noted  Skin intact  SILT C5-T1  +AIN/PIN/Radial/Ulnar/Median  +2/4 radial pulse  No bony ttp  Full painless passive and active ROM of Elbow and Wrist  Compartments soft and compressible

## 2018-08-24 NOTE — PROGRESS NOTE ADULT - ASSESSMENT
A/P: 89 F s/p Destructive Right pelvis lesion, suspected neoplasm w/ metastasis, s/p Left 1st Metatarsal Head Resection POD 4    Analgesia  DVT ppx   NWB RLE  CT bone biopsy 8/20; Metastatic Adenocarcinoma. IHC Pending to determine primary source  FU Heme/Onc, recommendations appreciated  FU Labs   Cardiology/Endocrine recommendations appreciated  PT/OT  Once IHC reviewed, will re address the need for prophylactic surgical intervention.   Appreciate H/O reccommendations  Monitor R Arm pain; If pain persists will consider imaging    Will discuss with attending and advise if plan changes.

## 2018-08-24 NOTE — DISCHARGE NOTE ADULT - MEDICATION SUMMARY - MEDICATIONS TO TAKE
I will START or STAY ON the medications listed below when I get home from the hospital:    fentaNYL 12 mcg/hr transdermal film, extended release  -- 1 patch by transdermal patch every 72 hours  -- Indication: For =    oxyCODONE 10 mg oral tablet, extended release  -- 1 tab(s) by mouth 2 times a day  -- Indication: For =    Nitro-Dur 0.4 mg/hr transdermal film, extended release  -- 1 patch by transdermal patch once a day  -- Indication: For =    propranolol 60 mg oral capsule, extended release  -- 1 cap(s) by mouth once a day  -- Indication: For =    Coumadin 1 mg oral tablet  -- 1 tab(s) by mouth once a day alternating with 2mg  -- every other day with Coumadin 2mg po  -- Indication: For =    Lantus 100 units/mL subcutaneous solution  -- 12 unit(s) subcutaneous once a day (at bedtime)  -- Indication: For =    HumaLOG 100 units/mL subcutaneous solution  -- 4 unit(s) subcutaneous 3 times a day (before meals)  -- Indication: For =    Vytorin 10 mg-10 mg oral tablet  -- 1 tab(s) by mouth once a day  -- Indication: For =    ezetimibe 10 mg oral tablet  -- 1 tab(s) by mouth once a day  -- Indication: For =    albuterol 90 mcg/inh inhalation aerosol  -- 2 puff(s) inhaled every 6 hours, As needed, Shortness of Breath and/or Wheezing  -- Indication: For =    ceFAZolin 2 g/50 mL-NaCl 0.9% intravenous solution  -- 2 gram(s) intravenous once a day -- until 9/30/18  -- Indication: For =    lidocaine 5% topical film  -- Apply on skin to affected area once a day  -- Indication: For =    guaiFENesin 100 mg/5 mL oral liquid  -- 5 milliliter(s) by mouth every 6 hours, As needed, Cough  -- Indication: For =    bisacodyl 5 mg oral delayed release tablet  -- 1 tab(s) by mouth once a day (at bedtime)  -- Indication: For =    docusate sodium 100 mg oral capsule  -- 1 cap(s) by mouth 3 times a day  -- Indication: For =    senna oral tablet  -- 2 tab(s) by mouth once a day (at bedtime)  -- Indication: For =    psyllium 3.4 g/7 g oral powder for reconstitution  -- orally once a day  -- Indication: For =    polyethylene glycol 3350 oral powder for reconstitution  -- 17 gram(s) by mouth 2 times a day  -- Indication: For =    glycerin adult rectal suppository  -- 1 suppository(ies) rectally once a day  -- Indication: For =    potassium chloride 10 mEq oral capsule, extended release  -- 1 cap(s) by mouth once a day  -- Indication: For =    melatonin 3 mg oral tablet  -- 1 tab(s) by mouth once a day (at bedtime)  -- Indication: For =    lactobacillus acidophilus oral capsule  -- orally once a day  -- Indication: For =    omeprazole 40 mg oral delayed release capsule  -- 1 cap(s) by mouth once a day  -- Indication: For =    Myrbetriq 25 mg oral tablet, extended release  -- 1 tab(s) by mouth once a day  -- Indication: For =    Multiple Vitamins with Minerals oral tablet  -- 1 tab(s) by mouth once a day  -- Indication: For =    Vitamin D3 1000 intl units oral capsule  -- 1 cap(s) by mouth once a day  -- Indication: For =

## 2018-08-24 NOTE — DISCHARGE NOTE ADULT - PATIENT PORTAL LINK FT
You can access the [facility] Patient Portal, offered by [facility], by registering with the following website: [email or web address]

## 2018-08-24 NOTE — DISCHARGE NOTE ADULT - ADDITIONAL INSTRUCTIONS
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 BLEVINS

## 2018-08-24 NOTE — DISCHARGE NOTE ADULT - CARE PROVIDERS DIRECT ADDRESSES
,DirectAddress_Unknown,[email or web address],DirectAddress_Unknown

## 2018-08-24 NOTE — CHART NOTE - NSCHARTNOTEFT_GEN_A_CORE
Assessment: Pt reports fair to good intake, taking some glucerna supplements, bm noted today. Await d/c tp GREGORIO possibly Monday. Heme-onc following, may receive immunotherapy in future for metastatic ca, awaiting path for final plan of care(ie palliative care??).     Factors impacting intake: [ ] none [ ] nausea  [ ] vomiting [ ] diarrhea [ ] constipation  [ ]chewing problems [ ] swallowing issues  [ ] other:     Diet Presciption: Diet, Consistent Carbohydrate w/Evening Snack:   DASH/TLC {Sodium & Cholesterol Restricted}  Supplement Feeding Modality:  Oral  Glucerna Shake Cans or Servings Per Day:  1       Frequency:  Two Times a day (08-21-18 @ 17:51)    Intake: 75-80% per EMR, taking glucerna    Current Weight: Weight (kg): 68 (08-21 @ 15:50)  ONS  (STANDING):  bisacodyl 5 milliGRAM(s) Oral at bedtime  ceFAZolin   IVPB 2000 milliGRAM(s) IV Intermittent every 12 hours  cholecalciferol 1000 Unit(s) Oral daily  dextrose 5%. 1000 milliLiter(s) (50 mL/Hr) IV Continuous <Continuous>  dextrose 50% Injectable 25 Gram(s) IV Push once  dextrose 50% Injectable 12.5 Gram(s) IV Push once  dextrose 50% Injectable 25 Gram(s) IV Push once  docusate sodium 100 milliGRAM(s) Oral three times a day  enoxaparin Injectable 70 milliGRAM(s) SubCutaneous daily  glycerin Suppository - Adult 1 Suppository(s) Rectal daily  insulin glargine Injectable (LANTUS) 12 Unit(s) SubCutaneous at bedtime  insulin lispro (HumaLOG) corrective regimen sliding scale   SubCutaneous at bedtime  insulin lispro (HumaLOG) corrective regimen sliding scale   SubCutaneous three times a day before meals  insulin lispro Injectable (HumaLOG) 4 Unit(s) SubCutaneous three times a day before meals  lactobacillus acidophilus 1 Tablet(s) Oral three times a day with meals  lidocaine   Patch 1 Patch Transdermal daily  multivitamin/minerals 1 Tablet(s) Oral daily  oxyCODONE  ER Tablet 10 milliGRAM(s) Oral <User Schedule>  pantoprazole    Tablet 40 milliGRAM(s) Oral before breakfast  polyethylene glycol 3350 17 Gram(s) Oral two times a day  propranolol LA 60 milliGRAM(s) Oral daily  psyllium Powder 1 Packet(s) Oral daily  senna 2 Tablet(s) Oral at bedtime  simvastatin 10 milliGRAM(s) Oral at bedtime  sodium chloride 0.9%. 1000 milliLiter(s) (75 mL/Hr) IV Continuous <Continuous>  warfarin 2 milliGRAM(s) Oral daily    MEDICATIONS  (PRN):  ALBUTerol    90 MICROgram(s) HFA Inhaler 2 Puff(s) Inhalation every 6 hours PRN Shortness of Breath and/or Wheezing  dextrose 40% Gel 15 Gram(s) Oral once PRN Blood Glucose LESS THAN 70 milliGRAM(s)/deciLiter  glucagon  Injectable 1 milliGRAM(s) IntraMuscular once PRN Glucose <70 milliGRAM(s)/deciLiter  guaiFENesin    Syrup 100 milliGRAM(s) Oral every 6 hours PRN Cough  morphine  - Injectable 2 milliGRAM(s) IV Push every 6 hours PRN Severe Pain (7 - 10)    Pertinent Labs: 08-23 Na136 mmol/L Glu 146 mg/dL<H> K+ 4.7 mmol/L Cr  1.90 mg/dL<H> BUN 52 mg/dL<H> 08-19 Alb 1.8 g/dL<L> 08-15 [id number] 6.7 %<H>     CAPILLARY BLOOD GLUCOSE      POCT Blood Glucose.: 166 mg/dL (24 Aug 2018 11:49)  POCT Blood Glucose.: 131 mg/dL (24 Aug 2018 08:17)  POCT Blood Glucose.: 199 mg/dL (23 Aug 2018 22:19)  POCT Blood Glucose.: 130 mg/dL (23 Aug 2018 16:33)    Skin: st 2 pressure injury, on MVI daily, rec add vit c 500 mg daily    Estimated Needs:   [x ] no change since previous assessment  [ ] recalculated:     Previous Nutrition Diagnosis:   [ ] Inadequate Energy Intake [ ]Inadequate Oral Intake [ ] Excessive Energy Intake   [ ] Underweight [ ] Increased Nutrient Needs [ ] Overweight/Obesity   [ ] Altered GI Function [ ] Unintended Weight Loss [ ] Food & Nutrition Related Knowledge Deficit [x ] Malnutrition     Nutrition Diagnosis is [x ] ongoing  [ ] resolved [ ] not applicable     New Nutrition Diagnosis: [x ] not applicable       Interventions:  Continue glucerna, dtr may bring in Premiere supplement(lower CHO, hi pro)  Recommend  [ ] Change Diet To:  [ ] Nutrition Supplement  [ ] Nutrition Support  [x ] Other: add vit c 500 mg daily    Monitoring and Evaluation:   [ ] PO intake [ x ] Tolerance to diet prescription [ x ] weights [ x ] labs[ x ] follow up per protocol  [ ] other:

## 2018-08-24 NOTE — CHART NOTE - NSCHARTNOTEFT_GEN_A_CORE
Do you have Advance Directives (HCP / LV / Organ donation / Documentation of oral advance Directive):   (  x  )  yes    (      )    NO                                                                            Do you have LV - Living will :                                                                                                                                             (    )  yes    (   x   )   No    Do you have HCP - Health Care Proxy:                                                                                                                            (  x   )  yes   (       ) N0    Do you have DNR- Do Not Resuscitate :                                                                                                                           (   x   )  yes  (        )  No    Do you have DNI- Do Not intubate  :                                                                                                                               (    x  )  yes   (       ) No    Do you have MOLST - Medical orders for Life sustaining treatment  :                                                                    (   x   ) yes    (       ) No    Decision Maker :  (  x   ) Patient     (      )  HCA   (     ) Public Health Law Surrogate     (      ) Surrogate  (       ) Guardian    Goals of Care :  (      )   Complete Care     (       ) No Limitations                              (       )   Comfort Care       (       )  Hospice                               (   x   )   Limited medical Intervention / s    Medical Interventions :   (        )   CPR       (    x    )  DNR                                               (        )  Intubation with MV - Mechanical Ventilation  (      ) BIPAP/CPAP    (      x   )   DNI                                               (         )  Artificial Nutrition -  IVF, TPN / PPN, Tube Feeds             (    x     )   No Feeding Tube                                                (    x    ) Use Antibiotics                         (          ) No Antibiotics                                                (     x    ) Blood and Blood Products     (         )   No Blood or Blood products                                                (          )  Dialysis                                    (     x    )  No Dialysis                                                (          )  Medical Management only  (    x     )  No Invasive Interventions or Surgery  Time spent :                        (       ) upto 30 minutes                       (     x      )   more than 30 minutes  Goals of care by palliative RN reviewed and discussed with patient and family

## 2018-08-24 NOTE — PROGRESS NOTE ADULT - SUBJECTIVE AND OBJECTIVE BOX
Patient is a 89y old  Female who presents with a chief complaint of diabetic foot ulcer/ambulatory disfunction (15 Aug 2018 02:29)      Patient seen in follow up for CKD 3. Family at bedside.     PAST MEDICAL HISTORY:  OAB (overactive bladder)  GERD (gastroesophageal reflux disease)  Angina effort  Heart failure  Upper respiratory infection  Diabetes  Renal stones  Myocardial infarction  Spinal stenosis  CVA (cerebral infarction)  Afib  Raynaud disease  Acid reflux  Hypertension  Hyperlipidemia  Asthma    MEDICATIONS  (STANDING):  bisacodyl 5 milliGRAM(s) Oral at bedtime  ceFAZolin   IVPB 2000 milliGRAM(s) IV Intermittent every 12 hours  cholecalciferol 1000 Unit(s) Oral daily  dextrose 5%. 1000 milliLiter(s) (50 mL/Hr) IV Continuous <Continuous>  dextrose 50% Injectable 25 Gram(s) IV Push once  dextrose 50% Injectable 12.5 Gram(s) IV Push once  dextrose 50% Injectable 25 Gram(s) IV Push once  docusate sodium 100 milliGRAM(s) Oral three times a day  enoxaparin Injectable 70 milliGRAM(s) SubCutaneous daily  furosemide    Tablet 20 milliGRAM(s) Oral daily  glycerin Suppository - Adult 1 Suppository(s) Rectal daily  insulin glargine Injectable (LANTUS) 12 Unit(s) SubCutaneous at bedtime  insulin lispro (HumaLOG) corrective regimen sliding scale   SubCutaneous at bedtime  insulin lispro (HumaLOG) corrective regimen sliding scale   SubCutaneous three times a day before meals  insulin lispro Injectable (HumaLOG) 4 Unit(s) SubCutaneous three times a day before meals  lactobacillus acidophilus 1 Tablet(s) Oral three times a day with meals  lidocaine   Patch 1 Patch Transdermal daily  multivitamin/minerals 1 Tablet(s) Oral daily  oxyCODONE  ER Tablet 10 milliGRAM(s) Oral <User Schedule>  pantoprazole    Tablet 40 milliGRAM(s) Oral before breakfast  polyethylene glycol 3350 17 Gram(s) Oral two times a day  propranolol  milliGRAM(s) Oral daily  psyllium Powder 1 Packet(s) Oral daily  senna 2 Tablet(s) Oral at bedtime  simvastatin 10 milliGRAM(s) Oral at bedtime  sodium chloride 0.9%. 1000 milliLiter(s) (75 mL/Hr) IV Continuous <Continuous>  warfarin 2 milliGRAM(s) Oral daily    MEDICATIONS  (PRN):  ALBUTerol    90 MICROgram(s) HFA Inhaler 2 Puff(s) Inhalation every 6 hours PRN Shortness of Breath and/or Wheezing  dextrose 40% Gel 15 Gram(s) Oral once PRN Blood Glucose LESS THAN 70 milliGRAM(s)/deciLiter  glucagon  Injectable 1 milliGRAM(s) IntraMuscular once PRN Glucose <70 milliGRAM(s)/deciLiter  guaiFENesin    Syrup 100 milliGRAM(s) Oral every 6 hours PRN Cough  morphine  - Injectable 2 milliGRAM(s) IV Push every 6 hours PRN Severe Pain (7 - 10)    T(C): 36.4 (08-24-18 @ 04:21), Max: 36.8 (08-22-18 @ 13:55)  HR: 67 (08-24-18 @ 04:21) (67 - 76)  BP: 124/80 (08-24-18 @ 04:21) (82/56 - 124/80)  RR: 16 (08-24-18 @ 04:21)  SpO2: 100% (08-24-18 @ 04:21)  Wt(kg): --  I&O's Detail    23 Aug 2018 07:01  -  24 Aug 2018 07:00  --------------------------------------------------------  IN:    sodium chloride 0.9%.: 525 mL  Total IN: 525 mL    OUT:  Total OUT: 0 mL    Total NET: 525 mL          PHYSICAL EXAM:  General: NAD  Respiratory: b/l air entry  Cardiovascular: S1 S2  Gastrointestinal: soft  Extremities:  left foot dressing                    LABORATORY:                        11.0   11.62 )-----------( 297      ( 24 Aug 2018 07:58 )             35.8     08-23    136  |  97  |  52<H>  ----------------------------<  146<H>  4.7   |  31  |  1.90<H>    Ca    9.1      23 Aug 2018 07:12      Sodium, Serum: 136 mmol/L (08-23 @ 07:12)    Potassium, Serum: 4.7 mmol/L (08-23 @ 07:12)    Hemoglobin: 11.0 g/dL (08-24 @ 07:58)  Hemoglobin: 10.6 g/dL (08-23 @ 07:12)  Hemoglobin: 10.9 g/dL (08-22 @ 08:42)    Creatinine, Serum 1.90 (08-23 @ 07:12)  Creatinine, Serum 1.80 (08-22 @ 08:42)

## 2018-08-24 NOTE — PROGRESS NOTE ADULT - PROBLEM SELECTOR PLAN 1
cont lantus 12 units qhs  cont humalog 4 units tid before meals  cont mod dose humalog scale coverage  goal bg 100-180 in hosp setting  cont cons cho diet

## 2018-08-24 NOTE — DISCHARGE NOTE ADULT - HOSPITAL COURSE
admitted for diabetic foot ulcer  found to have OM and MSSA bacteremia  ABX per ID  underwent resection 1st metatarsal head  also has hip pain - found to have metastatic lesion  path revealed adenocarcinoma  transfer to [facility] after PT / ORTHO clearance admitted for diabetic foot ulcer  found to have OM and MSSA bacteremia  ABX per ID  underwent resection 1st metatarsal head for osteomyelitis  also has hip pain - found to have metastatic lesion  path revealed adenocarcinoma -- pancreatic/UGI origin  needs 6 weeks of iv abx -- until 9/30/18  being transferred to [facility] for radiation therapy to the hip    >30 minutes spent on discharge

## 2018-08-24 NOTE — PROGRESS NOTE ADULT - SUBJECTIVE AND OBJECTIVE BOX
Patient is a 89y old  Female who presents with a chief complaint of diabetic foot ulcer/ambulatory disfunction (24 Aug 2018 01:06)      INTERVAL /OVERNIGHT EVENTS: c/o pain    MEDICATIONS  (STANDING):  bisacodyl 5 milliGRAM(s) Oral at bedtime  ceFAZolin   IVPB 2000 milliGRAM(s) IV Intermittent every 12 hours  cholecalciferol 1000 Unit(s) Oral daily  dextrose 5%. 1000 milliLiter(s) (50 mL/Hr) IV Continuous <Continuous>  dextrose 50% Injectable 25 Gram(s) IV Push once  dextrose 50% Injectable 12.5 Gram(s) IV Push once  dextrose 50% Injectable 25 Gram(s) IV Push once  docusate sodium 100 milliGRAM(s) Oral three times a day  enoxaparin Injectable 70 milliGRAM(s) SubCutaneous daily  glycerin Suppository - Adult 1 Suppository(s) Rectal daily  insulin glargine Injectable (LANTUS) 12 Unit(s) SubCutaneous at bedtime  insulin lispro (HumaLOG) corrective regimen sliding scale   SubCutaneous at bedtime  insulin lispro (HumaLOG) corrective regimen sliding scale   SubCutaneous three times a day before meals  insulin lispro Injectable (HumaLOG) 4 Unit(s) SubCutaneous three times a day before meals  lactobacillus acidophilus 1 Tablet(s) Oral three times a day with meals  lidocaine   Patch 1 Patch Transdermal daily  multivitamin/minerals 1 Tablet(s) Oral daily  oxyCODONE  ER Tablet 10 milliGRAM(s) Oral <User Schedule>  pantoprazole    Tablet 40 milliGRAM(s) Oral before breakfast  polyethylene glycol 3350 17 Gram(s) Oral two times a day  propranolol LA 60 milliGRAM(s) Oral daily  psyllium Powder 1 Packet(s) Oral daily  senna 2 Tablet(s) Oral at bedtime  simvastatin 10 milliGRAM(s) Oral at bedtime  sodium chloride 0.9%. 1000 milliLiter(s) (75 mL/Hr) IV Continuous <Continuous>  warfarin 2 milliGRAM(s) Oral daily    MEDICATIONS  (PRN):  ALBUTerol    90 MICROgram(s) HFA Inhaler 2 Puff(s) Inhalation every 6 hours PRN Shortness of Breath and/or Wheezing  dextrose 40% Gel 15 Gram(s) Oral once PRN Blood Glucose LESS THAN 70 milliGRAM(s)/deciLiter  glucagon  Injectable 1 milliGRAM(s) IntraMuscular once PRN Glucose <70 milliGRAM(s)/deciLiter  guaiFENesin    Syrup 100 milliGRAM(s) Oral every 6 hours PRN Cough  morphine  - Injectable 2 milliGRAM(s) IV Push every 6 hours PRN Severe Pain (7 - 10)      Allergies    Levaquin (Other)  ramipril (Other)  tetracycline (Hives; Rash)    Intolerances        REVIEW OF SYSTEMS:  CONSTITUTIONAL: No fever, weight loss, or fatigue  EYES: No eye pain, visual disturbances, or discharge  ENMT:  No difficulty hearing, tinnitus, vertigo; No sinus or throat pain  NECK: No pain or stiffness  RESPIRATORY: No cough, wheezing, chills or hemoptysis; No shortness of breath  CARDIOVASCULAR: No chest pain, palpitations, dizziness, or leg swelling  GASTROINTESTINAL: No abdominal or epigastric pain. No nausea, vomiting, or hematemesis; No diarrhea or constipation. No melena or hematochezia.  GENITOURINARY: No dysuria, frequency, hematuria, or incontinence  NEUROLOGICAL: No headaches, memory loss, loss of strength, numbness, or tremors  SKIN: No itching, burning, rashes, or lesions   LYMPH NODES: No enlarged glands  ENDOCRINE: No heat or cold intolerance; No hair loss; No polydipsia or polyuria  MUSCULOSKELETAL: No joint pain or swelling; No muscle, back, or extremity pain  PSYCHIATRIC: No depression, anxiety, mood swings, or difficulty sleeping  HEME/LYMPH: No easy bruising, or bleeding gums  ALLERGY AND IMMUNOLOGIC: No hives or eczema    Vital Signs Last 24 Hrs  T(C): 36.4 (24 Aug 2018 04:21), Max: 36.8 (23 Aug 2018 22:24)  T(F): 97.5 (24 Aug 2018 04:21), Max: 98.2 (23 Aug 2018 22:24)  HR: 67 (24 Aug 2018 04:21) (67 - 76)  BP: 124/80 (24 Aug 2018 04:21) (82/56 - 124/80)  BP(mean): --  RR: 16 (24 Aug 2018 04:21) (15 - 16)  SpO2: 100% (24 Aug 2018 04:21) (96% - 100%)    PHYSICAL EXAM:  GENERAL: NAD, well-groomed, well-developed  HEAD:  Atraumatic, Normocephalic  EYES: EOMI, PERRLA, conjunctiva and sclera clear  ENMT: No tonsillar erythema, exudates, or enlargement; Moist mucous membranes, Good dentition, No lesions  NECK: Supple, No JVD, Normal thyroid  NERVOUS SYSTEM:  Alert & Oriented X3, Good concentration; Motor Strength 5/5 B/L upper and lower extremities; DTRs 2+ intact and symmetric  CHEST/LUNG: Clear to auscultation bilaterally; No rales, rhonchi, wheezing, or rubs  HEART: Regular rate and rhythm; No murmurs, rubs, or gallops  ABDOMEN: Soft, Nontender, Nondistended; Bowel sounds present  EXTREMITIES:  2+ Peripheral Pulses, No clubbing, cyanosis, or edema  LYMPH: No lymphadenopathy noted  SKIN: No rashes or lesions    LABS:                        11.0   11.62 )-----------( 297      ( 24 Aug 2018 07:58 )             35.8       Ca    9.1        23 Aug 2018 07:12      PT/INR - ( 24 Aug 2018 07:58 )   PT: 13.4 sec;   INR: 1.22 ratio         PTT - ( 24 Aug 2018 07:58 )  PTT:31.7 sec    CAPILLARY BLOOD GLUCOSE      POCT Blood Glucose.: 166 mg/dL (24 Aug 2018 11:49)  POCT Blood Glucose.: 131 mg/dL (24 Aug 2018 08:17)  POCT Blood Glucose.: 199 mg/dL (23 Aug 2018 22:19)  POCT Blood Glucose.: 130 mg/dL (23 Aug 2018 16:33)      RADIOLOGY & ADDITIONAL TESTS:    Notes Reviewed:  [x ] YES  [ ] NO    Care Discussed with Consultants/Other Providers [x ] YES  [ ] NO

## 2018-08-24 NOTE — PROGRESS NOTE ADULT - SUBJECTIVE AND OBJECTIVE BOX
ID progress note     Name: DWAYNE DONAHUE  Age: 89y  Gender: Female  MRN: [id number]    Interval History-- Events noted, discussed with orthopedics , she will NWB right leg till final pathology is available . had PICC line placed.   Notes reviewed    Past Medical History--  OAB (overactive bladder)  GERD (gastroesophageal reflux disease)  Angina effort  Heart failure  Upper respiratory infection  Diabetes  Renal stones  Myocardial infarction  Spinal stenosis  CVA (cerebral infarction)  Afib  Raynaud disease  Acid reflux  Hypertension  Hyperlipidemia  Asthma  Cataract  S/P hysterectomy      For details regarding the patient's social history, family history, and other miscellaneous elements, please refer the initial infectious diseases consultation and/or the admitting history and physical examination for this admission.    Allergies--  Allergies    Levaquin (Other)  ramipril (Other)  tetracycline (Hives; Rash)    Intolerances        Medications--  Antibiotics:  ceFAZolin   IVPB 2000 milliGRAM(s) IV Intermittent every 12 hours    Immunologic:    Other:  ALBUTerol    90 MICROgram(s) HFA Inhaler PRN  bisacodyl  cholecalciferol  dextrose 40% Gel PRN  dextrose 5%.  dextrose 50% Injectable  dextrose 50% Injectable  dextrose 50% Injectable  docusate sodium  enoxaparin Injectable  glucagon  Injectable PRN  glycerin Suppository - Adult  guaiFENesin    Syrup PRN  insulin glargine Injectable (LANTUS)  insulin lispro (HumaLOG) corrective regimen sliding scale  insulin lispro (HumaLOG) corrective regimen sliding scale  insulin lispro Injectable (HumaLOG)  lactobacillus acidophilus  lidocaine   Patch  morphine  - Injectable PRN  multivitamin/minerals  oxyCODONE  ER Tablet  pantoprazole    Tablet  polyethylene glycol 3350  propranolol LA  psyllium Powder  senna  simvastatin  sodium chloride 0.9%.  warfarin      Review of Systems--  A 10-point review of systems was obtained.     Pertinent positives and negatives--  Constitutional: No fevers. No Chills. No Rigors.   Cardiovascular: No chest pain. No palpitations.  Respiratory: No shortness of breath. No cough.  Gastrointestinal: No nausea or vomiting. No diarrhea or constipation.   Psychiatric: Pleasant. Appropriate affect.    Review of systems otherwise negative except as previously noted.    Physical Examination--  Vital Signs: T(F): 97.5 (08-24-18 @ 04:21), Max: 98.2 (08-23-18 @ 22:24)  HR: 67 (08-24-18 @ 04:21)  BP: 124/80 (08-24-18 @ 04:21)  RR: 16 (08-24-18 @ 04:21)  SpO2: 100% (08-24-18 @ 04:21)  Wt(kg): --  General: Nontoxic-appearing Female in no acute distress.  HEENT: AT/NC. PERRL. EOMI. Anicteric. Conjunctiva pink and moist. Oropharynx clear. Dentition fair.  Neck: Not rigid. No sense of mass.  Nodes: None palpable.  Lungs: Clear bilaterally without rales, wheezing or rhonchi  Heart: Regular rate and rhythm. No Murmur. No rub. No gallop. No palpable thrill.  Abdomen: Bowel sounds present and normoactive. Soft. Nondistended. Nontender. No sense of mass. No organomegaly.  Back: No spinal tenderness. No costovertebral angle tenderness.   Extremities: No cyanosis or clubbing. No edema. left foot dressing intact , wound as per podiatry   Skin: Warm. Dry. Good turgor. No rash. No vasculitic stigmata.  Psychiatric: Appropriate affect and mood for situation.         Laboratory Studies--  CBC                        11.0   11.62 )-----------( 297      ( 24 Aug 2018 07:58 )             35.8       Chemistries  08-23    136  |  97  |  52<H>  ----------------------------<  146<H>  4.7   |  31  |  1.90<H>    Ca    9.1      23 Aug 2018 07:12    Surgical Pathology Final Report -  (08.21.18 @ 07:40)    Surgical Pathology Final Report - :   ACCESSION No:  30 [id number]    DWAYNE DONAHUE                     2  Surgical Final Report    Final Diagnosis  1. Periosteal tissue and portion of bone (from left foot, first  metatarsal head) with acute osteomyelitis.    2. Periosteal tissue and portion of bone (from left foot, first  metatarsal, proximal clean margin) with degenerative changes.    3. Periosteal tissue and portion of bone (from left foot,  sesamoid) with degenerative changes.  Periosteal soft tissue shows acute inflammatory changes.    Francesco Vallejo M.D.  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    Note: Report pending immunohistochemical stain workup to evaluate  for primary origin of tumor.  Results to follow in an addendum.  Key portions of this case were reviewed in intradepartmental  consultation with Dr. Cordova, with concurrence of  diagnosis.    Cari Florentino MD  (Electronic Signature)      RECENT CULTURES    Culture - Tissue with Gram Stain (collected 21 Aug 2018 21:28)  Source: .Tissue left 1st metatarsal head  Gram Stain (22 Aug 2018 02:40):    No polymorphonuclear cells seen    No organisms seen  Preliminary Report (23 Aug 2018 19:48):    Rare Staphylococcus aureus  Organism: Staphylococcus aureus (23 Aug 2018 19:43)  Organism: Staphylococcus aureus (23 Aug 2018 19:43)    RADIOLOGY:  Xray Foot AP + Lateral + Oblique, Left (08.21.18 @ 17:52) >  FINDINGS:  The resection of the left first metatarsal with postsurgical changes.  There is no fracture or dislocation.  The joint spaces are preserved.  There are no lytic or blastic bony lesions.    IMPRESSION:   Resection of the left first metatarsal with postsurgical changes      Assessment--  89 y.o woman with multiple comorbidities presented with several weeks h/o of bony pain and recent inability to ambulate du to pain in the foot. Ct scan revealed destructive bony lesions concerning for metastasis and liver lesions. 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 Noted to have staph aurues bacteremia likely from left hallux abscess    The primary source of malignancy is unclear, she is to have biopsy of the hip lesion on Friday provided INR is below 1.5 . Anticoagulation is stopped . 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, primary unknown , await final report    she is s/p left hallux amputation , pathology consistent with acute OM , intra op cs also MSSA    Plan :   - will continue with  Ancef 2 grams q 12 as blood and wound cs is MSSA  - oncology follow up   - no need for JOSE GUADALUPE as repeat blood cs negative and her source is the foot infection   - overall prognosis is poor   - goals of care conversation with her Son, does not want aggressive care   - she will need long term IV abx , PICC line placed     - dc planning, most likely needs GREGORIO  - orthopedic follow up noted     Continue with present regime .  Appropriate use of antibiotics and adverse effects reviewed.    I have discussed the above plan of care with patient and family in detail. They expressed understanding of the treatment plan . Risks, benefits and alternatives discussed in detail. I have asked if they have any questions or concerns and appropriately addressed them to the best of my ability .      > 25 minutes spent in direct patient care reviewing  the notes, lab data/ imaging , discussion with multidisciplinary team. All questions were addressed and answered to the best of my capacity .    Thank you for allowing me to participate in the care of your patient .        William Armendariz MD  [phone]

## 2018-08-24 NOTE — PROGRESS NOTE ADULT - ASSESSMENT
·	CKD 3  ·	s/p foot surgery  ·	Diabetes, diabetic ulcer  ·	H/o Hypertension, now with low BP.   ·	MSSA bacteremia    Check am labs. Will hold lasix for now. Will follow creatinine trend. Add IVF if creatinine rising.  D/w family at bedside. s/p PICC line. On Abx. Monitor blood sugar levels. Insulin coverage as needed.   Dietary restriction. Monitor BP trend. Titrate BP meds as needed. Lower propanolol dose if BP low.   D/w family at bedside.

## 2018-08-24 NOTE — PROGRESS NOTE ADULT - ASSESSMENT
89 F htn, dm, chol, af pvd, adm with hip pain with ?metastatic disease now s/p iliac crest biopsy for malignancy    -s/p podiatric procedure.  Tolerated well with no cardiac complications  -there is no evidence of acute ischemia.  -there is no evidence of significant arrhythmia.  -af on ac  -remains on full dose Lovenox.  Continue to monitor closely for bleeding post operatively  -cont propranolol  -there is no evidence for meaningful  volume overload.  - pain controll  - Awaiting pathology. F/U heme/onc  -monitor electrolytes, keep k>4, Mg>2    -All other workup per primary team  - Will follow 89 F htn, dm, chol, af pvd, adm with hip pain with ?metastatic disease now s/p iliac crest biopsy for malignancy    -s/p podiatric procedure.  Tolerated well with no cardiac complications  -there is no evidence of acute ischemia.  -there is no evidence of significant arrhythmia.  -Afib on ac with full dose Lovenox and now started on Coumadin 2mg.  Continue to monitor closely for bleeding post operatively  -BP has been trending low but is improving. Lowest was 82/56 last night.   -Patient has not been symptomatic. Propanolol was decreased this morning  -there is no evidence for meaningful  volume overload.  - pain controlled    -Confirmed osteomyelitis in left foot. PICC line placed for long term abx    - awaiting path for met to pelvis. F/U heme/onc  - monitor electrolytes, keep k>4, Mg>2   -All other workup per primary team  -Discharge planning  - Will follow

## 2018-08-24 NOTE — DISCHARGE NOTE ADULT - MEDICATION SUMMARY - MEDICATIONS TO STOP TAKING
I will STOP taking the medications listed below when I get home from the hospital:    furosemide 40 mg oral tablet  -- 1 tab(s) by mouth once a day    Tradjenta 5 mg oral tablet  -- 1 tab(s) by mouth once a day    potassium chloride 10 mEq oral capsule, extended release  -- 1 cap(s) by mouth once a day    HYDROcodone 10 mg oral capsule, extended release  -- 5 milligram(s) by mouth once a day (at bedtime)    NovoLOG Mix 70/30 FlexPen subcutaneous suspension  -- 26 unit(s) subcutaneous once a day in the morning    NovoLOG Mix 70/30 FlexPen subcutaneous suspension  -- 11 unit(s) subcutaneous once a day (at bedtime)

## 2018-08-24 NOTE — PROGRESS NOTE ADULT - SUBJECTIVE AND OBJECTIVE BOX
[INTERVAL HX: ]  Patient seen and examined;  Chart reviewed and events noted;   had lidocaine patch placed  feeling tired.     Son at bedside.   Had extensive discussion on pending pathology, time to results.   whether can undergo treatment depends on pt PS, fitness for tx and treatment.       MEDICATIONS  (STANDING):   bisacodyl 5 milliGRAM(s) Oral at bedtime  ceFAZolin   IVPB 2000 milliGRAM(s) IV Intermittent every 12 hours  cholecalciferol 1000 Unit(s) Oral daily  dextrose 5%. 1000 milliLiter(s) (50 mL/Hr) IV Continuous <Continuous>  dextrose 50% Injectable 25 Gram(s) IV Push once  dextrose 50% Injectable 12.5 Gram(s) IV Push once  dextrose 50% Injectable 25 Gram(s) IV Push once  docusate sodium 100 milliGRAM(s) Oral three times a day  enoxaparin Injectable 70 milliGRAM(s) SubCutaneous daily  glycerin Suppository - Adult 1 Suppository(s) Rectal daily  insulin glargine Injectable (LANTUS) 12 Unit(s) SubCutaneous at bedtime  insulin lispro (HumaLOG) corrective regimen sliding scale   SubCutaneous at bedtime  insulin lispro (HumaLOG) corrective regimen sliding scale   SubCutaneous three times a day before meals  insulin lispro Injectable (HumaLOG) 4 Unit(s) SubCutaneous three times a day before meals  lactobacillus acidophilus 1 Tablet(s) Oral three times a day with meals  lidocaine   Patch 1 Patch Transdermal daily  multivitamin/minerals 1 Tablet(s) Oral daily  oxyCODONE  ER Tablet 10 milliGRAM(s) Oral <User Schedule>  pantoprazole    Tablet 40 milliGRAM(s) Oral before breakfast  polyethylene glycol 3350 17 Gram(s) Oral two times a day  propranolol LA 60 milliGRAM(s) Oral daily  psyllium Powder 1 Packet(s) Oral daily  senna 2 Tablet(s) Oral at bedtime  simvastatin 10 milliGRAM(s) Oral at bedtime  sodium chloride 0.9%. 1000 milliLiter(s) (75 mL/Hr) IV Continuous <Continuous>  warfarin 2 milliGRAM(s) Oral daily      MEDICATIONS  (PRN):  ALBUTerol    90 MICROgram(s) HFA Inhaler 2 Puff(s) Inhalation every 6 hours PRN Shortness of Breath and/or Wheezing  dextrose 40% Gel 15 Gram(s) Oral once PRN Blood Glucose LESS THAN 70 milliGRAM(s)/deciLiter  glucagon  Injectable 1 milliGRAM(s) IntraMuscular once PRN Glucose <70 milliGRAM(s)/deciLiter  guaiFENesin    Syrup 100 milliGRAM(s) Oral every 6 hours PRN Cough  morphine  - Injectable 2 milliGRAM(s) IV Push every 6 hours PRN Severe Pain (7 - 10)      Vital Signs Last 24 Hrs  T(C): 36.4 (24 Aug 2018 04:21), Max: 36.8 (23 Aug 2018 22:24)  T(F): 97.5 (24 Aug 2018 04:21), Max: 98.2 (23 Aug 2018 22:24)  HR: 53 (24 Aug 2018 14:56) (53 - 71)  BP: 105/48 (24 Aug 2018 14:56) (82/56 - 124/80)  BP(mean): --  RR: 16 (24 Aug 2018 14:56) (16 - 16)  SpO2: 96% (24 Aug 2018 14:56) (96% - 100%)      [PHYSICAL EXAM]  General: adult in NAD,  WN,  WD. in bed mainly  HEENT: clear oropharynx, anicteric sclera, pink conjunctivae.  Neck: supple, no masses.  CV: normal S1S2, no murmur, no rubs, no gallops.  Lungs: clear to auscultation, no wheezes, no rales, no rhonchi.  Abdomen: soft, non-tender, non-distended, no hepatosplenomegaly, normal BS, no guarding.  Ext: no clubbing, no cyanosis, no edema.  Skin: no rashes,  no petechiae, no venous stasis changes.  Neuro: alert and oriented X2, no focal motor deficits.  Aware has cancer.   LN: no LUCIANA.      [LABS:]                        11.0   11.62 )-----------( 297      ( 24 Aug 2018 07:58 )             35.8     08-23  136  |  97  |  52<H>  ----------------------------<  146<H>  4.7   |  31  |  1.90<H>  Ca    9.1      23 Aug 2018 07:12    PT/INR - ( 24 Aug 2018 07:58 )   PT: 13.4 sec;   INR: 1.22 ratio    PTT - ( 24 Aug 2018 07:58 )  PTT:31.7 sec      [RADIOLOGY STUDIES:]

## 2018-08-24 NOTE — PROGRESS NOTE ADULT - SUBJECTIVE AND OBJECTIVE BOX
89yo female seen bedside 2 day s/p left 1st metatarsal head resection (DOS 8/21/18), in bed with son bedside. Patient denies pain to her foot at this time.    Vital Signs Last 24 Hrs  T(C): 36.4 (24 Aug 2018 04:21), Max: 36.8 (23 Aug 2018 22:24)  T(F): 97.5 (24 Aug 2018 04:21), Max: 98.2 (23 Aug 2018 22:24)  HR: 67 (24 Aug 2018 04:21) (67 - 76)  BP: 124/80 (24 Aug 2018 04:21) (82/56 - 124/80)  BP(mean): --  RR: 16 (24 Aug 2018 04:21) (15 - 16)  SpO2: 100% (24 Aug 2018 04:21) (96% - 100%)                          11.0   11.62 )-----------( 297      ( 24 Aug 2018 07:58 )             35.8     08-23    136  |  97  |  52<H>  ----------------------------<  146<H>  4.7   |  31  |  1.90<H>    Ca    9.1      23 Aug 2018 07:12              Objective: left foot focused  Vasc: DP and PT 2/4; CFT < 3 seconds x5; TG WNL; no edema noted  Derm:  -sutures intact to medial aspect of 1st MPJ well coapted without dehiscence   Neuro: epicritic sensation intact  Ortho:  1st MPJ extensor contracture noted

## 2018-08-24 NOTE — DISCHARGE NOTE ADULT - CARE PLAN
Principal Discharge DX:	Diabetic ulcer of toe  Goal:	free from infection  Assessment and plan of treatment:	follow up with foot Dr. MENSAH  Secondary Diagnosis:	Acid reflux  Secondary Diagnosis:	Ambulatory dysfunction  Secondary Diagnosis:	Asthma  Secondary Diagnosis:	Atrial fibrillation, unspecified type  Secondary Diagnosis:	Constipation  Secondary Diagnosis:	Diabetes Principal Discharge DX:	Metastatic cancer  Goal:	.  Assessment and plan of treatment:	For radiation therapy consult  Secondary Diagnosis:	Atrial fibrillation, unspecified type  Assessment and plan of treatment:	Dose coumadin daily  Secondary Diagnosis:	MSSA bacteremia  Assessment and plan of treatment:	IV ancef 2g qd until 9/30/18  Secondary Diagnosis:	Other acute osteomyelitis of left foot  Assessment and plan of treatment:	Continue local wound care  Finish course of antibiotics.

## 2018-08-24 NOTE — PROGRESS NOTE ADULT - SUBJECTIVE AND OBJECTIVE BOX
Patient is a 89y old  Female who presents with a chief complaint of diabetic foot ulcer/ambulatory disfunction (24 Aug 2018 01:06)      INTERVAL HPI/OVERNIGHT EVENTS:    Admits to cough. Denies shortness of breath    MEDICATIONS  (STANDING):  bisacodyl 5 milliGRAM(s) Oral at bedtime  ceFAZolin   IVPB 2000 milliGRAM(s) IV Intermittent every 12 hours  cholecalciferol 1000 Unit(s) Oral daily  dextrose 5%. 1000 milliLiter(s) (50 mL/Hr) IV Continuous <Continuous>  dextrose 50% Injectable 25 Gram(s) IV Push once  dextrose 50% Injectable 12.5 Gram(s) IV Push once  dextrose 50% Injectable 25 Gram(s) IV Push once  docusate sodium 100 milliGRAM(s) Oral three times a day  enoxaparin Injectable 70 milliGRAM(s) SubCutaneous daily  glycerin Suppository - Adult 1 Suppository(s) Rectal daily  insulin glargine Injectable (LANTUS) 12 Unit(s) SubCutaneous at bedtime  insulin lispro (HumaLOG) corrective regimen sliding scale   SubCutaneous at bedtime  insulin lispro (HumaLOG) corrective regimen sliding scale   SubCutaneous three times a day before meals  insulin lispro Injectable (HumaLOG) 4 Unit(s) SubCutaneous three times a day before meals  lactobacillus acidophilus 1 Tablet(s) Oral three times a day with meals  lidocaine   Patch 1 Patch Transdermal daily  multivitamin/minerals 1 Tablet(s) Oral daily  oxyCODONE  ER Tablet 10 milliGRAM(s) Oral <User Schedule>  pantoprazole    Tablet 40 milliGRAM(s) Oral before breakfast  polyethylene glycol 3350 17 Gram(s) Oral two times a day  propranolol LA 60 milliGRAM(s) Oral daily  psyllium Powder 1 Packet(s) Oral daily  senna 2 Tablet(s) Oral at bedtime  simvastatin 10 milliGRAM(s) Oral at bedtime  sodium chloride 0.9%. 1000 milliLiter(s) (75 mL/Hr) IV Continuous <Continuous>  warfarin 2 milliGRAM(s) Oral daily      MEDICATIONS  (PRN):  ALBUTerol    90 MICROgram(s) HFA Inhaler 2 Puff(s) Inhalation every 6 hours PRN Shortness of Breath and/or Wheezing  dextrose 40% Gel 15 Gram(s) Oral once PRN Blood Glucose LESS THAN 70 milliGRAM(s)/deciLiter  glucagon  Injectable 1 milliGRAM(s) IntraMuscular once PRN Glucose <70 milliGRAM(s)/deciLiter  guaiFENesin    Syrup 100 milliGRAM(s) Oral every 6 hours PRN Cough  morphine  - Injectable 2 milliGRAM(s) IV Push every 6 hours PRN Severe Pain (7 - 10)      Allergies    Levaquin (Other)  ramipril (Other)  tetracycline (Hives; Rash)    Intolerances        PAST MEDICAL & SURGICAL HISTORY:  OAB (overactive bladder)  GERD (gastroesophageal reflux disease)  Angina effort  Heart failure  Upper respiratory infection  Diabetes  Renal stones  Myocardial infarction: 1981  Spinal stenosis  CVA (cerebral infarction)  Afib  Raynaud disease  Acid reflux  Hypertension  Hyperlipidemia  Asthma  Cataract  S/P hysterectomy      Vital Signs Last 24 Hrs  T(C): 36.4 (24 Aug 2018 04:21), Max: 36.8 (23 Aug 2018 22:24)  T(F): 97.5 (24 Aug 2018 04:21), Max: 98.2 (23 Aug 2018 22:24)  HR: 67 (24 Aug 2018 04:21) (67 - 71)  BP: 124/80 (24 Aug 2018 04:21) (82/56 - 124/80)  BP(mean): --  RR: 16 (24 Aug 2018 04:21) (16 - 16)  SpO2: 100% (24 Aug 2018 04:21) (97% - 100%)    PHYSICAL EXAMINATION:    GENERAL: The patient is awake and alert in no apparent distress.     HEENT: Head is normocephalic and atraumatic. Extraocular muscles are intact. Mucous membranes are moist.    NECK: Supple.    LUNGS: Clear to auscultation without wheezing, rales or rhonchi; respirations unlabored    HEART: Regular rate and rhythm without murmur.    ABDOMEN: Soft, nontender, and nondistended.      EXTREMITIES: Without any cyanosis, clubbing, rash, lesions or edema.    NEUROLOGIC: Grossly intact.    SKIN: No ulceration or induration present.      LABS:                        11.0   11.62 )-----------( 297      ( 24 Aug 2018 07:58 )             35.8     08-23    136  |  97  |  52<H>  ----------------------------<  146<H>  4.7   |  31  |  1.90<H>    Ca    9.1      23 Aug 2018 07:12      PT/INR - ( 24 Aug 2018 07:58 )   PT: 13.4 sec;   INR: 1.22 ratio         PTT - ( 24 Aug 2018 07:58 )  PTT:31.7 sec                    MICROBIOLOGY:  Culture Results:   Rare Staphylococcus aureus (08-21 @ 21:28)      RADIOLOGY & ADDITIONAL STUDIES:    Assessment:    Metastatic adenocarcinoma - primary uncertain  Hx Asthma    Plan:    Await final pathology report  Discussed case with oncologist

## 2018-08-24 NOTE — DISCHARGE NOTE ADULT - PLAN OF CARE
free from infection follow up with foot Dr. MENSAH . For radiation therapy consult Dose coumadin daily IV ancef 2g qd until 9/30/18 Continue local wound care  Finish course of antibiotics.

## 2018-08-24 NOTE — PROGRESS NOTE ADULT - SUBJECTIVE AND OBJECTIVE BOX
CAPILLARY BLOOD GLUCOSE      POCT Blood Glucose.: 199 mg/dL (23 Aug 2018 22:19)  POCT Blood Glucose.: 130 mg/dL (23 Aug 2018 16:33)  POCT Blood Glucose.: 183 mg/dL (23 Aug 2018 11:43)  POCT Blood Glucose.: 156 mg/dL (23 Aug 2018 07:22)      Vital Signs Last 24 Hrs  T(C): 36.4 (24 Aug 2018 04:21), Max: 36.8 (23 Aug 2018 22:24)  T(F): 97.5 (24 Aug 2018 04:21), Max: 98.2 (23 Aug 2018 22:24)  HR: 67 (24 Aug 2018 04:21) (67 - 76)  BP: 124/80 (24 Aug 2018 04:21) (82/56 - 124/80)  BP(mean): --  RR: 16 (24 Aug 2018 04:21) (15 - 16)  SpO2: 100% (24 Aug 2018 04:21) (96% - 100%)    General: WN/WD NAD  Respiratory: CTA B/L  CV: RRR, S1S2, no murmurs, rubs or gallops  Abdominal: Soft, NT, ND +BS, Last BM  Extremities: No edema, + peripheral pulses     08-23    136  |  97  |  52<H>  ----------------------------<  146<H>  4.7   |  31  |  1.90<H>    Ca    9.1      23 Aug 2018 07:12        dextrose 40% Gel 15 Gram(s) Oral once PRN  dextrose 50% Injectable 25 Gram(s) IV Push once  dextrose 50% Injectable 12.5 Gram(s) IV Push once  dextrose 50% Injectable 25 Gram(s) IV Push once  glucagon  Injectable 1 milliGRAM(s) IntraMuscular once PRN  insulin glargine Injectable (LANTUS) 12 Unit(s) SubCutaneous at bedtime  insulin lispro (HumaLOG) corrective regimen sliding scale   SubCutaneous at bedtime  insulin lispro (HumaLOG) corrective regimen sliding scale   SubCutaneous three times a day before meals  insulin lispro Injectable (HumaLOG) 4 Unit(s) SubCutaneous three times a day before meals  simvastatin 10 milliGRAM(s) Oral at bedtime

## 2018-08-24 NOTE — DISCHARGE NOTE ADULT - CARE PROVIDER_API CALL
Ke Ceballos), Hematology; Internal Medicine; Medical Oncology  [address]  [address]  [place] [address]  Phone: [phone]  Fax: [phone]    Skinny Diaz), Cardiovascular Disease; Internal Medicine  [address] [place] [address]  Phone: [phone]  Fax: [phone]    Trell Killian), Podiatric Medicine and Surgery  [address] [place] [address]  Phone: [phone]  Fax: [phone]

## 2018-08-25 LAB
ANION GAP SERPL CALC-SCNC: 9 MMOL/L — SIGNIFICANT CHANGE UP (ref 5–17)
APTT BLD: 33.2 SEC — SIGNIFICANT CHANGE UP (ref 27.5–37.4)
BUN SERPL-MCNC: 55 MG/DL — HIGH (ref 7–23)
CALCIUM SERPL-MCNC: 8.7 MG/DL — SIGNIFICANT CHANGE UP (ref 8.5–10.1)
CHLORIDE SERPL-SCNC: 96 MMOL/L — SIGNIFICANT CHANGE UP (ref 96–108)
CO2 SERPL-SCNC: 29 MMOL/L — SIGNIFICANT CHANGE UP (ref 22–31)
CREAT SERPL-MCNC: 2 MG/DL — HIGH (ref 0.5–1.3)
GLUCOSE SERPL-MCNC: 122 MG/DL — HIGH (ref 70–99)
HCT VFR BLD CALC: 31.4 % — LOW (ref 34.5–45)
HGB BLD-MCNC: 9.8 G/DL — LOW (ref 11.5–15.5)
INR BLD: 1.48 RATIO — HIGH (ref 0.88–1.16)
MCHC RBC-ENTMCNC: 29 PG — SIGNIFICANT CHANGE UP (ref 27–34)
MCHC RBC-ENTMCNC: 31.2 GM/DL — LOW (ref 32–36)
MCV RBC AUTO: 92.9 FL — SIGNIFICANT CHANGE UP (ref 80–100)
NRBC # BLD: 0 /100 WBCS — SIGNIFICANT CHANGE UP (ref 0–0)
PLATELET # BLD AUTO: 265 K/UL — SIGNIFICANT CHANGE UP (ref 150–400)
POTASSIUM SERPL-MCNC: 4.5 MMOL/L — SIGNIFICANT CHANGE UP (ref 3.5–5.3)
POTASSIUM SERPL-SCNC: 4.5 MMOL/L — SIGNIFICANT CHANGE UP (ref 3.5–5.3)
PROTHROM AB SERPL-ACNC: 16.3 SEC — HIGH (ref 9.8–12.7)
RBC # BLD: 3.38 M/UL — LOW (ref 3.8–5.2)
RBC # FLD: 17.2 % — HIGH (ref 10.3–14.5)
SODIUM SERPL-SCNC: 134 MMOL/L — LOW (ref 135–145)
WBC # BLD: 9.66 K/UL — SIGNIFICANT CHANGE UP (ref 3.8–10.5)
WBC # FLD AUTO: 9.66 K/UL — SIGNIFICANT CHANGE UP (ref 3.8–10.5)

## 2018-08-25 PROCEDURE — 99232 SBSQ HOSP IP/OBS MODERATE 35: CPT

## 2018-08-25 RX ADMIN — Medication 1: at 08:26

## 2018-08-25 RX ADMIN — Medication 100 MILLIGRAM(S): at 18:06

## 2018-08-25 RX ADMIN — LIDOCAINE 1 PATCH: 4 CREAM TOPICAL at 11:48

## 2018-08-25 RX ADMIN — SIMVASTATIN 10 MILLIGRAM(S): 20 TABLET, FILM COATED ORAL at 22:12

## 2018-08-25 RX ADMIN — Medication 1 TABLET(S): at 08:53

## 2018-08-25 RX ADMIN — Medication 1 TABLET(S): at 11:47

## 2018-08-25 RX ADMIN — Medication 1 PACKET(S): at 11:47

## 2018-08-25 RX ADMIN — Medication 4 UNIT(S): at 12:04

## 2018-08-25 RX ADMIN — PANTOPRAZOLE SODIUM 40 MILLIGRAM(S): 20 TABLET, DELAYED RELEASE ORAL at 06:02

## 2018-08-25 RX ADMIN — WARFARIN SODIUM 2 MILLIGRAM(S): 2.5 TABLET ORAL at 22:12

## 2018-08-25 RX ADMIN — Medication 100 MILLIGRAM(S): at 06:02

## 2018-08-25 RX ADMIN — Medication 1 TABLET(S): at 11:49

## 2018-08-25 RX ADMIN — LIDOCAINE 1 PATCH: 4 CREAM TOPICAL at 22:59

## 2018-08-25 RX ADMIN — Medication 60 MILLIGRAM(S): at 06:02

## 2018-08-25 RX ADMIN — INSULIN GLARGINE 12 UNIT(S): 100 INJECTION, SOLUTION SUBCUTANEOUS at 22:13

## 2018-08-25 RX ADMIN — Medication 5 MILLIGRAM(S): at 22:12

## 2018-08-25 RX ADMIN — Medication 4 UNIT(S): at 08:25

## 2018-08-25 RX ADMIN — Medication 1000 UNIT(S): at 11:47

## 2018-08-25 RX ADMIN — Medication 1 TABLET(S): at 16:59

## 2018-08-25 RX ADMIN — SENNA PLUS 2 TABLET(S): 8.6 TABLET ORAL at 22:12

## 2018-08-25 RX ADMIN — Medication 4 UNIT(S): at 16:58

## 2018-08-25 RX ADMIN — Medication 100 MILLIGRAM(S): at 22:12

## 2018-08-25 RX ADMIN — OXYCODONE HYDROCHLORIDE 10 MILLIGRAM(S): 5 TABLET ORAL at 22:12

## 2018-08-25 RX ADMIN — LIDOCAINE 1 PATCH: 4 CREAM TOPICAL at 00:02

## 2018-08-25 RX ADMIN — Medication 3 MILLIGRAM(S): at 22:12

## 2018-08-25 RX ADMIN — OXYCODONE HYDROCHLORIDE 10 MILLIGRAM(S): 5 TABLET ORAL at 22:43

## 2018-08-25 RX ADMIN — Medication 100 MILLIGRAM(S): at 13:59

## 2018-08-25 RX ADMIN — ENOXAPARIN SODIUM 70 MILLIGRAM(S): 100 INJECTION SUBCUTANEOUS at 11:48

## 2018-08-25 NOTE — PROGRESS NOTE ADULT - SUBJECTIVE AND OBJECTIVE BOX
Patient is a 89y old  Female who presents with a chief complaint of diabetic foot ulcer/ambulatory disfunction (24 Aug 2018 01:06)      INTERVAL HPI/OVERNIGHT EVENTS: Patient seen and examined. NAD. No complaints.    Vital Signs Last 24 Hrs  T(C): 36.6 (25 Aug 2018 05:04), Max: 36.6 (24 Aug 2018 21:26)  T(F): 97.8 (25 Aug 2018 05:04), Max: 97.9 (24 Aug 2018 21:26)  HR: 64 (25 Aug 2018 05:04) (53 - 64)  BP: 119/60 (25 Aug 2018 05:04) (105/48 - 119/60)  BP(mean): --  RR: 16 (25 Aug 2018 05:04) (16 - 16)  SpO2: 98% (25 Aug 2018 05:04) (96% - 98%)    08-25    134<L>  |  96  |  55<H>  ----------------------------<  122<H>  4.5   |  29  |  2.00<H>    Ca    8.7      25 Aug 2018 08:23                            9.8    9.66  )-----------( 265      ( 25 Aug 2018 08:24 )             31.4     PT/INR - ( 25 Aug 2018 08:24 )   PT: 16.3 sec;   INR: 1.48 ratio         PTT - ( 25 Aug 2018 08:24 )  PTT:33.2 sec  CAPILLARY BLOOD GLUCOSE      POCT Blood Glucose.: 153 mg/dL (25 Aug 2018 07:54)  POCT Blood Glucose.: 139 mg/dL (24 Aug 2018 21:53)  POCT Blood Glucose.: 176 mg/dL (24 Aug 2018 16:44)              ALBUTerol    90 MICROgram(s) HFA Inhaler 2 Puff(s) Inhalation every 6 hours PRN  bisacodyl 5 milliGRAM(s) Oral at bedtime  ceFAZolin   IVPB 2000 milliGRAM(s) IV Intermittent every 12 hours  cholecalciferol 1000 Unit(s) Oral daily  dextrose 40% Gel 15 Gram(s) Oral once PRN  dextrose 5%. 1000 milliLiter(s) IV Continuous <Continuous>  dextrose 50% Injectable 25 Gram(s) IV Push once  dextrose 50% Injectable 12.5 Gram(s) IV Push once  dextrose 50% Injectable 25 Gram(s) IV Push once  docusate sodium 100 milliGRAM(s) Oral three times a day  enoxaparin Injectable 70 milliGRAM(s) SubCutaneous daily  fentaNYL   Patch  12 MICROgram(s)/Hr 1 Patch Transdermal every 72 hours  glucagon  Injectable 1 milliGRAM(s) IntraMuscular once PRN  glycerin Suppository - Adult 1 Suppository(s) Rectal daily  guaiFENesin    Syrup 100 milliGRAM(s) Oral every 6 hours PRN  insulin glargine Injectable (LANTUS) 12 Unit(s) SubCutaneous at bedtime  insulin lispro (HumaLOG) corrective regimen sliding scale   SubCutaneous at bedtime  insulin lispro (HumaLOG) corrective regimen sliding scale   SubCutaneous three times a day before meals  insulin lispro Injectable (HumaLOG) 4 Unit(s) SubCutaneous three times a day before meals  lactobacillus acidophilus 1 Tablet(s) Oral three times a day with meals  lidocaine   Patch 1 Patch Transdermal daily  melatonin 3 milliGRAM(s) Oral at bedtime  morphine  - Injectable 2 milliGRAM(s) IV Push every 6 hours PRN  multivitamin/minerals 1 Tablet(s) Oral daily  oxyCODONE  ER Tablet 10 milliGRAM(s) Oral <User Schedule>  pantoprazole    Tablet 40 milliGRAM(s) Oral before breakfast  polyethylene glycol 3350 17 Gram(s) Oral two times a day  propranolol LA 60 milliGRAM(s) Oral daily  psyllium Powder 1 Packet(s) Oral daily  senna 2 Tablet(s) Oral at bedtime  simvastatin 10 milliGRAM(s) Oral at bedtime  sodium chloride 0.9%. 1000 milliLiter(s) IV Continuous <Continuous>  warfarin 2 milliGRAM(s) Oral daily              REVIEW OF SYSTEMS:  CONSTITUTIONAL: No fever, no weight loss, or no fatigue  NECK: No pain, no stiffness  RESPIRATORY: No cough, no wheezing, no chills, no hemoptysis, No shortness of breath  CARDIOVASCULAR: No chest pain, no palpitations, no dizziness, no leg swelling  GASTROINTESTINAL: No abdominal pain. No nausea, no vomiting, no hematemesis; No diarrhea, no constipation. No melena, no hematochezia.  GENITOURINARY: No dysuria, no frequency, no hematuria, no incontinence  NEUROLOGICAL: No headaches, no loss of strength, no numbness, no tremors  SKIN: No itching, no burning  MUSCULOSKELETAL: No joint pain, no swelling; No muscle, no back, no extremity pain  PSYCHIATRIC: No depression, no mood swings,   HEME/LYMPH: No easy bruising, no bleeding gums  ALLERY AND IMMUNOLOGIC: No hives       Consultant(s) Notes Reviewed:  [X] YES  [ ] NO    PHYSICAL EXAM:  GENERAL: NAD  HEAD:  Atraumatic, Normocephalic  EYES: EOMI, PERRLA, conjunctiva and sclera clear  ENMT: No tonsillar erythema, exudates, or enlargement; Moist mucous membranes  NECK: Supple, No JVD  NERVOUS SYSTEM:  Awake & alert  CHEST/LUNG: Clear to auscultation bilaterally; No rales, rhonchi, wheezing,  HEART: Regular rate and rhythm  ABDOMEN: Soft, Nontender, Nondistended; Bowel sounds present  EXTREMITIES:  No clubbing, cyanosis, or edema; left foot ace wrapped  LYMPH: No lymphadenopathy noted  SKIN: No rashes      Advanced care planning discussed with patient/family [X] YES   [ ] NO    Advanced care planning discussed with patient/family. Advanced care planning forms reviewed/discussed/completed. 20 minutes spent.

## 2018-08-25 NOTE — PROGRESS NOTE ADULT - SUBJECTIVE AND OBJECTIVE BOX
[facility] - Cooper Diaz, Hari, Don, [place], Jimi Jovel  Office Number:  [phone]    Patient resting comfortably in bed in NAD.  Laying flat with no respiratory distress.  No complaints of chest pain, dyspnea, palpitations, PND, or orthopnea. Feels weak overall.    ROS: negative unless otherwise mentioned.    Telemetry:  not on tele    MEDICATIONS  (STANDING):  bisacodyl 5 milliGRAM(s) Oral at bedtime  ceFAZolin   IVPB 2000 milliGRAM(s) IV Intermittent every 12 hours  cholecalciferol 1000 Unit(s) Oral daily  dextrose 5%. 1000 milliLiter(s) (50 mL/Hr) IV Continuous <Continuous>  dextrose 50% Injectable 25 Gram(s) IV Push once  dextrose 50% Injectable 12.5 Gram(s) IV Push once  dextrose 50% Injectable 25 Gram(s) IV Push once  docusate sodium 100 milliGRAM(s) Oral three times a day  enoxaparin Injectable 70 milliGRAM(s) SubCutaneous daily  fentaNYL   Patch  12 MICROgram(s)/Hr 1 Patch Transdermal every 72 hours  glycerin Suppository - Adult 1 Suppository(s) Rectal daily  insulin glargine Injectable (LANTUS) 12 Unit(s) SubCutaneous at bedtime  insulin lispro (HumaLOG) corrective regimen sliding scale   SubCutaneous at bedtime  insulin lispro (HumaLOG) corrective regimen sliding scale   SubCutaneous three times a day before meals  insulin lispro Injectable (HumaLOG) 4 Unit(s) SubCutaneous three times a day before meals  lactobacillus acidophilus 1 Tablet(s) Oral three times a day with meals  lidocaine   Patch 1 Patch Transdermal daily  melatonin 3 milliGRAM(s) Oral at bedtime  multivitamin/minerals 1 Tablet(s) Oral daily  oxyCODONE  ER Tablet 10 milliGRAM(s) Oral <User Schedule>  pantoprazole    Tablet 40 milliGRAM(s) Oral before breakfast  polyethylene glycol 3350 17 Gram(s) Oral two times a day  propranolol LA 60 milliGRAM(s) Oral daily  psyllium Powder 1 Packet(s) Oral daily  senna 2 Tablet(s) Oral at bedtime  simvastatin 10 milliGRAM(s) Oral at bedtime  sodium chloride 0.9%. 1000 milliLiter(s) (75 mL/Hr) IV Continuous <Continuous>  warfarin 2 milliGRAM(s) Oral daily    MEDICATIONS  (PRN):  ALBUTerol    90 MICROgram(s) HFA Inhaler 2 Puff(s) Inhalation every 6 hours PRN Shortness of Breath and/or Wheezing  dextrose 40% Gel 15 Gram(s) Oral once PRN Blood Glucose LESS THAN 70 milliGRAM(s)/deciLiter  glucagon  Injectable 1 milliGRAM(s) IntraMuscular once PRN Glucose <70 milliGRAM(s)/deciLiter  guaiFENesin    Syrup 100 milliGRAM(s) Oral every 6 hours PRN Cough  morphine  - Injectable 2 milliGRAM(s) IV Push every 6 hours PRN Severe Pain (7 - 10)      Allergies    Levaquin (Other)  ramipril (Other)  tetracycline (Hives; Rash)    Intolerances        Vital Signs Last 24 Hrs  T(C): 36.6 (25 Aug 2018 05:04), Max: 36.6 (24 Aug 2018 21:26)  T(F): 97.8 (25 Aug 2018 05:04), Max: 97.9 (24 Aug 2018 21:26)  HR: 64 (25 Aug 2018 05:04) (53 - 64)  BP: 119/60 (25 Aug 2018 05:04) (105/48 - 119/60)  BP(mean): --  RR: 16 (25 Aug 2018 05:04) (16 - 16)  SpO2: 98% (25 Aug 2018 05:04) (96% - 98%)    I&O's Summary    24 Aug 2018 07:01  -  25 Aug 2018 07:00  --------------------------------------------------------  IN: 480 mL / OUT: 0 mL / NET: 480 mL        ON EXAM:    General: Anxious, awake and alert, oriented x 3  HEENT: Mucous membranes are moist, anicteric  Lungs: Non-labored, breath sounds are clear bilaterally. No wheezing, rales or rhonchi  Cardiovascular: Regular, S1 and S2, no murmurs, rubs, or gallops  Gastrointestinal: Bowel Sounds present, soft, nontender.   Lymph: No peripheral edema. No lymphadenopathy.  Skin: Warm, dry  Psych:  Mood & affect appropriate      LABS: All Labs Reviewed:                        11.0   11.62 )-----------( 297      ( 24 Aug 2018 07:58 )             35.8                         10.6   11.03 )-----------( 249      ( 23 Aug 2018 07:12 )             32.8     23 Aug 2018 07:12    136    |  97     |  52     ----------------------------<  146    4.7     |  31     |  1.90     Ca    9.1        23 Aug 2018 07:12      PT/INR - ( 24 Aug 2018 07:58 )   PT: 13.4 sec;   INR: 1.22 ratio         PTT - ( 24 Aug 2018 07:58 )  PTT:31.7 sec      Blood Culture: Organism Staphylococcus aureus  Gram Stain Blood -- Gram Stain   No polymorphonuclear cells seen  No organisms seen  Specimen Source .Tissue left 1st metatarsal head  Culture-Blood --

## 2018-08-25 NOTE — PROGRESS NOTE ADULT - SUBJECTIVE AND OBJECTIVE BOX
88yo female seen bedside 2 day s/p left 1st metatarsal head resection (DOS 8/21/18), in bed with son bedside. Patient denies pain to her foot at this time.    Vital Signs Last 24 Hrs  T(C): 36.6 (25 Aug 2018 05:04), Max: 36.6 (24 Aug 2018 21:26)  T(F): 97.8 (25 Aug 2018 05:04), Max: 97.9 (24 Aug 2018 21:26)  HR: 64 (25 Aug 2018 05:04) (53 - 64)  BP: 119/60 (25 Aug 2018 05:04) (105/48 - 119/60)  BP(mean): --  RR: 16 (25 Aug 2018 05:04) (16 - 16)  SpO2: 98% (25 Aug 2018 05:04) (96% - 98%)                          9.8    9.66  )-----------( 265      ( 25 Aug 2018 08:24 )             31.4     08-25    134<L>  |  96  |  55<H>  ----------------------------<  122<H>  4.5   |  29  |  2.00<H>    Ca    8.7      25 Aug 2018 08:23          Objective: left foot focused  Vasc: DP and PT 2/4; CFT < 3 seconds x5; TG WNL; no edema noted  Derm:  -sutures intact to medial aspect of 1st MPJ well coapted without dehiscence   Neuro: epicritic sensation intact  Ortho:  1st MPJ extensor contracture noted

## 2018-08-25 NOTE — PROGRESS NOTE ADULT - PROBLEM SELECTOR PLAN 2
Continue iv cefazolin per ID -- will need long-term iv abx  Repeat cultures negative  ID follow up with Dr. CARLOS

## 2018-08-25 NOTE — PROGRESS NOTE ADULT - ASSESSMENT
A/P: 89 F s/p Destructive Right pelvis lesion, suspected neoplasm w/ metastasis, s/p Left 1st Metatarsal Head Resection POD 5    Analgesia  DVT ppx   NWB RLE  CT bone biopsy 8/20; Metastatic Adenocarcinoma. IHC Pending to determine primary source  FU Heme/Onc, recommendations appreciated  FU Labs   Cardiology/Endocrine recommendations appreciated  PT/OT  Once IHC reviewed, will re address the need for prophylactic surgical intervention.   Appreciate H/O reccommendations      Will discuss with attending and advise if plan changes.

## 2018-08-25 NOTE — PROGRESS NOTE ADULT - SUBJECTIVE AND OBJECTIVE BOX
Patient seen an examined at bedside. Pain is well controlled. Reports R forearm/wrist pain has resolved.     PE:  Vital Signs Last 24 Hrs  T(C): 36.4 (24 Aug 2018 04:21), Max: 36.8 (23 Aug 2018 22:24)  T(F): 97.5 (24 Aug 2018 04:21), Max: 98.2 (23 Aug 2018 22:24)  HR: 67 (24 Aug 2018 04:21) (67 - 76)  BP: 124/80 (24 Aug 2018 04:21) (82/56 - 124/80)  RR: 16 (24 Aug 2018 04:21) (15 - 16)  SpO2: 100% (24 Aug 2018 04:21) (96% - 100%)    RLE:    No gross deformity noted  Skin intact; No erythema or ecchymosis  SILT L3-S1  DP1+  EHL/FHL/GSC/TA intact  Compartments soft and compressible  No calf tenderness  Decreased sensation to light tough dorsal/volar foot, hx diabetic neuropathy

## 2018-08-25 NOTE — PROGRESS NOTE ADULT - PROBLEM SELECTOR PLAN 1
Right hip lesion concerning for malignancy  S/P right hip IR biopsy -- adenocarcinoma: moderately to poorly differentiated of unknown primar -- IHC stains pending  Ortho f/u

## 2018-08-25 NOTE — PROGRESS NOTE ADULT - SUBJECTIVE AND OBJECTIVE BOX
Interval History:  remains weak   Chart reviewed and events noted;   Overnight events:    MEDICATIONS  (STANDING):  bisacodyl 5 milliGRAM(s) Oral at bedtime  ceFAZolin   IVPB 2000 milliGRAM(s) IV Intermittent every 12 hours  cholecalciferol 1000 Unit(s) Oral daily  dextrose 5%. 1000 milliLiter(s) (50 mL/Hr) IV Continuous <Continuous>  dextrose 50% Injectable 25 Gram(s) IV Push once  dextrose 50% Injectable 12.5 Gram(s) IV Push once  dextrose 50% Injectable 25 Gram(s) IV Push once  docusate sodium 100 milliGRAM(s) Oral three times a day  enoxaparin Injectable 70 milliGRAM(s) SubCutaneous daily  fentaNYL   Patch  12 MICROgram(s)/Hr 1 Patch Transdermal every 72 hours  glycerin Suppository - Adult 1 Suppository(s) Rectal daily  insulin glargine Injectable (LANTUS) 12 Unit(s) SubCutaneous at bedtime  insulin lispro (HumaLOG) corrective regimen sliding scale   SubCutaneous at bedtime  insulin lispro (HumaLOG) corrective regimen sliding scale   SubCutaneous three times a day before meals  insulin lispro Injectable (HumaLOG) 4 Unit(s) SubCutaneous three times a day before meals  lactobacillus acidophilus 1 Tablet(s) Oral three times a day with meals  lidocaine   Patch 1 Patch Transdermal daily  melatonin 3 milliGRAM(s) Oral at bedtime  multivitamin/minerals 1 Tablet(s) Oral daily  oxyCODONE  ER Tablet 10 milliGRAM(s) Oral <User Schedule>  pantoprazole    Tablet 40 milliGRAM(s) Oral before breakfast  polyethylene glycol 3350 17 Gram(s) Oral two times a day  propranolol LA 60 milliGRAM(s) Oral daily  psyllium Powder 1 Packet(s) Oral daily  senna 2 Tablet(s) Oral at bedtime  simvastatin 10 milliGRAM(s) Oral at bedtime  sodium chloride 0.9%. 1000 milliLiter(s) (75 mL/Hr) IV Continuous <Continuous>  warfarin 2 milliGRAM(s) Oral daily    MEDICATIONS  (PRN):  ALBUTerol    90 MICROgram(s) HFA Inhaler 2 Puff(s) Inhalation every 6 hours PRN Shortness of Breath and/or Wheezing  dextrose 40% Gel 15 Gram(s) Oral once PRN Blood Glucose LESS THAN 70 milliGRAM(s)/deciLiter  glucagon  Injectable 1 milliGRAM(s) IntraMuscular once PRN Glucose <70 milliGRAM(s)/deciLiter  guaiFENesin    Syrup 100 milliGRAM(s) Oral every 6 hours PRN Cough  morphine  - Injectable 2 milliGRAM(s) IV Push every 6 hours PRN Severe Pain (7 - 10)      Vital Signs Last 24 Hrs  T(C): 36.6 (25 Aug 2018 14:23), Max: 36.6 (24 Aug 2018 21:26)  T(F): 97.9 (25 Aug 2018 14:23), Max: 97.9 (24 Aug 2018 21:26)  HR: 64 (25 Aug 2018 14:23) (58 - 64)  BP: 106/53 (25 Aug 2018 14:23) (106/53 - 119/60)  BP(mean): --  RR: 19 (25 Aug 2018 14:23) (16 - 19)  SpO2: 94% (25 Aug 2018 14:23) (94% - 98%)    PHYSICAL EXAM  General: adult in NAD, chronically ill appearing  HEENT: clear oropharynx, anicteric sclera, pink conjunctivae  Neck: supple  CV: normal S1S2 with no murmur rubs or gallops  Lungs: clear to auscultation, no wheezes, no rhales  Abdomen: soft non-tender non-distended, no hepato/splenomegaly  Ext: left foot with bandage in place  Skin: no rashes and no petichiae  Neuro: alert and oriented X3 no focal deficits      LABS:  CBC Full  -  ( 25 Aug 2018 08:24 )  WBC Count : 9.66 K/uL  Hemoglobin : 9.8 g/dL  Hematocrit : 31.4 %  Platelet Count - Automated : 265 K/uL  Mean Cell Volume : 92.9 fl  Mean Cell Hemoglobin : 29.0 pg  Mean Cell Hemoglobin Concentration : 31.2 gm/dL  Auto Neutrophil # : x  Auto Lymphocyte # : x  Auto Monocyte # : x  Auto Eosinophil # : x  Auto Basophil # : x  Auto Neutrophil % : x  Auto Lymphocyte % : x  Auto Monocyte % : x  Auto Eosinophil % : x  Auto Basophil % : x    08-25    134<L>  |  96  |  55<H>  ----------------------------<  122<H>  4.5   |  29  |  2.00<H>    Ca    8.7      25 Aug 2018 08:23      PT/INR - ( 25 Aug 2018 08:24 )   PT: 16.3 sec;   INR: 1.48 ratio         PTT - ( 25 Aug 2018 08:24 )  PTT:33.2 sec    fe studies      WBC trend  9.66 K/uL (08-25-18 @ 08:24)  11.62 K/uL (08-24-18 @ 07:58)  11.03 K/uL (08-23-18 @ 07:12)      Hgb trend  9.8 g/dL (08-25-18 @ 08:24)  11.0 g/dL (08-24-18 @ 07:58)  10.6 g/dL (08-23-18 @ 07:12)      plt trend  265 K/uL (08-25-18 @ 08:24)  297 K/uL (08-24-18 @ 07:58)  249 K/uL (08-23-18 @ 07:12)        RADIOLOGY & ADDITIONAL STUDIES:

## 2018-08-25 NOTE — PROGRESS NOTE ADULT - ASSESSMENT
88 yo woman with multiple comorbidities presented with several weeks hx of hip/leg pain with  inability to ambulate due to pain in the foot. CT scan revealed destructive bony pelvic lesions in right lisa-pelvis concerning for bone metastasis and liver lesions. Dx also with osteomyelitis of left great toe.        Hip MRI: Mildly expansile metastatic lesion within the anterior wall of the right acetabulum with extension to the right superior pubic ramus. Additional metastatic lesions are noted within the right ischium and midportion of the right inferior pubic ramus. Nonspecific small lesions within the left femoral head and left femoral neck which may be related to additional metastatic foci.        Bone scan with  increased radiopharmaceutical accumulation in the lower right iliac bone extending through the acetabulum into the ischium. Focally increased uptake in the left great toe is nonspecific, but given the history of left foot ulcer, osteomyelitis needs to be considered.        CT abdomen and pelvis no contrast. Prior 4/26/2014.  Limited by lack of oral and IV contrast. Small layering basilar effusions. Prominent interlobular septa at the   lung bases suggests interstitial edema. Substantial coronary artery calcification.   Scattered poorly defined low-attenuation foci throughout the hepatic parenchyma concerning for metastases.    Patient was on Coumdin for A fib and is on antibiotics  for presumed osteomyelitis  of the left foot    Consult called for recommendation regarding further workup for lytic lesion.     Bone, metastatic adenocarcinoma, IHC pending.   Cr is stable since 2016. Ca is borderline.  Serum Immunofixation negative for monoclonal protein. CEA normal 3.8. .   (08/20/18); Right pelvic bone, CT-guided biopsy:  Metastatic adenocarcinoma, moderately differentiated to poorly differentiated, unknown primary. IHC pending   further oncology management pending pathology results, whether pt is physically fit.     Leukocytosis with osteomyelitis of the left great toe  reactive, improved on antibiotics  s/p surgical metatarsal amputation, by podiatry 08/21/18  MSSA bacteremia on abx      PLAN:  Awaiting final report on path. Await IHC.   Await family decision regarding  proceeding  with any antineoplastic Tx ( could be eligible for immunotherapy in the right settings).  However, PS remains poor.   If family decides proceed with palliative tx, may need outpt radiation.    Resumption of anticoagulation per cardiology if warranted, if no planned surgery per ortho.   Continue pain meds.   Await further ortho evaluation to comment on plan for stabilization and ambulation for d/c planning.   DVT prophylaxis.   continue abx as per ID

## 2018-08-25 NOTE — PROGRESS NOTE ADULT - ASSESSMENT
89 F htn, dm, chol, af pvd, adm with hip pain with ?metastatic disease now s/p iliac crest biopsy for malignancy    -s/p podiatric procedure.  Tolerated well with no cardiac complications  -there is no evidence of acute ischemia.  -there is no evidence of significant arrhythmia.  -Afib on ac with full dose Lovenox and now started on Coumadin 2mg.  Continue to monitor closely for bleeding post operatively  -BP has improved. Continue Propanolol LA 60.  -Patient has not been symptomatic.   -there is no evidence for meaningful volume overload.  -pain controlled    -Confirmed osteomyelitis in left foot. PICC line placed for long term Abx   -monitor electrolytes, keep k>4, Mg>2   -All other workup per primary team  -Will follow

## 2018-08-26 LAB
ANION GAP SERPL CALC-SCNC: 8 MMOL/L — SIGNIFICANT CHANGE UP (ref 5–17)
BUN SERPL-MCNC: 61 MG/DL — HIGH (ref 7–23)
CALCIUM SERPL-MCNC: 9.2 MG/DL — SIGNIFICANT CHANGE UP (ref 8.5–10.1)
CHLORIDE SERPL-SCNC: 97 MMOL/L — SIGNIFICANT CHANGE UP (ref 96–108)
CO2 SERPL-SCNC: 30 MMOL/L — SIGNIFICANT CHANGE UP (ref 22–31)
CREAT SERPL-MCNC: 2.2 MG/DL — HIGH (ref 0.5–1.3)
CULTURE RESULTS: SIGNIFICANT CHANGE UP
GLUCOSE SERPL-MCNC: 133 MG/DL — HIGH (ref 70–99)
HCT VFR BLD CALC: 34.2 % — LOW (ref 34.5–45)
HGB BLD-MCNC: 10.8 G/DL — LOW (ref 11.5–15.5)
INR BLD: 1.72 RATIO — HIGH (ref 0.88–1.16)
MAGNESIUM SERPL-MCNC: 2.4 MG/DL — SIGNIFICANT CHANGE UP (ref 1.6–2.6)
MCHC RBC-ENTMCNC: 29.4 PG — SIGNIFICANT CHANGE UP (ref 27–34)
MCHC RBC-ENTMCNC: 31.6 GM/DL — LOW (ref 32–36)
MCV RBC AUTO: 93.2 FL — SIGNIFICANT CHANGE UP (ref 80–100)
NRBC # BLD: 0 /100 WBCS — SIGNIFICANT CHANGE UP (ref 0–0)
ORGANISM # SPEC MICROSCOPIC CNT: SIGNIFICANT CHANGE UP
ORGANISM # SPEC MICROSCOPIC CNT: SIGNIFICANT CHANGE UP
PLATELET # BLD AUTO: 275 K/UL — SIGNIFICANT CHANGE UP (ref 150–400)
POTASSIUM SERPL-MCNC: 5.3 MMOL/L — SIGNIFICANT CHANGE UP (ref 3.5–5.3)
POTASSIUM SERPL-SCNC: 5.3 MMOL/L — SIGNIFICANT CHANGE UP (ref 3.5–5.3)
PROTHROM AB SERPL-ACNC: 19 SEC — HIGH (ref 9.8–12.7)
RBC # BLD: 3.67 M/UL — LOW (ref 3.8–5.2)
RBC # FLD: 17.7 % — HIGH (ref 10.3–14.5)
SODIUM SERPL-SCNC: 135 MMOL/L — SIGNIFICANT CHANGE UP (ref 135–145)
SPECIMEN SOURCE: SIGNIFICANT CHANGE UP
WBC # BLD: 10.18 K/UL — SIGNIFICANT CHANGE UP (ref 3.8–10.5)
WBC # FLD AUTO: 10.18 K/UL — SIGNIFICANT CHANGE UP (ref 3.8–10.5)

## 2018-08-26 PROCEDURE — 99232 SBSQ HOSP IP/OBS MODERATE 35: CPT

## 2018-08-26 RX ORDER — WARFARIN SODIUM 2.5 MG/1
1 TABLET ORAL ONCE
Qty: 0 | Refills: 0 | Status: COMPLETED | OUTPATIENT
Start: 2018-08-26 | End: 2018-08-26

## 2018-08-26 RX ORDER — SODIUM CHLORIDE 9 MG/ML
1000 INJECTION INTRAMUSCULAR; INTRAVENOUS; SUBCUTANEOUS
Qty: 0 | Refills: 0 | Status: DISCONTINUED | OUTPATIENT
Start: 2018-08-26 | End: 2018-08-28

## 2018-08-26 RX ADMIN — SENNA PLUS 2 TABLET(S): 8.6 TABLET ORAL at 22:23

## 2018-08-26 RX ADMIN — Medication 4 UNIT(S): at 16:55

## 2018-08-26 RX ADMIN — Medication 4 UNIT(S): at 08:05

## 2018-08-26 RX ADMIN — Medication 100 MILLIGRAM(S): at 14:16

## 2018-08-26 RX ADMIN — LIDOCAINE 1 PATCH: 4 CREAM TOPICAL at 23:54

## 2018-08-26 RX ADMIN — OXYCODONE HYDROCHLORIDE 10 MILLIGRAM(S): 5 TABLET ORAL at 22:22

## 2018-08-26 RX ADMIN — LIDOCAINE 1 PATCH: 4 CREAM TOPICAL at 12:01

## 2018-08-26 RX ADMIN — Medication 1 TABLET(S): at 12:00

## 2018-08-26 RX ADMIN — INSULIN GLARGINE 12 UNIT(S): 100 INJECTION, SOLUTION SUBCUTANEOUS at 22:23

## 2018-08-26 RX ADMIN — Medication 1: at 08:05

## 2018-08-26 RX ADMIN — Medication 1: at 11:59

## 2018-08-26 RX ADMIN — Medication 100 MILLIGRAM(S): at 22:23

## 2018-08-26 RX ADMIN — Medication 100 MILLIGRAM(S): at 05:52

## 2018-08-26 RX ADMIN — OXYCODONE HYDROCHLORIDE 10 MILLIGRAM(S): 5 TABLET ORAL at 23:53

## 2018-08-26 RX ADMIN — ENOXAPARIN SODIUM 70 MILLIGRAM(S): 100 INJECTION SUBCUTANEOUS at 12:01

## 2018-08-26 RX ADMIN — Medication 3 MILLIGRAM(S): at 22:23

## 2018-08-26 RX ADMIN — SIMVASTATIN 10 MILLIGRAM(S): 20 TABLET, FILM COATED ORAL at 22:23

## 2018-08-26 RX ADMIN — Medication 1000 UNIT(S): at 12:00

## 2018-08-26 RX ADMIN — SODIUM CHLORIDE 60 MILLILITER(S): 9 INJECTION INTRAMUSCULAR; INTRAVENOUS; SUBCUTANEOUS at 22:30

## 2018-08-26 RX ADMIN — Medication 100 MILLIGRAM(S): at 18:08

## 2018-08-26 RX ADMIN — PANTOPRAZOLE SODIUM 40 MILLIGRAM(S): 20 TABLET, DELAYED RELEASE ORAL at 05:52

## 2018-08-26 RX ADMIN — Medication 4 UNIT(S): at 12:00

## 2018-08-26 RX ADMIN — Medication 1 TABLET(S): at 08:27

## 2018-08-26 RX ADMIN — WARFARIN SODIUM 1 MILLIGRAM(S): 2.5 TABLET ORAL at 22:23

## 2018-08-26 RX ADMIN — Medication 1 TABLET(S): at 17:23

## 2018-08-26 RX ADMIN — POLYETHYLENE GLYCOL 3350 17 GRAM(S): 17 POWDER, FOR SOLUTION ORAL at 05:52

## 2018-08-26 RX ADMIN — Medication 60 MILLIGRAM(S): at 05:52

## 2018-08-26 NOTE — PROGRESS NOTE ADULT - SUBJECTIVE AND OBJECTIVE BOX
Interval History:  resting comfortably    Chart reviewed and events noted;   Overnight events:    MEDICATIONS  (STANDING):  bisacodyl 5 milliGRAM(s) Oral at bedtime  ceFAZolin   IVPB 2000 milliGRAM(s) IV Intermittent every 12 hours  cholecalciferol 1000 Unit(s) Oral daily  dextrose 5%. 1000 milliLiter(s) (50 mL/Hr) IV Continuous <Continuous>  dextrose 50% Injectable 25 Gram(s) IV Push once  dextrose 50% Injectable 12.5 Gram(s) IV Push once  dextrose 50% Injectable 25 Gram(s) IV Push once  docusate sodium 100 milliGRAM(s) Oral three times a day  enoxaparin Injectable 70 milliGRAM(s) SubCutaneous daily  fentaNYL   Patch  12 MICROgram(s)/Hr 1 Patch Transdermal every 72 hours  glycerin Suppository - Adult 1 Suppository(s) Rectal daily  insulin glargine Injectable (LANTUS) 12 Unit(s) SubCutaneous at bedtime  insulin lispro (HumaLOG) corrective regimen sliding scale   SubCutaneous at bedtime  insulin lispro (HumaLOG) corrective regimen sliding scale   SubCutaneous three times a day before meals  insulin lispro Injectable (HumaLOG) 4 Unit(s) SubCutaneous three times a day before meals  lactobacillus acidophilus 1 Tablet(s) Oral three times a day with meals  lidocaine   Patch 1 Patch Transdermal daily  melatonin 3 milliGRAM(s) Oral at bedtime  multivitamin/minerals 1 Tablet(s) Oral daily  oxyCODONE  ER Tablet 10 milliGRAM(s) Oral <User Schedule>  pantoprazole    Tablet 40 milliGRAM(s) Oral before breakfast  polyethylene glycol 3350 17 Gram(s) Oral two times a day  propranolol LA 60 milliGRAM(s) Oral daily  psyllium Powder 1 Packet(s) Oral daily  senna 2 Tablet(s) Oral at bedtime  simvastatin 10 milliGRAM(s) Oral at bedtime  sodium chloride 0.9%. 1000 milliLiter(s) (75 mL/Hr) IV Continuous <Continuous>  sodium chloride 0.9%. 1000 milliLiter(s) (60 mL/Hr) IV Continuous <Continuous>    MEDICATIONS  (PRN):  ALBUTerol    90 MICROgram(s) HFA Inhaler 2 Puff(s) Inhalation every 6 hours PRN Shortness of Breath and/or Wheezing  dextrose 40% Gel 15 Gram(s) Oral once PRN Blood Glucose LESS THAN 70 milliGRAM(s)/deciLiter  glucagon  Injectable 1 milliGRAM(s) IntraMuscular once PRN Glucose <70 milliGRAM(s)/deciLiter  guaiFENesin    Syrup 100 milliGRAM(s) Oral every 6 hours PRN Cough  morphine  - Injectable 2 milliGRAM(s) IV Push every 6 hours PRN Severe Pain (7 - 10)      Vital Signs Last 24 Hrs  T(C): 36.7 (26 Aug 2018 20:55), Max: 36.7 (26 Aug 2018 04:30)  T(F): 98 (26 Aug 2018 20:55), Max: 98 (26 Aug 2018 04:30)  HR: 60 (26 Aug 2018 20:55) (60 - 67)  BP: 106/55 (26 Aug 2018 20:55) (100/59 - 116/66)  BP(mean): --  RR: 16 (26 Aug 2018 20:55) (16 - 18)  SpO2: 95% (26 Aug 2018 20:55) (93% - 98%)    PHYSICAL EXAM  General: adult in NAD  HEENT: clear oropharynx, anicteric sclera, pink conjunctivae  Neck: supple  CV: normal S1S2 with no murmur rubs or gallops  Lungs: clear to auscultation, no wheezes, no rhales  Abdomen: soft non-tender non-distended, no hepato/splenomegaly  Ext: left foot with bandage  Skin: no rashes and no petichiae  Neuro: alert and oriented X3 no focal deficits      LABS:  CBC Full  -  ( 26 Aug 2018 07:46 )  WBC Count : 10.18 K/uL  Hemoglobin : 10.8 g/dL  Hematocrit : 34.2 %  Platelet Count - Automated : 275 K/uL  Mean Cell Volume : 93.2 fl  Mean Cell Hemoglobin : 29.4 pg  Mean Cell Hemoglobin Concentration : 31.6 gm/dL  Auto Neutrophil # : x  Auto Lymphocyte # : x  Auto Monocyte # : x  Auto Eosinophil # : x  Auto Basophil # : x  Auto Neutrophil % : x  Auto Lymphocyte % : x  Auto Monocyte % : x  Auto Eosinophil % : x  Auto Basophil % : x    08-26    135  |  97  |  61<H>  ----------------------------<  133<H>  5.3   |  30  |  2.20<H>    Ca    9.2      26 Aug 2018 07:46  Mg     2.4     08-26      PT/INR - ( 26 Aug 2018 07:46 )   PT: 19.0 sec;   INR: 1.72 ratio         PTT - ( 25 Aug 2018 08:24 )  PTT:33.2 sec    fe studies      WBC trend  10.18 K/uL (08-26-18 @ 07:46)  9.66 K/uL (08-25-18 @ 08:24)  11.62 K/uL (08-24-18 @ 07:58)      Hgb trend  10.8 g/dL (08-26-18 @ 07:46)  9.8 g/dL (08-25-18 @ 08:24)  11.0 g/dL (08-24-18 @ 07:58)      plt trend  275 K/uL (08-26-18 @ 07:46)  265 K/uL (08-25-18 @ 08:24)  297 K/uL (08-24-18 @ 07:58)        RADIOLOGY & ADDITIONAL STUDIES:

## 2018-08-26 NOTE — PROGRESS NOTE ADULT - SUBJECTIVE AND OBJECTIVE BOX
Patient was seen and examined at bedside. Pain is well controlled. No acute overnight events.        PE:    Vital Signs Last 24 Hrs  T(C): 36.6 (26 Aug 2018 14:09), Max: 36.9 (25 Aug 2018 21:18)  T(F): 97.8 (26 Aug 2018 14:09), Max: 98.5 (25 Aug 2018 21:18)  HR: 63 (26 Aug 2018 14:09) (63 - 67)  BP: 100/59 (26 Aug 2018 14:09) (100/59 - 116/66)  RR: 16 (26 Aug 2018 14:09) (16 - 18)  SpO2: 93% (26 Aug 2018 14:09) (93% - 98%)    Gen: NAD    RLE:  No gross deformity  SILT L3-S1  EHL/FHL/TA/GSC intact  DP 2+  PT 2+  skin intact, no erythema/ ecchymosis   compressions soft and compressibe  no calf tenderness  Decreased sensation to light tough dorsal/volar foot, hx diabetic neuropathy

## 2018-08-26 NOTE — PROGRESS NOTE ADULT - SUBJECTIVE AND OBJECTIVE BOX
Patient seen in follow up for TRISH on CKD. Relates poor appetite, poor oral intake of fluids. No SOB, no diarrhea, no voiding c/o.    Metastatic adeno Ca  OAB (overactive bladder)  GERD (gastroesophageal reflux disease)  Angina effort  Heart failure  Upper respiratory infection  Diabetes  Renal stones  Myocardial infarction  Spinal stenosis  CVA (cerebral infarction)  Afib  Raynaud disease  Acid reflux  Hypertension  Hyperlipidemia  Asthma    MEDICATIONS  (STANDING):  bisacodyl 5 milliGRAM(s) Oral at bedtime  ceFAZolin   IVPB 2000 milliGRAM(s) IV Intermittent every 12 hours  cholecalciferol 1000 Unit(s) Oral daily  dextrose 5%. 1000 milliLiter(s) (50 mL/Hr) IV Continuous <Continuous>  dextrose 50% Injectable 25 Gram(s) IV Push once  dextrose 50% Injectable 12.5 Gram(s) IV Push once  dextrose 50% Injectable 25 Gram(s) IV Push once  docusate sodium 100 milliGRAM(s) Oral three times a day  enoxaparin Injectable 70 milliGRAM(s) SubCutaneous daily  fentaNYL   Patch  12 MICROgram(s)/Hr 1 Patch Transdermal every 72 hours  glycerin Suppository - Adult 1 Suppository(s) Rectal daily  insulin glargine Injectable (LANTUS) 12 Unit(s) SubCutaneous at bedtime  insulin lispro (HumaLOG) corrective regimen sliding scale   SubCutaneous at bedtime  insulin lispro (HumaLOG) corrective regimen sliding scale   SubCutaneous three times a day before meals  insulin lispro Injectable (HumaLOG) 4 Unit(s) SubCutaneous three times a day before meals  lactobacillus acidophilus 1 Tablet(s) Oral three times a day with meals  lidocaine   Patch 1 Patch Transdermal daily  melatonin 3 milliGRAM(s) Oral at bedtime  multivitamin/minerals 1 Tablet(s) Oral daily  oxyCODONE  ER Tablet 10 milliGRAM(s) Oral <User Schedule>  pantoprazole    Tablet 40 milliGRAM(s) Oral before breakfast  polyethylene glycol 3350 17 Gram(s) Oral two times a day  propranolol LA 60 milliGRAM(s) Oral daily  psyllium Powder 1 Packet(s) Oral daily  senna 2 Tablet(s) Oral at bedtime  simvastatin 10 milliGRAM(s) Oral at bedtime  sodium chloride 0.9%. 1000 milliLiter(s) (75 mL/Hr) IV Continuous <Continuous>  warfarin 1 milliGRAM(s) Oral once    MEDICATIONS  (PRN):  ALBUTerol    90 MICROgram(s) HFA Inhaler 2 Puff(s) Inhalation every 6 hours PRN Shortness of Breath and/or Wheezing  dextrose 40% Gel 15 Gram(s) Oral once PRN Blood Glucose LESS THAN 70 milliGRAM(s)/deciLiter  glucagon  Injectable 1 milliGRAM(s) IntraMuscular once PRN Glucose <70 milliGRAM(s)/deciLiter  guaiFENesin    Syrup 100 milliGRAM(s) Oral every 6 hours PRN Cough  morphine  - Injectable 2 milliGRAM(s) IV Push every 6 hours PRN Severe Pain (7 - 10)    T(C): 36.7 (08-26-18 @ 20:55), Max: 36.9 (08-25-18 @ 21:18)  HR: 60 (08-26-18 @ 20:55) (60 - 67)  BP: 106/55 (08-26-18 @ 20:55) (100/59 - 116/66)  RR: 16 (08-26-18 @ 20:55) (16 - 19)  SpO2: 95% (08-26-18 @ 20:55) (93% - 98%)  Wt(kg): --  I&O's Detail    25 Aug 2018 07:01  -  26 Aug 2018 07:00  --------------------------------------------------------  IN:    Oral Fluid: 360 mL  Total IN: 360 mL    OUT:    Voided: 400 mL  Total OUT: 400 mL    Total NET: -40 mL      26 Aug 2018 07:01  -  26 Aug 2018 21:57  --------------------------------------------------------  IN:    Oral Fluid: 200 mL  Total IN: 200 mL    OUT:  Total OUT: 0 mL    Total NET: 200 mL      PHYSICAL EXAM:  General: NAD, supine, conversant  No JVD  Respiratory: b/l air entry, clear  Cardiovascular: S1 S2 reg  Gastrointestinal: soft, NT, no bladder distension  Extremities: no edema, left foot dressed  No skin rash    CBC Full  -  ( 26 Aug 2018 07:46 )  WBC Count : 10.18 K/uL  Hemoglobin : 10.8 g/dL  Hematocrit : 34.2 %  Platelet Count - Automated : 275 K/uL  Mean Cell Volume : 93.2 fl  Mean Cell Hemoglobin : 29.4 pg  Mean Cell Hemoglobin Concentration : 31.6 gm/dL  Auto Neutrophil # : x  Auto Lymphocyte # : x  Auto Monocyte # : x  Auto Eosinophil # : x  Auto Basophil # : x  Auto Neutrophil % : x  Auto Lymphocyte % : x  Auto Monocyte % : x  Auto Eosinophil % : x  Auto Basophil % : x    08-26    135  |  97  |  61<H>  ----------------------------<  133<H>  5.3   |  30  |  2.20<H>    Ca    9.2      26 Aug 2018 07:46  Mg     2.4     08-26          Sodium, Serum: 135 (08-26 @ 07:46)  Sodium, Serum: 134 (08-25 @ 08:23)    Creatinine, Serum: 2.20 (08-26 @ 07:46)  Creatinine, Serum: 2.00 (08-25 @ 08:23)    Potassium, Serum: 5.3 (08-26 @ 07:46)  Potassium, Serum: 4.5 (08-25 @ 08:23)    Hemoglobin: 10.8 (08-26 @ 07:46)  Hemoglobin: 9.8 (08-25 @ 08:24)  Hemoglobin: 11.0 (08-24 @ 07:58)

## 2018-08-26 NOTE — PROGRESS NOTE ADULT - SUBJECTIVE AND OBJECTIVE BOX
[facility] - Cooper Diaz Grossman, Don, [place], Jimi Jovel  Office Number:  [phone]    Patient resting comfortably in bed in NAD.  Laying flat with no respiratory distress.  No complaints of chest pain, dyspnea, palpitations, PND, or orthopnea.  Reports feeling loopy    ROS: negative unless otherwise mentioned.    Telemetry:  not on tele    MEDICATIONS  (STANDING):  bisacodyl 5 milliGRAM(s) Oral at bedtime  ceFAZolin   IVPB 2000 milliGRAM(s) IV Intermittent every 12 hours  cholecalciferol 1000 Unit(s) Oral daily  dextrose 5%. 1000 milliLiter(s) (50 mL/Hr) IV Continuous <Continuous>  dextrose 50% Injectable 25 Gram(s) IV Push once  dextrose 50% Injectable 12.5 Gram(s) IV Push once  dextrose 50% Injectable 25 Gram(s) IV Push once  docusate sodium 100 milliGRAM(s) Oral three times a day  enoxaparin Injectable 70 milliGRAM(s) SubCutaneous daily  fentaNYL   Patch  12 MICROgram(s)/Hr 1 Patch Transdermal every 72 hours  glycerin Suppository - Adult 1 Suppository(s) Rectal daily  insulin glargine Injectable (LANTUS) 12 Unit(s) SubCutaneous at bedtime  insulin lispro (HumaLOG) corrective regimen sliding scale   SubCutaneous at bedtime  insulin lispro (HumaLOG) corrective regimen sliding scale   SubCutaneous three times a day before meals  insulin lispro Injectable (HumaLOG) 4 Unit(s) SubCutaneous three times a day before meals  lactobacillus acidophilus 1 Tablet(s) Oral three times a day with meals  lidocaine   Patch 1 Patch Transdermal daily  melatonin 3 milliGRAM(s) Oral at bedtime  multivitamin/minerals 1 Tablet(s) Oral daily  oxyCODONE  ER Tablet 10 milliGRAM(s) Oral <User Schedule>  pantoprazole    Tablet 40 milliGRAM(s) Oral before breakfast  polyethylene glycol 3350 17 Gram(s) Oral two times a day  propranolol LA 60 milliGRAM(s) Oral daily  psyllium Powder 1 Packet(s) Oral daily  senna 2 Tablet(s) Oral at bedtime  simvastatin 10 milliGRAM(s) Oral at bedtime  sodium chloride 0.9%. 1000 milliLiter(s) (75 mL/Hr) IV Continuous <Continuous>  warfarin 1 milliGRAM(s) Oral once    MEDICATIONS  (PRN):  ALBUTerol    90 MICROgram(s) HFA Inhaler 2 Puff(s) Inhalation every 6 hours PRN Shortness of Breath and/or Wheezing  dextrose 40% Gel 15 Gram(s) Oral once PRN Blood Glucose LESS THAN 70 milliGRAM(s)/deciLiter  glucagon  Injectable 1 milliGRAM(s) IntraMuscular once PRN Glucose <70 milliGRAM(s)/deciLiter  guaiFENesin    Syrup 100 milliGRAM(s) Oral every 6 hours PRN Cough  morphine  - Injectable 2 milliGRAM(s) IV Push every 6 hours PRN Severe Pain (7 - 10)      Allergies    Levaquin (Other)  ramipril (Other)  tetracycline (Hives; Rash)    Intolerances        Vital Signs Last 24 Hrs  T(C): 36.7 (26 Aug 2018 04:30), Max: 36.9 (25 Aug 2018 21:18)  T(F): 98 (26 Aug 2018 04:30), Max: 98.5 (25 Aug 2018 21:18)  HR: 67 (26 Aug 2018 04:30) (64 - 67)  BP: 116/66 (26 Aug 2018 04:30) (106/53 - 116/66)  BP(mean): --  RR: 18 (26 Aug 2018 04:30) (17 - 19)  SpO2: 98% (26 Aug 2018 04:30) (94% - 98%)    I&O's Summary    25 Aug 2018 07:01  -  26 Aug 2018 07:00  --------------------------------------------------------  IN: 360 mL / OUT: 400 mL / NET: -40 mL        ON EXAM:    General: Anxious, awake and alert, oriented x 3  HEENT: Mucous membranes are moist, anicteric  Lungs: Non-labored, breath sounds are clear bilaterally. No wheezing, rales or rhonchi  Cardiovascular: Regular, S1 and S2, no murmurs, rubs, or gallops  Gastrointestinal: Bowel Sounds present, soft, nontender.   Lymph: No peripheral edema. No lymphadenopathy.  Skin: Warm, dry; fentanyl patch notable on chest  Psych:  Mood & affect appropriate    LABS: All Labs Reviewed:                        10.8   10.18 )-----------( 275      ( 26 Aug 2018 07:46 )             34.2                         9.8    9.66  )-----------( 265      ( 25 Aug 2018 08:24 )             31.4                         11.0   11.62 )-----------( 297      ( 24 Aug 2018 07:58 )             35.8     26 Aug 2018 07:46    135    |  97     |  61     ----------------------------<  133    5.3     |  30     |  2.20   25 Aug 2018 08:23    134    |  96     |  55     ----------------------------<  122    4.5     |  29     |  2.00     Ca    9.2        26 Aug 2018 07:46  Ca    8.7        25 Aug 2018 08:23  Mg     2.4       26 Aug 2018 07:46      PT/INR - ( 26 Aug 2018 07:46 )   PT: 19.0 sec;   INR: 1.72 ratio         PTT - ( 25 Aug 2018 08:24 )  PTT:33.2 sec      Blood Culture: Organism Staphylococcus aureus  Gram Stain Blood -- Gram Stain   No polymorphonuclear cells seen  No organisms seen  Specimen Source .Tissue left 1st metatarsal head  Culture-Blood --

## 2018-08-26 NOTE — PROGRESS NOTE ADULT - ASSESSMENT
A/P: 89 F s/p Destructive Right pelvis lesion, suspected neoplasm w/ metastasis, s/p Left 1st Metatarsal Head Resection POD 6    Analgesia  DVT ppx   NWB RLE  CT bone biopsy 8/20; Metastatic Adenocarcinoma. IHC is still Pending to determine primary source. Will FU  FU Heme/Onc, recommendations appreciated  FU Labs   Cardiology/Endocrine recommendations appreciated  PT/OT

## 2018-08-26 NOTE — PROGRESS NOTE ADULT - SUBJECTIVE AND OBJECTIVE BOX
Patient is a 89y old  Female who presents with a chief complaint of diabetic foot ulcer/ambulatory disfunction (24 Aug 2018 01:06)      INTERVAL HPI/OVERNIGHT EVENTS: Patient seen and examined. NAD. No complaints.    Vital Signs Last 24 Hrs  T(C): 36.7 (26 Aug 2018 04:30), Max: 36.9 (25 Aug 2018 21:18)  T(F): 98 (26 Aug 2018 04:30), Max: 98.5 (25 Aug 2018 21:18)  HR: 67 (26 Aug 2018 04:30) (64 - 67)  BP: 116/66 (26 Aug 2018 04:30) (106/53 - 116/66)  BP(mean): --  RR: 18 (26 Aug 2018 04:30) (17 - 19)  SpO2: 98% (26 Aug 2018 04:30) (94% - 98%)    08-26    135  |  97  |  61<H>  ----------------------------<  133<H>  5.3   |  30  |  2.20<H>    Ca    9.2      26 Aug 2018 07:46  Mg     2.4     08-26                            10.8   10.18 )-----------( 275      ( 26 Aug 2018 07:46 )             34.2     PT/INR - ( 26 Aug 2018 07:46 )   PT: 19.0 sec;   INR: 1.72 ratio         PTT - ( 25 Aug 2018 08:24 )  PTT:33.2 sec  CAPILLARY BLOOD GLUCOSE      POCT Blood Glucose.: 175 mg/dL (26 Aug 2018 07:36)  POCT Blood Glucose.: 189 mg/dL (25 Aug 2018 21:45)  POCT Blood Glucose.: 146 mg/dL (25 Aug 2018 16:40)  POCT Blood Glucose.: 118 mg/dL (25 Aug 2018 11:51)              ALBUTerol    90 MICROgram(s) HFA Inhaler 2 Puff(s) Inhalation every 6 hours PRN  bisacodyl 5 milliGRAM(s) Oral at bedtime  ceFAZolin   IVPB 2000 milliGRAM(s) IV Intermittent every 12 hours  cholecalciferol 1000 Unit(s) Oral daily  dextrose 40% Gel 15 Gram(s) Oral once PRN  dextrose 5%. 1000 milliLiter(s) IV Continuous <Continuous>  dextrose 50% Injectable 25 Gram(s) IV Push once  dextrose 50% Injectable 12.5 Gram(s) IV Push once  dextrose 50% Injectable 25 Gram(s) IV Push once  docusate sodium 100 milliGRAM(s) Oral three times a day  enoxaparin Injectable 70 milliGRAM(s) SubCutaneous daily  fentaNYL   Patch  12 MICROgram(s)/Hr 1 Patch Transdermal every 72 hours  glucagon  Injectable 1 milliGRAM(s) IntraMuscular once PRN  glycerin Suppository - Adult 1 Suppository(s) Rectal daily  guaiFENesin    Syrup 100 milliGRAM(s) Oral every 6 hours PRN  insulin glargine Injectable (LANTUS) 12 Unit(s) SubCutaneous at bedtime  insulin lispro (HumaLOG) corrective regimen sliding scale   SubCutaneous at bedtime  insulin lispro (HumaLOG) corrective regimen sliding scale   SubCutaneous three times a day before meals  insulin lispro Injectable (HumaLOG) 4 Unit(s) SubCutaneous three times a day before meals  lactobacillus acidophilus 1 Tablet(s) Oral three times a day with meals  lidocaine   Patch 1 Patch Transdermal daily  melatonin 3 milliGRAM(s) Oral at bedtime  morphine  - Injectable 2 milliGRAM(s) IV Push every 6 hours PRN  multivitamin/minerals 1 Tablet(s) Oral daily  oxyCODONE  ER Tablet 10 milliGRAM(s) Oral <User Schedule>  pantoprazole    Tablet 40 milliGRAM(s) Oral before breakfast  polyethylene glycol 3350 17 Gram(s) Oral two times a day  propranolol LA 60 milliGRAM(s) Oral daily  psyllium Powder 1 Packet(s) Oral daily  senna 2 Tablet(s) Oral at bedtime  simvastatin 10 milliGRAM(s) Oral at bedtime  sodium chloride 0.9%. 1000 milliLiter(s) IV Continuous <Continuous>              REVIEW OF SYSTEMS:  CONSTITUTIONAL: No fever, no weight loss, or no fatigue  NECK: No pain, no stiffness  RESPIRATORY: No cough, no wheezing, no chills, no hemoptysis, No shortness of breath  CARDIOVASCULAR: No chest pain, no palpitations, no dizziness, no leg swelling  GASTROINTESTINAL: No abdominal pain. No nausea, no vomiting, no hematemesis; No diarrhea, no constipation. No melena, no hematochezia.  GENITOURINARY: No dysuria, no frequency, no hematuria, no incontinence  NEUROLOGICAL: No headaches, no loss of strength, no numbness, no tremors  SKIN: No itching, no burning  MUSCULOSKELETAL: No joint pain, no swelling; No muscle, no back, no extremity pain  PSYCHIATRIC: No depression, no mood swings,   HEME/LYMPH: No easy bruising, no bleeding gums  ALLERY AND IMMUNOLOGIC: No hives       Consultant(s) Notes Reviewed:  [X] YES  [ ] NO    PHYSICAL EXAM:  GENERAL: NAD  HEAD:  Atraumatic, Normocephalic  EYES: EOMI, PERRLA, conjunctiva and sclera clear  ENMT: No tonsillar erythema, exudates, or enlargement; Moist mucous membranes  NECK: Supple, No JVD  NERVOUS SYSTEM:  Awake & alert  CHEST/LUNG: Clear to auscultation bilaterally; No rales, rhonchi, wheezing,  HEART: Regular rate and rhythm  ABDOMEN: Soft, Nontender, Nondistended; Bowel sounds present  EXTREMITIES:  No clubbing, cyanosis, or edema; left foot ace wrapped  LYMPH: No lymphadenopathy noted  SKIN: No rashes      Advanced care planning discussed with patient/family [X] YES   [ ] NO    Advanced care planning discussed with patient/family. Advanced care planning forms reviewed/discussed/completed. 20 minutes spent.

## 2018-08-26 NOTE — PROGRESS NOTE ADULT - SUBJECTIVE AND OBJECTIVE BOX
CAPILLARY BLOOD GLUCOSE      POCT Blood Glucose.: 142 mg/dL (26 Aug 2018 16:50)  POCT Blood Glucose.: 190 mg/dL (26 Aug 2018 11:52)  POCT Blood Glucose.: 175 mg/dL (26 Aug 2018 07:36)  POCT Blood Glucose.: 189 mg/dL (25 Aug 2018 21:45)      Vital Signs Last 24 Hrs  T(C): 36.6 (26 Aug 2018 14:09), Max: 36.9 (25 Aug 2018 21:18)  T(F): 97.8 (26 Aug 2018 14:09), Max: 98.5 (25 Aug 2018 21:18)  HR: 63 (26 Aug 2018 14:09) (63 - 67)  BP: 100/59 (26 Aug 2018 14:09) (100/59 - 116/66)  BP(mean): --  RR: 16 (26 Aug 2018 14:09) (16 - 18)  SpO2: 93% (26 Aug 2018 14:09) (93% - 98%)    General: WN/WD NAD  Respiratory: CTA B/L  CV: RRR, S1S2, no murmurs, rubs or gallops  Abdominal: Soft, NT, ND +BS, Last BM  Extremities: No edema, + peripheral pulses     08-26    135  |  97  |  61<H>  ----------------------------<  133<H>  5.3   |  30  |  2.20<H>    Ca    9.2      26 Aug 2018 07:46  Mg     2.4     08-26        dextrose 40% Gel 15 Gram(s) Oral once PRN  dextrose 50% Injectable 25 Gram(s) IV Push once  dextrose 50% Injectable 12.5 Gram(s) IV Push once  dextrose 50% Injectable 25 Gram(s) IV Push once  glucagon  Injectable 1 milliGRAM(s) IntraMuscular once PRN  insulin glargine Injectable (LANTUS) 12 Unit(s) SubCutaneous at bedtime  insulin lispro (HumaLOG) corrective regimen sliding scale   SubCutaneous at bedtime  insulin lispro (HumaLOG) corrective regimen sliding scale   SubCutaneous three times a day before meals  insulin lispro Injectable (HumaLOG) 4 Unit(s) SubCutaneous three times a day before meals  simvastatin 10 milliGRAM(s) Oral at bedtime

## 2018-08-26 NOTE — PROGRESS NOTE ADULT - PROBLEM SELECTOR PLAN 1
cont lantus 12 units qhs  cont humalog 4 units tid before meals  cont humalog scale coverage- low dose  goal bg 100-180 in hosp setting  cont cons cho diet

## 2018-08-26 NOTE — PROGRESS NOTE ADULT - ASSESSMENT
89 F htn, dm, chol, af pvd, adm with hip pain with ?metastatic disease now s/p iliac crest biopsy for malignancy    -s/p podiatric procedure.  Tolerated well with no cardiac complications  -there is no evidence of acute ischemia.  -there is no evidence of significant arrhythmia.  -Afib on ac with full dose Lovenox and now started on Coumadin 2mg, and 1mg tonight.  Continue to monitor closely for bleeding post operatively  -BP has improved. Continue Propanolol LA 60.  -Patient has not been symptomatic.   -there is no evidence for meaningful volume overload.  -pain controlled. Fentanyl patches on   -Confirmed osteomyelitis in left foot. PICC line placed for long term Abx   -monitor electrolytes, keep k>4, Mg>2   -All other workup per primary team  -Will follow

## 2018-08-26 NOTE — PROGRESS NOTE ADULT - ASSESSMENT
89 yo woman with multiple comorbidities presented with several weeks hx of hip/leg pain with  inability to ambulate due to pain in the foot. CT scan revealed destructive bony pelvic lesions in right lisa-pelvis concerning for bone metastasis and liver lesions. Dx also with osteomyelitis of left great toe.        Hip MRI: Mildly expansile metastatic lesion within the anterior wall of the right acetabulum with extension to the right superior pubic ramus. Additional metastatic lesions are noted within the right ischium and midportion of the right inferior pubic ramus. Nonspecific small lesions within the left femoral head and left femoral neck which may be related to additional metastatic foci.        Bone scan with  increased radiopharmaceutical accumulation in the lower right iliac bone extending through the acetabulum into the ischium. Focally increased uptake in the left great toe is nonspecific, but given the history of left foot ulcer, osteomyelitis needs to be considered.        CT abdomen and pelvis no contrast. Prior 4/26/2014.  Limited by lack of oral and IV contrast. Small layering basilar effusions. Prominent interlobular septa at the   lung bases suggests interstitial edema. Substantial coronary artery calcification.   Scattered poorly defined low-attenuation foci throughout the hepatic parenchyma concerning for metastases.    Patient was on Coumdin for A fib and is on antibiotics  for presumed osteomyelitis  of the left foot    Consult called for recommendation regarding further workup for lytic lesion.     Bone, metastatic adenocarcinoma, IHC pending.   Cr is stable since 2016. Ca is borderline.  Serum Immunofixation negative for monoclonal protein. CEA normal 3.8. .   (08/20/18); Right pelvic bone, CT-guided biopsy:  Metastatic adenocarcinoma, moderately differentiated to poorly differentiated, unknown primary. IHC pending   further oncology management pending pathology results, whether pt is physically fit.     Leukocytosis with osteomyelitis of the left great toe  reactive, improved on antibiotics  s/p surgical metatarsal amputation, by podiatry 08/21/18  MSSA bacteremia on abx      PLAN:  Awaiting final report on path. Await IHC.   Await family decision regarding  proceeding  with any antineoplastic Tx ( could be eligible for immunotherapy in the right settings).  However, PS remains poor.   If family decides proceed with palliative tx, may need outpt radiation.    Resumption of anticoagulation per cardiology if warranted, if no planned surgery per ortho.   Continue pain meds.   Await further ortho evaluation to comment on plan for stabilization and ambulation for d/c planning.   DVT prophylaxis. continues on lovenox  continue abx as per ID

## 2018-08-27 LAB
ANION GAP SERPL CALC-SCNC: 8 MMOL/L — SIGNIFICANT CHANGE UP (ref 5–17)
BUN SERPL-MCNC: 59 MG/DL — HIGH (ref 7–23)
CALCIUM SERPL-MCNC: 8.9 MG/DL — SIGNIFICANT CHANGE UP (ref 8.5–10.1)
CHLORIDE SERPL-SCNC: 96 MMOL/L — SIGNIFICANT CHANGE UP (ref 96–108)
CO2 SERPL-SCNC: 30 MMOL/L — SIGNIFICANT CHANGE UP (ref 22–31)
CREAT SERPL-MCNC: 2.3 MG/DL — HIGH (ref 0.5–1.3)
GLUCOSE SERPL-MCNC: 74 MG/DL — SIGNIFICANT CHANGE UP (ref 70–99)
HCT VFR BLD CALC: 32.1 % — LOW (ref 34.5–45)
HGB BLD-MCNC: 10.1 G/DL — LOW (ref 11.5–15.5)
INR BLD: 2.31 RATIO — HIGH (ref 0.88–1.16)
MAGNESIUM SERPL-MCNC: 2.3 MG/DL — SIGNIFICANT CHANGE UP (ref 1.6–2.6)
MCHC RBC-ENTMCNC: 29.2 PG — SIGNIFICANT CHANGE UP (ref 27–34)
MCHC RBC-ENTMCNC: 31.5 GM/DL — LOW (ref 32–36)
MCV RBC AUTO: 92.8 FL — SIGNIFICANT CHANGE UP (ref 80–100)
NRBC # BLD: 0 /100 WBCS — SIGNIFICANT CHANGE UP (ref 0–0)
PLATELET # BLD AUTO: 291 K/UL — SIGNIFICANT CHANGE UP (ref 150–400)
POTASSIUM SERPL-MCNC: 4.4 MMOL/L — SIGNIFICANT CHANGE UP (ref 3.5–5.3)
POTASSIUM SERPL-SCNC: 4.4 MMOL/L — SIGNIFICANT CHANGE UP (ref 3.5–5.3)
PROTHROM AB SERPL-ACNC: 25.6 SEC — HIGH (ref 9.8–12.7)
RBC # BLD: 3.46 M/UL — LOW (ref 3.8–5.2)
RBC # FLD: 17.9 % — HIGH (ref 10.3–14.5)
SODIUM SERPL-SCNC: 134 MMOL/L — LOW (ref 135–145)
WBC # BLD: 9.67 K/UL — SIGNIFICANT CHANGE UP (ref 3.8–10.5)
WBC # FLD AUTO: 9.67 K/UL — SIGNIFICANT CHANGE UP (ref 3.8–10.5)

## 2018-08-27 PROCEDURE — 99232 SBSQ HOSP IP/OBS MODERATE 35: CPT

## 2018-08-27 RX ORDER — FENTANYL CITRATE 50 UG/ML
1 INJECTION INTRAVENOUS
Qty: 0 | Refills: 0 | Status: DISCONTINUED | OUTPATIENT
Start: 2018-08-27 | End: 2018-08-28

## 2018-08-27 RX ORDER — CEFAZOLIN SODIUM 1 G
2000 VIAL (EA) INJECTION EVERY 24 HOURS
Qty: 0 | Refills: 0 | Status: DISCONTINUED | OUTPATIENT
Start: 2018-08-28 | End: 2018-08-28

## 2018-08-27 RX ORDER — OXYCODONE HYDROCHLORIDE 5 MG/1
10 TABLET ORAL
Qty: 0 | Refills: 0 | Status: DISCONTINUED | OUTPATIENT
Start: 2018-08-27 | End: 2018-08-28

## 2018-08-27 RX ORDER — WARFARIN SODIUM 2.5 MG/1
1 TABLET ORAL ONCE
Qty: 0 | Refills: 0 | Status: COMPLETED | OUTPATIENT
Start: 2018-08-27 | End: 2018-08-27

## 2018-08-27 RX ADMIN — PANTOPRAZOLE SODIUM 40 MILLIGRAM(S): 20 TABLET, DELAYED RELEASE ORAL at 05:28

## 2018-08-27 RX ADMIN — Medication 1 TABLET(S): at 08:16

## 2018-08-27 RX ADMIN — SIMVASTATIN 10 MILLIGRAM(S): 20 TABLET, FILM COATED ORAL at 21:03

## 2018-08-27 RX ADMIN — Medication 1 TABLET(S): at 12:35

## 2018-08-27 RX ADMIN — Medication 100 MILLIGRAM(S): at 05:27

## 2018-08-27 RX ADMIN — Medication 5 MILLIGRAM(S): at 21:02

## 2018-08-27 RX ADMIN — Medication 1000 UNIT(S): at 12:35

## 2018-08-27 RX ADMIN — LIDOCAINE 1 PATCH: 4 CREAM TOPICAL at 23:58

## 2018-08-27 RX ADMIN — Medication 1 TABLET(S): at 17:12

## 2018-08-27 RX ADMIN — Medication 100 MILLIGRAM(S): at 17:12

## 2018-08-27 RX ADMIN — OXYCODONE HYDROCHLORIDE 10 MILLIGRAM(S): 5 TABLET ORAL at 21:02

## 2018-08-27 RX ADMIN — Medication 4 UNIT(S): at 17:12

## 2018-08-27 RX ADMIN — Medication 60 MILLIGRAM(S): at 05:27

## 2018-08-27 RX ADMIN — Medication 3 MILLIGRAM(S): at 21:01

## 2018-08-27 RX ADMIN — Medication 100 MILLIGRAM(S): at 21:01

## 2018-08-27 RX ADMIN — WARFARIN SODIUM 1 MILLIGRAM(S): 2.5 TABLET ORAL at 21:01

## 2018-08-27 RX ADMIN — SENNA PLUS 2 TABLET(S): 8.6 TABLET ORAL at 21:01

## 2018-08-27 RX ADMIN — FENTANYL CITRATE 1 PATCH: 50 INJECTION INTRAVENOUS at 15:09

## 2018-08-27 RX ADMIN — Medication 100 MILLIGRAM(S): at 05:26

## 2018-08-27 RX ADMIN — OXYCODONE HYDROCHLORIDE 10 MILLIGRAM(S): 5 TABLET ORAL at 22:02

## 2018-08-27 RX ADMIN — LIDOCAINE 1 PATCH: 4 CREAM TOPICAL at 12:35

## 2018-08-27 RX ADMIN — Medication 100 MILLIGRAM(S): at 15:09

## 2018-08-27 RX ADMIN — INSULIN GLARGINE 12 UNIT(S): 100 INJECTION, SOLUTION SUBCUTANEOUS at 21:46

## 2018-08-27 RX ADMIN — Medication 4 UNIT(S): at 12:03

## 2018-08-27 NOTE — PROGRESS NOTE ADULT - PROBLEM SELECTOR PLAN 2
Continue iv cefazolin per ID -- will need long-term iv abx..until??  Repeat cultures negative  ID follow up with Dr. CARLOS

## 2018-08-27 NOTE — PROGRESS NOTE ADULT - SUBJECTIVE AND OBJECTIVE BOX
Patient was seen and examined at bedside. Pain is well controlled. No acute overnight events. No new complaints.         PE:    Vital Signs Last 24 Hrs  T(C): 36.8 (27 Aug 2018 04:30), Max: 36.8 (27 Aug 2018 04:30)  T(F): 98.2 (27 Aug 2018 04:30), Max: 98.2 (27 Aug 2018 04:30)  HR: 64 (27 Aug 2018 04:30) (60 - 64)  BP: 127/61 (27 Aug 2018 04:30) (100/59 - 127/61)  BP(mean): --  RR: 17 (27 Aug 2018 04:30) (16 - 17)  SpO2: 95% (27 Aug 2018 04:30) (93% - 95%)  Gen: NAD    RLE:  No gross deformity  SILT L3-S1  EHL/FHL/TA/GSC intact  DP 2+  PT 2+  skin intact, no erythema/ ecchymosis   compartments soft and compressile  no calf tenderness  Decreased sensation to light tough dorsal/volar foot (unchanged from baseline per patient)

## 2018-08-27 NOTE — PROGRESS NOTE ADULT - ASSESSMENT
A/P: 89 F with multiple destructive Right pelvic lesions likely, suspected neoplasm w/ metastasis, s/p Left 1st Metatarsal Head Resection POD 7    Analgesia  DVT ppx   NWB RLE  CT bone biopsy 8/20; Metastatic Adenocarcinoma.  FU IHC to determine primary source  FU Heme/Onc  FU am Labs   Cardiology/Endocrine recommendations appreciated  PT/OT

## 2018-08-27 NOTE — PROGRESS NOTE ADULT - SUBJECTIVE AND OBJECTIVE BOX
88yo female seen bedside 2 day s/p left 1st metatarsal head resection (DOS 8/21/18), in bed with daughter bedside. Patient denies pain to her foot at this time. Awaiting rehab placement and discharge.    Vital Signs Last 24 Hrs  T(C): 36.8 (27 Aug 2018 04:30), Max: 36.8 (27 Aug 2018 04:30)  T(F): 98.2 (27 Aug 2018 04:30), Max: 98.2 (27 Aug 2018 04:30)  HR: 64 (27 Aug 2018 04:30) (60 - 64)  BP: 127/61 (27 Aug 2018 04:30) (100/59 - 127/61)  BP(mean): --  RR: 17 (27 Aug 2018 04:30) (16 - 17)  SpO2: 95% (27 Aug 2018 04:30) (93% - 95%)                          10.1   9.67  )-----------( 291      ( 27 Aug 2018 08:05 )             32.1       08-27    134<L>  |  96  |  59<H>  ----------------------------<  74  4.4   |  30  |  2.30<H>    Ca    8.9      27 Aug 2018 08:05  Mg     2.3     08-27        Objective: left foot focused  Vasc: DP and PT 2/4; CFT < 3 seconds x5; TG WNL; no edema noted  Derm:  -sutures intact to medial aspect of 1st MPJ well coapted without dehiscence   Neuro: epicritic sensation intact  Ortho:  1st MPJ extensor contracture noted

## 2018-08-27 NOTE — PROGRESS NOTE ADULT - SUBJECTIVE AND OBJECTIVE BOX
[INTERVAL HX: ]  Patient seen and examined;  Chart reviewed and events noted;   no CP, no SOB. Moderate pain R hip.   dtr Leandra at bedside.   I spoke with Dr White, along with ID Dr Armendariz and orthopaedics team regarding case.   Pt will need antibiotics for 6wk for osteomyelitis.     Discussed with pt and dtr in detail, additional pathology results which suggest cancer from hepatobiliary or upper GI tract origin. Relayed to pt and family that no surgery is planned given current infection. Also if infection is completed treatment, type of surgery involved would be extensive in this setting and given circumstances. Options for further mgmt include no treatment, surgical which orthopaedics evaluated, systemic which I recommended aginst at current time due to poor PS and infection, and finally XRT. Pt in addition states "I do not want chemotherapy". With regards to XRT, can be evaluated in 6wk after abx or if family wishes more immediate eval, then would transfer to [facility]. Discussed risks with delay, versus earlier eval, in general terms. Dtr had questions about specific AE; I recommended pt and family speak with radiation oncologist directly regarding likely benefit and AE expected.   Stage 4 disease discussed. Long term prognosis is poor. Pt understood, dx, states herself "I have cancer in here" finger pointing to abdomen. States that she wants mainly to be pain free, to possible weight bear without pain.   Dr DARRYL White present for discussion. Dtr states needs to take to family but states, likely "how her sister is" would likely request more immediate eval. There we will await family decision.       MEDICATIONS  (STANDING):  bisacodyl 5 milliGRAM(s) Oral at bedtime  cholecalciferol 1000 Unit(s) Oral daily  dextrose 5%. 1000 milliLiter(s) (50 mL/Hr) IV Continuous <Continuous>  dextrose 50% Injectable 25 Gram(s) IV Push once  dextrose 50% Injectable 12.5 Gram(s) IV Push once  dextrose 50% Injectable 25 Gram(s) IV Push once  docusate sodium 100 milliGRAM(s) Oral three times a day  fentaNYL   Patch  12 MICROgram(s)/Hr 1 Patch Transdermal every 72 hours  glycerin Suppository - Adult 1 Suppository(s) Rectal daily  insulin glargine Injectable (LANTUS) 12 Unit(s) SubCutaneous at bedtime  insulin lispro (HumaLOG) corrective regimen sliding scale   SubCutaneous at bedtime  insulin lispro (HumaLOG) corrective regimen sliding scale   SubCutaneous three times a day before meals  insulin lispro Injectable (HumaLOG) 4 Unit(s) SubCutaneous three times a day before meals  lactobacillus acidophilus 1 Tablet(s) Oral three times a day with meals  lidocaine   Patch 1 Patch Transdermal daily  melatonin 3 milliGRAM(s) Oral at bedtime  multivitamin/minerals 1 Tablet(s) Oral daily  oxyCODONE  ER Tablet 10 milliGRAM(s) Oral <User Schedule>  pantoprazole    Tablet 40 milliGRAM(s) Oral before breakfast  polyethylene glycol 3350 17 Gram(s) Oral two times a day  propranolol LA 60 milliGRAM(s) Oral daily  psyllium Powder 1 Packet(s) Oral daily  senna 2 Tablet(s) Oral at bedtime  simvastatin 10 milliGRAM(s) Oral at bedtime  sodium chloride 0.9%. 1000 milliLiter(s) (75 mL/Hr) IV Continuous <Continuous>  sodium chloride 0.9%. 1000 milliLiter(s) (60 mL/Hr) IV Continuous <Continuous>  warfarin 1 milliGRAM(s) Oral once    MEDICATIONS  (PRN):  ALBUTerol    90 MICROgram(s) HFA Inhaler 2 Puff(s) Inhalation every 6 hours PRN Shortness of Breath and/or Wheezing  dextrose 40% Gel 15 Gram(s) Oral once PRN Blood Glucose LESS THAN 70 milliGRAM(s)/deciLiter  glucagon  Injectable 1 milliGRAM(s) IntraMuscular once PRN Glucose <70 milliGRAM(s)/deciLiter  guaiFENesin    Syrup 100 milliGRAM(s) Oral every 6 hours PRN Cough  morphine  - Injectable 2 milliGRAM(s) IV Push every 6 hours PRN Severe Pain (7 - 10)      Vital Signs Last 24 Hrs  T(C): 36.9 (27 Aug 2018 13:31), Max: 36.9 (27 Aug 2018 13:31)  T(F): 98.4 (27 Aug 2018 13:31), Max: 98.4 (27 Aug 2018 13:31)  HR: 64 (27 Aug 2018 13:31) (60 - 69)  BP: 94/53 (27 Aug 2018 13:31) (94/53 - 127/61)  BP(mean): --  RR: 15 (27 Aug 2018 13:31) (15 - 17)  SpO2: 95% (27 Aug 2018 13:31) (95% - 97%)    [PHYSICAL EXAM]  General: adult in NAD,  WN,  WD.  HEENT: clear oropharynx, anicteric sclera, pink conjunctivae.  Neck: supple, no masses.  CV: normal S1S2, no murmur, no rubs, no gallops.  Lungs: clear to auscultation, no wheezes, no rales, no rhonchi.  Abdomen: soft, non-tender, non-distended, no hepatosplenomegaly, normal BS, no guarding.  Ext: no clubbing, no cyanosis, no edema.  Skin: no rashes,  no petechiae, no venous stasis changes.  Neuro: alert and oriented X3, no focal motor deficits.  LN: no LUCIANA.      [LABS:]                        10.1   9.67  )-----------( 291      ( 27 Aug 2018 08:05 )             32.1     08-27  134<L>  |  96  |  59<H>  ----------------------------<  74  4.4   |  30  |  2.30<H>  Ca    8.9      27 Aug 2018 08:05  Mg     2.3     08-27    PT/INR - ( 27 Aug 2018 08:05 )   PT: 25.6 sec;   INR: 2.31 ratio               [RADIOLOGY STUDIES:]

## 2018-08-27 NOTE — PROGRESS NOTE ADULT - ASSESSMENT
·	CKD 3  ·	s/p foot surgery  ·	Diabetes, diabetic ulcer  ·	H/o Hypertension, now with low BP.   ·	MSSA bacteremia    Mild increase in creatinine trend. Will continue IVF, Encourage PO intake. Check labs as out pt if discharged from hospital to [facility].   Will follow creatinine trend. D/w family at bedside. Monitor blood sugar levels. Insulin coverage as needed.   Dietary restriction. Monitor BP trend. Titrate BP meds as needed. D/w family at bedside.

## 2018-08-27 NOTE — PROGRESS NOTE ADULT - SUBJECTIVE AND OBJECTIVE BOX
Patient is a 89y old  Female who presents with a chief complaint of diabetic foot ulcer/ambulatory disfunction (15 Aug 2018 02:29)      Patient seen in follow up for CKD 3. Family at bedside.     PAST MEDICAL HISTORY:  OAB (overactive bladder)  GERD (gastroesophageal reflux disease)  Angina effort  Heart failure  Upper respiratory infection  Diabetes  Renal stones  Myocardial infarction  Spinal stenosis  CVA (cerebral infarction)  Afib  Raynaud disease  Acid reflux  Hypertension  Hyperlipidemia  Asthma    MEDICATIONS  (STANDING):  bisacodyl 5 milliGRAM(s) Oral at bedtime  ceFAZolin   IVPB 2000 milliGRAM(s) IV Intermittent every 12 hours  cholecalciferol 1000 Unit(s) Oral daily  dextrose 5%. 1000 milliLiter(s) (50 mL/Hr) IV Continuous <Continuous>  dextrose 50% Injectable 25 Gram(s) IV Push once  dextrose 50% Injectable 12.5 Gram(s) IV Push once  dextrose 50% Injectable 25 Gram(s) IV Push once  docusate sodium 100 milliGRAM(s) Oral three times a day  fentaNYL   Patch  12 MICROgram(s)/Hr 1 Patch Transdermal every 72 hours  glycerin Suppository - Adult 1 Suppository(s) Rectal daily  insulin glargine Injectable (LANTUS) 12 Unit(s) SubCutaneous at bedtime  insulin lispro (HumaLOG) corrective regimen sliding scale   SubCutaneous at bedtime  insulin lispro (HumaLOG) corrective regimen sliding scale   SubCutaneous three times a day before meals  insulin lispro Injectable (HumaLOG) 4 Unit(s) SubCutaneous three times a day before meals  lactobacillus acidophilus 1 Tablet(s) Oral three times a day with meals  lidocaine   Patch 1 Patch Transdermal daily  melatonin 3 milliGRAM(s) Oral at bedtime  multivitamin/minerals 1 Tablet(s) Oral daily  oxyCODONE  ER Tablet 10 milliGRAM(s) Oral <User Schedule>  pantoprazole    Tablet 40 milliGRAM(s) Oral before breakfast  polyethylene glycol 3350 17 Gram(s) Oral two times a day  propranolol LA 60 milliGRAM(s) Oral daily  psyllium Powder 1 Packet(s) Oral daily  senna 2 Tablet(s) Oral at bedtime  simvastatin 10 milliGRAM(s) Oral at bedtime  sodium chloride 0.9%. 1000 milliLiter(s) (75 mL/Hr) IV Continuous <Continuous>  sodium chloride 0.9%. 1000 milliLiter(s) (60 mL/Hr) IV Continuous <Continuous>  warfarin 1 milliGRAM(s) Oral once    MEDICATIONS  (PRN):  ALBUTerol    90 MICROgram(s) HFA Inhaler 2 Puff(s) Inhalation every 6 hours PRN Shortness of Breath and/or Wheezing  dextrose 40% Gel 15 Gram(s) Oral once PRN Blood Glucose LESS THAN 70 milliGRAM(s)/deciLiter  glucagon  Injectable 1 milliGRAM(s) IntraMuscular once PRN Glucose <70 milliGRAM(s)/deciLiter  guaiFENesin    Syrup 100 milliGRAM(s) Oral every 6 hours PRN Cough  morphine  - Injectable 2 milliGRAM(s) IV Push every 6 hours PRN Severe Pain (7 - 10)    T(C): 36.8 (08-27-18 @ 04:30), Max: 36.9 (08-25-18 @ 21:18)  HR: 64 (08-27-18 @ 04:30) (60 - 67)  BP: 127/61 (08-27-18 @ 04:30) (100/59 - 127/61)  RR: 17 (08-27-18 @ 04:30)  SpO2: 95% (08-27-18 @ 04:30)  Wt(kg): --  I&O's Detail    26 Aug 2018 07:01  -  27 Aug 2018 07:00  --------------------------------------------------------  IN:    Oral Fluid: 200 mL    sodium chloride 0.9%.: 570 mL  Total IN: 770 mL    OUT:  Total OUT: 0 mL    Total NET: 770 mL            PHYSICAL EXAM:  General: NAD  Respiratory: b/l air entry  Cardiovascular: S1 S2  Gastrointestinal: soft  Extremities:  left foot dressing                    LABORATORY:                        10.1   9.67  )-----------( 291      ( 27 Aug 2018 08:05 )             32.1     08-27    134<L>  |  96  |  59<H>  ----------------------------<  74  4.4   |  30  |  2.30<H>    Ca    8.9      27 Aug 2018 08:05  Mg     2.3     08-27      Sodium, Serum: 134 mmol/L (08-27 @ 08:05)  Sodium, Serum: 135 mmol/L (08-26 @ 07:46)    Potassium, Serum: 4.4 mmol/L (08-27 @ 08:05)  Potassium, Serum: 5.3 mmol/L (08-26 @ 07:46)    Hemoglobin: 10.1 g/dL (08-27 @ 08:05)  Hemoglobin: 10.8 g/dL (08-26 @ 07:46)  Hemoglobin: 9.8 g/dL (08-25 @ 08:24)    Creatinine, Serum 2.30 (08-27 @ 08:05)  Creatinine, Serum 2.20 (08-26 @ 07:46)  Creatinine, Serum 2.00 (08-25 @ 08:23)

## 2018-08-27 NOTE — PROGRESS NOTE ADULT - ASSESSMENT
89 yo woman with multiple comorbidities presented with several weeks hx of hip/leg pain with  inability to ambulate due to pain in the foot. CT scan revealed destructive bony pelvic lesions in right lisa-pelvis concerning for bone metastasis and liver lesions. Dx also with osteomyelitis of left great toe.        Hip MRI: Mildly expansile metastatic lesion within the anterior wall of the right acetabulum with extension to the right superior pubic ramus. Additional metastatic lesions are noted within the right ischium and midportion of the right inferior pubic ramus. Nonspecific small lesions within the left femoral head and left femoral neck which may be related to additional metastatic foci.        Bone scan with  increased radiopharmaceutical accumulation in the lower right iliac bone extending through the acetabulum into the ischium. Focally increased uptake in the left great toe is nonspecific, but given the history of left foot ulcer, osteomyelitis needs to be considered.        CT abdomen and pelvis no contrast. Prior 4/26/2014.  Limited by lack of oral and IV contrast. Small layering basilar effusions. Prominent interlobular septa at the   lung bases suggests interstitial edema. Substantial coronary artery calcification.   Scattered poorly defined low-attenuation foci throughout the hepatic parenchyma concerning for metastases.    Patient was on Coumdin for A fib and is on antibiotics  for presumed osteomyelitis  of the left foot    Consult called for recommendation regarding further workup for lytic lesion.     Bone, metastatic adenocarcinoma, IHC pending.   Cr is stable since 2016. Ca is borderline.  Serum Immunofixation negative for monoclonal protein. CEA normal 3.8. .   (08/20/18); Right pelvic bone, CT-guided biopsy:  Metastatic adenocarcinoma, moderately differentiated to poorly differentiated.   IHC suggestive of upper GI or pancreato-hepatobillairy origin.     Leukocytosis with osteomyelitis of the left great toe  reactive, improved on antibiotics.  s/p surgical metatarsal amputation, by podiatry 08/21/18  MSSA bacteremia on abx    Worsening renal fx. Unclear cause.     PLAN:  Pt refuses systemic chemo, with any antineoplastic chemotx ( had prior discussed with family could be eligible for immunotherapy in the right settings).  However, PS remains poor.   If family decides proceed with palliative tx, may need outpt radiation.      Resumption of anticoagulation per cardiology if warranted, if no planned surgery per ortho.   continue abx as per ID  Continue pain meds.   DVT prophylaxis. continues on lovenox    Appreciate input from ortho evaluation. No immediate planned surgery due to recent infection. Not likely to benefit from surgery per orthopaedics due to multiple factors. Await additional recommendation on stabilization and ambulation for d/c planning.     Await family decision regarding  proceeding with XRT eval.     Consider renal eval if continued decline.

## 2018-08-27 NOTE — PROGRESS NOTE ADULT - SUBJECTIVE AND OBJECTIVE BOX
[facility] -- Cooper Diaz, Hari, Don, Med Fung Savella  Office # [phone]        Subjective/Observations: Patient resting comfortably in bed in NAD.  Laying flat with no respiratory distress.  No complaints of chest pain, dyspnea, palpitations, PND, or orthopnea.      REVIEW OF SYSTEMS: All other review of systems is negative unless indicated above    PAST MEDICAL & SURGICAL HISTORY:  OAB (overactive bladder)  GERD (gastroesophageal reflux disease)  Angina effort  Heart failure  Upper respiratory infection  Diabetes  Renal stones  Myocardial infarction: 1981  Spinal stenosis  CVA (cerebral infarction)  Afib  Raynaud disease  Acid reflux  Hypertension  Hyperlipidemia  Asthma  Cataract  S/P hysterectomy      MEDICATIONS  (STANDING):  bisacodyl 5 milliGRAM(s) Oral at bedtime  ceFAZolin   IVPB 2000 milliGRAM(s) IV Intermittent every 12 hours  cholecalciferol 1000 Unit(s) Oral daily  dextrose 5%. 1000 milliLiter(s) (50 mL/Hr) IV Continuous <Continuous>  dextrose 50% Injectable 25 Gram(s) IV Push once  dextrose 50% Injectable 12.5 Gram(s) IV Push once  dextrose 50% Injectable 25 Gram(s) IV Push once  docusate sodium 100 milliGRAM(s) Oral three times a day  enoxaparin Injectable 70 milliGRAM(s) SubCutaneous daily  fentaNYL   Patch  12 MICROgram(s)/Hr 1 Patch Transdermal every 72 hours  glycerin Suppository - Adult 1 Suppository(s) Rectal daily  insulin glargine Injectable (LANTUS) 12 Unit(s) SubCutaneous at bedtime  insulin lispro (HumaLOG) corrective regimen sliding scale   SubCutaneous at bedtime  insulin lispro (HumaLOG) corrective regimen sliding scale   SubCutaneous three times a day before meals  insulin lispro Injectable (HumaLOG) 4 Unit(s) SubCutaneous three times a day before meals  lactobacillus acidophilus 1 Tablet(s) Oral three times a day with meals  lidocaine   Patch 1 Patch Transdermal daily  melatonin 3 milliGRAM(s) Oral at bedtime  multivitamin/minerals 1 Tablet(s) Oral daily  oxyCODONE  ER Tablet 10 milliGRAM(s) Oral <User Schedule>  pantoprazole    Tablet 40 milliGRAM(s) Oral before breakfast  polyethylene glycol 3350 17 Gram(s) Oral two times a day  propranolol LA 60 milliGRAM(s) Oral daily  psyllium Powder 1 Packet(s) Oral daily  senna 2 Tablet(s) Oral at bedtime  simvastatin 10 milliGRAM(s) Oral at bedtime  sodium chloride 0.9%. 1000 milliLiter(s) (75 mL/Hr) IV Continuous <Continuous>  sodium chloride 0.9%. 1000 milliLiter(s) (60 mL/Hr) IV Continuous <Continuous>    MEDICATIONS  (PRN):  ALBUTerol    90 MICROgram(s) HFA Inhaler 2 Puff(s) Inhalation every 6 hours PRN Shortness of Breath and/or Wheezing  dextrose 40% Gel 15 Gram(s) Oral once PRN Blood Glucose LESS THAN 70 milliGRAM(s)/deciLiter  glucagon  Injectable 1 milliGRAM(s) IntraMuscular once PRN Glucose <70 milliGRAM(s)/deciLiter  guaiFENesin    Syrup 100 milliGRAM(s) Oral every 6 hours PRN Cough  morphine  - Injectable 2 milliGRAM(s) IV Push every 6 hours PRN Severe Pain (7 - 10)      Allergies    Levaquin (Other)  ramipril (Other)  tetracycline (Hives; Rash)    Intolerances            Vital Signs Last 24 Hrs  T(C): 36.8 (27 Aug 2018 04:30), Max: 36.8 (27 Aug 2018 04:30)  T(F): 98.2 (27 Aug 2018 04:30), Max: 98.2 (27 Aug 2018 04:30)  HR: 64 (27 Aug 2018 04:30) (60 - 64)  BP: 127/61 (27 Aug 2018 04:30) (100/59 - 127/61)  BP(mean): --  RR: 17 (27 Aug 2018 04:30) (16 - 17)  SpO2: 95% (27 Aug 2018 04:30) (93% - 95%)    I&O's Summary    26 Aug 2018 07:01  -  27 Aug 2018 07:00  --------------------------------------------------------  IN: 770 mL / OUT: 0 mL / NET: 770 mL          PHYSICAL EXAM:  TELE: not on tele  Constitutional: NAD, awake and alert, well-developed  HEENT: Moist Mucous Membranes, Anicteric  Pulmonary: Non-labored; Fine rales bibasilar - improved with coughing  Cardiovascular: Regular, S1 and S2, No murmurs, rubs, gallops or clicks  Gastrointestinal: Bowel Sounds present, soft, nontender.   Lymph: No peripheral edema. No lymphadenopathy.  Skin: No visible rashes. Fentanyl patch noted on chest.  Psych:  Mood & affect appropriate    LABS: All Labs Reviewed:                        10.1   9.67  )-----------( 291      ( 27 Aug 2018 08:05 )             32.1                         10.8   10.18 )-----------( 275      ( 26 Aug 2018 07:46 )             34.2                         9.8    9.66  )-----------( 265      ( 25 Aug 2018 08:24 )             31.4     27 Aug 2018 08:05    134    |  96     |  59     ----------------------------<  74     4.4     |  30     |  2.30   26 Aug 2018 07:46    135    |  97     |  61     ----------------------------<  133    5.3     |  30     |  2.20   25 Aug 2018 08:23    134    |  96     |  55     ----------------------------<  122    4.5     |  29     |  2.00     Ca    8.9        27 Aug 2018 08:05  Ca    9.2        26 Aug 2018 07:46  Ca    8.7        25 Aug 2018 08:23  Mg     2.3       27 Aug 2018 08:05  Mg     2.4       26 Aug 2018 07:46      PT/INR - ( 27 Aug 2018 08:05 )   PT: 25.6 sec;   INR: 2.31 ratio       Blood Culture: Organism Staphylococcus aureus  Gram Stain Blood -- Gram Stain   No polymorphonuclear cells seen  No organisms seen  Specimen Source .Tissue left 1st metatarsal head  Culture-Blood --      < from: TTE Echo Doppler w/o Cont (08.17.18 @ 10:38) >  OBSERVATIONS:  Technically difficult and limited study.  Mitral Valve: Calcified mitral annulus and mitral valve leaflets. Normal   opening of the mitral valve leaflets. Trace mitral regurgitation.  Aortic Valve/Aorta: Not well visualized  Tricuspid Valve: Calcified tricuspid annulus with normally opening valve.   Trace tricuspid regurgitation.  Pulmonic Valve: The pulmonic valve is not well visualized. Probably   normal.  Left Atrium: Moderate left atrial enlargement  Right Atrium: Normal  Left Ventricle: The endocardium is not well-visualized. Overall there is   preserved left ventricular systolic function. Unable to rule out wall   motion abnormalities. The EF is approximately 65%.  Right Ventricle: Normal right ventricular size and function.  Pericardium/Pleura: No pericardial effusion noted.  Pulmonary/RV Pressure: The right ventricular systolic pressure is   estimated to be 35mmHg, assuming that the right atrial pressure is   estimated to be8 mmHg. This is consistent with normal pulmonary   pressures.  LV Diastolic Function: Stage 2 diastolic dysfunction    Conclusion:   Technically difficult and limited study. Overall preserved left   ventricular systolic function. Stage II diastolic dysfunction. No   definitive vegetations noted on this study. If clinically warranted would   recommend a JOSE GUADALUPE to rule out endocarditis                  PEPITO BUTT M.D., ATTENDING CARDIOLOGIST  This document has been electronically signed. Aug 17 2018 1:09PM    < end of copied text > [facility] -- Cooper Diaz, Hari, Don, Med Fung Savella  Office # [phone]        Subjective/Observations: Patient resting comfortably in bed in NAD.  Laying flat with no respiratory distress.  No complaints of chest pain, dyspnea, palpitations, PND, or orthopnea.      REVIEW OF SYSTEMS: All other review of systems is negative for eye, ent, gi, gu, allergic, dermatologic, neurologic unless indicated above    PAST MEDICAL & SURGICAL HISTORY:  OAB (overactive bladder)  GERD (gastroesophageal reflux disease)  Angina effort  Heart failure  Upper respiratory infection  Diabetes  Renal stones  Myocardial infarction: 1981  Spinal stenosis  CVA (cerebral infarction)  Afib  Raynaud disease  Acid reflux  Hypertension  Hyperlipidemia  Asthma  Cataract  S/P hysterectomy      MEDICATIONS  (STANDING):  bisacodyl 5 milliGRAM(s) Oral at bedtime  ceFAZolin   IVPB 2000 milliGRAM(s) IV Intermittent every 12 hours  cholecalciferol 1000 Unit(s) Oral daily  dextrose 5%. 1000 milliLiter(s) (50 mL/Hr) IV Continuous <Continuous>  dextrose 50% Injectable 25 Gram(s) IV Push once  dextrose 50% Injectable 12.5 Gram(s) IV Push once  dextrose 50% Injectable 25 Gram(s) IV Push once  docusate sodium 100 milliGRAM(s) Oral three times a day  enoxaparin Injectable 70 milliGRAM(s) SubCutaneous daily  fentaNYL   Patch  12 MICROgram(s)/Hr 1 Patch Transdermal every 72 hours  glycerin Suppository - Adult 1 Suppository(s) Rectal daily  insulin glargine Injectable (LANTUS) 12 Unit(s) SubCutaneous at bedtime  insulin lispro (HumaLOG) corrective regimen sliding scale   SubCutaneous at bedtime  insulin lispro (HumaLOG) corrective regimen sliding scale   SubCutaneous three times a day before meals  insulin lispro Injectable (HumaLOG) 4 Unit(s) SubCutaneous three times a day before meals  lactobacillus acidophilus 1 Tablet(s) Oral three times a day with meals  lidocaine   Patch 1 Patch Transdermal daily  melatonin 3 milliGRAM(s) Oral at bedtime  multivitamin/minerals 1 Tablet(s) Oral daily  oxyCODONE  ER Tablet 10 milliGRAM(s) Oral <User Schedule>  pantoprazole    Tablet 40 milliGRAM(s) Oral before breakfast  polyethylene glycol 3350 17 Gram(s) Oral two times a day  propranolol LA 60 milliGRAM(s) Oral daily  psyllium Powder 1 Packet(s) Oral daily  senna 2 Tablet(s) Oral at bedtime  simvastatin 10 milliGRAM(s) Oral at bedtime  sodium chloride 0.9%. 1000 milliLiter(s) (75 mL/Hr) IV Continuous <Continuous>  sodium chloride 0.9%. 1000 milliLiter(s) (60 mL/Hr) IV Continuous <Continuous>    MEDICATIONS  (PRN):  ALBUTerol    90 MICROgram(s) HFA Inhaler 2 Puff(s) Inhalation every 6 hours PRN Shortness of Breath and/or Wheezing  dextrose 40% Gel 15 Gram(s) Oral once PRN Blood Glucose LESS THAN 70 milliGRAM(s)/deciLiter  glucagon  Injectable 1 milliGRAM(s) IntraMuscular once PRN Glucose <70 milliGRAM(s)/deciLiter  guaiFENesin    Syrup 100 milliGRAM(s) Oral every 6 hours PRN Cough  morphine  - Injectable 2 milliGRAM(s) IV Push every 6 hours PRN Severe Pain (7 - 10)      Allergies    Levaquin (Other)  ramipril (Other)  tetracycline (Hives; Rash)    Intolerances            Vital Signs Last 24 Hrs  T(C): 36.8 (27 Aug 2018 04:30), Max: 36.8 (27 Aug 2018 04:30)  T(F): 98.2 (27 Aug 2018 04:30), Max: 98.2 (27 Aug 2018 04:30)  HR: 64 (27 Aug 2018 04:30) (60 - 64)  BP: 127/61 (27 Aug 2018 04:30) (100/59 - 127/61)  BP(mean): --  RR: 17 (27 Aug 2018 04:30) (16 - 17)  SpO2: 95% (27 Aug 2018 04:30) (93% - 95%)    I&O's Summary    26 Aug 2018 07:01  -  27 Aug 2018 07:00  --------------------------------------------------------  IN: 770 mL / OUT: 0 mL / NET: 770 mL          PHYSICAL EXAM:  TELE: not on tele  Constitutional: NAD, awake and alert, well-developed  HEENT: Moist Mucous Membranes, Anicteric  Pulmonary: Non-labored; Fine rales bibasilar - improved with coughing  Cardiovascular: Regular, S1 and S2, No murmurs, rubs, gallops or clicks  Gastrointestinal: Bowel Sounds present, soft, nontender.   Lymph: No peripheral edema. No lymphadenopathy.  Skin: No visible rashes. Fentanyl patch noted on chest.  Psych:  Mood & affect appropriate    LABS: All Labs Reviewed:                        10.1   9.67  )-----------( 291      ( 27 Aug 2018 08:05 )             32.1                         10.8   10.18 )-----------( 275      ( 26 Aug 2018 07:46 )             34.2                         9.8    9.66  )-----------( 265      ( 25 Aug 2018 08:24 )             31.4     27 Aug 2018 08:05    134    |  96     |  59     ----------------------------<  74     4.4     |  30     |  2.30   26 Aug 2018 07:46    135    |  97     |  61     ----------------------------<  133    5.3     |  30     |  2.20   25 Aug 2018 08:23    134    |  96     |  55     ----------------------------<  122    4.5     |  29     |  2.00     Ca    8.9        27 Aug 2018 08:05  Ca    9.2        26 Aug 2018 07:46  Ca    8.7        25 Aug 2018 08:23  Mg     2.3       27 Aug 2018 08:05  Mg     2.4       26 Aug 2018 07:46      PT/INR - ( 27 Aug 2018 08:05 )   PT: 25.6 sec;   INR: 2.31 ratio       Blood Culture: Organism Staphylococcus aureus  Gram Stain Blood -- Gram Stain   No polymorphonuclear cells seen  No organisms seen  Specimen Source .Tissue left 1st metatarsal head  Culture-Blood --      < from: TTE Echo Doppler w/o Cont (08.17.18 @ 10:38) >  OBSERVATIONS:  Technically difficult and limited study.  Mitral Valve: Calcified mitral annulus and mitral valve leaflets. Normal   opening of the mitral valve leaflets. Trace mitral regurgitation.  Aortic Valve/Aorta: Not well visualized  Tricuspid Valve: Calcified tricuspid annulus with normally opening valve.   Trace tricuspid regurgitation.  Pulmonic Valve: The pulmonic valve is not well visualized. Probably   normal.  Left Atrium: Moderate left atrial enlargement  Right Atrium: Normal  Left Ventricle: The endocardium is not well-visualized. Overall there is   preserved left ventricular systolic function. Unable to rule out wall   motion abnormalities. The EF is approximately 65%.  Right Ventricle: Normal right ventricular size and function.  Pericardium/Pleura: No pericardial effusion noted.  Pulmonary/RV Pressure: The right ventricular systolic pressure is   estimated to be 35mmHg, assuming that the right atrial pressure is   estimated to be8 mmHg. This is consistent with normal pulmonary   pressures.  LV Diastolic Function: Stage 2 diastolic dysfunction    Conclusion:   Technically difficult and limited study. Overall preserved left   ventricular systolic function. Stage II diastolic dysfunction. No   definitive vegetations noted on this study. If clinically warranted would   recommend a JOSE GUADALUPE to rule out endocarditis                  PEPITO BUTT M.D., ATTENDING CARDIOLOGIST  This document has been electronically signed. Aug 17 2018 1:09PM    < end of copied text >

## 2018-08-27 NOTE — PROGRESS NOTE ADULT - SUBJECTIVE AND OBJECTIVE BOX
ID Progress note     Name: DWAYNE DONAHUE  Age: 89y  Gender: Female  MRN: [id number]    Interval History-- Events noted, no new complaints . DC plan in progress . Pathology of right hip bone consistent with metastatic disease from pancreato- biliary or upper GI origin . Worsening renal function   Notes reviewed    Past Medical History--  OAB (overactive bladder)  GERD (gastroesophageal reflux disease)  Angina effort  Heart failure  Upper respiratory infection  Diabetes  Renal stones  Myocardial infarction  Spinal stenosis  CVA (cerebral infarction)  Afib  Raynaud disease  Acid reflux  Hypertension  Hyperlipidemia  Asthma  Cataract  S/P hysterectomy      For details regarding the patient's social history, family history, and other miscellaneous elements, please refer the initial infectious diseases consultation and/or the admitting history and physical examination for this admission.    Allergies--  Allergies    Levaquin (Other)  ramipril (Other)  tetracycline (Hives; Rash)    Intolerances        Medications--  Antibiotics:  ceFAZolin   IVPB 2000 milliGRAM(s) IV Intermittent every 12 hours    Immunologic:    Other:  ALBUTerol    90 MICROgram(s) HFA Inhaler PRN  bisacodyl  cholecalciferol  dextrose 40% Gel PRN  dextrose 5%.  dextrose 50% Injectable  dextrose 50% Injectable  dextrose 50% Injectable  docusate sodium  fentaNYL   Patch  12 MICROgram(s)/Hr  glucagon  Injectable PRN  glycerin Suppository - Adult  guaiFENesin    Syrup PRN  insulin glargine Injectable (LANTUS)  insulin lispro (HumaLOG) corrective regimen sliding scale  insulin lispro (HumaLOG) corrective regimen sliding scale  insulin lispro Injectable (HumaLOG)  lactobacillus acidophilus  lidocaine   Patch  melatonin  morphine  - Injectable PRN  multivitamin/minerals  oxyCODONE  ER Tablet  pantoprazole    Tablet  polyethylene glycol 3350  propranolol LA  psyllium Powder  senna  simvastatin  sodium chloride 0.9%.  sodium chloride 0.9%.  warfarin      Review of Systems--  Review of systems unable to be obtained secondary to clinical condition.    Physical Examination--    Vital Signs: T(F): 98.4 (08-27-18 @ 13:31), Max: 98.4 (08-27-18 @ 13:31)  HR: 64 (08-27-18 @ 13:31)  BP: 94/53 (08-27-18 @ 13:31)  RR: 15 (08-27-18 @ 13:31)  SpO2: 95% (08-27-18 @ 13:31)  Wt(kg): --  General: Nontoxic-appearing Female in no acute distress.  HEENT: AT/NC. PERRL. EOMI. Anicteric. Conjunctiva pink and moist. Oropharynx clear. Dentition fair.  Neck: Not rigid. No sense of mass.  Nodes: None palpable.  Lungs: Clear bilaterally without rales, wheezing or rhonchi  Heart: Regular rate and rhythm. No Murmur. No rub. No gallop. No palpable thrill.  Abdomen: Bowel sounds present and normoactive. Soft. Nondistended. Nontender. No sense of mass. No organomegaly.  Back: No spinal tenderness. No costovertebral angle tenderness.   Extremities: No cyanosis or clubbing. No edema. left foot- dressin gin place, wound as per podiatry   Skin: Warm. Dry. Good turgor. No rash. No vasculitic stigmata.  Psychiatric: Appropriate affect and mood for situation.         Laboratory Studies--  CBC                        10.1   9.67  )-----------( 291      ( 27 Aug 2018 08:05 )             32.1       Chemistries  08-27    134<L>  |  96  |  59<H>  ----------------------------<  74  4.4   |  30  |  2.30<H>    Ca    8.9      27 Aug 2018 08:05  Mg     2.3     08-27        Culture Data    Culture - Tissue with Gram Stain (collected 21 Aug 2018 21:28)  Source: .Tissue left 1st metatarsal head  Gram Stain (22 Aug 2018 02:40):    No polymorphonuclear cells seen    No organisms seen  Final Report (26 Aug 2018 17:10):    Rare Staphylococcus aureus  Organism: Staphylococcus aureus (26 Aug 2018 17:10)  Organism: Staphylococcus aureus (26 Aug 2018 17:10)    Assessment--    89 y.o woman with multiple comorbidities presented with several weeks h/o of bony pain and recent inability to ambulate du to pain in the foot. Ct scan revealed destructive bony lesions concerning for metastasis and liver lesions. 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 Noted to have staph aurues bacteremia likely from left hallux abscess    The primary source of malignancy is unclear, she is to have biopsy of the hip lesion on Friday provided INR is below 1.5 . Anticoagulation is stopped . 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, primary unknown , await final report    she is s/p left hallux amputation , pathology consistent with acute OM , intra op cs also MSSA    Plan :   - will decrease Ancef 2 grams q daily from am as Cr clearance below 18, needs total 6 weeks to end 9/30/18   - oncology follow up to decide dc plan if needs radiation   - no need for JOSE GUADALUPE as repeat blood cs negative and her source is the foot infection   - overall prognosis is poor   - goals of care conversation with her Son, does not want aggressive care   - dc planning, most likely needs GREGORIO  - orthopedic follow up noted     Continue with present regime .  Appropriate use of antibiotics and adverse effects reviewed.    I have discussed the above plan of care with patient and family in detail. They expressed understanding of the treatment plan . Risks, benefits and alternatives discussed in detail. I have asked if they have any questions or concerns and appropriately addressed them to the best of my ability .      > 15 minutes spent in direct patient care reviewing  the notes, lab data/ imaging , discussion with multidisciplinary team. All questions were addressed and answered to the best of my capacity .    Thank you for allowing me to participate in the care of your patient .        William Armendariz MD  [phone]

## 2018-08-27 NOTE — PROGRESS NOTE ADULT - SUBJECTIVE AND OBJECTIVE BOX
Patient is a 89y old  Female who presents with a chief complaint of diabetic foot ulcer/ambulatory disfunction (24 Aug 2018 01:06)      INTERVAL HPI/OVERNIGHT EVENTS: Patient seen and examined. NAD. C/O right hip pain    Vital Signs Last 24 Hrs  T(C): 36.8 (27 Aug 2018 04:30), Max: 36.8 (27 Aug 2018 04:30)  T(F): 98.2 (27 Aug 2018 04:30), Max: 98.2 (27 Aug 2018 04:30)  HR: 69 (27 Aug 2018 11:44) (60 - 69)  BP: 127/61 (27 Aug 2018 04:30) (100/59 - 127/61)  BP(mean): --  RR: 17 (27 Aug 2018 04:30) (16 - 17)  SpO2: 97% (27 Aug 2018 11:44) (93% - 97%)    08-27    134<L>  |  96  |  59<H>  ----------------------------<  74  4.4   |  30  |  2.30<H>    Ca    8.9      27 Aug 2018 08:05  Mg     2.3     08-27                            10.1   9.67  )-----------( 291      ( 27 Aug 2018 08:05 )             32.1     PT/INR - ( 27 Aug 2018 08:05 )   PT: 25.6 sec;   INR: 2.31 ratio           CAPILLARY BLOOD GLUCOSE      POCT Blood Glucose.: 123 mg/dL (27 Aug 2018 11:45)  POCT Blood Glucose.: 86 mg/dL (27 Aug 2018 07:30)  POCT Blood Glucose.: 92 mg/dL (26 Aug 2018 21:50)  POCT Blood Glucose.: 142 mg/dL (26 Aug 2018 16:50)    Surgical Pathology Final Report -  (08.20.18 @ 11:29)    Surgical Pathology Final Report - :   ACCESSION No:  [id number]    DWAYNE DONAHUE                     3        Addendum Report          Addendum Reason  Block submitted to [facility] for additional studies.    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    See attached immunohistochemistry analysis report from [facility].      The immunohistochemical test was performed by an outside  laboratory.  For all patient care and clinical decision making  purposes refer solely to original [facility]  report.  The information transcribed here is not the entire  report.  This shortened version has been placed herefor the  purpose of the electronic record.      The immunohistochemical tests have been developed and their  performance characteristics determined by [facility].  They have not been cleared or approved by the  [place] Food and Drug Administration.  The FDA has determined that  such clearance or approval is not necessary.  These tests are  used for clinical purposes.  The laboratory is certified under  "CLIA-88", and is qualified to perform high complexity clinical  testing. [facility] is a business unit of [facility], a wholly-owned subsidiary of  [facility] [employer].  Testing performed at  [facility]. [address] [place] [address], [address] [place] [phone]. [phone]. Ke Peterson, Medical  Director. CLIA#  [id number].    Cari Florentino MD  (Electronic Signature)      Surgical Final Report          Final Diagnosis  Right pelvic bone, CT-guided biopsy:  - Metastatic adenocarcinoma, 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            DWAYNE DONAHUE                     3        Surgical Final Report            Note: Report pending immunohistochemical stain workup to evaluate  for primary origin of tumor.Results to follow in an addendum.      Key portions of this case were reviewed in intradepartmental  consultation with Dr. Cordova, with concurrence of  diagnosis.    Cari Florentino MD  (Electronic Signature)  Reported on: 08/21/18    Clinical Information  Bone lesion, leg pain  Procedure: CT-guided biopsy right pelvic bone    Specimen Description  Right pelvic bone    Gross Description  The specimen is received in formalin and the specimen container  is labeled: Right pelvic bone. It consists of two cores of white-  tan soft tissue fragments measuring 1.6 and 1.7 cm in length,  each averaging 0.1 cm in diameter. Entirely submitted. One  cassette.    In addition to other data that may appear on the specimen  container, the label has been inspected to confirm the presence  of the patient's name and date of birth.  DN 8/21/2018 6:51 AM      AAddendum Report            Addendum Reason  Block submitted to [facility] for additional studies.      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            DWAYNE DONAHUE                     3        AAddendum Report            See attached immunohistochemistry analysis report from [facility].      The immunohistochemical test was performed by an outside  laboratory.  For all patient care and clinical decision making  purposes refer solely to original [facility]  report.The information transcribed here is not the entire  report.  This shortened version has been placed here for the  purpose of the electronic record.      The immunohistochemical tests have been developed and their  performance characteristics determined by [facility].  They have not been cleared or approved by the  [place] Food and Drug Administration.  The FDA has determined that  such clearance or approval is not necessary.  These tests are  used for clinical purposes.  The laboratory is certified under  "CLIA-88", and is qualified to perform high complexity clinical  testing. [facility] is a business unit of [facility], a wholly-owned subsidiary of  [facility] [employer].  Testing performed at  [facility]. [address] [place] [address], [address] [place] [phone]. [phone]. Ke Peterson, Medical  Director. CLIA#  [id number].    Cari Florentino MD  (Electronic Signature)              ALBUTerol    90 MICROgram(s) HFA Inhaler 2 Puff(s) Inhalation every 6 hours PRN  bisacodyl 5 milliGRAM(s) Oral at bedtime  ceFAZolin   IVPB 2000 milliGRAM(s) IV Intermittent every 12 hours  cholecalciferol 1000 Unit(s) Oral daily  dextrose 40% Gel 15 Gram(s) Oral once PRN  dextrose 5%. 1000 milliLiter(s) IV Continuous <Continuous>  dextrose 50% Injectable 25 Gram(s) IV Push once  dextrose 50% Injectable 12.5 Gram(s) IV Push once  dextrose 50% Injectable 25 Gram(s) IV Push once  docusate sodium 100 milliGRAM(s) Oral three times a day  fentaNYL   Patch  12 MICROgram(s)/Hr 1 Patch Transdermal every 72 hours  glucagon  Injectable 1 milliGRAM(s) IntraMuscular once PRN  glycerin Suppository - Adult 1 Suppository(s) Rectal daily  guaiFENesin    Syrup 100 milliGRAM(s) Oral every 6 hours PRN  insulin glargine Injectable (LANTUS) 12 Unit(s) SubCutaneous at bedtime  insulin lispro (HumaLOG) corrective regimen sliding scale   SubCutaneous at bedtime  insulin lispro (HumaLOG) corrective regimen sliding scale   SubCutaneous three times a day before meals  insulin lispro Injectable (HumaLOG) 4 Unit(s) SubCutaneous three times a day before meals  lactobacillus acidophilus 1 Tablet(s) Oral three times a day with meals  lidocaine   Patch 1 Patch Transdermal daily  melatonin 3 milliGRAM(s) Oral at bedtime  morphine  - Injectable 2 milliGRAM(s) IV Push every 6 hours PRN  multivitamin/minerals 1 Tablet(s) Oral daily  oxyCODONE  ER Tablet 10 milliGRAM(s) Oral <User Schedule>  pantoprazole    Tablet 40 milliGRAM(s) Oral before breakfast  polyethylene glycol 3350 17 Gram(s) Oral two times a day  propranolol LA 60 milliGRAM(s) Oral daily  psyllium Powder 1 Packet(s) Oral daily  senna 2 Tablet(s) Oral at bedtime  simvastatin 10 milliGRAM(s) Oral at bedtime  sodium chloride 0.9%. 1000 milliLiter(s) IV Continuous <Continuous>  sodium chloride 0.9%. 1000 milliLiter(s) IV Continuous <Continuous>  warfarin 1 milliGRAM(s) Oral once              REVIEW OF SYSTEMS:  CONSTITUTIONAL: No fever, no weight loss, or no fatigue  NECK: No pain, no stiffness  RESPIRATORY: No cough, no wheezing, no chills, no hemoptysis, No shortness of breath  CARDIOVASCULAR: No chest pain, no palpitations, no dizziness, no leg swelling  GASTROINTESTINAL: No abdominal pain. No nausea, no vomiting, no hematemesis; No diarrhea, no constipation. No melena, no hematochezia.  GENITOURINARY: No dysuria, no frequency, no hematuria, no incontinence  NEUROLOGICAL: No headaches, no loss of strength, no numbness, no tremors  SKIN: No itching, no burning  MUSCULOSKELETAL: No joint pain, no swelling; No muscle, no back, + RL extremity pain  PSYCHIATRIC: No depression, no mood swings,   HEME/LYMPH: No easy bruising, no bleeding gums  ALLERY AND IMMUNOLOGIC: No hives       Consultant(s) Notes Reviewed:  [X] YES  [ ] NO    PHYSICAL EXAM:  GENERAL: NAD  HEAD:  Atraumatic, Normocephalic  EYES: EOMI, PERRLA, conjunctiva and sclera clear  ENMT: No tonsillar erythema, exudates, or enlargement; Moist mucous membranes  NECK: Supple, No JVD  NERVOUS SYSTEM:  Awake & alert  CHEST/LUNG: Clear to auscultation bilaterally; No rales, rhonchi, wheezing,  HEART: Regular rate and rhythm  ABDOMEN: Soft, Nontender, Nondistended; Bowel sounds present  EXTREMITIES:  No clubbing, cyanosis, or edema  LYMPH: No lymphadenopathy noted  SKIN: No rashes      Advanced care planning discussed with patient/family [X] YES   [ ] NO    Advanced care planning discussed with patient/family. Advanced care planning forms reviewed/discussed/completed. 20 minutes spent.

## 2018-08-27 NOTE — PROGRESS NOTE ADULT - ASSESSMENT
89 F htn, dm2, HLD, af, pvd, adm with hip pain with metastatic disease now s/p iliac crest biopsy for malignancy and s/p podiatric procedure.  Tolerated well with no cardiac complications.  -Afib on AC. INR >2 today. Continue to dose Coumadin. Lovenox discontinued. Continue to monitor closely for post operative bleeding.  -VS stable per flowsheet  -incentive spirometry  -there is no evidence of acute ischemia.  -there is no evidence of significant arrhythmia.  -Continue Propanolol LA 60.  -there is no evidence for meaningful volume overload.  -pain controlled. Fentanyl patch noted.  -Confirmed osteomyelitis in left foot. PICC line placed for long term Abx.  -monitor electrolytes, keep k>4, Mg>2.   -Monitor uptrending creatinine. Management as per nephrology recs.  -Strict I&O  -All other workup per primary team  -D/C planning for rehab placement  -Will follow    SOSA Johansen 89 F htn, dm2, HLD, af, pvd, adm with hip pain with metastatic disease now s/p iliac crest biopsy for malignancy and s/p podiatric procedure.  Tolerated well with no cardiac complications.    -Afib on AC. INR >2 today. Continue to dose Coumadin.   - Lovenox discontinued. Continue to monitor closely for post operative bleeding.  -VS stable per flowsheet  -there is no evidence of acute ischemia.  -there is no evidence of significant arrhythmia.  -Continue Propanolol LA 60.  -there is no evidence for meaningful volume overload.  -Confirmed osteomyelitis in left foot. PICC line placed for long term Abx.  -monitor electrolytes, keep k>4, Mg>2.   -Monitor uptrending creatinine. Management as per nephrology .  -Strict I&O  -All other workup per primary team  -D/C planning for rehab placement  -Will follow    MADELEINE JohansenC

## 2018-08-27 NOTE — PROGRESS NOTE ADULT - PROBLEM SELECTOR PLAN 1
Right hip lesion concerning for malignancy  S/P right hip IR biopsy -- adenocarcinoma: moderately to poorly differentiated of pancreatobiliary/UGI primary  Ortho/onc f/u

## 2018-08-27 NOTE — PROGRESS NOTE ADULT - SUBJECTIVE AND OBJECTIVE BOX
CAPILLARY BLOOD GLUCOSE      POCT Blood Glucose.: 92 mg/dL (26 Aug 2018 21:50)  POCT Blood Glucose.: 142 mg/dL (26 Aug 2018 16:50)  POCT Blood Glucose.: 190 mg/dL (26 Aug 2018 11:52)      Vital Signs Last 24 Hrs  T(C): 36.8 (27 Aug 2018 04:30), Max: 36.8 (27 Aug 2018 04:30)  T(F): 98.2 (27 Aug 2018 04:30), Max: 98.2 (27 Aug 2018 04:30)  HR: 64 (27 Aug 2018 04:30) (60 - 64)  BP: 127/61 (27 Aug 2018 04:30) (100/59 - 127/61)  BP(mean): --  RR: 17 (27 Aug 2018 04:30) (16 - 17)  SpO2: 95% (27 Aug 2018 04:30) (93% - 95%)    General: WN/WD NAD  Respiratory: CTA B/L  CV: RRR, S1S2, no murmurs, rubs or gallops  Abdominal: Soft, NT, ND +BS, Last BM  Extremities: No edema, + peripheral pulses     08-26    135  |  97  |  61<H>  ----------------------------<  133<H>  5.3   |  30  |  2.20<H>    Ca    9.2      26 Aug 2018 07:46  Mg     2.4     08-26        dextrose 40% Gel 15 Gram(s) Oral once PRN  dextrose 50% Injectable 25 Gram(s) IV Push once  dextrose 50% Injectable 12.5 Gram(s) IV Push once  dextrose 50% Injectable 25 Gram(s) IV Push once  glucagon  Injectable 1 milliGRAM(s) IntraMuscular once PRN  insulin glargine Injectable (LANTUS) 12 Unit(s) SubCutaneous at bedtime  insulin lispro (HumaLOG) corrective regimen sliding scale   SubCutaneous at bedtime  insulin lispro (HumaLOG) corrective regimen sliding scale   SubCutaneous three times a day before meals  insulin lispro Injectable (HumaLOG) 4 Unit(s) SubCutaneous three times a day before meals  simvastatin 10 milliGRAM(s) Oral at bedtime

## 2018-08-28 ENCOUNTER — INPATIENT (INPATIENT)
Facility: HOSPITAL | Age: 83
LOS: 12 days | Discharge: NURSING, REHAB OR LONG-TERM CARE | End: 2018-09-10
Attending: INTERNAL MEDICINE | Admitting: INTERNAL MEDICINE
Payer: MEDICARE

## 2018-08-28 VITALS
HEART RATE: 74 BPM | DIASTOLIC BLOOD PRESSURE: 66 MMHG | OXYGEN SATURATION: 96 % | TEMPERATURE: 98 F | SYSTOLIC BLOOD PRESSURE: 129 MMHG | RESPIRATION RATE: 16 BRPM

## 2018-08-28 VITALS
HEIGHT: 61 IN | TEMPERATURE: 98 F | SYSTOLIC BLOOD PRESSURE: 102 MMHG | OXYGEN SATURATION: 97 % | WEIGHT: 173.06 LBS | HEART RATE: 61 BPM | RESPIRATION RATE: 18 BRPM | DIASTOLIC BLOOD PRESSURE: 52 MMHG

## 2018-08-28 DIAGNOSIS — E11.9 TYPE 2 DIABETES MELLITUS WITHOUT COMPLICATIONS: ICD-10-CM

## 2018-08-28 DIAGNOSIS — I50.9 HEART FAILURE, UNSPECIFIED: ICD-10-CM

## 2018-08-28 DIAGNOSIS — C80.1 MALIGNANT (PRIMARY) NEOPLASM, UNSPECIFIED: ICD-10-CM

## 2018-08-28 DIAGNOSIS — E11.621 TYPE 2 DIABETES MELLITUS WITH FOOT ULCER: ICD-10-CM

## 2018-08-28 DIAGNOSIS — R74.8 ABNORMAL LEVELS OF OTHER SERUM ENZYMES: ICD-10-CM

## 2018-08-28 DIAGNOSIS — I10 ESSENTIAL (PRIMARY) HYPERTENSION: ICD-10-CM

## 2018-08-28 DIAGNOSIS — K59.03 DRUG INDUCED CONSTIPATION: ICD-10-CM

## 2018-08-28 LAB
CEA SERPL-MCNC: 4.9 NG/ML — HIGH (ref 0–3.8)
GLUCOSE BLDC GLUCOMTR-MCNC: 105 MG/DL — HIGH (ref 70–99)
GLUCOSE BLDC GLUCOMTR-MCNC: 117 MG/DL — HIGH (ref 70–99)
HCT VFR BLD CALC: 33.9 % — LOW (ref 34.5–45)
HGB BLD-MCNC: 10.7 G/DL — LOW (ref 11.5–15.5)
INR BLD: 2.32 RATIO — HIGH (ref 0.88–1.16)
MCHC RBC-ENTMCNC: 29.6 PG — SIGNIFICANT CHANGE UP (ref 27–34)
MCHC RBC-ENTMCNC: 31.6 GM/DL — LOW (ref 32–36)
MCV RBC AUTO: 93.9 FL — SIGNIFICANT CHANGE UP (ref 80–100)
NRBC # BLD: 0 /100 WBCS — SIGNIFICANT CHANGE UP (ref 0–0)
PLATELET # BLD AUTO: 300 K/UL — SIGNIFICANT CHANGE UP (ref 150–400)
PROTHROM AB SERPL-ACNC: 25.7 SEC — HIGH (ref 9.8–12.7)
RBC # BLD: 3.61 M/UL — LOW (ref 3.8–5.2)
RBC # FLD: 17.9 % — HIGH (ref 10.3–14.5)
WBC # BLD: 10.79 K/UL — HIGH (ref 3.8–10.5)
WBC # FLD AUTO: 10.79 K/UL — HIGH (ref 3.8–10.5)

## 2018-08-28 PROCEDURE — 82728 ASSAY OF FERRITIN: CPT

## 2018-08-28 PROCEDURE — 83521 IG LIGHT CHAINS FREE EACH: CPT

## 2018-08-28 PROCEDURE — 80048 BASIC METABOLIC PNL TOTAL CA: CPT

## 2018-08-28 PROCEDURE — 84165 PROTEIN E-PHORESIS SERUM: CPT

## 2018-08-28 PROCEDURE — 96374 THER/PROPH/DIAG INJ IV PUSH: CPT

## 2018-08-28 PROCEDURE — 83735 ASSAY OF MAGNESIUM: CPT

## 2018-08-28 PROCEDURE — 76937 US GUIDE VASCULAR ACCESS: CPT

## 2018-08-28 PROCEDURE — 71045 X-RAY EXAM CHEST 1 VIEW: CPT

## 2018-08-28 PROCEDURE — 85610 PROTHROMBIN TIME: CPT

## 2018-08-28 PROCEDURE — 36415 COLL VENOUS BLD VENIPUNCTURE: CPT

## 2018-08-28 PROCEDURE — 99223 1ST HOSP IP/OBS HIGH 75: CPT

## 2018-08-28 PROCEDURE — 74176 CT ABD & PELVIS W/O CONTRAST: CPT

## 2018-08-28 PROCEDURE — G8979: CPT

## 2018-08-28 PROCEDURE — 20225 BONE BIOPSY TROCAR/NDL DEEP: CPT

## 2018-08-28 PROCEDURE — 97162 PT EVAL MOD COMPLEX 30 MIN: CPT

## 2018-08-28 PROCEDURE — 83036 HEMOGLOBIN GLYCOSYLATED A1C: CPT

## 2018-08-28 PROCEDURE — 36569 INSJ PICC 5 YR+ W/O IMAGING: CPT

## 2018-08-28 PROCEDURE — 97110 THERAPEUTIC EXERCISES: CPT

## 2018-08-28 PROCEDURE — 88304 TISSUE EXAM BY PATHOLOGIST: CPT

## 2018-08-28 PROCEDURE — 82270 OCCULT BLOOD FECES: CPT

## 2018-08-28 PROCEDURE — 87150 DNA/RNA AMPLIFIED PROBE: CPT

## 2018-08-28 PROCEDURE — 96375 TX/PRO/DX INJ NEW DRUG ADDON: CPT

## 2018-08-28 PROCEDURE — C1751: CPT

## 2018-08-28 PROCEDURE — 85730 THROMBOPLASTIN TIME PARTIAL: CPT

## 2018-08-28 PROCEDURE — 88305 TISSUE EXAM BY PATHOLOGIST: CPT

## 2018-08-28 PROCEDURE — 77012 CT SCAN FOR NEEDLE BIOPSY: CPT

## 2018-08-28 PROCEDURE — 73630 X-RAY EXAM OF FOOT: CPT

## 2018-08-28 PROCEDURE — 87086 URINE CULTURE/COLONY COUNT: CPT

## 2018-08-28 PROCEDURE — 72192 CT PELVIS W/O DYE: CPT

## 2018-08-28 PROCEDURE — 97112 NEUROMUSCULAR REEDUCATION: CPT

## 2018-08-28 PROCEDURE — 86140 C-REACTIVE PROTEIN: CPT

## 2018-08-28 PROCEDURE — 73502 X-RAY EXAM HIP UNI 2-3 VIEWS: CPT

## 2018-08-28 PROCEDURE — 80076 HEPATIC FUNCTION PANEL: CPT

## 2018-08-28 PROCEDURE — 78306 BONE IMAGING WHOLE BODY: CPT

## 2018-08-28 PROCEDURE — 88311 DECALCIFY TISSUE: CPT

## 2018-08-28 PROCEDURE — 82378 CARCINOEMBRYONIC ANTIGEN: CPT

## 2018-08-28 PROCEDURE — 80053 COMPREHEN METABOLIC PANEL: CPT

## 2018-08-28 PROCEDURE — 85652 RBC SED RATE AUTOMATED: CPT

## 2018-08-28 PROCEDURE — 87040 BLOOD CULTURE FOR BACTERIA: CPT

## 2018-08-28 PROCEDURE — A9561: CPT

## 2018-08-28 PROCEDURE — 81001 URINALYSIS AUTO W/SCOPE: CPT

## 2018-08-28 PROCEDURE — 71250 CT THORAX DX C-: CPT

## 2018-08-28 PROCEDURE — 84145 PROCALCITONIN (PCT): CPT

## 2018-08-28 PROCEDURE — G8978: CPT | Mod: CM

## 2018-08-28 PROCEDURE — 87075 CULTR BACTERIA EXCEPT BLOOD: CPT

## 2018-08-28 PROCEDURE — 86301 IMMUNOASSAY TUMOR CA 19-9: CPT

## 2018-08-28 PROCEDURE — 82607 VITAMIN B-12: CPT

## 2018-08-28 PROCEDURE — 96376 TX/PRO/DX INJ SAME DRUG ADON: CPT

## 2018-08-28 PROCEDURE — G8980: CPT | Mod: CM

## 2018-08-28 PROCEDURE — 87186 SC STD MICRODIL/AGAR DIL: CPT

## 2018-08-28 PROCEDURE — 82105 ALPHA-FETOPROTEIN SERUM: CPT

## 2018-08-28 PROCEDURE — 77001 FLUOROGUIDE FOR VEIN DEVICE: CPT

## 2018-08-28 PROCEDURE — 93005 ELECTROCARDIOGRAM TRACING: CPT

## 2018-08-28 PROCEDURE — 85027 COMPLETE CBC AUTOMATED: CPT

## 2018-08-28 PROCEDURE — 73552 X-RAY EXAM OF FEMUR 2/>: CPT

## 2018-08-28 PROCEDURE — 73721 MRI JNT OF LWR EXTRE W/O DYE: CPT

## 2018-08-28 PROCEDURE — 87070 CULTURE OTHR SPECIMN AEROBIC: CPT

## 2018-08-28 PROCEDURE — 96372 THER/PROPH/DIAG INJ SC/IM: CPT | Mod: XU

## 2018-08-28 PROCEDURE — 99222 1ST HOSP IP/OBS MODERATE 55: CPT

## 2018-08-28 PROCEDURE — 83605 ASSAY OF LACTIC ACID: CPT

## 2018-08-28 PROCEDURE — 97530 THERAPEUTIC ACTIVITIES: CPT

## 2018-08-28 PROCEDURE — 99285 EMERGENCY DEPT VISIT HI MDM: CPT | Mod: 25

## 2018-08-28 PROCEDURE — 82962 GLUCOSE BLOOD TEST: CPT

## 2018-08-28 PROCEDURE — 81003 URINALYSIS AUTO W/O SCOPE: CPT

## 2018-08-28 PROCEDURE — 93306 TTE W/DOPPLER COMPLETE: CPT

## 2018-08-28 RX ORDER — PANTOPRAZOLE SODIUM 20 MG/1
40 TABLET, DELAYED RELEASE ORAL
Qty: 0 | Refills: 0 | Status: DISCONTINUED | OUTPATIENT
Start: 2018-08-28 | End: 2018-09-10

## 2018-08-28 RX ORDER — LACTOBACILLUS ACIDOPHILUS 100MM CELL
1 CAPSULE ORAL
Qty: 0 | Refills: 0 | Status: DISCONTINUED | OUTPATIENT
Start: 2018-08-28 | End: 2018-09-10

## 2018-08-28 RX ORDER — FENTANYL CITRATE 50 UG/ML
1 INJECTION INTRAVENOUS
Qty: 0 | Refills: 0 | Status: DISCONTINUED | OUTPATIENT
Start: 2018-08-28 | End: 2018-08-30

## 2018-08-28 RX ORDER — HYDROCODONE BITARTRATE 50 MG/1
5 CAPSULE, EXTENDED RELEASE ORAL
Qty: 0 | Refills: 0 | COMMUNITY

## 2018-08-28 RX ORDER — NITROGLYCERIN 6.5 MG
1 CAPSULE, EXTENDED RELEASE ORAL DAILY
Qty: 0 | Refills: 0 | Status: DISCONTINUED | OUTPATIENT
Start: 2018-08-28 | End: 2018-08-29

## 2018-08-28 RX ORDER — INSULIN ASPART 100 [IU]/ML
26 INJECTION, SUSPENSION SUBCUTANEOUS
Qty: 0 | Refills: 0 | COMMUNITY

## 2018-08-28 RX ORDER — LANOLIN ALCOHOL/MO/W.PET/CERES
1 CREAM (GRAM) TOPICAL
Qty: 0 | Refills: 0 | COMMUNITY
Start: 2018-08-28

## 2018-08-28 RX ORDER — WARFARIN SODIUM 2.5 MG/1
1 TABLET ORAL ONCE
Qty: 0 | Refills: 0 | Status: COMPLETED | OUTPATIENT
Start: 2018-08-28 | End: 2018-08-28

## 2018-08-28 RX ORDER — DEXTROSE 50 % IN WATER 50 %
15 SYRINGE (ML) INTRAVENOUS ONCE
Qty: 0 | Refills: 0 | Status: DISCONTINUED | OUTPATIENT
Start: 2018-08-28 | End: 2018-09-10

## 2018-08-28 RX ORDER — INSULIN GLARGINE 100 [IU]/ML
12 INJECTION, SOLUTION SUBCUTANEOUS
Qty: 0 | Refills: 0 | COMMUNITY
Start: 2018-08-28

## 2018-08-28 RX ORDER — INSULIN LISPRO 100/ML
4 VIAL (ML) SUBCUTANEOUS
Qty: 0 | Refills: 0 | Status: DISCONTINUED | OUTPATIENT
Start: 2018-08-28 | End: 2018-08-29

## 2018-08-28 RX ORDER — PROPRANOLOL HCL 160 MG
60 CAPSULE, EXTENDED RELEASE 24HR ORAL DAILY
Qty: 0 | Refills: 0 | Status: DISCONTINUED | OUTPATIENT
Start: 2018-08-28 | End: 2018-09-10

## 2018-08-28 RX ORDER — DEXTROSE 50 % IN WATER 50 %
25 SYRINGE (ML) INTRAVENOUS ONCE
Qty: 0 | Refills: 0 | Status: DISCONTINUED | OUTPATIENT
Start: 2018-08-28 | End: 2018-09-10

## 2018-08-28 RX ORDER — CEFAZOLIN SODIUM 1 G
2 VIAL (EA) INJECTION
Qty: 0 | Refills: 0 | COMMUNITY

## 2018-08-28 RX ORDER — MORPHINE SULFATE 50 MG/1
2 CAPSULE, EXTENDED RELEASE ORAL ONCE
Qty: 0 | Refills: 0 | Status: DISCONTINUED | OUTPATIENT
Start: 2018-08-28 | End: 2018-08-28

## 2018-08-28 RX ORDER — POLYETHYLENE GLYCOL 3350 17 G/17G
17 POWDER, FOR SOLUTION ORAL
Qty: 0 | Refills: 0 | Status: DISCONTINUED | OUTPATIENT
Start: 2018-08-28 | End: 2018-09-10

## 2018-08-28 RX ORDER — SENNA PLUS 8.6 MG/1
2 TABLET ORAL AT BEDTIME
Qty: 0 | Refills: 0 | Status: DISCONTINUED | OUTPATIENT
Start: 2018-08-28 | End: 2018-09-10

## 2018-08-28 RX ORDER — INSULIN LISPRO 100/ML
VIAL (ML) SUBCUTANEOUS
Qty: 0 | Refills: 0 | Status: DISCONTINUED | OUTPATIENT
Start: 2018-08-28 | End: 2018-09-10

## 2018-08-28 RX ORDER — LIDOCAINE 4 G/100G
1 CREAM TOPICAL
Qty: 0 | Refills: 0 | COMMUNITY
Start: 2018-08-28

## 2018-08-28 RX ORDER — INSULIN GLARGINE 100 [IU]/ML
12 INJECTION, SOLUTION SUBCUTANEOUS AT BEDTIME
Qty: 0 | Refills: 0 | Status: DISCONTINUED | OUTPATIENT
Start: 2018-08-28 | End: 2018-09-10

## 2018-08-28 RX ORDER — PROPRANOLOL HCL 160 MG
1 CAPSULE, EXTENDED RELEASE 24HR ORAL
Qty: 0 | Refills: 0 | COMMUNITY
Start: 2018-08-28

## 2018-08-28 RX ORDER — DOCUSATE SODIUM 100 MG
100 CAPSULE ORAL THREE TIMES A DAY
Qty: 0 | Refills: 0 | Status: DISCONTINUED | OUTPATIENT
Start: 2018-08-28 | End: 2018-09-10

## 2018-08-28 RX ORDER — GLUCAGON INJECTION, SOLUTION 0.5 MG/.1ML
1 INJECTION, SOLUTION SUBCUTANEOUS ONCE
Qty: 0 | Refills: 0 | Status: DISCONTINUED | OUTPATIENT
Start: 2018-08-28 | End: 2018-09-10

## 2018-08-28 RX ORDER — DEXTROSE 50 % IN WATER 50 %
12.5 SYRINGE (ML) INTRAVENOUS ONCE
Qty: 0 | Refills: 0 | Status: DISCONTINUED | OUTPATIENT
Start: 2018-08-28 | End: 2018-09-10

## 2018-08-28 RX ORDER — LINAGLIPTIN 5 MG/1
1 TABLET, FILM COATED ORAL
Qty: 0 | Refills: 0 | COMMUNITY

## 2018-08-28 RX ORDER — INSULIN GLARGINE 100 [IU]/ML
14 INJECTION, SOLUTION SUBCUTANEOUS
Qty: 0 | Refills: 0 | COMMUNITY
Start: 2018-08-28

## 2018-08-28 RX ORDER — LIDOCAINE 4 G/100G
1 CREAM TOPICAL DAILY
Qty: 0 | Refills: 0 | Status: DISCONTINUED | OUTPATIENT
Start: 2018-08-28 | End: 2018-08-30

## 2018-08-28 RX ORDER — INSULIN ASPART 100 [IU]/ML
11 INJECTION, SUSPENSION SUBCUTANEOUS
Qty: 0 | Refills: 0 | COMMUNITY

## 2018-08-28 RX ORDER — ALBUTEROL 90 UG/1
2 AEROSOL, METERED ORAL EVERY 6 HOURS
Qty: 0 | Refills: 0 | Status: DISCONTINUED | OUTPATIENT
Start: 2018-08-28 | End: 2018-09-10

## 2018-08-28 RX ORDER — CEFAZOLIN SODIUM 1 G
2000 VIAL (EA) INJECTION EVERY 24 HOURS
Qty: 0 | Refills: 0 | Status: DISCONTINUED | OUTPATIENT
Start: 2018-08-28 | End: 2018-09-10

## 2018-08-28 RX ORDER — OXYCODONE HYDROCHLORIDE 5 MG/1
1 TABLET ORAL
Qty: 0 | Refills: 0 | COMMUNITY
Start: 2018-08-28

## 2018-08-28 RX ORDER — SODIUM CHLORIDE 9 MG/ML
1000 INJECTION, SOLUTION INTRAVENOUS
Qty: 0 | Refills: 0 | Status: DISCONTINUED | OUTPATIENT
Start: 2018-08-28 | End: 2018-09-10

## 2018-08-28 RX ORDER — FENTANYL CITRATE 50 UG/ML
1 INJECTION INTRAVENOUS
Qty: 0 | Refills: 0 | COMMUNITY
Start: 2018-08-28

## 2018-08-28 RX ORDER — OXYCODONE HYDROCHLORIDE 5 MG/1
10 TABLET ORAL EVERY 12 HOURS
Qty: 0 | Refills: 0 | Status: DISCONTINUED | OUTPATIENT
Start: 2018-08-28 | End: 2018-08-30

## 2018-08-28 RX ADMIN — OXYCODONE HYDROCHLORIDE 10 MILLIGRAM(S): 5 TABLET ORAL at 21:57

## 2018-08-28 RX ADMIN — INSULIN GLARGINE 12 UNIT(S): 100 INJECTION, SOLUTION SUBCUTANEOUS at 22:23

## 2018-08-28 RX ADMIN — Medication 1 TABLET(S): at 18:45

## 2018-08-28 RX ADMIN — SENNA PLUS 2 TABLET(S): 8.6 TABLET ORAL at 21:57

## 2018-08-28 RX ADMIN — Medication 1 TABLET(S): at 08:10

## 2018-08-28 RX ADMIN — Medication 4 UNIT(S): at 08:11

## 2018-08-28 RX ADMIN — PANTOPRAZOLE SODIUM 40 MILLIGRAM(S): 20 TABLET, DELAYED RELEASE ORAL at 18:45

## 2018-08-28 RX ADMIN — LIDOCAINE 1 PATCH: 4 CREAM TOPICAL at 13:47

## 2018-08-28 RX ADMIN — WARFARIN SODIUM 1 MILLIGRAM(S): 2.5 TABLET ORAL at 22:23

## 2018-08-28 RX ADMIN — OXYCODONE HYDROCHLORIDE 10 MILLIGRAM(S): 5 TABLET ORAL at 10:15

## 2018-08-28 RX ADMIN — Medication 100 MILLIGRAM(S): at 21:57

## 2018-08-28 RX ADMIN — FENTANYL CITRATE 1 PATCH: 50 INJECTION INTRAVENOUS at 13:46

## 2018-08-28 RX ADMIN — OXYCODONE HYDROCHLORIDE 10 MILLIGRAM(S): 5 TABLET ORAL at 21:58

## 2018-08-28 RX ADMIN — PANTOPRAZOLE SODIUM 40 MILLIGRAM(S): 20 TABLET, DELAYED RELEASE ORAL at 06:18

## 2018-08-28 RX ADMIN — Medication 4 UNIT(S): at 18:46

## 2018-08-28 RX ADMIN — Medication 4 UNIT(S): at 14:15

## 2018-08-28 RX ADMIN — Medication 100 MILLIGRAM(S): at 08:10

## 2018-08-28 RX ADMIN — Medication 100 MILLIGRAM(S): at 06:18

## 2018-08-28 NOTE — PROGRESS NOTE ADULT - SUBJECTIVE AND OBJECTIVE BOX
CAPILLARY BLOOD GLUCOSE      POCT Blood Glucose.: 129 mg/dL (27 Aug 2018 21:23)  POCT Blood Glucose.: 126 mg/dL (27 Aug 2018 16:49)  POCT Blood Glucose.: 123 mg/dL (27 Aug 2018 11:45)      Vital Signs Last 24 Hrs  T(C): 36.2 (28 Aug 2018 04:29), Max: 36.9 (27 Aug 2018 13:31)  T(F): 97.2 (28 Aug 2018 04:29), Max: 98.4 (27 Aug 2018 13:31)  HR: 57 (28 Aug 2018 04:29) (57 - 69)  BP: 124/57 (28 Aug 2018 04:29) (94/53 - 124/57)  BP(mean): --  RR: 16 (28 Aug 2018 04:29) (15 - 17)  SpO2: 95% (28 Aug 2018 04:29) (95% - 97%)    General: WN/WD NAD  Respiratory: CTA B/L  CV: RRR, S1S2, no murmurs, rubs or gallops  Abdominal: Soft, NT, ND +BS, Last BM  Extremities: No edema, + peripheral pulses     08-27    134<L>  |  96  |  59<H>  ----------------------------<  74  4.4   |  30  |  2.30<H>    Ca    8.9      27 Aug 2018 08:05  Mg     2.3     08-27        dextrose 40% Gel 15 Gram(s) Oral once PRN  dextrose 50% Injectable 25 Gram(s) IV Push once  dextrose 50% Injectable 12.5 Gram(s) IV Push once  dextrose 50% Injectable 25 Gram(s) IV Push once  glucagon  Injectable 1 milliGRAM(s) IntraMuscular once PRN  insulin glargine Injectable (LANTUS) 12 Unit(s) SubCutaneous at bedtime  insulin lispro (HumaLOG) corrective regimen sliding scale   SubCutaneous at bedtime  insulin lispro (HumaLOG) corrective regimen sliding scale   SubCutaneous three times a day before meals  insulin lispro Injectable (HumaLOG) 4 Unit(s) SubCutaneous three times a day before meals  simvastatin 10 milliGRAM(s) Oral at bedtime

## 2018-08-28 NOTE — PROGRESS NOTE ADULT - PROBLEM SELECTOR PLAN 2
Continue iv cefazolin per ID -- will need long-term iv abx until 9/30/18  Repeat cultures negative  ID follow up with Dr. CALROS

## 2018-08-28 NOTE — PROGRESS NOTE ADULT - PROBLEM SELECTOR PLAN 7
IV ABX - cefazolin per ID  ID eval with Dr. CARLOS
IV ABX  ID eval
IV ABX - cefazolin per ID  ID eval with Dr. CARLOS
IV ABX per ID  ID eval

## 2018-08-28 NOTE — PROGRESS NOTE ADULT - PROBLEM SELECTOR PROBLEM 4
Metastatic cancer
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
Atrial fibrillation, unspecified type
Metastatic cancer

## 2018-08-28 NOTE — CONSULT NOTE ADULT - SUBJECTIVE AND OBJECTIVE BOX
HISTORY OF PRESENT ILLNESS: HPI:  89 y.o woman with multiple comorbidities preserved LV and RV fx, Afib on coumadin, reported CAD ? remote MI with no intervention follows with Dr. Diaz with annual stress tests being medically managed for CAD  presented with several weeks h/o of bony pain and recent inability to ambulate due to pain in the foot. Ct scan revealed destructive bony lesions concerning for metastasis and liver lesions. 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 Noted to have staph aurues bacteremia likely from left hallux abscess.The primary source of malignancy is unclear,. 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, primary unknown , now transfered to [facility] for rad-onc evaluation o the hip (28 Aug 2018 12:08)      PAST MEDICAL & SURGICAL HISTORY:  OAB (overactive bladder)  GERD (gastroesophageal reflux disease)  Angina effort  Heart failure  Upper respiratory infection  Diabetes  Renal stones  Myocardial infarction: 1981  Spinal stenosis  CVA (cerebral infarction)  Afib  Raynaud disease  Acid reflux  Hypertension  Hyperlipidemia  Asthma  Cataract  S/P hysterectomy          MEDICATIONS:  MEDICATIONS  (STANDING):  ceFAZolin   IVPB 2000 milliGRAM(s) IV Intermittent every 24 hours  dextrose 5%. 1000 milliLiter(s) (50 mL/Hr) IV Continuous <Continuous>  dextrose 50% Injectable 12.5 Gram(s) IV Push once  dextrose 50% Injectable 25 Gram(s) IV Push once  dextrose 50% Injectable 25 Gram(s) IV Push once  docusate sodium 100 milliGRAM(s) Oral three times a day  fentaNYL   Patch  12 MICROgram(s)/Hr 1 Patch Transdermal every 72 hours  insulin glargine Injectable (LANTUS) 12 Unit(s) SubCutaneous at bedtime  insulin lispro (HumaLOG) corrective regimen sliding scale   SubCutaneous three times a day before meals  insulin lispro Injectable (HumaLOG) 4 Unit(s) SubCutaneous three times a day before meals  lactobacillus acidophilus 1 Tablet(s) Oral three times a day with meals  lidocaine   Patch 1 Patch Transdermal daily  nitroglycerin    Patch 0.4 mG/Hr(s) 1 patch Transdermal daily  oxyCODONE  ER Tablet 10 milliGRAM(s) Oral every 12 hours  pantoprazole    Tablet 40 milliGRAM(s) Oral before breakfast  propranolol LA 60 milliGRAM(s) Oral daily  senna 2 Tablet(s) Oral at bedtime  warfarin 1 milliGRAM(s) Oral once      Allergies    Levaquin (Other)  ramipril (Other)  tetracycline (Hives; Rash)    Intolerances        FAMILY HISTORY:  No pertinent family history in first degree relatives    Non-contributary for premature coronary disease or sudden cardiac death    SOCIAL HISTORY:    [X ] Non-smoker  [ ] Smoker  [ ] Alcohol      REVIEW OF SYSTEMS:  [ ]chest pain  [  ]shortness of breath  [  ]palpitations  [  ]syncope  [ ]near syncope [ ]upper extremity weakness   [ ] lower extremity weakness  [  ]diplopia  [  ]altered mental status   [  ]fevers  [ ]chills [ ]nausea  [ ]vomitting  [  ]dysphagia    [ ]abdominal pain  [ ]melena  [ ]BRBPR    [  ]epistaxis  [  ]rash    [ ]lower extremity edema        [ X] All others negative	  [ ] Unable to obtain    PHYSICAL EXAM:  T(C): 36.6 (08-28-18 @ 14:51), Max: 36.7 (08-28-18 @ 11:50)  HR: 74 (08-28-18 @ 14:51) (57 - 74)  BP: 111/77 (08-28-18 @ 14:51) (102/52 - 129/66)  RR: 18 (08-28-18 @ 14:51) (16 - 18)  SpO2: 97% (08-28-18 @ 14:51) (95% - 97%)  Wt(kg): --  I&O's Summary        Gen: Appears well in NAD  HEENT:  (-)icterus (-)pallor  CV: N S1 S2 1/6 STEFFANY (+)2 Pulses B/l  Resp:  Clear to ausculatation B/L, normal effort  GI: (+) BS Soft, NT, ND  Lymph:  (-)Edema, (-)obvious lymphadenopathy  Skin: Warm to touch, Normal turgor  Psych: Appropriate mood and affect        ECG:  Sinus first degree AV block, LAFB  RADIOLOGY:      CXR: < from: Xray Chest 1 View- PORTABLE-Urgent (08.17.18 @ 21:23) >  There are low lung volumes resulting in crowding of the bronchovascular   markings at the lung bases.  There is minimal blunting at the right costophrenic angle either   representing trace pleural effusion and/or pleural thickening.  There is subsegmental atelectasis at both lung bases.    < end of copied text >    	  	  LABS:	 	    CARDIAC MARKERS:                              10.7   10.79 )-----------( 300      ( 28 Aug 2018 10:04 )             33.9     Hb Trend: 10.7<--    08-27    134<L>  |  96  |  59<H>  ----------------------------<  74  4.4   |  30  |  2.30<H>    Ca    8.9      27 Aug 2018 08:05  Mg     2.3     08-27      Creatinine Trend: 2.30<--, 2.20<--, 2.00<--, 1.90<--, 1.80<--, 1.60<--    Coags:  PT/INR - ( 28 Aug 2018 10:04 )   PT: 25.7 sec;   INR: 2.32 ratio           ASSESSMENT/PLAN: 	89y Female  multiple comorbidities preserved LV and RV fx, Afib on coumadin, reported CAD ? remote MI with no intervention follows with Dr. Diaz with annual stress tests being medically managed for CAD  presented with several weeks h/o of bony pain and recent inability to ambulate,  ct scan revealed destructive bony lesions concerning for metastasis and liver lesions    - HR appropriate  - Cont coumadin INR 2-3 if bleeding risk is acceptable  - Obtain records from Dr. Skinny Diaz's office  - Will follow    I once again thank you for allowing me to participate in the care of your patient.  If you have any questions or concerns please do not hesitate to contact me.    Elder Mathis MD, FACC

## 2018-08-28 NOTE — CONSULT NOTE ADULT - SUBJECTIVE AND OBJECTIVE BOX
Podiatry pager #: [phone]/ [phone]    Patient is a 89y old  Female who presents with a chief complaint of Onc workup (28 Aug 2018 12:16)      HPI:  89 y.o woman with multiple comorbidities presented with several weeks h/o of bony pain and recent inability to ambulate due to pain in the foot. Ct scan revealed destructive bony lesions concerning for metastasis and liver lesions. 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 Noted to have staph aurues bacteremia likely from left hallux abscess.The primary source of malignancy is unclear,. 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 1000 milliLiter(s) (50 mL/Hr) IV Continuous <Continuous>  dextrose 50% Injectable 12.5 Gram(s) IV Push once  dextrose 50% Injectable 25 Gram(s) IV Push once  dextrose 50% Injectable 25 Gram(s) IV Push once  docusate sodium 100 milliGRAM(s) Oral three times a day  fentaNYL   Patch  12 MICROgram(s)/Hr 1 Patch Transdermal every 72 hours  insulin glargine Injectable (LANTUS) 12 Unit(s) SubCutaneous at bedtime  insulin lispro (HumaLOG) corrective regimen sliding scale   SubCutaneous three times a day before meals  insulin lispro Injectable (HumaLOG) 4 Unit(s) SubCutaneous three times a day before meals  lactobacillus acidophilus 1 Tablet(s) Oral three times a day with meals  lidocaine   Patch 1 Patch Transdermal daily  nitroglycerin    Patch 0.4 mG/Hr(s) 1 patch Transdermal daily  oxyCODONE  ER Tablet 10 milliGRAM(s) Oral every 12 hours  pantoprazole    Tablet 40 milliGRAM(s) Oral before breakfast  propranolol LA 60 milliGRAM(s) Oral daily  senna 2 Tablet(s) Oral at bedtime  warfarin 1 milliGRAM(s) Oral once    MEDICATIONS  (PRN):  ALBUTerol    90 MICROgram(s) HFA Inhaler 2 Puff(s) Inhalation every 6 hours PRN Shortness of Breath and/or Wheezing  dextrose 40% Gel 15 Gram(s) Oral once PRN Blood Glucose LESS THAN 70 milliGRAM(s)/deciLiter  glucagon  Injectable 1 milliGRAM(s) IntraMuscular once PRN Glucose <70 milliGRAM(s)/deciLiter  polyethylene glycol 3350 17 Gram(s) Oral two times a day PRN Constipation      Allergies    Levaquin (Other)  ramipril (Other)  tetracycline (Hives; Rash)    Intolerances        VITALS:    Vital Signs Last 24 Hrs  T(C): 36.6 (28 Aug 2018 14:51), Max: 36.7 (28 Aug 2018 11:50)  T(F): 97.9 (28 Aug 2018 14:51), Max: 98 (28 Aug 2018 11:50)  HR: 74 (28 Aug 2018 14:51) (57 - 74)  BP: 111/77 (28 Aug 2018 14:51) (102/52 - 129/66)  BP(mean): --  RR: 18 (28 Aug 2018 14:51) (16 - 18)  SpO2: 97% (28 Aug 2018 14:51) (95% - 97%)    LABS:                          10.7   10.79 )-----------( 300      ( 28 Aug 2018 10:04 )             33.9       08-27    134<L>  |  96  |  59<H>  ----------------------------<  74  4.4   |  30  |  2.30<H>    Ca    8.9      27 Aug 2018 08:05  Mg     2.3     08-27        CAPILLARY BLOOD GLUCOSE      POCT Blood Glucose.: 111 mg/dL (28 Aug 2018 07:43)  POCT Blood Glucose.: 129 mg/dL (27 Aug 2018 21:23)  POCT Blood Glucose.: 126 mg/dL (27 Aug 2018 16:49)      PT/INR - ( 28 Aug 2018 10:04 )   PT: 25.7 sec;   INR: 2.32 ratio             LOWER EXTREMITY PHYSICAL EXAM:  - s/p LF  bunionectomy- sutures intact, no dehiscence skin well coapted, no erythema or edema, surrounding skin viable.   - palpable pedal pulses.     RADIOLOGY & ADDITIONAL STUDIES:    < from: Xray Foot AP + Lateral + Oblique, Left (08.21.18 @ 17:52) >    EXAM:  FOOT LEFT (MINIMUM 3 VIEWS)                            PROCEDURE DATE:  08/21/2018          INTERPRETATION:  CLINICAL INFORMATION: Postop, osteomyelitis of left   first metatarsal.    TECHNIQUE: 3 views of the left foot.    COMPARISON: 8/14/2018    FINDINGS:  The resection of the left first metatarsal with postsurgical changes.  There is no fracture or dislocation.  The joint spaces are preserved.  There are no lytic or blastic bony lesions.    IMPRESSION:   Resection of the left first metatarsal with postsurgical changes                AILIN CALDERON M.D., ATTENDING RADIOLOGIST  This document has been electronically signed. Aug 21 2018  7:33PM        < end of copied text >

## 2018-08-28 NOTE — CONSULT NOTE ADULT - SUBJECTIVE AND OBJECTIVE BOX
Chief Complaint:  Patient is a 89y old  Female who presents with a chief complaint of Onc workup (28 Aug 2018 12:16)    OAB (overactive bladder)  GERD (gastroesophageal reflux disease)  Angina effort  Heart failure  Upper respiratory infection  Diabetes  Renal stones  Myocardial infarction  Spinal stenosis  CVA (cerebral infarction)  Afib on coumadin  Raynaud disease  Acid reflux  Hypertension  Hyperlipidemia  Asthma  Cataract  S/P hysterectomy     HPI:  89yo woman transferred from [facility] (stay there 8/14 to 8/28) with h/o multiple comorbidities as noted above presented with several weeks h/o of bony pain and recent inability to ambulate due to pain in the foot. CT scan revealed destructive bony lesions concerning for metastasis and liver lesions. 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 The primary source of malignancy is unclear. 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 GI consulted. The patient is seem with her daughter bedside.  She does not endorse any abdominal complaints. She has undergone 3 colonoscopies in her lifetime which both the patient and her daughter believe were non-concerning. Patient reports no abdominal pain, no nausea/vomiting, no melena/hematochezia, no hematemesis, no weight loss, no bowel habit changes, no dysphagia/odynophagia, no fever/chills. No FHx of colon, gastric, pancreatic or gallbladder disease to her knowledge.  She does admit intermittent constipation and a recent decrease in her appetite from her baseline, but no weight changes.       Levaquin (Other)  ramipril (Other)  tetracycline (Hives; Rash)      ALBUTerol    90 MICROgram(s) HFA Inhaler 2 Puff(s) Inhalation every 6 hours PRN  ceFAZolin   IVPB 2000 milliGRAM(s) IV Intermittent every 24 hours  fentaNYL   Patch  12 MICROgram(s)/Hr 1 Patch Transdermal every 72 hours  insulin glargine Injectable (LANTUS) 12 Unit(s) SubCutaneous at bedtime  insulin lispro Injectable (HumaLOG) 4 Unit(s) SubCutaneous three times a day before meals  lidocaine   Patch 1 Patch Transdermal daily  nitroglycerin    Patch 0.4 mG/Hr(s) 1 patch Transdermal daily  oxyCODONE  ER Tablet 10 milliGRAM(s) Oral every 12 hours  propranolol LA 60 milliGRAM(s) Oral daily  warfarin 1 milliGRAM(s) Oral once        FAMILY HISTORY:  No pertinent family history in first degree relatives        Review of Systems:    General:  No wt loss, fevers, chills, night sweats, fatigue  Eyes:  Good vision, no reported pain  ENT:  No sore throat, pain, runny nose, dysphagia  CV:  No pain, palpitations, no lightheadedness  Resp:  No dyspnea, cough, tachypnea, wheezing  GI: denies n/v/d/c, abdominal pain, melena or brbpr; +decrease appetite   :  No pain, bleeding, incontinence, nocturia  Muscle:  No pain, weakness  Neuro:  No weakness, tingling, memory problems  Psych:  No fatigue, insomnia, mood problems, depression  Endocrine:  No polyuria, polydypsia, cold/heat intolerance  Heme:  No petechiae, ecchymosis, easy bruisability  Skin:  No rash, tattoos, scars, edema    Relevant Family History:   n/c    Relevant Social History: n/c      Physical Exam:    Vital Signs:  Vital Signs Last 24 Hrs  T(C): 36.7 (28 Aug 2018 11:50), Max: 36.7 (28 Aug 2018 11:50)  T(F): 98 (28 Aug 2018 11:50), Max: 98 (28 Aug 2018 11:50)  HR: 61 (28 Aug 2018 11:50) (57 - 74)  BP: 102/52 (28 Aug 2018 11:50) (102/52 - 129/66)  BP(mean): --  RR: 18 (28 Aug 2018 11:50) (16 - 18)  SpO2: 97% (28 Aug 2018 11:50) (95% - 97%)  Daily Height in cm: 154.94 (28 Aug 2018 11:50)    Daily     General:  Appears stated age, well-groomed, nad  HEENT:  NC/AT,  conjunctivae clear and pink, no thyromegaly, nodules, adenopathy, no JVD  Chest:  Full & symmetric excursion, no increased effort, breath sounds clear  Cardiovascular:  Regular rhythm, S1, S2, no murmur/rub/S3/S4, no abdominal bruit, no edema  Abdomen:  Soft, non-tender, non-distended, normoactive bowel sounds,  no masses ,no hepatosplenomeagaly, no signs of chronic liver disease  Extremities:  no cyanosis,clubbing or edema  Skin:  No rash/erythema/ecchymoses/petechiae/wounds/abscess/warm/dry  Neuro/Psych:  A&Ox3  , no asterixis, no tremor, no encephalopathy    Laboratory:                            10.7   10.79 )-----------( 300      ( 28 Aug 2018 10:04 )             33.9     08-27    134<L>  |  96  |  59<H>  ----------------------------<  74  4.4   |  30  |  2.30<H>    Ca    8.9      27 Aug 2018 08:05  Mg     2.3     08-27        PT/INR - ( 28 Aug 2018 10:04 )   PT: 25.7 sec;   INR: 2.32 ratio               Imaging:      < from: CT Abdomen and Pelvis No Cont (08.15.18 @ 08:01) >    EXAM:  CT ABDOMEN AND PELVIS                            *** ADDENDUM 08/16/2018  ***    Please note the body the report contains a voice transcription error. The   corrected line should read as follows: There is substantial coronary   artery calcification.      *** END OF ADDENDUM 08/16/2018  ***      PROCEDURE DATE:  08/15/2018          INTERPRETATION:  History: Destructive lesion right hip.    CT abdomen and pelvis no contrast. Prior 4/26/2014.    Limited by lack of oral and IV contrast.  Small layering basilar effusions. Prominent interlobular septa at the   lung bases suggests interstitial edema. No substantial coronary artery   calcification. No calcified gallstones or biliary dilatation.  Scattered poorly defined low-attenuation foci throughout the hepatic   parenchyma concerning for metastases. Correlate with contrast-enhanced CT   or MR. Unenhanced pancreas spleen not remarkable.  No adrenal nodules.  No hydronephrosis. Left renal cysts.  Atherosclerotic nonaneurysmal abdominal aorta.  Colonic diverticula, no diverticulitis or other active bowel inflammation.  Trace ascites.  Status post hysterectomy.  Distended urinary bladder without wall thickening.  No aggressive lytic foci right acetabulum and right pubis consistent with    neoplasm.    Impression:    Limited by lack of oral and IV contrast.  Inhomogeneous hepatic parenchyma suspicious for metastatic involvement.   Correlate with contrast-enhanced CT or MR.  Destructive osseous lesions right hip and pubis consistent with   neoplastic involvement.  Additional findings as discussed.        ***Please see the addendum at the top of this report. It may contain   additional important information or changes.****          LOCO SANCHEZ M.D., ATTENDING RADIOLOGIST  This document has been electronically signed. Aug 15 2018  9:00AM  Addend:  LOCO SANCHEZ M.D., ATTENDING RADIOLOGIST  This addendum was electronically signed on: Aug 16 2018  1:19PM.          < end of copied text >    ---      Surgical Pathology Final Report -  (08.20.18 @ 11:29)    Surgical Pathology Final Report - :   ACCESSION No:  [id number]    DWAYNE DONAHUE                     3        Addendum Report          Addendum Reason  Block submitted to [facility] for additional studies.    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    See attached immunohistochemistry analysis report from [facility].      The immunohistochemical test was performed by an outside  laboratory.  For all patient care and clinical decision making  purposes refer solely to original integrated oncology laboratory  report.  The information transcribed here is not the entire  report.  This shortened version has been placed herefor the  purpose of the electronic record.      The immunohistochemical tests have been developed and their  performance characteristics determined by [facility].  They have not been cleared or approved by the  [place] Food and Drug Administration.  The FDA has determined that  such clearance or approval is not necessary.  These tests are  used for clinical purposes.  The laboratory is certified under  "CLIA-88", and is qualified to perform high complexity clinical  testing. [facility] is a business unit of [facility], a wholly-owned subsidiary of  [facility].  Testing performed at  [facility]. [address] [place] [address], [address] [place] [phone]. [phone]. Ke Peterson, Medical  Director. CLIA#  [id number].    Cari Florentino MD  (Electronic Signature)      Surgical Final Report          Final Diagnosis  Right pelvic bone, CT-guided biopsy:  - Metastatic adenocarcinoma, 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    DWAYNE DONAHUE                     3    surgical Final Report    Note: Report pending immunohistochemical stain workup to evaluate  for primary origin of tumor.Results to follow in an addendum.      Key portions of this case were reviewed in intradepartmental  consultation with Dr. Cordova, with concurrence of  diagnosis.    Cari Florentino MD  (Electronic Signature)  Reported on: 08/21/18    Clinical Information  Bone lesion, leg pain  Procedure: CT-guided biopsy right pelvic bone    Specimen Description  Right pelvic bone    Gross Description  The specimen is received in formalin and the specimen container  is labeled: Right pelvic bone. It consists of two cores of white-  tan soft tissue fragments measuring 1.6 and 1.7 cm in length,  each averaging 0.1 cm in diameter. Entirely submitted. One  cassette.    In addition to other data that may appear on the specimen  container, the label has been inspected to confirm the presence  of the patient's name and date of birth.  DN 8/21/2018 6:51 AM      AAddendum Report            Addendum Reason  Block submitted to [facility] for additional studies.      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            DWAYNE DONAHUE                     3        AAddendum Report            See attached immunohistochemistry analysis report from [facility].      The immunohistochemical test was performed by an outside  laboratory.  For all patient care and clinical decision making  purposes refer solely to original integrated oncology laboratory  report.The information transcribed here is not the entire  report.  This shortened version has been placed here for the  purpose of the electronic record.      The immunohistochemical tests have been developed and their  performance characteristics determined by [facility].  They have not been cleared or approved by the  [place] Food and Drug Administration.  The FDA has determined that  such clearance or approval is not necessary.  These tests are  used for clinical purposes.  The laboratory is certified under  "CLIA-88", and is qualified to perform high complexity clinical  testing. [facility] is a business unit of [facility], a wholly-owned subsidiary of  [facility].  Testing performed at  [facility]. [address] [place] [address], [address] [place] [phone]. [phone]. Ke Peterson, Medical  Director. CLIA#  [id number].    Cari Florentino MD  (Electronic Signature)

## 2018-08-28 NOTE — PROGRESS NOTE ADULT - PROBLEM SELECTOR PLAN 4
Unknown primary  Work-up in progress  Regardless of results, family would probably want conservative treatment
Path noted  Patient and family wants patient transferred to [facility] for radiation therapy evaluation
Path noted  Regardless of results, family would probably want conservative treatment
Unknown primary  Work-up in progress  Regardless of results, family would probably want conservative treatment
Unknown primary, final path pending  Work-up in progress  Regardless of results, family would probably want conservative treatment
insulin scale
insulin scale
Continue current medications  Change to dvt prophylaxis lovenox

## 2018-08-28 NOTE — PROGRESS NOTE ADULT - PROVIDER SPECIALTY LIST ADULT
Anesthesia
Cardiology
Endocrinology
Family Medicine
Heme/Onc
Infectious Disease
Internal Medicine
Nephrology
Orthopedics
Podiatry
Pulmonology
Pulmonology
Heme/Onc
Infectious Disease
Cardiology
Heme/Onc
Internal Medicine
Nephrology
Endocrinology
Podiatry
Internal Medicine
Internal Medicine

## 2018-08-28 NOTE — PROGRESS NOTE ADULT - PROBLEM SELECTOR PROBLEM 5
Atrial fibrillation, unspecified type
Chronic atrial fibrillation
Chronic atrial fibrillation

## 2018-08-28 NOTE — PROGRESS NOTE ADULT - SUBJECTIVE AND OBJECTIVE BOX
Patient is a 89y old  Female who presents with a chief complaint of diabetic foot ulcer/ambulatory disfunction (24 Aug 2018 01:06)      INTERVAL HPI/OVERNIGHT EVENTS: Patient seen and examined. NAD. C/O right hip pain    Vital Signs Last 24 Hrs  T(C): 36.2 (28 Aug 2018 04:29), Max: 36.9 (27 Aug 2018 13:31)  T(F): 97.2 (28 Aug 2018 04:29), Max: 98.4 (27 Aug 2018 13:31)  HR: 57 (28 Aug 2018 04:29) (57 - 69)  BP: 124/57 (28 Aug 2018 04:29) (94/53 - 124/57)  BP(mean): --  RR: 16 (28 Aug 2018 04:29) (15 - 17)  SpO2: 95% (28 Aug 2018 04:29) (95% - 97%)    08-27    134<L>  |  96  |  59<H>  ----------------------------<  74  4.4   |  30  |  2.30<H>    Ca    8.9      27 Aug 2018 08:05  Mg     2.3     08-27                            10.1   9.67  )-----------( 291      ( 27 Aug 2018 08:05 )             32.1     PT/INR - ( 27 Aug 2018 08:05 )   PT: 25.6 sec;   INR: 2.31 ratio           CAPILLARY BLOOD GLUCOSE      POCT Blood Glucose.: 111 mg/dL (28 Aug 2018 07:43)  POCT Blood Glucose.: 129 mg/dL (27 Aug 2018 21:23)  POCT Blood Glucose.: 126 mg/dL (27 Aug 2018 16:49)  POCT Blood Glucose.: 123 mg/dL (27 Aug 2018 11:45)              ALBUTerol    90 MICROgram(s) HFA Inhaler 2 Puff(s) Inhalation every 6 hours PRN  bisacodyl 5 milliGRAM(s) Oral at bedtime  ceFAZolin   IVPB 2000 milliGRAM(s) IV Intermittent every 24 hours  cholecalciferol 1000 Unit(s) Oral daily  dextrose 40% Gel 15 Gram(s) Oral once PRN  dextrose 5%. 1000 milliLiter(s) IV Continuous <Continuous>  dextrose 50% Injectable 25 Gram(s) IV Push once  dextrose 50% Injectable 12.5 Gram(s) IV Push once  dextrose 50% Injectable 25 Gram(s) IV Push once  docusate sodium 100 milliGRAM(s) Oral three times a day  fentaNYL   Patch  12 MICROgram(s)/Hr 1 Patch Transdermal every 72 hours  glucagon  Injectable 1 milliGRAM(s) IntraMuscular once PRN  glycerin Suppository - Adult 1 Suppository(s) Rectal daily  guaiFENesin    Syrup 100 milliGRAM(s) Oral every 6 hours PRN  insulin glargine Injectable (LANTUS) 12 Unit(s) SubCutaneous at bedtime  insulin lispro (HumaLOG) corrective regimen sliding scale   SubCutaneous at bedtime  insulin lispro (HumaLOG) corrective regimen sliding scale   SubCutaneous three times a day before meals  insulin lispro Injectable (HumaLOG) 4 Unit(s) SubCutaneous three times a day before meals  lactobacillus acidophilus 1 Tablet(s) Oral three times a day with meals  lidocaine   Patch 1 Patch Transdermal daily  melatonin 3 milliGRAM(s) Oral at bedtime  morphine  - Injectable 2 milliGRAM(s) IV Push every 6 hours PRN  multivitamin/minerals 1 Tablet(s) Oral daily  oxyCODONE  ER Tablet 10 milliGRAM(s) Oral <User Schedule>  pantoprazole    Tablet 40 milliGRAM(s) Oral before breakfast  polyethylene glycol 3350 17 Gram(s) Oral two times a day  propranolol LA 60 milliGRAM(s) Oral daily  psyllium Powder 1 Packet(s) Oral daily  senna 2 Tablet(s) Oral at bedtime  simvastatin 10 milliGRAM(s) Oral at bedtime  sodium chloride 0.9%. 1000 milliLiter(s) IV Continuous <Continuous>  sodium chloride 0.9%. 1000 milliLiter(s) IV Continuous <Continuous>    Surgical Pathology Final Report -  (08.20.18 @ 11:29)    Surgical Pathology Final Report - :   ACCESSION No:  [id number]    DWAYNE DONAHUE                     3        Addendum Report          Addendum Reason  Block submitted to [facility] for additional studies.    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    See attached immunohistochemistry analysis report from [facility].      The immunohistochemical test was performed by an outside  laboratory.  For all patient care and clinical decision making  purposes refer solely to original [facility] laboratory  report.  The information transcribed here is not the entire  report.  This shortened version has been placed herefor the  purpose of the electronic record.      The immunohistochemical tests have been developed and their  performance characteristics determined by [facility].  They have not been cleared or approved by the  [place] Food and Drug Administration.  The FDA has determined that  such clearance or approval is not necessary.  These tests are  used for clinical purposes.  The laboratory is certified under  "CLIA-88", and is qualified to perform high complexity clinical  testing. [facility] [employer] is a business unit of [employer] [employer] [facility], a wholly-owned subsidiary of  [employer] [employer].  Testing performed at  [facility]. [address] [place] [address], [address] [place] [address]. [phone]. Ke Peterson, Medical  Director. CLIA#  [id number].    Cari Florentino MD  (Electronic Signature)      Surgical Final Report          Final Diagnosis  Right pelvic bone, CT-guided biopsy:  - Metastatic adenocarcinoma, 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            DWAYNE DONAHUE                     3        Surgical Final Report            Note: Report pending immunohistochemical stain workup to evaluate  for primary origin of tumor.Results to follow in an addendum.      Key portions of this case were reviewed in intradepartmental  consultation with Dr. Cordova, with concurrence of  diagnosis.    Cari Florentino MD  (Electronic Signature)  Reported on: 08/21/18    Clinical Information  Bone lesion, leg pain  Procedure: CT-guided biopsy right pelvic bone    Specimen Description  Right pelvic bone    Gross Description  The specimen is received in formalin and the specimen container  is labeled: Right pelvic bone. It consists of two cores of white-  tan soft tissue fragments measuring 1.6 and 1.7 cm in length,  each averaging 0.1 cm in diameter. Entirely submitted. One  cassette.    In addition to other data that may appear on the specimen  container, the label has been inspected to confirm the presence  of the patient's name and date of birth.  DN 8/21/2018 6:51 AM      AAddendum Report            Addendum Reason  Block submitted to [facility] for additional studies.      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            DWAYNE DONAHUE                     3        AAddendum Report            See attached immunohistochemistry analysis report from [facility].      The immunohistochemical test was performed by an outside  laboratory.  For all patient care and clinical decision making  purposes refer solely to original [facility] laboratory  report.The information transcribed here is not the entire  report.  This shortened version has been placed here for the  purpose of the electronic record.      The immunohistochemical tests have been developed and their  performance characteristics determined by [facility].  They have not been cleared or approved by the  [place] Food and Drug Administration.  The FDA has determined that  such clearance or approval is not necessary.  These tests are  used for clinical purposes.  The laboratory is certified under  "CLIA-88", and is qualified to perform high complexity clinical  testing. [facility] is a business unit of [employer], a wholly-owned subsidiary of  [facility] [employer].  Testing performed at  [facility]. [address] [place] [address], [address] [place] [address]. [phone]. Ke Peterson, Medical  Director. CLIA#  [id number].    Cari Florentino MD  (Electronic Signature)              ALBUTerol    90 MICROgram(s) HFA Inhaler 2 Puff(s) Inhalation every 6 hours PRN  bisacodyl 5 milliGRAM(s) Oral at bedtime  ceFAZolin   IVPB 2000 milliGRAM(s) IV Intermittent every 12 hours  cholecalciferol 1000 Unit(s) Oral daily  dextrose 40% Gel 15 Gram(s) Oral once PRN  dextrose 5%. 1000 milliLiter(s) IV Continuous <Continuous>  dextrose 50% Injectable 25 Gram(s) IV Push once  dextrose 50% Injectable 12.5 Gram(s) IV Push once  dextrose 50% Injectable 25 Gram(s) IV Push once  docusate sodium 100 milliGRAM(s) Oral three times a day  fentaNYL   Patch  12 MICROgram(s)/Hr 1 Patch Transdermal every 72 hours  glucagon  Injectable 1 milliGRAM(s) IntraMuscular once PRN  glycerin Suppository - Adult 1 Suppository(s) Rectal daily  guaiFENesin    Syrup 100 milliGRAM(s) Oral every 6 hours PRN  insulin glargine Injectable (LANTUS) 12 Unit(s) SubCutaneous at bedtime  insulin lispro (HumaLOG) corrective regimen sliding scale   SubCutaneous at bedtime  insulin lispro (HumaLOG) corrective regimen sliding scale   SubCutaneous three times a day before meals  insulin lispro Injectable (HumaLOG) 4 Unit(s) SubCutaneous three times a day before meals  lactobacillus acidophilus 1 Tablet(s) Oral three times a day with meals  lidocaine   Patch 1 Patch Transdermal daily  melatonin 3 milliGRAM(s) Oral at bedtime  morphine  - Injectable 2 milliGRAM(s) IV Push every 6 hours PRN  multivitamin/minerals 1 Tablet(s) Oral daily  oxyCODONE  ER Tablet 10 milliGRAM(s) Oral <User Schedule>  pantoprazole    Tablet 40 milliGRAM(s) Oral before breakfast  polyethylene glycol 3350 17 Gram(s) Oral two times a day  propranolol LA 60 milliGRAM(s) Oral daily  psyllium Powder 1 Packet(s) Oral daily  senna 2 Tablet(s) Oral at bedtime  simvastatin 10 milliGRAM(s) Oral at bedtime  sodium chloride 0.9%. 1000 milliLiter(s) IV Continuous <Continuous>  sodium chloride 0.9%. 1000 milliLiter(s) IV Continuous <Continuous>  warfarin 1 milliGRAM(s) Oral once              REVIEW OF SYSTEMS:  CONSTITUTIONAL: No fever, no weight loss, or no fatigue  NECK: No pain, no stiffness  RESPIRATORY: No cough, no wheezing, no chills, no hemoptysis, No shortness of breath  CARDIOVASCULAR: No chest pain, no palpitations, no dizziness, no leg swelling  GASTROINTESTINAL: No abdominal pain. No nausea, no vomiting, no hematemesis; No diarrhea, no constipation. No melena, no hematochezia.  GENITOURINARY: No dysuria, no frequency, no hematuria, no incontinence  NEUROLOGICAL: No headaches, no loss of strength, no numbness, no tremors  SKIN: No itching, no burning  MUSCULOSKELETAL: No joint pain, no swelling; No muscle, no back, + RL extremity pain  PSYCHIATRIC: No depression, no mood swings,   HEME/LYMPH: No easy bruising, no bleeding gums  ALLERY AND IMMUNOLOGIC: No hives       Consultant(s) Notes Reviewed:  [X] YES  [ ] NO    PHYSICAL EXAM:  GENERAL: NAD  HEAD:  Atraumatic, Normocephalic  EYES: EOMI, PERRLA, conjunctiva and sclera clear  ENMT: No tonsillar erythema, exudates, or enlargement; Moist mucous membranes  NECK: Supple, No JVD  NERVOUS SYSTEM:  Awake & alert  CHEST/LUNG: Clear to auscultation bilaterally; No rales, rhonchi, wheezing,  HEART: Regular rate and rhythm  ABDOMEN: Soft, Nontender, Nondistended; Bowel sounds present  EXTREMITIES:  No clubbing, cyanosis, or edema  LYMPH: No lymphadenopathy noted  SKIN: No rashes      Advanced care planning discussed with patient/family [X] YES   [ ] NO    Advanced care planning discussed with patient/family. Advanced care planning forms reviewed/discussed/completed. 20 minutes spent.

## 2018-08-28 NOTE — H&P ADULT - ASSESSMENT
89 y.o woman with multiple comorbidities presented with several weeks h/o of bony pain and recent inability to ambulate du to pain in the foot. Ct scan revealed destructive bony lesions concerning for metastasis and liver lesions. 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 Noted to have staph aurues bacteremia likely from left hallux abscess.The primary source of malignancy is unclear,. 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, primary unknown , now transfered to [facility] for rad-onc evaluation o the hip    Problem/Plan - 1:  ·  Problem: Right hip pain.  Plan: Right hip lesion concerning for malignancy  S/P right hip IR biopsy -- adenocarcinoma: moderately to poorly differentiated of pancreatobiliary/UGI primary  rad- onc consult      Problem/Plan - 2:  ·  Problem: MSSA bacteremia.  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  Plan: cw earlier regimen      Problem/Plan - 4:  ·  Problem: Metastatic cancer.  Plan onc, and rad-onc consult    Problem/Plan - 5:  ·  Problem: Atrial fibrillation, unspecified type.  Plan: Continue current medications  dose coumadin daily.

## 2018-08-28 NOTE — CONSULT NOTE ADULT - SUBJECTIVE AND OBJECTIVE BOX
HPI:  89 y.o woman h/o GI/pancreatic adenocarcinoma now with lytic lesions seen to the right hip and bony pelvis with a request for us to consider radiation treatment.  She was transferred from [facility].      She is also s/p left foot surgery complications from her diabetes. . Ct scan revealed destructive bony lesions concerning for metastasis and liver lesions. 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, primary unknown, possibly GI / pancreatic.     Her daughter was at bedside, pt is [place], KPS 50, cannot ambulate x ? weeks per the daughter.  She denies any pain at rest but indicates moderate pain with motion.  She uses a fentanyl patch for pain relief.     < from: CT Abdomen and Pelvis No Cont (08.15.18 @ 08:01) >  Impression:    Limited by lack of oral and IV contrast.  Inhomogeneous hepatic parenchyma suspicious for metastatic involvement.   Correlate with contrast-enhanced CT or MR.  Destructive osseous lesions right hip and pubis consistent with   neoplastic involvement.  Additional findings as discussed.      Allergies    Levaquin (Other)  ramipril (Other)  tetracycline (Hives; Rash)    Intolerances        ROS: [  ] Fever  [  ] Chills  [  ]Chest Pain [  ] SOB  [  ]Cough [  ] N/V  [  ] Diarrhea [  ]Constipation [X ]Other ROS: unable to walk  [  ] ROS otherwise negative    PAST MEDICAL & SURGICAL HISTORY:  OAB (overactive bladder)  GERD (gastroesophageal reflux disease)  Angina effort  Heart failure  Upper respiratory infection  Diabetes  Renal stones  Myocardial infarction: 1981  Spinal stenosis  CVA (cerebral infarction)  Afib  Raynaud disease  Acid reflux  Hypertension  Hyperlipidemia  Asthma  Cataract  S/P hysterectomy      FAMILY HISTORY:  No pertinent family history in first degree relatives      MEDICATIONS  (STANDING):  ceFAZolin   IVPB 2000 milliGRAM(s) IV Intermittent every 24 hours  fentaNYL   Patch  12 MICROgram(s)/Hr 1 Patch Transdermal every 72 hours  insulin glargine Injectable (LANTUS) 12 Unit(s) SubCutaneous at bedtime  insulin lispro Injectable (HumaLOG) 4 Unit(s) SubCutaneous three times a day before meals  lidocaine   Patch 1 Patch Transdermal daily  nitroglycerin    Patch 0.4 mG/Hr(s) 1 patch Transdermal daily  oxyCODONE  ER Tablet 10 milliGRAM(s) Oral every 12 hours  propranolol LA 60 milliGRAM(s) Oral daily  warfarin 1 milliGRAM(s) Oral once    MEDICATIONS  (PRN):  ALBUTerol    90 MICROgram(s) HFA Inhaler 2 Puff(s) Inhalation every 6 hours PRN Shortness of Breath and/or Wheezing      PHYSICAL EXAM  Vital Signs Last 24 Hrs  T(C): 36.7 (28 Aug 2018 11:50), Max: 36.7 (28 Aug 2018 11:50)  T(F): 98 (28 Aug 2018 11:50), Max: 98 (28 Aug 2018 11:50)  HR: 61 (28 Aug 2018 11:50) (57 - 74)  BP: 102/52 (28 Aug 2018 11:50) (102/52 - 129/66)  BP(mean): --  RR: 18 (28 Aug 2018 11:50) (16 - 18)  SpO2: 97% (28 Aug 2018 11:50) (95% - 97%)    General: Well nourished, well developed, no acute distress  HEENT: NC/AT; EOMI, PERRL, sclera nonicteric; external ears normal; no rhinorrhea or epistaxis; mucous membranes moist; oropharynx clear and without erythema  CV: NR, RR; no appreciable r/m/g  Lungs: CTAB, no increased work of breathing  [place]: Bowel sounds present; soft, NTND  MSK: Vertebral spine non-tender to palpation  Neuro: AAOx3; cranial nerves II-XII intact; strength 5/5 in upper and 4/5 lower extremities; sensation to light touch in tact bilaterally.  unable to raise her right foot/leg off the bed.  left foot s/p surgery, bandage wrap present.   Psych: Full affect; mood congruent  Skin: no visible rashes on limited examination           ASSESSMENT/PLAN    DWAYNE DONAHUE is a 89y woman with unknown primary, likely GI/pancreatic, + adenocarcinoma from right hip bone biopsy with c/o pain and unable to ambulate for weeks.   Plan to CT simulate tomorrow followed by 5 fractions once we start treatment.     We discussed the use of palliative radiation in this setting, namely to improve quality of life through the reduction of symptoms.  We talked about the risks, benefits, acute and long term side effects, as well as expected treatment outcomes.  He/She was given the opportunity to ask questions, which were answered to his/her apparent satisfaction.  He/She provided written consent to proceed with radiation therapy. We will arrange for inpatient treatment.  [phone]

## 2018-08-28 NOTE — PROGRESS NOTE ADULT - PROBLEM SELECTOR PROBLEM 6
Constipation
Essential hypertension
Essential hypertension

## 2018-08-28 NOTE — PROGRESS NOTE ADULT - PROBLEM SELECTOR PLAN 1
Right hip lesion concerning for malignancy  S/P right hip IR biopsy -- adenocarcinoma: moderately to poorly differentiated of pancreatobiliary/UGI primary  Ortho/onc f/u  Being transferred to [facility] for radiation therapy evaluation

## 2018-08-28 NOTE — CONSULT NOTE ADULT - SUBJECTIVE AND OBJECTIVE BOX
Patient is a 89y old  Female who presents with a chief complaint of Onc workup (28 Aug 2018 12:16)      HPI:  89 y.o woman with multiple comorbidities presented with several weeks h/o of bony pain and recent inability to ambulate due to pain in the foot. Ct scan revealed destructive bony lesions concerning for metastasis and liver lesions. 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 Noted to have staph aurues bacteremia likely from left hallux abscess. The primary source of malignancy is unclear. 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  WBC 10.7.        REVIEW OF SYSTEMS:    CONSTITUTIONAL: No fever, weight loss, or fatigue  EYES: No eye pain, visual disturbances, or discharge  ENMT:  No sore throat  NECK: No pain or stiffness  RESPIRATORY: No cough, wheezing, chills or hemoptysis; No shortness of breath  CARDIOVASCULAR: No chest pain, palpitations, dizziness, or leg swelling  GASTROINTESTINAL: No abdominal or epigastric pain. No nausea, vomiting, or hematemesis; No diarrhea or constipation. No melena or hematochezia.  GENITOURINARY: No dysuria, frequency, hematuria, or incontinence  NEUROLOGICAL: No headaches, memory loss, loss of strength, numbness, or tremors  SKIN: No itching, burning, rashes, or lesions   LYMPH NODES: No enlarged glands  MUSCULOSKELETAL: No joint pain or swelling; No muscle, back, or extremity pain      PAST MEDICAL & SURGICAL HISTORY:  OAB (overactive bladder)  GERD (gastroesophageal reflux disease)  Angina effort  Heart failure  Upper respiratory infection  Diabetes  Renal stones  Myocardial infarction: 1981  Spinal stenosis  CVA (cerebral infarction)  Afib  Raynaud disease  Acid reflux  Hypertension  Hyperlipidemia  Asthma  Cataract  S/P hysterectomy      Allergies    Levaquin (Other)  ramipril (Other)  tetracycline (Hives; Rash)    Intolerances        FAMILY HISTORY:  No pertinent family history in first degree relatives      SOCIAL HISTORY:        MEDICATIONS  (STANDING):  ceFAZolin   IVPB 2000 milliGRAM(s) IV Intermittent every 24 hours  fentaNYL   Patch  12 MICROgram(s)/Hr 1 Patch Transdermal every 72 hours  insulin glargine Injectable (LANTUS) 12 Unit(s) SubCutaneous at bedtime  insulin lispro Injectable (HumaLOG) 4 Unit(s) SubCutaneous three times a day before meals  lidocaine   Patch 1 Patch Transdermal daily  morphine  - Injectable 2 milliGRAM(s) IV Push once  nitroglycerin    Patch 0.4 mG/Hr(s) 1 patch Transdermal daily  oxyCODONE  ER Tablet 10 milliGRAM(s) Oral every 12 hours  propranolol LA 60 milliGRAM(s) Oral daily  warfarin 1 milliGRAM(s) Oral once    MEDICATIONS  (PRN):  ALBUTerol    90 MICROgram(s) HFA Inhaler 2 Puff(s) Inhalation every 6 hours PRN Shortness of Breath and/or Wheezing      Vital Signs Last 24 Hrs  T(C): 36.7 (28 Aug 2018 11:50), Max: 36.9 (27 Aug 2018 13:31)  T(F): 98 (28 Aug 2018 11:50), Max: 98.4 (27 Aug 2018 13:31)  HR: 61 (28 Aug 2018 11:50) (57 - 74)  BP: 102/52 (28 Aug 2018 11:50) (94/53 - 129/66)  BP(mean): --  RR: 18 (28 Aug 2018 11:50) (15 - 18)  SpO2: 97% (28 Aug 2018 11:50) (95% - 97%)    PHYSICAL EXAM:    GENERAL: NAD, well-groomed  HEAD:  Atraumatic, Normocephalic  EYES: EOMI, PERRLA, conjunctiva and sclera clear  ENMT: No tonsillar erythema, exudates, or enlargement; Moist mucous membranes  NECK: Supple, No JVD  CHEST/LUNG: Clear to percussion bilaterally; No rales, rhonchi, wheezing, or rubs  HEART: Regular rate and rhythm; No murmurs, rubs, or gallops  ABDOMEN: Soft, Nontender, Nondistended; Bowel sounds present  EXTREMITIES:  2+ Peripheral Pulses, No clubbing, cyanosis, or edema  LYMPH: No lymphadenopathy noted  SKIN: No rashes or lesions    LABS:  CBC Full  -  ( 28 Aug 2018 10:04 )  WBC Count : 10.79 K/uL  Hemoglobin : 10.7 g/dL  Hematocrit : 33.9 %  Platelet Count - Automated : 300 K/uL  Mean Cell Volume : 93.9 fl  Mean Cell Hemoglobin : 29.6 pg  Mean Cell Hemoglobin Concentration : 31.6 gm/dL  Auto Neutrophil # : x  Auto Lymphocyte # : x  Auto Monocyte # : x  Auto Eosinophil # : x  Auto Basophil # : x  Auto Neutrophil % : x  Auto Lymphocyte % : x  Auto Monocyte % : x  Auto Eosinophil % : x  Auto Basophil % : x      08-27    134<L>  |  96  |  59<H>  ----------------------------<  74  4.4   |  30  |  2.30<H>    Ca    8.9      27 Aug 2018 08:05  Mg     2.3     08-27      Sedimentation Rate, Erythrocyte: 92 mm/hr (08.15.18 @ 09:19)    C-Reactive Protein, Serum: 21.63 mg/dL (08.15.18 @ 11:49)            MICROBIOLOGY:    Culture - Tissue with Gram Stain (08.21.18 @ 21:28)    -  Oxacillin: S <=0.25    -  Penicillin: R >8    -  Gentamicin: S <=4 Should not be used as monotherapy    -  RIF- Rifampin: S <=1 Should not be used as monotherapy    -  Tetra/Doxy: S <=4    -  Trimethoprim/Sulfamethoxazole: S <=0.5/9.5    -  Vancomycin: S 1    Gram Stain:   No polymorphonuclear cells seen  No organisms seen    -  Ampicillin/Sulbactam: S <=8/4    -  Cefazolin: S <=4    -  Clindamycin: R >4    -  Erythromycin: R >4    Specimen Source: .Tissue left 1st metatarsal head    Culture Results:   Rare Staphylococcus aureus    Organism Identification: Staphylococcus aureus    Organism: Staphylococcus aureus    Method Type: BUCK    Culture - Urine (08.18.18 @ 10:12)    Specimen Source: .Urine Clean Catch (Midstream)    Culture Results:   No growth    Culture - Blood (08.17.18 @ 23:48)    Specimen Source: .Blood Blood-Peripheral    Culture Results:   No growth at 5 days.    Culture - Blood in AM (08.17.18 @ 10:59)    Specimen Source: .Blood Blood-Peripheral    Culture Results:   No growth at 5 days.    Culture - Blood (08.16.18 @ 08:42)    Specimen Source: .Blood Blood-Venous    Culture Results:   No growth at 5 days.    Culture - Blood (08.16.18 @ 08:42)    Specimen Source: .Blood Blood-Venous    Culture Results:   No growth at 5 days.      Culture - Other (08.15.18 @ 00:41)    -  Tetra/Doxy: S <=1    -  Trimethoprim/Sulfamethoxazole: S <=0.5/9.5    -  RIF- Rifampin: S <=1 Should not be used as monotherapy    -  Penicillin: R >8    -  Oxacillin: S <=0.25    -  Gentamicin: S 2 Should not be used as monotherapy    -  Vancomycin: S 2    -  Ampicillin/Sulbactam: S <=8/4    -  Erythromycin: R >4    -  Clindamycin: R >4    -  Cefazolin: S <=4    Specimen Source: .Other left toe    Culture Results:   Moderate Staphylococcus aureus    Organism Identification: Staphylococcus aureus    Organism: Staphylococcus aureus    Method Type: BUCK        Culture - Blood (08.14.18 @ 21:27)    -  Staphylococcus aureus: Detec Any isolate of Staphylococcus aureus from a blood culture is NOT considered a contaminant.    -  Vancomycin: S 2    Gram Stain:   Growth in aerobic bottle: Gram Positive Cocci in Clusters  Growth in anaerobic bottle: Gram Positive Cocci in Clusters    -  Ampicillin/Sulbactam: S <=8/4    -  Cefazolin: S <=4    -  Clindamycin: R >4    -  Erythromycin: R >4    -  Gentamicin: S <=1 Should not be used as monotherapy    -  Penicillin: R >8    -  Oxacillin: S <=0.25    -  RIF- Rifampin: S <=1 Should not be used as monotherapy    -  Tetra/Doxy: S <=1    -  Trimethoprim/Sulfamethoxazole: S <=0.5/9.5    Specimen Source: .Blood Blood    Organism: Blood Culture PCR    Organism: Staphylococcus aureus    Culture Results:   Growth in aerobic and anaerobic bottles: Staphylococcus aureus  "Due to technical problems, Proteus sp. will Not be reported as part of  the BCID panel until further notice"  ***Blood Panel PCR results on this specimen are available  approximately 3 hours after the Gram stain result.***  Gram stain, PCR, and/or culture results may not always  correspond due to difference in methodologies.  ************************************************************  This PCR assay was performed using [employer].  The following targets are tested for: Enterococcus,  vancomycin resistant enterococci, Listeria monocytogenes,  coagulase negative staphylococci, S. aureus,  methicillin resistant S. aureus, Streptococcus agalactiae  (Group B), S. pneumoniae, S. pyogenes (Group A),  Acinetobacter baumannii, Enterobacter cloacae, E. coli,  Klebsiella oxytoca, K. pneumoniae, Proteus sp.,  Serratia marcescens, Haemophilus influenzae,  Neisseria meningitidis, Pseudomonas aeruginosa, Candida  albicans, C. glabrata, C krusei, C parapsilosis,  C. tropicalis and the KPC resistance gene.    Organism Identification: Blood Culture PCR  Staphylococcus aureus    Method Type: PCR    Method Type: BUCK                      RADIOLOGY:    < from: US Guided Vascular Access (08.23.18 @ 14:40) >  INTERPRETATION: There was normal course of the catheter and guidewire to   the level of the superior vena cava. A 4 [place] single lumen catheter was   utilized. Catheter length was 45cm.    The patient tolerated the procedure well and was discharged from the   interventional suite in stable condition.         Thank you for this referral.    < end of copied text >        < from: Xray Foot AP + Lateral + Oblique, Left (08.21.18 @ 17:52) >  FINDINGS:  The resection of the left first metatarsal with postsurgical changes.  There is no fracture or dislocation.  The joint spaces are preserved.  There are no lytic or blastic bony lesions.    IMPRESSION:   Resection of the left first metatarsal with postsurgical changes    < end of copied text >        < from: CT Biopsy Bone Deep (08.20.18 @ 11:38) >  INTERPRETATION: A right side anterior approach was utilized to access the   lytic component medial to the right femoral vessels. Followup images   showed no evidence of hematoma.    Thank you for this referral.     < end of copied text >        < from: Xray Chest 1 View- PORTABLE-Urgent (08.17.18 @ 21:23) >  Comparison: 8/14/2018.    Single AP view submitted.  The patient is rotated.    The evaluation of the cardiomediastinal silhouette is limited on portable   technique.    There are low lung volumes resulting in crowding of the bronchovascular   markings at the lung bases.  There is minimal blunting at the right costophrenic angle either   representing trace pleural effusion and/or pleural thickening.  There is subsegmental atelectasis at both lung bases.    Impression:    Findings as discussed above.    < end of copied text >          < from: Xray Chest 1 View- PORTABLE-Urgent (08.17.18 @ 21:23) >  INTERPRETATION:  Chest portable.    Clinical History: Fever.    Comparison: 8/14/2018.    Single AP view submitted.  The patient is rotated.    The evaluation of the cardiomediastinal silhouette is limited on portable   technique.    There are low lung volumes resulting in crowding of the bronchovascular   markings at the lung bases.  There is minimal blunting at the right costophrenic angle either   representing trace pleural effusion and/or pleural thickening.  There is subsegmental atelectasis at both lung bases.    Impression:    Findings as discussed above.    < end of copied text >      < from: MR Hip No Cont, Right (08.15.18 @ 19:21) >  Impression:    Mildly expansile metastatic lesion within the anterior wall of the right   acetabulum with extension to the right superior pubic ramus. Additional   metastatic lesions are noted within the right ischium and midportion of   the right inferior pubic ramus. There is prominent edema within the right   inferior pubic ramus likely related to a superimposed pathologic stress   fracture. Prominent edema throughout the right adductor muscles about the   right inferior pubic ramus is may be related to underlying reactive   edema, muscle strain, and/or extension of metastatic disease.    Nonspecific small lesions within the left femoral head and left femoral   neck which may be related to additional metastatic foci.     < end of copied text >        < from: NM Bone Imaging Total (08.15.18 @ 11:23) >  IMPRESSION: Abnormal bone scan most consistent with neoplasm involving   the right pelvis.    Probable old trauma left mid rib.    Focally increased radiopharmaceutical uptake in the left great toe is   nonspecific, but given the history of left foot ulcer, if osteomyelitis   is a clinical consideration labeled leukocyte imaging is suggested for   further evaluation.    < end of copied text >            < from: CT Pelvis Bony Only No Cont (08.14.18 @ 18:40) >  INTERPRETATION:  Clinical information: Fall, pain    Axial images obtained, coronal and sagittal images computer reformatted.     Destructive lesion present in the right hip. Follow-up with MRI to   further evaluate this finding. A portion of the right acetabulum is   absent. Similar findings visible in the right pubic bones, two  locations.    Femoral heads, femoral necks intact. SI joints intact.    Urinary bladder is filled. Diverticulosis present sigmoid region.   Inguinal regions intact. Multilevel disc degenerative disease lumbar   region.    IMPRESSION: Evidence of bone destruction in the right acetabulum, right   pubic bones suspicious for neoplasm. Follow-up with MRI.    < end of copied text >          < from: CT Chest No Cont (08.14.18 @ 18:22) >  INTERPRETATION:  Clinical information: Cough    Comparison study dated 1/29/2017    Axial images obtained, coronal and sagittal images computer reformatted.   IV contrast material not administered limiting evaluation.    The patient is rotated in the gantry towards the right.    Aorta shows atherosclerotic changes. No pericardial effusion. Coronary   artery calcifications, cardiac valve calcifications present.    Central airway intact. Thyroid gland not enlarged. No mediastinal lesions   evident, evaluation limited due to lack of IV contrast. No hilar lesions   evident, evaluation limited due to lack of IV contrast.    Trace right pleural effusion. Pleural calcification right lung base.   Minimal bullous changes. No lobar consolidation.    No acute osseous abnormalities. Levoscoliosis dorsal spine. Spleen is not   enlarged. Calcification present mid polar region left kidney. Hypodensity   in the upper pole of left kidney has the appearance of a cyst. The left   kidney is not totally included on this exam. Trace ascites anterior to   the right hepatic lobe.    IMPRESSION: Trace right pleural effusion. See additional findings as   described above.    < end of copied text >

## 2018-08-28 NOTE — PROGRESS NOTE ADULT - PROBLEM SELECTOR PROBLEM 1
Diabetes mellitus type 2, uncontrolled
Right hip pain
Diabetes mellitus type 2, uncontrolled
Right hip pain
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
Unsteady gait
Unsteady gait
Right hip pain
Right hip pain

## 2018-08-28 NOTE — H&P ADULT - HISTORY OF PRESENT ILLNESS
89 y.o woman with multiple comorbidities presented with several weeks h/o of bony pain and recent inability to ambulate du to pain in the foot. Ct scan revealed destructive bony lesions concerning for metastasis and liver lesions. 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 Noted to have staph aurues bacteremia likely from left hallux abscess.The primary source of malignancy is unclear,. 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

## 2018-08-28 NOTE — H&P ADULT - NSHPLABSRESULTS_GEN_ALL_CORE
Lab Results:  CBC  CBC Full  -  ( 28 Aug 2018 10:04 )  WBC Count : 10.79 K/uL  Hemoglobin : 10.7 g/dL  Hematocrit : 33.9 %  Platelet Count - Automated : 300 K/uL  Mean Cell Volume : 93.9 fl  Mean Cell Hemoglobin : 29.6 pg  Mean Cell Hemoglobin Concentration : 31.6 gm/dL  Auto Neutrophil # : x  Auto Lymphocyte # : x  Auto Monocyte # : x  Auto Eosinophil # : x  Auto Basophil # : x  Auto Neutrophil % : x  Auto Lymphocyte % : x  Auto Monocyte % : x  Auto Eosinophil % : x  Auto Basophil % : x    .		Differential:	[] Automated		[] Manual  Chemistry                        10.7   10.79 )-----------( 300      ( 28 Aug 2018 10:04 )             33.9     08-27    134<L>  |  96  |  59<H>  ----------------------------<  74  4.4   |  30  |  2.30<H>    Ca    8.9      27 Aug 2018 08:05  Mg     2.3     08-27        PT/INR - ( 28 Aug 2018 10:04 )   PT: 25.7 sec;   INR: 2.32 ratio                   MICROBIOLOGY/CULTURES:  Culture Results:   Rare Staphylococcus aureus (08-21 @ 21:28)      RADIOLOGY RESULTS: reviewed

## 2018-08-28 NOTE — H&P ADULT - NSHPPHYSICALEXAM_GEN_ALL_CORE
General:frail  Neurology: A&Ox3, nonfocal, BALLESTEROS x 4  Respiratory: CTA B/L  CV: RRR, S1S2, no murmurs, rubs or gallops  Abdominal: Soft, NT, ND +BS, Last BM  Extremities: No edema, + peripheral pulses  Skin Normal

## 2018-08-28 NOTE — CONSULT NOTE ADULT - SUBJECTIVE AND OBJECTIVE BOX
[facility] Surgical Oncology Consultation Note   Consultation of Dr. Preet Peters       HPI:  Allison is a very pleasant 89 year old female with multiple comorbidities presented with several weeks of right hip pain and recent inability to ambulate du to pain in the foot. This promoted workup which revealed metastatic adenocarcinoma to the bone. She states that she noticed weight loss ( 30-40 Ib in 5 months) with dark stool. No N/V, diarrhea or constipation. She has no abdominal pain. She has no fevers or night sweats. Her last colonoscopy was 40 years ago. Her last mammogram was 27 years ago. Her daughter was diagnosed with breast cancer in her fifties. Her son was diagnosed with renal cancer in his fifties.   As part of the workup, her CT scan revealed destructive bony lesions concerning for metastasis and liver lesions. A PET scan showed increased uptake in the right hip. 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       PAST MEDICAL & SURGICAL HISTORY:  OAB (overactive bladder)  GERD (gastroesophageal reflux disease)  Angina effort  Heart failure  Upper respiratory infection  Diabetes  Renal stones  Myocardial infarction: 1981  Spinal stenosis  CVA (cerebral infarction)  Afib  Raynaud disease  Acid reflux  Hypertension  Hyperlipidemia  Asthma  Cataract  S/P hysterectomy      FAMILY HISTORY:  Her daughter was diagnosed with breast cancer in her fifties. Her son was diagnosed with renal cancer in his fifties.     SOCIAL HISTORY: quit 50 years ago, smoked for 20 years     MEDICATIONS  (STANDING):  ceFAZolin   IVPB 2000 milliGRAM(s) IV Intermittent every 24 hours  dextrose 5%. 1000 milliLiter(s) (50 mL/Hr) IV Continuous <Continuous>  dextrose 50% Injectable 12.5 Gram(s) IV Push once  dextrose 50% Injectable 25 Gram(s) IV Push once  dextrose 50% Injectable 25 Gram(s) IV Push once  docusate sodium 100 milliGRAM(s) Oral three times a day  fentaNYL   Patch  12 MICROgram(s)/Hr 1 Patch Transdermal every 72 hours  insulin glargine Injectable (LANTUS) 12 Unit(s) SubCutaneous at bedtime  insulin lispro (HumaLOG) corrective regimen sliding scale   SubCutaneous three times a day before meals  insulin lispro Injectable (HumaLOG) 4 Unit(s) SubCutaneous three times a day before meals  lactobacillus acidophilus 1 Tablet(s) Oral three times a day with meals  lidocaine   Patch 1 Patch Transdermal daily  nitroglycerin    Patch 0.4 mG/Hr(s) 1 patch Transdermal daily  oxyCODONE  ER Tablet 10 milliGRAM(s) Oral every 12 hours  pantoprazole    Tablet 40 milliGRAM(s) Oral before breakfast  propranolol LA 60 milliGRAM(s) Oral daily  senna 2 Tablet(s) Oral at bedtime  warfarin 1 milliGRAM(s) Oral once    MEDICATIONS  (PRN):  ALBUTerol    90 MICROgram(s) HFA Inhaler 2 Puff(s) Inhalation every 6 hours PRN Shortness of Breath and/or Wheezing  dextrose 40% Gel 15 Gram(s) Oral once PRN Blood Glucose LESS THAN 70 milliGRAM(s)/deciLiter  glucagon  Injectable 1 milliGRAM(s) IntraMuscular once PRN Glucose <70 milliGRAM(s)/deciLiter  polyethylene glycol 3350 17 Gram(s) Oral two times a day PRN Constipation    Allergies    Levaquin (Other)  ramipril (Other)  tetracycline (Hives; Rash)    Intolerances        Vital Signs Last 24 Hrs  T(C): 36.8 (28 Aug 2018 21:21), Max: 36.8 (28 Aug 2018 21:21)  T(F): 98.3 (28 Aug 2018 21:21), Max: 98.3 (28 Aug 2018 21:21)  HR: 84 (28 Aug 2018 21:21) (57 - 84)  BP: 123/88 (28 Aug 2018 21:21) (102/52 - 129/66)  BP(mean): --  RR: 18 (28 Aug 2018 21:21) (16 - 18)  SpO2: 96% (28 Aug 2018 21:21) (95% - 97%)  Daily Height in cm: 154.94 (28 Aug 2018 11:50)    Daily     Gen; NAD, icteric   Resp: CTA b/l   CV: RRR  Abd: soft, ND, NTTP   Ext: no swelling                         10.7   10.79 )-----------( 300      ( 28 Aug 2018 10:04 )             33.9     08-27    134<L>  |  96  |  59<H>  ----------------------------<  74  4.4   |  30  |  2.30<H>    Ca    8.9      27 Aug 2018 08:05  Mg     2.3     08-27      PT/INR - ( 28 Aug 2018 10:04 )   PT: 25.7 sec;   INR: 2.32 ratio         Radiographic Findings:   MRI hips:   Mildly expansile metastatic lesion within the anterior wall of the right   acetabulum with extension to the right superior pubic ramus. Additional   metastatic lesions are noted within the right ischium and midportion of   the right inferior pubic ramus. There is prominent edema within the right   inferior pubic ramus likely related to a superimposed pathologic stress   fracture. Prominent edema throughout the right adductor muscles about the   right inferior pubic ramus is may be related to underlying reactive   edema, muscle strain, and/or extension of metastatic disease.    Nonspecific small lesions within the left femoral head and left femoral   neck which may be related to additional metastatic foci.     PET scan:   Abnormal bone scan most consistent with neoplasm involving   the right pelvis.    Probable old trauma left mid rib.    Focally increased radiopharmaceutical uptake in the left great toe is   nonspecific, but given the history of left foot ulcer, if osteomyelitis   is a clinical consideration labeled leukocyte imaging is suggested for   further evaluation.    CT abdomen:  Limited by lack of oral and IV contrast.  Inhomogeneous hepatic parenchyma suspicious for metastatic involvement.   Correlate with contrast-enhanced CT or MR.  Destructive osseous lesions right hip and pubis consistent with   neoplastic involvement.  Additional findings as discussed.    CT pelvis:  Evidence of bone destruction in the right acetabulum, right   pubic bones suspicious for neoplasm. Follow-up with MRI.          Assessment:   Allison is a very pleasant 89 year old female presented with several weeks of right hip pain, found to have metastatic poorly differentiated adenocarcinoma, unknown primary, awaiting radiation therapy.    - No surgical intervention indicated at this time. Local therapy with radiation is planned this week. There is a high likelihood of intestinal malignancy, however, she does not have obstructive symptoms or symptomatic anemia, therefore, no further diagnostic studies is needed from surgical oncology standpoint.  - Discussed with Dr. Petres    [id number]

## 2018-08-28 NOTE — PROGRESS NOTE ADULT - PROBLEM SELECTOR PLAN 6
add laxatives and stool softeners
continue home meds
continue home meds  stable

## 2018-08-28 NOTE — PROGRESS NOTE ADULT - SUBJECTIVE AND OBJECTIVE BOX
89yo female seen bedside s/p left 1st metatarsal head resection (DOS 8/21/18). Patient denies pain to her foot at this time. Discharge planning in progress according to patient.    Vital Signs Last 24 Hrs  T(C): 36.2 (28 Aug 2018 04:29), Max: 36.9 (27 Aug 2018 13:31)  T(F): 97.2 (28 Aug 2018 04:29), Max: 98.4 (27 Aug 2018 13:31)  HR: 57 (28 Aug 2018 04:29) (57 - 69)  BP: 124/57 (28 Aug 2018 04:29) (94/53 - 124/57)  BP(mean): --  RR: 16 (28 Aug 2018 04:29) (15 - 17)  SpO2: 95% (28 Aug 2018 04:29) (95% - 97%)                          10.1   9.67  )-----------( 291      ( 27 Aug 2018 08:05 )             32.1     08-27    134<L>  |  96  |  59<H>  ----------------------------<  74  4.4   |  30  |  2.30<H>    Ca    8.9      27 Aug 2018 08:05  Mg     2.3     08-27            Objective: left foot focused  Vasc: DP and PT 2/4; CFT < 3 seconds x5; TG WNL; no edema noted  Derm:  -sutures intact to medial aspect of 1st MPJ well coapted without dehiscence   Neuro: epicritic sensation intact  Ortho:  1st MPJ extensor contracture noted

## 2018-08-28 NOTE — CONSULT NOTE ADULT - PROBLEM SELECTOR RECOMMENDATION 2
- likely in setting of bone mets  - continue to trend   - avoid hepatotoxins  - check US Abdomen - likely in setting of bone mets vs in setting of primary source of ca  - continue to trend   - avoid hepatotoxins  - alpha fetoprotein elevated  - may consider checking CA 19-9, ; (CEA is within normal range)  - check US Abdomen  - may consider checking MRI Abd/Liver/Pancreas if within palliative goc

## 2018-08-28 NOTE — PROGRESS NOTE ADULT - PROBLEM SELECTOR PLAN 5
Continue current medications  Change to dvt prophylaxis lovenox
Continue current medications  dose coumadin daily
Continue current medications  Change to dvt prophylaxis lovenox
Continue current medications  Change to dvt prophylaxis lovenox / heparin  d/w cardio
Continue current medications  Change to dvt prophylaxis lovenox / heparin  d/w cardio, restart coumadin
Continue current medications  Change to dvt prophylaxis lovenox / heparin  d/w cardio, restart coumadin
Continue current medications  Restarted on coumadin  Dose daily
Continue current medications  Restarted on coumadin  Dose daily
Continue current medications  dose coumadin daily
rate control  cardio eval with Dr. ASH
rate control  cardio eval with Dr. ASH appreciated  AC on hold

## 2018-08-28 NOTE — PROGRESS NOTE ADULT - NSHPATTENDINGPLANDISCUSS_GEN_ALL_CORE
attending
pt, pt's daughter, primary team
attending
attending, RN
attending, daughter
patient and RN
patient and dtr at bedside
patient and RN
patient and dtr at bedside
patient and son at bedside
patient, family and consultants

## 2018-08-28 NOTE — PROGRESS NOTE ADULT - ATTENDING COMMENTS
Patient seen and evaluated with attending Dr. Killian  Wound cleansed and flushed with normal saline, dressed with silver alginate, DSD  Discussed surgical intervention via resection of infected bone with the patient and family bedside, possibly early next week after her bone biopsy of the hip tomorrow.   Recommend foot MRI  Recommend continuation of IV antibiotics and ID consult  Recommend vascular consult due to non-palpable pulses      Wound care instructions:  1. cleanse wound with normal saline, pat dry  2. apply silver alginate to wound, secure with 4x4 gauze, abd pad and janessa wrap  3. change daily  Please have patient follow up in Dr. Killian's wound care clinic within 1 week of discharge.
Chart reviewed    Patient seen and examined    Agree with plan as outlined above
I saw and examined the patient personally. Spoke with above provider regarding this case. I reviewed the above findings completely.  I agree with the above history, physical, and plan which I have edited where appropriate.
Patient seen and evaluated   Wound cleansed and dressed with silver alginate, DSD  Discussed surgical intervention via resection of infected bone, patient agrees to the procedure.  Consent was obtained, witnessed signed and placed in chart for left 1st metatarsal head resection and possible proximal phalanx resection for Monday 8/20/18  All questions and concerns were answered to the fullest extent and capability.  Please provide the appropriate medical and/or cardio clearances.  Recommend foot MRI  Recommend continuation of IV antibiotics and ID consult  Recommend vascular consult due to non-palpable pulses
Patient seen and evaluated   Wound cleansed and dressed with silver alginate, DSD  Please provide the appropriate medical and/or cardio clearances for surgical procedure on Monday 8/20/18 for left 1st metatarsal head resection  Follow up ID recommendations  Recommend vascular consult due to non-palpable pulses.
Patient seen and evaluated  Wound cleansed and dressed with silver alginate DSD  Follow up surgical microbiology and pathology  Recommend ID evaluation and continuation of IV antibiotics  Patient to be full weightbearing to left foot in surgical shoe at all times to tolerance.  Recommend PT evaluation  Please have patient follow up in Dr. Killian's wound care clinic within 1 week of discharge.    Wound care instructions:  1. cleanse wound with normal saline, pat dry  2. apply silver alginate and secure with 4x4 gauze, abd padding, janessa and ace wrap  3. change daily.
Patient seen and evaluated  Wound cleansed and dressed with silver alginate DSD  Follow up surgical microbiology and pathology  Recommend ID evaluation and continuation of IV antibiotics  Please have patient follow up in Dr. Killian's wound care clinic within 1 week of discharge.    Wound care instructions:  1. cleanse wound with normal saline, pat dry  2. apply silver alginate and secure with 4x4 gauze, abd padding, janessa and ace wrap  3. change daily
Patient seen and evaluated  Wound cleansed and dressed with silver alginate DSD  Surgical pathology results reviewed, patient stable for discharge per podiatry  Follow up ID recommendations  Patient to be full weightbearing to left foot in surgical shoe at all times to tolerance, PT evaluation  Please have patient follow up in Dr. Killian's wound care clinic within 1 week of discharge.  Sutures to be removed in 2 weeks from date of surgery    Wound care instructions:  1. cleanse wound with normal saline, pat dry  2. apply silver alginate and secure with 4x4 gauze, abd padding, janessa and ace wrap  3. change daily.
Patient seen and evaluated  Wound cleansed and dressed with silver alginate DSD  Surgical pathology results reviewed, patient stable for discharge per podiatry  Recommend ID evaluation and continuation of IV antibiotics  Patient to be full weightbearing to left foot in surgical shoe at all times to tolerance.  Recommend PT evaluation  Please have patient follow up in Dr. Killian's wound care clinic within 1 week of discharge.    Wound care instructions:  1. cleanse wound with normal saline, pat dry  2. apply silver alginate and secure with 4x4 gauze, abd padding, janessa and ace wrap  3. change daily.
Patient seen and evaluated  Wound cleansed and dressed with silver alginate DSD  Surgical pathology results reviewed, patient stable for discharge per podiatry  Recommend ID evaluation and continuation of IV antibiotics  Patient to be full weightbearing to left foot in surgical shoe at all times to tolerance.  Recommend PT evaluation  Please have patient follow up in Dr. Killian's wound care clinic within 1 week of discharge.    Wound care instructions:  1. cleanse wound with normal saline, pat dry  2. apply silver alginate and secure with 4x4 gauze, abd padding, janessa and ace wrap  3. change daily.
Patient seen and evaluated  Wound cleansed and dressed with silver alginate DSD  Surgical pathology results reviewed, patient stable for discharge per podiatry, and to follow up in Dr. Killian's wound care clinic within 1 week of discharge.  Follow up ID and PT recommendations    Wound care instructions:  1. cleanse wound with normal saline, pat dry  2. apply silver alginate and secure with 4x4 gauze, abd padding, janessa and ace wrap  3. change daily.
Seen/examined. Agree with above.
The patient was personally seen and examined, in addition to being examined and evaluated by NP.  All elements of the note were edited where appropriate.
Chart reviewed    Patient seen and examined    Agree with plan as outlined above
I saw and examined the patient personally. Spoke with above provider regarding this case. I reviewed the above findings completely.  I agree with the above history, physical, and plan which I have edited where appropriate.

## 2018-08-28 NOTE — PROGRESS NOTE ADULT - PROBLEM SELECTOR PROBLEM 2
MSSA bacteremia
Weakness
Weakness
MSSA bacteremia
MSSA bacteremia

## 2018-08-28 NOTE — PROGRESS NOTE ADULT - PROBLEM SELECTOR PROBLEM 3
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
OAB (overactive bladder)
OAB (overactive bladder)
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

## 2018-08-28 NOTE — CONSULT NOTE ADULT - PROBLEM SELECTOR RECOMMENDATION 3
- intermittent   - no obstructive symptoms as stools at present soft  - keep on bowel regimen while on pain medications

## 2018-08-28 NOTE — CONSULT NOTE ADULT - SUBJECTIVE AND OBJECTIVE BOX
HPI:  89 y.o woman with multiple comorbidities presented with several weeks h/o of bony pain and recent inability to ambulate du to pain in the foot. Ct scan revealed destructive bony lesions concerning for metastasis and liver lesions. 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 Noted to have staph aurues bacteremia likely from left hallux abscess.The primary source of malignancy is unclear,. 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 Patient follows up with PCP for diabetes management.    PAST MEDICAL & SURGICAL HISTORY:  OAB (overactive bladder)  GERD (gastroesophageal reflux disease)  Angina effort  Heart failure  Upper respiratory infection  Diabetes  Renal stones  Myocardial infarction: 1981  Spinal stenosis  CVA (cerebral infarction)  Afib  Raynaud disease  Acid reflux  Hypertension  Hyperlipidemia  Asthma  Cataract  S/P hysterectomy      FAMILY HISTORY:  No pertinent family history in first degree relatives      Social History:    Outpatient Medications:    MEDICATIONS  (STANDING):  ceFAZolin   IVPB 2000 milliGRAM(s) IV Intermittent every 24 hours  dextrose 5%. 1000 milliLiter(s) (50 mL/Hr) IV Continuous <Continuous>  dextrose 50% Injectable 12.5 Gram(s) IV Push once  dextrose 50% Injectable 25 Gram(s) IV Push once  dextrose 50% Injectable 25 Gram(s) IV Push once  docusate sodium 100 milliGRAM(s) Oral three times a day  fentaNYL   Patch  12 MICROgram(s)/Hr 1 Patch Transdermal every 72 hours  insulin glargine Injectable (LANTUS) 12 Unit(s) SubCutaneous at bedtime  insulin lispro (HumaLOG) corrective regimen sliding scale   SubCutaneous three times a day before meals  insulin lispro Injectable (HumaLOG) 4 Unit(s) SubCutaneous three times a day before meals  lactobacillus acidophilus 1 Tablet(s) Oral three times a day with meals  lidocaine   Patch 1 Patch Transdermal daily  nitroglycerin    Patch 0.4 mG/Hr(s) 1 patch Transdermal daily  oxyCODONE  ER Tablet 10 milliGRAM(s) Oral every 12 hours  pantoprazole    Tablet 40 milliGRAM(s) Oral before breakfast  propranolol LA 60 milliGRAM(s) Oral daily  senna 2 Tablet(s) Oral at bedtime  warfarin 1 milliGRAM(s) Oral once    MEDICATIONS  (PRN):  ALBUTerol    90 MICROgram(s) HFA Inhaler 2 Puff(s) Inhalation every 6 hours PRN Shortness of Breath and/or Wheezing  dextrose 40% Gel 15 Gram(s) Oral once PRN Blood Glucose LESS THAN 70 milliGRAM(s)/deciLiter  glucagon  Injectable 1 milliGRAM(s) IntraMuscular once PRN Glucose <70 milliGRAM(s)/deciLiter  polyethylene glycol 3350 17 Gram(s) Oral two times a day PRN Constipation      Allergies    Levaquin (Other)  ramipril (Other)  tetracycline (Hives; Rash)    Intolerances      Review of Systems:  Constitutional: No fever, no chills  Eyes: No blurry vision  Neuro: No tremors  HEENT: No pain, no neck swelling  Cardiovascular: No chest pain, no palpitations  Respiratory: Has SOB, no cough  GI: No nausea, vomiting, abdominal pain  : No dysuria  Skin: no rash  MSK: Has leg swelling.  Psych: no depression  Endocrine: no polyuria, polydipsia    ALL OTHER SYSTEMS REVIEWED AND NEGATIVE    UNABLE TO OBTAIN    PHYSICAL EXAM:  VITALS: T(C): 36.6 (08-28-18 @ 14:51)  T(F): 97.9 (08-28-18 @ 14:51), Max: 98 (08-28-18 @ 11:50)  HR: 74 (08-28-18 @ 14:51) (57 - 74)  BP: 111/77 (08-28-18 @ 14:51) (102/52 - 129/66)  RR:  (16 - 18)  SpO2:  (95% - 97%)  Wt(kg): --  GENERAL: NAD, well-groomed, well-developed  EYES: No proptosis, no lid lag  HEENT:  Atraumatic, Normocephalic  THYROID: Normal size, no palpable nodules  RESPIRATORY: Clear to auscultation bilaterally; No rales, rhonchi, wheezing  CARDIOVASCULAR: Si S2, No murmurs;  GI: Soft, non distended, normal bowel sounds  SKIN: Dry, intact, No rashes or lesions  MUSCULOSKELETAL: Has BL lower extremity edema.  NEURO:  no tremor, sensation decreased in feet BL,    POCT Blood Glucose.: 111 mg/dL (08-28-18 @ 07:43)  POCT Blood Glucose.: 129 mg/dL (08-27-18 @ 21:23)  POCT Blood Glucose.: 126 mg/dL (08-27-18 @ 16:49)  POCT Blood Glucose.: 123 mg/dL (08-27-18 @ 11:45)  POCT Blood Glucose.: 86 mg/dL (08-27-18 @ 07:30)  POCT Blood Glucose.: 92 mg/dL (08-26-18 @ 21:50)  POCT Blood Glucose.: 142 mg/dL (08-26-18 @ 16:50)  POCT Blood Glucose.: 190 mg/dL (08-26-18 @ 11:52)  POCT Blood Glucose.: 175 mg/dL (08-26-18 @ 07:36)  POCT Blood Glucose.: 189 mg/dL (08-25-18 @ 21:45)  POCT Blood Glucose.: 146 mg/dL (08-25-18 @ 16:40)                            10.7   10.79 )-----------( 300      ( 28 Aug 2018 10:04 )             33.9       08-27    134<L>  |  96  |  59<H>  ----------------------------<  74  4.4   |  30  |  2.30<H>    EGFR if : 21<L>  EGFR if non : 18<L>    Ca    8.9      08-27  Mg     2.3     08-27        Thyroid Function Tests:      Hemoglobin A1C, Whole Blood: 6.7 % <H> [4.0 - 5.6] (08-15-18 @ 07:58)          Radiology:

## 2018-08-28 NOTE — PROGRESS NOTE ADULT - SUBJECTIVE AND OBJECTIVE BOX
24 Hr Events:  Pt pain continues when moving in bed, but well controlled. No acute overnight events. no fevers, cp, sob, n/v.    Vital Signs Last 24 Hrs  T(C): 36.2 (28 Aug 2018 04:29), Max: 36.9 (27 Aug 2018 13:31)  T(F): 97.2 (28 Aug 2018 04:29), Max: 98.4 (27 Aug 2018 13:31)  HR: 57 (28 Aug 2018 04:29) (57 - 69)  BP: 124/57 (28 Aug 2018 04:29) (94/53 - 124/57)  BP(mean): --  RR: 16 (28 Aug 2018 04:29) (15 - 17)  SpO2: 95% (28 Aug 2018 04:29) (95% - 97%)    RLE:  No gross deformity  SILT L2-S1  EHL/FHL/TA/GSC intact  DP 2+, PT 2+  skin intact, no erythema/ ecchymosis   compartments soft and compressile  no calf tenderness  Decreased sensation to light tough dorsal/volar foot (unchanged from baseline per patient)     A/P: 89 F with multiple destructive Right pelvic lesions likely, suspected neoplasm w/ metastasis, s/p Left 1st Metatarsal Head Resection POD 8:    pain control  DVT ppx   NWB RLE  CT bone biopsy 8/20; Metastatic Adenocarcinoma, 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

## 2018-08-28 NOTE — PROGRESS NOTE ADULT - PROBLEM SELECTOR PLAN 3
S/P L hallux amputation  Continue iv cefazolin per ID  S/P OR debridement   Continue RISS
S/P L hallux amputation  Continue iv cefazolin per ID  S/P OR debridement   Continue RISS
S/P L hallux amputation  Continue iv cefazolin per ID  S/P OR debridement   Continue RISS  Further work-up/management pending clinical course.
S/P L hallux amputation  Continue iv cefazolin per ID  S/P OR debridement   Continue RISS  Further work-up/management pending clinical course.
With osteomyelitis clinically  Continue iv cefazolin  Podiatry f/u for OR debridement on Monday  May proceed to OR for debridement  Continue RISS  Further work-up/management pending clinical course.
With osteomyelitis clinically  Continue iv cefazolin per ID  Podiatry f/u for OR debridement on Monday cancelled by OR / anesthesia  May proceed to OR for debridement  Continue RISS  Further work-up/management pending clinical course.
With osteomyelitis clinically  Continue iv cefazolin per ID  S/P OR debridement   Continue RISS  Further work-up/management pending clinical course.
add oxybutinin
add oxybutinin
With osteomyelitis clinically  Continue iv abx  Podiatry f/u for OR debridement  Continue RISS  Further work-up/management pending clinical course.
With osteomyelitis clinically  Continue iv cefazolin  Podiatry f/u for OR debridement on Monday  Continue RISS  Further work-up/management pending clinical course.

## 2018-08-29 DIAGNOSIS — M86.9 OSTEOMYELITIS, UNSPECIFIED: ICD-10-CM

## 2018-08-29 LAB
ALBUMIN SERPL ELPH-MCNC: 1.9 G/DL — LOW (ref 3.3–5)
ALP SERPL-CCNC: 394 U/L — HIGH (ref 40–120)
ALT FLD-CCNC: < 4 U/L — LOW (ref 4–33)
APTT BLD: 47.8 SEC — HIGH (ref 27.5–37.4)
AST SERPL-CCNC: 75 U/L — HIGH (ref 4–32)
BILIRUB SERPL-MCNC: 0.3 MG/DL — SIGNIFICANT CHANGE UP (ref 0.2–1.2)
BUN SERPL-MCNC: 64 MG/DL — HIGH (ref 7–23)
CALCIUM SERPL-MCNC: 9.3 MG/DL — SIGNIFICANT CHANGE UP (ref 8.4–10.5)
CANCER AG125 SERPL-ACNC: 243 U/ML — HIGH
CANCER AG19-9 SERPL-ACNC: 336.5 U/ML — HIGH
CHLORIDE SERPL-SCNC: 94 MMOL/L — LOW (ref 98–107)
CO2 SERPL-SCNC: 24 MMOL/L — SIGNIFICANT CHANGE UP (ref 22–31)
CREAT SERPL-MCNC: 2.27 MG/DL — HIGH (ref 0.5–1.3)
GLUCOSE BLDC GLUCOMTR-MCNC: 119 MG/DL — HIGH (ref 70–99)
GLUCOSE BLDC GLUCOMTR-MCNC: 135 MG/DL — HIGH (ref 70–99)
GLUCOSE BLDC GLUCOMTR-MCNC: 70 MG/DL — SIGNIFICANT CHANGE UP (ref 70–99)
GLUCOSE BLDC GLUCOMTR-MCNC: 96 MG/DL — SIGNIFICANT CHANGE UP (ref 70–99)
GLUCOSE SERPL-MCNC: 70 MG/DL — SIGNIFICANT CHANGE UP (ref 70–99)
HCT VFR BLD CALC: 32.9 % — LOW (ref 34.5–45)
HGB BLD-MCNC: 10.2 G/DL — LOW (ref 11.5–15.5)
INR BLD: 2.19 — HIGH (ref 0.88–1.17)
MCHC RBC-ENTMCNC: 29.7 PG — SIGNIFICANT CHANGE UP (ref 27–34)
MCHC RBC-ENTMCNC: 31 % — LOW (ref 32–36)
MCV RBC AUTO: 95.6 FL — SIGNIFICANT CHANGE UP (ref 80–100)
NRBC # FLD: 0 — SIGNIFICANT CHANGE UP
PLATELET # BLD AUTO: 280 K/UL — SIGNIFICANT CHANGE UP (ref 150–400)
PMV BLD: 9.6 FL — SIGNIFICANT CHANGE UP (ref 7–13)
POTASSIUM SERPL-MCNC: 4.5 MMOL/L — SIGNIFICANT CHANGE UP (ref 3.5–5.3)
POTASSIUM SERPL-SCNC: 4.5 MMOL/L — SIGNIFICANT CHANGE UP (ref 3.5–5.3)
PROT SERPL-MCNC: 5.6 G/DL — LOW (ref 6–8.3)
PROTHROM AB SERPL-ACNC: 24.7 SEC — HIGH (ref 9.8–13.1)
RBC # BLD: 3.44 M/UL — LOW (ref 3.8–5.2)
RBC # FLD: 17.8 % — HIGH (ref 10.3–14.5)
SODIUM SERPL-SCNC: 130 MMOL/L — LOW (ref 135–145)
WBC # BLD: 10.3 K/UL — SIGNIFICANT CHANGE UP (ref 3.8–10.5)
WBC # FLD AUTO: 10.3 K/UL — SIGNIFICANT CHANGE UP (ref 3.8–10.5)

## 2018-08-29 PROCEDURE — 99223 1ST HOSP IP/OBS HIGH 75: CPT | Mod: GC

## 2018-08-29 PROCEDURE — 76700 US EXAM ABDOM COMPLETE: CPT | Mod: 26

## 2018-08-29 RX ORDER — CHLORHEXIDINE GLUCONATE 213 G/1000ML
1 SOLUTION TOPICAL ONCE
Qty: 0 | Refills: 0 | Status: DISCONTINUED | OUTPATIENT
Start: 2018-08-29 | End: 2018-08-29

## 2018-08-29 RX ORDER — WARFARIN SODIUM 2.5 MG/1
1 TABLET ORAL ONCE
Qty: 0 | Refills: 0 | Status: COMPLETED | OUTPATIENT
Start: 2018-08-29 | End: 2018-08-29

## 2018-08-29 RX ORDER — CHLORHEXIDINE GLUCONATE 213 G/1000ML
1 SOLUTION TOPICAL
Qty: 0 | Refills: 0 | Status: DISCONTINUED | OUTPATIENT
Start: 2018-08-29 | End: 2018-09-10

## 2018-08-29 RX ADMIN — CHLORHEXIDINE GLUCONATE 1 APPLICATION(S): 213 SOLUTION TOPICAL at 13:31

## 2018-08-29 RX ADMIN — LIDOCAINE 1 PATCH: 4 CREAM TOPICAL at 13:30

## 2018-08-29 RX ADMIN — LIDOCAINE 1 PATCH: 4 CREAM TOPICAL at 01:41

## 2018-08-29 RX ADMIN — Medication 4 UNIT(S): at 13:31

## 2018-08-29 RX ADMIN — OXYCODONE HYDROCHLORIDE 10 MILLIGRAM(S): 5 TABLET ORAL at 13:31

## 2018-08-29 RX ADMIN — Medication 1 TABLET(S): at 18:19

## 2018-08-29 RX ADMIN — SENNA PLUS 2 TABLET(S): 8.6 TABLET ORAL at 23:34

## 2018-08-29 RX ADMIN — PANTOPRAZOLE SODIUM 40 MILLIGRAM(S): 20 TABLET, DELAYED RELEASE ORAL at 06:13

## 2018-08-29 RX ADMIN — Medication 100 MILLIGRAM(S): at 07:48

## 2018-08-29 RX ADMIN — Medication 60 MILLIGRAM(S): at 06:13

## 2018-08-29 RX ADMIN — OXYCODONE HYDROCHLORIDE 10 MILLIGRAM(S): 5 TABLET ORAL at 23:32

## 2018-08-29 RX ADMIN — Medication 1 TABLET(S): at 13:30

## 2018-08-29 RX ADMIN — INSULIN GLARGINE 12 UNIT(S): 100 INJECTION, SOLUTION SUBCUTANEOUS at 23:00

## 2018-08-29 RX ADMIN — Medication 100 MILLIGRAM(S): at 23:34

## 2018-08-29 RX ADMIN — WARFARIN SODIUM 1 MILLIGRAM(S): 2.5 TABLET ORAL at 18:19

## 2018-08-29 NOTE — PROGRESS NOTE ADULT - PROBLEM SELECTOR PLAN 1
- CT Abd/Pelvis w/o contrast revealed destructive bony lesions concerning for metastasis and liver lesions  - adenoca noted on hip bone biopsy at [facility]  - path immunohistochemistry analysis noting possible pancreatobiliary/upper gi tract as primary source  - heme/onc, surg/onc and ID input noted and appreciated  - patient is wishing to hold off on endoscopic evaluation until after the onset of radiation begins and then will make a decision if she wishes to pursue further investigation to primary source  - pts daughter requesting tumor markers as she is uneasy with the unknown of where the cancer is coming from, however, they continue to express minimal interest in invasive procedures at this time such as egd/colon

## 2018-08-29 NOTE — CONSULT NOTE ADULT - PROBLEM SELECTOR RECOMMENDATION 2
-On antibiotics, per ID will need antibiotics through 9/30.    Please page at [phone] with questions or concerns.

## 2018-08-29 NOTE — PROGRESS NOTE ADULT - SUBJECTIVE AND OBJECTIVE BOX
Podiatry pager #: [phone]/ [phone]    Patient is a 89y old  Female who presents with a chief complaint of Onc workup (28 Aug 2018 12:16)      HPI:  89 y.o woman with multiple comorbidities presented with several weeks h/o of bony pain and recent inability to ambulate due to pain in the foot. Ct scan revealed destructive bony lesions concerning for metastasis and liver lesions. 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 Noted to have staph aurues bacteremia likely from left hallux abscess.The primary source of malignancy is unclear,. 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 1000 milliLiter(s) (50 mL/Hr) IV Continuous <Continuous>  dextrose 50% Injectable 12.5 Gram(s) IV Push once  dextrose 50% Injectable 25 Gram(s) IV Push once  dextrose 50% Injectable 25 Gram(s) IV Push once  docusate sodium 100 milliGRAM(s) Oral three times a day  fentaNYL   Patch  12 MICROgram(s)/Hr 1 Patch Transdermal every 72 hours  insulin glargine Injectable (LANTUS) 12 Unit(s) SubCutaneous at bedtime  insulin lispro (HumaLOG) corrective regimen sliding scale   SubCutaneous three times a day before meals  insulin lispro Injectable (HumaLOG) 4 Unit(s) SubCutaneous three times a day before meals  lactobacillus acidophilus 1 Tablet(s) Oral three times a day with meals  lidocaine   Patch 1 Patch Transdermal daily  nitroglycerin    Patch 0.4 mG/Hr(s) 1 patch Transdermal daily  oxyCODONE  ER Tablet 10 milliGRAM(s) Oral every 12 hours  pantoprazole    Tablet 40 milliGRAM(s) Oral before breakfast  propranolol LA 60 milliGRAM(s) Oral daily  senna 2 Tablet(s) Oral at bedtime  warfarin 1 milliGRAM(s) Oral once    MEDICATIONS  (PRN):  ALBUTerol    90 MICROgram(s) HFA Inhaler 2 Puff(s) Inhalation every 6 hours PRN Shortness of Breath and/or Wheezing  dextrose 40% Gel 15 Gram(s) Oral once PRN Blood Glucose LESS THAN 70 milliGRAM(s)/deciLiter  glucagon  Injectable 1 milliGRAM(s) IntraMuscular once PRN Glucose <70 milliGRAM(s)/deciLiter  polyethylene glycol 3350 17 Gram(s) Oral two times a day PRN Constipation      Allergies    Levaquin (Other)  ramipril (Other)  tetracycline (Hives; Rash)    Intolerances        VITALS:    Vital Signs Last 24 Hrs  T(C): 36.6 (28 Aug 2018 14:51), Max: 36.7 (28 Aug 2018 11:50)  T(F): 97.9 (28 Aug 2018 14:51), Max: 98 (28 Aug 2018 11:50)  HR: 74 (28 Aug 2018 14:51) (57 - 74)  BP: 111/77 (28 Aug 2018 14:51) (102/52 - 129/66)  BP(mean): --  RR: 18 (28 Aug 2018 14:51) (16 - 18)  SpO2: 97% (28 Aug 2018 14:51) (95% - 97%)    LABS:                          10.7   10.79 )-----------( 300      ( 28 Aug 2018 10:04 )             33.9       08-27    134<L>  |  96  |  59<H>  ----------------------------<  74  4.4   |  30  |  2.30<H>    Ca    8.9      27 Aug 2018 08:05  Mg     2.3     08-27        CAPILLARY BLOOD GLUCOSE      POCT Blood Glucose.: 111 mg/dL (28 Aug 2018 07:43)  POCT Blood Glucose.: 129 mg/dL (27 Aug 2018 21:23)  POCT Blood Glucose.: 126 mg/dL (27 Aug 2018 16:49)      PT/INR - ( 28 Aug 2018 10:04 )   PT: 25.7 sec;   INR: 2.32 ratio             LOWER EXTREMITY PHYSICAL EXAM:  - s/p LF  bunionectomy- sutures intact, no dehiscence skin well coapted, no erythema or edema, surrounding skin viable.   - palpable pedal pulses.     RADIOLOGY & ADDITIONAL STUDIES:    < from: Xray Foot AP + Lateral + Oblique, Left (08.21.18 @ 17:52) >    EXAM:  FOOT LEFT (MINIMUM 3 VIEWS)                            PROCEDURE DATE:  08/21/2018          INTERPRETATION:  CLINICAL INFORMATION: Postop, osteomyelitis of left   first metatarsal.    TECHNIQUE: 3 views of the left foot.    COMPARISON: 8/14/2018    FINDINGS:  The resection of the left first metatarsal with postsurgical changes.  There is no fracture or dislocation.  The joint spaces are preserved.  There are no lytic or blastic bony lesions.    IMPRESSION:   Resection of the left first metatarsal with postsurgical changes                AILIN CALDERON M.D., ATTENDING RADIOLOGIST  This document has been electronically signed. Aug 21 2018  7:33PM        < end of copied text >

## 2018-08-29 NOTE — PROGRESS NOTE ADULT - SUBJECTIVE AND OBJECTIVE BOX
SUBJECTIVE: Patient with no anginal chest pain or shortness of breath  ROS otherwise negative       MEDICATIONS  (STANDING):  ceFAZolin   IVPB 2000 milliGRAM(s) IV Intermittent every 24 hours  chlorhexidine 4% Liquid 1 Application(s) Topical once  docusate sodium 100 milliGRAM(s) Oral three times a day  fentaNYL   Patch  12 MICROgram(s)/Hr 1 Patch Transdermal every 72 hours  insulin glargine Injectable (LANTUS) 12 Unit(s) SubCutaneous at bedtime  insulin lispro (HumaLOG) corrective regimen sliding scale   SubCutaneous three times a day before meals  insulin lispro Injectable (HumaLOG) 4 Unit(s) SubCutaneous three times a day before meals  lactobacillus acidophilus 1 Tablet(s) Oral three times a day with meals  lidocaine   Patch 1 Patch Transdermal daily  nitroglycerin    Patch 0.4 mG/Hr(s) 1 patch Transdermal daily  oxyCODONE  ER Tablet 10 milliGRAM(s) Oral every 12 hours  pantoprazole    Tablet 40 milliGRAM(s) Oral before breakfast  propranolol LA 60 milliGRAM(s) Oral daily  senna 2 Tablet(s) Oral at bedtime    MEDICATIONS  (PRN):  ALBUTerol    90 MICROgram(s) HFA Inhaler 2 Puff(s) Inhalation every 6 hours PRN Shortness of Breath and/or Wheezing  dextrose 40% Gel 15 Gram(s) Oral once PRN Blood Glucose LESS THAN 70 milliGRAM(s)/deciLiter  glucagon  Injectable 1 milliGRAM(s) IntraMuscular once PRN Glucose <70 milliGRAM(s)/deciLiter  polyethylene glycol 3350 17 Gram(s) Oral two times a day PRN Constipation      LABS:                        10.2   10.30 )-----------( 280      ( 29 Aug 2018 06:15 )             32.9       08-29    130<L>  |  94<L>  |  64<H>  ----------------------------<  70  4.5   |  24  |  2.27<H>    Ca    9.3      29 Aug 2018 06:15    TPro  5.6<L>  /  Alb  1.9<L>  /  TBili  0.3  /  DBili  x   /  AST  75<H>  /  ALT  < 4<L>  /  AlkPhos  394<H>  08-29      PTT - ( 29 Aug 2018 06:15 )  PTT:47.8 SEC          PHYSICAL EXAM:  Vital Signs Last 24 Hrs  T(C): 36.4 (29 Aug 2018 05:23), Max: 36.8 (28 Aug 2018 21:21)  T(F): 97.5 (29 Aug 2018 05:23), Max: 98.3 (28 Aug 2018 21:21)  HR: 65 (29 Aug 2018 05:23) (61 - 84)  BP: 130/80 (29 Aug 2018 05:23) (102/52 - 130/80)  BP(mean): --  RR: 18 (29 Aug 2018 05:23) (16 - 18)  SpO2: 100% (29 Aug 2018 05:23) (96% - 100%)    HEENT: Normal Oral mucosa, PERRL, EOMI  Lymphatic: No obvious lymphadenopathy, No edema  Cardiovascular: Normal S1S2, No JVD, 1/6 STEFFANY, Peripheral pulses palpable 2+ B/L  Respiratory: Lungs clear to auscultation, normal effort  Gastrointestinal: Abdomen soft, ND, NT, +BS  Skin: Warm, dry, intact. No cyanosis, No rash.  Musculoskeletal: Normal ROM, normal strength  Psychiatric: Appropriate Mood and Affect      DIAGNOSTIC DATA  TELEMETRY: n/a    RADIOLOGY:   Xray Chest 1 View- PORTABLE-Urgent (08.17.18 @ 21:23) >  There are low lung volumes resulting in crowding of the bronchovascular   markings at the lung bases.  There is minimal blunting at the right costophrenic angle either   representing trace pleural effusion and/or pleural thickening.  There is subsegmental atelectasis at both lung bases.      < from: TTE Echo Doppler w/o Cont (08.17.18 @ 10:38) >  Technically difficult and limited study.  Mitral Valve: Calcified mitral annulus and mitral valve leaflets. Normal   opening of the mitral valve leaflets. Trace mitral regurgitation.  Aortic Valve/Aorta: Not well visualized  Tricuspid Valve: Calcified tricuspid annulus with normally opening valve.   Trace tricuspid regurgitation.  Pulmonic Valve: The pulmonic valve is not well visualized. Probably   normal.  Left Atrium: Moderate left atrial enlargement  Right Atrium: Normal  Left Ventricle: The endocardium is not well-visualized. Overall there is   preserved left ventricular systolic function. Unable to rule out wall   motion abnormalities. The EF is approximately 65%.  Right Ventricle: Normal right ventricular size and function.  Pericardium/Pleura: No pericardial effusion noted.  Pulmonary/RV Pressure: The right ventricular systolic pressure is   estimated to be 35mmHg, assuming that the right atrial pressure is   estimated to be8 mmHg. This is consistent with normal pulmonary   pressures.  LV Diastolic Function: Stage 2 diastolic dysfunction    < end of copied text >      ASSESSMENT AND PLAN:  89y Female  multiple comorbidities preserved LV and RV fx, Afib on coumadin, reported CAD ? remote MI with no intervention follows with Dr. 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 Skinny Diaz's office  - pending radiation therapy - surgery noted - no plans for surgical intervention at this time   - Will follow

## 2018-08-29 NOTE — PROGRESS NOTE ADULT - SUBJECTIVE AND OBJECTIVE BOX
Patient is a 89y old  Female who presents with a chief complaint of Onc workup (28 Aug 2018 12:16)      INTERVAL HPI/OVERNIGHT EVENTS:  T(C): 36.1 (08-29-18 @ 13:46), Max: 36.8 (08-28-18 @ 21:21)  HR: 67 (08-29-18 @ 13:46) (65 - 84)  BP: 117/55 (08-29-18 @ 13:46) (117/55 - 130/80)  RR: 18 (08-29-18 @ 13:46) (18 - 18)  SpO2: 100% (08-29-18 @ 13:46) (96% - 100%)  Wt(kg): --  I&O's Summary      LABS:                        10.2   10.30 )-----------( 280      ( 29 Aug 2018 06:15 )             32.9     08-29    130<L>  |  94<L>  |  64<H>  ----------------------------<  70  4.5   |  24  |  2.27<H>    Ca    9.3      29 Aug 2018 06:15    TPro  5.6<L>  /  Alb  1.9<L>  /  TBili  0.3  /  DBili  x   /  AST  75<H>  /  ALT  < 4<L>  /  AlkPhos  394<H>  08-29    PT/INR - ( 29 Aug 2018 06:15 )   PT: 24.7 SEC;   INR: 2.19          PTT - ( 29 Aug 2018 06:15 )  PTT:47.8 SEC    CAPILLARY BLOOD GLUCOSE      POCT Blood Glucose.: 119 mg/dL (29 Aug 2018 17:57)  POCT Blood Glucose.: 96 mg/dL (29 Aug 2018 12:54)  POCT Blood Glucose.: 70 mg/dL (29 Aug 2018 08:22)  POCT Blood Glucose.: 105 mg/dL (28 Aug 2018 22:06)            MEDICATIONS  (STANDING):  ceFAZolin   IVPB 2000 milliGRAM(s) IV Intermittent every 24 hours  chlorhexidine 4% Liquid 1 Application(s) Topical <User Schedule>  dextrose 5%. 1000 milliLiter(s) (50 mL/Hr) IV Continuous <Continuous>  dextrose 50% Injectable 12.5 Gram(s) IV Push once  dextrose 50% Injectable 25 Gram(s) IV Push once  dextrose 50% Injectable 25 Gram(s) IV Push once  docusate sodium 100 milliGRAM(s) Oral three times a day  fentaNYL   Patch  12 MICROgram(s)/Hr 1 Patch Transdermal every 72 hours  insulin glargine Injectable (LANTUS) 12 Unit(s) SubCutaneous at bedtime  insulin lispro (HumaLOG) corrective regimen sliding scale   SubCutaneous three times a day before meals  lactobacillus acidophilus 1 Tablet(s) Oral three times a day with meals  lidocaine   Patch 1 Patch Transdermal daily  oxyCODONE  ER Tablet 10 milliGRAM(s) Oral every 12 hours  pantoprazole    Tablet 40 milliGRAM(s) Oral before breakfast  propranolol LA 60 milliGRAM(s) Oral daily  senna 2 Tablet(s) Oral at bedtime    MEDICATIONS  (PRN):  ALBUTerol    90 MICROgram(s) HFA Inhaler 2 Puff(s) Inhalation every 6 hours PRN Shortness of Breath and/or Wheezing  dextrose 40% Gel 15 Gram(s) Oral once PRN Blood Glucose LESS THAN 70 milliGRAM(s)/deciLiter  glucagon  Injectable 1 milliGRAM(s) IntraMuscular once PRN Glucose <70 milliGRAM(s)/deciLiter  polyethylene glycol 3350 17 Gram(s) Oral two times a day PRN Constipation          PHYSICAL EXAM:  GENERAL: NAD, well-groomed, well-developed  HEAD:  Atraumatic, Normocephalic  CHEST/LUNG: Clear to percussion bilaterally; No rales, rhonchi, wheezing, or rubs  HEART: Regular rate and rhythm; No murmurs, rubs, or gallops  ABDOMEN: Soft, Nontender, Nondistended; Bowel sounds present  EXTREMITIES:  2+ Peripheral Pulses, No clubbing, cyanosis, or edema  LYMPH: No lymphadenopathy noted  SKIN: No rashes or lesions    Care Discussed with Consultants/Other Providers [+ ] YES  [ ] NO

## 2018-08-29 NOTE — PROGRESS NOTE ADULT - SUBJECTIVE AND OBJECTIVE BOX
Chief complaint  Patient is a 89y old  Female who presents with a chief complaint of Onc workup (28 Aug 2018 12:16)   Review of systems  Patient in bed, looks comfortable, no fever, no hypoglycemia.    Labs and Fingersticks  CAPILLARY BLOOD GLUCOSE  117 (28 Aug 2018 18:43)      POCT Blood Glucose.: 96 mg/dL (29 Aug 2018 12:54)  POCT Blood Glucose.: 70 mg/dL (29 Aug 2018 08:22)  POCT Blood Glucose.: 105 mg/dL (28 Aug 2018 22:06)  POCT Blood Glucose.: 117 mg/dL (28 Aug 2018 18:21)          Calcium, Total Serum: 9.3 (08-29 @ 06:15)  Albumin, Serum: 1.9 <L> (08-29 @ 06:15)    Alanine Aminotransferase (ALT/SGPT): < 4 <L> (08-29 @ 06:15)  Alkaline Phosphatase, Serum: 394 <H> (08-29 @ 06:15)  Aspartate Aminotransferase (AST/SGOT): 75 <H> (08-29 @ 06:15)        08-29    130<L>  |  94<L>  |  64<H>  ----------------------------<  70  4.5   |  24  |  2.27<H>    Ca    9.3      29 Aug 2018 06:15    TPro  5.6<L>  /  Alb  1.9<L>  /  TBili  0.3  /  DBili  x   /  AST  75<H>  /  ALT  < 4<L>  /  AlkPhos  394<H>  08-29                        10.2   10.30 )-----------( 280      ( 29 Aug 2018 06:15 )             32.9     Medications  MEDICATIONS  (STANDING):  ceFAZolin   IVPB 2000 milliGRAM(s) IV Intermittent every 24 hours  chlorhexidine 4% Liquid 1 Application(s) Topical <User Schedule>  dextrose 5%. 1000 milliLiter(s) (50 mL/Hr) IV Continuous <Continuous>  dextrose 50% Injectable 12.5 Gram(s) IV Push once  dextrose 50% Injectable 25 Gram(s) IV Push once  dextrose 50% Injectable 25 Gram(s) IV Push once  docusate sodium 100 milliGRAM(s) Oral three times a day  fentaNYL   Patch  12 MICROgram(s)/Hr 1 Patch Transdermal every 72 hours  insulin glargine Injectable (LANTUS) 12 Unit(s) SubCutaneous at bedtime  insulin lispro (HumaLOG) corrective regimen sliding scale   SubCutaneous three times a day before meals  lactobacillus acidophilus 1 Tablet(s) Oral three times a day with meals  lidocaine   Patch 1 Patch Transdermal daily  nitroglycerin    Patch 0.4 mG/Hr(s) 1 patch Transdermal daily  oxyCODONE  ER Tablet 10 milliGRAM(s) Oral every 12 hours  pantoprazole    Tablet 40 milliGRAM(s) Oral before breakfast  propranolol LA 60 milliGRAM(s) Oral daily  senna 2 Tablet(s) Oral at bedtime  warfarin 1 milliGRAM(s) Oral once      Physical Exam  General: Patient comfortable in bed  Vital Signs Last 12 Hrs  T(F): 97 (08-29-18 @ 13:46), Max: 97.5 (08-29-18 @ 05:23)  HR: 67 (08-29-18 @ 13:46) (65 - 67)  BP: 117/55 (08-29-18 @ 13:46) (117/55 - 130/80)  BP(mean): --  RR: 18 (08-29-18 @ 13:46) (18 - 18)  SpO2: 100% (08-29-18 @ 13:46) (100% - 100%)  Neck: No palpable thyroid nodules.  CVS: S1S2, No murmurs  Respiratory: No wheezing, no crepitations  GI: Abdomen soft, bowel sounds positive  Musculoskeletal:  edema lower extremities.   Skin: No skin rashes, no ecchymosis    Diagnostics

## 2018-08-29 NOTE — PROGRESS NOTE ADULT - SUBJECTIVE AND OBJECTIVE BOX
INTERVAL HPI/OVERNIGHT EVENTS:    daughter bedside  pt without n/v/d/c or abdominal pain  ate eating cereal and a banana this am without difficulties  pts daughter is uneasy about the unknown primary source, states she wonders if her sisters metastatic breast cancer came from her moms genes    MEDICATIONS  (STANDING):  ceFAZolin   IVPB 2000 milliGRAM(s) IV Intermittent every 24 hours  chlorhexidine 4% Liquid 1 Application(s) Topical <User Schedule>  dextrose 5%. 1000 milliLiter(s) (50 mL/Hr) IV Continuous <Continuous>  dextrose 50% Injectable 12.5 Gram(s) IV Push once  dextrose 50% Injectable 25 Gram(s) IV Push once  dextrose 50% Injectable 25 Gram(s) IV Push once  docusate sodium 100 milliGRAM(s) Oral three times a day  fentaNYL   Patch  12 MICROgram(s)/Hr 1 Patch Transdermal every 72 hours  insulin glargine Injectable (LANTUS) 12 Unit(s) SubCutaneous at bedtime  insulin lispro (HumaLOG) corrective regimen sliding scale   SubCutaneous three times a day before meals  insulin lispro Injectable (HumaLOG) 4 Unit(s) SubCutaneous three times a day before meals  lactobacillus acidophilus 1 Tablet(s) Oral three times a day with meals  lidocaine   Patch 1 Patch Transdermal daily  nitroglycerin    Patch 0.4 mG/Hr(s) 1 patch Transdermal daily  oxyCODONE  ER Tablet 10 milliGRAM(s) Oral every 12 hours  pantoprazole    Tablet 40 milliGRAM(s) Oral before breakfast  propranolol LA 60 milliGRAM(s) Oral daily  senna 2 Tablet(s) Oral at bedtime    MEDICATIONS  (PRN):  ALBUTerol    90 MICROgram(s) HFA Inhaler 2 Puff(s) Inhalation every 6 hours PRN Shortness of Breath and/or Wheezing  dextrose 40% Gel 15 Gram(s) Oral once PRN Blood Glucose LESS THAN 70 milliGRAM(s)/deciLiter  glucagon  Injectable 1 milliGRAM(s) IntraMuscular once PRN Glucose <70 milliGRAM(s)/deciLiter  polyethylene glycol 3350 17 Gram(s) Oral two times a day PRN Constipation      Allergies    Levaquin (Other)  ramipril (Other)  tetracycline (Hives; Rash)    Intolerances        Review of Systems:    General:  No wt loss, fevers, chills, night sweats, fatigue   Eyes:  Good vision, no reported pain  ENT:  No sore throat, pain, runny nose, dysphagia  CV:  No pain, palpitations, hypo/hypertension  Resp:  No dyspnea, cough, tachypnea, wheezing  GI:  No pain, No nausea, No vomiting, No diarrhea, No constipation, No weight loss, No fever, No pruritis, No rectal bleeding, No melena, No dysphagia  :  No pain, bleeding, incontinence, nocturia  Muscle:  No pain, weakness  Neuro:  No weakness, tingling, memory problems  Psych:  No fatigue, insomnia, mood problems, depression  Endocrine:  No polyuria, polydypsia, cold/heat intolerance  Heme:  No petechiae, ecchymosis, easy bruisability  Skin:  No rash, tattoos, scars, edema      Vital Signs Last 24 Hrs  T(C): 36.4 (29 Aug 2018 05:23), Max: 36.8 (28 Aug 2018 21:21)  T(F): 97.5 (29 Aug 2018 05:23), Max: 98.3 (28 Aug 2018 21:21)  HR: 65 (29 Aug 2018 05:23) (61 - 84)  BP: 130/80 (29 Aug 2018 05:23) (102/52 - 130/80)  BP(mean): --  RR: 18 (29 Aug 2018 05:23) (18 - 18)  SpO2: 100% (29 Aug 2018 05:23) (96% - 100%)    PHYSICAL EXAM:    Constitutional: NAD  HEENT: EOMI, throat clear  Neck: No LAD, supple  Respiratory: CTA and P  Cardiovascular: S1 and S2, RRR, no M  Gastrointestinal: BS+, soft, NT/ND, neg HSM,  Extremities: No peripheral edema, neg clubbing, cyanosis  Vascular: 2+ peripheral pulses  Neurological: A/O x 3, no focal deficits  Psychiatric: Normal mood, normal affect  Skin: No rashes      LABS:                        10.2   10.30 )-----------( 280      ( 29 Aug 2018 06:15 )             32.9     08-29    130<L>  |  94<L>  |  64<H>  ----------------------------<  70  4.5   |  24  |  2.27<H>    Ca    9.3      29 Aug 2018 06:15    TPro  5.6<L>  /  Alb  1.9<L>  /  TBili  0.3  /  DBili  x   /  AST  75<H>  /  ALT  < 4<L>  /  AlkPhos  394<H>  08-29    PT/INR - ( 29 Aug 2018 06:15 )   PT: 24.7 SEC;   INR: 2.19          PTT - ( 29 Aug 2018 06:15 )  PTT:47.8 SEC      RADIOLOGY & ADDITIONAL TESTS:

## 2018-08-29 NOTE — CONSULT NOTE ADULT - SUBJECTIVE AND OBJECTIVE BOX
Patient is a 89y old  Female who presents with a chief complaint of Onc workup (28 Aug 2018 12:16)      HPI:  89 y.o woman with multiple comorbidities presented with several weeks h/o of bony pain and recent inability to ambulate du to pain in the foot. Ct scan revealed destructive bony lesions concerning for metastasis and liver lesions. 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 Noted to have staph aurues bacteremia likely from left hallux abscess.The primary source of malignancy is unclear,. 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 No nausea, vomiting, or hematemesis; No diarrhea or constipation. No melena or hematochezia.  GENITOURINARY: No dysuria, frequency, hematuria, or incontinence  NEUROLOGICAL: No headaches, memory loss, loss of strength, numbness, or tremors  SKIN: No itching, burning, rashes, or lesions   LYMPH NODES: No enlarged glands  MUSCULOSKELETAL: No joint pain or swelling; No muscle, back, or extremity pain      PAST MEDICAL & SURGICAL HISTORY:  OAB (overactive bladder)  GERD (gastroesophageal reflux disease)  Angina effort  Heart failure  Upper respiratory infection  Diabetes  Renal stones  Myocardial infarction: 1981  Spinal stenosis  CVA (cerebral infarction)  Afib  Raynaud disease  Acid reflux  Hypertension  Hyperlipidemia  Asthma  Cataract  S/P hysterectomy      Allergies    Levaquin (Other)  ramipril (Other)  tetracycline (Hives; Rash)    Intolerances        FAMILY HISTORY:  No pertinent family history in first degree relatives      SOCIAL HISTORY:        MEDICATIONS  (STANDING):  ceFAZolin   IVPB 2000 milliGRAM(s) IV Intermittent every 24 hours  chlorhexidine 4% Liquid 1 Application(s) Topical <User Schedule>  dextrose 5%. 1000 milliLiter(s) (50 mL/Hr) IV Continuous <Continuous>  dextrose 50% Injectable 12.5 Gram(s) IV Push once  dextrose 50% Injectable 25 Gram(s) IV Push once  dextrose 50% Injectable 25 Gram(s) IV Push once  docusate sodium 100 milliGRAM(s) Oral three times a day  fentaNYL   Patch  12 MICROgram(s)/Hr 1 Patch Transdermal every 72 hours  insulin glargine Injectable (LANTUS) 12 Unit(s) SubCutaneous at bedtime  insulin lispro (HumaLOG) corrective regimen sliding scale   SubCutaneous three times a day before meals  insulin lispro Injectable (HumaLOG) 4 Unit(s) SubCutaneous three times a day before meals  lactobacillus acidophilus 1 Tablet(s) Oral three times a day with meals  lidocaine   Patch 1 Patch Transdermal daily  nitroglycerin    Patch 0.4 mG/Hr(s) 1 patch Transdermal daily  oxyCODONE  ER Tablet 10 milliGRAM(s) Oral every 12 hours  pantoprazole    Tablet 40 milliGRAM(s) Oral before breakfast  propranolol LA 60 milliGRAM(s) Oral daily  senna 2 Tablet(s) Oral at bedtime    MEDICATIONS  (PRN):  ALBUTerol    90 MICROgram(s) HFA Inhaler 2 Puff(s) Inhalation every 6 hours PRN Shortness of Breath and/or Wheezing  dextrose 40% Gel 15 Gram(s) Oral once PRN Blood Glucose LESS THAN 70 milliGRAM(s)/deciLiter  glucagon  Injectable 1 milliGRAM(s) IntraMuscular once PRN Glucose <70 milliGRAM(s)/deciLiter  polyethylene glycol 3350 17 Gram(s) Oral two times a day PRN Constipation      Vital Signs Last 24 Hrs  T(C): 36.4 (29 Aug 2018 05:23), Max: 36.8 (28 Aug 2018 21:21)  T(F): 97.5 (29 Aug 2018 05:23), Max: 98.3 (28 Aug 2018 21:21)  HR: 65 (29 Aug 2018 05:23) (61 - 84)  BP: 130/80 (29 Aug 2018 05:23) (102/52 - 130/80)  BP(mean): --  RR: 18 (29 Aug 2018 05:23) (18 - 18)  SpO2: 100% (29 Aug 2018 05:23) (96% - 100%)    PHYSICAL EXAM:    GENERAL: NAD, well-groomed  HEAD:  Atraumatic, Normocephalic  EYES: EOMI, PERRLA, conjunctiva and sclera clear  ENMT: No tonsillar erythema, exudates, or enlargement; Moist mucous membranes  NECK: Supple, No JVD  CHEST/LUNG: Clear to percussion bilaterally; No rales, rhonchi, wheezing, or rubs  HEART: Regular rate and rhythm; No murmurs, rubs, or gallops  ABDOMEN: Soft, Nontender, Nondistended; Bowel sounds present  EXTREMITIES:  2+ Peripheral Pulses, No clubbing, cyanosis, or edema  LYMPH: No lymphadenopathy noted  SKIN: No rashes or lesions    LABS:  CBC Full  -  ( 29 Aug 2018 06:15 )  WBC Count : 10.30 K/uL  Hemoglobin : 10.2 g/dL  Hematocrit : 32.9 %  Platelet Count - Automated : 280 K/uL  Mean Cell Volume : 95.6 fL  Mean Cell Hemoglobin : 29.7 pg  Mean Cell Hemoglobin Concentration : 31.0 %  Auto Neutrophil # : x  Auto Lymphocyte # : x  Auto Monocyte # : x  Auto Eosinophil # : x  Auto Basophil # : x  Auto Neutrophil % : x  Auto Lymphocyte % : x  Auto Monocyte % : x  Auto Eosinophil % : x  Auto Basophil % : x      08-29    130<L>  |  94<L>  |  64<H>  ----------------------------<  70  4.5   |  24  |  2.27<H>    Ca    9.3      29 Aug 2018 06:15    TPro  5.6<L>  /  Alb  1.9<L>  /  TBili  0.3  /  DBili  x   /  AST  75<H>  /  ALT  < 4<L>  /  AlkPhos  394<H>  08-29      LIVER FUNCTIONS - ( 29 Aug 2018 06:15 )  Alb: 1.9 g/dL / Pro: 5.6 g/dL / ALK PHOS: 394 u/L / ALT: < 4 u/L / AST: 75 u/L / GGT: x                               MICROBIOLOGY:      Culture - Tissue with Gram Stain (08.21.18 @ 21:28)    -  Gentamicin: S <=4 Should not be used as monotherapy    -  Oxacillin: S <=0.25    -  Penicillin: R >8    -  RIF- Rifampin: S <=1 Should not be used as monotherapy    -  Tetra/Doxy: S <=4    -  Trimethoprim/Sulfamethoxazole: S <=0.5/9.5    -  Vancomycin: S 1    Gram Stain:   No polymorphonuclear cells seen  No organisms seen    -  Ampicillin/Sulbactam: S <=8/4    -  Cefazolin: S <=4    -  Clindamycin: R >4    -  Erythromycin: R >4    Specimen Source: .Tissue left 1st metatarsal head    Culture Results:   Rare Staphylococcus aureus    Organism Identification: Staphylococcus aureus    Organism: Staphylococcus aureus    Method Type: BUCK    Culture - Urine (08.18.18 @ 10:12)    Specimen Source: .Urine Clean Catch (Midstream)    Culture Results:   No growth    Culture - Blood (08.17.18 @ 23:48)    Specimen Source: .Blood Blood-Peripheral    Culture Results:   No growth at 5 days.    Culture - Blood (08.17.18 @ 23:48)    Specimen Source: .Blood Blood-Peripheral    Culture Results:   No growth at 5 days.    Culture - Blood in AM (08.17.18 @ 10:59)    Specimen Source: .Blood Blood-Peripheral    Culture Results:   No growth at 5 days.      Culture - Other (08.15.18 @ 00:41)    -  Oxacillin: S <=0.25    -  Penicillin: R >8    -  Gentamicin: S 2 Should not be used as monotherapy    -  Vancomycin: S 2    -  Trimethoprim/Sulfamethoxazole: S <=0.5/9.5    -  RIF- Rifampin: S <=1 Should not be used as monotherapy    -  Tetra/Doxy: S <=1    -  Cefazolin: S <=4    -  Clindamycin: R >4    -  Ampicillin/Sulbactam: S <=8/4    -  Erythromycin: R >4    Specimen Source: .Other left toe    Culture Results:   Moderate Staphylococcus aureus    Organism Identification: Staphylococcus aureus    Organism: Staphylococcus aureus    Method Type: [facility]    Culture - Blood (08.14.18 @ 21:28)    Gram Stain:   Growth in anaerobic bottle: Gram Positive Cocci in Clusters    Specimen Source: .Blood Blood    Culture Results:   Growth in anaerobic bottle: Staphylococcus aureus  See previous culture [id number]    Culture - Blood (08.14.18 @ 21:27)    -  Staphylococcus aureus: Detec Any isolate of Staphylococcus aureus from a blood culture is NOT considered a contaminant.    -  Tetra/Doxy: S <=1    -  Vancomycin: S 2    -  Trimethoprim/Sulfamethoxazole: S <=0.5/9.5    Gram Stain:   Growth in aerobic bottle: Gram Positive Cocci in Clusters  Growth in anaerobic bottle: Gram Positive Cocci in Clusters    -  Ampicillin/Sulbactam: S <=8/4    -  Cefazolin: S <=4    -  Clindamycin: R >4    -  Erythromycin: R >4    -  RIF- Rifampin: S <=1 Should not be used as monotherapy    -  Oxacillin: S <=0.25    -  Penicillin: R >8    -  Gentamicin: S <=1 Should not be used as monotherapy    Specimen Source: .Blood Blood    Organism: Blood Culture PCR    Organism: Staphylococcus aureus    Culture Results:   Growth in aerobic and anaerobic bottles: Staphylococcus aureus  "Due to technical problems, Proteus sp. will Not be reported as part of  the BCID panel until further notice"  ***Blood Panel PCR results on this specimen are available  approximately 3 hours after the Gram stain result.***  Gram stain, PCR, and/or culture results may not always  correspond due to difference in methodologies.  ************************************************************  This PCR assay was performed using [employer].  The following targets are tested for: Enterococcus,  vancomycin resistant enterococci, Listeria monocytogenes,  coagulase negative staphylococci, S. aureus,  methicillin resistant S. aureus, Streptococcus agalactiae  (Group B), S. pneumoniae, S. pyogenes (Group A),  Acinetobacter baumannii, Enterobacter cloacae, E. coli,  Klebsiella oxytoca, K. pneumoniae, Proteus sp.,  Serratia marcescens, Haemophilus influenzae,  Neisseria meningitidis, Pseudomonas aeruginosa, Candida  albicans, C. glabrata, C krusei, C parapsilosis,  C. tropicalis and the KPC resistance gene.    Organism Identification: Blood Culture PCR  Staphylococcus aureus    Method Type: PCR    Method Type: BUCK                RADIOLOGY:      < from: Xray Foot AP + Lateral + Oblique, Left (08.21.18 @ 17:52) >  FINDINGS:  The resection of the left first metatarsal with postsurgical changes.  There is no fracture or dislocation.  The joint spaces are preserved.  There are no lytic or blastic bony lesions.    IMPRESSION:   Resection of the left first metatarsal with postsurgical changes    < end of copied text >        < from: Xray Foot AP + Lateral + Oblique, Left (08.21.18 @ 17:52) >  FINDINGS:  The resection of the left first metatarsal with postsurgical changes.  There is no fracture or dislocation.  The joint spaces are preserved.  There are no lytic or blastic bony lesions.    IMPRESSION:   Resection of the left first metatarsal with postsurgical changes    < end of copied text >        < from: CT Biopsy Bone Deep (08.20.18 @ 11:38) >    INTERPRETATION: A right side anterior approach was utilized to access the   lytic component medial to the right femoral vessels. Followup images   showed no evidence of hematoma.    Thank you for this referral.     < end of copied text >        < from: MR Hip No Cont, Right (08.15.18 @ 19:21) >    Impression:    Mildly expansile metastatic lesion within the anterior wall of the right   acetabulum with extension to the right superior pubic ramus. Additional   metastatic lesions are noted within the right ischium and midportion of   the right inferior pubic ramus. There is prominent edema within the right   inferior pubic ramus likely related to a superimposed pathologic stress   fracture. Prominent edema throughout the right adductor muscles about the   right inferior pubic ramus is may be related to underlying reactive   edema, muscle strain, and/or extension of metastatic disease.    Nonspecific small lesions within the left femoral head and left femoral   neck which may be related to additional metastatic foci.     < end of copied text > Patient is a 89y old  Female who presents with a chief complaint of Onc workup (28 Aug 2018 12:16)      HPI:  89 y.o woman with multiple comorbidities presented with several weeks h/o of bony pain and recent inability to ambulate due to pain in the foot. Ct scan revealed destructive bony lesions concerning for metastasis and liver lesions. 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 Noted to have staph aurues bacteremia likely from left hallux abscess.The primary source of malignancy is unclear,. 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  Pt has been on Ancef and planned for total 6 week course to end on 9/30/18.  Pt had TTE performed on 8/17.   Repeat blood cultures have been negative.  Pt had been followed by Podiatry service at  (Dr. Killian).      Pt has been seen by Rad Onc for initiation of palliative radiation.  GI also following for possible pancreatobiliary/upper GI tract as primary source.  Patient wishes to hold off on further endoscopic evaluation until after the onset of radiation begins.  Pt wishes for more palliative options with palliative radiation to the hip.  Pt has increased liver enzymes likely in setting of bone mets vs primary source.  GI following.  Pt also seen by Onc Surgery - no intervention necessary at this time.        ID consult called for antibiotic managment.        REVIEW OF SYSTEMS:    CONSTITUTIONAL: No fever, weight loss, or fatigue  EYES: No eye pain, visual disturbances, or discharge  ENMT:  No sore throat  NECK: No pain or stiffness  RESPIRATORY: No cough, wheezing, chills or hemoptysis; No shortness of breath  CARDIOVASCULAR: No chest pain, palpitations, dizziness, or leg swelling  GASTROINTESTINAL: No abdominal or epigastric pain. No nausea, vomiting, or hematemesis; No diarrhea or constipation. No melena or hematochezia.  GENITOURINARY: No dysuria, frequency, hematuria, or incontinence  NEUROLOGICAL: No headaches, memory loss, loss of strength, numbness, or tremors  SKIN: No itching, burning, rashes, or lesions   LYMPH NODES: No enlarged glands  MUSCULOSKELETAL: No joint pain or swelling; No muscle, back, or extremity pain      PAST MEDICAL & SURGICAL HISTORY:  OAB (overactive bladder)  GERD (gastroesophageal reflux disease)  Angina effort  Heart failure  Upper respiratory infection  Diabetes  Renal stones  Myocardial infarction: 1981  Spinal stenosis  CVA (cerebral infarction)  Afib  Raynaud disease  Acid reflux  Hypertension  Hyperlipidemia  Asthma  Cataract  S/P hysterectomy      Allergies    Levaquin (Other)  ramipril (Other)  tetracycline (Hives; Rash)    Intolerances        FAMILY HISTORY:  No pertinent family history in first degree relatives      SOCIAL HISTORY:        MEDICATIONS  (STANDING):  ceFAZolin   IVPB 2000 milliGRAM(s) IV Intermittent every 24 hours  chlorhexidine 4% Liquid 1 Application(s) Topical <User Schedule>  dextrose 5%. 1000 milliLiter(s) (50 mL/Hr) IV Continuous <Continuous>  dextrose 50% Injectable 12.5 Gram(s) IV Push once  dextrose 50% Injectable 25 Gram(s) IV Push once  dextrose 50% Injectable 25 Gram(s) IV Push once  docusate sodium 100 milliGRAM(s) Oral three times a day  fentaNYL   Patch  12 MICROgram(s)/Hr 1 Patch Transdermal every 72 hours  insulin glargine Injectable (LANTUS) 12 Unit(s) SubCutaneous at bedtime  insulin lispro (HumaLOG) corrective regimen sliding scale   SubCutaneous three times a day before meals  insulin lispro Injectable (HumaLOG) 4 Unit(s) SubCutaneous three times a day before meals  lactobacillus acidophilus 1 Tablet(s) Oral three times a day with meals  lidocaine   Patch 1 Patch Transdermal daily  nitroglycerin    Patch 0.4 mG/Hr(s) 1 patch Transdermal daily  oxyCODONE  ER Tablet 10 milliGRAM(s) Oral every 12 hours  pantoprazole    Tablet 40 milliGRAM(s) Oral before breakfast  propranolol LA 60 milliGRAM(s) Oral daily  senna 2 Tablet(s) Oral at bedtime    MEDICATIONS  (PRN):  ALBUTerol    90 MICROgram(s) HFA Inhaler 2 Puff(s) Inhalation every 6 hours PRN Shortness of Breath and/or Wheezing  dextrose 40% Gel 15 Gram(s) Oral once PRN Blood Glucose LESS THAN 70 milliGRAM(s)/deciLiter  glucagon  Injectable 1 milliGRAM(s) IntraMuscular once PRN Glucose <70 milliGRAM(s)/deciLiter  polyethylene glycol 3350 17 Gram(s) Oral two times a day PRN Constipation      Vital Signs Last 24 Hrs  T(C): 36.4 (29 Aug 2018 05:23), Max: 36.8 (28 Aug 2018 21:21)  T(F): 97.5 (29 Aug 2018 05:23), Max: 98.3 (28 Aug 2018 21:21)  HR: 65 (29 Aug 2018 05:23) (61 - 84)  BP: 130/80 (29 Aug 2018 05:23) (102/52 - 130/80)  BP(mean): --  RR: 18 (29 Aug 2018 05:23) (18 - 18)  SpO2: 100% (29 Aug 2018 05:23) (96% - 100%)    PHYSICAL EXAM:    GENERAL: NAD, well-groomed  HEAD:  Atraumatic, Normocephalic  EYES: EOMI, PERRLA, conjunctiva and sclera clear  ENMT: No tonsillar erythema, exudates, or enlargement; Moist mucous membranes  NECK: Supple, No JVD  CHEST/LUNG: Clear to percussion bilaterally; No rales, rhonchi, wheezing, or rubs  HEART: Regular rate and rhythm; No murmurs, rubs, or gallops  ABDOMEN: Soft, Nontender, Nondistended; Bowel sounds present  EXTREMITIES:  2+ Peripheral Pulses, No clubbing, cyanosis, or edema  LYMPH: No lymphadenopathy noted  SKIN: L foot dsg c/d/i    LABS:  CBC Full  -  ( 29 Aug 2018 06:15 )  WBC Count : 10.30 K/uL  Hemoglobin : 10.2 g/dL  Hematocrit : 32.9 %  Platelet Count - Automated : 280 K/uL  Mean Cell Volume : 95.6 fL  Mean Cell Hemoglobin : 29.7 pg  Mean Cell Hemoglobin Concentration : 31.0 %  Auto Neutrophil # : x  Auto Lymphocyte # : x  Auto Monocyte # : x  Auto Eosinophil # : x  Auto Basophil # : x  Auto Neutrophil % : x  Auto Lymphocyte % : x  Auto Monocyte % : x  Auto Eosinophil % : x  Auto Basophil % : x      08-29    130<L>  |  94<L>  |  64<H>  ----------------------------<  70  4.5   |  24  |  2.27<H>    Ca    9.3      29 Aug 2018 06:15    TPro  5.6<L>  /  Alb  1.9<L>  /  TBili  0.3  /  DBili  x   /  AST  75<H>  /  ALT  < 4<L>  /  AlkPhos  394<H>  08-29      LIVER FUNCTIONS - ( 29 Aug 2018 06:15 )  Alb: 1.9 g/dL / Pro: 5.6 g/dL / ALK PHOS: 394 u/L / ALT: < 4 u/L / AST: 75 u/L / GGT: x                               MICROBIOLOGY:      Culture - Tissue with Gram Stain (08.21.18 @ 21:28)    -  Gentamicin: S <=4 Should not be used as monotherapy    -  Oxacillin: S <=0.25    -  Penicillin: R >8    -  RIF- Rifampin: S <=1 Should not be used as monotherapy    -  Tetra/Doxy: S <=4    -  Trimethoprim/Sulfamethoxazole: S <=0.5/9.5    -  Vancomycin: S 1    Gram Stain:   No polymorphonuclear cells seen  No organisms seen    -  Ampicillin/Sulbactam: S <=8/4    -  Cefazolin: S <=4    -  Clindamycin: R >4    -  Erythromycin: R >4    Specimen Source: .Tissue left 1st metatarsal head    Culture Results:   Rare Staphylococcus aureus    Organism Identification: Staphylococcus aureus    Organism: Staphylococcus aureus    Method Type: BUCK    Culture - Urine (08.18.18 @ 10:12)    Specimen Source: .Urine Clean Catch (Midstream)    Culture Results:   No growth    Culture - Blood (08.17.18 @ 23:48)    Specimen Source: .Blood Blood-Peripheral    Culture Results:   No growth at 5 days.    Culture - Blood (08.17.18 @ 23:48)    Specimen Source: .Blood Blood-Peripheral    Culture Results:   No growth at 5 days.    Culture - Blood in AM (08.17.18 @ 10:59)    Specimen Source: .Blood Blood-Peripheral    Culture Results:   No growth at 5 days.      Culture - Other (08.15.18 @ 00:41)    -  Oxacillin: S <=0.25    -  Penicillin: R >8    -  Gentamicin: S 2 Should not be used as monotherapy    -  Vancomycin: S 2    -  Trimethoprim/Sulfamethoxazole: S <=0.5/9.5    -  RIF- Rifampin: S <=1 Should not be used as monotherapy    -  Tetra/Doxy: S <=1    -  Cefazolin: S <=4    -  Clindamycin: R >4    -  Ampicillin/Sulbactam: S <=8/4    -  Erythromycin: R >4    Specimen Source: .Other left toe    Culture Results:   Moderate Staphylococcus aureus    Organism Identification: Staphylococcus aureus    Organism: Staphylococcus aureus    Method Type: BUCK    Culture - Blood (08.14.18 @ 21:28)    Gram Stain:   Growth in anaerobic bottle: Gram Positive Cocci in Clusters    Specimen Source: .Blood Blood    Culture Results:   Growth in anaerobic bottle: Staphylococcus aureus  See previous culture [id number]    Culture - Blood (08.14.18 @ 21:27)    -  Staphylococcus aureus: Detec Any isolate of Staphylococcus aureus from a blood culture is NOT considered a contaminant.    -  Tetra/Doxy: S <=1    -  Vancomycin: S 2    -  Trimethoprim/Sulfamethoxazole: S <=0.5/9.5    Gram Stain:   Growth in aerobic bottle: Gram Positive Cocci in Clusters  Growth in anaerobic bottle: Gram Positive Cocci in Clusters    -  Ampicillin/Sulbactam: S <=8/4    -  Cefazolin: S <=4    -  Clindamycin: R >4    -  Erythromycin: R >4    -  RIF- Rifampin: S <=1 Should not be used as monotherapy    -  Oxacillin: S <=0.25    -  Penicillin: R >8    -  Gentamicin: S <=1 Should not be used as monotherapy    Specimen Source: .Blood Blood    Organism: Blood Culture PCR    Organism: Staphylococcus aureus    Culture Results:   Growth in aerobic and anaerobic bottles: Staphylococcus aureus  "Due to technical problems, Proteus sp. will Not be reported as part of  the BCID panel until further notice"  ***Blood Panel PCR results on this specimen are available  approximately 3 hours after the Gram stain result.***  Gram stain, PCR, and/or culture results may not always  correspond due to difference in methodologies.  ************************************************************  This PCR assay was performed using [employer].  The following targets are tested for: Enterococcus,  vancomycin resistant enterococci, Listeria monocytogenes,  coagulase negative staphylococci, S. aureus,  methicillin resistant S. aureus, Streptococcus agalactiae  (Group B), S. pneumoniae, S. pyogenes (Group A),  Acinetobacter baumannii, Enterobacter cloacae, E. coli,  Klebsiella oxytoca, K. pneumoniae, Proteus sp.,  Serratia marcescens, Haemophilus influenzae,  Neisseria meningitidis, Pseudomonas aeruginosa, Candida  albicans, C. glabrata, C krusei, C parapsilosis,  C. tropicalis and the KPC resistance gene.    Organism Identification: Blood Culture PCR  Staphylococcus aureus    Method Type: PCR    Method Type: BUCK                RADIOLOGY:    < from: TTE Echo Doppler w/o Cont (08.17.18 @ 10:38) >  Conclusion:   Technically difficult and limited study. Overall preserved left   ventricular systolic function. Stage II diastolic dysfunction. No   definitive vegetations noted on this study. If clinically warranted would   recommend a JOSE GUADALUPE to rule out endocarditis    < end of copied text >          < from: Xray Foot AP + Lateral + Oblique, Left (08.21.18 @ 17:52) >  FINDINGS:  The resection of the left first metatarsal with postsurgical changes.  There is no fracture or dislocation.  The joint spaces are preserved.  There are no lytic or blastic bony lesions.    IMPRESSION:   Resection of the left first metatarsal with postsurgical changes    < end of copied text >        < from: Xray Foot AP + Lateral + Oblique, Left (08.21.18 @ 17:52) >  FINDINGS:  The resection of the left first metatarsal with postsurgical changes.  There is no fracture or dislocation.  The joint spaces are preserved.  There are no lytic or blastic bony lesions.    IMPRESSION:   Resection of the left first metatarsal with postsurgical changes    < end of copied text >        < from: CT Biopsy Bone Deep (08.20.18 @ 11:38) >    INTERPRETATION: A right side anterior approach was utilized to access the   lytic component medial to the right femoral vessels. Followup images   showed no evidence of hematoma.    Thank you for this referral.     < end of copied text >        < from: MR Hip No Cont, Right (08.15.18 @ 19:21) >    Impression:    Mildly expansile metastatic lesion within the anterior wall of the right   acetabulum with extension to the right superior pubic ramus. Additional   metastatic lesions are noted within the right ischium and midportion of   the right inferior pubic ramus. There is prominent edema within the right   inferior pubic ramus likely related to a superimposed pathologic stress   fracture. Prominent edema throughout the right adductor muscles about the   right inferior pubic ramus is may be related to underlying reactive   edema, muscle strain, and/or extension of metastatic disease.    Nonspecific small lesions within the left femoral head and left femoral   neck which may be related to additional metastatic foci.     < end of copied text >

## 2018-08-29 NOTE — CONSULT NOTE ADULT - SUBJECTIVE AND OBJECTIVE BOX
HPI:  89 y.o woman with multiple comorbidities presented with several weeks h/o of bony pain and recent inability to ambulate du to pain in the foot. Ct scan revealed destructive bony lesions concerning for metastasis and liver lesions. 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 Noted to have staph aurues bacteremia likely from left hallux abscess.The primary source of malignancy is unclear,. 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, primary unknown , now transfered to [facility] for rad-onc evaluation o the hip (28 Aug 2018 12:08)    PAST MEDICAL & SURGICAL HISTORY:  OAB (overactive bladder)  GERD (gastroesophageal reflux disease)  Angina effort  Heart failure  Upper respiratory infection  Diabetes  Renal stones  Myocardial infarction: 1981  Spinal stenosis  CVA (cerebral infarction)  Afib  Raynaud disease  Acid reflux  Hypertension  Hyperlipidemia  Asthma  Cataract  S/P hysterectomy    FAMILY HISTORY:  No pertinent family history in first degree relatives    Social History  MEDICATIONS  (STANDING):  ceFAZolin   IVPB 2000 milliGRAM(s) IV Intermittent every 24 hours  chlorhexidine 4% Liquid 1 Application(s) Topical once  dextrose 5%. 1000 milliLiter(s) (50 mL/Hr) IV Continuous <Continuous>  dextrose 50% Injectable 12.5 Gram(s) IV Push once  dextrose 50% Injectable 25 Gram(s) IV Push once  dextrose 50% Injectable 25 Gram(s) IV Push once  docusate sodium 100 milliGRAM(s) Oral three times a day  fentaNYL   Patch  12 MICROgram(s)/Hr 1 Patch Transdermal every 72 hours  insulin glargine Injectable (LANTUS) 12 Unit(s) SubCutaneous at bedtime  insulin lispro (HumaLOG) corrective regimen sliding scale   SubCutaneous three times a day before meals  insulin lispro Injectable (HumaLOG) 4 Unit(s) SubCutaneous three times a day before meals  lactobacillus acidophilus 1 Tablet(s) Oral three times a day with meals  lidocaine   Patch 1 Patch Transdermal daily  nitroglycerin    Patch 0.4 mG/Hr(s) 1 patch Transdermal daily  oxyCODONE  ER Tablet 10 milliGRAM(s) Oral every 12 hours  pantoprazole    Tablet 40 milliGRAM(s) Oral before breakfast  propranolol LA 60 milliGRAM(s) Oral daily  senna 2 Tablet(s) Oral at bedtime    MEDICATIONS  (PRN):  ALBUTerol    90 MICROgram(s) HFA Inhaler 2 Puff(s) Inhalation every 6 hours PRN Shortness of Breath and/or Wheezing  dextrose 40% Gel 15 Gram(s) Oral once PRN Blood Glucose LESS THAN 70 milliGRAM(s)/deciLiter  glucagon  Injectable 1 milliGRAM(s) IntraMuscular once PRN Glucose <70 milliGRAM(s)/deciLiter  polyethylene glycol 3350 17 Gram(s) Oral two times a day PRN Constipation    Levaquin (Other)  ramipril (Other)  tetracycline (Hives; Rash)    REVIEW OF SYSTEMS      General:	    Skin/Breast:  	  Ophthalmologic:  	  ENMT:	    Respiratory and Thorax:  	  Cardiovascular:	    Gastrointestinal:	    Genitourinary:	    Musculoskeletal:	    Neurological:	    Psychiatric:	    Hematology/Lymphatics:	    Endocrine:	    Allergic/Immunologic:	  Vital Signs Last 24 Hrs  T(C): 36.4 (29 Aug 2018 05:23), Max: 36.8 (28 Aug 2018 21:21)  T(F): 97.5 (29 Aug 2018 05:23), Max: 98.3 (28 Aug 2018 21:21)  HR: 65 (29 Aug 2018 05:23) (61 - 84)  BP: 130/80 (29 Aug 2018 05:23) (102/52 - 130/80)  BP(mean): --  RR: 18 (29 Aug 2018 05:23) (16 - 18)  SpO2: 100% (29 Aug 2018 05:23) (96% - 100%)  Physical Examination  Constitutional: Alert, oriented X3  Eyes: Normal  ENMT: normal  Neck: No lymphadneopathy  Respiratory: b/l clear to auscultation  Cardiovascular: S1,S2,M0  Abdomen: Soft, non tender, BS present  Gastrointestinal: soft, non tender, BS present  Extremities: soft, no edema  Neurological: intact  Skin: no petechiae  Musculoskeletal: normal  Psychiatric: normal                            10.2   10.30 )-----------( 280      ( 29 Aug 2018 06:15 )             32.9     08-29    130<L>  |  94<L>  |  64<H>  ----------------------------<  70  4.5   |  24  |  2.27<H>    Ca    9.3      29 Aug 2018 06:15    TPro  5.6<L>  /  Alb  1.9<L>  /  TBili  0.3  /  DBili  x   /  AST  75<H>  /  ALT  < 4<L>  /  AlkPhos  394<H>  08-29      Radiology  Assessment and Plan HPI:    Ms. Olson is a 88 y/o woman with h/o DM transferred from [facility] for radiation to right hip bony lesion. Patient has h/o DM, with abscess and osteomyelitis of left hallux s/p amputation and on antibiotics. Patient had biopsy of right hip mass c/w adenocarcinoma of unknown origin, likely pancreatic or GI. Patient had CT scan w/o contrast showing possible hepatic metastasis. .    History taken from patient and daughter. She continues to have in right hip. She is otherwise doing well. She denies pain elsewhere. No nausea, vomiting, abdominal pain, chest pain, sob. She deneis weight loss. Bowel and bladder habits are normal.    PAST MEDICAL & SURGICAL HISTORY:  OAB (overactive bladder)  GERD (gastroesophageal reflux disease)  Angina effort  Heart failure  Upper respiratory infection  Diabetes  Renal stones  Myocardial infarction: 1981  Spinal stenosis  CVA (cerebral infarction)  Afib  Raynaud disease  Acid reflux  Hypertension  Hyperlipidemia  Asthma  Cataract  S/P hysterectomy    FAMILY HISTORY:  No pertinent family history in first degree relatives    Social History  MEDICATIONS  (STANDING):  ceFAZolin   IVPB 2000 milliGRAM(s) IV Intermittent every 24 hours  chlorhexidine 4% Liquid 1 Application(s) Topical once  dextrose 5%. 1000 milliLiter(s) (50 mL/Hr) IV Continuous <Continuous>  dextrose 50% Injectable 12.5 Gram(s) IV Push once  dextrose 50% Injectable 25 Gram(s) IV Push once  dextrose 50% Injectable 25 Gram(s) IV Push once  docusate sodium 100 milliGRAM(s) Oral three times a day  fentaNYL   Patch  12 MICROgram(s)/Hr 1 Patch Transdermal every 72 hours  insulin glargine Injectable (LANTUS) 12 Unit(s) SubCutaneous at bedtime  insulin lispro (HumaLOG) corrective regimen sliding scale   SubCutaneous three times a day before meals  insulin lispro Injectable (HumaLOG) 4 Unit(s) SubCutaneous three times a day before meals  lactobacillus acidophilus 1 Tablet(s) Oral three times a day with meals  lidocaine   Patch 1 Patch Transdermal daily  nitroglycerin    Patch 0.4 mG/Hr(s) 1 patch Transdermal daily  oxyCODONE  ER Tablet 10 milliGRAM(s) Oral every 12 hours  pantoprazole    Tablet 40 milliGRAM(s) Oral before breakfast  propranolol LA 60 milliGRAM(s) Oral daily  senna 2 Tablet(s) Oral at bedtime    MEDICATIONS  (PRN):  ALBUTerol    90 MICROgram(s) HFA Inhaler 2 Puff(s) Inhalation every 6 hours PRN Shortness of Breath and/or Wheezing  dextrose 40% Gel 15 Gram(s) Oral once PRN Blood Glucose LESS THAN 70 milliGRAM(s)/deciLiter  glucagon  Injectable 1 milliGRAM(s) IntraMuscular once PRN Glucose <70 milliGRAM(s)/deciLiter  polyethylene glycol 3350 17 Gram(s) Oral two times a day PRN Constipation    Levaquin (Other)  ramipril (Other)  tetracycline (Hives; Rash)    REVIEW OF SYSTEMS      10 points ROS is negative except for the ones in HPI.  	  Vital Signs Last 24 Hrs  T(C): 36.4 (29 Aug 2018 05:23), Max: 36.8 (28 Aug 2018 21:21)  T(F): 97.5 (29 Aug 2018 05:23), Max: 98.3 (28 Aug 2018 21:21)  HR: 65 (29 Aug 2018 05:23) (61 - 84)  BP: 130/80 (29 Aug 2018 05:23) (102/52 - 130/80)  BP(mean): --  RR: 18 (29 Aug 2018 05:23) (16 - 18)  SpO2: 100% (29 Aug 2018 05:23) (96% - 100%)    Physical Examination  Constitutional: Alert, oriented X3  Eyes: Normal  ENMT: normal  Neck: No lymphadenopathy  Respiratory: b/l clear to auscultation  Cardiovascular: S1,S2,M0  Abdomen: Soft, non tender, BS present  Extremities: soft, no edema  Neurological: intact  Skin: no petechiae  Musculoskeletal: normal  Psychiatric: normal                            10.2   10.30 )-----------( 280      ( 29 Aug 2018 06:15 )             32.9     08-29    130<L>  |  94<L>  |  64<H>  ----------------------------<  70  4.5   |  24  |  2.27<H>    Ca    9.3      29 Aug 2018 06:15    TPro  5.6<L>  /  Alb  1.9<L>  /  TBili  0.3  /  DBili  x   /  AST  75<H>  /  ALT  < 4<L>  /  AlkPhos  394<H>  08-29      Radiology  < from: MR Hip No Cont, Right (08.15.18 @ 19:21) >  Impression:    Mildly expansile metastatic lesion within the anterior wall of the right   acetabulum with extension to the right superior pubic ramus. Additional   metastatic lesions are noted within the right ischium and midportion of   the right inferior pubic ramus. There is prominent edema within the right   inferior pubic ramus likely related to a superimposed pathologic stress   fracture. Prominent edema throughout the right adductor muscles about the   right inferior pubic ramus is may be related to underlying reactive   edema, muscle strain, and/or extension of metastatic disease.    Nonspecific small lesions within the left femoral head and left femoral   neck which may be related to additional metastatic foci.       < end of copied text >  < from: CT Abdomen and Pelvis No Cont (08.15.18 @ 08:01) >  Impression:    Limited by lack of oral and IV contrast.  Inhomogeneous hepatic parenchyma suspicious for metastatic involvement.   Correlate with contrast-enhanced CT or MR.  Destructive osseous lesions right hip and pubis consistentwith   neoplastic involvement.  Additional findings as discussed.        < end of copied text >

## 2018-08-29 NOTE — PROGRESS NOTE ADULT - SUBJECTIVE AND OBJECTIVE BOX
Surgical Oncology  CC: metastatic adenocarcinoma  HPI: 89F w/metastatic poorly differentiated adenocarcinoma, unknown primary, awaiting radiation therapy  24/Overnight events: Patient seen and examined, laying without distress in bed. Currently awaiting radiation therapy. As the patient is not showing signs of being clinically obstructed or anemic [11/34] there remains no indicated surgical intervention at this moment.         Objective:  Vital Signs Last 24 Hrs  T(C): 36.4 (29 Aug 2018 05:23), Max: 36.8 (28 Aug 2018 21:21)  T(F): 97.5 (29 Aug 2018 05:23), Max: 98.3 (28 Aug 2018 21:21)  HR: 65 (29 Aug 2018 05:23) (61 - 84)  BP: 130/80 (29 Aug 2018 05:23) (102/52 - 130/80)  BP(mean): --  RR: 18 (29 Aug 2018 05:23) (16 - 18)  SpO2: 100% (29 Aug 2018 05:23) (96% - 100%)    Physical Exam:  General: NAD, laying in bed comfortably  Respiratory: non-labored on room air  Abdomen: soft, non-tender, non-distended, without guarding or rebound     I&O's Detail    Daily Height in cm: 154.94 (28 Aug 2018 11:50)    Daily     LABS:                        10.7   10.79 )-----------( 300      ( 28 Aug 2018 10:04 )             33.9     08-27    134<L>  |  96  |  59<H>  ----------------------------<  74  4.4   |  30  |  2.30<H>    Ca    8.9      27 Aug 2018 08:05  Mg     2.3     08-27      PT/INR - ( 28 Aug 2018 10:04 )   PT: 25.7 sec;   INR: 2.32 ratio

## 2018-08-30 LAB
ALBUMIN SERPL ELPH-MCNC: 2.2 G/DL — LOW (ref 3.3–5)
ALP SERPL-CCNC: 419 U/L — HIGH (ref 40–120)
ALT FLD-CCNC: < 4 U/L — LOW (ref 4–33)
APTT BLD: 38.8 SEC — HIGH (ref 27.5–37.4)
AST SERPL-CCNC: 79 U/L — HIGH (ref 4–32)
BILIRUB SERPL-MCNC: 0.3 MG/DL — SIGNIFICANT CHANGE UP (ref 0.2–1.2)
BUN SERPL-MCNC: 69 MG/DL — HIGH (ref 7–23)
CALCIUM SERPL-MCNC: 9.6 MG/DL — SIGNIFICANT CHANGE UP (ref 8.4–10.5)
CANCER AG19-9 SERPL-ACNC: 329.7 U/ML — HIGH
CHLORIDE SERPL-SCNC: 93 MMOL/L — LOW (ref 98–107)
CO2 SERPL-SCNC: 24 MMOL/L — SIGNIFICANT CHANGE UP (ref 22–31)
CREAT SERPL-MCNC: 2.39 MG/DL — HIGH (ref 0.5–1.3)
GLUCOSE BLDC GLUCOMTR-MCNC: 143 MG/DL — HIGH (ref 70–99)
GLUCOSE BLDC GLUCOMTR-MCNC: 145 MG/DL — HIGH (ref 70–99)
GLUCOSE BLDC GLUCOMTR-MCNC: 189 MG/DL — HIGH (ref 70–99)
GLUCOSE BLDC GLUCOMTR-MCNC: 194 MG/DL — HIGH (ref 70–99)
GLUCOSE SERPL-MCNC: 124 MG/DL — HIGH (ref 70–99)
HCT VFR BLD CALC: 33.4 % — LOW (ref 34.5–45)
HGB BLD-MCNC: 10.7 G/DL — LOW (ref 11.5–15.5)
INR BLD: 2.19 — HIGH (ref 0.88–1.17)
MCHC RBC-ENTMCNC: 30.5 PG — SIGNIFICANT CHANGE UP (ref 27–34)
MCHC RBC-ENTMCNC: 32 % — SIGNIFICANT CHANGE UP (ref 32–36)
MCV RBC AUTO: 95.2 FL — SIGNIFICANT CHANGE UP (ref 80–100)
NRBC # FLD: 0 — SIGNIFICANT CHANGE UP
PLATELET # BLD AUTO: 303 K/UL — SIGNIFICANT CHANGE UP (ref 150–400)
PMV BLD: 9.7 FL — SIGNIFICANT CHANGE UP (ref 7–13)
POTASSIUM SERPL-MCNC: 4.9 MMOL/L — SIGNIFICANT CHANGE UP (ref 3.5–5.3)
POTASSIUM SERPL-SCNC: 4.9 MMOL/L — SIGNIFICANT CHANGE UP (ref 3.5–5.3)
PROT SERPL-MCNC: 5.7 G/DL — LOW (ref 6–8.3)
PROTHROM AB SERPL-ACNC: 25.6 SEC — HIGH (ref 9.8–13.1)
RBC # BLD: 3.51 M/UL — LOW (ref 3.8–5.2)
RBC # FLD: 17.7 % — HIGH (ref 10.3–14.5)
SODIUM SERPL-SCNC: 131 MMOL/L — LOW (ref 135–145)
WBC # BLD: 10.69 K/UL — HIGH (ref 3.8–10.5)
WBC # FLD AUTO: 10.69 K/UL — HIGH (ref 3.8–10.5)

## 2018-08-30 PROCEDURE — 99223 1ST HOSP IP/OBS HIGH 75: CPT | Mod: GC

## 2018-08-30 RX ORDER — FENTANYL CITRATE 50 UG/ML
1 INJECTION INTRAVENOUS
Qty: 0 | Refills: 0 | Status: DISCONTINUED | OUTPATIENT
Start: 2018-08-30 | End: 2018-09-05

## 2018-08-30 RX ORDER — WARFARIN SODIUM 2.5 MG/1
1 TABLET ORAL ONCE
Qty: 0 | Refills: 0 | Status: COMPLETED | OUTPATIENT
Start: 2018-08-30 | End: 2018-08-30

## 2018-08-30 RX ORDER — OXYCODONE HYDROCHLORIDE 5 MG/1
10 TABLET ORAL EVERY 12 HOURS
Qty: 0 | Refills: 0 | Status: DISCONTINUED | OUTPATIENT
Start: 2018-08-30 | End: 2018-08-31

## 2018-08-30 RX ORDER — LIDOCAINE 4 G/100G
1 CREAM TOPICAL DAILY
Qty: 0 | Refills: 0 | Status: DISCONTINUED | OUTPATIENT
Start: 2018-08-30 | End: 2018-09-10

## 2018-08-30 RX ADMIN — Medication 1: at 18:12

## 2018-08-30 RX ADMIN — FENTANYL CITRATE 1 PATCH: 50 INJECTION INTRAVENOUS at 09:33

## 2018-08-30 RX ADMIN — Medication 100 MILLIGRAM(S): at 13:25

## 2018-08-30 RX ADMIN — OXYCODONE HYDROCHLORIDE 10 MILLIGRAM(S): 5 TABLET ORAL at 18:17

## 2018-08-30 RX ADMIN — Medication 1: at 13:25

## 2018-08-30 RX ADMIN — OXYCODONE HYDROCHLORIDE 10 MILLIGRAM(S): 5 TABLET ORAL at 19:10

## 2018-08-30 RX ADMIN — CHLORHEXIDINE GLUCONATE 1 APPLICATION(S): 213 SOLUTION TOPICAL at 09:33

## 2018-08-30 RX ADMIN — Medication 60 MILLIGRAM(S): at 05:33

## 2018-08-30 RX ADMIN — Medication 100 MILLIGRAM(S): at 05:33

## 2018-08-30 RX ADMIN — Medication 1 TABLET(S): at 09:33

## 2018-08-30 RX ADMIN — Medication 1 TABLET(S): at 13:25

## 2018-08-30 RX ADMIN — Medication 100 MILLIGRAM(S): at 07:00

## 2018-08-30 RX ADMIN — LIDOCAINE 1 PATCH: 4 CREAM TOPICAL at 13:25

## 2018-08-30 RX ADMIN — FENTANYL CITRATE 1 PATCH: 50 INJECTION INTRAVENOUS at 08:20

## 2018-08-30 RX ADMIN — Medication 1 TABLET(S): at 18:12

## 2018-08-30 RX ADMIN — INSULIN GLARGINE 12 UNIT(S): 100 INJECTION, SOLUTION SUBCUTANEOUS at 22:35

## 2018-08-30 RX ADMIN — LIDOCAINE 1 PATCH: 4 CREAM TOPICAL at 00:50

## 2018-08-30 RX ADMIN — PANTOPRAZOLE SODIUM 40 MILLIGRAM(S): 20 TABLET, DELAYED RELEASE ORAL at 07:00

## 2018-08-30 RX ADMIN — WARFARIN SODIUM 1 MILLIGRAM(S): 2.5 TABLET ORAL at 18:17

## 2018-08-30 NOTE — DIETITIAN INITIAL EVALUATION ADULT. - OTHER INFO
Nutrition consult received for pressure ulcer stage 2 or greater. Noted medial buttock stage-II pressure ulcers, diabetic foot ulcers/ right foot big toe. 89 y/o Female with medical history of multiple comorbities, was transferred from [facility] where she was admitted for c/o painful ambulation for weeks and underwent hip bone biopsy which revealed Adenocarcinoma with unknown primary with pathology immunohistochemistry analysis noting possible pancreatobiliary/upper gi tract as primary source. CT scan revealed destructive bony lesions concerning for metastasis and liver lesions. Met with patient and daughter at bedside. Reports fair appetite and PO intake 50-75% at this time. Food preferences voiced and noted. Patient denies any nausea/vomiting/diarrhea/constipation or difficulty chewing and swallowing. NKFA. Therapeutic diet recommendations reviewed patient. Discussed importance of having proteins with each meals and snacks. Offered PO supplement Glucerna which patient Nutrition consult received for pressure ulcer stage 2 or greater. Noted medial buttock stage-II pressure ulcers, diabetic foot ulcers/ right foot big toe. 89 y/o Female with medical history of multiple comorbities, was transferred [facility] she was admitted for c/o painful ambulation for weeks and underwent hip bone biopsy which revealed Adenocarcinoma with unknown primary with pathology immunohistochemistry analysis noting possible pancreatobiliary/upper gi tract as primary source. CT scan revealed destructive bony lesions concerning for metastasis and liver lesions. Met with patient and daughter at bedside. Reports fair appetite and PO intake 50-75% at this time. Food preferences voiced and noted. Patient denies any nausea/vomiting/diarrhea/constipation or difficulty chewing and swallowing. NKFA. Therapeutic diet recommendations reviewed patient. Discussed importance of having proteins with each meals and snacks. Offered PO supplement Glucerna which patient was amenable to. Of note, patient weighed 163.5lbs (8/26/18) documented and this admission weight (8/28)173lbs (dosing) with ?questionable weight gain of 10lbs. Suggest obtain new weight to confirm.

## 2018-08-30 NOTE — PROGRESS NOTE ADULT - SUBJECTIVE AND OBJECTIVE BOX
INTERVAL HPI/OVERNIGHT EVENTS:    denies n/v/d/c, abdominal pain, melena or brbpr     MEDICATIONS  (STANDING):  ceFAZolin   IVPB 2000 milliGRAM(s) IV Intermittent every 24 hours  chlorhexidine 4% Liquid 1 Application(s) Topical <User Schedule>  dextrose 5%. 1000 milliLiter(s) (50 mL/Hr) IV Continuous <Continuous>  dextrose 50% Injectable 12.5 Gram(s) IV Push once  dextrose 50% Injectable 25 Gram(s) IV Push once  dextrose 50% Injectable 25 Gram(s) IV Push once  docusate sodium 100 milliGRAM(s) Oral three times a day  fentaNYL   Patch  12 MICROgram(s)/Hr 1 Patch Transdermal every 72 hours  insulin glargine Injectable (LANTUS) 12 Unit(s) SubCutaneous at bedtime  insulin lispro (HumaLOG) corrective regimen sliding scale   SubCutaneous three times a day before meals  lactobacillus acidophilus 1 Tablet(s) Oral three times a day with meals  lidocaine   Patch 1 Patch Transdermal daily  oxyCODONE  ER Tablet 10 milliGRAM(s) Oral every 12 hours  pantoprazole    Tablet 40 milliGRAM(s) Oral before breakfast  propranolol LA 60 milliGRAM(s) Oral daily  senna 2 Tablet(s) Oral at bedtime  warfarin 1 milliGRAM(s) Oral once    MEDICATIONS  (PRN):  ALBUTerol    90 MICROgram(s) HFA Inhaler 2 Puff(s) Inhalation every 6 hours PRN Shortness of Breath and/or Wheezing  dextrose 40% Gel 15 Gram(s) Oral once PRN Blood Glucose LESS THAN 70 milliGRAM(s)/deciLiter  glucagon  Injectable 1 milliGRAM(s) IntraMuscular once PRN Glucose <70 milliGRAM(s)/deciLiter  polyethylene glycol 3350 17 Gram(s) Oral two times a day PRN Constipation      Allergies    Levaquin (Other)  ramipril (Other)  tetracycline (Hives; Rash)    Intolerances        Review of Systems:    General:  No wt loss, fevers, chills, night sweats, fatigue   Eyes:  Good vision, no reported pain  ENT:  No sore throat, pain, runny nose, dysphagia  CV:  No pain, palpitations, hypo/hypertension  Resp:  No dyspnea, cough, tachypnea, wheezing  GI:  No pain, No nausea, No vomiting, No diarrhea, No constipation, No weight loss, No fever, No pruritis, No rectal bleeding, No melena, No dysphagia  :  No pain, bleeding, incontinence, nocturia  Muscle:  No pain, weakness  Neuro:  No weakness, tingling, memory problems  Psych:  No fatigue, insomnia, mood problems, depression  Endocrine:  No polyuria, polydypsia, cold/heat intolerance  Heme:  No petechiae, ecchymosis, easy bruisability  Skin:  No rash, tattoos, scars, edema      Vital Signs Last 24 Hrs  T(C): 36.5 (30 Aug 2018 05:28), Max: 36.6 (29 Aug 2018 21:51)  T(F): 97.7 (30 Aug 2018 05:28), Max: 97.9 (29 Aug 2018 21:51)  HR: 88 (30 Aug 2018 05:28) (67 - 88)  BP: 118/50 (30 Aug 2018 05:28) (104/51 - 118/50)  BP(mean): --  RR: 20 (30 Aug 2018 05:28) (18 - 20)  SpO2: 94% (30 Aug 2018 05:28) (94% - 100%)    PHYSICAL EXAM:    Constitutional: NAD  HEENT: EOMI, throat clear  Neck: No LAD, supple  Respiratory: CTA and P  Cardiovascular: S1 and S2, RRR, no M  Gastrointestinal: BS+, soft, NT/ND, neg HSM,  Extremities: No peripheral edema, neg clubbing, cyanosis  Vascular: 2+ peripheral pulses  Neurological: A/O x 3, no focal deficits  Psychiatric: Normal mood, normal affect  Skin: No rashes      LABS:                        10.7   10.69 )-----------( 303      ( 30 Aug 2018 05:40 )             33.4     08-30    131<L>  |  93<L>  |  69<H>  ----------------------------<  124<H>  4.9   |  24  |  2.39<H>    Ca    9.6      30 Aug 2018 05:40    TPro  5.7<L>  /  Alb  2.2<L>  /  TBili  0.3  /  DBili  x   /  AST  79<H>  /  ALT  < 4<L>  /  AlkPhos  419<H>  08-30    PT/INR - ( 30 Aug 2018 05:40 )   PT: 25.6 SEC;   INR: 2.19          PTT - ( 30 Aug 2018 05:40 )  PTT:38.8 SEC      RADIOLOGY & ADDITIONAL TESTS:

## 2018-08-30 NOTE — PROGRESS NOTE ADULT - SUBJECTIVE AND OBJECTIVE BOX
Chief complaint  Patient is a 89y old  Female who presents with a chief complaint of Onc workup (28 Aug 2018 12:16)   Review of systems  Patient in bed, looks comfortable, no fever, no hypoglycemia.    Labs and Fingersticks  CAPILLARY BLOOD GLUCOSE      POCT Blood Glucose.: 135 mg/dL (29 Aug 2018 22:32)  POCT Blood Glucose.: 119 mg/dL (29 Aug 2018 17:57)  POCT Blood Glucose.: 96 mg/dL (29 Aug 2018 12:54)          Calcium, Total Serum: 9.6 (08-30 @ 05:40)  Calcium, Total Serum: 9.3 (08-29 @ 06:15)  Albumin, Serum: 2.2 <L> (08-30 @ 05:40)  Albumin, Serum: 1.9 <L> (08-29 @ 06:15)    Alanine Aminotransferase (ALT/SGPT): < 4 <L> (08-30 @ 05:40)  Alanine Aminotransferase (ALT/SGPT): < 4 <L> (08-29 @ 06:15)  Alkaline Phosphatase, Serum: 419 <H> (08-30 @ 05:40)  Alkaline Phosphatase, Serum: 394 <H> (08-29 @ 06:15)  Aspartate Aminotransferase (AST/SGOT): 79 <H> (08-30 @ 05:40)  Aspartate Aminotransferase (AST/SGOT): 75 <H> (08-29 @ 06:15)        08-30    131<L>  |  93<L>  |  69<H>  ----------------------------<  124<H>  4.9   |  24  |  2.39<H>    Ca    9.6      30 Aug 2018 05:40    TPro  5.7<L>  /  Alb  2.2<L>  /  TBili  0.3  /  DBili  x   /  AST  79<H>  /  ALT  < 4<L>  /  AlkPhos  419<H>  08-30                        10.7   10.69 )-----------( 303      ( 30 Aug 2018 05:40 )             33.4     Medications  MEDICATIONS  (STANDING):  ceFAZolin   IVPB 2000 milliGRAM(s) IV Intermittent every 24 hours  chlorhexidine 4% Liquid 1 Application(s) Topical <User Schedule>  dextrose 5%. 1000 milliLiter(s) (50 mL/Hr) IV Continuous <Continuous>  dextrose 50% Injectable 12.5 Gram(s) IV Push once  dextrose 50% Injectable 25 Gram(s) IV Push once  dextrose 50% Injectable 25 Gram(s) IV Push once  docusate sodium 100 milliGRAM(s) Oral three times a day  fentaNYL   Patch  12 MICROgram(s)/Hr 1 Patch Transdermal every 72 hours  insulin glargine Injectable (LANTUS) 12 Unit(s) SubCutaneous at bedtime  insulin lispro (HumaLOG) corrective regimen sliding scale   SubCutaneous three times a day before meals  lactobacillus acidophilus 1 Tablet(s) Oral three times a day with meals  lidocaine   Patch 1 Patch Transdermal daily  oxyCODONE  ER Tablet 10 milliGRAM(s) Oral every 12 hours  pantoprazole    Tablet 40 milliGRAM(s) Oral before breakfast  propranolol LA 60 milliGRAM(s) Oral daily  senna 2 Tablet(s) Oral at bedtime  warfarin 1 milliGRAM(s) Oral once      Physical Exam  General: Patient comfortable in bed  Vital Signs Last 12 Hrs  T(F): 97.7 (08-30-18 @ 05:28), Max: 97.7 (08-30-18 @ 05:28)  HR: 88 (08-30-18 @ 05:28) (88 - 88)  BP: 118/50 (08-30-18 @ 05:28) (118/50 - 118/50)  BP(mean): --  RR: 20 (08-30-18 @ 05:28) (20 - 20)  SpO2: 94% (08-30-18 @ 05:28) (94% - 94%)  Neck: No palpable thyroid nodules.  CVS: S1S2, No murmurs  Respiratory: No wheezing, no crepitations  GI: Abdomen soft, bowel sounds positive  Musculoskeletal:  edema lower extremities.   Skin: No skin rashes, no ecchymosis    Diagnostics

## 2018-08-30 NOTE — PROGRESS NOTE ADULT - SUBJECTIVE AND OBJECTIVE BOX
SUBJECTIVE: Patient with no anginal chest pain or shortness of breath  ROS otherwise negative           MEDICATIONS  (STANDING):  ceFAZolin   IVPB 2000 milliGRAM(s) IV Intermittent every 24 hours  chlorhexidine 4% Liquid 1 Application(s) Topical <User Schedule>  docusate sodium 100 milliGRAM(s) Oral three times a day  fentaNYL   Patch  12 MICROgram(s)/Hr 1 Patch Transdermal every 72 hours  insulin glargine Injectable (LANTUS) 12 Unit(s) SubCutaneous at bedtime  insulin lispro (HumaLOG) corrective regimen sliding scale   SubCutaneous three times a day before meals  lactobacillus acidophilus 1 Tablet(s) Oral three times a day with meals  lidocaine   Patch 1 Patch Transdermal daily  oxyCODONE  ER Tablet 10 milliGRAM(s) Oral every 12 hours  pantoprazole    Tablet 40 milliGRAM(s) Oral before breakfast  propranolol LA 60 milliGRAM(s) Oral daily  senna 2 Tablet(s) Oral at bedtime  warfarin 1 milliGRAM(s) Oral once    MEDICATIONS  (PRN):  ALBUTerol    90 MICROgram(s) HFA Inhaler 2 Puff(s) Inhalation every 6 hours PRN Shortness of Breath and/or Wheezing  dextrose 40% Gel 15 Gram(s) Oral once PRN Blood Glucose LESS THAN 70 milliGRAM(s)/deciLiter  glucagon  Injectable 1 milliGRAM(s) IntraMuscular once PRN Glucose <70 milliGRAM(s)/deciLiter  polyethylene glycol 3350 17 Gram(s) Oral two times a day PRN Constipation      LABS:                        10.7   10.69 )-----------( 303      ( 30 Aug 2018 05:40 )             33.4     Hemoglobin: 10.7 g/dL (08-30 @ 05:40)  Hemoglobin: 10.2 g/dL (08-29 @ 06:15)  Hemoglobin: 10.7 g/dL (08-28 @ 10:04)  Hemoglobin: 10.1 g/dL (08-27 @ 08:05)  Hemoglobin: 10.8 g/dL (08-26 @ 07:46)    08-30    131<L>  |  93<L>  |  69<H>  ----------------------------<  124<H>  4.9   |  24  |  2.39<H>    Ca    9.6      30 Aug 2018 05:40    TPro  5.7<L>  /  Alb  2.2<L>  /  TBili  0.3  /  DBili  x   /  AST  79<H>  /  ALT  < 4<L>  /  AlkPhos  419<H>  08-30    Creatinine Trend: 2.39<--, 2.27<--, 2.30<--, 2.20<--, 2.00<--, 1.90<--   PT/INR - ( 30 Aug 2018 05:40 )   PT: 25.6 SEC;   INR: 2.19          PTT - ( 30 Aug 2018 05:40 )  PTT:38.8 SEC        PHYSICAL EXAM  Vital Signs Last 24 Hrs  T(C): 36.5 (30 Aug 2018 05:28), Max: 36.6 (29 Aug 2018 21:51)  T(F): 97.7 (30 Aug 2018 05:28), Max: 97.9 (29 Aug 2018 21:51)  HR: 88 (30 Aug 2018 05:28) (67 - 88)  BP: 118/50 (30 Aug 2018 05:28) (104/51 - 118/50)  BP(mean): --  RR: 20 (30 Aug 2018 05:28) (18 - 20)  SpO2: 94% (30 Aug 2018 05:28) (94% - 100%)      HEENT: Normal Oral mucosa, PERRL, EOMI  Lymphatic: No obvious lymphadenopathy, No edema  Cardiovascular: Normal S1S2, No JVD, 1/6 STEFFANY, Peripheral pulses palpable 2+ B/L  Respiratory: Lungs clear to auscultation, normal effort  Gastrointestinal: Abdomen soft, ND, NT, +BS  Skin: Warm, dry, intact. No cyanosis, No rash.  Musculoskeletal: Normal ROM, normal strength  Psychiatric: Appropriate Mood and Affect      DIAGNOSTIC DATA  TELEMETRY: n/a    RADIOLOGY:   Xray Chest 1 View- PORTABLE-Urgent (08.17.18 @ 21:23) >  There are low lung volumes resulting in crowding of the bronchovascular   markings at the lung bases.  There is minimal blunting at the right costophrenic angle either   representing trace pleural effusion and/or pleural thickening.  There is subsegmental atelectasis at both lung bases.      < from: TTE Echo Doppler w/o Cont (08.17.18 @ 10:38) >  Technically difficult and limited study.  Mitral Valve: Calcified mitral annulus and mitral valve leaflets. Normal   opening of the mitral valve leaflets. Trace mitral regurgitation.  Aortic Valve/Aorta: Not well visualized  Tricuspid Valve: Calcified tricuspid annulus with normally opening valve.   Trace tricuspid regurgitation.  Pulmonic Valve: The pulmonic valve is not well visualized. Probably   normal.  Left Atrium: Moderate left atrial enlargement  Right Atrium: Normal  Left Ventricle: The endocardium is not well-visualized. Overall there is   preserved left ventricular systolic function. Unable to rule out wall   motion abnormalities. The EF is approximately 65%.  Right Ventricle: Normal right ventricular size and function.  Pericardium/Pleura: No pericardial effusion noted.  Pulmonary/RV Pressure: The right ventricular systolic pressure is   estimated to be 35mmHg, assuming that the right atrial pressure is   estimated to be8 mmHg. This is consistent with normal pulmonary   pressures.  LV Diastolic Function: Stage 2 diastolic dysfunction    < end of copied text >      ASSESSMENT AND PLAN:  89y Female  multiple comorbidities preserved LV and RV fx, Afib on coumadin, reported CAD ? remote MI with no intervention follows with Dr. Diaz with annual stress tests being medically managed for CAD with overall preserved LV function on recent TTE presented with several weeks h/o of bony pain and recent inability to ambulate,  ct scan revealed destructive bony lesions concerning for metastasis and liver lesions (primary possibly upper GI/pancreo-biliary) :     - HR appropriate  - Cont coumadin INR 2-3 if bleeding risk is acceptable- INR therapeutic   - HD stable with no evidence CHF  - could Obtain records from Dr. Skinny Diaz's office  - pending radiation therapy - heme/onc and surgery noted    - no plans for surgical intervention at this time     - patient refusing aggressive chemo  - no plans for inpatient cardiac testing at this time

## 2018-08-30 NOTE — DIETITIAN INITIAL EVALUATION ADULT. - PERTINENT LABORATORY DATA
08-30 Na131 mmol/L<L> Glu 124 mg/dL<H> K+ 4.9 mmol/L Cr  2.39 mg/dL<H> BUN 69 mg/dL<H> 08-30 Alb 2.2 g/dL<L> 08-15 [id number] 6.7 %<H>

## 2018-08-30 NOTE — PROGRESS NOTE ADULT - PROBLEM SELECTOR PLAN 1
- CT Abd/Pelvis w/o contrast revealed destructive bony lesions concerning for metastasis and liver lesions  - adenoca noted on hip bone biopsy at [facility]  - path immunohistochemistry analysis noting possible pancreatobiliary/upper gi tract as primary source  - heme/onc, surg/onc and ID input noted and appreciated  - patient and family wishing for more palliative management; refusing chemo, EUS/EGD/Colonoscopy   - consider palliative eval re: GOC

## 2018-08-30 NOTE — DIETITIAN INITIAL EVALUATION ADULT. - SIGNS/SYMPTOMS
<75% nutritional needs >1 month, physical findings stated above muscle wasting and fat loss +PU As evidenced by stage II pressure ulcer

## 2018-08-30 NOTE — DIETITIAN INITIAL EVALUATION ADULT. - ENERGY NEEDS
current weight 173lbs (dosing) Ht 61" BMI 32.7kg/m2  IBW: 105lbs (+/-10%) %IBW: 165%  No edema noted. Skin -pressure ulcer left medial buttock-stage II, diabetic foot ulcers/side of R. foot Big toe noted.

## 2018-08-30 NOTE — DIETITIAN INITIAL EVALUATION ADULT. - NS AS NUTRI INTERV MEALS SNACK3
Composition of meals/snacks/Carbohydrate - modified diet/Continue current diet order, which remains appropriate at this time.

## 2018-08-30 NOTE — DIETITIAN INITIAL EVALUATION ADULT. - ETIOLOGY
Patient meets criteria for moderate malnutrition in the context of chronic illness related to demand for wound healing

## 2018-08-30 NOTE — DIETITIAN INITIAL EVALUATION ADULT. - SOURCE
patient/family/significant other/other (specify)/electronic chart, RN, daughter at bedside, previous RDN notes

## 2018-08-30 NOTE — PROGRESS NOTE ADULT - SUBJECTIVE AND OBJECTIVE BOX
Patient is a 89y old  Female who presents with a chief complaint of Onc workup (28 Aug 2018 12:16)      INTERVAL HPI/OVERNIGHT EVENTS:  T(C): 36.2 (08-30-18 @ 14:51), Max: 36.6 (08-29-18 @ 21:51)  HR: 67 (08-30-18 @ 14:51) (67 - 88)  BP: 111/54 (08-30-18 @ 14:51) (104/51 - 118/50)  RR: 18 (08-30-18 @ 14:51) (18 - 20)  SpO2: 95% (08-30-18 @ 14:51) (94% - 99%)  Wt(kg): --  I&O's Summary      LABS:                        10.7   10.69 )-----------( 303      ( 30 Aug 2018 05:40 )             33.4     08-30    131<L>  |  93<L>  |  69<H>  ----------------------------<  124<H>  4.9   |  24  |  2.39<H>    Ca    9.6      30 Aug 2018 05:40    TPro  5.7<L>  /  Alb  2.2<L>  /  TBili  0.3  /  DBili  x   /  AST  79<H>  /  ALT  < 4<L>  /  AlkPhos  419<H>  08-30    PT/INR - ( 30 Aug 2018 05:40 )   PT: 25.6 SEC;   INR: 2.19          PTT - ( 30 Aug 2018 05:40 )  PTT:38.8 SEC    CAPILLARY BLOOD GLUCOSE      POCT Blood Glucose.: 189 mg/dL (30 Aug 2018 18:00)  POCT Blood Glucose.: 194 mg/dL (30 Aug 2018 12:42)  POCT Blood Glucose.: 143 mg/dL (30 Aug 2018 08:25)  POCT Blood Glucose.: 135 mg/dL (29 Aug 2018 22:32)            MEDICATIONS  (STANDING):  ceFAZolin   IVPB 2000 milliGRAM(s) IV Intermittent every 24 hours  chlorhexidine 4% Liquid 1 Application(s) Topical <User Schedule>  dextrose 5%. 1000 milliLiter(s) (50 mL/Hr) IV Continuous <Continuous>  dextrose 50% Injectable 12.5 Gram(s) IV Push once  dextrose 50% Injectable 25 Gram(s) IV Push once  dextrose 50% Injectable 25 Gram(s) IV Push once  docusate sodium 100 milliGRAM(s) Oral three times a day  fentaNYL   Patch  12 MICROgram(s)/Hr 1 Patch Transdermal every 72 hours  insulin glargine Injectable (LANTUS) 12 Unit(s) SubCutaneous at bedtime  insulin lispro (HumaLOG) corrective regimen sliding scale   SubCutaneous three times a day before meals  lactobacillus acidophilus 1 Tablet(s) Oral three times a day with meals  lidocaine   Patch 1 Patch Transdermal daily  oxyCODONE  ER Tablet 10 milliGRAM(s) Oral every 12 hours  pantoprazole    Tablet 40 milliGRAM(s) Oral before breakfast  propranolol LA 60 milliGRAM(s) Oral daily  senna 2 Tablet(s) Oral at bedtime    MEDICATIONS  (PRN):  ALBUTerol    90 MICROgram(s) HFA Inhaler 2 Puff(s) Inhalation every 6 hours PRN Shortness of Breath and/or Wheezing  dextrose 40% Gel 15 Gram(s) Oral once PRN Blood Glucose LESS THAN 70 milliGRAM(s)/deciLiter  glucagon  Injectable 1 milliGRAM(s) IntraMuscular once PRN Glucose <70 milliGRAM(s)/deciLiter  polyethylene glycol 3350 17 Gram(s) Oral two times a day PRN Constipation          PHYSICAL EXAM:  GENERAL: NAD, well-groomed, well-developed  HEAD:  Atraumatic, Normocephalic  CHEST/LUNG: Clear to percussion bilaterally; No rales, rhonchi, wheezing, or rubs  HEART: Regular rate and rhythm; No murmurs, rubs, or gallops  ABDOMEN: Soft, Nontender, Nondistended; Bowel sounds present  EXTREMITIES:  2+ Peripheral Pulses, No clubbing, cyanosis, or edema  LYMPH: No lymphadenopathy noted  SKIN: No rashes or lesions    Care Discussed with Consultants/Other Providers [+ ] YES  [ ] NO

## 2018-08-30 NOTE — DIETITIAN INITIAL EVALUATION ADULT. - PHYSICAL APPEARANCE
Nutrition focused physical exam conducted - found signs of malnutrition [ ]absent [ X]present Subcutaneous fat loss: [moderate ] Orbital fat pads region, [moderate ]Buccal fat region, [moderate ]Triceps region Muscle wasting: [ moderate]Temples region

## 2018-08-30 NOTE — DIETITIAN INITIAL EVALUATION ADULT. - NS AS NUTRI INTERV MEDICAL AND FOOD SUPPLEMENTS3
Recommend Glucerna Therapeutic Nutrition 240mls 2x daily (440kcals, 20g protein) for optimal nutrition

## 2018-08-31 DIAGNOSIS — G89.3 NEOPLASM RELATED PAIN (ACUTE) (CHRONIC): ICD-10-CM

## 2018-08-31 DIAGNOSIS — Z51.5 ENCOUNTER FOR PALLIATIVE CARE: ICD-10-CM

## 2018-08-31 DIAGNOSIS — R53.81 OTHER MALAISE: ICD-10-CM

## 2018-08-31 LAB
ALBUMIN SERPL ELPH-MCNC: 2.3 G/DL — LOW (ref 3.3–5)
ALP SERPL-CCNC: 443 U/L — HIGH (ref 40–120)
ALT FLD-CCNC: < 4 U/L — LOW (ref 4–33)
APTT BLD: 55.4 SEC — HIGH (ref 27.5–37.4)
AST SERPL-CCNC: 90 U/L — HIGH (ref 4–32)
BILIRUB SERPL-MCNC: 0.3 MG/DL — SIGNIFICANT CHANGE UP (ref 0.2–1.2)
BUN SERPL-MCNC: 69 MG/DL — HIGH (ref 7–23)
BUN SERPL-MCNC: 70 MG/DL — HIGH (ref 7–23)
CALCIUM SERPL-MCNC: 9.1 MG/DL — SIGNIFICANT CHANGE UP (ref 8.4–10.5)
CALCIUM SERPL-MCNC: 9.2 MG/DL — SIGNIFICANT CHANGE UP (ref 8.4–10.5)
CHLORIDE SERPL-SCNC: 92 MMOL/L — LOW (ref 98–107)
CHLORIDE SERPL-SCNC: 94 MMOL/L — LOW (ref 98–107)
CO2 SERPL-SCNC: 22 MMOL/L — SIGNIFICANT CHANGE UP (ref 22–31)
CO2 SERPL-SCNC: 24 MMOL/L — SIGNIFICANT CHANGE UP (ref 22–31)
CREAT SERPL-MCNC: 2.46 MG/DL — HIGH (ref 0.5–1.3)
CREAT SERPL-MCNC: 2.52 MG/DL — HIGH (ref 0.5–1.3)
GLUCOSE BLDC GLUCOMTR-MCNC: 113 MG/DL — HIGH (ref 70–99)
GLUCOSE BLDC GLUCOMTR-MCNC: 114 MG/DL — HIGH (ref 70–99)
GLUCOSE BLDC GLUCOMTR-MCNC: 152 MG/DL — HIGH (ref 70–99)
GLUCOSE BLDC GLUCOMTR-MCNC: 192 MG/DL — HIGH (ref 70–99)
GLUCOSE SERPL-MCNC: 120 MG/DL — HIGH (ref 70–99)
GLUCOSE SERPL-MCNC: 172 MG/DL — HIGH (ref 70–99)
HCT VFR BLD CALC: 32.8 % — LOW (ref 34.5–45)
HGB BLD-MCNC: 10 G/DL — LOW (ref 11.5–15.5)
INR BLD: 2.7 — HIGH (ref 0.88–1.17)
MAGNESIUM SERPL-MCNC: 2.1 MG/DL — SIGNIFICANT CHANGE UP (ref 1.6–2.6)
MCHC RBC-ENTMCNC: 29.1 PG — SIGNIFICANT CHANGE UP (ref 27–34)
MCHC RBC-ENTMCNC: 30.5 % — LOW (ref 32–36)
MCV RBC AUTO: 95.3 FL — SIGNIFICANT CHANGE UP (ref 80–100)
NRBC # FLD: 0 — SIGNIFICANT CHANGE UP
PHOSPHATE SERPL-MCNC: 4.3 MG/DL — SIGNIFICANT CHANGE UP (ref 2.5–4.5)
PLATELET # BLD AUTO: 267 K/UL — SIGNIFICANT CHANGE UP (ref 150–400)
PMV BLD: 9.6 FL — SIGNIFICANT CHANGE UP (ref 7–13)
POTASSIUM SERPL-MCNC: 4.7 MMOL/L — SIGNIFICANT CHANGE UP (ref 3.5–5.3)
POTASSIUM SERPL-MCNC: 4.9 MMOL/L — SIGNIFICANT CHANGE UP (ref 3.5–5.3)
POTASSIUM SERPL-SCNC: 4.7 MMOL/L — SIGNIFICANT CHANGE UP (ref 3.5–5.3)
POTASSIUM SERPL-SCNC: 4.9 MMOL/L — SIGNIFICANT CHANGE UP (ref 3.5–5.3)
PROT SERPL-MCNC: 5.9 G/DL — LOW (ref 6–8.3)
PROTHROM AB SERPL-ACNC: 30.6 SEC — HIGH (ref 9.8–13.1)
RBC # BLD: 3.44 M/UL — LOW (ref 3.8–5.2)
RBC # FLD: 17.8 % — HIGH (ref 10.3–14.5)
SODIUM SERPL-SCNC: 130 MMOL/L — LOW (ref 135–145)
SODIUM SERPL-SCNC: 131 MMOL/L — LOW (ref 135–145)
WBC # BLD: 8.98 K/UL — SIGNIFICANT CHANGE UP (ref 3.8–10.5)
WBC # FLD AUTO: 8.98 K/UL — SIGNIFICANT CHANGE UP (ref 3.8–10.5)

## 2018-08-31 PROCEDURE — 77261 THER RADIOLOGY TX PLNG SMPL: CPT

## 2018-08-31 PROCEDURE — 77306 TELETHX ISODOSE PLAN SIMPLE: CPT | Mod: 26

## 2018-08-31 PROCEDURE — 77290 THER RAD SIMULAJ FIELD CPLX: CPT | Mod: 26

## 2018-08-31 PROCEDURE — 77334 RADIATION TREATMENT AID(S): CPT | Mod: 26

## 2018-08-31 RX ORDER — OXYCODONE HYDROCHLORIDE 5 MG/1
5 TABLET ORAL EVERY 4 HOURS
Qty: 0 | Refills: 0 | Status: DISCONTINUED | OUTPATIENT
Start: 2018-08-31 | End: 2018-09-06

## 2018-08-31 RX ORDER — WARFARIN SODIUM 2.5 MG/1
1 TABLET ORAL ONCE
Qty: 0 | Refills: 0 | Status: COMPLETED | OUTPATIENT
Start: 2018-08-31 | End: 2018-08-31

## 2018-08-31 RX ADMIN — INSULIN GLARGINE 12 UNIT(S): 100 INJECTION, SOLUTION SUBCUTANEOUS at 22:09

## 2018-08-31 RX ADMIN — OXYCODONE HYDROCHLORIDE 10 MILLIGRAM(S): 5 TABLET ORAL at 06:05

## 2018-08-31 RX ADMIN — LIDOCAINE 1 PATCH: 4 CREAM TOPICAL at 13:31

## 2018-08-31 RX ADMIN — PANTOPRAZOLE SODIUM 40 MILLIGRAM(S): 20 TABLET, DELAYED RELEASE ORAL at 06:05

## 2018-08-31 RX ADMIN — Medication 1 TABLET(S): at 09:03

## 2018-08-31 RX ADMIN — Medication 1: at 18:21

## 2018-08-31 RX ADMIN — LIDOCAINE 1 PATCH: 4 CREAM TOPICAL at 01:25

## 2018-08-31 RX ADMIN — Medication 1 TABLET(S): at 13:31

## 2018-08-31 RX ADMIN — Medication 60 MILLIGRAM(S): at 06:05

## 2018-08-31 RX ADMIN — Medication 1 TABLET(S): at 18:21

## 2018-08-31 RX ADMIN — WARFARIN SODIUM 1 MILLIGRAM(S): 2.5 TABLET ORAL at 18:28

## 2018-08-31 RX ADMIN — Medication 100 MILLIGRAM(S): at 07:11

## 2018-08-31 RX ADMIN — CHLORHEXIDINE GLUCONATE 1 APPLICATION(S): 213 SOLUTION TOPICAL at 09:03

## 2018-08-31 NOTE — GOALS OF CARE CONVERSATION - PERSONAL ADVANCE DIRECTIVE - CONVERSATION DETAILS
Pt transferred to [facility] for RT to right hip for tx of bone metastases. Met with pt and dtr Leandra at bedside. Pt alert and oriented with a comprehensive understanding of her overall medical condition including treatment plan and prognosis. Pt has established her goals of care and documented her goals of care in a MOLST form dated 8/21/18. Pt does not want aggressive resuscitation at end of life such as CPR or mechanical ventilation. Pt states that after RT is completed she intends to transition to sub acute rehabilitation or PT and antibiotic. Pt states upon discharge from rehab facility she will elect for home hospice care. Pt and family have a good understanding of pt's disease trajectory. Support provided. No additional referrals indicated at this time.

## 2018-08-31 NOTE — CONSULT NOTE ADULT - PROBLEM SELECTOR RECOMMENDATION 2
Patient reports relief with current regimen.  Continue Fentanyl 12mcq/hr and Oxycodone 5mg PO q4h PRN. Bowel regimen. Patient reports relief with current regimen.  Continue Fentanyl 12mcq/hr and Oxycodone 5mg PO q4h PRN. Given renal insufficiency, titrate conservatively if needed.  Bowel regimen.

## 2018-08-31 NOTE — CONSULT NOTE ADULT - PROBLEM SELECTOR RECOMMENDATION 4
Goals established as above.  MOLST present in chart.  Psychosocial support provided.  Please re-consult as needed.

## 2018-08-31 NOTE — PROGRESS NOTE ADULT - PROBLEM SELECTOR PLAN 1
- CT Abd/Pelvis w/o contrast revealed destructive bony lesions concerning for metastasis and liver lesions  - adenoca noted on hip bone biopsy at [facility]  - path immunohistochemistry analysis noting possible pancreatobiliary/upper gi tract as primary source  - heme/onc, surg/onc and ID input noted and appreciated  - patient and family wishing for more palliative management; refusing chemo, EUS/EGD/Colonoscopy   - consider palliative eval re: GOC

## 2018-08-31 NOTE — PROGRESS NOTE ADULT - SUBJECTIVE AND OBJECTIVE BOX
Podiatry pager #: [phone]/ [phone]    Patient is a 89y old  Female who presents with a chief complaint of Onc workup (28 Aug 2018 12:16)       INTERVAL HPI/OVERNIGHT EVENTS:  Patient seen and evaluated at bedside.  Pt is resting comfortable in NAD. Denies N/V/F/C.  Pt denies pain.     Allergies    Levaquin (Other)  ramipril (Other)  tetracycline (Hives; Rash)    Intolerances        Vital Signs Last 24 Hrs  T(C): 36.6 (31 Aug 2018 05:52), Max: 36.6 (30 Aug 2018 21:58)  T(F): 97.8 (31 Aug 2018 05:52), Max: 97.8 (30 Aug 2018 21:58)  HR: 62 (31 Aug 2018 05:52) (62 - 68)  BP: 130/68 (31 Aug 2018 05:52) (104/66 - 130/68)  BP(mean): --  RR: 18 (31 Aug 2018 05:52) (18 - 18)  SpO2: 98% (31 Aug 2018 05:52) (95% - 98%)    LABS:                        10.0   8.98  )-----------( 267      ( 31 Aug 2018 06:15 )             32.8     08-31    130<L>  |  94<L>  |  69<H>  ----------------------------<  120<H>  4.9   |  22  |  2.52<H>    Ca    9.2      31 Aug 2018 06:15    TPro  5.7<L>  /  Alb  2.2<L>  /  TBili  0.3  /  DBili  x   /  AST  79<H>  /  ALT  < 4<L>  /  AlkPhos  419<H>  08-30    PT/INR - ( 31 Aug 2018 06:15 )   PT: 30.6 SEC;   INR: 2.70          PTT - ( 31 Aug 2018 06:15 )  PTT:55.4 SEC    CAPILLARY BLOOD GLUCOSE      POCT Blood Glucose.: 114 mg/dL (31 Aug 2018 08:35)  POCT Blood Glucose.: 145 mg/dL (30 Aug 2018 22:07)  POCT Blood Glucose.: 189 mg/dL (30 Aug 2018 18:00)  POCT Blood Glucose.: 194 mg/dL (30 Aug 2018 12:42)      Lower Extremity Physical Exam:  1- s/p LF  bunionectomy- sutures intact, no dehiscence skin well coapted, no erythema or edema, surrounding skin viable.   - palpable pedal pulses. :    RADIOLOGY & ADDITIONAL TESTS:

## 2018-08-31 NOTE — PROGRESS NOTE ADULT - SUBJECTIVE AND OBJECTIVE BOX
Patient is a 89y old  Female who presents with a chief complaint of Onc workup (28 Aug 2018 12:16)      INTERVAL HPI/OVERNIGHT EVENTS:  T(C): 36.4 (08-31-18 @ 15:19), Max: 36.6 (08-30-18 @ 21:58)  HR: 62 (08-31-18 @ 15:19) (62 - 68)  BP: 105/50 (08-31-18 @ 16:47) (95/65 - 130/68)  RR: 18 (08-31-18 @ 15:19) (18 - 18)  SpO2: 96% (08-31-18 @ 15:19) (96% - 98%)  Wt(kg): --  I&O's Summary      LABS:                        10.0   8.98  )-----------( 267      ( 31 Aug 2018 06:15 )             32.8     08-31    131<L>  |  92<L>  |  70<H>  ----------------------------<  172<H>  4.7   |  24  |  2.46<H>    Ca    9.1      31 Aug 2018 10:45  Phos  4.3     08-31  Mg     2.1     08-31    TPro  5.9<L>  /  Alb  2.3<L>  /  TBili  0.3  /  DBili  x   /  AST  90<H>  /  ALT  < 4<L>  /  AlkPhos  443<H>  08-31    PT/INR - ( 31 Aug 2018 06:15 )   PT: 30.6 SEC;   INR: 2.70          PTT - ( 31 Aug 2018 06:15 )  PTT:55.4 SEC    CAPILLARY BLOOD GLUCOSE      POCT Blood Glucose.: 113 mg/dL (31 Aug 2018 13:30)  POCT Blood Glucose.: 114 mg/dL (31 Aug 2018 08:35)  POCT Blood Glucose.: 145 mg/dL (30 Aug 2018 22:07)  POCT Blood Glucose.: 189 mg/dL (30 Aug 2018 18:00)            MEDICATIONS  (STANDING):  ceFAZolin   IVPB 2000 milliGRAM(s) IV Intermittent every 24 hours  chlorhexidine 4% Liquid 1 Application(s) Topical <User Schedule>  dextrose 5%. 1000 milliLiter(s) (50 mL/Hr) IV Continuous <Continuous>  dextrose 50% Injectable 12.5 Gram(s) IV Push once  dextrose 50% Injectable 25 Gram(s) IV Push once  dextrose 50% Injectable 25 Gram(s) IV Push once  docusate sodium 100 milliGRAM(s) Oral three times a day  fentaNYL   Patch  12 MICROgram(s)/Hr 1 Patch Transdermal every 72 hours  insulin glargine Injectable (LANTUS) 12 Unit(s) SubCutaneous at bedtime  insulin lispro (HumaLOG) corrective regimen sliding scale   SubCutaneous three times a day before meals  lactobacillus acidophilus 1 Tablet(s) Oral three times a day with meals  lidocaine   Patch 1 Patch Transdermal daily  pantoprazole    Tablet 40 milliGRAM(s) Oral before breakfast  propranolol LA 60 milliGRAM(s) Oral daily  senna 2 Tablet(s) Oral at bedtime  warfarin 1 milliGRAM(s) Oral once    MEDICATIONS  (PRN):  ALBUTerol    90 MICROgram(s) HFA Inhaler 2 Puff(s) Inhalation every 6 hours PRN Shortness of Breath and/or Wheezing  dextrose 40% Gel 15 Gram(s) Oral once PRN Blood Glucose LESS THAN 70 milliGRAM(s)/deciLiter  glucagon  Injectable 1 milliGRAM(s) IntraMuscular once PRN Glucose <70 milliGRAM(s)/deciLiter  oxyCODONE    IR 5 milliGRAM(s) Oral every 4 hours PRN Moderate and Severe pain  polyethylene glycol 3350 17 Gram(s) Oral two times a day PRN Constipation          PHYSICAL EXAM:  GENERAL: frail  CHEST/LUNG: Clear to percussion bilaterally; No rales, rhonchi, wheezing, or rubs  HEART: Regular rate and rhythm; No murmurs, rubs, or gallops  ABDOMEN: Soft, Nontender, Nondistended; Bowel sounds present  EXTREMITIES:  2+ Peripheral Pulses, No clubbing, cyanosis, or edema  LYMPH: No lymphadenopathy noted  SKIN: No rashes or lesions    Care Discussed with Consultants/Other Providers [* ] YES  [ ] NO

## 2018-08-31 NOTE — PROGRESS NOTE ADULT - SUBJECTIVE AND OBJECTIVE BOX
SUBJECTIVE: Patient with no anginal chest pain or shortness of breath  ROS otherwise negative       MEDICATIONS  (STANDING):  ceFAZolin   IVPB 2000 milliGRAM(s) IV Intermittent every 24 hours  chlorhexidine 4% Liquid 1 Application(s) Topical <User Schedule>  dextrose 5%. 1000 milliLiter(s) (50 mL/Hr) IV Continuous <Continuous>  dextrose 50% Injectable 12.5 Gram(s) IV Push once  dextrose 50% Injectable 25 Gram(s) IV Push once  dextrose 50% Injectable 25 Gram(s) IV Push once  docusate sodium 100 milliGRAM(s) Oral three times a day  fentaNYL   Patch  12 MICROgram(s)/Hr 1 Patch Transdermal every 72 hours  insulin glargine Injectable (LANTUS) 12 Unit(s) SubCutaneous at bedtime  insulin lispro (HumaLOG) corrective regimen sliding scale   SubCutaneous three times a day before meals  lactobacillus acidophilus 1 Tablet(s) Oral three times a day with meals  lidocaine   Patch 1 Patch Transdermal daily  oxyCODONE  ER Tablet 10 milliGRAM(s) Oral every 12 hours  pantoprazole    Tablet 40 milliGRAM(s) Oral before breakfast  propranolol LA 60 milliGRAM(s) Oral daily  senna 2 Tablet(s) Oral at bedtime  warfarin 1 milliGRAM(s) Oral once    MEDICATIONS  (PRN):  ALBUTerol    90 MICROgram(s) HFA Inhaler 2 Puff(s) Inhalation every 6 hours PRN Shortness of Breath and/or Wheezing  dextrose 40% Gel 15 Gram(s) Oral once PRN Blood Glucose LESS THAN 70 milliGRAM(s)/deciLiter  glucagon  Injectable 1 milliGRAM(s) IntraMuscular once PRN Glucose <70 milliGRAM(s)/deciLiter  polyethylene glycol 3350 17 Gram(s) Oral two times a day PRN Constipation      LABS:                        10.0   8.98  )-----------( 267      ( 31 Aug 2018 06:15 )             32.8     Hemoglobin: 10.0 g/dL (08-31 @ 06:15)  Hemoglobin: 10.7 g/dL (08-30 @ 05:40)  Hemoglobin: 10.2 g/dL (08-29 @ 06:15)  Hemoglobin: 10.7 g/dL (08-28 @ 10:04)  Hemoglobin: 10.1 g/dL (08-27 @ 08:05)    08-31    130<L>  |  94<L>  |  69<H>  ----------------------------<  120<H>  4.9   |  22  |  2.52<H>    Ca    9.2      31 Aug 2018 06:15    TPro  5.7<L>  /  Alb  2.2<L>  /  TBili  0.3  /  DBili  x   /  AST  79<H>  /  ALT  < 4<L>  /  AlkPhos  419<H>  08-30    Creatinine Trend: 2.52<--, 2.39<--, 2.27<--, 2.30<--, 2.20<--, 2.00<--   PT/INR - ( 31 Aug 2018 06:15 )   PT: 30.6 SEC;   INR: 2.70          PTT - ( 31 Aug 2018 06:15 )  PTT:55.4 SEC        PHYSICAL EXAM  Vital Signs Last 24 Hrs  T(C): 36.6 (31 Aug 2018 05:52), Max: 36.6 (30 Aug 2018 21:58)  T(F): 97.8 (31 Aug 2018 05:52), Max: 97.8 (30 Aug 2018 21:58)  HR: 62 (31 Aug 2018 05:52) (62 - 68)  BP: 130/68 (31 Aug 2018 05:52) (104/66 - 130/68)  BP(mean): --  RR: 18 (31 Aug 2018 05:52) (18 - 18)  SpO2: 98% (31 Aug 2018 05:52) (95% - 98%)      HEENT: Normal Oral mucosa, PERRL, EOMI  Lymphatic: No obvious lymphadenopathy, No edema  Cardiovascular: Normal S1S2, No JVD, 1/6 STEFFANY, Peripheral pulses palpable 2+ B/L  Respiratory: Lungs clear to auscultation, normal effort  Gastrointestinal: Abdomen soft, ND, NT, +BS  Skin: Warm, dry, intact. No cyanosis, No rash.  Musculoskeletal: Normal ROM, normal strength  Psychiatric: Appropriate Mood and Affect      DIAGNOSTIC DATA  TELEMETRY: n/a    RADIOLOGY:   Xray Chest 1 View- PORTABLE-Urgent (08.17.18 @ 21:23) >  There are low lung volumes resulting in crowding of the bronchovascular   markings at the lung bases.  There is minimal blunting at the right costophrenic angle either   representing trace pleural effusion and/or pleural thickening.  There is subsegmental atelectasis at both lung bases.      < from: TTE Echo Doppler w/o Cont (08.17.18 @ 10:38) >  Technically difficult and limited study.  Mitral Valve: Calcified mitral annulus and mitral valve leaflets. Normal   opening of the mitral valve leaflets. Trace mitral regurgitation.  Aortic Valve/Aorta: Not well visualized  Tricuspid Valve: Calcified tricuspid annulus with normally opening valve.   Trace tricuspid regurgitation.  Pulmonic Valve: The pulmonic valve is not well visualized. Probably   normal.  Left Atrium: Moderate left atrial enlargement  Right Atrium: Normal  Left Ventricle: The endocardium is not well-visualized. Overall there is   preserved left ventricular systolic function. Unable to rule out wall   motion abnormalities. The EF is approximately 65%.  Right Ventricle: Normal right ventricular size and function.  Pericardium/Pleura: No pericardial effusion noted.  Pulmonary/RV Pressure: The right ventricular systolic pressure is   estimated to be 35mmHg, assuming that the right atrial pressure is   estimated to be8 mmHg. This is consistent with normal pulmonary   pressures.  LV Diastolic Function: Stage 2 diastolic dysfunction    < end of copied text >      ASSESSMENT AND PLAN:  89y Female  multiple comorbidities preserved LV and RV fx, Afib on coumadin, reported CAD ? remote MI with no intervention follows with Dr. Diaz with annual stress tests being medically managed for CAD with overall preserved LV function on recent TTE presented with several weeks h/o of bony pain and recent inability to ambulate,  ct scan revealed destructive bony lesions concerning for metastasis and liver lesions (primary possibly upper GI/pancreo-biliary) :     - HR appropriate  - Cont coumadin INR 2-3 if bleeding risk is acceptable- INR therapeutic   - HD stable with no evidence CHF  - could Obtain records from Dr. Skinny Diaz's office  - pending radiation therapy - heme/onc and surgery noted    - no plans for surgical intervention at this time     - patient/family  refusing aggressive chemo  - no plans for inpatient cardiac testing at this time

## 2018-08-31 NOTE — PROGRESS NOTE ADULT - SUBJECTIVE AND OBJECTIVE BOX
Chief complaint  Patient is a 89y old  Female who presents with a chief complaint of Onc workup (28 Aug 2018 12:16)   Review of systems  Patient in bed, looks comfortable, no fever, no hypoglycemia.    Labs and Fingersticks  CAPILLARY BLOOD GLUCOSE      POCT Blood Glucose.: 114 mg/dL (31 Aug 2018 08:35)  POCT Blood Glucose.: 145 mg/dL (30 Aug 2018 22:07)  POCT Blood Glucose.: 189 mg/dL (30 Aug 2018 18:00)  POCT Blood Glucose.: 194 mg/dL (30 Aug 2018 12:42)          Calcium, Total Serum: 9.2 (08-31 @ 06:15)  Calcium, Total Serum: 9.6 (08-30 @ 05:40)  Albumin, Serum: 2.2 <L> (08-30 @ 05:40)    Alanine Aminotransferase (ALT/SGPT): < 4 <L> (08-30 @ 05:40)  Alkaline Phosphatase, Serum: 419 <H> (08-30 @ 05:40)  Aspartate Aminotransferase (AST/SGOT): 79 <H> (08-30 @ 05:40)        08-31    130<L>  |  94<L>  |  69<H>  ----------------------------<  120<H>  4.9   |  22  |  2.52<H>    Ca    9.2      31 Aug 2018 06:15    TPro  5.7<L>  /  Alb  2.2<L>  /  TBili  0.3  /  DBili  x   /  AST  79<H>  /  ALT  < 4<L>  /  AlkPhos  419<H>  08-30                        10.0   8.98  )-----------( 267      ( 31 Aug 2018 06:15 )             32.8     Medications  MEDICATIONS  (STANDING):  ceFAZolin   IVPB 2000 milliGRAM(s) IV Intermittent every 24 hours  chlorhexidine 4% Liquid 1 Application(s) Topical <User Schedule>  dextrose 5%. 1000 milliLiter(s) (50 mL/Hr) IV Continuous <Continuous>  dextrose 50% Injectable 12.5 Gram(s) IV Push once  dextrose 50% Injectable 25 Gram(s) IV Push once  dextrose 50% Injectable 25 Gram(s) IV Push once  docusate sodium 100 milliGRAM(s) Oral three times a day  fentaNYL   Patch  12 MICROgram(s)/Hr 1 Patch Transdermal every 72 hours  insulin glargine Injectable (LANTUS) 12 Unit(s) SubCutaneous at bedtime  insulin lispro (HumaLOG) corrective regimen sliding scale   SubCutaneous three times a day before meals  lactobacillus acidophilus 1 Tablet(s) Oral three times a day with meals  lidocaine   Patch 1 Patch Transdermal daily  oxyCODONE  ER Tablet 10 milliGRAM(s) Oral every 12 hours  pantoprazole    Tablet 40 milliGRAM(s) Oral before breakfast  propranolol LA 60 milliGRAM(s) Oral daily  senna 2 Tablet(s) Oral at bedtime  warfarin 1 milliGRAM(s) Oral once      Physical Exam  General: Patient comfortable in bed  Vital Signs Last 12 Hrs  T(F): 97.8 (08-31-18 @ 05:52), Max: 97.8 (08-31-18 @ 05:52)  HR: 62 (08-31-18 @ 05:52) (62 - 62)  BP: 130/68 (08-31-18 @ 05:52) (130/68 - 130/68)  BP(mean): --  RR: 18 (08-31-18 @ 05:52) (18 - 18)  SpO2: 98% (08-31-18 @ 05:52) (98% - 98%)  Neck: No palpable thyroid nodules.  CVS: S1S2, No murmurs  Respiratory: No wheezing, no crepitations  GI: Abdomen soft, bowel sounds positive  Musculoskeletal:  edema lower extremities.   Skin: No skin rashes, no ecchymosis    Diagnostics

## 2018-08-31 NOTE — PROGRESS NOTE ADULT - SUBJECTIVE AND OBJECTIVE BOX
Infectious Diseases progress note:    Subjective: NAD. Afebrile.  No leukocytosis    ROS:  CONSTITUTIONAL:  No fever, chills, rigors  CARDIOVASCULAR:  No chest pain or palpitations  RESPIRATORY:   No SOB, cough, dyspnea on exertion.  No wheezing  GASTROINTESTINAL:  No abd pain, N/V, diarrhea/constipation  EXTREMITIES:  No swelling or joint pain  GENITOURINARY:  No burning on urination, increased frequency or urgency.  No flank pain  NEUROLOGIC:  No HA, visual disturbances  SKIN: No rashes    Allergies    Levaquin (Other)  ramipril (Other)  tetracycline (Hives; Rash)    Intolerances        ANTIBIOTICS/RELEVANT:  antimicrobials  ceFAZolin   IVPB 2000 milliGRAM(s) IV Intermittent every 24 hours    immunologic:    OTHER:  ALBUTerol    90 MICROgram(s) HFA Inhaler 2 Puff(s) Inhalation every 6 hours PRN  chlorhexidine 4% Liquid 1 Application(s) Topical <User Schedule>  dextrose 40% Gel 15 Gram(s) Oral once PRN  dextrose 5%. 1000 milliLiter(s) IV Continuous <Continuous>  dextrose 50% Injectable 12.5 Gram(s) IV Push once  dextrose 50% Injectable 25 Gram(s) IV Push once  dextrose 50% Injectable 25 Gram(s) IV Push once  docusate sodium 100 milliGRAM(s) Oral three times a day  fentaNYL   Patch  12 MICROgram(s)/Hr 1 Patch Transdermal every 72 hours  glucagon  Injectable 1 milliGRAM(s) IntraMuscular once PRN  insulin glargine Injectable (LANTUS) 12 Unit(s) SubCutaneous at bedtime  insulin lispro (HumaLOG) corrective regimen sliding scale   SubCutaneous three times a day before meals  lactobacillus acidophilus 1 Tablet(s) Oral three times a day with meals  lidocaine   Patch 1 Patch Transdermal daily  oxyCODONE    IR 5 milliGRAM(s) Oral every 4 hours PRN  pantoprazole    Tablet 40 milliGRAM(s) Oral before breakfast  polyethylene glycol 3350 17 Gram(s) Oral two times a day PRN  propranolol LA 60 milliGRAM(s) Oral daily  senna 2 Tablet(s) Oral at bedtime  warfarin 1 milliGRAM(s) Oral once      Objective:  Vital Signs Last 24 Hrs  T(C): 36.6 (31 Aug 2018 05:52), Max: 36.6 (30 Aug 2018 21:58)  T(F): 97.8 (31 Aug 2018 05:52), Max: 97.8 (30 Aug 2018 21:58)  HR: 62 (31 Aug 2018 05:52) (62 - 68)  BP: 130/68 (31 Aug 2018 05:52) (104/66 - 130/68)  BP(mean): --  RR: 18 (31 Aug 2018 05:52) (18 - 18)  SpO2: 98% (31 Aug 2018 05:52) (95% - 98%)    PHYSICAL EXAM:  Constitutional:NAD  Eyes:JORGE, EOMI  Ear/Nose/Throat: no thrush, mucositis.  Moist mucous membranes	  Neck:no JVD, no lymphadenopathy, supple  Respiratory: CTA ignacio  Cardiovascular: S1S2 RRR, no murmurs  Gastrointestinal:soft, nontender,  nondistended (+) BS  Extremities:no e/e/c  Skin:  no rashes, open wounds or ulcerations        LABS:                        10.0   8.98  )-----------( 267      ( 31 Aug 2018 06:15 )             32.8     08-31    131<L>  |  92<L>  |  70<H>  ----------------------------<  172<H>  4.7   |  24  |  2.46<H>    Ca    9.1      31 Aug 2018 10:45  Phos  4.3     08-31  Mg     2.1     08-31    TPro  5.9<L>  /  Alb  2.3<L>  /  TBili  0.3  /  DBili  x   /  AST  90<H>  /  ALT  < 4<L>  /  AlkPhos  443<H>  08-31    PT/INR - ( 31 Aug 2018 06:15 )   PT: 30.6 SEC;   INR: 2.70          PTT - ( 31 Aug 2018 06:15 )  PTT:55.4 SEC      Procalcitonin, Serum: 2.60 (08-16 @ 08:00)                    MICROBIOLOGY:    Culture - Tissue with Gram Stain (08.21.18 @ 21:28)    -  Gentamicin: S <=4 Should not be used as monotherapy    -  Oxacillin: S <=0.25    -  Penicillin: R >8    -  RIF- Rifampin: S <=1 Should not be used as monotherapy    -  Tetra/Doxy: S <=4    -  Trimethoprim/Sulfamethoxazole: S <=0.5/9.5    -  Vancomycin: S 1    Gram Stain:   No polymorphonuclear cells seen  No organisms seen    -  Ampicillin/Sulbactam: S <=8/4    -  Cefazolin: S <=4    -  Clindamycin: R >4    -  Erythromycin: R >4    Specimen Source: .Tissue left 1st metatarsal head    Culture Results:   Rare Staphylococcus aureus    Organism Identification: Staphylococcus aureus    Organism: Staphylococcus aureus    Method Type: BUCK          RADIOLOGY & ADDITIONAL STUDIES:    < from: US Abdomen Complete (08.29.18 @ 12:48) >  IMPRESSION:     Heterogeneous liver echotexture.    Limited evaluation of the gallbladder without evidence of cholelithiasis.   Focal outpouching of fluid seen on transverse images likely corresponding   to a small fold in the gallbladder fundus better seen on CT technique.    Small to moderate abdominal ascites.    < end of copied text >

## 2018-08-31 NOTE — CONSULT NOTE ADULT - SUBJECTIVE AND OBJECTIVE BOX
HPI:  Obtained from H&P   89 y.o woman with multiple comorbidities presented with several weeks h/o of bony pain and recent inability to ambulate due to pain in the foot. Ct scan revealed destructive bony lesions concerning for metastasis and liver lesions. 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 Noted to have staph aurues bacteremia likely from left hallux abscess. The primary source of malignancy is unclear,. 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       PERTINENT PMH REVIEWED:  [x ] YES [ ] NO           SOCIAL HISTORY:  Significant other/partner:  [ ] YES  [x ] NO            Children:  [x ] YES  [ ] NO                   Sikh/Spirituality:  Episcopal   Substance hx:  [ ] YES   [x ] NO           Tobacco hx:  [ ] YES  [ ] NO             Alcohol hx: [ ] YES  [x ] NO        Home Opioid hx:  [ ] YES  [x ] NO   Living Situation: [x ] Home - with daughter     FAMILY HISTORY:  No pertinent family history in first degree relatives    BASELINE ADLs (prior to admission):  Independent [ ] moderately [ ] fully   Dependent   [ x] moderately [ ] fully    Code Status: DNR                     MOLST  [x ] YES [ ] NO      Health Care Proxy [x ] YES  [ ] NO      [ ] Surrogate  [x ] HCP  [ ] Guardian:     Yue Jessyjudy                                                             Phone#: [phone]    Allergies    Levaquin (Other)  ramipril (Other)  tetracycline (Hives; Rash)    Intolerances        MEDICATIONS  (STANDING):  ceFAZolin   IVPB 2000 milliGRAM(s) IV Intermittent every 24 hours  chlorhexidine 4% Liquid 1 Application(s) Topical <User Schedule>  dextrose 5%. 1000 milliLiter(s) (50 mL/Hr) IV Continuous <Continuous>  dextrose 50% Injectable 12.5 Gram(s) IV Push once  dextrose 50% Injectable 25 Gram(s) IV Push once  dextrose 50% Injectable 25 Gram(s) IV Push once  docusate sodium 100 milliGRAM(s) Oral three times a day  fentaNYL   Patch  12 MICROgram(s)/Hr 1 Patch Transdermal every 72 hours  insulin glargine Injectable (LANTUS) 12 Unit(s) SubCutaneous at bedtime  insulin lispro (HumaLOG) corrective regimen sliding scale   SubCutaneous three times a day before meals  lactobacillus acidophilus 1 Tablet(s) Oral three times a day with meals  lidocaine   Patch 1 Patch Transdermal daily  pantoprazole    Tablet 40 milliGRAM(s) Oral before breakfast  propranolol LA 60 milliGRAM(s) Oral daily  senna 2 Tablet(s) Oral at bedtime  warfarin 1 milliGRAM(s) Oral once    MEDICATIONS  (PRN):  ALBUTerol    90 MICROgram(s) HFA Inhaler 2 Puff(s) Inhalation every 6 hours PRN Shortness of Breath and/or Wheezing  dextrose 40% Gel 15 Gram(s) Oral once PRN Blood Glucose LESS THAN 70 milliGRAM(s)/deciLiter  glucagon  Injectable 1 milliGRAM(s) IntraMuscular once PRN Glucose <70 milliGRAM(s)/deciLiter  oxyCODONE    IR 5 milliGRAM(s) Oral every 4 hours PRN Moderate and Severe pain  polyethylene glycol 3350 17 Gram(s) Oral two times a day PRN Constipation      PRESENT SYMPTOMS:  Source: [x ] Patient   [ ] Family   [ ] Team     Pain: Denies currently, but reports intermittent right hip pain - occurring for "weeks" - worsening - sharp (7-8/10 at its worst), worsened with movement, relieved with opioids and rest.  Reports radiation at times to her knee.    Dyspnea: [ ] YES [x ] NO - Mild [ ]  Moderate [ ]  Severe [ ]    Anxiety: [ ] YES [x ] NO  Fatigue: [ ] YES [x ] NO   Nausea: [ ] YES [x ] NO  Loss of appetite: [x ] YES [ ] NO   Constipation: [ ] YES [x ] NO     Other Symptoms:  [ x] All other review of systems negative   [ ] Unable to obtain due to poor mentation     Does patient meet criteria for Severe Protein Calorie Malnutrition?  Yes [ ]  No [x ]  PPSV 30% or below [ ]  Anasarca [ ]  Albumin < 2 [ ] Catabolic State [ ] Poor nutritional intake [ ] Significant weight loss [ ]      Palliative Performance Status Version 2:    30-40%  ECOG - 4       Vital Signs Last 24 Hrs  T(C): 36.6 (31 Aug 2018 05:52), Max: 36.6 (30 Aug 2018 21:58)  T(F): 97.8 (31 Aug 2018 05:52), Max: 97.8 (30 Aug 2018 21:58)  HR: 62 (31 Aug 2018 05:52) (62 - 68)  BP: 130/68 (31 Aug 2018 05:52) (104/66 - 130/68)  BP(mean): --  RR: 18 (31 Aug 2018 05:52) (18 - 18)  SpO2: 98% (31 Aug 2018 05:52) (95% - 98%)    Physical Exam:    General: [x ] Alert,  A&O x 4     Lungs: [x ] Clear - anteriorly    Cardiovascular:  [x ] Regular rate and rhythm  [ ] Irregular [ ] Tachycardia   [ ] Bradycardia   Abdomen: [ x] Soft  [ ] Distended  [x ] +BS  [ x] Non tender [ ] Tender  [ ]PEG   [ ] NGT   Last BM:  8/30/18   Genitourinary: [x ] Normal [ ] Incontinent   [ ] Oliguria/Anuria   [ ] Verduzco  Musculoskeletal:  [ ] Normal   [x ] Generalized weakness  [x ] Bedbound  [ ] Edema   Neurological: [x ] No focal deficits  [ ] Cognitive impairment     Skin: Left foot dressing CD&I     LABS:                        10.0   8.98  )-----------( 267      ( 31 Aug 2018 06:15 )             32.8     08-31    131<L>  |  92<L>  |  70<H>  ----------------------------<  172<H>  4.7   |  24  |  2.46<H>    Ca    9.1      31 Aug 2018 10:45  Phos  4.3     08-31  Mg     2.1     08-31    TPro  5.9<L>  /  Alb  2.3<L>  /  TBili  0.3  /  DBili  x   /  AST  90<H>  /  ALT  < 4<L>  /  AlkPhos  443<H>  08-31    PT/INR - ( 31 Aug 2018 06:15 )   PT: 30.6 SEC;   INR: 2.70          PTT - ( 31 Aug 2018 06:15 )  PTT:55.4 SEC    I&O's Summary      RADIOLOGY & ADDITIONAL STUDIES:  Reviewed      Referrals:  Hospice [ ]   Chaplaincy [ ]    Child Life [ ]   Social Work [ ]   Case Management [ ]   Holistic Therapy [ ]

## 2018-09-01 ENCOUNTER — TRANSCRIPTION ENCOUNTER (OUTPATIENT)
Age: 83
End: 2018-09-01

## 2018-09-01 DIAGNOSIS — R60.0 LOCALIZED EDEMA: ICD-10-CM

## 2018-09-01 DIAGNOSIS — N18.3 CHRONIC KIDNEY DISEASE, STAGE 3 (MODERATE): ICD-10-CM

## 2018-09-01 DIAGNOSIS — N17.9 ACUTE KIDNEY FAILURE, UNSPECIFIED: ICD-10-CM

## 2018-09-01 DIAGNOSIS — E87.1 HYPO-OSMOLALITY AND HYPONATREMIA: ICD-10-CM

## 2018-09-01 DIAGNOSIS — I12.9 HYPERTENSIVE CHRONIC KIDNEY DISEASE WITH STAGE 1 THROUGH STAGE 4 CHRONIC KIDNEY DISEASE, OR UNSPECIFIED CHRONIC KIDNEY DISEASE: ICD-10-CM

## 2018-09-01 LAB
APTT BLD: 38.1 SEC — HIGH (ref 27.5–37.4)
GLUCOSE BLDC GLUCOMTR-MCNC: 129 MG/DL — HIGH (ref 70–99)
GLUCOSE BLDC GLUCOMTR-MCNC: 146 MG/DL — HIGH (ref 70–99)
GLUCOSE BLDC GLUCOMTR-MCNC: 162 MG/DL — HIGH (ref 70–99)
GLUCOSE BLDC GLUCOMTR-MCNC: 176 MG/DL — HIGH (ref 70–99)
HCT VFR BLD CALC: 32.7 % — LOW (ref 34.5–45)
HGB BLD-MCNC: 10.1 G/DL — LOW (ref 11.5–15.5)
INR BLD: 2.63 — HIGH (ref 0.88–1.17)
MCHC RBC-ENTMCNC: 29.6 PG — SIGNIFICANT CHANGE UP (ref 27–34)
MCHC RBC-ENTMCNC: 30.9 % — LOW (ref 32–36)
MCV RBC AUTO: 95.9 FL — SIGNIFICANT CHANGE UP (ref 80–100)
NRBC # FLD: 0 — SIGNIFICANT CHANGE UP
PLATELET # BLD AUTO: 269 K/UL — SIGNIFICANT CHANGE UP (ref 150–400)
PMV BLD: 9.6 FL — SIGNIFICANT CHANGE UP (ref 7–13)
PROTHROM AB SERPL-ACNC: 30.8 SEC — HIGH (ref 9.8–13.1)
RBC # BLD: 3.41 M/UL — LOW (ref 3.8–5.2)
RBC # FLD: 17.8 % — HIGH (ref 10.3–14.5)
WBC # BLD: 9.73 K/UL — SIGNIFICANT CHANGE UP (ref 3.8–10.5)
WBC # FLD AUTO: 9.73 K/UL — SIGNIFICANT CHANGE UP (ref 3.8–10.5)

## 2018-09-01 RX ORDER — WARFARIN SODIUM 2.5 MG/1
1 TABLET ORAL ONCE
Qty: 0 | Refills: 0 | Status: COMPLETED | OUTPATIENT
Start: 2018-09-01 | End: 2018-09-01

## 2018-09-01 RX ADMIN — Medication 60 MILLIGRAM(S): at 05:33

## 2018-09-01 RX ADMIN — Medication 1: at 08:39

## 2018-09-01 RX ADMIN — Medication 1 TABLET(S): at 18:29

## 2018-09-01 RX ADMIN — PANTOPRAZOLE SODIUM 40 MILLIGRAM(S): 20 TABLET, DELAYED RELEASE ORAL at 05:33

## 2018-09-01 RX ADMIN — CHLORHEXIDINE GLUCONATE 1 APPLICATION(S): 213 SOLUTION TOPICAL at 11:43

## 2018-09-01 RX ADMIN — INSULIN GLARGINE 12 UNIT(S): 100 INJECTION, SOLUTION SUBCUTANEOUS at 22:15

## 2018-09-01 RX ADMIN — OXYCODONE HYDROCHLORIDE 5 MILLIGRAM(S): 5 TABLET ORAL at 01:19

## 2018-09-01 RX ADMIN — LIDOCAINE 1 PATCH: 4 CREAM TOPICAL at 23:00

## 2018-09-01 RX ADMIN — Medication 1 TABLET(S): at 11:43

## 2018-09-01 RX ADMIN — LIDOCAINE 1 PATCH: 4 CREAM TOPICAL at 00:45

## 2018-09-01 RX ADMIN — OXYCODONE HYDROCHLORIDE 5 MILLIGRAM(S): 5 TABLET ORAL at 00:49

## 2018-09-01 RX ADMIN — LIDOCAINE 1 PATCH: 4 CREAM TOPICAL at 11:43

## 2018-09-01 RX ADMIN — WARFARIN SODIUM 1 MILLIGRAM(S): 2.5 TABLET ORAL at 18:29

## 2018-09-01 RX ADMIN — Medication 100 MILLIGRAM(S): at 06:50

## 2018-09-01 RX ADMIN — Medication 1 TABLET(S): at 08:40

## 2018-09-01 NOTE — PHYSICAL THERAPY INITIAL EVALUATION ADULT - PERTINENT HX OF CURRENT PROBLEM, REHAB EVAL
89 y.o woman with multiple comorbidities presented with several weeks h/o of bony pain and recent inability to ambulate due to pain in the foot. CT scan revealed destructive bony lesions concerning for metastasis and liver lesions. 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 Patient is s/p left foot bunionectomy day #11.

## 2018-09-01 NOTE — DISCHARGE NOTE ADULT - PLAN OF CARE
Continue current blood pressure medication regimen as directed. Monitor for any visual changes, headaches or dizziness.  Monitor blood pressure regularly.  Follow up with your PCP for further management for high blood pressure, please call to make appointment within 1 week of discharge Continue consistent carbohydrate diet.  Monitor blood glucose levels throughout the day before meals and at bedtime.  Record blood sugars and bring to outpatient providers appointment in order to be reviewed by your doctor for management modifications.  Be aware of diabetes management symptoms including feeling cool and clammy may be related to low glucose levels.  Feeling hot and dry may indicate high glucose levels.  If  you feel these symptoms, check your blood sugar.  Make regular podiatry appointments in order to have feet checked for wounds and toe nails cut by a doctor to prevent infections. Continue antibiotics through 9/30/18 Your kidney function worsened during your hospitalization, nephrology was consulted-- continue fluid restricted diet. stable Continue pain medication regimen as directed. You completed a course of radiation treatment during your hospital stay. Continue recommended medication regimen. Monitor for signs/symptoms of fluid overload and electrolyte abnormalities, such as, shortness of breath, cough, swelling, chest discomfort, changes in heart rate, dizziness, fainting, or changes in mental status. Follow-up with your PCP/cardiologist outpatient after you've been discharged from the hospital. Continue fluid restricted diet. Continue antibiotics through 9/30/18, you have PICC line in place. Your Lasix was decreased to 80 mg PO every other day. Continue recommended medication regimen. Monitor for signs/symptoms of fluid overload and electrolyte abnormalities, such as, shortness of breath, cough, swelling, chest discomfort, changes in heart rate, dizziness, fainting, or changes in mental status. Follow-up with your PCP/cardiologist outpatient after you've been discharged from the hospital. Continue fluid restricted diet. Please continue your medications as directed. The medical team at the rehab facility will dose your Warfarin. Monitor for signs/symptoms of uncontrolled atrial fibrillation, such as, increased heart rate, palpitations, chest pain, dizziness, or shortness of breath - Return to emergency room if these signs/symptoms are present. Continue current blood pressure medication regimen as directed. Monitor for any visual changes, headaches or dizziness.  Monitor blood pressure regularly.  Follow up with your PCP once d/c from rehab for further management for high blood pressure, please call to make appointment within 1 week of discharge Your Lasix was decreased to 80 mg PO every other day. Continue recommended medication regimen. Monitor for signs/symptoms of fluid overload and electrolyte abnormalities, such as, shortness of breath, cough, swelling, chest discomfort, changes in heart rate, dizziness, fainting, or changes in mental status. Follow-up with your PCP/cardiologist outpatient after you've been discharged from the rehab center. Continue fluid restricted diet. Pain control Your Lasix dose was changed to 80 mg PO every other day. Continue recommended medication regimen. Monitor for signs/symptoms of fluid overload and electrolyte abnormalities, such as, shortness of breath, cough, swelling, chest discomfort, changes in heart rate, dizziness, fainting, or changes in mental status. Follow-up with your PCP/cardiologist outpatient after you've been discharged from the rehab center. Continue fluid restricted diet. Continue antibiotics through 9/30/18, you have PICC line in place. Follow up with Dr. Reaves after discharge from rehab and after completion of antibiotics. Your kidney function worsened during your hospitalization, nephrology was consulted- continue fluid restricted diet. Follow up with your nephrologist within 1 week of discharge, please call to schedule robert appt. Please continue your medications as directed. The medical team at the rehab facility will dose your Warfarin. Monitor for signs/symptoms of uncontrolled atrial fibrillation, such as, increased heart rate, palpitations, chest pain, dizziness, or shortness of breath - Return to emergency room if these signs/symptoms are present. Your INR on discharge (9/10/18) was 2.73.

## 2018-09-01 NOTE — CONSULT NOTE ADULT - ATTENDING COMMENTS
- I have seen and examined the patient on rounds. Patient's chart, labs, images and reports reviewed.  Allison is a pleasant 89 year old woman complaining of R hip pain. Work up revealed a metastatic implant in the R hip positive for adenocarcinoma likely GI or HPB origin. Work up also reveals other lytic lesions in the pelvis concerning for metastatic disease.  She has no complaints of abdominal pain, no nausea or vomiting, no constipation, no hematichezia at this point.  Abd- soft NT ND  A/P- Metastatic adenocarcinoma of GI/HPB origin  - Given the metastatic disease in the R hip- a work up for primary tumor can be encountered with EGD/EUS and colonoscopy. But reviewing the med/onc recommendation from last adnmission- as pt not a candidate for chemotherapy- further work up for primary deferred to primary team.  - Currently no need for any acute surgical intervention.  - I will sign off at this point, please recall with new concerns and/or questions. Thank you for allowing me to take care of your patient as always.  -Thank you for allowing me to take care of your patient. I will follow the patient with you.  Regards  Preet Peters MD  Division of Surgical Oncology  [address] [place] [id number]  Ph:[phone]
89/f with poor functional status and poor nutritional status, CKD, active osteomyelitis and MSSA bacteremia, on IV abx, tx from [facility] for palliative RT to the hip lesion. Path reviewed- CUP ( ? upper GI/pancreato biliary primary). Imaging showed small pleural effusion and concern for liver mets. Diagnosis of stage IV carcinoma of unknown primary d/w the pt and daughter. Patient refused invasive procedures ( refused EUS/EGD/ERCP) to locate the primary. She is also refusing aggressive chemotherapy. Agree with current plan to continue RT and then rehab. We discussed prognosis and recommend home hospice services but the pt and daughter would like to think more about it. Rec palliative care consult.
BRAD in the chart  Plan for rehab with potential hospice after completion
[facility]  Doug Manjarrez M.D.  Uzair Prince D.O.  Becca Hanna M.D.  Vibha Bazan, MSN, ANP-C    Telephone: [phone]  Facsimile: [phone]    [address] [place] [address]

## 2018-09-01 NOTE — PHYSICAL THERAPY INITIAL EVALUATION ADULT - GENERAL OBSERVATIONS, REHAB EVAL
Patient found supine in bed; +left foot ace wrap CDI; spoke with YANY Govea and patient may be OOB as tolerated.

## 2018-09-01 NOTE — DISCHARGE NOTE ADULT - MEDICATION SUMMARY - MEDICATIONS TO STOP TAKING
I will STOP taking the medications listed below when I get home from the hospital:    Nitro-Dur 0.4 mg/hr transdermal film, extended release  -- 1 patch by transdermal patch once a day    Vytorin 10 mg-10 mg oral tablet  -- 1 tab(s) by mouth once a day    ezetimibe 10 mg oral tablet  -- 1 tab(s) by mouth once a day    guaiFENesin 100 mg/5 mL oral liquid  -- 5 milliliter(s) by mouth every 6 hours, As needed, Cough    bisacodyl 5 mg oral delayed release tablet  -- 1 tab(s) by mouth once a day (at bedtime)    psyllium 3.4 g/7 g oral powder for reconstitution  -- orally once a day    glycerin adult rectal suppository  -- 1 suppository(ies) rectally once a day    HumaLOG 100 units/mL subcutaneous solution  -- 4 unit(s) subcutaneous 3 times a day (before meals)    melatonin 3 mg oral tablet  -- 1 tab(s) by mouth once a day (at bedtime)

## 2018-09-01 NOTE — DISCHARGE NOTE ADULT - HOSPITAL COURSE
89 y.o woman with multiple comorbidities presented with several weeks h/o of bony pain and recent inability to ambulate du to pain in the foot. Ct scan revealed destructive bony lesions concerning for metastasis and liver lesions. 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 Noted to have staph aurues bacteremia likely from left hallux abscess.The primary source of malignancy is unclear,. 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    -Continue iv cefazolin per ID --> 9/30/18   ID- Dr. 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    -finger sticks QID, FS   -Dr. Birmingham consulted  -Placed on insulin sliding scale     Metastatic cancer.    -onc, and rad-onc consulted, recs as above   -Palliative consulted for GOC------------------------    Atrial fibrillation, unspecified type.    - Continue current medications  - dose coumadin daily per INR     US abdomen - mild ascites  -GI consulted     Left Bunionectomy  -Podiatry following  -8/31-s/p suture removal  -WBAT LLE with surgical boot     TRISH   nephro consulted   fluid restricted diet   urine lytes--------------    PT eval: rec rehab 89 y.o woman with multiple comorbidities presented with several weeks h/o of bony pain and recent inability to ambulate du to pain in the foot. Ct scan revealed destructive bony lesions concerning for metastasis and liver lesions. 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 Noted to have staph aurues bacteremia likely from left hallux abscess.The primary source of malignancy is unclear,. 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    -Continue iv cefazolin per ID --> 9/30/18, PICC in place   ID- Dr. 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    -finger sticks QID, FS   -Dr. Birmingham consulted  -Placed on insulin sliding scale     Metastatic cancer.    -onc, and rad-onc consulted, recs as above   -Palliative consulted for GOC- dispo to rehab facility with transition to home hospice     Atrial fibrillation, unspecified type.    - Continue current medications  - dose coumadin daily per INR     US abdomen - mild ascites  -GI consulted     Left Bunionectomy  -Podiatry following  -8/31-s/p suture removal  -WBAT LLE with surgical boot     TRISH   nephro consulted   fluid restricted diet   urine lytes, no evidence of monoclonal components     PT eval: rec rehab     Dispo: Rehab with transition to home hospice 89 y.o woman with multiple comorbidities presented with several weeks h/o of bony pain and recent inability to ambulate du to pain in the foot. Ct scan revealed destructive bony lesions concerning for metastasis and liver lesions. 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 Noted to have staph aurues bacteremia likely from left hallux abscess.The primary source of malignancy is unclear,. 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    -Continue iv cefazolin per ID --> 9/30/18, PICC in place   ID- Dr. 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    -finger sticks QID, FS   -Dr. Birmingham consulted  -Placed on insulin sliding scale during hospital stay     Metastatic cancer.    -onc, and rad-onc consulted, recs as above   -Palliative consulted for GOC- dispo to rehab facility with transition to home hospice     Atrial fibrillation, unspecified type.    - Continue current medications  - dose coumadin daily per INR     US abdomen - mild ascites  -GI was consulted     Left Bunionectomy  -Podiatry was following during hospital stay   -8/31-s/p suture removal  -WBAT LLE with surgical boot     TRISH   nephro consulted   fluid restricted diet   urine lytes, no evidence of monoclonal components     PT eval: rec rehab     Dispo: Rehab with transition to home hospice 89 y.o woman with multiple comorbidities presented with several weeks h/o of bony pain and recent inability to ambulate du to pain in the foot. Ct scan revealed destructive bony lesions concerning for metastasis and liver lesions. 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 Noted to have staph aurues bacteremia likely from left hallux abscess.The primary source of malignancy is unclear,. 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    -Continue iv cefazolin per ID --> 9/30/18, PICC in place   ID- Dr. 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    -finger sticks QID, FS   -Dr. Birmingham consulted during hospitalization   -Placed on insulin sliding scale during hospital stay     Metastatic cancer.    -onc, and rad-onc consulted, recs as above   -Palliative consulted for GOC- dispo to rehab facility with transition to home hospice     Atrial fibrillation, unspecified type.    - Continue current medications  - dose coumadin daily per INR     US abdomen - mild ascites  -GI was consulted     Left Bunionectomy  -Podiatry was following during hospital stay   -8/31-s/p suture removal  -WBAT LLE with surgical boot     TRISH   nephro consulted   Lasix was decreased to 80 mg PO every other day   fluid restricted diet   urine lytes, no evidence of monoclonal components     PT eval: rec rehab     Dispo: Rehab with transition to home hospice 89 y.o woman with multiple comorbidities presented with several weeks h/o of bony pain and recent inability to ambulate du to pain in the foot. Ct scan revealed destructive bony lesions concerning for metastasis and liver lesions. 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 Noted to have staph aurues bacteremia likely from left hallux abscess.The primary source of malignancy is unclear,. 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    -Continue iv cefazolin per ID --> 9/30/18, PICC in place   -ID- Dr. 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    -finger sticks QID, FS   -Dr. Birmingham (endocrine) consulted during hospitalization   -Placed on insulin sliding scale during hospital stay     Metastatic cancer.    -onc, and rad-onc consulted, recs as above   -Palliative consulted for GOC- dispo to rehab facility with transition to home hospice     Atrial fibrillation, unspecified type.    - Continue current medications  - dose coumadin daily per INR     US abdomen - mild ascites  -GI was consulted     Left Bunionectomy  -Podiatry was following during hospital stay   -8/31-s/p suture removal  -WBAT LLE with surgical boot     SOB  -Likely 2/2 fluid overload  -Pulmonary consulted  -Cardiology consulted  -Renal consulted  -Above teams managed fluid status  -Duonebs as needed    CHF  -Cardiology and pulmonary consulted  -Lasix dose adjusted according to fluid status     TRISH   -nephro consulted   -Lasix dose adjusted throughout hospitalization. Upon discharge: lasix 80 mg PO every other day   -fluid restricted diet   -urine lytes, no evidence of monoclonal components     PT eval: rec rehab     Dispo: Rehab with transition to home hospice

## 2018-09-01 NOTE — DISCHARGE NOTE ADULT - MEDICATION SUMMARY - MEDICATIONS TO TAKE
I will START or STAY ON the medications listed below when I get home from the hospital:    fentaNYL 12 mcg/hr transdermal film, extended release  -- 1 patch by transdermal patch every 72 hours  -- Indication: For Pain, neoplasm-related    oxyCODONE 5 mg oral tablet  -- 1 tab(s) by mouth every 4 hours, As needed, Moderate and Severe pain  -- Indication: For Pain, neoplasm-related    propranolol 60 mg oral capsule, extended release  -- 1 cap(s) by mouth once a day  -- Indication: For Afib    Coumadin 1 mg oral tablet  -- 1 tab(s) by mouth once  Continue to monitor PT/INR and dose accordingly  INR on 9/101/8 was 2.73  -- every other day with Coumadin 2mg po  -- Indication: For Afib    HumaLOG 100 units/mL subcutaneous solution  -- ISS 3 times a day (before meals)  1 Unit(s) if Glucose 151 - 200  2 Unit(s) if Glucose 201 - 250  3 Unit(s) if Glucose 251 - 300  4 Unit(s) if Glucose 301 - 350  5 Unit(s) if Glucose 351 - 400  6 Unit(s) if Glucose GREATER THAN 400  -- Indication: For Diabetes    Lantus 100 units/mL subcutaneous solution  -- 12 unit(s) subcutaneous once a day (at bedtime)  -- Indication: For Diabetes    Hibiclens 4% topical soap  -- Apply on skin to PICC daily for ppx  -- Indication: For Ppx    albuterol 90 mcg/inh inhalation aerosol  -- 2 puff(s) inhaled every 6 hours, As needed, Shortness of Breath and/or Wheezing  -- Indication: For SOB (shortness of breath)    ceFAZolin 2 g/50 mL-NaCl 0.9% intravenous solution  -- 2 gram(s) intravenous once a day -- until 9/30/18  -- Indication: For Bacteremia    lidocaine 5% topical film  -- Apply on skin to affected area once a day  -- Indication: For Pain, neoplasm-related    docusate sodium 100 mg oral capsule  -- 1 cap(s) by mouth 3 times a day  -- Indication: For Constipation    senna oral tablet  -- 2 tab(s) by mouth once a day (at bedtime)  -- Indication: For Constipation    polyethylene glycol 3350 oral powder for reconstitution  -- 17 gram(s) by mouth 2 times a day  -- Indication: For Constipation    lactobacillus acidophilus oral capsule  -- 1 tab(s) by mouth 3 times a day (with meals)  -- Indication: For Probiotic    pantoprazole 40 mg oral delayed release tablet  -- 1 tab(s) by mouth once a day (before a meal)  -- Indication: For GERD    Multiple Vitamins with Minerals oral tablet  -- 1 tab(s) by mouth once a day  -- Indication: For Supplement    Vitamin D3 1000 intl units oral capsule  -- 1 cap(s) by mouth once a day  -- Indication: For Supplement I will START or STAY ON the medications listed below when I get home from the hospital:    fentaNYL 12 mcg/hr transdermal film, extended release  -- 1 patch by transdermal patch every 72 hours  -- Indication: For Pain, neoplasm-related    oxyCODONE 5 mg oral tablet  -- 1 tab(s) by mouth every 4 hours, As needed, Moderate and Severe pain  -- Indication: For Pain, neoplasm-related    propranolol 60 mg oral capsule, extended release  -- 1 cap(s) by mouth once a day  -- Indication: For Afib    Coumadin 1 mg oral tablet  -- 1 tab(s) by mouth once  Continue to monitor PT/INR and dose accordingly  INR on 9/101/8 was 2.73  -- every other day with Coumadin 2mg po  -- Indication: For Afib    HumaLOG 100 units/mL subcutaneous solution  -- ISS 3 times a day (before meals)  1 Unit(s) if Glucose 151 - 200  2 Unit(s) if Glucose 201 - 250  3 Unit(s) if Glucose 251 - 300  4 Unit(s) if Glucose 301 - 350  5 Unit(s) if Glucose 351 - 400  6 Unit(s) if Glucose GREATER THAN 400  -- Indication: For Diabetes    Lantus 100 units/mL subcutaneous solution  -- 12 unit(s) subcutaneous once a day (at bedtime)  -- Indication: For Diabetes    Hibiclens 4% topical soap  -- Apply on skin to PICC daily for ppx  -- Indication: For Ppx    albuterol 90 mcg/inh inhalation aerosol  -- 2 puff(s) inhaled every 6 hours, As needed, Shortness of Breath and/or Wheezing  -- Indication: For SOB (shortness of breath)    ceFAZolin 2 g/50 mL-NaCl 0.9% intravenous solution  -- 2 gram(s) intravenous once a day -- until 9/30/18  -- Indication: For Bacteremia    lidocaine 5% topical film  -- Apply on skin to affected area once a day  -- Indication: For Pain, neoplasm-related    Lasix 80 mg oral tablet  -- 1 tab(s) by mouth every other day starting on 9/11  -- Indication: For Fluid overload    docusate sodium 100 mg oral capsule  -- 1 cap(s) by mouth 3 times a day  -- Indication: For Constipation    senna oral tablet  -- 2 tab(s) by mouth once a day (at bedtime)  -- Indication: For Constipation    polyethylene glycol 3350 oral powder for reconstitution  -- 17 gram(s) by mouth 2 times a day  -- Indication: For Constipation    lactobacillus acidophilus oral capsule  -- 1 tab(s) by mouth 3 times a day (with meals)  -- Indication: For Probiotic    pantoprazole 40 mg oral delayed release tablet  -- 1 tab(s) by mouth once a day (before a meal)  -- Indication: For GERD    Multiple Vitamins with Minerals oral tablet  -- 1 tab(s) by mouth once a day  -- Indication: For Supplement    Vitamin D3 1000 intl units oral capsule  -- 1 cap(s) by mouth once a day  -- Indication: For Supplement

## 2018-09-01 NOTE — DISCHARGE NOTE ADULT - CARE PROVIDER_API CALL
Shanda Reaves), Infectious Disease; Internal Medicine  [address] [facility] [address]  [place] [address]  Phone: [phone]  Fax: [phone]    Leonel Mcneil), Nephrology  [address] [facility]  [address] [place] [id number]  Phone: [phone]  Fax: [phone] Shanda Reaves), Infectious Disease; Internal Medicine  [address] [facility] [address]  [place] [address]  Phone: [phone]  Fax: [phone]    Leonel Mcneil), Nephrology  [address] [facility]  [address] [place] [id number]  Phone: [phone]  Fax: [phone]    Preet Mills), Critical Care Medicine; Internal Medicine; Pulmonary Disease  [id number] [place] [address]  Phone: [phone]  Fax: [phone]

## 2018-09-01 NOTE — DISCHARGE NOTE ADULT - CARE PLAN
Principal Discharge DX:	Pain, neoplasm-related  Secondary Diagnosis:	Hypertension  Assessment and plan of treatment:	Continue current blood pressure medication regimen as directed. Monitor for any visual changes, headaches or dizziness.  Monitor blood pressure regularly.  Follow up with your PCP for further management for high blood pressure, please call to make appointment within 1 week of discharge  Secondary Diagnosis:	Diabetes  Assessment and plan of treatment:	Continue consistent carbohydrate diet.  Monitor blood glucose levels throughout the day before meals and at bedtime.  Record blood sugars and bring to outpatient providers appointment in order to be reviewed by your doctor for management modifications.  Be aware of diabetes management symptoms including feeling cool and clammy may be related to low glucose levels.  Feeling hot and dry may indicate high glucose levels.  If  you feel these symptoms, check your blood sugar.  Make regular podiatry appointments in order to have feet checked for wounds and toe nails cut by a doctor to prevent infections.  Secondary Diagnosis:	Heart failure  Secondary Diagnosis:	Osteomyelitis  Assessment and plan of treatment:	Continue antibiotics through 9/30/18  Secondary Diagnosis:	CKD (chronic kidney disease), stage III  Assessment and plan of treatment:	Your kidney function worsened during your hospitalization, nephrology was consulted-- continue fluid restricted diet. Principal Discharge DX:	Pain, neoplasm-related  Goal:	stable  Assessment and plan of treatment:	Continue pain medication regimen as directed. You completed a course of radiation treatment during your hospital stay.  Secondary Diagnosis:	Hypertension  Assessment and plan of treatment:	Continue current blood pressure medication regimen as directed. Monitor for any visual changes, headaches or dizziness.  Monitor blood pressure regularly.  Follow up with your PCP for further management for high blood pressure, please call to make appointment within 1 week of discharge  Secondary Diagnosis:	Diabetes  Assessment and plan of treatment:	Continue consistent carbohydrate diet.  Monitor blood glucose levels throughout the day before meals and at bedtime.  Record blood sugars and bring to outpatient providers appointment in order to be reviewed by your doctor for management modifications.  Be aware of diabetes management symptoms including feeling cool and clammy may be related to low glucose levels.  Feeling hot and dry may indicate high glucose levels.  If  you feel these symptoms, check your blood sugar.  Make regular podiatry appointments in order to have feet checked for wounds and toe nails cut by a doctor to prevent infections.  Secondary Diagnosis:	Heart failure  Assessment and plan of treatment:	Continue recommended medication regimen. Monitor for signs/symptoms of fluid overload and electrolyte abnormalities, such as, shortness of breath, cough, swelling, chest discomfort, changes in heart rate, dizziness, fainting, or changes in mental status. Follow-up with your PCP/cardiologist outpatient after you've been discharged from the hospital. Continue fluid restricted diet.  Secondary Diagnosis:	Osteomyelitis  Assessment and plan of treatment:	Continue antibiotics through 9/30/18, you have PICC line in place.  Secondary Diagnosis:	CKD (chronic kidney disease), stage III  Assessment and plan of treatment:	Your kidney function worsened during your hospitalization, nephrology was consulted-- continue fluid restricted diet. Principal Discharge DX:	Pain, neoplasm-related  Goal:	stable  Assessment and plan of treatment:	Continue pain medication regimen as directed. You completed a course of radiation treatment during your hospital stay.  Secondary Diagnosis:	Hypertension  Assessment and plan of treatment:	Continue current blood pressure medication regimen as directed. Monitor for any visual changes, headaches or dizziness.  Monitor blood pressure regularly.  Follow up with your PCP for further management for high blood pressure, please call to make appointment within 1 week of discharge  Secondary Diagnosis:	Diabetes  Assessment and plan of treatment:	Continue consistent carbohydrate diet.  Monitor blood glucose levels throughout the day before meals and at bedtime.  Record blood sugars and bring to outpatient providers appointment in order to be reviewed by your doctor for management modifications.  Be aware of diabetes management symptoms including feeling cool and clammy may be related to low glucose levels.  Feeling hot and dry may indicate high glucose levels.  If  you feel these symptoms, check your blood sugar.  Make regular podiatry appointments in order to have feet checked for wounds and toe nails cut by a doctor to prevent infections.  Secondary Diagnosis:	Heart failure  Assessment and plan of treatment:	Your Lasix was decreased to 80 mg PO every other day. Continue recommended medication regimen. Monitor for signs/symptoms of fluid overload and electrolyte abnormalities, such as, shortness of breath, cough, swelling, chest discomfort, changes in heart rate, dizziness, fainting, or changes in mental status. Follow-up with your PCP/cardiologist outpatient after you've been discharged from the hospital. Continue fluid restricted diet.  Secondary Diagnosis:	Osteomyelitis  Assessment and plan of treatment:	Continue antibiotics through 9/30/18, you have PICC line in place.  Secondary Diagnosis:	CKD (chronic kidney disease), stage III  Assessment and plan of treatment:	Your kidney function worsened during your hospitalization, nephrology was consulted-- continue fluid restricted diet.  Secondary Diagnosis:	Afib  Assessment and plan of treatment:	Please continue your medications as directed. The medical team at the rehab facility will dose your Warfarin. Monitor for signs/symptoms of uncontrolled atrial fibrillation, such as, increased heart rate, palpitations, chest pain, dizziness, or shortness of breath - Return to emergency room if these signs/symptoms are present. Principal Discharge DX:	Pain, neoplasm-related  Goal:	stable  Assessment and plan of treatment:	Continue pain medication regimen as directed. You completed a course of radiation treatment during your hospital stay.  Secondary Diagnosis:	Hypertension  Assessment and plan of treatment:	Continue current blood pressure medication regimen as directed. Monitor for any visual changes, headaches or dizziness.  Monitor blood pressure regularly.  Follow up with your PCP once d/c from rehab for further management for high blood pressure, please call to make appointment within 1 week of discharge  Secondary Diagnosis:	Diabetes  Assessment and plan of treatment:	Continue consistent carbohydrate diet.  Monitor blood glucose levels throughout the day before meals and at bedtime.  Record blood sugars and bring to outpatient providers appointment in order to be reviewed by your doctor for management modifications.  Be aware of diabetes management symptoms including feeling cool and clammy may be related to low glucose levels.  Feeling hot and dry may indicate high glucose levels.  If  you feel these symptoms, check your blood sugar.  Make regular podiatry appointments in order to have feet checked for wounds and toe nails cut by a doctor to prevent infections.  Secondary Diagnosis:	Heart failure  Assessment and plan of treatment:	Your Lasix was decreased to 80 mg PO every other day. Continue recommended medication regimen. Monitor for signs/symptoms of fluid overload and electrolyte abnormalities, such as, shortness of breath, cough, swelling, chest discomfort, changes in heart rate, dizziness, fainting, or changes in mental status. Follow-up with your PCP/cardiologist outpatient after you've been discharged from the rehab center. Continue fluid restricted diet.  Secondary Diagnosis:	Osteomyelitis  Assessment and plan of treatment:	Continue antibiotics through 9/30/18, you have PICC line in place.  Secondary Diagnosis:	CKD (chronic kidney disease), stage III  Assessment and plan of treatment:	Your kidney function worsened during your hospitalization, nephrology was consulted-- continue fluid restricted diet.  Secondary Diagnosis:	Afib  Assessment and plan of treatment:	Please continue your medications as directed. The medical team at the rehab facility will dose your Warfarin. Monitor for signs/symptoms of uncontrolled atrial fibrillation, such as, increased heart rate, palpitations, chest pain, dizziness, or shortness of breath - Return to emergency room if these signs/symptoms are present. Principal Discharge DX:	Pain, neoplasm-related  Goal:	Pain control  Assessment and plan of treatment:	Continue pain medication regimen as directed. You completed a course of radiation treatment during your hospital stay.  Secondary Diagnosis:	Hypertension  Assessment and plan of treatment:	Continue current blood pressure medication regimen as directed. Monitor for any visual changes, headaches or dizziness.  Monitor blood pressure regularly.  Follow up with your PCP once d/c from rehab for further management for high blood pressure, please call to make appointment within 1 week of discharge  Secondary Diagnosis:	Diabetes  Assessment and plan of treatment:	Continue consistent carbohydrate diet.  Monitor blood glucose levels throughout the day before meals and at bedtime.  Record blood sugars and bring to outpatient providers appointment in order to be reviewed by your doctor for management modifications.  Be aware of diabetes management symptoms including feeling cool and clammy may be related to low glucose levels.  Feeling hot and dry may indicate high glucose levels.  If  you feel these symptoms, check your blood sugar.  Make regular podiatry appointments in order to have feet checked for wounds and toe nails cut by a doctor to prevent infections.  Secondary Diagnosis:	Heart failure  Assessment and plan of treatment:	Your Lasix dose was changed to 80 mg PO every other day. Continue recommended medication regimen. Monitor for signs/symptoms of fluid overload and electrolyte abnormalities, such as, shortness of breath, cough, swelling, chest discomfort, changes in heart rate, dizziness, fainting, or changes in mental status. Follow-up with your PCP/cardiologist outpatient after you've been discharged from the rehab center. Continue fluid restricted diet.  Secondary Diagnosis:	Osteomyelitis  Assessment and plan of treatment:	Continue antibiotics through 9/30/18, you have PICC line in place. Follow up with Dr. Reaves after discharge from rehab and after completion of antibiotics.  Secondary Diagnosis:	CKD (chronic kidney disease), stage III  Assessment and plan of treatment:	Your kidney function worsened during your hospitalization, nephrology was consulted- continue fluid restricted diet. Follow up with your nephrologist within 1 week of discharge, please call to schedule robert appt.  Secondary Diagnosis:	Afib  Assessment and plan of treatment:	Please continue your medications as directed. The medical team at the rehab facility will dose your Warfarin. Monitor for signs/symptoms of uncontrolled atrial fibrillation, such as, increased heart rate, palpitations, chest pain, dizziness, or shortness of breath - Return to emergency room if these signs/symptoms are present. Your INR on discharge (9/10/18) was 2.73.

## 2018-09-01 NOTE — CONSULT NOTE ADULT - PROBLEM SELECTOR RECOMMENDATION 5
Secondary to decreased EABV likely causing increase in ADH state. Will consider albumin assisted diuresis. Start 1L FR and continue with glucerna to increase osmolar intake. Monitor serum sodium.

## 2018-09-01 NOTE — CONSULT NOTE ADULT - PROBLEM SELECTOR RECOMMENDATION 4
Likely secondary to hypoalbuminemia for which would check spot urine TP/CR r/o nephrotic syndrome (secondary membranous?). Will consider albumin assisted diuresis if LE increases. For now, would encourage protein intake by continuing with glucerna 2 cans daily. Monitor UO.

## 2018-09-01 NOTE — PROGRESS NOTE ADULT - SUBJECTIVE AND OBJECTIVE BOX
Chief complaint  Patient is a 89y old  Female who presents with a chief complaint of Onc workup (01 Sep 2018 16:15)   Review of systems  Patient in bed, looks comfortable, no fever, no hypoglycemia.    Labs and Fingersticks  CAPILLARY BLOOD GLUCOSE      POCT Blood Glucose.: 146 mg/dL (01 Sep 2018 18:01)  POCT Blood Glucose.: 129 mg/dL (01 Sep 2018 12:32)  POCT Blood Glucose.: 176 mg/dL (01 Sep 2018 08:34)  POCT Blood Glucose.: 192 mg/dL (31 Aug 2018 22:02)          Calcium, Total Serum: 9.1 (08-31 @ 10:45)  Calcium, Total Serum: 9.2 (08-31 @ 06:15)  Albumin, Serum: 2.3 <L> (08-31 @ 10:45)    Alanine Aminotransferase (ALT/SGPT): < 4 <L> (08-31 @ 10:45)  Alkaline Phosphatase, Serum: 443 <H> (08-31 @ 10:45)  Aspartate Aminotransferase (AST/SGOT): 90 <H> (08-31 @ 10:45)        08-31    131<L>  |  92<L>  |  70<H>  ----------------------------<  172<H>  4.7   |  24  |  2.46<H>    Ca    9.1      31 Aug 2018 10:45  Phos  4.3     08-31  Mg     2.1     08-31    TPro  5.9<L>  /  Alb  2.3<L>  /  TBili  0.3  /  DBili  x   /  AST  90<H>  /  ALT  < 4<L>  /  AlkPhos  443<H>  08-31                        10.1   9.73  )-----------( 269      ( 01 Sep 2018 06:15 )             32.7     Medications  MEDICATIONS  (STANDING):  ceFAZolin   IVPB 2000 milliGRAM(s) IV Intermittent every 24 hours  chlorhexidine 4% Liquid 1 Application(s) Topical <User Schedule>  dextrose 5%. 1000 milliLiter(s) (50 mL/Hr) IV Continuous <Continuous>  dextrose 50% Injectable 12.5 Gram(s) IV Push once  dextrose 50% Injectable 25 Gram(s) IV Push once  dextrose 50% Injectable 25 Gram(s) IV Push once  docusate sodium 100 milliGRAM(s) Oral three times a day  fentaNYL   Patch  12 MICROgram(s)/Hr 1 Patch Transdermal every 72 hours  insulin glargine Injectable (LANTUS) 12 Unit(s) SubCutaneous at bedtime  insulin lispro (HumaLOG) corrective regimen sliding scale   SubCutaneous three times a day before meals  lactobacillus acidophilus 1 Tablet(s) Oral three times a day with meals  lidocaine   Patch 1 Patch Transdermal daily  pantoprazole    Tablet 40 milliGRAM(s) Oral before breakfast  propranolol LA 60 milliGRAM(s) Oral daily  senna 2 Tablet(s) Oral at bedtime      Physical Exam  General: Patient comfortable in bed  Vital Signs Last 12 Hrs  T(F): 98 (09-01-18 @ 21:04), Max: 98.1 (09-01-18 @ 15:25)  HR: 99 (09-01-18 @ 21:04) (68 - 99)  BP: 105/60 (09-01-18 @ 20:46) (95/56 - 106/58)  BP(mean): --  RR: 18 (09-01-18 @ 21:04) (18 - 19)  SpO2: 99% (09-01-18 @ 21:04) (98% - 99%)  Neck: No palpable thyroid nodules.  CVS: S1S2, No murmurs  Respiratory: No wheezing, no crepitations  GI: Abdomen soft, bowel sounds positive  Musculoskeletal:  edema lower extremities.   Skin: No skin rashes, no ecchymosis    Diagnostics

## 2018-09-01 NOTE — CONSULT NOTE ADULT - SUBJECTIVE AND OBJECTIVE BOX
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 Nephrology consulted for elevated Scr.    Patient with known history of renal disease for which she follows with Dr. Campbell as an outpatient with h/o CKD-3 (baseline 1.7-1.8?). Patient admits to long standing h/o HTN and DM with neuropathy. Patient states she was told by her nephrologist that her kidney disease was due to CHF.    Patient transferred to [facility] for radiation/oncology.    REVIEW OF SYSTEMS:  Gen: no changes in weight  HEENT: no rhinorrhea  Neck: no sore throat  Cards: no chest pain  Resp: no dyspnea  GI: + nausea this morning, no vomiting or diarrhea  : no dysuria or hematuria  Vascular: no LE edema  Derm: no rashes  Neuro: no numbness/tingling    Levaquin (Other)  ramipril (Other)  tetracycline (Hives; Rash)      Home Medications Reviewed  Hospital Medications:   MEDICATIONS  (STANDING):  ceFAZolin   IVPB 2000 milliGRAM(s) IV Intermittent every 24 hours  chlorhexidine 4% Liquid 1 Application(s) Topical <User Schedule>  dextrose 5%. 1000 milliLiter(s) (50 mL/Hr) IV Continuous <Continuous>  dextrose 50% Injectable 12.5 Gram(s) IV Push once  dextrose 50% Injectable 25 Gram(s) IV Push once  dextrose 50% Injectable 25 Gram(s) IV Push once  docusate sodium 100 milliGRAM(s) Oral three times a day  fentaNYL   Patch  12 MICROgram(s)/Hr 1 Patch Transdermal every 72 hours  insulin glargine Injectable (LANTUS) 12 Unit(s) SubCutaneous at bedtime  insulin lispro (HumaLOG) corrective regimen sliding scale   SubCutaneous three times a day before meals  lactobacillus acidophilus 1 Tablet(s) Oral three times a day with meals  lidocaine   Patch 1 Patch Transdermal daily  pantoprazole    Tablet 40 milliGRAM(s) Oral before breakfast  propranolol LA 60 milliGRAM(s) Oral daily  senna 2 Tablet(s) Oral at bedtime  warfarin 1 milliGRAM(s) Oral once      PAST MEDICAL & SURGICAL HISTORY:  OAB (overactive bladder)  GERD (gastroesophageal reflux disease)  Angina effort  Heart failure  Upper respiratory infection  Diabetes  Renal stones  Myocardial infarction: 1981  Spinal stenosis  CVA (cerebral infarction)  Afib  Raynaud disease  Acid reflux  Hypertension  Hyperlipidemia  Asthma  Cataract  S/P hysterectomy      FAMILY HISTORY:  No pertinent family history in first degree relatives      SOCIAL HISTORY:  Denies toxic substance use     VITALS:  T(F): 98 (09-01-18 @ 05:30), Max: 98 (09-01-18 @ 05:30)  HR: 68 (09-01-18 @ 05:30)  BP: 115/59 (09-01-18 @ 05:30)  RR: 18 (09-01-18 @ 05:30)  SpO2: 98% (09-01-18 @ 05:30)  Wt(kg): --        PHYSICAL EXAM:  Gen: NAD, calm  HEENT: MMM  Neck: no JVD  Cards: RRR, +S1/S2, no M/G/R  Resp: CTA B/L  GI: soft, NT/ND, NABS  : no CVA tenderness  Vascular: 2+ LE edema B/L  Derm: no rashes  Neuro: non-focal    LABS:  08-31    131<L>  |  92<L>  |  70<H>  ----------------------------<  172<H>  4.7   |  24  |  2.46<H>    Ca    9.1      31 Aug 2018 10:45  Phos  4.3     08-31  Mg     2.1     08-31    TPro  5.9<L>  /  Alb  2.3<L>  /  TBili  0.3  /  DBili      /  AST  90<H>  /  ALT  < 4<L>  /  AlkPhos  443<H>  08-31    Creatinine Trend: 2.46 <--, 2.52 <--, 2.39 <--, 2.27 <--, 2.30 <--, 2.20 <--                        10.1   9.73  )-----------( 269      ( 01 Sep 2018 06:15 )             32.7     Urine Studies:        RADIOLOGY & ADDITIONAL STUDIES:  < from: US Abdomen Complete (08.29.18 @ 12:48) >  Right kidney: 9.3 cm. No hydronephrosis.        Left kidney: 0.7 cm.  No hydronephrosis. A left renal cyst measures 1.8   cm.    < end of copied text >

## 2018-09-01 NOTE — PROGRESS NOTE ADULT - SUBJECTIVE AND OBJECTIVE BOX
Podiatry pager #: [phone]/ [phone]    Patient is a 89y old  Female who presents with a chief complaint of Onc workup (28 Aug 2018 12:16)       INTERVAL HPI/OVERNIGHT EVENTS:  Patient seen and evaluated at bedside.  Pt is resting comfortable in NAD. Denies N/V/F/C.  Pt denies pain.     Allergies    Levaquin (Other)  ramipril (Other)  tetracycline (Hives; Rash)    Intolerances        Vital Signs Last 24 Hrs  T(C): 36.6 (31 Aug 2018 05:52), Max: 36.6 (30 Aug 2018 21:58)  T(F): 97.8 (31 Aug 2018 05:52), Max: 97.8 (30 Aug 2018 21:58)  HR: 62 (31 Aug 2018 05:52) (62 - 68)  BP: 130/68 (31 Aug 2018 05:52) (104/66 - 130/68)  BP(mean): --  RR: 18 (31 Aug 2018 05:52) (18 - 18)  SpO2: 98% (31 Aug 2018 05:52) (95% - 98%)    LABS:                        10.0   8.98  )-----------( 267      ( 31 Aug 2018 06:15 )             32.8     08-31    130<L>  |  94<L>  |  69<H>  ----------------------------<  120<H>  4.9   |  22  |  2.52<H>    Ca    9.2      31 Aug 2018 06:15    TPro  5.7<L>  /  Alb  2.2<L>  /  TBili  0.3  /  DBili  x   /  AST  79<H>  /  ALT  < 4<L>  /  AlkPhos  419<H>  08-30    PT/INR - ( 31 Aug 2018 06:15 )   PT: 30.6 SEC;   INR: 2.70          PTT - ( 31 Aug 2018 06:15 )  PTT:55.4 SEC    CAPILLARY BLOOD GLUCOSE      POCT Blood Glucose.: 114 mg/dL (31 Aug 2018 08:35)  POCT Blood Glucose.: 145 mg/dL (30 Aug 2018 22:07)  POCT Blood Glucose.: 189 mg/dL (30 Aug 2018 18:00)  POCT Blood Glucose.: 194 mg/dL (30 Aug 2018 12:42)      Lower Extremity Physical Exam:  1- s/p LF  bunionectomy- sutures intact, no dehiscence skin well coapted, no erythema or edema, surrounding skin viable.   - palpable pedal pulses. :    RADIOLOGY & ADDITIONAL TESTS:

## 2018-09-01 NOTE — PROGRESS NOTE ADULT - PROBLEM SELECTOR PLAN 1
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

## 2018-09-01 NOTE — PROGRESS NOTE ADULT - SUBJECTIVE AND OBJECTIVE BOX
INTERVAL HPI/OVERNIGHT EVENTS:  No new overnight event.  No N/V/D.  Tolerating diet.    Allergies    Levaquin (Other)  ramipril (Other)  tetracycline (Hives; Rash)    Intolerances          General:  No wt loss, fevers, chills, night sweats, fatigue,   Eyes:  Good vision, no reported pain  ENT:  No sore throat, pain, runny nose, dysphagia  CV:  No pain, palpitations, hypo/hypertension  Resp:  No dyspnea, cough, tachypnea, wheezing  GI:  No pain, No nausea, No vomiting, No diarrhea, No constipation, No weight loss, No fever, No pruritis, No rectal bleeding, No tarry stools, No dysphagia,  :  No pain, bleeding, incontinence, nocturia  Muscle:  No pain, weakness  Neuro:  No weakness, tingling, memory problems  Psych:  No fatigue, insomnia, mood problems, depression  Endocrine:  No polyuria, polydipsia, cold/heat intolerance  Heme:  No petechiae, ecchymosis, easy bruisability  Skin:  No rash, tattoos, scars, edema      PHYSICAL EXAM:   Vital Signs:  Vital Signs Last 24 Hrs  T(C): 36.7 (01 Sep 2018 15:25), Max: 36.7 (01 Sep 2018 05:30)  T(F): 98.1 (01 Sep 2018 15:25), Max: 98.1 (01 Sep 2018 15:25)  HR: 70 (01 Sep 2018 18:31) (68 - 74)  BP: 106/58 (01 Sep 2018 18:31) (95/56 - 127/60)  BP(mean): --  RR: 19 (01 Sep 2018 15:25) (18 - 19)  SpO2: 98% (01 Sep 2018 15:25) (98% - 98%)  Daily     Daily I&O's Summary      GENERAL:  Appears stated age, well-groomed, well-nourished, no distress  HEENT:  NC/AT,  conjunctivae clear and pink, no thyromegaly, nodules, adenopathy, no JVD, sclera -anicteric  CHEST:  Full & symmetric excursion, no increased effort, breath sounds clear  HEART:  Regular rhythm, S1, S2, no murmur/rub/S3/S4, no abdominal bruit, no edema  ABDOMEN:  Soft, non-tender, non-distended, normoactive bowel sounds,  no masses ,no hepato-splenomegaly, no signs of chronic liver disease  EXTEREMITIES:  no cyanosis,clubbing or edema  SKIN:  No rash/erythema/ecchymoses/petechiae/wounds/abscess/warm/dry  NEURO:  Alert, oriented, no asterixis, no tremor, no encephalopathy      LABS:                        10.1   9.73  )-----------( 269      ( 01 Sep 2018 06:15 )             32.7     08-31    131<L>  |  92<L>  |  70<H>  ----------------------------<  172<H>  4.7   |  24  |  2.46<H>    Ca    9.1      31 Aug 2018 10:45  Phos  4.3     08-31  Mg     2.1     08-31    TPro  5.9<L>  /  Alb  2.3<L>  /  TBili  0.3  /  DBili  x   /  AST  90<H>  /  ALT  < 4<L>  /  AlkPhos  443<H>  08-31    PT/INR - ( 01 Sep 2018 06:15 )   PT: 30.8 SEC;   INR: 2.63          PTT - ( 01 Sep 2018 06:15 )  PTT:38.1 SEC    amylase   lipase  RADIOLOGY & ADDITIONAL TESTS:

## 2018-09-01 NOTE — DISCHARGE NOTE ADULT - PROVIDER TOKENS
TOKEN:'[id number]:'[id number]' TOKEN:'[id number]:'[id number]:'[id number]'

## 2018-09-01 NOTE — PHYSICAL THERAPY INITIAL EVALUATION ADULT - ACTIVE RANGE OF MOTION EXAMINATION, REHAB EVAL
Left LE Active ROM was WFL (within functional limits)/except right shoulder flexion 0-90 degrees due to patient reporting hx rot cuff tear; right hip flexion 0-55 degrees; right knee ext -25 degrees from neutral/bilateral upper extremity Active ROM was WFL (within functional limits)

## 2018-09-01 NOTE — PHYSICAL THERAPY INITIAL EVALUATION ADULT - PLANNED THERAPY INTERVENTIONS, PT EVAL
gait training/ROM/transfer training/Patient left supine in bed in NAD; call bell in reach; +bed check; YANY Govea aware/bed mobility training/strengthening

## 2018-09-01 NOTE — CONSULT NOTE ADULT - PROBLEM SELECTOR RECOMMENDATION 2
Patient appears to have underlying CKD-3 (baseline 1.8) for which she follow with Dr. Campbell as an outpatient. Defer inpatient CKD work up. Monitor electrolytes.

## 2018-09-01 NOTE — PROGRESS NOTE ADULT - SUBJECTIVE AND OBJECTIVE BOX
Patient is a 89y old  Female who presents with a chief complaint of Onc workup (01 Sep 2018 16:15)      INTERVAL HPI/OVERNIGHT EVENTS:  T(C): 36.7 (09-01-18 @ 15:25), Max: 36.7 (09-01-18 @ 05:30)  HR: 70 (09-01-18 @ 18:31) (68 - 74)  BP: 106/58 (09-01-18 @ 18:31) (95/56 - 127/60)  RR: 19 (09-01-18 @ 15:25) (18 - 19)  SpO2: 98% (09-01-18 @ 15:25) (98% - 98%)  Wt(kg): --  I&O's Summary      LABS:                        10.1   9.73  )-----------( 269      ( 01 Sep 2018 06:15 )             32.7     08-31    131<L>  |  92<L>  |  70<H>  ----------------------------<  172<H>  4.7   |  24  |  2.46<H>    Ca    9.1      31 Aug 2018 10:45  Phos  4.3     08-31  Mg     2.1     08-31    TPro  5.9<L>  /  Alb  2.3<L>  /  TBili  0.3  /  DBili  x   /  AST  90<H>  /  ALT  < 4<L>  /  AlkPhos  443<H>  08-31    PT/INR - ( 01 Sep 2018 06:15 )   PT: 30.8 SEC;   INR: 2.63          PTT - ( 01 Sep 2018 06:15 )  PTT:38.1 SEC    CAPILLARY BLOOD GLUCOSE      POCT Blood Glucose.: 146 mg/dL (01 Sep 2018 18:01)  POCT Blood Glucose.: 129 mg/dL (01 Sep 2018 12:32)  POCT Blood Glucose.: 176 mg/dL (01 Sep 2018 08:34)  POCT Blood Glucose.: 192 mg/dL (31 Aug 2018 22:02)            MEDICATIONS  (STANDING):  ceFAZolin   IVPB 2000 milliGRAM(s) IV Intermittent every 24 hours  chlorhexidine 4% Liquid 1 Application(s) Topical <User Schedule>  dextrose 5%. 1000 milliLiter(s) (50 mL/Hr) IV Continuous <Continuous>  dextrose 50% Injectable 12.5 Gram(s) IV Push once  dextrose 50% Injectable 25 Gram(s) IV Push once  dextrose 50% Injectable 25 Gram(s) IV Push once  docusate sodium 100 milliGRAM(s) Oral three times a day  fentaNYL   Patch  12 MICROgram(s)/Hr 1 Patch Transdermal every 72 hours  insulin glargine Injectable (LANTUS) 12 Unit(s) SubCutaneous at bedtime  insulin lispro (HumaLOG) corrective regimen sliding scale   SubCutaneous three times a day before meals  lactobacillus acidophilus 1 Tablet(s) Oral three times a day with meals  lidocaine   Patch 1 Patch Transdermal daily  pantoprazole    Tablet 40 milliGRAM(s) Oral before breakfast  propranolol LA 60 milliGRAM(s) Oral daily  senna 2 Tablet(s) Oral at bedtime    MEDICATIONS  (PRN):  ALBUTerol    90 MICROgram(s) HFA Inhaler 2 Puff(s) Inhalation every 6 hours PRN Shortness of Breath and/or Wheezing  dextrose 40% Gel 15 Gram(s) Oral once PRN Blood Glucose LESS THAN 70 milliGRAM(s)/deciLiter  glucagon  Injectable 1 milliGRAM(s) IntraMuscular once PRN Glucose <70 milliGRAM(s)/deciLiter  oxyCODONE    IR 5 milliGRAM(s) Oral every 4 hours PRN Moderate and Severe pain  polyethylene glycol 3350 17 Gram(s) Oral two times a day PRN Constipation          PHYSICAL EXAM:  GENERAL: NAD, well-groomed, well-developed  HEAD:  Atraumatic, Normocephalic  CHEST/LUNG: Clear to percussion bilaterally; No rales, rhonchi, wheezing, or rubs  HEART: Regular rate and rhythm; No murmurs, rubs, or gallops  ABDOMEN: Soft, Nontender, Nondistended; Bowel sounds present  EXTREMITIES:  2+ Peripheral Pulses, No clubbing, cyanosis, or edema  LYMPH: No lymphadenopathy noted  SKIN: No rashes or lesions    Care Discussed with Consultants/Other Providers [ ] YES  [ ] NO

## 2018-09-01 NOTE — PHYSICAL THERAPY INITIAL EVALUATION ADULT - CRITERIA FOR SKILLED THERAPEUTIC INTERVENTIONS
How Severe Are Your Spot(S)?: mild What Is The Reason For Today's Visit?: Full Body Skin Examination What Is The Reason For Today's Visit? (Being Monitored For X): the development of new lesions functional limitations in following categories/bed mobility, transfers, ambulation

## 2018-09-01 NOTE — PHYSICAL THERAPY INITIAL EVALUATION ADULT - NAME OF CLINICIAN
spoke to LUDIVINA Bernard who confirmed PT order- WBAT LLE in shoe, FWAUBREY RLE to patient tolerance *****

## 2018-09-01 NOTE — DISCHARGE NOTE ADULT - MEDICATION SUMMARY - MEDICATIONS TO CHANGE
I will SWITCH the dose or number of times a day I take the medications listed below when I get home from the hospital:    Coumadin 1 mg oral tablet  -- 1 tab(s) by mouth once a day alternating with 2mg  -- every other day with Coumadin 2mg po    oxyCODONE 10 mg oral tablet, extended release  -- 1 tab(s) by mouth 2 times a day

## 2018-09-01 NOTE — DISCHARGE NOTE ADULT - PATIENT PORTAL LINK FT
You can access the [facility] Patient Portal, offered by [facility], by registering with the following website: [email or web address]

## 2018-09-01 NOTE — DISCHARGE NOTE ADULT - ADDITIONAL INSTRUCTIONS
Follow up with medical provider at rehabilitation facility Follow up with medical provider at rehabilitation facility.   Follow up with nephrologist within 1 week of discharge.   Follow up with infectious disease doctor upon course of antibiotic completion. Follow up with medical provider at rehabilitation facility.   Follow up with nephrologist within 1 week of discharge.   Follow up with infectious disease doctor upon course of antibiotic completion.  Follow up with your cardiology upon discharge from rehab.  Follow up with your pulmonologist within 1-2 weeks from discharge.  Please call for the above appts.

## 2018-09-02 LAB
ALBUMIN SERPL ELPH-MCNC: 2.2 G/DL — LOW (ref 3.3–5)
ALP SERPL-CCNC: 466 U/L — HIGH (ref 40–120)
ALT FLD-CCNC: < 4 U/L — LOW (ref 4–33)
APPEARANCE UR: SIGNIFICANT CHANGE UP
APTT BLD: 37.7 SEC — HIGH (ref 27.5–37.4)
AST SERPL-CCNC: 84 U/L — HIGH (ref 4–32)
BACTERIA # UR AUTO: HIGH
BASOPHILS # BLD AUTO: 0.03 K/UL — SIGNIFICANT CHANGE UP (ref 0–0.2)
BASOPHILS NFR BLD AUTO: 0.3 % — SIGNIFICANT CHANGE UP (ref 0–2)
BILIRUB SERPL-MCNC: 0.3 MG/DL — SIGNIFICANT CHANGE UP (ref 0.2–1.2)
BILIRUB UR-MCNC: SIGNIFICANT CHANGE UP
BLOOD UR QL VISUAL: SIGNIFICANT CHANGE UP
BUN SERPL-MCNC: 73 MG/DL — HIGH (ref 7–23)
CALCIUM SERPL-MCNC: 9.1 MG/DL — SIGNIFICANT CHANGE UP (ref 8.4–10.5)
CHLORIDE SERPL-SCNC: 94 MMOL/L — LOW (ref 98–107)
CO2 SERPL-SCNC: 24 MMOL/L — SIGNIFICANT CHANGE UP (ref 22–31)
COARSE GRAN CASTS #/AREA URNS AUTO: SIGNIFICANT CHANGE UP (ref 0–?)
COLOR SPEC: YELLOW — SIGNIFICANT CHANGE UP
CREAT ?TM UR-MCNC: 78.7 MG/DL — SIGNIFICANT CHANGE UP
CREAT SERPL-MCNC: 2.47 MG/DL — HIGH (ref 0.5–1.3)
EOSINOPHIL # BLD AUTO: 0.07 K/UL — SIGNIFICANT CHANGE UP (ref 0–0.5)
EOSINOPHIL NFR BLD AUTO: 0.7 % — SIGNIFICANT CHANGE UP (ref 0–6)
EPI CELLS # UR: SIGNIFICANT CHANGE UP
GLUCOSE BLDC GLUCOMTR-MCNC: 116 MG/DL — HIGH (ref 70–99)
GLUCOSE BLDC GLUCOMTR-MCNC: 153 MG/DL — HIGH (ref 70–99)
GLUCOSE BLDC GLUCOMTR-MCNC: 161 MG/DL — HIGH (ref 70–99)
GLUCOSE BLDC GLUCOMTR-MCNC: 181 MG/DL — HIGH (ref 70–99)
GLUCOSE SERPL-MCNC: 120 MG/DL — HIGH (ref 70–99)
GLUCOSE UR-MCNC: NEGATIVE — SIGNIFICANT CHANGE UP
HCT VFR BLD CALC: 33.2 % — LOW (ref 34.5–45)
HGB BLD-MCNC: 10.4 G/DL — LOW (ref 11.5–15.5)
IMM GRANULOCYTES # BLD AUTO: 0.06 # — SIGNIFICANT CHANGE UP
IMM GRANULOCYTES NFR BLD AUTO: 0.6 % — SIGNIFICANT CHANGE UP (ref 0–1.5)
INR BLD: 2.75 — HIGH (ref 0.88–1.17)
KETONES UR-MCNC: NEGATIVE — SIGNIFICANT CHANGE UP
LEUKOCYTE ESTERASE UR-ACNC: SIGNIFICANT CHANGE UP
LYMPHOCYTES # BLD AUTO: 1.03 K/UL — SIGNIFICANT CHANGE UP (ref 1–3.3)
LYMPHOCYTES # BLD AUTO: 10 % — LOW (ref 13–44)
MAGNESIUM SERPL-MCNC: 2.1 MG/DL — SIGNIFICANT CHANGE UP (ref 1.6–2.6)
MCHC RBC-ENTMCNC: 30.1 PG — SIGNIFICANT CHANGE UP (ref 27–34)
MCHC RBC-ENTMCNC: 31.3 % — LOW (ref 32–36)
MCV RBC AUTO: 96.2 FL — SIGNIFICANT CHANGE UP (ref 80–100)
MONOCYTES # BLD AUTO: 0.83 K/UL — SIGNIFICANT CHANGE UP (ref 0–0.9)
MONOCYTES NFR BLD AUTO: 8.1 % — SIGNIFICANT CHANGE UP (ref 2–14)
NEUTROPHILS # BLD AUTO: 8.29 K/UL — HIGH (ref 1.8–7.4)
NEUTROPHILS NFR BLD AUTO: 80.3 % — HIGH (ref 43–77)
NITRITE UR-MCNC: NEGATIVE — SIGNIFICANT CHANGE UP
NRBC # FLD: 0 — SIGNIFICANT CHANGE UP
PH UR: 6.5 — SIGNIFICANT CHANGE UP (ref 5–8)
PHOSPHATE SERPL-MCNC: 4.2 MG/DL — SIGNIFICANT CHANGE UP (ref 2.5–4.5)
PLATELET # BLD AUTO: 266 K/UL — SIGNIFICANT CHANGE UP (ref 150–400)
PMV BLD: 9.6 FL — SIGNIFICANT CHANGE UP (ref 7–13)
POTASSIUM SERPL-MCNC: 5 MMOL/L — SIGNIFICANT CHANGE UP (ref 3.5–5.3)
POTASSIUM SERPL-SCNC: 5 MMOL/L — SIGNIFICANT CHANGE UP (ref 3.5–5.3)
PROT SERPL-MCNC: 5.7 G/DL — LOW (ref 6–8.3)
PROT UR-MCNC: 100 — SIGNIFICANT CHANGE UP
PROT UR-MCNC: 134.5 MG/DL — SIGNIFICANT CHANGE UP
PROTHROM AB SERPL-ACNC: 31.2 SEC — HIGH (ref 9.8–13.1)
RBC # BLD: 3.45 M/UL — LOW (ref 3.8–5.2)
RBC # FLD: 18.1 % — HIGH (ref 10.3–14.5)
RBC CASTS # UR COMP ASSIST: SIGNIFICANT CHANGE UP (ref 0–?)
SODIUM SERPL-SCNC: 132 MMOL/L — LOW (ref 135–145)
SODIUM UR-SCNC: < 20 MMOL/L — SIGNIFICANT CHANGE UP
SP GR SPEC: 1.02 — SIGNIFICANT CHANGE UP (ref 1–1.04)
UROBILINOGEN FLD QL: NORMAL — SIGNIFICANT CHANGE UP
WBC # BLD: 10.31 K/UL — SIGNIFICANT CHANGE UP (ref 3.8–10.5)
WBC # FLD AUTO: 10.31 K/UL — SIGNIFICANT CHANGE UP (ref 3.8–10.5)
WBC UR QL: SIGNIFICANT CHANGE UP (ref 0–?)

## 2018-09-02 RX ORDER — WARFARIN SODIUM 2.5 MG/1
1 TABLET ORAL ONCE
Qty: 0 | Refills: 0 | Status: COMPLETED | OUTPATIENT
Start: 2018-09-02 | End: 2018-09-02

## 2018-09-02 RX ADMIN — CHLORHEXIDINE GLUCONATE 1 APPLICATION(S): 213 SOLUTION TOPICAL at 10:20

## 2018-09-02 RX ADMIN — Medication 1: at 12:45

## 2018-09-02 RX ADMIN — FENTANYL CITRATE 1 PATCH: 50 INJECTION INTRAVENOUS at 10:00

## 2018-09-02 RX ADMIN — Medication 100 MILLIGRAM(S): at 06:48

## 2018-09-02 RX ADMIN — LIDOCAINE 1 PATCH: 4 CREAM TOPICAL at 11:10

## 2018-09-02 RX ADMIN — Medication 1 TABLET(S): at 12:45

## 2018-09-02 RX ADMIN — INSULIN GLARGINE 12 UNIT(S): 100 INJECTION, SOLUTION SUBCUTANEOUS at 22:16

## 2018-09-02 RX ADMIN — Medication 60 MILLIGRAM(S): at 06:05

## 2018-09-02 RX ADMIN — Medication 100 MILLIGRAM(S): at 22:15

## 2018-09-02 RX ADMIN — FENTANYL CITRATE 1 PATCH: 50 INJECTION INTRAVENOUS at 10:20

## 2018-09-02 RX ADMIN — WARFARIN SODIUM 1 MILLIGRAM(S): 2.5 TABLET ORAL at 17:05

## 2018-09-02 RX ADMIN — PANTOPRAZOLE SODIUM 40 MILLIGRAM(S): 20 TABLET, DELAYED RELEASE ORAL at 06:05

## 2018-09-02 RX ADMIN — SENNA PLUS 2 TABLET(S): 8.6 TABLET ORAL at 22:15

## 2018-09-02 RX ADMIN — Medication 1 TABLET(S): at 09:00

## 2018-09-02 RX ADMIN — Medication 1 TABLET(S): at 17:05

## 2018-09-02 RX ADMIN — Medication 1: at 17:52

## 2018-09-02 RX ADMIN — LIDOCAINE 1 PATCH: 4 CREAM TOPICAL at 23:10

## 2018-09-02 NOTE — PROGRESS NOTE ADULT - SUBJECTIVE AND OBJECTIVE BOX
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 Nephrology consulted for elevated Scr.    REVIEW OF SYSTEMS:  Gen: no changes in weight  Cards: no chest pain  Resp: no dyspnea  GI: no nausea or vomiting or diarrhea  Vascular: no LE edema    Levaquin (Other)  ramipril (Other)  tetracycline (Hives; Rash)      Hospital Medications: Medications reviewed    VITALS:  T(F): 97.7 (09-02-18 @ 06:41), Max: 98.1 (09-01-18 @ 15:25)  HR: 62 (09-02-18 @ 06:41)  BP: 110/72 (09-02-18 @ 06:41)  RR: 18 (09-02-18 @ 06:41)  SpO2: 100% (09-02-18 @ 06:41)  Wt(kg): --  Height (cm): 154.94 (08-28 @ 11:50), 152.4 (08-21 @ 15:50)  Weight (kg): 78.5 (08-28 @ 11:50), 68 (08-21 @ 15:50)  BMI (kg/m2): 32.7 (08-28 @ 11:50), 29.3 (08-21 @ 15:50)  BSA (m2): 1.78 (08-28 @ 11:50), 1.65 (08-21 @ 15:50)      PHYSICAL EXAM:    Gen: NAD, calm  Cards: RRR, +S1/S2, no M/G/R  Resp: CTA B/L  GI: soft, NT/ND, NABS  Vascular: 2+ LE edema B/L    LABS:  09-02    132<L>  |  94<L>  |  73<H>  ----------------------------<  120<H>  5.0   |  24  |  2.47<H>    Ca    9.1      02 Sep 2018 06:15  Phos  4.2     09-02  Mg     2.1     09-02    TPro  5.7<L>  /  Alb  2.2<L>  /  TBili  0.3  /  DBili      /  AST  84<H>  /  ALT  < 4<L>  /  AlkPhos  466<H>  09-02    Creatinine Trend: 2.47 <--, 2.46 <--, 2.52 <--, 2.39 <--, 2.27 <--, 2.30 <--                        10.4   10.31 )-----------( 266      ( 02 Sep 2018 06:15 )             33.2     Urine Studies:        RADIOLOGY & ADDITIONAL STUDIES:

## 2018-09-02 NOTE — PROGRESS NOTE ADULT - PROBLEM SELECTOR PLAN 1
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

## 2018-09-02 NOTE — PROGRESS NOTE ADULT - SUBJECTIVE AND OBJECTIVE BOX
Chief complaint  Patient is a 89y old  Female who presents with a chief complaint of Onc workup (01 Sep 2018 16:15)   Review of systems  Patient in bed, looks comfortable, no fever, no hypoglycemia.    Labs and Fingersticks  CAPILLARY BLOOD GLUCOSE      POCT Blood Glucose.: 181 mg/dL (02 Sep 2018 17:40)  POCT Blood Glucose.: 161 mg/dL (02 Sep 2018 12:43)  POCT Blood Glucose.: 116 mg/dL (02 Sep 2018 08:32)  POCT Blood Glucose.: 162 mg/dL (01 Sep 2018 22:12)          Calcium, Total Serum: 9.1 (09-02 @ 06:15)  Albumin, Serum: 2.2 <L> (09-02 @ 06:15)    Alanine Aminotransferase (ALT/SGPT): < 4 <L> (09-02 @ 06:15)  Alkaline Phosphatase, Serum: 466 <H> (09-02 @ 06:15)  Aspartate Aminotransferase (AST/SGOT): 84 <H> (09-02 @ 06:15)        09-02    132<L>  |  94<L>  |  73<H>  ----------------------------<  120<H>  5.0   |  24  |  2.47<H>    Ca    9.1      02 Sep 2018 06:15  Phos  4.2     09-02  Mg     2.1     09-02    TPro  5.7<L>  /  Alb  2.2<L>  /  TBili  0.3  /  DBili  x   /  AST  84<H>  /  ALT  < 4<L>  /  AlkPhos  466<H>  09-02                        10.4   10.31 )-----------( 266      ( 02 Sep 2018 06:15 )             33.2     Medications  MEDICATIONS  (STANDING):  ceFAZolin   IVPB 2000 milliGRAM(s) IV Intermittent every 24 hours  chlorhexidine 4% Liquid 1 Application(s) Topical <User Schedule>  dextrose 5%. 1000 milliLiter(s) (50 mL/Hr) IV Continuous <Continuous>  dextrose 50% Injectable 12.5 Gram(s) IV Push once  dextrose 50% Injectable 25 Gram(s) IV Push once  dextrose 50% Injectable 25 Gram(s) IV Push once  docusate sodium 100 milliGRAM(s) Oral three times a day  fentaNYL   Patch  12 MICROgram(s)/Hr 1 Patch Transdermal every 72 hours  insulin glargine Injectable (LANTUS) 12 Unit(s) SubCutaneous at bedtime  insulin lispro (HumaLOG) corrective regimen sliding scale   SubCutaneous three times a day before meals  lactobacillus acidophilus 1 Tablet(s) Oral three times a day with meals  lidocaine   Patch 1 Patch Transdermal daily  pantoprazole    Tablet 40 milliGRAM(s) Oral before breakfast  propranolol LA 60 milliGRAM(s) Oral daily  senna 2 Tablet(s) Oral at bedtime      Physical Exam  General: Patient comfortable in bed  Vital Signs Last 12 Hrs  T(F): 98.2 (09-02-18 @ 14:26), Max: 98.2 (09-02-18 @ 14:26)  HR: 80 (09-02-18 @ 14:26) (62 - 80)  BP: 121/48 (09-02-18 @ 14:26) (110/72 - 121/48)  BP(mean): --  RR: 18 (09-02-18 @ 14:26) (18 - 18)  SpO2: 100% (09-02-18 @ 14:26) (100% - 100%)  Neck: No palpable thyroid nodules.  CVS: S1S2, No murmurs  Respiratory: No wheezing, no crepitations  GI: Abdomen soft, bowel sounds positive  Musculoskeletal:  edema lower extremities.   Skin: No skin rashes, no ecchymosis    Diagnostics

## 2018-09-02 NOTE — PROGRESS NOTE ADULT - SUBJECTIVE AND OBJECTIVE BOX
Patient is a 89y old  Female who presents with a chief complaint of Onc workup (01 Sep 2018 16:15)      INTERVAL HPI/OVERNIGHT EVENTS:  T(C): 36.8 (18 @ 14:26), Max: 36.8 (18 @ 14:26)  HR: 80 (18 @ 14:26) (62 - 99)  BP: 121/48 (18 @ 14:26) (105/60 - 121/48)  RR: 18 (18 @ 14:26) (18 - 18)  SpO2: 100% (18 @ 14:26) (99% - 100%)  Wt(kg): --  I&O's Summary      LABS:                        10.4   10.31 )-----------( 266      ( 02 Sep 2018 06:15 )             33.2         132<L>  |  94<L>  |  73<H>  ----------------------------<  120<H>  5.0   |  24  |  2.47<H>    Ca    9.1      02 Sep 2018 06:15  Phos  4.2       Mg     2.1         TPro  5.7<L>  /  Alb  2.2<L>  /  TBili  0.3  /  DBili  x   /  AST  84<H>  /  ALT  < 4<L>  /  AlkPhos  466<H>      PT/INR - ( 02 Sep 2018 06:15 )   PT: 31.2 SEC;   INR: 2.75          PTT - ( 02 Sep 2018 06:15 )  PTT:37.7 SEC  Urinalysis Basic - ( 02 Sep 2018 14:15 )    Color: YELLOW / Appearance: TURBID / S.025 / pH: 6.5  Gluc: NEGATIVE / Ketone: NEGATIVE  / Bili: SMALL / Urobili: NORMAL   Blood: LARGE / Protein: 100 / Nitrite: NEGATIVE   Leuk Esterase: MODERATE / RBC: 20-30 / WBC 10-20   Sq Epi: x / Non Sq Epi: SMALL / Bacteria: MODERATE      CAPILLARY BLOOD GLUCOSE      POCT Blood Glucose.: 181 mg/dL (02 Sep 2018 17:40)  POCT Blood Glucose.: 161 mg/dL (02 Sep 2018 12:43)  POCT Blood Glucose.: 116 mg/dL (02 Sep 2018 08:32)  POCT Blood Glucose.: 162 mg/dL (01 Sep 2018 22:12)        Urinalysis Basic - ( 02 Sep 2018 14:15 )    Color: YELLOW / Appearance: TURBID / S.025 / pH: 6.5  Gluc: NEGATIVE / Ketone: NEGATIVE  / Bili: SMALL / Urobili: NORMAL   Blood: LARGE / Protein: 100 / Nitrite: NEGATIVE   Leuk Esterase: MODERATE / RBC: 20-30 / WBC 10-20   Sq Epi: x / Non Sq Epi: SMALL / Bacteria: MODERATE        MEDICATIONS  (STANDING):  ceFAZolin   IVPB 2000 milliGRAM(s) IV Intermittent every 24 hours  chlorhexidine 4% Liquid 1 Application(s) Topical <User Schedule>  dextrose 5%. 1000 milliLiter(s) (50 mL/Hr) IV Continuous <Continuous>  dextrose 50% Injectable 12.5 Gram(s) IV Push once  dextrose 50% Injectable 25 Gram(s) IV Push once  dextrose 50% Injectable 25 Gram(s) IV Push once  docusate sodium 100 milliGRAM(s) Oral three times a day  fentaNYL   Patch  12 MICROgram(s)/Hr 1 Patch Transdermal every 72 hours  insulin glargine Injectable (LANTUS) 12 Unit(s) SubCutaneous at bedtime  insulin lispro (HumaLOG) corrective regimen sliding scale   SubCutaneous three times a day before meals  lactobacillus acidophilus 1 Tablet(s) Oral three times a day with meals  lidocaine   Patch 1 Patch Transdermal daily  pantoprazole    Tablet 40 milliGRAM(s) Oral before breakfast  propranolol LA 60 milliGRAM(s) Oral daily  senna 2 Tablet(s) Oral at bedtime    MEDICATIONS  (PRN):  ALBUTerol    90 MICROgram(s) HFA Inhaler 2 Puff(s) Inhalation every 6 hours PRN Shortness of Breath and/or Wheezing  dextrose 40% Gel 15 Gram(s) Oral once PRN Blood Glucose LESS THAN 70 milliGRAM(s)/deciLiter  glucagon  Injectable 1 milliGRAM(s) IntraMuscular once PRN Glucose <70 milliGRAM(s)/deciLiter  oxyCODONE    IR 5 milliGRAM(s) Oral every 4 hours PRN Moderate and Severe pain  polyethylene glycol 3350 17 Gram(s) Oral two times a day PRN Constipation          PHYSICAL EXAM:  GENERAL: NAD, well-groomed, well-developed  HEAD:  Atraumatic, Normocephalic  CHEST/LUNG: Clear to percussion bilaterally; No rales, rhonchi, wheezing, or rubs  HEART: Regular rate and rhythm; No murmurs, rubs, or gallops  ABDOMEN: Soft, Nontender, Nondistended; Bowel sounds present  EXTREMITIES:  2+ Peripheral Pulses, No clubbing, cyanosis, or edema  LYMPH: No lymphadenopathy noted  SKIN: No rashes or lesions    Care Discussed with Consultants/Other Providers [ ] YES  [ ] NO

## 2018-09-02 NOTE — PROGRESS NOTE ADULT - SUBJECTIVE AND OBJECTIVE BOX
INTERVAL HPI/OVERNIGHT EVENTS:  No new overnight event.  No N/V/D.  Tolerating diet.    Allergies    Levaquin (Other)  ramipril (Other)  tetracycline (Hives; Rash)    Intolerances    General:  No wt loss, fevers, chills, night sweats, fatigue,   Eyes:  Good vision, no reported pain  ENT:  No sore throat, pain, runny nose, dysphagia  CV:  No pain, palpitations, hypo/hypertension  Resp:  No dyspnea, cough, tachypnea, wheezing  GI:  No pain, No nausea, No vomiting, No diarrhea, No constipation, No weight loss, No fever, No pruritis, No rectal bleeding, No tarry stools, No dysphagia,  :  No pain, bleeding, incontinence, nocturia  Muscle:  No pain, weakness  Neuro:  No weakness, tingling, memory problems  Psych:  No fatigue, insomnia, mood problems, depression  Endocrine:  No polyuria, polydipsia, cold/heat intolerance  Heme:  No petechiae, ecchymosis, easy bruisability  Skin:  No rash, tattoos, scars, edema    PHYSICAL EXAM:   Vital Signs:  Vital Signs Last 24 Hrs  T(C): 36.5 (02 Sep 2018 06:41), Max: 36.7 (01 Sep 2018 15:25)  T(F): 97.7 (02 Sep 2018 06:41), Max: 98.1 (01 Sep 2018 15:25)  HR: 62 (02 Sep 2018 06:41) (62 - 99)  BP: 110/72 (02 Sep 2018 06:41) (95/56 - 110/72)  BP(mean): --  RR: 18 (02 Sep 2018 06:41) (18 - 19)  SpO2: 100% (02 Sep 2018 06:41) (98% - 100%)  Daily     Daily I&O's Summary      GENERAL:  Appears stated age, well-groomed, well-nourished, no distress  HEENT:  NC/AT,  conjunctivae clear and pink, no thyromegaly, nodules, adenopathy, no JVD, sclera -anicteric  CHEST:  Full & symmetric excursion, no increased effort, breath sounds clear  HEART:  Regular rhythm, S1, S2, no murmur/rub/S3/S4, no abdominal bruit, no edema  ABDOMEN:  Soft, non-tender, non-distended, normoactive bowel sounds,  no masses ,no hepato-splenomegaly, no signs of chronic liver disease  EXTEREMITIES:  no cyanosis,clubbing or edema  SKIN:  No rash/erythema/ecchymoses/petechiae/wounds/abscess/warm/dry  NEURO:  Alert, oriented, no asterixis, no tremor, no encephalopathy      LABS:                        10.4   10.31 )-----------( 266      ( 02 Sep 2018 06:15 )             33.2     09-02    132<L>  |  94<L>  |  73<H>  ----------------------------<  120<H>  5.0   |  24  |  2.47<H>    Ca    9.1      02 Sep 2018 06:15  Phos  4.2     09-02  Mg     2.1     09-02    TPro  5.7<L>  /  Alb  2.2<L>  /  TBili  0.3  /  DBili  x   /  AST  84<H>  /  ALT  < 4<L>  /  AlkPhos  466<H>  09-02    PT/INR - ( 02 Sep 2018 06:15 )   PT: 31.2 SEC;   INR: 2.75          PTT - ( 02 Sep 2018 06:15 )  PTT:37.7 SEC    amylase   lipase  RADIOLOGY & ADDITIONAL TESTS:

## 2018-09-02 NOTE — PROGRESS NOTE ADULT - SUBJECTIVE AND OBJECTIVE BOX
Infectious Diseases progress note:    Subjective:  No new fevers.  No leukocytosis.  No new somatic complaints    ROS:  CONSTITUTIONAL:  No fever, chills, rigors  CARDIOVASCULAR:  No chest pain or palpitations  RESPIRATORY:   No SOB, cough, dyspnea on exertion.  No wheezing  GASTROINTESTINAL:  No abd pain, N/V, diarrhea/constipation  EXTREMITIES:  No swelling or joint pain  GENITOURINARY:  No burning on urination, increased frequency or urgency.  No flank pain  NEUROLOGIC:  No HA, visual disturbances  SKIN: No rashes    Allergies    Levaquin (Other)  ramipril (Other)  tetracycline (Hives; Rash)    Intolerances        ANTIBIOTICS/RELEVANT:  antimicrobials  ceFAZolin   IVPB 2000 milliGRAM(s) IV Intermittent every 24 hours    immunologic:    OTHER:  ALBUTerol    90 MICROgram(s) HFA Inhaler 2 Puff(s) Inhalation every 6 hours PRN  chlorhexidine 4% Liquid 1 Application(s) Topical <User Schedule>  dextrose 40% Gel 15 Gram(s) Oral once PRN  dextrose 5%. 1000 milliLiter(s) IV Continuous <Continuous>  dextrose 50% Injectable 12.5 Gram(s) IV Push once  dextrose 50% Injectable 25 Gram(s) IV Push once  dextrose 50% Injectable 25 Gram(s) IV Push once  docusate sodium 100 milliGRAM(s) Oral three times a day  fentaNYL   Patch  12 MICROgram(s)/Hr 1 Patch Transdermal every 72 hours  glucagon  Injectable 1 milliGRAM(s) IntraMuscular once PRN  insulin glargine Injectable (LANTUS) 12 Unit(s) SubCutaneous at bedtime  insulin lispro (HumaLOG) corrective regimen sliding scale   SubCutaneous three times a day before meals  lactobacillus acidophilus 1 Tablet(s) Oral three times a day with meals  lidocaine   Patch 1 Patch Transdermal daily  oxyCODONE    IR 5 milliGRAM(s) Oral every 4 hours PRN  pantoprazole    Tablet 40 milliGRAM(s) Oral before breakfast  polyethylene glycol 3350 17 Gram(s) Oral two times a day PRN  propranolol LA 60 milliGRAM(s) Oral daily  senna 2 Tablet(s) Oral at bedtime  warfarin 1 milliGRAM(s) Oral once      Objective:  Vital Signs Last 24 Hrs  T(C): 36.5 (02 Sep 2018 06:41), Max: 36.7 (01 Sep 2018 15:25)  T(F): 97.7 (02 Sep 2018 06:41), Max: 98.1 (01 Sep 2018 15:25)  HR: 62 (02 Sep 2018 06:41) (62 - 99)  BP: 110/72 (02 Sep 2018 06:41) (95/56 - 110/72)  BP(mean): --  RR: 18 (02 Sep 2018 06:41) (18 - 19)  SpO2: 100% (02 Sep 2018 06:41) (98% - 100%)    PHYSICAL EXAM:  Constitutional:NAD  Eyes:JORGE, EOMI  Ear/Nose/Throat: no thrush, mucositis.  Moist mucous membranes	  Neck:no JVD, no lymphadenopathy, supple  Respiratory: CTA ignacio  Cardiovascular: S1S2 RRR, no murmurs  Gastrointestinal:soft, nontender,  nondistended (+) BS  Extremities:no e/e/c  Skin:  L foot dsg in place        LABS:                        10.4   10.31 )-----------( 266      ( 02 Sep 2018 06:15 )             33.2     09-02    132<L>  |  94<L>  |  73<H>  ----------------------------<  120<H>  5.0   |  24  |  2.47<H>    Ca    9.1      02 Sep 2018 06:15  Phos  4.2     09-02  Mg     2.1     09-02    TPro  5.7<L>  /  Alb  2.2<L>  /  TBili  0.3  /  DBili  x   /  AST  84<H>  /  ALT  < 4<L>  /  AlkPhos  466<H>  09-02    PT/INR - ( 02 Sep 2018 06:15 )   PT: 31.2 SEC;   INR: 2.75          PTT - ( 02 Sep 2018 06:15 )  PTT:37.7 SEC      Procalcitonin, Serum: 2.60 (08-16 @ 08:00)                    MICROBIOLOGY:      Culture - Tissue with Gram Stain (08.21.18 @ 21:28)    -  Gentamicin: S <=4 Should not be used as monotherapy    -  Oxacillin: S <=0.25    -  Penicillin: R >8    -  RIF- Rifampin: S <=1 Should not be used as monotherapy    -  Tetra/Doxy: S <=4    -  Trimethoprim/Sulfamethoxazole: S <=0.5/9.5    -  Vancomycin: S 1    Gram Stain:   No polymorphonuclear cells seen  No organisms seen    -  Ampicillin/Sulbactam: S <=8/4    -  Cefazolin: S <=4    -  Clindamycin: R >4    -  Erythromycin: R >4    Specimen Source: .Tissue left 1st metatarsal head    Culture Results:   Rare Staphylococcus aureus    Organism Identification: Staphylococcus aureus    Organism: Staphylococcus aureus    Method Type: BUCK        RADIOLOGY & ADDITIONAL STUDIES:    < from: US Abdomen Complete (08.29.18 @ 12:48) >  IMPRESSION:     Heterogeneous liver echotexture.    Limited evaluation of the gallbladder without evidence of cholelithiasis.   Focal outpouching of fluid seen on transverse images likely corresponding   to a small fold in the gallbladder fundus better seen on CT technique.    Small to moderate abdominal ascites.    < end of copied text >

## 2018-09-02 NOTE — PROGRESS NOTE ADULT - SUBJECTIVE AND OBJECTIVE BOX
SUBJECTIVE: Patient with no anginal chest pain or shortness of breath    MEDICATIONS  (STANDING):  ceFAZolin   IVPB 2000 milliGRAM(s) IV Intermittent every 24 hours  chlorhexidine 4% Liquid 1 Application(s) Topical <User Schedule>  docusate sodium 100 milliGRAM(s) Oral three times a day  fentaNYL   Patch  12 MICROgram(s)/Hr 1 Patch Transdermal every 72 hours  insulin glargine Injectable (LANTUS) 12 Unit(s) SubCutaneous at bedtime  insulin lispro (HumaLOG) corrective regimen sliding scale   SubCutaneous three times a day before meals  lactobacillus acidophilus 1 Tablet(s) Oral three times a day with meals  lidocaine   Patch 1 Patch Transdermal daily  pantoprazole    Tablet 40 milliGRAM(s) Oral before breakfast  propranolol LA 60 milliGRAM(s) Oral daily  senna 2 Tablet(s) Oral at bedtime  warfarin 1 milliGRAM(s) Oral once    MEDICATIONS  (PRN):  ALBUTerol    90 MICROgram(s) HFA Inhaler 2 Puff(s) Inhalation every 6 hours PRN Shortness of Breath and/or Wheezing  dextrose 40% Gel 15 Gram(s) Oral once PRN Blood Glucose LESS THAN 70 milliGRAM(s)/deciLiter  glucagon  Injectable 1 milliGRAM(s) IntraMuscular once PRN Glucose <70 milliGRAM(s)/deciLiter  oxyCODONE    IR 5 milliGRAM(s) Oral every 4 hours PRN Moderate and Severe pain  polyethylene glycol 3350 17 Gram(s) Oral two times a day PRN Constipation      LABS:                        10.4   10.31 )-----------( 266      ( 02 Sep 2018 06:15 )             33.2     132<L>  |  94<L>  |  73<H>  ----------------------------<  120<H>  5.0   |  24  |  2.47<H>    Ca    9.1      02 Sep 2018 06:15  Phos  4.2     09-02  Mg     2.1     09-02    TPro  5.7<L>  /  Alb  2.2<L>  /  TBili  0.3  /  DBili  x   /  AST  84<H>  /  ALT  < 4<L>  /  AlkPhos  466<H>  09-02    Creatinine Trend: 2.47<--, 2.46<--, 2.52<--, 2.39<--, 2.27<--, 2.30<--   PT/INR - ( 02 Sep 2018 06:15 )   PT: 31.2 SEC;   INR: 2.75       PTT - ( 02 Sep 2018 06:15 )  PTT:37.7 SEC        PHYSICAL EXAM  Vital Signs Last 24 Hrs  T(C): 36.5 (02 Sep 2018 06:41), Max: 36.7 (01 Sep 2018 15:25)  T(F): 97.7 (02 Sep 2018 06:41), Max: 98.1 (01 Sep 2018 15:25)  HR: 62 (02 Sep 2018 06:41) (62 - 99)  BP: 110/72 (02 Sep 2018 06:41) (95/56 - 110/72)  RR: 18 (02 Sep 2018 06:41) (18 - 19)  SpO2: 100% (02 Sep 2018 06:41) (98% - 100%)    HEENT: Normal Oral mucosa, PERRL, EOMI  Lymphatic: No obvious lymphadenopathy, No edema  Cardiovascular: Normal S1S2, No JVD, 1/6 STEFFANY  Respiratory: Lungs clear to auscultation, normal effort  Gastrointestinal: Abdomen soft, ND, NT, +BS      DIAGNOSTIC DATA    Xray Chest 1 View- PORTABLE-Urgent (08.17.18 @ 21:23) >  There are low lung volumes resulting in crowding of the bronchovascular   markings at the lung bases.  There is minimal blunting at the right costophrenic angle either   representing trace pleural effusion and/or pleural thickening.  There is subsegmental atelectasis at both lung bases.      < from: TTE Echo Doppler w/o Cont (08.17.18 @ 10:38) >  Technically difficult and limited study.  Mitral Valve: Calcified mitral annulus and mitral valve leaflets. Normal   opening of the mitral valve leaflets. Trace mitral regurgitation.  Aortic Valve/Aorta: Not well visualized  Tricuspid Valve: Calcified tricuspid annulus with normally opening valve.   Trace tricuspid regurgitation.  Pulmonic Valve: The pulmonic valve is not well visualized. Probably   normal.  Left Atrium: Moderate left atrial enlargement  Right Atrium: Normal  Left Ventricle: The endocardium is not well-visualized. Overall there is   preserved left ventricular systolic function. Unable to rule out wall   motion abnormalities. The EF is approximately 65%.  Right Ventricle: Normal right ventricular size and function.  Pericardium/Pleura: No pericardial effusion noted.  Pulmonary/RV Pressure: The right ventricular systolic pressure is   estimated to be 35mmHg, assuming that the right atrial pressure is   estimated to be8 mmHg. This is consistent with normal pulmonary   pressures.  LV Diastolic Function: Stage 2 diastolic dysfunction    < end of copied text >      ASSESSMENT AND PLAN:  89y Female  multiple comorbidities preserved LV and RV fx, Afib on coumadin, reported CAD ? remote MI with no intervention follows with Dr. Diaz with annual stress tests being medically managed for CAD with overall preserved LV function on recent TTE presented with several weeks h/o of bony pain and recent inability to ambulate,  ct scan revealed destructive bony lesions concerning for metastasis and liver lesions (primary possibly upper GI/pancreo-biliary) :     - Cont coumadin INR 2-3 if bleeding risk is acceptable- INR therapeutic   - HD stable with no evidence CHF  - could Obtain records from Dr. Skinny Diaz's office  - pending radiation therapy - heme/onc and surgery noted    - no plans for surgical intervention at this time     - patient/family  refusing aggressive chemo  - no plans for inpatient cardiac testing at this time

## 2018-09-02 NOTE — PROGRESS NOTE ADULT - PROBLEM SELECTOR PLAN 1
Patient with TRISH during admission at OSH which has been relatively stable here. Ddx includes intravascular depletion versus interstitial disease on IV abx? Check UA, urine sodium, urine creatinine. Renal imaging on admission reviewed and likely erroneous as left kidney measured 0.7 cm (likely meant to be 10.7 cm). Attempted to contact radiology reading room to clarify without answer. Avoid nephrotoxins.

## 2018-09-03 LAB
ALBUMIN SERPL ELPH-MCNC: 1.9 G/DL — LOW (ref 3.3–5)
ALP SERPL-CCNC: 496 U/L — HIGH (ref 40–120)
ALT FLD-CCNC: < 4 U/L — LOW (ref 4–33)
APTT BLD: 51.1 SEC — HIGH (ref 27.5–37.4)
AST SERPL-CCNC: 90 U/L — HIGH (ref 4–32)
BASOPHILS # BLD AUTO: 0.03 K/UL — SIGNIFICANT CHANGE UP (ref 0–0.2)
BASOPHILS NFR BLD AUTO: 0.3 % — SIGNIFICANT CHANGE UP (ref 0–2)
BILIRUB SERPL-MCNC: 0.3 MG/DL — SIGNIFICANT CHANGE UP (ref 0.2–1.2)
BUN SERPL-MCNC: 79 MG/DL — HIGH (ref 7–23)
CALCIUM SERPL-MCNC: 9 MG/DL — SIGNIFICANT CHANGE UP (ref 8.4–10.5)
CHLORIDE SERPL-SCNC: 94 MMOL/L — LOW (ref 98–107)
CO2 SERPL-SCNC: 22 MMOL/L — SIGNIFICANT CHANGE UP (ref 22–31)
CREAT SERPL-MCNC: 2.7 MG/DL — HIGH (ref 0.5–1.3)
EOSINOPHIL # BLD AUTO: 0.15 K/UL — SIGNIFICANT CHANGE UP (ref 0–0.5)
EOSINOPHIL NFR BLD AUTO: 1.6 % — SIGNIFICANT CHANGE UP (ref 0–6)
GLUCOSE BLDC GLUCOMTR-MCNC: 126 MG/DL — HIGH (ref 70–99)
GLUCOSE BLDC GLUCOMTR-MCNC: 146 MG/DL — HIGH (ref 70–99)
GLUCOSE BLDC GLUCOMTR-MCNC: 176 MG/DL — HIGH (ref 70–99)
GLUCOSE BLDC GLUCOMTR-MCNC: 195 MG/DL — HIGH (ref 70–99)
GLUCOSE SERPL-MCNC: 127 MG/DL — HIGH (ref 70–99)
HCT VFR BLD CALC: 31.7 % — LOW (ref 34.5–45)
HGB BLD-MCNC: 9.8 G/DL — LOW (ref 11.5–15.5)
IMM GRANULOCYTES # BLD AUTO: 0.05 # — SIGNIFICANT CHANGE UP
IMM GRANULOCYTES NFR BLD AUTO: 0.5 % — SIGNIFICANT CHANGE UP (ref 0–1.5)
INR BLD: 2.36 — HIGH (ref 0.88–1.17)
LYMPHOCYTES # BLD AUTO: 0.97 K/UL — LOW (ref 1–3.3)
LYMPHOCYTES # BLD AUTO: 10.1 % — LOW (ref 13–44)
MAGNESIUM SERPL-MCNC: 2 MG/DL — SIGNIFICANT CHANGE UP (ref 1.6–2.6)
MCHC RBC-ENTMCNC: 29.1 PG — SIGNIFICANT CHANGE UP (ref 27–34)
MCHC RBC-ENTMCNC: 30.9 % — LOW (ref 32–36)
MCV RBC AUTO: 94.1 FL — SIGNIFICANT CHANGE UP (ref 80–100)
MONOCYTES # BLD AUTO: 0.76 K/UL — SIGNIFICANT CHANGE UP (ref 0–0.9)
MONOCYTES NFR BLD AUTO: 7.9 % — SIGNIFICANT CHANGE UP (ref 2–14)
NEUTROPHILS # BLD AUTO: 7.65 K/UL — HIGH (ref 1.8–7.4)
NEUTROPHILS NFR BLD AUTO: 79.6 % — HIGH (ref 43–77)
NRBC # FLD: 0 — SIGNIFICANT CHANGE UP
PHOSPHATE SERPL-MCNC: 4.1 MG/DL — SIGNIFICANT CHANGE UP (ref 2.5–4.5)
PLATELET # BLD AUTO: 248 K/UL — SIGNIFICANT CHANGE UP (ref 150–400)
PMV BLD: 9.8 FL — SIGNIFICANT CHANGE UP (ref 7–13)
POTASSIUM SERPL-MCNC: 5.3 MMOL/L — SIGNIFICANT CHANGE UP (ref 3.5–5.3)
POTASSIUM SERPL-SCNC: 5.3 MMOL/L — SIGNIFICANT CHANGE UP (ref 3.5–5.3)
PROT SERPL-MCNC: 5.2 G/DL — LOW (ref 6–8.3)
PROTHROM AB SERPL-ACNC: 27.6 SEC — HIGH (ref 9.8–13.1)
RBC # BLD: 3.37 M/UL — LOW (ref 3.8–5.2)
RBC # FLD: 18.1 % — HIGH (ref 10.3–14.5)
SODIUM SERPL-SCNC: 130 MMOL/L — LOW (ref 135–145)
WBC # BLD: 9.61 K/UL — SIGNIFICANT CHANGE UP (ref 3.8–10.5)
WBC # FLD AUTO: 9.61 K/UL — SIGNIFICANT CHANGE UP (ref 3.8–10.5)

## 2018-09-03 RX ORDER — ALBUMIN HUMAN 25 %
100 VIAL (ML) INTRAVENOUS EVERY 6 HOURS
Qty: 0 | Refills: 0 | Status: COMPLETED | OUTPATIENT
Start: 2018-09-03 | End: 2018-09-05

## 2018-09-03 RX ORDER — WARFARIN SODIUM 2.5 MG/1
1 TABLET ORAL ONCE
Qty: 0 | Refills: 0 | Status: COMPLETED | OUTPATIENT
Start: 2018-09-03 | End: 2018-09-03

## 2018-09-03 RX ADMIN — Medication 1 TABLET(S): at 09:06

## 2018-09-03 RX ADMIN — Medication 60 MILLIGRAM(S): at 06:41

## 2018-09-03 RX ADMIN — Medication 1: at 18:10

## 2018-09-03 RX ADMIN — PANTOPRAZOLE SODIUM 40 MILLIGRAM(S): 20 TABLET, DELAYED RELEASE ORAL at 06:41

## 2018-09-03 RX ADMIN — Medication 1 TABLET(S): at 17:35

## 2018-09-03 RX ADMIN — Medication 1 TABLET(S): at 13:16

## 2018-09-03 RX ADMIN — Medication 100 MILLIGRAM(S): at 07:02

## 2018-09-03 RX ADMIN — WARFARIN SODIUM 1 MILLIGRAM(S): 2.5 TABLET ORAL at 17:34

## 2018-09-03 RX ADMIN — INSULIN GLARGINE 12 UNIT(S): 100 INJECTION, SOLUTION SUBCUTANEOUS at 22:48

## 2018-09-03 RX ADMIN — Medication 50 MILLILITER(S): at 17:34

## 2018-09-03 RX ADMIN — LIDOCAINE 1 PATCH: 4 CREAM TOPICAL at 13:16

## 2018-09-03 RX ADMIN — CHLORHEXIDINE GLUCONATE 1 APPLICATION(S): 213 SOLUTION TOPICAL at 09:07

## 2018-09-03 NOTE — PROGRESS NOTE ADULT - SUBJECTIVE AND OBJECTIVE BOX
INTERVAL HPI/OVERNIGHT EVENTS:  No new overnight event.  No N/V/D.  Tolerating diet.      Allergies    Levaquin (Other)  ramipril (Other)  tetracycline (Hives; Rash)    Intolerances          General:  No wt loss, fevers, chills, night sweats, fatigue,   Eyes:  Good vision, no reported pain  ENT:  No sore throat, pain, runny nose, dysphagia  CV:  No pain, palpitations, hypo/hypertension  Resp:  No dyspnea, cough, tachypnea, wheezing  GI:  No pain, No nausea, No vomiting, No diarrhea, No constipation, No weight loss, No fever, No pruritis, No rectal bleeding, No tarry stools, No dysphagia,  :  No pain, bleeding, incontinence, nocturia  Muscle:  No pain, weakness  Neuro:  No weakness, tingling, memory problems  Psych:  No fatigue, insomnia, mood problems, depression  Endocrine:  No polyuria, polydipsia, cold/heat intolerance  Heme:  No petechiae, ecchymosis, easy bruisability  Skin:  No rash, tattoos, scars, edema      PHYSICAL EXAM:   Vital Signs:  Vital Signs Last 24 Hrs  T(C): 36.5 (03 Sep 2018 14:46), Max: 36.8 (03 Sep 2018 05:28)  T(F): 97.7 (03 Sep 2018 14:46), Max: 98.2 (03 Sep 2018 05:28)  HR: 73 (03 Sep 2018 14:46) (66 - 73)  BP: 107/55 (03 Sep 2018 05:28) (107/55 - 110/66)  BP(mean): --  RR: 18 (03 Sep 2018 14:46) (18 - 18)  SpO2: 99% (03 Sep 2018 14:46) (95% - 99%)  Daily     Daily I&O's Summary    03 Sep 2018 07:01  -  03 Sep 2018 16:46  --------------------------------------------------------  IN: 320 mL / OUT: 0 mL / NET: 320 mL        GENERAL:  Appears stated age, well-groomed, well-nourished, no distress  HEENT:  NC/AT,  conjunctivae clear and pink, no thyromegaly, nodules, adenopathy, no JVD, sclera -anicteric  CHEST:  Full & symmetric excursion, no increased effort, breath sounds clear  HEART:  Regular rhythm, S1, S2, no murmur/rub/S3/S4, no abdominal bruit, no edema  ABDOMEN:  Soft, non-tender, non-distended, normoactive bowel sounds,  no masses ,no hepato-splenomegaly, no signs of chronic liver disease  EXTEREMITIES:  no cyanosis,clubbing or edema  SKIN:  No rash/erythema/ecchymoses/petechiae/wounds/abscess/warm/dry  NEURO:  Alert, oriented, no asterixis, no tremor, no encephalopathy      LABS:                        9.8    9.61  )-----------( 248      ( 03 Sep 2018 05:44 )             31.7     -    130<L>  |  94<L>  |  79<H>  ----------------------------<  127<H>  5.3   |  22  |  2.70<H>    Ca    9.0      03 Sep 2018 05:40  Phos  4.1     -  Mg     2.0         TPro  5.2<L>  /  Alb  1.9<L>  /  TBili  0.3  /  DBili  x   /  AST  90<H>  /  ALT  < 4<L>  /  AlkPhos  496<H>      PT/INR - ( 03 Sep 2018 05:40 )   PT: 27.6 SEC;   INR: 2.36          PTT - ( 03 Sep 2018 05:40 )  PTT:51.1 SEC  Urinalysis Basic - ( 02 Sep 2018 14:15 )    Color: YELLOW / Appearance: TURBID / S.025 / pH: 6.5  Gluc: NEGATIVE / Ketone: NEGATIVE  / Bili: SMALL / Urobili: NORMAL   Blood: LARGE / Protein: 100 / Nitrite: NEGATIVE   Leuk Esterase: MODERATE / RBC: 20-30 / WBC 10-20   Sq Epi: x / Non Sq Epi: SMALL / Bacteria: MODERATE      amylase   lipase  RADIOLOGY & ADDITIONAL TESTS:

## 2018-09-03 NOTE — PROGRESS NOTE ADULT - PROBLEM SELECTOR PLAN 4
Likely secondary to hypoalbuminemia as spot urine TP/CR not nephrotic range (1.7 from secondary membranous?). IV albumin as above. Encourage glucerna intake. Monitor UO.

## 2018-09-03 NOTE — PROGRESS NOTE ADULT - SUBJECTIVE AND OBJECTIVE BOX
Patient is a 89y old  Female who presents with a chief complaint of Onc workup (01 Sep 2018 16:15)      INTERVAL HPI/OVERNIGHT EVENTS:  T(C): 36.5 (18 @ 14:46), Max: 36.8 (18 @ 05:28)  HR: 73 (18 @ 14:46) (66 - 73)  BP: 107/55 (18 @ 05:28) (107/55 - 110/66)  RR: 18 (18 @ 14:46) (18 - 18)  SpO2: 99% (18 @ 14:46) (95% - 99%)  Wt(kg): --  I&O's Summary    03 Sep 2018 07:01  -  03 Sep 2018 19:47  --------------------------------------------------------  IN: 320 mL / OUT: 0 mL / NET: 320 mL        LABS:                        9.8    9.61  )-----------( 248      ( 03 Sep 2018 05:44 )             31.7         130<L>  |  94<L>  |  79<H>  ----------------------------<  127<H>  5.3   |  22  |  2.70<H>    Ca    9.0      03 Sep 2018 05:40  Phos  4.1       Mg     2.0         TPro  5.2<L>  /  Alb  1.9<L>  /  TBili  0.3  /  DBili  x   /  AST  90<H>  /  ALT  < 4<L>  /  AlkPhos  496<H>      PT/INR - ( 03 Sep 2018 05:40 )   PT: 27.6 SEC;   INR: 2.36          PTT - ( 03 Sep 2018 05:40 )  PTT:51.1 SEC  Urinalysis Basic - ( 02 Sep 2018 14:15 )    Color: YELLOW / Appearance: TURBID / S.025 / pH: 6.5  Gluc: NEGATIVE / Ketone: NEGATIVE  / Bili: SMALL / Urobili: NORMAL   Blood: LARGE / Protein: 100 / Nitrite: NEGATIVE   Leuk Esterase: MODERATE / RBC: 20-30 / WBC 10-20   Sq Epi: x / Non Sq Epi: SMALL / Bacteria: MODERATE      CAPILLARY BLOOD GLUCOSE      POCT Blood Glucose.: 195 mg/dL (03 Sep 2018 18:09)  POCT Blood Glucose.: 146 mg/dL (03 Sep 2018 12:44)  POCT Blood Glucose.: 126 mg/dL (03 Sep 2018 08:59)  POCT Blood Glucose.: 153 mg/dL (02 Sep 2018 22:10)        Urinalysis Basic - ( 02 Sep 2018 14:15 )    Color: YELLOW / Appearance: TURBID / S.025 / pH: 6.5  Gluc: NEGATIVE / Ketone: NEGATIVE  / Bili: SMALL / Urobili: NORMAL   Blood: LARGE / Protein: 100 / Nitrite: NEGATIVE   Leuk Esterase: MODERATE / RBC: 20-30 / WBC 10-20   Sq Epi: x / Non Sq Epi: SMALL / Bacteria: MODERATE        MEDICATIONS  (STANDING):  albumin human 25% IVPB 100 milliLiter(s) IV Intermittent every 6 hours  ceFAZolin   IVPB 2000 milliGRAM(s) IV Intermittent every 24 hours  chlorhexidine 4% Liquid 1 Application(s) Topical <User Schedule>  dextrose 5%. 1000 milliLiter(s) (50 mL/Hr) IV Continuous <Continuous>  dextrose 50% Injectable 12.5 Gram(s) IV Push once  dextrose 50% Injectable 25 Gram(s) IV Push once  dextrose 50% Injectable 25 Gram(s) IV Push once  docusate sodium 100 milliGRAM(s) Oral three times a day  fentaNYL   Patch  12 MICROgram(s)/Hr 1 Patch Transdermal every 72 hours  insulin glargine Injectable (LANTUS) 12 Unit(s) SubCutaneous at bedtime  insulin lispro (HumaLOG) corrective regimen sliding scale   SubCutaneous three times a day before meals  lactobacillus acidophilus 1 Tablet(s) Oral three times a day with meals  lidocaine   Patch 1 Patch Transdermal daily  pantoprazole    Tablet 40 milliGRAM(s) Oral before breakfast  propranolol LA 60 milliGRAM(s) Oral daily  senna 2 Tablet(s) Oral at bedtime    MEDICATIONS  (PRN):  ALBUTerol    90 MICROgram(s) HFA Inhaler 2 Puff(s) Inhalation every 6 hours PRN Shortness of Breath and/or Wheezing  dextrose 40% Gel 15 Gram(s) Oral once PRN Blood Glucose LESS THAN 70 milliGRAM(s)/deciLiter  glucagon  Injectable 1 milliGRAM(s) IntraMuscular once PRN Glucose <70 milliGRAM(s)/deciLiter  oxyCODONE    IR 5 milliGRAM(s) Oral every 4 hours PRN Moderate and Severe pain  polyethylene glycol 3350 17 Gram(s) Oral two times a day PRN Constipation          PHYSICAL EXAM:  GENERAL: NAD, well-groomed, well-developed  HEAD:  Atraumatic, Normocephalic  CHEST/LUNG: Clear to percussion bilaterally; No rales, rhonchi, wheezing, or rubs  HEART: Regular rate and rhythm; No murmurs, rubs, or gallops  ABDOMEN: Soft, Nontender, Nondistended; Bowel sounds present  EXTREMITIES:  2+ Peripheral Pulses, No clubbing, cyanosis, or edema  LYMPH: No lymphadenopathy noted  SKIN: No rashes or lesions    Care Discussed with Consultants/Other Providers [ ] YES  [ ] NO

## 2018-09-03 NOTE — PROGRESS NOTE ADULT - PROBLEM SELECTOR PLAN 1
Patient with TRISH during admission at OSH which has been relatively stable here. Scr now rising likely due to intravascular depletion with possible ATN given granular casts on UA versus less likely glomerular disease pyuria and hematuria on UA. Start albumin IV Q6 hours X 2 days to improve renal perfusion. Check GN serologies. Renal imaging on admission reviewed and likely erroneous as left kidney measured 0.7 cm (likely meant to be 10.7 cm). Attempted to contact radiology reading room to clarify without answer. Avoid nephrotoxins.

## 2018-09-03 NOTE — PROGRESS NOTE ADULT - SUBJECTIVE AND OBJECTIVE BOX
SUBJECTIVE: Patient with no anginal chest pain or shortness of breath    MEDICATIONS  (STANDING):  ceFAZolin   IVPB 2000 milliGRAM(s) IV Intermittent every 24 hours  chlorhexidine 4% Liquid 1 Application(s) Topical <User Schedule>  docusate sodium 100 milliGRAM(s) Oral three times a day  fentaNYL   Patch  12 MICROgram(s)/Hr 1 Patch Transdermal every 72 hours  insulin glargine Injectable (LANTUS) 12 Unit(s) SubCutaneous at bedtime  insulin lispro (HumaLOG) corrective regimen sliding scale   SubCutaneous three times a day before meals  lactobacillus acidophilus 1 Tablet(s) Oral three times a day with meals  lidocaine   Patch 1 Patch Transdermal daily  pantoprazole    Tablet 40 milliGRAM(s) Oral before breakfast  propranolol LA 60 milliGRAM(s) Oral daily  senna 2 Tablet(s) Oral at bedtime    MEDICATIONS  (PRN):  ALBUTerol    90 MICROgram(s) HFA Inhaler 2 Puff(s) Inhalation every 6 hours PRN Shortness of Breath and/or Wheezing  dextrose 40% Gel 15 Gram(s) Oral once PRN Blood Glucose LESS THAN 70 milliGRAM(s)/deciLiter  glucagon  Injectable 1 milliGRAM(s) IntraMuscular once PRN Glucose <70 milliGRAM(s)/deciLiter  oxyCODONE    IR 5 milliGRAM(s) Oral every 4 hours PRN Moderate and Severe pain  polyethylene glycol 3350 17 Gram(s) Oral two times a day PRN Constipation      LABS:                        9.8    9.61  )-----------( 248      ( 03 Sep 2018 05:44 )             31.7     130<L>  |  94<L>  |  79<H>  ----------------------------<  127<H>  5.3   |  22  |  2.70<H>    Ca    9.0      03 Sep 2018 05:40  Phos  4.1     09-03  Mg     2.0     09-03    TPro  5.2<L>  /  Alb  1.9<L>  /  TBili  0.3  /  DBili  x   /  AST  90<H>  /  ALT  < 4<L>  /  AlkPhos  496<H>  09-03    Creatinine Trend: 2.70<--, 2.47<--, 2.46<--, 2.52<--, 2.39<--, 2.27<--   PT/INR - ( 03 Sep 2018 05:40 )   PT: 27.6 SEC;   INR: 2.36     PTT - ( 03 Sep 2018 05:40 )  PTT:51.1 SEC    PHYSICAL EXAM  Vital Signs Last 24 Hrs  T(C): 36.8 (03 Sep 2018 05:28), Max: 36.8 (02 Sep 2018 14:26)  T(F): 98.2 (03 Sep 2018 05:28), Max: 98.2 (02 Sep 2018 14:26)  HR: 67 (03 Sep 2018 05:28) (66 - 80)  BP: 107/55 (03 Sep 2018 05:28) (107/55 - 121/48)  RR: 18 (03 Sep 2018 05:28) (18 - 18)  SpO2: 98% (03 Sep 2018 05:28) (95% - 100%)    HEENT: Normal Oral mucosa, PERRL, EOMI  Lymphatic: No obvious lymphadenopathy, No edema  Cardiovascular: Normal S1S2, No JVD, 1/6 STEFFANY  Respiratory: Lungs clear to auscultation, normal effort  Gastrointestinal: Abdomen soft, ND, NT, +BS      DIAGNOSTIC DATA    Xray Chest 1 View- PORTABLE-Urgent (08.17.18 @ 21:23) >  There are low lung volumes resulting in crowding of the bronchovascular   markings at the lung bases.  There is minimal blunting at the right costophrenic angle either   representing trace pleural effusion and/or pleural thickening.  There is subsegmental atelectasis at both lung bases.      < from: TTE Echo Doppler w/o Cont (08.17.18 @ 10:38) >  Technically difficult and limited study.  Mitral Valve: Calcified mitral annulus and mitral valve leaflets. Normal   opening of the mitral valve leaflets. Trace mitral regurgitation.  Aortic Valve/Aorta: Not well visualized  Tricuspid Valve: Calcified tricuspid annulus with normally opening valve.   Trace tricuspid regurgitation.  Pulmonic Valve: The pulmonic valve is not well visualized. Probably   normal.  Left Atrium: Moderate left atrial enlargement  Right Atrium: Normal  Left Ventricle: The endocardium is not well-visualized. Overall there is   preserved left ventricular systolic function. Unable to rule out wall   motion abnormalities. The EF is approximately 65%.  Right Ventricle: Normal right ventricular size and function.  Pericardium/Pleura: No pericardial effusion noted.  Pulmonary/RV Pressure: The right ventricular systolic pressure is   estimated to be 35mmHg, assuming that the right atrial pressure is   estimated to be8 mmHg. This is consistent with normal pulmonary   pressures.  LV Diastolic Function: Stage 2 diastolic dysfunction    < end of copied text >      ASSESSMENT AND PLAN:  89y Female  multiple comorbidities preserved LV and RV fx, Afib on coumadin, reported CAD ? remote MI with no intervention follows with Dr. Diaz with annual stress tests being medically managed for CAD with overall preserved LV function on recent TTE presented with several weeks h/o of bony pain and recent inability to ambulate,  ct scan revealed destructive bony lesions concerning for metastasis and liver lesions (primary possibly upper GI/pancreo-biliary) :     - Cont coumadin INR 2-3 if bleeding risk is acceptable- INR therapeutic   - HD stable with no evidence CHF  - could Obtain records from Dr. Skinny Diaz's office  - plans for RT this week, and DC planning after last treatment friday per chart    - no plans for surgical intervention at this time     - patient/family  refusing aggressive chemo  - no plans for inpatient cardiac testing at this time

## 2018-09-03 NOTE — PROGRESS NOTE ADULT - SUBJECTIVE AND OBJECTIVE BOX
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 Nephrology consulted for elevated Scr.    REVIEW OF SYSTEMS:  Gen: no changes in weight, + weakness  Cards: no chest pain  Resp: no dyspnea  GI: no nausea or vomiting or diarrhea  Vascular: no LE edema    Levaquin (Other)  ramipril (Other)  tetracycline (Hives; Rash)      Hospital Medications: Medications reviewed      VITALS:  T(F): 98.2 (09-03-18 @ 05:28), Max: 98.2 (09-02-18 @ 14:26)  HR: 67 (09-03-18 @ 05:28)  BP: 107/55 (09-03-18 @ 05:28)  RR: 18 (09-03-18 @ 05:28)  SpO2: 98% (09-03-18 @ 05:28)  Wt(kg): --      PHYSICAL EXAM:    Gen: NAD, calm  Cards: RRR, +S1/S2, no M/G/R  Resp: CTA B/L  GI: soft, NT/ND, NABS  Vascular: 2+ LE edema B/L      LABS:  09-03    130<L>  |  94<L>  |  79<H>  ----------------------------<  127<H>  5.3   |  22  |  2.70<H>    Ca    9.0      03 Sep 2018 05:40  Phos  4.1     09-03  Mg     2.0     09-03    TPro  5.2<L>  /  Alb  1.9<L>  /  TBili  0.3  /  DBili      /  AST  90<H>  /  ALT  < 4<L>  /  AlkPhos  496<H>  09-03    Creatinine Trend: 2.70 <--, 2.47 <--, 2.46 <--, 2.52 <--, 2.39 <--, 2.27 <--                        9.8    9.61  )-----------( 248      ( 03 Sep 2018 05:44 )             31.7

## 2018-09-03 NOTE — PROGRESS NOTE ADULT - SUBJECTIVE AND OBJECTIVE BOX
Chief complaint  Patient is a 89y old  Female who presents with a chief complaint of Onc workup (01 Sep 2018 16:15)   Review of systems  Patient in bed, looks comfortable, no fever, no hypoglycemia.    Labs and Fingersticks  CAPILLARY BLOOD GLUCOSE      POCT Blood Glucose.: 195 mg/dL (03 Sep 2018 18:09)  POCT Blood Glucose.: 146 mg/dL (03 Sep 2018 12:44)  POCT Blood Glucose.: 126 mg/dL (03 Sep 2018 08:59)  POCT Blood Glucose.: 153 mg/dL (02 Sep 2018 22:10)          Calcium, Total Serum: 9.0 (09-03 @ 05:40)  Calcium, Total Serum: 9.1 (09-02 @ 06:15)  Albumin, Serum: 1.9 <L> (09-03 @ 05:40)  Albumin, Serum: 2.2 <L> (09-02 @ 06:15)    Alanine Aminotransferase (ALT/SGPT): < 4 <L> (09-03 @ 05:40)  Alanine Aminotransferase (ALT/SGPT): < 4 <L> (09-02 @ 06:15)  Alkaline Phosphatase, Serum: 496 <H> (09-03 @ 05:40)  Alkaline Phosphatase, Serum: 466 <H> (09-02 @ 06:15)  Aspartate Aminotransferase (AST/SGOT): 90 <H> (09-03 @ 05:40)  Aspartate Aminotransferase (AST/SGOT): 84 <H> (09-02 @ 06:15)        09-03    130<L>  |  94<L>  |  79<H>  ----------------------------<  127<H>  5.3   |  22  |  2.70<H>    Ca    9.0      03 Sep 2018 05:40  Phos  4.1     09-03  Mg     2.0     09-03    TPro  5.2<L>  /  Alb  1.9<L>  /  TBili  0.3  /  DBili  x   /  AST  90<H>  /  ALT  < 4<L>  /  AlkPhos  496<H>  09-03                        9.8    9.61  )-----------( 248      ( 03 Sep 2018 05:44 )             31.7     Medications  MEDICATIONS  (STANDING):  albumin human 25% IVPB 100 milliLiter(s) IV Intermittent every 6 hours  ceFAZolin   IVPB 2000 milliGRAM(s) IV Intermittent every 24 hours  chlorhexidine 4% Liquid 1 Application(s) Topical <User Schedule>  dextrose 5%. 1000 milliLiter(s) (50 mL/Hr) IV Continuous <Continuous>  dextrose 50% Injectable 12.5 Gram(s) IV Push once  dextrose 50% Injectable 25 Gram(s) IV Push once  dextrose 50% Injectable 25 Gram(s) IV Push once  docusate sodium 100 milliGRAM(s) Oral three times a day  fentaNYL   Patch  12 MICROgram(s)/Hr 1 Patch Transdermal every 72 hours  insulin glargine Injectable (LANTUS) 12 Unit(s) SubCutaneous at bedtime  insulin lispro (HumaLOG) corrective regimen sliding scale   SubCutaneous three times a day before meals  lactobacillus acidophilus 1 Tablet(s) Oral three times a day with meals  lidocaine   Patch 1 Patch Transdermal daily  pantoprazole    Tablet 40 milliGRAM(s) Oral before breakfast  propranolol LA 60 milliGRAM(s) Oral daily  senna 2 Tablet(s) Oral at bedtime      Physical Exam  General: Patient comfortable in bed  Vital Signs Last 12 Hrs  T(F): 97.7 (09-03-18 @ 14:46), Max: 97.7 (09-03-18 @ 14:46)  HR: 73 (09-03-18 @ 14:46) (73 - 73)  BP: --  BP(mean): --  RR: 18 (09-03-18 @ 14:46) (18 - 18)  SpO2: 99% (09-03-18 @ 14:46) (99% - 99%)  Neck: No palpable thyroid nodules.  CVS: S1S2, No murmurs  Respiratory: No wheezing, no crepitations  GI: Abdomen soft, bowel sounds positive  Musculoskeletal:  edema lower extremities.   Skin: No skin rashes, no ecchymosis    Diagnostics

## 2018-09-03 NOTE — PROGRESS NOTE ADULT - PROBLEM SELECTOR PLAN 1
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

## 2018-09-04 LAB
APTT BLD: 36.2 SEC — SIGNIFICANT CHANGE UP (ref 27.5–37.4)
ASO AB SER QL: < 20 IU/ML — SIGNIFICANT CHANGE UP
BASE EXCESS BLDA CALC-SCNC: -1.2 MMOL/L — SIGNIFICANT CHANGE UP
BUN SERPL-MCNC: 77 MG/DL — HIGH (ref 7–23)
C3 SERPL-MCNC: 99.6 MG/DL — SIGNIFICANT CHANGE UP (ref 90–180)
C4 SERPL-MCNC: 13.6 MG/DL — SIGNIFICANT CHANGE UP (ref 10–40)
CALCIUM SERPL-MCNC: 9.6 MG/DL — SIGNIFICANT CHANGE UP (ref 8.4–10.5)
CHLORIDE SERPL-SCNC: 93 MMOL/L — LOW (ref 98–107)
CO2 SERPL-SCNC: 24 MMOL/L — SIGNIFICANT CHANGE UP (ref 22–31)
CREAT SERPL-MCNC: 2.64 MG/DL — HIGH (ref 0.5–1.3)
GLUCOSE BLDC GLUCOMTR-MCNC: 143 MG/DL — HIGH (ref 70–99)
GLUCOSE BLDC GLUCOMTR-MCNC: 160 MG/DL — HIGH (ref 70–99)
GLUCOSE BLDC GLUCOMTR-MCNC: 178 MG/DL — HIGH (ref 70–99)
GLUCOSE BLDC GLUCOMTR-MCNC: 183 MG/DL — HIGH (ref 70–99)
GLUCOSE SERPL-MCNC: 190 MG/DL — HIGH (ref 70–99)
HBV SURFACE AG SER-ACNC: NEGATIVE — SIGNIFICANT CHANGE UP
HCO3 BLDA-SCNC: 23 MMOL/L — SIGNIFICANT CHANGE UP (ref 22–26)
HCV AB S/CO SERPL IA: 0.29 S/CO — SIGNIFICANT CHANGE UP
HCV AB SERPL-IMP: SIGNIFICANT CHANGE UP
INR BLD: 2.32 — HIGH (ref 0.88–1.17)
KAPPA FREE LIGHT CHAINS, SERUM: 10.56 MG/DL — HIGH (ref 0.33–1.94)
LAMBDA FREE LIGHT CHAINS, SERUM: 10.02 MG/DL — HIGH (ref 0.57–2.63)
MYELOPEROXIDASE AB SER-ACNC: <5 UNITS — SIGNIFICANT CHANGE UP
NT-PROBNP SERPL-SCNC: 9445 PG/ML — SIGNIFICANT CHANGE UP
PCO2 BLDA: 42 MMHG — SIGNIFICANT CHANGE UP (ref 32–48)
PH BLDA: 7.36 PH — SIGNIFICANT CHANGE UP (ref 7.35–7.45)
PO2 BLDA: 178 MMHG — HIGH (ref 83–108)
POTASSIUM SERPL-MCNC: 5.2 MMOL/L — SIGNIFICANT CHANGE UP (ref 3.5–5.3)
POTASSIUM SERPL-SCNC: 5.2 MMOL/L — SIGNIFICANT CHANGE UP (ref 3.5–5.3)
PROT SERPL-MCNC: 6.4 G/DL — SIGNIFICANT CHANGE UP (ref 6–8.3)
PROTEINASE3 AB FLD-ACNC: 6.2 UNITS — SIGNIFICANT CHANGE UP
PROTHROM AB SERPL-ACNC: 26.2 SEC — HIGH (ref 9.8–13.1)
SAO2 % BLDA: 99.3 % — HIGH (ref 95–99)
SODIUM SERPL-SCNC: 131 MMOL/L — LOW (ref 135–145)

## 2018-09-04 PROCEDURE — 77427 RADIATION TX MANAGEMENT X5: CPT

## 2018-09-04 PROCEDURE — 86334 IMMUNOFIX E-PHORESIS SERUM: CPT | Mod: 26

## 2018-09-04 PROCEDURE — 84165 PROTEIN E-PHORESIS SERUM: CPT | Mod: 26

## 2018-09-04 PROCEDURE — 71045 X-RAY EXAM CHEST 1 VIEW: CPT | Mod: 26

## 2018-09-04 RX ORDER — IPRATROPIUM/ALBUTEROL SULFATE 18-103MCG
3 AEROSOL WITH ADAPTER (GRAM) INHALATION ONCE
Qty: 0 | Refills: 0 | Status: COMPLETED | OUTPATIENT
Start: 2018-09-04 | End: 2018-09-04

## 2018-09-04 RX ORDER — FUROSEMIDE 40 MG
40 TABLET ORAL ONCE
Qty: 0 | Refills: 0 | Status: COMPLETED | OUTPATIENT
Start: 2018-09-04 | End: 2018-09-04

## 2018-09-04 RX ORDER — WARFARIN SODIUM 2.5 MG/1
1 TABLET ORAL ONCE
Qty: 0 | Refills: 0 | Status: COMPLETED | OUTPATIENT
Start: 2018-09-04 | End: 2018-09-04

## 2018-09-04 RX ADMIN — Medication 50 MILLILITER(S): at 00:25

## 2018-09-04 RX ADMIN — CHLORHEXIDINE GLUCONATE 1 APPLICATION(S): 213 SOLUTION TOPICAL at 09:10

## 2018-09-04 RX ADMIN — ALBUTEROL 2 PUFF(S): 90 AEROSOL, METERED ORAL at 20:27

## 2018-09-04 RX ADMIN — LIDOCAINE 1 PATCH: 4 CREAM TOPICAL at 00:25

## 2018-09-04 RX ADMIN — Medication 40 MILLIGRAM(S): at 18:13

## 2018-09-04 RX ADMIN — Medication 50 MILLILITER(S): at 05:20

## 2018-09-04 RX ADMIN — Medication 100 MILLIGRAM(S): at 07:32

## 2018-09-04 RX ADMIN — WARFARIN SODIUM 1 MILLIGRAM(S): 2.5 TABLET ORAL at 18:02

## 2018-09-04 RX ADMIN — Medication 1 TABLET(S): at 13:30

## 2018-09-04 RX ADMIN — Medication 3 MILLILITER(S): at 22:50

## 2018-09-04 RX ADMIN — Medication 50 MILLILITER(S): at 13:29

## 2018-09-04 RX ADMIN — Medication 1 TABLET(S): at 09:10

## 2018-09-04 RX ADMIN — PANTOPRAZOLE SODIUM 40 MILLIGRAM(S): 20 TABLET, DELAYED RELEASE ORAL at 05:21

## 2018-09-04 RX ADMIN — Medication 1: at 18:12

## 2018-09-04 RX ADMIN — Medication 100 MILLIGRAM(S): at 13:30

## 2018-09-04 RX ADMIN — INSULIN GLARGINE 12 UNIT(S): 100 INJECTION, SOLUTION SUBCUTANEOUS at 22:49

## 2018-09-04 RX ADMIN — Medication 1: at 09:10

## 2018-09-04 RX ADMIN — Medication 60 MILLIGRAM(S): at 05:21

## 2018-09-04 RX ADMIN — Medication 50 MILLILITER(S): at 18:03

## 2018-09-04 RX ADMIN — Medication 1 TABLET(S): at 18:03

## 2018-09-04 RX ADMIN — Medication 1: at 13:28

## 2018-09-04 NOTE — PROGRESS NOTE ADULT - SUBJECTIVE AND OBJECTIVE BOX
SUBJECTIVE: c/o SOB after using bedpan this AM, now on suppl O2.  Denies CP, Palps    MEDICATIONS  (STANDING):  albumin human 25% IVPB 100 milliLiter(s) IV Intermittent every 6 hours  ceFAZolin   IVPB 2000 milliGRAM(s) IV Intermittent every 24 hours  chlorhexidine 4% Liquid 1 Application(s) Topical <User Schedule>  docusate sodium 100 milliGRAM(s) Oral three times a day  fentaNYL   Patch  12 MICROgram(s)/Hr 1 Patch Transdermal every 72 hours  insulin glargine Injectable (LANTUS) 12 Unit(s) SubCutaneous at bedtime  insulin lispro (HumaLOG) corrective regimen sliding scale   SubCutaneous three times a day before meals  lactobacillus acidophilus 1 Tablet(s) Oral three times a day with meals  lidocaine   Patch 1 Patch Transdermal daily  pantoprazole    Tablet 40 milliGRAM(s) Oral before breakfast  propranolol LA 60 milliGRAM(s) Oral daily  senna 2 Tablet(s) Oral at bedtime    LABS:                        9.8    9.61  )-----------( 248      ( 03 Sep 2018 05:44 )             31.7     130<L>  |  94<L>  |  79<H>  ----------------------------<  127<H>  5.3   |  22  |  2.70<H>    Ca    9.0      03 Sep 2018 05:40  Phos  4.1     09-03  Mg     2.0     09-03    TPro  6.4  /  Alb  x   /  TBili  x   /  DBili  x   /  AST  x   /  ALT  x   /  AlkPhos  x   09-04    Creatinine Trend: 2.70<--, 2.47<--, 2.46<--, 2.52<--, 2.39<--, 2.27<--     PT/INR - ( 04 Sep 2018 06:12 )   PT: 26.2 SEC;   INR: 2.32     PTT - ( 04 Sep 2018 06:12 )  PTT:36.2 SEC    PHYSICAL EXAM  Vital Signs Last 24 Hrs  T(C): 36.7 (04 Sep 2018 05:14), Max: 36.7 (04 Sep 2018 05:14)  T(F): 98 (04 Sep 2018 05:14), Max: 98 (04 Sep 2018 05:14)  HR: 90 (04 Sep 2018 09:41) (73 - 90)  BP: 130/71 (04 Sep 2018 09:41) (111/65 - 150/64)  RR: 20 (04 Sep 2018 09:58) (18 - 20)  SpO2: 100% (04 Sep 2018 09:58) (96% - 100%)    HEENT: Normal Oral mucosa, PERRL, EOMI  Lymphatic: No obvious lymphadenopathy, 1-2+edema BL  Cardiovascular: Normal S1S2, No JVD, 1/6 STEFFANY  Respiratory: Lungs clear to auscultation, normal effort  Gastrointestinal: Abdomen soft, ND, NT, +BS      DIAGNOSTIC DATA    Xray Chest 1 View- PORTABLE-Urgent (08.17.18 @ 21:23) >  There are low lung volumes resulting in crowding of the bronchovascular   markings at the lung bases.  There is minimal blunting at the right costophrenic angle either   representing trace pleural effusion and/or pleural thickening.  There is subsegmental atelectasis at both lung bases.      < from: TTE Echo Doppler w/o Cont (08.17.18 @ 10:38) >  Technically difficult and limited study.  Mitral Valve: Calcified mitral annulus and mitral valve leaflets. Normal   opening of the mitral valve leaflets. Trace mitral regurgitation.  Aortic Valve/Aorta: Not well visualized  Tricuspid Valve: Calcified tricuspid annulus with normally opening valve.   Trace tricuspid regurgitation.  Pulmonic Valve: The pulmonic valve is not well visualized. Probably   normal.  Left Atrium: Moderate left atrial enlargement  Right Atrium: Normal  Left Ventricle: The endocardium is not well-visualized. Overall there is   preserved left ventricular systolic function. Unable to rule out wall   motion abnormalities. The EF is approximately 65%.  Right Ventricle: Normal right ventricular size and function.  Pericardium/Pleura: No pericardial effusion noted.  Pulmonary/RV Pressure: The right ventricular systolic pressure is   estimated to be 35mmHg, assuming that the right atrial pressure is   estimated to be8 mmHg. This is consistent with normal pulmonary   pressures.  LV Diastolic Function: Stage 2 diastolic dysfunction    < end of copied text >      ASSESSMENT AND PLAN:  89y Female  multiple comorbidities preserved LV and RV fx, Afib on coumadin, reported CAD ? remote MI with no intervention follows with Dr. 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 Skinny Diaz's office  - plans for RT this week, and DC planning after last treatment friday per chart    - no plans for surgical intervention at this time     - patient/family  refusing aggressive chemo  - no plans for inpatient cardiac testing at this time

## 2018-09-04 NOTE — PROGRESS NOTE ADULT - SUBJECTIVE AND OBJECTIVE BOX
Chief complaint  Patient is a 89y old  Female who presents with a chief complaint of transferred from [place] for rad-onc evaluation (04 Sep 2018 16:50)   Review of systems  Patient in bed, looks comfortable, no fever, no hypoglycemia.    Labs and Fingersticks  CAPILLARY BLOOD GLUCOSE      POCT Blood Glucose.: 178 mg/dL (04 Sep 2018 12:55)  POCT Blood Glucose.: 183 mg/dL (04 Sep 2018 09:04)  POCT Blood Glucose.: 176 mg/dL (03 Sep 2018 22:31)  POCT Blood Glucose.: 195 mg/dL (03 Sep 2018 18:09)          Calcium, Total Serum: 9.6 (09-04 @ 13:37)  Calcium, Total Serum: 9.0 (09-03 @ 05:40)  Albumin, Serum: 1.9 <L> (09-03 @ 05:40)    Alanine Aminotransferase (ALT/SGPT): < 4 <L> (09-03 @ 05:40)  Alkaline Phosphatase, Serum: 496 <H> (09-03 @ 05:40)  Aspartate Aminotransferase (AST/SGOT): 90 <H> (09-03 @ 05:40)        09-04    131<L>  |  93<L>  |  77<H>  ----------------------------<  190<H>  5.2   |  24  |  2.64<H>    Ca    9.6      04 Sep 2018 13:37  Phos  4.1     09-03  Mg     2.0     09-03    TPro  6.4  /  Alb  x   /  TBili  x   /  DBili  x   /  AST  x   /  ALT  x   /  AlkPhos  x   09-04                        9.8    9.61  )-----------( 248      ( 03 Sep 2018 05:44 )             31.7     Medications  MEDICATIONS  (STANDING):  albumin human 25% IVPB 100 milliLiter(s) IV Intermittent every 6 hours  ceFAZolin   IVPB 2000 milliGRAM(s) IV Intermittent every 24 hours  chlorhexidine 4% Liquid 1 Application(s) Topical <User Schedule>  dextrose 5%. 1000 milliLiter(s) (50 mL/Hr) IV Continuous <Continuous>  dextrose 50% Injectable 12.5 Gram(s) IV Push once  dextrose 50% Injectable 25 Gram(s) IV Push once  dextrose 50% Injectable 25 Gram(s) IV Push once  docusate sodium 100 milliGRAM(s) Oral three times a day  fentaNYL   Patch  12 MICROgram(s)/Hr 1 Patch Transdermal every 72 hours  furosemide   Injectable 40 milliGRAM(s) IV Push once  insulin glargine Injectable (LANTUS) 12 Unit(s) SubCutaneous at bedtime  insulin lispro (HumaLOG) corrective regimen sliding scale   SubCutaneous three times a day before meals  lactobacillus acidophilus 1 Tablet(s) Oral three times a day with meals  lidocaine   Patch 1 Patch Transdermal daily  pantoprazole    Tablet 40 milliGRAM(s) Oral before breakfast  propranolol LA 60 milliGRAM(s) Oral daily  senna 2 Tablet(s) Oral at bedtime  warfarin 1 milliGRAM(s) Oral once      Physical Exam  General: Patient comfortable in bed  Vital Signs Last 12 Hrs  T(F): 97.9 (09-04-18 @ 15:18), Max: 98 (09-04-18 @ 05:14)  HR: 85 (09-04-18 @ 15:18) (85 - 90)  BP: 114/61 (09-04-18 @ 15:18) (114/61 - 150/64)  BP(mean): --  RR: 18 (09-04-18 @ 15:18) (18 - 20)  SpO2: 98% (09-04-18 @ 15:18) (98% - 100%)  Neck: No palpable thyroid nodules.  CVS: S1S2, No murmurs  Respiratory: No wheezing, no crepitations  GI: Abdomen soft, bowel sounds positive  Musculoskeletal:  edema lower extremities.   Skin: No skin rashes, no ecchymosis    Diagnostics

## 2018-09-04 NOTE — PROGRESS NOTE ADULT - PROBLEM SELECTOR PLAN 4
Likely secondary to hypoalbuminemia as spot urine TP/CR not nephrotic range (1.7 from secondary membranous?). IV albumin as above. Encourage glucerna intake. Given complaints of dyspnea, check CXR and pro-BNP and will consider lasix with albumin IV infusion. Monitor UO.

## 2018-09-04 NOTE — PROGRESS NOTE ADULT - SUBJECTIVE AND OBJECTIVE BOX
Patient is a 89y old  Female who presents with a chief complaint of transferred from [facility] for rad-onc evaluation (04 Sep 2018 16:34)      INTERVAL HPI/OVERNIGHT EVENTS:  T(C): 36.6 (09-04-18 @ 15:18), Max: 36.7 (09-04-18 @ 05:14)  HR: 85 (09-04-18 @ 15:18) (76 - 90)  BP: 114/61 (09-04-18 @ 15:18) (111/65 - 150/64)  RR: 18 (09-04-18 @ 15:18) (18 - 20)  SpO2: 98% (09-04-18 @ 15:18) (96% - 100%)  Wt(kg): --  I&O's Summary    03 Sep 2018 07:01  -  04 Sep 2018 07:00  --------------------------------------------------------  IN: 320 mL / OUT: 0 mL / NET: 320 mL        LABS:                        9.8    9.61  )-----------( 248      ( 03 Sep 2018 05:44 )             31.7     09-04    131<L>  |  93<L>  |  77<H>  ----------------------------<  190<H>  5.2   |  24  |  2.64<H>    Ca    9.6      04 Sep 2018 13:37  Phos  4.1     09-03  Mg     2.0     09-03    TPro  6.4  /  Alb  x   /  TBili  x   /  DBili  x   /  AST  x   /  ALT  x   /  AlkPhos  x   09-04    PT/INR - ( 04 Sep 2018 06:12 )   PT: 26.2 SEC;   INR: 2.32          PTT - ( 04 Sep 2018 06:12 )  PTT:36.2 SEC    CAPILLARY BLOOD GLUCOSE      POCT Blood Glucose.: 178 mg/dL (04 Sep 2018 12:55)  POCT Blood Glucose.: 183 mg/dL (04 Sep 2018 09:04)  POCT Blood Glucose.: 176 mg/dL (03 Sep 2018 22:31)  POCT Blood Glucose.: 195 mg/dL (03 Sep 2018 18:09)            MEDICATIONS  (STANDING):  albumin human 25% IVPB 100 milliLiter(s) IV Intermittent every 6 hours  ceFAZolin   IVPB 2000 milliGRAM(s) IV Intermittent every 24 hours  chlorhexidine 4% Liquid 1 Application(s) Topical <User Schedule>  dextrose 5%. 1000 milliLiter(s) (50 mL/Hr) IV Continuous <Continuous>  dextrose 50% Injectable 12.5 Gram(s) IV Push once  dextrose 50% Injectable 25 Gram(s) IV Push once  dextrose 50% Injectable 25 Gram(s) IV Push once  docusate sodium 100 milliGRAM(s) Oral three times a day  fentaNYL   Patch  12 MICROgram(s)/Hr 1 Patch Transdermal every 72 hours  furosemide   Injectable 40 milliGRAM(s) IV Push once  insulin glargine Injectable (LANTUS) 12 Unit(s) SubCutaneous at bedtime  insulin lispro (HumaLOG) corrective regimen sliding scale   SubCutaneous three times a day before meals  lactobacillus acidophilus 1 Tablet(s) Oral three times a day with meals  lidocaine   Patch 1 Patch Transdermal daily  pantoprazole    Tablet 40 milliGRAM(s) Oral before breakfast  propranolol LA 60 milliGRAM(s) Oral daily  senna 2 Tablet(s) Oral at bedtime  warfarin 1 milliGRAM(s) Oral once    MEDICATIONS  (PRN):  ALBUTerol    90 MICROgram(s) HFA Inhaler 2 Puff(s) Inhalation every 6 hours PRN Shortness of Breath and/or Wheezing  dextrose 40% Gel 15 Gram(s) Oral once PRN Blood Glucose LESS THAN 70 milliGRAM(s)/deciLiter  glucagon  Injectable 1 milliGRAM(s) IntraMuscular once PRN Glucose <70 milliGRAM(s)/deciLiter  oxyCODONE    IR 5 milliGRAM(s) Oral every 4 hours PRN Moderate and Severe pain  polyethylene glycol 3350 17 Gram(s) Oral two times a day PRN Constipation          PHYSICAL EXAM:  GENERAL: NAD, well-groomed, well-developed  HEAD:  Atraumatic, Normocephalic  CHEST/LUNG: Clear to percussion bilaterally; No rales, rhonchi, wheezing, or rubs  HEART: Regular rate and rhythm; No murmurs, rubs, or gallops  ABDOMEN: Soft, Nontender, Nondistended; Bowel sounds present  EXTREMITIES:  2+ Peripheral Pulses, No clubbing, cyanosis, or edema  LYMPH: No lymphadenopathy noted  SKIN: No rashes or lesions    Care Discussed with Consultants/Other Providers [ ] YES  [ ] NO

## 2018-09-04 NOTE — CHART NOTE - NSCHARTNOTEFT_GEN_A_CORE
Called by RN re: pt SOB, 02 sat unable to be monitored secondary to cold extremities. STAT albuterol tx given with no improvement.   50% venti mask placed on pt.    ROS: unable to determine     Neuro: pt lethargic but arousable  CV: +distal pulses, -edema noted  Resp: diminished breath sounds, shallow breaths.   Extremities : cold to touch, and right toes cyanotic    Vital Signs Last 24 Hrs  T(C): 36.6 (04 Sep 2018 15:18), Max: 36.7 (04 Sep 2018 05:14)  T(F): 97.9 (04 Sep 2018 15:18), Max: 98 (04 Sep 2018 05:14)  HR: 88 (04 Sep 2018 18:01) (85 - 90)  BP: 119/67 (04 Sep 2018 18:01) (114/61 - 150/64)  BP(mean): --  RR: 18 (04 Sep 2018 15:18) (18 - 20)  SpO2: 98% (04 Sep 2018 15:18) (98% - 100%)    -venti mask 50%  -stat ABG

## 2018-09-04 NOTE — PROGRESS NOTE ADULT - PROBLEM SELECTOR PLAN 1
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

## 2018-09-04 NOTE — PROGRESS NOTE ADULT - SUBJECTIVE AND OBJECTIVE BOX
Patient is a 89y old  Female who presents with a chief complaint of Onc workup (01 Sep 2018 16:15)      INTERVAL HPI/OVERNIGHT EVENTS:  T(C): 36.6 (09-04-18 @ 15:18), Max: 36.7 (09-04-18 @ 05:14)  HR: 85 (09-04-18 @ 15:18) (76 - 90)  BP: 114/61 (09-04-18 @ 15:18) (111/65 - 150/64)  RR: 18 (09-04-18 @ 15:18) (18 - 20)  SpO2: 98% (09-04-18 @ 15:18) (96% - 100%)  Wt(kg): --  I&O's Summary    03 Sep 2018 07:01  -  04 Sep 2018 07:00  --------------------------------------------------------  IN: 320 mL / OUT: 0 mL / NET: 320 mL        LABS:                        9.8    9.61  )-----------( 248      ( 03 Sep 2018 05:44 )             31.7     09-04    131<L>  |  93<L>  |  77<H>  ----------------------------<  190<H>  5.2   |  24  |  2.64<H>    Ca    9.6      04 Sep 2018 13:37  Phos  4.1     09-03  Mg     2.0     09-03    TPro  6.4  /  Alb  x   /  TBili  x   /  DBili  x   /  AST  x   /  ALT  x   /  AlkPhos  x   09-04    PT/INR - ( 04 Sep 2018 06:12 )   PT: 26.2 SEC;   INR: 2.32          PTT - ( 04 Sep 2018 06:12 )  PTT:36.2 SEC    CAPILLARY BLOOD GLUCOSE      POCT Blood Glucose.: 178 mg/dL (04 Sep 2018 12:55)  POCT Blood Glucose.: 183 mg/dL (04 Sep 2018 09:04)  POCT Blood Glucose.: 176 mg/dL (03 Sep 2018 22:31)  POCT Blood Glucose.: 195 mg/dL (03 Sep 2018 18:09)            MEDICATIONS  (STANDING):  albumin human 25% IVPB 100 milliLiter(s) IV Intermittent every 6 hours  ceFAZolin   IVPB 2000 milliGRAM(s) IV Intermittent every 24 hours  chlorhexidine 4% Liquid 1 Application(s) Topical <User Schedule>  dextrose 5%. 1000 milliLiter(s) (50 mL/Hr) IV Continuous <Continuous>  dextrose 50% Injectable 12.5 Gram(s) IV Push once  dextrose 50% Injectable 25 Gram(s) IV Push once  dextrose 50% Injectable 25 Gram(s) IV Push once  docusate sodium 100 milliGRAM(s) Oral three times a day  fentaNYL   Patch  12 MICROgram(s)/Hr 1 Patch Transdermal every 72 hours  furosemide   Injectable 40 milliGRAM(s) IV Push once  insulin glargine Injectable (LANTUS) 12 Unit(s) SubCutaneous at bedtime  insulin lispro (HumaLOG) corrective regimen sliding scale   SubCutaneous three times a day before meals  lactobacillus acidophilus 1 Tablet(s) Oral three times a day with meals  lidocaine   Patch 1 Patch Transdermal daily  pantoprazole    Tablet 40 milliGRAM(s) Oral before breakfast  propranolol LA 60 milliGRAM(s) Oral daily  senna 2 Tablet(s) Oral at bedtime  warfarin 1 milliGRAM(s) Oral once    MEDICATIONS  (PRN):  ALBUTerol    90 MICROgram(s) HFA Inhaler 2 Puff(s) Inhalation every 6 hours PRN Shortness of Breath and/or Wheezing  dextrose 40% Gel 15 Gram(s) Oral once PRN Blood Glucose LESS THAN 70 milliGRAM(s)/deciLiter  glucagon  Injectable 1 milliGRAM(s) IntraMuscular once PRN Glucose <70 milliGRAM(s)/deciLiter  oxyCODONE    IR 5 milliGRAM(s) Oral every 4 hours PRN Moderate and Severe pain  polyethylene glycol 3350 17 Gram(s) Oral two times a day PRN Constipation          PHYSICAL EXAM:  GENERAL: NAD, well-groomed, well-developed  HEAD:  Atraumatic, Normocephalic  CHEST/LUNG: Clear to percussion bilaterally; No rales, rhonchi, wheezing, or rubs  HEART: Regular rate and rhythm; No murmurs, rubs, or gallops  ABDOMEN: Soft, Nontender, Nondistended; Bowel sounds present  EXTREMITIES:  2+ Peripheral Pulses, No clubbing, cyanosis, or edema  LYMPH: No lymphadenopathy noted  SKIN: No rashes or lesions    Care Discussed with Consultants/Other Providers [+ ] YES  [ ] NO

## 2018-09-04 NOTE — PROGRESS NOTE ADULT - SUBJECTIVE AND OBJECTIVE BOX
INTERVAL HPI/OVERNIGHT EVENTS:    denies n/v/d/c, abdominal pain, melena or brbpr   had soft bm x 2 yesterday, brown in color    MEDICATIONS  (STANDING):  albumin human 25% IVPB 100 milliLiter(s) IV Intermittent every 6 hours  ceFAZolin   IVPB 2000 milliGRAM(s) IV Intermittent every 24 hours  chlorhexidine 4% Liquid 1 Application(s) Topical <User Schedule>  dextrose 5%. 1000 milliLiter(s) (50 mL/Hr) IV Continuous <Continuous>  dextrose 50% Injectable 12.5 Gram(s) IV Push once  dextrose 50% Injectable 25 Gram(s) IV Push once  dextrose 50% Injectable 25 Gram(s) IV Push once  docusate sodium 100 milliGRAM(s) Oral three times a day  fentaNYL   Patch  12 MICROgram(s)/Hr 1 Patch Transdermal every 72 hours  insulin glargine Injectable (LANTUS) 12 Unit(s) SubCutaneous at bedtime  insulin lispro (HumaLOG) corrective regimen sliding scale   SubCutaneous three times a day before meals  lactobacillus acidophilus 1 Tablet(s) Oral three times a day with meals  lidocaine   Patch 1 Patch Transdermal daily  pantoprazole    Tablet 40 milliGRAM(s) Oral before breakfast  propranolol LA 60 milliGRAM(s) Oral daily  senna 2 Tablet(s) Oral at bedtime    MEDICATIONS  (PRN):  ALBUTerol    90 MICROgram(s) HFA Inhaler 2 Puff(s) Inhalation every 6 hours PRN Shortness of Breath and/or Wheezing  dextrose 40% Gel 15 Gram(s) Oral once PRN Blood Glucose LESS THAN 70 milliGRAM(s)/deciLiter  glucagon  Injectable 1 milliGRAM(s) IntraMuscular once PRN Glucose <70 milliGRAM(s)/deciLiter  oxyCODONE    IR 5 milliGRAM(s) Oral every 4 hours PRN Moderate and Severe pain  polyethylene glycol 3350 17 Gram(s) Oral two times a day PRN Constipation      Allergies    Levaquin (Other)  ramipril (Other)  tetracycline (Hives; Rash)    Intolerances        Review of Systems:    General:  No wt loss, fevers, chills, night sweats, fatigue   Eyes:  Good vision, no reported pain  ENT:  No sore throat, pain, runny nose, dysphagia  CV:  No pain, palpitations, hypo/hypertension  Resp:  No dyspnea, cough, tachypnea, wheezing  GI:  No pain, No nausea, No vomiting, No diarrhea, No constipation, No weight loss, No fever, No pruritis, No rectal bleeding, No melena, No dysphagia  :  No pain, bleeding, incontinence, nocturia  Muscle:  No pain, weakness  Neuro:  No weakness, tingling, memory problems  Psych:  No fatigue, insomnia, mood problems, depression  Endocrine:  No polyuria, polydypsia, cold/heat intolerance  Heme:  No petechiae, ecchymosis, easy bruisability  Skin:  No rash, tattoos, scars, edema      Vital Signs Last 24 Hrs  T(C): 36.7 (04 Sep 2018 05:14), Max: 36.7 (04 Sep 2018 05:14)  T(F): 98 (04 Sep 2018 05:14), Max: 98 (04 Sep 2018 05:14)  HR: 90 (04 Sep 2018 09:41) (73 - 90)  BP: 130/71 (04 Sep 2018 09:41) (111/65 - 150/64)  BP(mean): --  RR: 20 (04 Sep 2018 09:58) (18 - 20)  SpO2: 100% (04 Sep 2018 09:58) (96% - 100%)    PHYSICAL EXAM:    Constitutional: NAD  HEENT: EOMI, throat clear  Neck: No LAD, supple  Respiratory: CTA and P  Cardiovascular: S1 and S2, RRR, no M  Gastrointestinal: BS+, soft, NT/ND, neg HSM,  Extremities: No peripheral edema, neg clubbing, cyanosis  Vascular: 2+ peripheral pulses  Neurological: A/O x 3, no focal deficits  Psychiatric: Normal mood, normal affect  Skin: No rashes      LABS:                        9.8    9.61  )-----------( 248      ( 03 Sep 2018 05:44 )             31.7     09-03    130<L>  |  94<L>  |  79<H>  ----------------------------<  127<H>  5.3   |  22  |  2.70<H>    Ca    9.0      03 Sep 2018 05:40  Phos  4.1     09-03  Mg     2.0     -03    TPro  6.4  /  Alb  x   /  TBili  x   /  DBili  x   /  AST  x   /  ALT  x   /  AlkPhos  x   09-04    PT/INR - ( 04 Sep 2018 06:12 )   PT: 26.2 SEC;   INR: 2.32          PTT - ( 04 Sep 2018 06:12 )  PTT:36.2 SEC  Urinalysis Basic - ( 02 Sep 2018 14:15 )    Color: YELLOW / Appearance: TURBID / S.025 / pH: 6.5  Gluc: NEGATIVE / Ketone: NEGATIVE  / Bili: SMALL / Urobili: NORMAL   Blood: LARGE / Protein: 100 / Nitrite: NEGATIVE   Leuk Esterase: MODERATE / RBC: 20-30 / WBC 10-20   Sq Epi: x / Non Sq Epi: SMALL / Bacteria: MODERATE        RADIOLOGY & ADDITIONAL TESTS:

## 2018-09-04 NOTE — PROGRESS NOTE ADULT - SUBJECTIVE AND OBJECTIVE BOX
Infectious Diseases progress note:    Subjective:  Pt resting comfortably.  Awake and alert.  Daughter at bedside.  States hip pain has improved, still lying in bed most of the time.  No fever/chills/diarrhea/abd pain    ROS:  CONSTITUTIONAL:  No fever, chills, rigors  CARDIOVASCULAR:  No chest pain or palpitations  RESPIRATORY:   No SOB, cough, dyspnea on exertion.  No wheezing  GASTROINTESTINAL:  No abd pain, N/V, diarrhea/constipation  EXTREMITIES:  No swelling or joint pain  GENITOURINARY:  No burning on urination, increased frequency or urgency.  No flank pain  NEUROLOGIC:  No HA, visual disturbances  SKIN: No rashes    Allergies    Levaquin (Other)  ramipril (Other)  tetracycline (Hives; Rash)    Intolerances        ANTIBIOTICS/RELEVANT:  antimicrobials  ceFAZolin   IVPB 2000 milliGRAM(s) IV Intermittent every 24 hours    immunologic:    OTHER:  albumin human 25% IVPB 100 milliLiter(s) IV Intermittent every 6 hours  ALBUTerol    90 MICROgram(s) HFA Inhaler 2 Puff(s) Inhalation every 6 hours PRN  chlorhexidine 4% Liquid 1 Application(s) Topical <User Schedule>  dextrose 40% Gel 15 Gram(s) Oral once PRN  dextrose 5%. 1000 milliLiter(s) IV Continuous <Continuous>  dextrose 50% Injectable 12.5 Gram(s) IV Push once  dextrose 50% Injectable 25 Gram(s) IV Push once  dextrose 50% Injectable 25 Gram(s) IV Push once  docusate sodium 100 milliGRAM(s) Oral three times a day  fentaNYL   Patch  12 MICROgram(s)/Hr 1 Patch Transdermal every 72 hours  glucagon  Injectable 1 milliGRAM(s) IntraMuscular once PRN  insulin glargine Injectable (LANTUS) 12 Unit(s) SubCutaneous at bedtime  insulin lispro (HumaLOG) corrective regimen sliding scale   SubCutaneous three times a day before meals  lactobacillus acidophilus 1 Tablet(s) Oral three times a day with meals  lidocaine   Patch 1 Patch Transdermal daily  oxyCODONE    IR 5 milliGRAM(s) Oral every 4 hours PRN  pantoprazole    Tablet 40 milliGRAM(s) Oral before breakfast  polyethylene glycol 3350 17 Gram(s) Oral two times a day PRN  propranolol LA 60 milliGRAM(s) Oral daily  senna 2 Tablet(s) Oral at bedtime      Objective:  Vital Signs Last 24 Hrs  T(C): 36.6 (04 Sep 2018 22:40), Max: 36.7 (04 Sep 2018 05:14)  T(F): 97.8 (04 Sep 2018 22:40), Max: 98 (04 Sep 2018 05:14)  HR: 90 (04 Sep 2018 22:50) (85 - 93)  BP: 115/45 (04 Sep 2018 22:40) (114/61 - 150/64)  BP(mean): --  RR: 22 (04 Sep 2018 22:40) (18 - 22)  SpO2: 92% (04 Sep 2018 22:50) (92% - 100%)    PHYSICAL EXAM:  Constitutional:NAD  Eyes:JORGE, EOMI  Ear/Nose/Throat: no thrush, mucositis.  Moist mucous membranes	  Neck:no JVD, no lymphadenopathy, supple  Respiratory: CTA ignacio  Cardiovascular: S1S2 RRR, no murmurs  Gastrointestinal:soft, nontender,  nondistended (+) BS  Extremities:no e/e/c  Skin:  L ft dsg c/d/i        LABS:                        9.8    9.61  )-----------( 248      ( 03 Sep 2018 05:44 )             31.7     09-04    131<L>  |  93<L>  |  77<H>  ----------------------------<  190<H>  5.2   |  24  |  2.64<H>    Ca    9.6      04 Sep 2018 13:37  Phos  4.1     09-03  Mg     2.0     09-03    TPro  6.4  /  Alb  x   /  TBili  x   /  DBili  x   /  AST  x   /  ALT  x   /  AlkPhos  x   09-04    PT/INR - ( 04 Sep 2018 06:12 )   PT: 26.2 SEC;   INR: 2.32          PTT - ( 04 Sep 2018 06:12 )  PTT:36.2 SEC      Procalcitonin, Serum: 2.60 (08-16 @ 08:00)                    MICROBIOLOGY:    Culture - Tissue with Gram Stain (08.21.18 @ 21:28)    -  Gentamicin: S <=4 Should not be used as monotherapy    -  Oxacillin: S <=0.25    -  Penicillin: R >8    -  RIF- Rifampin: S <=1 Should not be used as monotherapy    -  Tetra/Doxy: S <=4    -  Trimethoprim/Sulfamethoxazole: S <=0.5/9.5    -  Vancomycin: S 1    Gram Stain:   No polymorphonuclear cells seen  No organisms seen    -  Ampicillin/Sulbactam: S <=8/4    -  Cefazolin: S <=4    -  Clindamycin: R >4    -  Erythromycin: R >4    Specimen Source: .Tissue left 1st metatarsal head    Culture Results:   Rare Staphylococcus aureus    Organism Identification: Staphylococcus aureus    Organism: Staphylococcus aureus    Method Type: BUCK          RADIOLOGY & ADDITIONAL STUDIES:

## 2018-09-04 NOTE — PROGRESS NOTE ADULT - SUBJECTIVE AND OBJECTIVE BOX
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 Nephrology consulted for elevated Scr.    REVIEW OF SYSTEMS:  Gen: no changes in weight, + weakness after RT this morning  Cards: no chest pain  Resp: + dyspnea with exertion  GI: no nausea or vomiting or diarrhea  Vascular: no LE edema    Levaquin (Other)  ramipril (Other)  tetracycline (Hives; Rash)      Hospital Medications: Medications reviewed      VITALS:  T(F): 98 (18 @ 05:14), Max: 98 (18 @ 05:14)  HR: 90 (18 @ 09:41)  BP: 130/71 (18 @ 09:41)  RR: 20 (18 @ 09:58)  SpO2: 100% (18 @ 09:58)  Wt(kg): --     @ 07:01  -   @ 07:00  --------------------------------------------------------  IN: 320 mL / OUT: 0 mL / NET: 320 mL      PHYSICAL EXAM:    Gen: NAD, calm  Cards: RRR, +S1/S2, no M/G/R  Resp: Decreased BS @ bases B/L  GI: soft, NT/ND, NABS  Vascular: 2+ LE edema B/L      LABS:      130<L>  |  94<L>  |  79<H>  ----------------------------<  127<H>  5.3   |  22  |  2.70<H>    Ca    9.0      03 Sep 2018 05:40  Phos  4.1       Mg     2.0         TPro  6.4  /  Alb      /  TBili      /  DBili      /  AST      /  ALT      /  AlkPhos          Creatinine Trend: 2.70 <--, 2.47 <--, 2.46 <--, 2.52 <--, 2.39 <--, 2.27 <--                        9.8    9.61  )-----------( 248      ( 03 Sep 2018 05:44 )             31.7     Urine Studies:  Urinalysis Basic - ( 02 Sep 2018 14:15 )    Color: YELLOW / Appearance: TURBID / S.025 / pH: 6.5  Gluc: NEGATIVE / Ketone: NEGATIVE  / Bili: SMALL / Urobili: NORMAL   Blood: LARGE / Protein: 100 / Nitrite: NEGATIVE   Leuk Esterase: MODERATE / RBC: 20-30 / WBC 10-20   Sq Epi:  / Non Sq Epi: SMALL / Bacteria: MODERATE      Sodium, Random Urine: < 20 mmol/L ( @ 14:15)  Creatinine, Random Urine: 78.70 mg/dL ( @ 14:15)  Protein, Random Urine: 134.5 mg/dL ( @ 14:15)

## 2018-09-04 NOTE — PROGRESS NOTE ADULT - PROBLEM SELECTOR PLAN 1
Patient with TRISH during admission at OSH which has been relatively stable here. Scr now rising likely due to intravascular depletion with possible ATN given granular casts on UA versus less likely glomerular disease pyuria and hematuria on UA. Continue with albumin IV Q6 hours X 2 days to improve renal perfusion. F/U GN serologies. Renal imaging on admission reviewed and likely erroneous as left kidney measured 0.7 cm (likely meant to be 10.7 cm). Attempted to contact radiology reading room to clarify without answer. Avoid nephrotoxins.

## 2018-09-05 LAB
ANA TITR SER: NEGATIVE — SIGNIFICANT CHANGE UP
BUN SERPL-MCNC: 79 MG/DL — HIGH (ref 7–23)
C-ANCA SER-ACNC: NEGATIVE — SIGNIFICANT CHANGE UP
CALCIUM SERPL-MCNC: 10 MG/DL — SIGNIFICANT CHANGE UP (ref 8.4–10.5)
CHLORIDE SERPL-SCNC: 94 MMOL/L — LOW (ref 98–107)
CO2 SERPL-SCNC: 23 MMOL/L — SIGNIFICANT CHANGE UP (ref 22–31)
CREAT SERPL-MCNC: 2.65 MG/DL — HIGH (ref 0.5–1.3)
DSDNA AB SER-ACNC: <12 IU/ML — SIGNIFICANT CHANGE UP
GBM IGG SER-ACNC: <0.2 U — SIGNIFICANT CHANGE UP
GLUCOSE BLDC GLUCOMTR-MCNC: 132 MG/DL — HIGH (ref 70–99)
GLUCOSE BLDC GLUCOMTR-MCNC: 133 MG/DL — HIGH (ref 70–99)
GLUCOSE BLDC GLUCOMTR-MCNC: 168 MG/DL — HIGH (ref 70–99)
GLUCOSE BLDC GLUCOMTR-MCNC: 171 MG/DL — HIGH (ref 70–99)
GLUCOSE SERPL-MCNC: 165 MG/DL — HIGH (ref 70–99)
HCT VFR BLD CALC: 28.3 % — LOW (ref 34.5–45)
HGB BLD-MCNC: 8.9 G/DL — LOW (ref 11.5–15.5)
INR BLD: 1.87 — HIGH (ref 0.88–1.17)
MCHC RBC-ENTMCNC: 30.4 PG — SIGNIFICANT CHANGE UP (ref 27–34)
MCHC RBC-ENTMCNC: 31.4 % — LOW (ref 32–36)
MCV RBC AUTO: 96.6 FL — SIGNIFICANT CHANGE UP (ref 80–100)
NRBC # FLD: 0 — SIGNIFICANT CHANGE UP
P-ANCA SER-ACNC: NEGATIVE — SIGNIFICANT CHANGE UP
PLATELET # BLD AUTO: 196 K/UL — SIGNIFICANT CHANGE UP (ref 150–400)
PMV BLD: 9.6 FL — SIGNIFICANT CHANGE UP (ref 7–13)
POTASSIUM SERPL-MCNC: 5 MMOL/L — SIGNIFICANT CHANGE UP (ref 3.5–5.3)
POTASSIUM SERPL-SCNC: 5 MMOL/L — SIGNIFICANT CHANGE UP (ref 3.5–5.3)
PROTHROM AB SERPL-ACNC: 21.8 SEC — HIGH (ref 9.8–13.1)
RBC # BLD: 2.93 M/UL — LOW (ref 3.8–5.2)
RBC # FLD: 18.2 % — HIGH (ref 10.3–14.5)
SODIUM SERPL-SCNC: 132 MMOL/L — LOW (ref 135–145)
WBC # BLD: 12.65 K/UL — HIGH (ref 3.8–10.5)
WBC # FLD AUTO: 12.65 K/UL — HIGH (ref 3.8–10.5)

## 2018-09-05 PROCEDURE — 93971 EXTREMITY STUDY: CPT | Mod: 26,RT

## 2018-09-05 PROCEDURE — 77280 THER RAD SIMULAJ FIELD SMPL: CPT | Mod: 26

## 2018-09-05 PROCEDURE — 71250 CT THORAX DX C-: CPT | Mod: 26

## 2018-09-05 RX ORDER — WARFARIN SODIUM 2.5 MG/1
2 TABLET ORAL ONCE
Qty: 0 | Refills: 0 | Status: COMPLETED | OUTPATIENT
Start: 2018-09-05 | End: 2018-09-05

## 2018-09-05 RX ORDER — SODIUM CHLORIDE 5 G/100ML
100 INJECTION, SOLUTION INTRAVENOUS
Qty: 0 | Refills: 0 | Status: COMPLETED | OUTPATIENT
Start: 2018-09-05 | End: 2018-09-05

## 2018-09-05 RX ORDER — FUROSEMIDE 40 MG
80 TABLET ORAL ONCE
Qty: 0 | Refills: 0 | Status: COMPLETED | OUTPATIENT
Start: 2018-09-05 | End: 2018-09-05

## 2018-09-05 RX ORDER — FUROSEMIDE 40 MG
40 TABLET ORAL
Qty: 0 | Refills: 0 | Status: DISCONTINUED | OUTPATIENT
Start: 2018-09-05 | End: 2018-09-05

## 2018-09-05 RX ADMIN — Medication 100 MILLIGRAM(S): at 05:33

## 2018-09-05 RX ADMIN — Medication 1 TABLET(S): at 08:51

## 2018-09-05 RX ADMIN — INSULIN GLARGINE 12 UNIT(S): 100 INJECTION, SOLUTION SUBCUTANEOUS at 22:34

## 2018-09-05 RX ADMIN — Medication 50 MILLILITER(S): at 11:29

## 2018-09-05 RX ADMIN — PANTOPRAZOLE SODIUM 40 MILLIGRAM(S): 20 TABLET, DELAYED RELEASE ORAL at 06:13

## 2018-09-05 RX ADMIN — Medication 50 MILLILITER(S): at 00:00

## 2018-09-05 RX ADMIN — WARFARIN SODIUM 2 MILLIGRAM(S): 2.5 TABLET ORAL at 18:18

## 2018-09-05 RX ADMIN — Medication 50 MILLILITER(S): at 05:33

## 2018-09-05 RX ADMIN — Medication 1 TABLET(S): at 18:18

## 2018-09-05 RX ADMIN — LIDOCAINE 1 PATCH: 4 CREAM TOPICAL at 11:30

## 2018-09-05 RX ADMIN — CHLORHEXIDINE GLUCONATE 1 APPLICATION(S): 213 SOLUTION TOPICAL at 09:00

## 2018-09-05 RX ADMIN — SODIUM CHLORIDE 400 MILLILITER(S): 5 INJECTION, SOLUTION INTRAVENOUS at 19:06

## 2018-09-05 RX ADMIN — LIDOCAINE 1 PATCH: 4 CREAM TOPICAL at 23:32

## 2018-09-05 RX ADMIN — Medication 1: at 08:51

## 2018-09-05 RX ADMIN — Medication 80 MILLIGRAM(S): at 19:22

## 2018-09-05 RX ADMIN — FENTANYL CITRATE 1 PATCH: 50 INJECTION INTRAVENOUS at 09:00

## 2018-09-05 RX ADMIN — Medication 100 MILLIGRAM(S): at 07:23

## 2018-09-05 RX ADMIN — Medication 80 MILLIGRAM(S): at 11:26

## 2018-09-05 RX ADMIN — Medication 60 MILLIGRAM(S): at 06:14

## 2018-09-05 NOTE — PROGRESS NOTE ADULT - SUBJECTIVE AND OBJECTIVE BOX
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 Nephrology consulted for elevated Scr.    REVIEW OF SYSTEMS:  Gen: no changes in weight, + weakness  Cards: no chest pain  Resp: + dyspnea with exertion  GI: no nausea or vomiting or diarrhea  Vascular: + LE edema    Levaquin (Other)  ramipril (Other)  tetracycline (Hives; Rash)      Hospital Medications: Medications reviewed      VITALS:  T(F): 97.6 (18 @ 05:20), Max: 97.9 (18 @ 15:18)  HR: 82 (18 @ 05:20)  BP: 127/65 (18 @ 05:20)  RR: 20 (18 @ 05:20)  SpO2: 98% (18 @ 05:20)  Wt(kg): --      PHYSICAL EXAM:    Gen: NAD, calm  Cards: RRR, +S1/S2, no M/G/R  Resp: Decreased BS @ bases B/L with rales in lower lobes  GI: soft, NT/ND, NABS  Vascular: 2+ LE edema B/L, cyanotic fingers      LABS:      132<L>  |  94<L>  |  79<H>  ----------------------------<  165<H>  5.0   |  23  |  2.65<H>    Ca    10.0      05 Sep 2018 05:45    TPro  6.4  /  Alb      /  TBili      /  DBili      /  AST      /  ALT      /  AlkPhos          Creatinine Trend: 2.65 <--, 2.64 <--, 2.70 <--, 2.47 <--, 2.46 <--, 2.52 <--, 2.39 <--                        8.9    12.65 )-----------( 196      ( 05 Sep 2018 05:45 )             28.3     Urine Studies:  Urinalysis Basic - ( 02 Sep 2018 14:15 )    Color: YELLOW / Appearance: TURBID / S.025 / pH: 6.5  Gluc: NEGATIVE / Ketone: NEGATIVE  / Bili: SMALL / Urobili: NORMAL   Blood: LARGE / Protein: 100 / Nitrite: NEGATIVE   Leuk Esterase: MODERATE / RBC: 20-30 / WBC 10-20   Sq Epi:  / Non Sq Epi: SMALL / Bacteria: MODERATE      Sodium, Random Urine: < 20 mmol/L ( @ 14:15)  Creatinine, Random Urine: 78.70 mg/dL ( @ 14:15)  Protein, Random Urine: 134.5 mg/dL ( @ 14:15)      < from: Xray Chest 1 View- PORTABLE-Urgent (18 @ 12:15) >  IMPRESSION:    1.  Bilateral pleural effusions.  2.  Likely underlying pulmonary edema.    < end of copied text >

## 2018-09-05 NOTE — PROGRESS NOTE ADULT - SUBJECTIVE AND OBJECTIVE BOX
SUBJECTIVE: events noted, c/o SOB with minimal movement in bed      MEDICATIONS  (STANDING):  ceFAZolin   IVPB 2000 milliGRAM(s) IV Intermittent every 24 hours  chlorhexidine 4% Liquid 1 Application(s) Topical <User Schedule>  dextrose 5%. 1000 milliLiter(s) (50 mL/Hr) IV Continuous <Continuous>  dextrose 50% Injectable 12.5 Gram(s) IV Push once  dextrose 50% Injectable 25 Gram(s) IV Push once  dextrose 50% Injectable 25 Gram(s) IV Push once  docusate sodium 100 milliGRAM(s) Oral three times a day  furosemide   Injectable 40 milliGRAM(s) IV Push two times a day  insulin glargine Injectable (LANTUS) 12 Unit(s) SubCutaneous at bedtime  insulin lispro (HumaLOG) corrective regimen sliding scale   SubCutaneous three times a day before meals  lactobacillus acidophilus 1 Tablet(s) Oral three times a day with meals  lidocaine   Patch 1 Patch Transdermal daily  pantoprazole    Tablet 40 milliGRAM(s) Oral before breakfast  propranolol LA 60 milliGRAM(s) Oral daily  senna 2 Tablet(s) Oral at bedtime  warfarin 2 milliGRAM(s) Oral once    MEDICATIONS  (PRN):  ALBUTerol    90 MICROgram(s) HFA Inhaler 2 Puff(s) Inhalation every 6 hours PRN Shortness of Breath and/or Wheezing  dextrose 40% Gel 15 Gram(s) Oral once PRN Blood Glucose LESS THAN 70 milliGRAM(s)/deciLiter  glucagon  Injectable 1 milliGRAM(s) IntraMuscular once PRN Glucose <70 milliGRAM(s)/deciLiter  oxyCODONE    IR 5 milliGRAM(s) Oral every 4 hours PRN Moderate and Severe pain  polyethylene glycol 3350 17 Gram(s) Oral two times a day PRN Constipation      LABS:                        8.9    12.65 )-----------( 196      ( 05 Sep 2018 05:45 )             28.3     132<L>  |  94<L>  |  79<H>  ----------------------------<  165<H>  5.0   |  23  |  2.65<H>    Ca    10.0      05 Sep 2018 05:45    TPro  6.4  /  Alb  x   /  TBili  x   /  DBili  x   /  AST  x   /  ALT  x   /  AlkPhos  x   09-04    Creatinine Trend: 2.65<--, 2.64<--, 2.70<--, 2.47<--, 2.46<--, 2.52<--   PT/INR - ( 05 Sep 2018 05:45 )   PT: 21.8 SEC;   INR: 1.87       PHYSICAL EXAM  Vital Signs Last 24 Hrs  T(C): 36.4 (05 Sep 2018 05:20), Max: 36.6 (04 Sep 2018 15:18)  T(F): 97.6 (05 Sep 2018 05:20), Max: 97.9 (04 Sep 2018 15:18)  HR: 82 (05 Sep 2018 05:20) (82 - 93)  BP: 127/65 (05 Sep 2018 05:20) (114/61 - 127/65)  RR: 20 (05 Sep 2018 05:20) (18 - 22)  SpO2: 98% (05 Sep 2018 05:20) (92% - 100%)    HEENT: Normal Oral mucosa, PERRL, EOMI  Cardiovascular: Normal S1S2, No JVD, 1/6 STEFFANY  Respiratory: decreased BS BL bases  Gastrointestinal: Abdomen soft, ND, NT, +BS  Ext: Cool B/L LE with 2+ edema      DIAGNOSTIC DATA    Xray Chest 1 View- PORTABLE-Urgent (08.17.18 @ 21:23) >  There are low lung volumes resulting in crowding of the bronchovascular   markings at the lung bases.  There is minimal blunting at the right costophrenic angle either   representing trace pleural effusion and/or pleural thickening.  There is subsegmental atelectasis at both lung bases.      < from: TTE Echo Doppler w/o Cont (08.17.18 @ 10:38) >  Technically difficult and limited study.  Mitral Valve: Calcified mitral annulus and mitral valve leaflets. Normal   opening of the mitral valve leaflets. Trace mitral regurgitation.  Aortic Valve/Aorta: Not well visualized  Tricuspid Valve: Calcified tricuspid annulus with normally opening valve.   Trace tricuspid regurgitation.  Pulmonic Valve: The pulmonic valve is not well visualized. Probably   normal.  Left Atrium: Moderate left atrial enlargement  Right Atrium: Normal  Left Ventricle: The endocardium is not well-visualized. Overall there is   preserved left ventricular systolic function. Unable to rule out wall   motion abnormalities. The EF is approximately 65%.  Right Ventricle: Normal right ventricular size and function.  Pericardium/Pleura: No pericardial effusion noted.  Pulmonary/RV Pressure: The right ventricular systolic pressure is   estimated to be 35mmHg, assuming that the right atrial pressure is   estimated to be8 mmHg. This is consistent with normal pulmonary   pressures.  LV Diastolic Function: Stage 2 diastolic dysfunction    < end of copied text >      ASSESSMENT AND PLAN:  89y Female  multiple comorbidities preserved LV and RV fx, Afib on coumadin, reported CAD ? remote MI with no intervention follows with Dr. 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 Siknny Diaz's office  - plans for RT this week  - no plans for surgical intervention at this time     - patient/family  refusing aggressive chemo  - can consider repeating echo if within GOC  - consider Palliative eval

## 2018-09-05 NOTE — PROGRESS NOTE ADULT - PROBLEM SELECTOR PLAN 4
Likely secondary to hypoalbuminemia as spot urine TP/CR not nephrotic range (1.7 from secondary membranous?). S/P IV lasix on 9/4 given pulmonary edema on CXR. Given persistent dyspnea, plan to give additional lasix 80 mg IV today. IV albumin as above. Encourage glucerna intake. If no improvement in dyspnea, recommend CT chest. Monitor UO.

## 2018-09-05 NOTE — PROGRESS NOTE ADULT - SUBJECTIVE AND OBJECTIVE BOX
Patient is a 89y old  Female who presents with a chief complaint of transferred from [place] for rad-onc evaluation (05 Sep 2018 15:04)      INTERVAL HPI/OVERNIGHT EVENTS:  T(C): 36.3 (09-05-18 @ 15:12), Max: 36.6 (09-04-18 @ 22:40)  HR: 85 (09-05-18 @ 15:12) (82 - 93)  BP: 114/71 (09-05-18 @ 15:12) (114/71 - 127/65)  RR: 20 (09-05-18 @ 15:12) (20 - 22)  SpO2: 99% (09-05-18 @ 15:12) (92% - 100%)  Wt(kg): --  I&O's Summary      LABS:                        8.9    12.65 )-----------( 196      ( 05 Sep 2018 05:45 )             28.3     09-05    132<L>  |  94<L>  |  79<H>  ----------------------------<  165<H>  5.0   |  23  |  2.65<H>    Ca    10.0      05 Sep 2018 05:45    TPro  6.4  /  Alb  x   /  TBili  x   /  DBili  x   /  AST  x   /  ALT  x   /  AlkPhos  x   09-04    PT/INR - ( 05 Sep 2018 05:45 )   PT: 21.8 SEC;   INR: 1.87          PTT - ( 04 Sep 2018 06:12 )  PTT:36.2 SEC    CAPILLARY BLOOD GLUCOSE      POCT Blood Glucose.: 133 mg/dL (05 Sep 2018 13:44)  POCT Blood Glucose.: 168 mg/dL (05 Sep 2018 08:32)  POCT Blood Glucose.: 143 mg/dL (04 Sep 2018 22:21)  POCT Blood Glucose.: 160 mg/dL (04 Sep 2018 18:10)    ABG - ( 04 Sep 2018 22:20 )  pH, Arterial: 7.36  pH, Blood: x     /  pCO2: 42    /  pO2: 178   / HCO3: 23    / Base Excess: -1.2  /  SaO2: 99.3                    MEDICATIONS  (STANDING):  ceFAZolin   IVPB 2000 milliGRAM(s) IV Intermittent every 24 hours  chlorhexidine 4% Liquid 1 Application(s) Topical <User Schedule>  dextrose 5%. 1000 milliLiter(s) (50 mL/Hr) IV Continuous <Continuous>  dextrose 50% Injectable 12.5 Gram(s) IV Push once  dextrose 50% Injectable 25 Gram(s) IV Push once  dextrose 50% Injectable 25 Gram(s) IV Push once  docusate sodium 100 milliGRAM(s) Oral three times a day  furosemide   Injectable 40 milliGRAM(s) IV Push two times a day  insulin glargine Injectable (LANTUS) 12 Unit(s) SubCutaneous at bedtime  insulin lispro (HumaLOG) corrective regimen sliding scale   SubCutaneous three times a day before meals  lactobacillus acidophilus 1 Tablet(s) Oral three times a day with meals  lidocaine   Patch 1 Patch Transdermal daily  pantoprazole    Tablet 40 milliGRAM(s) Oral before breakfast  propranolol LA 60 milliGRAM(s) Oral daily  senna 2 Tablet(s) Oral at bedtime  warfarin 2 milliGRAM(s) Oral once    MEDICATIONS  (PRN):  ALBUTerol    90 MICROgram(s) HFA Inhaler 2 Puff(s) Inhalation every 6 hours PRN Shortness of Breath and/or Wheezing  dextrose 40% Gel 15 Gram(s) Oral once PRN Blood Glucose LESS THAN 70 milliGRAM(s)/deciLiter  glucagon  Injectable 1 milliGRAM(s) IntraMuscular once PRN Glucose <70 milliGRAM(s)/deciLiter  oxyCODONE    IR 5 milliGRAM(s) Oral every 4 hours PRN Moderate and Severe pain  polyethylene glycol 3350 17 Gram(s) Oral two times a day PRN Constipation          PHYSICAL EXAM:  GENERAL: frail  CHEST/LUNG: Clear to percussion bilaterally; No rales, rhonchi, wheezing, or rubs  HEART: Regular rate and rhythm; No murmurs, rubs, or gallops  ABDOMEN: Soft, Nontender, Nondistended; Bowel sounds present  EXTREMITIES: bluish discoloration of the toes  LYMPH: No lymphadenopathy noted  SKIN: No rashes or lesions    Care Discussed with Consultants/Other Providers [ ] YES  [ ] NO

## 2018-09-05 NOTE — PROVIDER CONTACT NOTE (OTHER) - SITUATION
Pt wheezing and increased SOB after using bedpan.
Pt continues to breathe through mouth and appears more comfortable with mask on

## 2018-09-05 NOTE — PROGRESS NOTE ADULT - SUBJECTIVE AND OBJECTIVE BOX
Chief complaint  Patient is a 89y old  Female who presents with a chief complaint of transferred from [place] for rad-onc evaluation (05 Sep 2018 15:44)   Review of systems  Patient in bed, looks comfortable, no fever,  no hypoglycemia.    Labs and Fingersticks  CAPILLARY BLOOD GLUCOSE      POCT Blood Glucose.: 132 mg/dL (05 Sep 2018 17:44)  POCT Blood Glucose.: 133 mg/dL (05 Sep 2018 13:44)  POCT Blood Glucose.: 168 mg/dL (05 Sep 2018 08:32)  POCT Blood Glucose.: 143 mg/dL (04 Sep 2018 22:21)          Calcium, Total Serum: 10.0 (09-05 @ 05:45)  Calcium, Total Serum: 9.6 (09-04 @ 13:37)          09-05    132<L>  |  94<L>  |  79<H>  ----------------------------<  165<H>  5.0   |  23  |  2.65<H>    Ca    10.0      05 Sep 2018 05:45    TPro  6.4  /  Alb  x   /  TBili  x   /  DBili  x   /  AST  x   /  ALT  x   /  AlkPhos  x   09-04                        8.9    12.65 )-----------( 196      ( 05 Sep 2018 05:45 )             28.3     Medications  MEDICATIONS  (STANDING):  ceFAZolin   IVPB 2000 milliGRAM(s) IV Intermittent every 24 hours  chlorhexidine 4% Liquid 1 Application(s) Topical <User Schedule>  dextrose 5%. 1000 milliLiter(s) (50 mL/Hr) IV Continuous <Continuous>  dextrose 50% Injectable 12.5 Gram(s) IV Push once  dextrose 50% Injectable 25 Gram(s) IV Push once  dextrose 50% Injectable 25 Gram(s) IV Push once  docusate sodium 100 milliGRAM(s) Oral three times a day  insulin glargine Injectable (LANTUS) 12 Unit(s) SubCutaneous at bedtime  insulin lispro (HumaLOG) corrective regimen sliding scale   SubCutaneous three times a day before meals  lactobacillus acidophilus 1 Tablet(s) Oral three times a day with meals  lidocaine   Patch 1 Patch Transdermal daily  pantoprazole    Tablet 40 milliGRAM(s) Oral before breakfast  propranolol LA 60 milliGRAM(s) Oral daily  senna 2 Tablet(s) Oral at bedtime      Physical Exam  General: Patient comfortable in bed  Vital Signs Last 12 Hrs  T(F): 97.3 (09-05-18 @ 15:12), Max: 97.3 (09-05-18 @ 15:12)  HR: 85 (09-05-18 @ 15:12) (85 - 85)  BP: 114/71 (09-05-18 @ 15:12) (114/71 - 114/71)  BP(mean): --  RR: 20 (09-05-18 @ 15:12) (20 - 20)  SpO2: 99% (09-05-18 @ 15:12) (99% - 99%)  Neck: No palpable thyroid nodules.  CVS: S1S2, No murmurs  Respiratory: No wheezing, no crepitations  GI: Abdomen soft, bowel sounds positive  Musculoskeletal:  edema lower extremities.   Skin: No skin rashes, no ecchymosis    Diagnostics

## 2018-09-05 NOTE — CHART NOTE - NSCHARTNOTEFT_GEN_A_CORE
Pt with increased work of breathing. Pt with crackles R>L. Pt ordered for lasix with albumin. CT chest ordered. Pulmonary consulted by attg.   Urgent vascular consult requested to assess RLE coldness and mottling. Urgent LE ultrasound ordered (called to expedite at [phone]). Spoke with Surgery consult resident (Marcy). Will follow up recs. Pt with increased work of breathing. Pt with crackles R>L. B/L LEs with b/l edema. Pt ordered for lasix with albumin. CT chest ordered (called [phone] to expedite). Pulmonary consulted by attg. Cardiology already following.   Urgent vascular consult requested to assess RLE coldness and mottling. Urgent LE ultrasound ordered (called to expedite at [phone]). Spoke with Surgery consult resident (Marcy). Will follow up recs.

## 2018-09-05 NOTE — PROGRESS NOTE ADULT - PROBLEM SELECTOR PLAN 1
Patient with TRISH during admission at OSH with mild rise in Scr during current admission due to intravascular depletion with possible ATN given granular casts on UA. Continue with albumin IV Q6 hours today to improve renal perfusion. F/U pending GN serologies (BRUNO, ANCA, anti-GBM ab, serum DOMINGA, SPEP). Renal imaging on admission reviewed and likely erroneous as left kidney measured 0.7 cm (likely meant to be 10.7 cm). Attempted to contact radiology reading room to clarify without answer. Avoid nephrotoxins.

## 2018-09-05 NOTE — CONSULT NOTE ADULT - SUBJECTIVE AND OBJECTIVE BOX
CC: 89y old Female admitted with a chief complaint of transferred from [place] for rad-onc evaluation (05 Sep 2018 15:37)  , now    HPI: 89 y.o woman with multiple comorbidities presented with several weeks h/o of bony pain and recent inability to ambulate du to pain in the foot. Ct scan revealed destructive bony lesions concerning for metastasis and liver lesions. 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 Noted to have bacteremia likely from left hallux abscess. The primary source of malignancy is unclear,. 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  This morning she was noted to have cool and mottled toes bilaterally, more-so on the right than the left. She has never had it before.  She does not have any pain, numbness, or tingling.     PMHx: OAB (overactive bladder)  GERD (gastroesophageal reflux disease)  Angina effort  Heart failure  Upper respiratory infection  Diabetes  Renal stones  Myocardial infarction  Spinal stenosis  CVA (cerebral infarction)  Afib  Raynaud disease  Acid reflux  Hypertension  Hyperlipidemia  Asthma    PSHx: Cataract  S/P hysterectomy    Medications (inpatient): ceFAZolin   IVPB 2000 milliGRAM(s) IV Intermittent every 24 hours  chlorhexidine 4% Liquid 1 Application(s) Topical <User Schedule>  dextrose 5%. 1000 milliLiter(s) IV Continuous <Continuous>  dextrose 50% Injectable 12.5 Gram(s) IV Push once  dextrose 50% Injectable 25 Gram(s) IV Push once  dextrose 50% Injectable 25 Gram(s) IV Push once  docusate sodium 100 milliGRAM(s) Oral three times a day  furosemide   Injectable 40 milliGRAM(s) IV Push two times a day  insulin glargine Injectable (LANTUS) 12 Unit(s) SubCutaneous at bedtime  insulin lispro (HumaLOG) corrective regimen sliding scale   SubCutaneous three times a day before meals  lactobacillus acidophilus 1 Tablet(s) Oral three times a day with meals  lidocaine   Patch 1 Patch Transdermal daily  pantoprazole    Tablet 40 milliGRAM(s) Oral before breakfast  propranolol LA 60 milliGRAM(s) Oral daily  senna 2 Tablet(s) Oral at bedtime  warfarin 2 milliGRAM(s) Oral once    Medications (PRN):ALBUTerol    90 MICROgram(s) HFA Inhaler 2 Puff(s) Inhalation every 6 hours PRN  dextrose 40% Gel 15 Gram(s) Oral once PRN  glucagon  Injectable 1 milliGRAM(s) IntraMuscular once PRN  oxyCODONE    IR 5 milliGRAM(s) Oral every 4 hours PRN  polyethylene glycol 3350 17 Gram(s) Oral two times a day PRN    Allergies: Levaquin (Other)  ramipril (Other)  tetracycline (Hives; Rash)  Family Hx: No pertinent family history in first degree relatives      Physical Exam  T(C): 36.3  HR: 85 (82 - 93)  BP: 114/71 (114/71 - 127/65)  RR: 20 (20 - 22)  SpO2: 99% (92% - 100%)  Tmax: T(C): , Max: 36.6 (09-04-18 @ 22:40)    General: well developed, well nourished, NAD  Neuro: alert and oriented, no focal deficits, moves all extremities spontaneously  Respiratory: airway patent, respirations unlabored  CVS: regular rate and rhythm  Abdomen: soft, nontender, nondistended  Extremities: edema noted b/l LE, sensation and movement grossly intact, bilateral toes mottled and cool, DP/PT pulses palpable bilaterally     Labs:                        8.9    12.65 )-----------( 196      ( 05 Sep 2018 05:45 )             28.3     PT/INR - ( 05 Sep 2018 05:45 )   PT: 21.8 SEC;   INR: 1.87          PTT - ( 04 Sep 2018 06:12 )  PTT:36.2 SEC  09-05    132<L>  |  94<L>  |  79<H>  ----------------------------<  165<H>  5.0   |  23  |  2.65<H>    Ca    10.0      05 Sep 2018 05:45    TPro  6.4  /  Alb  x   /  TBili  x   /  DBili  x   /  AST  x   /  ALT  x   /  AlkPhos  x   09-04      ABG - ( 04 Sep 2018 22:20 )  pH, Arterial: 7.36  pH, Blood: x     /  pCO2: 42    /  pO2: 178   / HCO3: 23    / Base Excess: -1.2  /  SaO2: 99.3

## 2018-09-05 NOTE — PROGRESS NOTE ADULT - SUBJECTIVE AND OBJECTIVE BOX
INTERVAL HPI/OVERNIGHT EVENTS:    seen with daughter present who reports mom has been sleeping most of the day   no abdominal events overnight or this morning; no nausea or vomiting  on O2     MEDICATIONS  (STANDING):  ceFAZolin   IVPB 2000 milliGRAM(s) IV Intermittent every 24 hours  chlorhexidine 4% Liquid 1 Application(s) Topical <User Schedule>  dextrose 5%. 1000 milliLiter(s) (50 mL/Hr) IV Continuous <Continuous>  dextrose 50% Injectable 12.5 Gram(s) IV Push once  dextrose 50% Injectable 25 Gram(s) IV Push once  dextrose 50% Injectable 25 Gram(s) IV Push once  docusate sodium 100 milliGRAM(s) Oral three times a day  insulin glargine Injectable (LANTUS) 12 Unit(s) SubCutaneous at bedtime  insulin lispro (HumaLOG) corrective regimen sliding scale   SubCutaneous three times a day before meals  lactobacillus acidophilus 1 Tablet(s) Oral three times a day with meals  lidocaine   Patch 1 Patch Transdermal daily  pantoprazole    Tablet 40 milliGRAM(s) Oral before breakfast  propranolol LA 60 milliGRAM(s) Oral daily  senna 2 Tablet(s) Oral at bedtime  warfarin 2 milliGRAM(s) Oral once    MEDICATIONS  (PRN):  ALBUTerol    90 MICROgram(s) HFA Inhaler 2 Puff(s) Inhalation every 6 hours PRN Shortness of Breath and/or Wheezing  dextrose 40% Gel 15 Gram(s) Oral once PRN Blood Glucose LESS THAN 70 milliGRAM(s)/deciLiter  glucagon  Injectable 1 milliGRAM(s) IntraMuscular once PRN Glucose <70 milliGRAM(s)/deciLiter  oxyCODONE    IR 5 milliGRAM(s) Oral every 4 hours PRN Moderate and Severe pain  polyethylene glycol 3350 17 Gram(s) Oral two times a day PRN Constipation      Allergies    Levaquin (Other)  ramipril (Other)  tetracycline (Hives; Rash)    Intolerances        Review of Systems: *sleeping would not participate         Vital Signs Last 24 Hrs  T(C): 36.4 (05 Sep 2018 05:20), Max: 36.6 (04 Sep 2018 15:18)  T(F): 97.6 (05 Sep 2018 05:20), Max: 97.9 (04 Sep 2018 15:18)  HR: 82 (05 Sep 2018 05:20) (82 - 93)  BP: 127/65 (05 Sep 2018 05:20) (114/61 - 127/65)  BP(mean): --  RR: 20 (05 Sep 2018 05:20) (18 - 22)  SpO2: 98% (05 Sep 2018 05:20) (92% - 100%)    PHYSICAL EXAM:    Constitutional: NAD  HEENT: EOMI, throat clear  Neck: No LAD, supple  Respiratory: on O2  Cardiovascular: S1 and S2, RRR, no M  Gastrointestinal: BS+, soft, NT/ND, neg HSM,  Extremities: No peripheral edema, neg clubbing, cyanosis  Vascular: 2+ peripheral pulses  Neurological: A/O x 3, no focal deficits  Psychiatric: Normal mood, normal affect  Skin: No rashes      LABS:                        8.9    12.65 )-----------( 196      ( 05 Sep 2018 05:45 )             28.3     09-05    132<L>  |  94<L>  |  79<H>  ----------------------------<  165<H>  5.0   |  23  |  2.65<H>    Ca    10.0      05 Sep 2018 05:45    TPro  6.4  /  Alb  x   /  TBili  x   /  DBili  x   /  AST  x   /  ALT  x   /  AlkPhos  x   09-04    PT/INR - ( 05 Sep 2018 05:45 )   PT: 21.8 SEC;   INR: 1.87          PTT - ( 04 Sep 2018 06:12 )  PTT:36.2 SEC      RADIOLOGY & ADDITIONAL TESTS:

## 2018-09-05 NOTE — PROGRESS NOTE ADULT - PROBLEM SELECTOR PLAN 1
- CT Abd/Pelvis w/o contrast revealed destructive bony lesions concerning for metastasis and liver lesions  - adenoca noted on hip bone biopsy at [facility], receiving radiation therapy  - path immunohistochemistry analysis noting possible pancreatobiliary/upper gi tract as primary source  - heme/onc, surg/onc and ID input noted and appreciated  - patient and family wishing for more palliative management; refusing chemo, EUS/EGD/Colonoscopy   -palliative re: GOC noted/appreciated

## 2018-09-05 NOTE — PROGRESS NOTE ADULT - SUBJECTIVE AND OBJECTIVE BOX
Infectious Diseases progress note:    Subjective: Events noted.  Pt's son at bedside.  Pt lethargic, arousable  Pt with reported sob and increased work of breathing.  S/p lasix and albumin.  RLE coldness with mottling.  No fevers.  WBc elevated today.        ROS:  CONSTITUTIONAL:  No fever, chills, rigors  CARDIOVASCULAR:  No chest pain or palpitations  RESPIRATORY:   No SOB, cough, dyspnea on exertion.  No wheezing  GASTROINTESTINAL:  No abd pain, N/V, diarrhea/constipation  EXTREMITIES:  No swelling or joint pain  GENITOURINARY:  No burning on urination, increased frequency or urgency.  No flank pain  NEUROLOGIC:  No HA, visual disturbances  SKIN: No rashes    Allergies    Levaquin (Other)  ramipril (Other)  tetracycline (Hives; Rash)    Intolerances        ANTIBIOTICS/RELEVANT:  antimicrobials  ceFAZolin   IVPB 2000 milliGRAM(s) IV Intermittent every 24 hours    immunologic:    OTHER:  ALBUTerol    90 MICROgram(s) HFA Inhaler 2 Puff(s) Inhalation every 6 hours PRN  chlorhexidine 4% Liquid 1 Application(s) Topical <User Schedule>  dextrose 40% Gel 15 Gram(s) Oral once PRN  dextrose 5%. 1000 milliLiter(s) IV Continuous <Continuous>  dextrose 50% Injectable 12.5 Gram(s) IV Push once  dextrose 50% Injectable 25 Gram(s) IV Push once  dextrose 50% Injectable 25 Gram(s) IV Push once  docusate sodium 100 milliGRAM(s) Oral three times a day  furosemide   Injectable 40 milliGRAM(s) IV Push two times a day  glucagon  Injectable 1 milliGRAM(s) IntraMuscular once PRN  insulin glargine Injectable (LANTUS) 12 Unit(s) SubCutaneous at bedtime  insulin lispro (HumaLOG) corrective regimen sliding scale   SubCutaneous three times a day before meals  lactobacillus acidophilus 1 Tablet(s) Oral three times a day with meals  lidocaine   Patch 1 Patch Transdermal daily  oxyCODONE    IR 5 milliGRAM(s) Oral every 4 hours PRN  pantoprazole    Tablet 40 milliGRAM(s) Oral before breakfast  polyethylene glycol 3350 17 Gram(s) Oral two times a day PRN  propranolol LA 60 milliGRAM(s) Oral daily  senna 2 Tablet(s) Oral at bedtime  warfarin 2 milliGRAM(s) Oral once      Objective:  Vital Signs Last 24 Hrs  T(C): 36.4 (05 Sep 2018 05:20), Max: 36.6 (04 Sep 2018 15:18)  T(F): 97.6 (05 Sep 2018 05:20), Max: 97.9 (04 Sep 2018 15:18)  HR: 82 (05 Sep 2018 05:20) (82 - 93)  BP: 127/65 (05 Sep 2018 05:20) (114/61 - 127/65)  BP(mean): --  RR: 20 (05 Sep 2018 05:20) (18 - 22)  SpO2: 98% (05 Sep 2018 05:20) (92% - 100%)    PHYSICAL EXAM:  Constitutional: lethargic, arousable  Eyes:JORGE, EOMI  Ear/Nose/Throat: no thrush, mucositis.  Moist mucous membranes	  Neck:no JVD, no lymphadenopathy, supple  Respiratory: CTA ignacio  Cardiovascular: S1S2 RRR, no murmurs  Gastrointestinal:soft, nontender,  nondistended (+) BS  Extremities:no e/e/c  Skin:  L foot dressing c/d/i.  Rt foot cool to touch        LABS:                        8.9    12.65 )-----------( 196      ( 05 Sep 2018 05:45 )             28.3     09-05    132<L>  |  94<L>  |  79<H>  ----------------------------<  165<H>  5.0   |  23  |  2.65<H>    Ca    10.0      05 Sep 2018 05:45    TPro  6.4  /  Alb  x   /  TBili  x   /  DBili  x   /  AST  x   /  ALT  x   /  AlkPhos  x   09-04    PT/INR - ( 05 Sep 2018 05:45 )   PT: 21.8 SEC;   INR: 1.87          PTT - ( 04 Sep 2018 06:12 )  PTT:36.2 SEC      Procalcitonin, Serum: 2.60 (08-16 @ 08:00)                    MICROBIOLOGY:    Culture - Tissue with Gram Stain (08.21.18 @ 21:28)    -  Gentamicin: S <=4 Should not be used as monotherapy    -  Oxacillin: S <=0.25    -  Penicillin: R >8    -  RIF- Rifampin: S <=1 Should not be used as monotherapy    -  Tetra/Doxy: S <=4    -  Trimethoprim/Sulfamethoxazole: S <=0.5/9.5    -  Vancomycin: S 1    Gram Stain:   No polymorphonuclear cells seen  No organisms seen    -  Ampicillin/Sulbactam: S <=8/4    -  Cefazolin: S <=4    -  Clindamycin: R >4    -  Erythromycin: R >4    Specimen Source: .Tissue left 1st metatarsal head    Culture Results:   Rare Staphylococcus aureus    Organism Identification: Staphylococcus aureus    Organism: Staphylococcus aureus    Method Type: BUCK          RADIOLOGY & ADDITIONAL STUDIES:    < from: Xray Chest 1 View- PORTABLE-Urgent (09.04.18 @ 12:15) >  FINDINGS:      Lines and Tubes: The catheter placed via left-sided approach is likely in   the SVC.      Lungs: The lateral perihilar opacities are increased.      Pleura: Bilateral pleural effusions.      Heart and Mediastinum: Cardiomegaly.           Skeletal: Unremarkable.        IMPRESSION:    1.  Bilateral pleural effusions.  2.  Likely underlying pulmonary edema.    < end of copied text >

## 2018-09-06 DIAGNOSIS — R78.81 BACTEREMIA: ICD-10-CM

## 2018-09-06 DIAGNOSIS — R06.02 SHORTNESS OF BREATH: ICD-10-CM

## 2018-09-06 DIAGNOSIS — C79.9 SECONDARY MALIGNANT NEOPLASM OF UNSPECIFIED SITE: ICD-10-CM

## 2018-09-06 LAB
BUN SERPL-MCNC: 81 MG/DL — HIGH (ref 7–23)
CALCIUM SERPL-MCNC: 9.8 MG/DL — SIGNIFICANT CHANGE UP (ref 8.4–10.5)
CHLORIDE SERPL-SCNC: 95 MMOL/L — LOW (ref 98–107)
CO2 SERPL-SCNC: 25 MMOL/L — SIGNIFICANT CHANGE UP (ref 22–31)
CREAT SERPL-MCNC: 2.6 MG/DL — HIGH (ref 0.5–1.3)
GAS PNL BLDMV: SIGNIFICANT CHANGE UP
GAS PNL BLDMV: SIGNIFICANT CHANGE UP
GLUCOSE BLDC GLUCOMTR-MCNC: 121 MG/DL — HIGH (ref 70–99)
GLUCOSE BLDC GLUCOMTR-MCNC: 134 MG/DL — HIGH (ref 70–99)
GLUCOSE BLDC GLUCOMTR-MCNC: 190 MG/DL — HIGH (ref 70–99)
GLUCOSE BLDC GLUCOMTR-MCNC: 85 MG/DL — SIGNIFICANT CHANGE UP (ref 70–99)
GLUCOSE SERPL-MCNC: 105 MG/DL — HIGH (ref 70–99)
HCT VFR BLD CALC: 30.3 % — LOW (ref 34.5–45)
HGB BLD-MCNC: 9.3 G/DL — LOW (ref 11.5–15.5)
INR BLD: 1.75 — HIGH (ref 0.88–1.17)
KAPPA/LAMBDA FREE LIGHT CHAIN RATIO, SERUM: 1.05 — SIGNIFICANT CHANGE UP
MCHC RBC-ENTMCNC: 29.2 PG — SIGNIFICANT CHANGE UP (ref 27–34)
MCHC RBC-ENTMCNC: 30.7 % — LOW (ref 32–36)
MCV RBC AUTO: 95 FL — SIGNIFICANT CHANGE UP (ref 80–100)
NRBC # FLD: 0 — SIGNIFICANT CHANGE UP
PLATELET # BLD AUTO: 212 K/UL — SIGNIFICANT CHANGE UP (ref 150–400)
PMV BLD: 9.9 FL — SIGNIFICANT CHANGE UP (ref 7–13)
POTASSIUM SERPL-MCNC: 4.3 MMOL/L — SIGNIFICANT CHANGE UP (ref 3.5–5.3)
POTASSIUM SERPL-SCNC: 4.3 MMOL/L — SIGNIFICANT CHANGE UP (ref 3.5–5.3)
PROTHROM AB SERPL-ACNC: 19.6 SEC — HIGH (ref 9.8–13.1)
RBC # BLD: 3.19 M/UL — LOW (ref 3.8–5.2)
RBC # FLD: 18.3 % — HIGH (ref 10.3–14.5)
SODIUM SERPL-SCNC: 136 MMOL/L — SIGNIFICANT CHANGE UP (ref 135–145)
WBC # BLD: 11.41 K/UL — HIGH (ref 3.8–10.5)
WBC # FLD AUTO: 11.41 K/UL — HIGH (ref 3.8–10.5)

## 2018-09-06 RX ORDER — WARFARIN SODIUM 2.5 MG/1
2 TABLET ORAL ONCE
Qty: 0 | Refills: 0 | Status: COMPLETED | OUTPATIENT
Start: 2018-09-06 | End: 2018-09-06

## 2018-09-06 RX ORDER — FUROSEMIDE 40 MG
80 TABLET ORAL EVERY 12 HOURS
Qty: 0 | Refills: 0 | Status: COMPLETED | OUTPATIENT
Start: 2018-09-06 | End: 2018-09-06

## 2018-09-06 RX ORDER — ALBUMIN HUMAN 25 %
50 VIAL (ML) INTRAVENOUS EVERY 12 HOURS
Qty: 0 | Refills: 0 | Status: COMPLETED | OUTPATIENT
Start: 2018-09-06 | End: 2018-09-06

## 2018-09-06 RX ORDER — FENTANYL CITRATE 50 UG/ML
1 INJECTION INTRAVENOUS
Qty: 0 | Refills: 0 | Status: DISCONTINUED | OUTPATIENT
Start: 2018-09-06 | End: 2018-09-10

## 2018-09-06 RX ORDER — OXYCODONE HYDROCHLORIDE 5 MG/1
5 TABLET ORAL EVERY 4 HOURS
Qty: 0 | Refills: 0 | Status: DISCONTINUED | OUTPATIENT
Start: 2018-09-06 | End: 2018-09-10

## 2018-09-06 RX ORDER — FUROSEMIDE 40 MG
80 TABLET ORAL EVERY 12 HOURS
Qty: 0 | Refills: 0 | Status: DISCONTINUED | OUTPATIENT
Start: 2018-09-06 | End: 2018-09-06

## 2018-09-06 RX ADMIN — OXYCODONE HYDROCHLORIDE 5 MILLIGRAM(S): 5 TABLET ORAL at 15:21

## 2018-09-06 RX ADMIN — Medication 1: at 13:14

## 2018-09-06 RX ADMIN — INSULIN GLARGINE 12 UNIT(S): 100 INJECTION, SOLUTION SUBCUTANEOUS at 22:33

## 2018-09-06 RX ADMIN — WARFARIN SODIUM 2 MILLIGRAM(S): 2.5 TABLET ORAL at 17:56

## 2018-09-06 RX ADMIN — Medication 60 MILLIGRAM(S): at 06:29

## 2018-09-06 RX ADMIN — Medication 100 MILLIGRAM(S): at 13:14

## 2018-09-06 RX ADMIN — Medication 1 TABLET(S): at 18:00

## 2018-09-06 RX ADMIN — LIDOCAINE 1 PATCH: 4 CREAM TOPICAL at 11:10

## 2018-09-06 RX ADMIN — Medication 1 TABLET(S): at 13:14

## 2018-09-06 RX ADMIN — OXYCODONE HYDROCHLORIDE 5 MILLIGRAM(S): 5 TABLET ORAL at 14:25

## 2018-09-06 RX ADMIN — Medication 80 MILLIGRAM(S): at 22:32

## 2018-09-06 RX ADMIN — Medication 50 MILLILITER(S): at 22:04

## 2018-09-06 RX ADMIN — Medication 100 MILLIGRAM(S): at 06:51

## 2018-09-06 RX ADMIN — CHLORHEXIDINE GLUCONATE 1 APPLICATION(S): 213 SOLUTION TOPICAL at 09:22

## 2018-09-06 RX ADMIN — PANTOPRAZOLE SODIUM 40 MILLIGRAM(S): 20 TABLET, DELAYED RELEASE ORAL at 06:29

## 2018-09-06 RX ADMIN — LIDOCAINE 1 PATCH: 4 CREAM TOPICAL at 22:35

## 2018-09-06 RX ADMIN — Medication 50 MILLILITER(S): at 10:45

## 2018-09-06 RX ADMIN — Medication 1 TABLET(S): at 09:21

## 2018-09-06 RX ADMIN — Medication 80 MILLIGRAM(S): at 10:45

## 2018-09-06 RX ADMIN — Medication 100 MILLIGRAM(S): at 06:30

## 2018-09-06 RX ADMIN — Medication 100 MILLIGRAM(S): at 22:32

## 2018-09-06 RX ADMIN — SENNA PLUS 2 TABLET(S): 8.6 TABLET ORAL at 22:32

## 2018-09-06 NOTE — PROGRESS NOTE ADULT - SUBJECTIVE AND OBJECTIVE BOX
[facility] NEPHROLOGY- PROGRESS NOTE    89y Female with history of CKD-3 presents with difficulty ambulating found to have R hip lesion secondary to malignancy and bacteremia. Nephrology consulted for elevated Scr.    Patient s/p lasix 80 mg IV X 2 doses yesterday with mild improvement in dyspnea.    REVIEW OF SYSTEMS:  Gen: no changes in weight, + weakness  Cards: no chest pain  Resp: + dyspnea with exertion improving  GI: no nausea or vomiting or diarrhea  Vascular: + LE edema improving    Levaquin (Other)  ramipril (Other)  tetracycline (Hives; Rash)      Hospital Medications: Medications reviewed      VITALS:  T(F): 97.7 (18 @ 06:21), Max: 98.1 (18 @ 21:02)  HR: 73 (18 @ 07:14)  BP: 122/65 (18 @ 06:21)  RR: 19 (18 @ 07:14)  SpO2: 100% (18 @ 07:14)  Wt(kg): --      PHYSICAL EXAM:    Gen: NAD, calm  Cards: RRR, +S1/S2, no M/G/R  Resp: Decreased BS @ bases B/L with rales in lower lobes  GI: soft, NT/ND, NABS  Vascular: 2+ LE edema B/L, cyanotic fingers      LABS:      136  |  95<L>  |  81<H>  ----------------------------<  105<H>  4.3   |  25  |  2.60<H>    Ca    9.8      06 Sep 2018 06:35      Creatinine Trend: 2.60 <--, 2.65 <--, 2.64 <--, 2.70 <--, 2.47 <--, 2.46 <--, 2.52 <--                        9.3    11.41 )-----------( 212      ( 06 Sep 2018 06:35 )             30.3     Urine Studies:  Urinalysis Basic - ( 02 Sep 2018 14:15 )    Color: YELLOW / Appearance: TURBID / S.025 / pH: 6.5  Gluc: NEGATIVE / Ketone: NEGATIVE  / Bili: SMALL / Urobili: NORMAL   Blood: LARGE / Protein: 100 / Nitrite: NEGATIVE   Leuk Esterase: MODERATE / RBC: 20-30 / WBC 10-20   Sq Epi:  / Non Sq Epi: SMALL / Bacteria: MODERATE      Sodium, Random Urine: < 20 mmol/L ( @ 14:15)  Creatinine, Random Urine: 78.70 mg/dL ( @ 14:15)  Protein, Random Urine: 134.5 mg/dL ( @ 14:15) [facility] NEPHROLOGY- PROGRESS NOTE    89y Female with history of CKD-3 presents with difficulty ambulating found to have R hip lesion secondary to malignancy and bacteremia. Nephrology consulted for elevated Scr.    Patient s/p lasix 80 mg IV X 2 doses yesterday with mild improvement in dyspnea.    REVIEW OF SYSTEMS:  Gen: no changes in weight, + weakness  Cards: no chest pain  Resp: + dyspnea with exertion improving  GI: no nausea or vomiting or diarrhea  Vascular: + LE edema improving    Levaquin (Other)  ramipril (Other)  tetracycline (Hives; Rash)      Hospital Medications: Medications reviewed      VITALS:  T(F): 97.7 (18 @ 06:21), Max: 98.1 (18 @ 21:02)  HR: 73 (18 @ 07:14)  BP: 122/65 (18 @ 06:21)  RR: 19 (18 @ 07:14)  SpO2: 100% (18 @ 07:14)  Wt(kg): --      PHYSICAL EXAM:    Gen: NAD, calm  Cards: RRR, +S1/S2, no M/G/R  Resp: Decreased BS @ bases B/L with rales in lower lobes  GI: soft, NT/ND, NABS  Vascular: 1+ LE edema B/L improving, cyanotic fingers      LABS:      136  |  95<L>  |  81<H>  ----------------------------<  105<H>  4.3   |  25  |  2.60<H>    Ca    9.8      06 Sep 2018 06:35      Creatinine Trend: 2.60 <--, 2.65 <--, 2.64 <--, 2.70 <--, 2.47 <--, 2.46 <--, 2.52 <--                        9.3    11.41 )-----------( 212      ( 06 Sep 2018 06:35 )             30.3     Urine Studies:  Urinalysis Basic - ( 02 Sep 2018 14:15 )    Color: YELLOW / Appearance: TURBID / S.025 / pH: 6.5  Gluc: NEGATIVE / Ketone: NEGATIVE  / Bili: SMALL / Urobili: NORMAL   Blood: LARGE / Protein: 100 / Nitrite: NEGATIVE   Leuk Esterase: MODERATE / RBC: 20-30 / WBC 10-20   Sq Epi:  / Non Sq Epi: SMALL / Bacteria: MODERATE      Sodium, Random Urine: < 20 mmol/L ( @ 14:15)  Creatinine, Random Urine: 78.70 mg/dL ( @ 14:15)  Protein, Random Urine: 134.5 mg/dL ( @ 14:15)      < from: CT Chest No Cont (18 @ 16:32) >  IMPRESSION:    Interlobular septal thickening with superimposed groundglass opacities   and small to moderate bilateral bilateral pleural effusions, right   greater than left, consistent with pulmonary edema.    Small amount of ascites increased from 2018 and associated   subcutaneous edema.    Heterogeneity of the liver with hypodense lesions worrisome for   metastases.    Subcentimeter lucent lesions in the second left rib with associated small   pathologic fracture, suspicious for metastases. 1.3 cm lucent lesion in   the fifth left rib with bony destruction, also suspicious for metastases.    < end of copied text >

## 2018-09-06 NOTE — CONSULT NOTE ADULT - CONSULT REASON
pain
Adenocarcinoma
Afib
Antibiotic management
Elevated Scr
High Blood Sugars/DMT2
MSSA bacteremia
SOB
adenocarcinoma
cold right foot
foot infection
goals of care
metastatic adenocarcinoma
right hip metastatic lytic lesion

## 2018-09-06 NOTE — PROGRESS NOTE ADULT - PROBLEM SELECTOR PLAN 4
Likely secondary to hypoalbuminemia as spot urine TP/CR not nephrotic range (1.7 from secondary membranous?). Continue with IV albumin assisted diuresis. If no improvement in dyspnea, recommend CT chest. Monitor UO. Likely secondary to hypoalbuminemia as spot urine TP/CR not nephrotic range (1.7 from secondary membranous?). Continue with IV albumin assisted diuresis. Monitor UO.

## 2018-09-06 NOTE — PROGRESS NOTE ADULT - PROBLEM SELECTOR PLAN 1
- CT Abd/Pelvis w/o contrast revealed destructive bony lesions concerning for metastasis and liver lesions  - adenoca noted on hip bone biopsy at [facility], receiving radiation therapy  - path immunohistochemistry analysis noting possible pancreatobiliary/upper gi tract as primary source  - heme/onc, surg/onc and ID input noted and appreciated  - patient and family wishing for more palliative management; refusing chemo, EUS/EGD/Colonoscopy   -palliative re: GOC noted/appreciated

## 2018-09-06 NOTE — CONSULT NOTE ADULT - PROVIDER SPECIALTY LIST ADULT
Cardiology
Endocrinology
Gastroenterology
Heme/Onc
Infectious Disease
Infectious Disease
Nephrology
Palliative Care
Podiatry
Pulmonology
Surgery
Vascular Surgery
Rad Onc
Neurology

## 2018-09-06 NOTE — CONSULT NOTE ADULT - PROBLEM SELECTOR PROBLEM 1
Acute kidney injury
Adenocarcinoma of unknown primary
Adenocarcinoma of unknown primary
Diabetes
SOB (shortness of breath)
Adenocarcinoma of unknown primary

## 2018-09-06 NOTE — PROGRESS NOTE ADULT - SUBJECTIVE AND OBJECTIVE BOX
Patient is a 89y old  Female who presents with a chief complaint of transferred from [place] for rad-onc evaluation (06 Sep 2018 15:14)      INTERVAL HPI/OVERNIGHT EVENTS:  T(C): 36.5 (09-06-18 @ 15:30), Max: 36.7 (09-05-18 @ 21:02)  HR: 77 (09-06-18 @ 15:30) (73 - 84)  BP: 90/55 (09-06-18 @ 15:30) (90/55 - 122/65)  RR: 20 (09-06-18 @ 15:30) (19 - 20)  SpO2: 100% (09-06-18 @ 15:30) (100% - 100%)  Wt(kg): --  I&O's Summary      LABS:                        9.3    11.41 )-----------( 212      ( 06 Sep 2018 06:35 )             30.3     09-06    136  |  95<L>  |  81<H>  ----------------------------<  105<H>  4.3   |  25  |  2.60<H>    Ca    9.8      06 Sep 2018 06:35      PT/INR - ( 06 Sep 2018 06:35 )   PT: 19.6 SEC;   INR: 1.75              CAPILLARY BLOOD GLUCOSE      POCT Blood Glucose.: 190 mg/dL (06 Sep 2018 13:13)  POCT Blood Glucose.: 85 mg/dL (06 Sep 2018 08:39)  POCT Blood Glucose.: 171 mg/dL (05 Sep 2018 22:26)    ABG - ( 04 Sep 2018 22:20 )  pH, Arterial: 7.36  pH, Blood: x     /  pCO2: 42    /  pO2: 178   / HCO3: 23    / Base Excess: -1.2  /  SaO2: 99.3                    MEDICATIONS  (STANDING):  albumin human 25% IVPB 50 milliLiter(s) IV Intermittent every 12 hours  ceFAZolin   IVPB 2000 milliGRAM(s) IV Intermittent every 24 hours  chlorhexidine 4% Liquid 1 Application(s) Topical <User Schedule>  dextrose 5%. 1000 milliLiter(s) (50 mL/Hr) IV Continuous <Continuous>  dextrose 50% Injectable 12.5 Gram(s) IV Push once  dextrose 50% Injectable 25 Gram(s) IV Push once  dextrose 50% Injectable 25 Gram(s) IV Push once  docusate sodium 100 milliGRAM(s) Oral three times a day  fentaNYL   Patch  12 MICROgram(s)/Hr 1 Patch Transdermal every 72 hours  furosemide   Injectable 80 milliGRAM(s) IV Push every 12 hours  insulin glargine Injectable (LANTUS) 12 Unit(s) SubCutaneous at bedtime  insulin lispro (HumaLOG) corrective regimen sliding scale   SubCutaneous three times a day before meals  lactobacillus acidophilus 1 Tablet(s) Oral three times a day with meals  lidocaine   Patch 1 Patch Transdermal daily  pantoprazole    Tablet 40 milliGRAM(s) Oral before breakfast  propranolol LA 60 milliGRAM(s) Oral daily  senna 2 Tablet(s) Oral at bedtime  warfarin 2 milliGRAM(s) Oral once    MEDICATIONS  (PRN):  ALBUTerol    90 MICROgram(s) HFA Inhaler 2 Puff(s) Inhalation every 6 hours PRN Shortness of Breath and/or Wheezing  dextrose 40% Gel 15 Gram(s) Oral once PRN Blood Glucose LESS THAN 70 milliGRAM(s)/deciLiter  glucagon  Injectable 1 milliGRAM(s) IntraMuscular once PRN Glucose <70 milliGRAM(s)/deciLiter  oxyCODONE    IR 5 milliGRAM(s) Oral every 4 hours PRN Moderate and Severe pain  polyethylene glycol 3350 17 Gram(s) Oral two times a day PRN Constipation Patient is a 89y old  Female who presents with a chief complaint of transferred from [place] for rad-onc evaluation (06 Sep 2018 15:14)      INTERVAL HPI/OVERNIGHT EVENTS:  T(C): 36.5 (09-06-18 @ 15:30), Max: 36.7 (09-05-18 @ 21:02)  HR: 77 (09-06-18 @ 15:30) (73 - 84)  BP: 90/55 (09-06-18 @ 15:30) (90/55 - 122/65)  RR: 20 (09-06-18 @ 15:30) (19 - 20)  SpO2: 100% (09-06-18 @ 15:30) (100% - 100%)  Wt(kg): --  I&O's Summary      LABS:                        9.3    11.41 )-----------( 212      ( 06 Sep 2018 06:35 )             30.3     09-06    136  |  95<L>  |  81<H>  ----------------------------<  105<H>  4.3   |  25  |  2.60<H>    Ca    9.8      06 Sep 2018 06:35      PT/INR - ( 06 Sep 2018 06:35 )   PT: 19.6 SEC;   INR: 1.75              CAPILLARY BLOOD GLUCOSE    General: WN/WD NAD  PERRLA  Neurology: frail  Respiratory: CTA B/L  CV: RRR, S1S2, no murmurs, rubs or gallops  Abdominal: Soft, NT, ND +BS, Last BM  Extremities: No edema, + peripheral pulses  Skin Normal           POCT Blood Glucose.: 190 mg/dL (06 Sep 2018 13:13)  POCT Blood Glucose.: 85 mg/dL (06 Sep 2018 08:39)  POCT Blood Glucose.: 171 mg/dL (05 Sep 2018 22:26)    ABG - ( 04 Sep 2018 22:20 )  pH, Arterial: 7.36  pH, Blood: x     /  pCO2: 42    /  pO2: 178   / HCO3: 23    / Base Excess: -1.2  /  SaO2: 99.3                    MEDICATIONS  (STANDING):  albumin human 25% IVPB 50 milliLiter(s) IV Intermittent every 12 hours  ceFAZolin   IVPB 2000 milliGRAM(s) IV Intermittent every 24 hours  chlorhexidine 4% Liquid 1 Application(s) Topical <User Schedule>  dextrose 5%. 1000 milliLiter(s) (50 mL/Hr) IV Continuous <Continuous>  dextrose 50% Injectable 12.5 Gram(s) IV Push once  dextrose 50% Injectable 25 Gram(s) IV Push once  dextrose 50% Injectable 25 Gram(s) IV Push once  docusate sodium 100 milliGRAM(s) Oral three times a day  fentaNYL   Patch  12 MICROgram(s)/Hr 1 Patch Transdermal every 72 hours  furosemide   Injectable 80 milliGRAM(s) IV Push every 12 hours  insulin glargine Injectable (LANTUS) 12 Unit(s) SubCutaneous at bedtime  insulin lispro (HumaLOG) corrective regimen sliding scale   SubCutaneous three times a day before meals  lactobacillus acidophilus 1 Tablet(s) Oral three times a day with meals  lidocaine   Patch 1 Patch Transdermal daily  pantoprazole    Tablet 40 milliGRAM(s) Oral before breakfast  propranolol LA 60 milliGRAM(s) Oral daily  senna 2 Tablet(s) Oral at bedtime  warfarin 2 milliGRAM(s) Oral once    MEDICATIONS  (PRN):  ALBUTerol    90 MICROgram(s) HFA Inhaler 2 Puff(s) Inhalation every 6 hours PRN Shortness of Breath and/or Wheezing  dextrose 40% Gel 15 Gram(s) Oral once PRN Blood Glucose LESS THAN 70 milliGRAM(s)/deciLiter  glucagon  Injectable 1 milliGRAM(s) IntraMuscular once PRN Glucose <70 milliGRAM(s)/deciLiter  oxyCODONE    IR 5 milliGRAM(s) Oral every 4 hours PRN Moderate and Severe pain  polyethylene glycol 3350 17 Gram(s) Oral two times a day PRN Constipation

## 2018-09-06 NOTE — PROGRESS NOTE ADULT - PROBLEM SELECTOR PLAN 1
Patient with TRISH during admission at OSH with mild rise in Scr during current admission due to intravascular depletion with possible ATN given granular casts on UA. Continue with albumin IV albumin given plans to continue IV lasix. F/U pending serologies (serum DOMINGA, SPEP). Avoid nephrotoxins. Patient with TRISH during admission at OSH with mild rise in Scr during current admission due to intravascular depletion with possible ATN given granular casts on UA. Scr now stable. Continue with albumin IV albumin given plans to continue IV lasix as patient with pulmonary edema on CT chest. F/U pending serologies (serum DOMINGA, SPEP). Avoid nephrotoxins.

## 2018-09-06 NOTE — CONSULT NOTE ADULT - SUBJECTIVE AND OBJECTIVE BOX
Patient is a 89y old  Female who presents with a chief complaint of transferred from [place] for rad-onc evaluation (06 Sep 2018 10:14)      HPI:  89 y.o woman with multiple comorbidities presented with several weeks h/o of bony pain and recent inability to ambulate due to pain in the foot. Ct scan revealed destructive bony lesions concerning for metastasis and liver lesions. 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 Noted to have staph aurues bacteremia likely from left hallux abscess.The primary source of malignancy is unclear,. 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, primary unknown , now transferred to [facility] for rad-onc evaluation o the hip (28 Aug 2018 12:08)    Pt is being treated fro MSSA bacteremia with antibiotics and she was recently noted to have SOB and found to have abnormal ct scan chest and hence pulmonary called; Pt says when they move her she gets SOB: She denies having any pulmonary disease: But she was a smoker: In last 24 hours, per NP, she became SOB as well as found to be fluid overloaded:       ?FOLLOWING PRESENT  [x ] Hx of PE/DVT, [ x] Hx COPD, [ x] Hx of Asthma, [x ] Hx of Hospitalization, [ x]  Hx of BiPAP/CPAP use, [x ] Hx of DAVE    Allergies    Levaquin (Other)  ramipril (Other)  tetracycline (Hives; Rash)    Intolerances        PAST MEDICAL & SURGICAL HISTORY:  OAB (overactive bladder)  GERD (gastroesophageal reflux disease)  Angina effort  Heart failure  Upper respiratory infection  Diabetes  Renal stones  Myocardial infarction: 1981  Spinal stenosis  CVA (cerebral infarction)  Afib  Raynaud disease  Acid reflux  Hypertension  Hyperlipidemia  Asthma  Cataract  S/P hysterectomy      FAMILY HISTORY:  No pertinent family history in first degree relatives      Social History: [ 20 pk yeas: quit 30 years ago ] TOBACCO                  [x  ] ETOH                                 [ x ] IVDA/DRUGS    REVIEW OF SYSTEMS      General:	x    Skin/Breast:x  	  Ophthalmologic:x  	x  ENMT:	    Respiratory and Thorax: SOB   	  Cardiovascular:	x    Gastrointestinal:	    Genitourinary:	x    Musculoskeletal:	 left hallux OM. pedal Edema    Neurological:	x    Psychiatric:	x    Hematology/Lymphatics:	x    Endocrine:	x    Allergic/Immunologic:	x    MEDICATIONS  (STANDING):  albumin human 25% IVPB 50 milliLiter(s) IV Intermittent every 12 hours  ceFAZolin   IVPB 2000 milliGRAM(s) IV Intermittent every 24 hours  chlorhexidine 4% Liquid 1 Application(s) Topical <User Schedule>  dextrose 5%. 1000 milliLiter(s) (50 mL/Hr) IV Continuous <Continuous>  dextrose 50% Injectable 12.5 Gram(s) IV Push once  dextrose 50% Injectable 25 Gram(s) IV Push once  dextrose 50% Injectable 25 Gram(s) IV Push once  docusate sodium 100 milliGRAM(s) Oral three times a day  furosemide   Injectable 80 milliGRAM(s) IV Push every 12 hours  insulin glargine Injectable (LANTUS) 12 Unit(s) SubCutaneous at bedtime  insulin lispro (HumaLOG) corrective regimen sliding scale   SubCutaneous three times a day before meals  lactobacillus acidophilus 1 Tablet(s) Oral three times a day with meals  lidocaine   Patch 1 Patch Transdermal daily  pantoprazole    Tablet 40 milliGRAM(s) Oral before breakfast  propranolol LA 60 milliGRAM(s) Oral daily  senna 2 Tablet(s) Oral at bedtime  warfarin 2 milliGRAM(s) Oral once    MEDICATIONS  (PRN):  ALBUTerol    90 MICROgram(s) HFA Inhaler 2 Puff(s) Inhalation every 6 hours PRN Shortness of Breath and/or Wheezing  dextrose 40% Gel 15 Gram(s) Oral once PRN Blood Glucose LESS THAN 70 milliGRAM(s)/deciLiter  glucagon  Injectable 1 milliGRAM(s) IntraMuscular once PRN Glucose <70 milliGRAM(s)/deciLiter  oxyCODONE    IR 5 milliGRAM(s) Oral every 4 hours PRN Moderate and Severe pain  polyethylene glycol 3350 17 Gram(s) Oral two times a day PRN Constipation       Vital Signs Last 24 Hrs  T(C): 36.5 (06 Sep 2018 06:21), Max: 36.7 (05 Sep 2018 21:02)  T(F): 97.7 (06 Sep 2018 06:21), Max: 98.1 (05 Sep 2018 21:02)  HR: 73 (06 Sep 2018 07:14) (73 - 85)  BP: 122/65 (06 Sep 2018 06:21) (114/71 - 122/65)  BP(mean): --  RR: 19 (06 Sep 2018 07:14) (19 - 20)  SpO2: 100% (06 Sep 2018 07:14) (99% - 100%)        I&O's Summary      Physical Exam:   GENERAL: NAD, well-groomed, well-developed  HEENT: JORGE/   Atraumatic, Normocephalic  ENMT: No tonsillar erythema, exudates, or enlargement; Moist mucous membranes, Good dentition, No lesions  NECK: Supple, No JVD, Normal thyroid  CHEST/LUNG: bilaterla scattered crackles+  CVS: Regular rate and rhythm; No murmurs, rubs, or gallops  GI: : Soft, Nontender, Nondistended; Bowel sounds present  NERVOUS SYSTEM:  Alert & Oriented X3  EXTREMITIES:  2+ edema/ left hallux discoloration  LYMPH: No lymphadenopathy noted  SKIN: No rashes or lesions  ENDOCRINOLOGY: No Thyromegaly  PSYCH: Appropriate    Labs:  ABG - ( 04 Sep 2018 22:20 )  pH, Arterial: 7.36  pH, Blood: x     /  pCO2: 42    /  pO2: 178   / HCO3: 23    / Base Excess: -1.2  /  SaO2: 99.3                                        9.3    11.41 )-----------( 212      ( 06 Sep 2018 06:35 )             30.3                         8.9    12.65 )-----------( 196      ( 05 Sep 2018 05:45 )             28.3                         9.8    9.61  )-----------( 248      ( 03 Sep 2018 05:44 )             31.7     09-06    136  |  95<L>  |  81<H>  ----------------------------<  105<H>  4.3   |  25  |  2.60<H>  09-05    132<L>  |  94<L>  |  79<H>  ----------------------------<  165<H>  5.0   |  23  |  2.65<H>  09-04    131<L>  |  93<L>  |  77<H>  ----------------------------<  190<H>  5.2   |  24  |  2.64<H>  09-03    130<L>  |  94<L>  |  79<H>  ----------------------------<  127<H>  5.3   |  22  |  2.70<H>    Ca    9.8      06 Sep 2018 06:35  Ca    10.0      05 Sep 2018 05:45  Ca    9.6      04 Sep 2018 13:37    TPro  6.4  /  Alb  x   /  TBili  x   /  DBili  x   /  AST  x   /  ALT  x   /  AlkPhos  x   09-04  TPro  5.2<L>  /  Alb  1.9<L>  /  TBili  0.3  /  DBili  x   /  AST  90<H>  /  ALT  < 4<L>  /  AlkPhos  496<H>  09-03    CAPILLARY BLOOD GLUCOSE      POCT Blood Glucose.: 85 mg/dL (06 Sep 2018 08:39)  POCT Blood Glucose.: 171 mg/dL (05 Sep 2018 22:26)  POCT Blood Glucose.: 132 mg/dL (05 Sep 2018 17:44)  POCT Blood Glucose.: 133 mg/dL (05 Sep 2018 13:44)      PT/INR - ( 06 Sep 2018 06:35 )   PT: 19.6 SEC;   INR: 1.75              D DImer  Serum Pro-Brain Natriuretic Peptide: 9445 pg/mL (09-04 @ 13:37)      Studies  Chest X-RAY  CT SCAN Chest   CT Abdomen  Venous Dopplers: LE:   Others      < from: CT Chest No Cont (09.05.18 @ 16:32) >  HEART: Heart size is normal. No pericardial effusion.   MEDIASTINUM AND OZ: Anterior mediastinal lymph node unchanged from   2014, may be reactive. No hilar or mediastinal lesions evident,   evaluation limited due to lack of IV contrast.  CHEST WALL AND LOWER NECK: Anasarca. Subcutaneous left and right   abdominal wall edema.  VISUALIZED UPPER ABDOMEN: Small ascites, increased from 8/14/2018.   Heterogeneityof the liver with multiple hypodense lesions.  BONES: Degenerative changes of the spine. Subcentimeter lucent lesions in   the second left rib with associated small pathologic fracture, suspicious   for metastases. 1.3 cm lucent lesion in the fifth left rib with bony   destruction, suspicious for metastases.    IMPRESSION:    Interlobular septal thickening with superimposed groundglass opacities   and small to moderate bilateral bilateral pleural effusions, right   greater than left, consistent with pulmonary edema.    Small amount of ascites increased from 8/14/2018 and associated   subcutaneous edema.    Heterogeneity of the liver with hypodense lesions worrisome for   metastases.    Subcentimeter lucent lesions in the second left rib with associated small   pathologic fracture, suspicious for metastases. 1.3 cm lucent lesion in   the fifth left rib with bony destruction, also suspicious for metastases.        < from: US Duplex Venous Lower Ext Ltd, Right (09.05.18 @ 15:54) >    TECHNIQUE: Duplex sonography of the RIGHT LOWER extremity with color and   spectral Doppler, with and without compression.      FINDINGS:    There is normal compressibility of the right common femoral, femoral and   popliteal veins. No calf vein thrombosis is detected. Subcutaneous edema   in the right lower extremity is noted.    The contralateral common femoral vein is patent.    Doppler examination shows normal spontaneous and phasic flow.    IMPRESSION:     No evidence of right lower extremity deep venous thrombosis.                  < from: Xray Chest 1 View- PORTABLE-Urgent (09.04.18 @ 12:15) >      FINDINGS:      Lines and Tubes: The catheter placed via left-sided approach is likely in   the SVC.      Lungs: The lateral perihilar opacities are increased.      Pleura: Bilateral pleural effusions.      Heart and Mediastinum: Cardiomegaly.           Skeletal: Unremarkable.        IMPRESSION:    1.  Bilateral pleural effusions.  2.  Likely underlying pulmonary edema.            < from: Xray Chest 1 View- PORTABLE-Urgent (08.17.18 @ 21:23) >        INTERPRETATION:  Chest portable.    Clinical History: Fever.    Comparison: 8/14/2018.    Single AP view submitted.  The patient is rotated.    The evaluation of the cardiomediastinal silhouette is limited on portable   technique.    There are low lung volumes resulting in crowding of the bronchovascular   markings at the lung bases.  There is minimal blunting at the right costophrenic angle either   representing trace pleural effusion and/or pleural thickening.  There is subsegmental atelectasis at both lung bases.    Impression:    Findings as discussed above.                      GHAZALA ARAGON M.D., ATTENDING RADIOLOGIST  This document has been electronically signed. Aug 18 2018  8:09AM        < end of copied text >        NATHALY CARLOS M.D., ATTENDING RADIOLOGIST  This document has been electronically signed. Sep  4 2018  1:35PM        < end of copied text >        MENDY ZACARIAS M.D. ATTENDING RADIOLOGIST  This document has been electronically signed. Sep  5 2018  4:04PM        < end of copied text >        GAYATRI JUDGE M.D., RADIOLOGY RESIDENT  This document has been electronically signed.  HANK HAMM M.D. ATTENDING RADIOLOGIST  This document has been electronically signed. Sep  5 2018  5:45PM        < end of copied text >      < from: TTE Echo Doppler w/o Cont (08.17.18 @ 10:38) >  Technically difficult and limited study.  Mitral Valve: Calcified mitral annulus and mitral valve leaflets. Normal   opening of the mitral valve leaflets. Trace mitral regurgitation.  Aortic Valve/Aorta: Not well visualized  Tricuspid Valve: Calcified tricuspid annulus with normally opening valve.   Trace tricuspid regurgitation.  Pulmonic Valve: The pulmonic valve is not well visualized. Probably   normal.  Left Atrium: Moderate left atrial enlargement  Right Atrium: Normal  Left Ventricle: The endocardium is not well-visualized. Overall there is   preserved left ventricular systolic function. Unable to rule out wall   motion abnormalities. The EF is approximately 65%.  Right Ventricle: Normal right ventricular size and function.  Pericardium/Pleura: No pericardial effusion noted.  Pulmonary/RV Pressure: The right ventricular systolic pressure is   estimated to be 35mmHg, assuming that the right atrial pressure is   estimated to be8 mmHg. This is consistent with normal pulmonary   pressures.  LV Diastolic Function: Stage 2 diastolic dysfunction    Conclusion:   Technically difficult and limited study. Overall preserved left   ventricular systolic function. Stage II diastolic dysfunction. No   definitive vegetations noted on this study. If clinically warranted would   recommend a JOSE GUADALUPE to rule out endocarditis                  PEPITO BUTT M.D., ATTENDING CARDIOLOGIST  This document has been electronically signed. Aug 17 2018 1:09PM        < end of copied text >

## 2018-09-06 NOTE — CONSULT NOTE ADULT - CONSULT REQUESTED BY NAME
Dr KRISTI Corrales
Dr. Corrales
Dr. Kwaku Corrales
Dr. Kwaku Corrales
Primary team
floor
primary team
KRISTI Corrales
Dr. Corrales

## 2018-09-06 NOTE — CONSULT NOTE ADULT - ASSESSMENT
89 y.o woman with multiple comorbidities presented with several weeks h/o of bony pain and recent inability to ambulate du to pain in the foot. Ct scan revealed destructive bony lesions concerning for metastasis and liver lesions. 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 Noted to have staph aurues bacteremia likely from left hallux abscess.The primary source of malignancy is unclear,. 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  Plan: Right hip lesion concerning for malignancy  S/P right hip IR biopsy -- adenocarcinoma: moderately to poorly differentiated of pancreatobiliary/UGI primary  rad- onc consult      Problem/Plan - 2:  ·  Problem: MSSA bacteremia.  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  Plan: cw earlier regimen      Problem/Plan - 4:  ·  Problem: Metastatic cancer.  Plan onc, and rad-onc consult    Problem/Plan - 5:  ·  Problem: Atrial fibrillation, unspecified type.  Plan: Continue current medications  dose coumadin daily.
89 year old F s/p left foot bunionectomy POD#7 secondary to osteomyelitis of hallux   - pt seen and eval  - VSS, WBC 10.38  - x-rays reveal no gas, no OM  - wound is not dehisced no surrounding erythema or edema, sutures intact.  - cont IV abx via PICC   - podiatry will continue to follow  - d/w attending.
89 year old female with newly diagnosed poorly differentiated adenocarcinoma, primary unknown.  Currently receiving RT.  Palliative consulted for goals of care.
89y Female with history of CKD-3 presents with difficulty ambulating found to have R hip lesion secondary to malignancy and bacteremia. Nephrology consulted for elevated Scr.
89yo woman transferred from [facility] where she was admitted for c/o painful ambulation for weeks and underwent hip bone biopsy which revealed Adenocarcinoma with unknown primary with pathology immunohistochemistry analysis noting possible pancreatobiliary/upper gi tract as primary source. CT Abd/Pelvis w/o contrast revealed destructive bony lesions concerning for metastasis and liver lesions
Assessment  DMT2: 89y Female with DM T2 with hyperglycemia was started on basal bolus insulin recently in hospital, blood sugars in acceptable range now, no hypoglycemic episode,  eating meals,  non compliant with low carb diet.  CAD: On medications, stable, monitored.  Metastatic disease: On pain meds, stable.  HTN: Controlled, On med.
Patient is an 89F with metastatic cancer to hip, hx of afib on coumadin, with 1 day of cool mottled toes, pulses intact  - continue anticoagulation with coumadin  - no acute surgical intervention at this time   - please reconsult if patient loses pulses, concern for acute limb ischemia  - case discussed with vascular fellow, Dr. Valerio Woodard PGY2  [id number]
89 y.o woman with multiple comorbidities presented with several weeks h/o of bony pain and recent inability to ambulate due to pain in the foot. Ct scan revealed destructive bony lesions concerning for metastasis and liver lesions. 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 Noted to have staph aurues bacteremia likely from left hallux abscess.The primary source of malignancy is unclear,. 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, primary possibly GI/pancreatic , now transfered to [facility] for rad-onc evaluation o the hip (28 Aug 2018 12:08)    Pt underwent left hallux amputation , pathology consistent with acute OM , intra op cs also MSSA.  Pt has been on Ancef and planned for total 6 week course to end on 9/30/18.  Pt had TTE performed on 8/17.   Repeat blood cultures have been negative.  Pt had been followed by Podiatry service at  (Dr. Killian).      Pt has been seen by Rad Onc for initiation of palliative radiation.  GI also following for possible pancreatobiliary/upper GI tract as primary source.  Patient wishes to hold off on further endoscopic evaluation until after the onset of radiation begins.  Pt wishes for more palliative options with palliative radiation to the hip.  Pt has increased liver enzymes likely in setting of bone mets vs primary source.  GI following.  Pt also seen by Onc Surgery - no intervention necessary at this time.        ID consult called for antibiotic managment.      Problem/Plan - 1:    ·	MSSA bacteremia    - Source likely L hallux OM/abscess, s/p amputation.  Intra-op cx also (+) MSSA.  pt s/p echo.  Repeat bcx cleared.  s/p PICC line.      - Cont cefazolin (renally dosed for Cr Cl 20) 2gm IV Qdaily.  Cont to monitor renal function.    - Monitor for leukocytosis, new fevers.      - Complete abx course through 9/30 to complete 6 weeks.    - cont local wound care and management of sutures as per Podiatry    Will follow,    Shanda Reaves  [phone]
Ms. Olson is a 88 y/o F, with recent dx of cancer of unknown primary via right bone biopsy, DM with osteomyelitis on antibiotics, transferred from [facility] for radiation.

## 2018-09-06 NOTE — PROGRESS NOTE ADULT - SUBJECTIVE AND OBJECTIVE BOX
Infectious Diseases progress note:    Subjective: SOB improved today, s/p lasix.  No new fevers, abd pain, diarrhea.  Pt for radiation trt today.  Son present at bedside    ROS:  CONSTITUTIONAL:  No fever, chills, rigors  CARDIOVASCULAR:  No chest pain or palpitations  RESPIRATORY:   No SOB, cough, dyspnea on exertion.  No wheezing  GASTROINTESTINAL:  No abd pain, N/V, diarrhea/constipation  EXTREMITIES:  No swelling or joint pain  GENITOURINARY:  No burning on urination, increased frequency or urgency.  No flank pain  NEUROLOGIC:  No HA, visual disturbances  SKIN: No rashes    Allergies    Levaquin (Other)  ramipril (Other)  tetracycline (Hives; Rash)    Intolerances        ANTIBIOTICS/RELEVANT:  antimicrobials  ceFAZolin   IVPB 2000 milliGRAM(s) IV Intermittent every 24 hours    immunologic:    OTHER:  albumin human 25% IVPB 50 milliLiter(s) IV Intermittent every 12 hours  ALBUTerol    90 MICROgram(s) HFA Inhaler 2 Puff(s) Inhalation every 6 hours PRN  chlorhexidine 4% Liquid 1 Application(s) Topical <User Schedule>  dextrose 40% Gel 15 Gram(s) Oral once PRN  dextrose 5%. 1000 milliLiter(s) IV Continuous <Continuous>  dextrose 50% Injectable 12.5 Gram(s) IV Push once  dextrose 50% Injectable 25 Gram(s) IV Push once  dextrose 50% Injectable 25 Gram(s) IV Push once  docusate sodium 100 milliGRAM(s) Oral three times a day  furosemide   Injectable 80 milliGRAM(s) IV Push every 12 hours  glucagon  Injectable 1 milliGRAM(s) IntraMuscular once PRN  insulin glargine Injectable (LANTUS) 12 Unit(s) SubCutaneous at bedtime  insulin lispro (HumaLOG) corrective regimen sliding scale   SubCutaneous three times a day before meals  lactobacillus acidophilus 1 Tablet(s) Oral three times a day with meals  lidocaine   Patch 1 Patch Transdermal daily  oxyCODONE    IR 5 milliGRAM(s) Oral every 4 hours PRN  pantoprazole    Tablet 40 milliGRAM(s) Oral before breakfast  polyethylene glycol 3350 17 Gram(s) Oral two times a day PRN  propranolol LA 60 milliGRAM(s) Oral daily  senna 2 Tablet(s) Oral at bedtime  warfarin 2 milliGRAM(s) Oral once      Objective:  Vital Signs Last 24 Hrs  T(C): 36.5 (06 Sep 2018 06:21), Max: 36.7 (05 Sep 2018 21:02)  T(F): 97.7 (06 Sep 2018 06:21), Max: 98.1 (05 Sep 2018 21:02)  HR: 73 (06 Sep 2018 07:14) (73 - 84)  BP: 122/65 (06 Sep 2018 06:21) (120/63 - 122/65)  BP(mean): --  RR: 19 (06 Sep 2018 07:14) (19 - 20)  SpO2: 100% (06 Sep 2018 07:14) (100% - 100%)    PHYSICAL EXAM:  Constitutional:NAD  Eyes:JORGE, EOMI  Ear/Nose/Throat: no thrush, mucositis.  Moist mucous membranes	  Neck:no JVD, no lymphadenopathy, supple  Respiratory: CTA ignacio  Cardiovascular: S1S2 RRR, no murmurs  Gastrointestinal:soft, nontender,  nondistended (+) BS  Extremities:no e/e/c  Skin:  L ft ace bandage c/d/i.  Rt ft cool        LABS:                        9.3    11.41 )-----------( 212      ( 06 Sep 2018 06:35 )             30.3     09-06    136  |  95<L>  |  81<H>  ----------------------------<  105<H>  4.3   |  25  |  2.60<H>    Ca    9.8      06 Sep 2018 06:35      PT/INR - ( 06 Sep 2018 06:35 )   PT: 19.6 SEC;   INR: 1.75                Procalcitonin, Serum: 2.60 (08-16 @ 08:00)                    MICROBIOLOGY:    Culture - Tissue with Gram Stain (08.21.18 @ 21:28)    -  Oxacillin: S <=0.25    -  Penicillin: R >8    -  RIF- Rifampin: S <=1 Should not be used as monotherapy    -  Gentamicin: S <=4 Should not be used as monotherapy    -  Tetra/Doxy: S <=4    -  Trimethoprim/Sulfamethoxazole: S <=0.5/9.5    -  Vancomycin: S 1    Gram Stain:   No polymorphonuclear cells seen  No organisms seen    -  Ampicillin/Sulbactam: S <=8/4    -  Cefazolin: S <=4    -  Clindamycin: R >4    -  Erythromycin: R >4    Specimen Source: .Tissue left 1st metatarsal head    Culture Results:   Rare Staphylococcus aureus    Organism Identification: Staphylococcus aureus    Organism: Staphylococcus aureus    Method Type: [facility]            RADIOLOGY & ADDITIONAL STUDIES:    < from: CT Chest No Cont (09.05.18 @ 16:32) >    IMPRESSION:    Interlobular septal thickening with superimposed groundglass opacities   and small to moderate bilateral bilateral pleural effusions, right   greater than left, consistent with pulmonary edema.    Small amount of ascites increased from 8/14/2018 and associated   subcutaneous edema.    Heterogeneity of the liver with hypodense lesions worrisome for   metastases.    Subcentimeter lucent lesions in the second left rib with associated small   pathologic fracture, suspicious for metastases. 1.3 cm lucent lesion in   the fifth left rib with bony destruction, also suspicious for metastases.    < end of copied text >

## 2018-09-06 NOTE — CONSULT NOTE ADULT - PROBLEM SELECTOR RECOMMENDATION 9
- CT Abd/Pelvis w/o contrast revealed destructive bony lesions concerning for metastasis and liver lesions  - adenoca noted on hip bone biopsy at [facility]  - path immunohistochemistry analysis noting possible pancreatobiliary/upper gi tract as primary source  - patient is wishing to hold off on endoscopic evaluation until after the onset of radiation begins and then will make a decision if she wishes to pursue further investigation to primary source  - discussion w/the patient and daughter bedside who at present are wishing for more palliative options with palliative radiation to the hip in hopes to be able to ambulate again; they would possibly be willing to consider a "mild chemo" if that would be offered to them  - consider palliative eval re: ISAIAH
Currently receiving RT to right hip.  Plan for 5 sessions.  No plans for further disease modifying treatment.  Plan for rehab post completion of RT with transition to home with hospice after.  Continue supportive care.
Patient with TRISH during admission at OSH which has been relatively stable here. Ddx includes intravascular depletion versus interstitial disease on IV abx? Check UA, urine sodium, urine creatinine. Renal imaging on admission reviewed and likely erroneous as left kidney measured 0.7 cm (likely meant to be 10.7 cm). Attempted to contact radiology reading room today without answer. Avoid nephrotoxins.
Will continue current insulin regimen for now. Will continue monitoring FS, log, will Follow up. Will add Humalog coverage, monitor FS FU  Patient counseled for compliance with consistent low carb diet.
pt seems SOB secondary to fluid overload: When she had ct scan done on 8/14/18: She did not have much of pleural effusion and has no GGO , but on repeat ct scan few days ago, she has significant pleural effusion as well as GGO suggestive of fluid overload: Her last echo was technically limited but she had gr two diastolic dysfunction: Her legs are significantly edematous: Sh eis getting IV lasix for a limited time, I think she would need ATC lasix every day and could change to po lasix: May repeat echo for a good study to evaluate her valvular functions as well as left ventricular functions!!
-GI or pancreatic origin.   -With destructive bone lesion, and possible liver mets. D/W patient and daughter Leandra in depth, regarding diagnosis and Stage IV disease. At this time, patient is getting radiation to the right hip.  -Discussed that her current PS of 3, with active infection precludes her from being a chemotherapy candidate. Also, her albumin is 1.9.   -Discussed about hospice after completion of radiation and rehab.   -Daughter states she wants to decide about hospice and unsure at this time.  -Nutritional consult.

## 2018-09-06 NOTE — CONSULT NOTE ADULT - CONSULT REQUESTED DATE/TIME
01-Sep-2018 12:24
05-Sep-2018 15:45
06-Sep-2018 10:38
28-Aug-2018 12:00
28-Aug-2018 14:51
28-Aug-2018 16:11
28-Aug-2018 16:33
28-Aug-2018 17:15
28-Aug-2018 21:28
29-Aug-2018 09:00
29-Aug-2018 09:37
31-Aug-2018 14:15
28-Aug-2018 13:40
06-Sep-2018 14:13

## 2018-09-06 NOTE — CONSULT NOTE ADULT - SUBJECTIVE AND OBJECTIVE BOX
[facility]), [facility]        Patient is a 89y old  Female who presents with a chief complaint of transferred from [place] for rad-onc evaluation (06 Sep 2018 13:38)      HPI:  89 y.o woman with multiple comorbidities presented with several weeks h/o of bony pain and recent inability to ambulate du to pain in the foot. Ct scan revealed destructive bony lesions concerning for metastasis and liver lesions. 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 Noted to have staph aurues bacteremia likely from left hallux abscess.The primary source of malignancy is unclear,. 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  Son at bedside.          *****PAST MEDICAL / Surgical  HISTORY:  PAST MEDICAL & SURGICAL HISTORY:  OAB (overactive bladder)  GERD (gastroesophageal reflux disease)  Angina effort  Heart failure  Upper respiratory infection  Diabetes  Renal stones  Myocardial infarction:   Spinal stenosis  CVA (cerebral infarction)  Afib  Raynaud disease  Acid reflux  Hypertension  Hyperlipidemia  Asthma  Cataract  S/P hysterectomy           *****FAMILY HISTORY:  FAMILY HISTORY:  No pertinent family history in first degree relatives           *****SOCIAL HISTORY:  Alcohol: None  Smoking: None         *****ALLERGIES:   Allergies    Levaquin (Other)  ramipril (Other)  tetracycline (Hives; Rash)    Intolerances             *****MEDICATIONS: current medication reviewed and documented.   MEDICATIONS  (STANDING):  albumin human 25% IVPB 50 milliLiter(s) IV Intermittent every 12 hours  ceFAZolin   IVPB 2000 milliGRAM(s) IV Intermittent every 24 hours  chlorhexidine 4% Liquid 1 Application(s) Topical <User Schedule>  dextrose 5%. 1000 milliLiter(s) (50 mL/Hr) IV Continuous <Continuous>  dextrose 50% Injectable 12.5 Gram(s) IV Push once  dextrose 50% Injectable 25 Gram(s) IV Push once  dextrose 50% Injectable 25 Gram(s) IV Push once  docusate sodium 100 milliGRAM(s) Oral three times a day  furosemide   Injectable 80 milliGRAM(s) IV Push every 12 hours  insulin glargine Injectable (LANTUS) 12 Unit(s) SubCutaneous at bedtime  insulin lispro (HumaLOG) corrective regimen sliding scale   SubCutaneous three times a day before meals  lactobacillus acidophilus 1 Tablet(s) Oral three times a day with meals  lidocaine   Patch 1 Patch Transdermal daily  pantoprazole    Tablet 40 milliGRAM(s) Oral before breakfast  propranolol LA 60 milliGRAM(s) Oral daily  senna 2 Tablet(s) Oral at bedtime  warfarin 2 milliGRAM(s) Oral once    MEDICATIONS  (PRN):  ALBUTerol    90 MICROgram(s) HFA Inhaler 2 Puff(s) Inhalation every 6 hours PRN Shortness of Breath and/or Wheezing  dextrose 40% Gel 15 Gram(s) Oral once PRN Blood Glucose LESS THAN 70 milliGRAM(s)/deciLiter  glucagon  Injectable 1 milliGRAM(s) IntraMuscular once PRN Glucose <70 milliGRAM(s)/deciLiter  oxyCODONE    IR 5 milliGRAM(s) Oral every 4 hours PRN Moderate and Severe pain  polyethylene glycol 3350 17 Gram(s) Oral two times a day PRN Constipation           *****REVIEW OF SYSTEM:  GEN: no fever, no chills, no pain  RESP: no SOB, no cough, no sputum  CVS: no chest pain, no palpitations, no edema  GI: no abdominal pain, no nausea, no vomiting, no constipation, no diarrhea  : no dysurea, no frequency, no hematurea  Neuro: no headache, no dizziness  PSYCH: no anxiety, no depression  Derm : no itching, no rash         *****VITAL SIGNS:  T(C): 36.5 (18 @ 06:21), Max: 36.7 (18 @ 21:02)  HR: 73 (18 @ 07:14) (73 - 85)  BP: 122/65 (18 @ 06:21) (114/71 - 122/65)  RR: 19 (18 @ 07:14) (19 - 20)  SpO2: 100% (18 @ 07:14) (99% - 100%)  Wt(kg): --           *****PHYSICAL EXAM:      Alert oriented x 2   Attention comprehension are fair. Able to name, repeat, read without any difficulty.   Able to follow1-2 step commands.      EOMI fundi not visualized,  VFF to confrontration  No facial asymmetry   Tongue is midline   Palate elevates symmetrically     limited mvt of the r leg antigravity due to pain.      Gait : not assessed.  purplish discoloration of the toes.                 *****LAB AND IMAGIN.3    11.41 )-----------( 212      ( 06 Sep 2018 06:35 )             30.3                   136  |  95<L>  |  81<H>  ----------------------------<  105<H>  4.3   |  25  |  2.60<H>    Ca    9.8      06 Sep 2018 06:35      PT/INR - ( 06 Sep 2018 06:35 )   PT: 19.6 SEC;   INR: 1.75                                ABG - ( 04 Sep 2018 22:20 )  pH, Arterial: 7.36  pH, Blood: x     /  pCO2: 42    /  pO2: 178   / HCO3: 23    / Base Excess: -1.2  /  SaO2: 99.3      Albumin, Serum: 1.9 g/dL (18 @ 05:40)            [All pertinent recent Imaging reports reviewed]         *****A S S E S S M E N T   A N D   P L A N :        89 y.o woman with multiple comorbidities presented with several weeks h/o of bony pain and recent inability to ambulate due to pain in the foot. Ct scan revealed destructive bony lesions concerning for metastasis and liver lesions. 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 Noted to have staph aurues bacteremia likely from left hallux abscess.The primary source of malignancy is unclear,. 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 continue current management.  radiation should also alleviate the pain.  Pt's son was hesitant to start anything new due to her hx of renal dysfunction.   tolerating fentanyl/oxycodone and lidocaine patch.       Problem/Recommendations 2:osteomyelitis of the toe   ?warfarin related purplish discoloration of   toes  ID on board, defer to id for further w/u and management   echo  without endocarditis, no cinthia done   blood cultures  neg.       Problem/Recommendation 3: Hyponatremia of unclear etiology, ? related to excess adh from pain vs volume contraction.  defer to medicine for management    Problem/Recommendation 3: severe- protein calorie malnutrition       ___________________________  Will follow with you.  Thank you,  Marcy Durant MD  Diplomate of the American Board of Neurology and Psychiatry.  Diplomate of the American Board of Vascular Neurology.   [facility] ([facility]   Ph: [phone]    Differential diagnosis and plan of care discussed with patient after the evaluation.   Advanced care planning options discussed.   Pain assessed and judicious use of narcotics when appropriate was discussed.  Importance of Fall prevention discussed.  Counseling on Smoking and Alcohol cessation was offered when appropriate.  Counseling on Diet, exercise, and medication compliance was done.     80 minutes spent on the total encounter;  more than 50 % of the visit was spent on counseling  and or coordinating care by the attending physician.    Thank you for allowing me to participate in the care of this darryl patient. Please do not hesitate to call me if you have any questions.     This and subsequent notes were partially created using voice recognition software and will  inherently be subject to errors including those of syntax and sound alike substitutions which may escape proofreading. In such instances original meaning may be extrapolated by contextual derivation.

## 2018-09-06 NOTE — PROGRESS NOTE ADULT - SUBJECTIVE AND OBJECTIVE BOX
INTERVAL HPI/OVERNIGHT EVENTS:    remains with shortness of breath, fatigued  denies n/v/d/c, abdominal pain, melena or brbpr     MEDICATIONS  (STANDING):  albumin human 25% IVPB 50 milliLiter(s) IV Intermittent every 12 hours  ceFAZolin   IVPB 2000 milliGRAM(s) IV Intermittent every 24 hours  chlorhexidine 4% Liquid 1 Application(s) Topical <User Schedule>  dextrose 5%. 1000 milliLiter(s) (50 mL/Hr) IV Continuous <Continuous>  dextrose 50% Injectable 12.5 Gram(s) IV Push once  dextrose 50% Injectable 25 Gram(s) IV Push once  dextrose 50% Injectable 25 Gram(s) IV Push once  docusate sodium 100 milliGRAM(s) Oral three times a day  furosemide   Injectable 80 milliGRAM(s) IV Push every 12 hours  insulin glargine Injectable (LANTUS) 12 Unit(s) SubCutaneous at bedtime  insulin lispro (HumaLOG) corrective regimen sliding scale   SubCutaneous three times a day before meals  lactobacillus acidophilus 1 Tablet(s) Oral three times a day with meals  lidocaine   Patch 1 Patch Transdermal daily  pantoprazole    Tablet 40 milliGRAM(s) Oral before breakfast  propranolol LA 60 milliGRAM(s) Oral daily  senna 2 Tablet(s) Oral at bedtime  warfarin 2 milliGRAM(s) Oral once    MEDICATIONS  (PRN):  ALBUTerol    90 MICROgram(s) HFA Inhaler 2 Puff(s) Inhalation every 6 hours PRN Shortness of Breath and/or Wheezing  dextrose 40% Gel 15 Gram(s) Oral once PRN Blood Glucose LESS THAN 70 milliGRAM(s)/deciLiter  glucagon  Injectable 1 milliGRAM(s) IntraMuscular once PRN Glucose <70 milliGRAM(s)/deciLiter  oxyCODONE    IR 5 milliGRAM(s) Oral every 4 hours PRN Moderate and Severe pain  polyethylene glycol 3350 17 Gram(s) Oral two times a day PRN Constipation      Allergies    Levaquin (Other)  ramipril (Other)  tetracycline (Hives; Rash)    Intolerances        Review of Systems:    General:  No wt loss, fevers, chills, night sweats, fatigue   Eyes:  Good vision, no reported pain  ENT:  No sore throat, pain, runny nose, dysphagia  CV:  No pain, palpitations, hypo/hypertension  Resp:  No dyspnea, cough, tachypnea, wheezing; +sob  GI:  No pain, No nausea, No vomiting, No diarrhea, No constipation, No weight loss, No fever, No pruritis, No rectal bleeding, No melena, No dysphagia  :  No pain, bleeding, incontinence, nocturia  Muscle:  No pain, weakness  Neuro:  No weakness, tingling, memory problems  Psych:  No fatigue, insomnia, mood problems, depression  Endocrine:  No polyuria, polydypsia, cold/heat intolerance  Heme:  No petechiae, ecchymosis, easy bruisability  Skin:  No rash, tattoos, scars, edema      Vital Signs Last 24 Hrs  T(C): 36.5 (06 Sep 2018 06:21), Max: 36.7 (05 Sep 2018 21:02)  T(F): 97.7 (06 Sep 2018 06:21), Max: 98.1 (05 Sep 2018 21:02)  HR: 73 (06 Sep 2018 07:14) (73 - 85)  BP: 122/65 (06 Sep 2018 06:21) (114/71 - 122/65)  BP(mean): --  RR: 19 (06 Sep 2018 07:14) (19 - 20)  SpO2: 100% (06 Sep 2018 07:14) (99% - 100%)    PHYSICAL EXAM:    Constitutional: NAD  HEENT: EOMI, throat clear  Neck: No LAD, supple  Respiratory: CTA and P  Cardiovascular: S1 and S2, RRR, no M  Gastrointestinal: BS+, soft, NT/ND, neg HSM,  Extremities: No peripheral edema, neg clubbing, cyanosis  Vascular: 2+ peripheral pulses  Neurological: A/O x 3, no focal deficits  Psychiatric: Normal mood, normal affect  Skin: No rashes      LABS:                        9.3    11.41 )-----------( 212      ( 06 Sep 2018 06:35 )             30.3     09-06    136  |  95<L>  |  81<H>  ----------------------------<  105<H>  4.3   |  25  |  2.60<H>    Ca    9.8      06 Sep 2018 06:35      PT/INR - ( 06 Sep 2018 06:35 )   PT: 19.6 SEC;   INR: 1.75                RADIOLOGY & ADDITIONAL TESTS:

## 2018-09-06 NOTE — CONSULT NOTE ADULT - REASON FOR ADMISSION
transferred from [place] for rad-onc evaluation
transferred from [place] for rad-onc evaluation
transferred from [place] for rad-onc evaluation

## 2018-09-06 NOTE — CHART NOTE - NSCHARTNOTEFT_GEN_A_CORE
Symptoms currently managed.  Patient and family report patient was participating with PT this AM.  Goal is to pursue rehab with transition to home hospice with patient's daughter Yue post completion of rehab.  Dr. Corrales and social work aware.  Please re-consult as needed.

## 2018-09-06 NOTE — PROGRESS NOTE ADULT - SUBJECTIVE AND OBJECTIVE BOX
Chief complaint  Patient is a 89y old  Female who presents with a chief complaint of transferred from [place] for rad-onc evaluation (06 Sep 2018 13:12)   Review of systems  Patient in bed, looks comfortable, no fever, no hypoglycemia.    Labs and Fingersticks  CAPILLARY BLOOD GLUCOSE      POCT Blood Glucose.: 190 mg/dL (06 Sep 2018 13:13)  POCT Blood Glucose.: 85 mg/dL (06 Sep 2018 08:39)  POCT Blood Glucose.: 171 mg/dL (05 Sep 2018 22:26)  POCT Blood Glucose.: 132 mg/dL (05 Sep 2018 17:44)  POCT Blood Glucose.: 133 mg/dL (05 Sep 2018 13:44)          Calcium, Total Serum: 9.8 (09-06 @ 06:35)  Calcium, Total Serum: 10.0 (09-05 @ 05:45)          09-06    136  |  95<L>  |  81<H>  ----------------------------<  105<H>  4.3   |  25  |  2.60<H>    Ca    9.8      06 Sep 2018 06:35                          9.3    11.41 )-----------( 212      ( 06 Sep 2018 06:35 )             30.3     Medications  MEDICATIONS  (STANDING):  albumin human 25% IVPB 50 milliLiter(s) IV Intermittent every 12 hours  ceFAZolin   IVPB 2000 milliGRAM(s) IV Intermittent every 24 hours  chlorhexidine 4% Liquid 1 Application(s) Topical <User Schedule>  dextrose 5%. 1000 milliLiter(s) (50 mL/Hr) IV Continuous <Continuous>  dextrose 50% Injectable 12.5 Gram(s) IV Push once  dextrose 50% Injectable 25 Gram(s) IV Push once  dextrose 50% Injectable 25 Gram(s) IV Push once  docusate sodium 100 milliGRAM(s) Oral three times a day  furosemide   Injectable 80 milliGRAM(s) IV Push every 12 hours  insulin glargine Injectable (LANTUS) 12 Unit(s) SubCutaneous at bedtime  insulin lispro (HumaLOG) corrective regimen sliding scale   SubCutaneous three times a day before meals  lactobacillus acidophilus 1 Tablet(s) Oral three times a day with meals  lidocaine   Patch 1 Patch Transdermal daily  pantoprazole    Tablet 40 milliGRAM(s) Oral before breakfast  propranolol LA 60 milliGRAM(s) Oral daily  senna 2 Tablet(s) Oral at bedtime  warfarin 2 milliGRAM(s) Oral once      Physical Exam  General: Patient comfortable in bed  Vital Signs Last 12 Hrs  T(F): 97.7 (09-06-18 @ 06:21), Max: 97.7 (09-06-18 @ 06:21)  HR: 73 (09-06-18 @ 07:14) (73 - 75)  BP: 122/65 (09-06-18 @ 06:21) (122/65 - 122/65)  BP(mean): --  RR: 19 (09-06-18 @ 07:14) (19 - 19)  SpO2: 100% (09-06-18 @ 07:14) (100% - 100%)  Neck: No palpable thyroid nodules.  CVS: S1S2, No murmurs  Respiratory: No wheezing, no crepitations  GI: Abdomen soft, bowel sounds positive  Musculoskeletal:  edema lower extremities.   Skin: No skin rashes, no ecchymosis    Diagnostics

## 2018-09-06 NOTE — CONSULT NOTE ADULT - PROBLEM SELECTOR PROBLEM 2
Adenocarcinoma of unknown primary
Bacteremia
CKD (chronic kidney disease), stage III
Increased liver enzymes
Pain, neoplasm-related
Osteomyelitis

## 2018-09-07 DIAGNOSIS — S22.39XA FRACTURE OF ONE RIB, UNSPECIFIED SIDE, INITIAL ENCOUNTER FOR CLOSED FRACTURE: ICD-10-CM

## 2018-09-07 LAB
BUN SERPL-MCNC: 85 MG/DL — HIGH (ref 7–23)
CALCIUM SERPL-MCNC: 9.9 MG/DL — SIGNIFICANT CHANGE UP (ref 8.4–10.5)
CHLORIDE SERPL-SCNC: 93 MMOL/L — LOW (ref 98–107)
CO2 SERPL-SCNC: 26 MMOL/L — SIGNIFICANT CHANGE UP (ref 22–31)
CREAT SERPL-MCNC: 2.67 MG/DL — HIGH (ref 0.5–1.3)
GLUCOSE BLDC GLUCOMTR-MCNC: 143 MG/DL — HIGH (ref 70–99)
GLUCOSE BLDC GLUCOMTR-MCNC: 158 MG/DL — HIGH (ref 70–99)
GLUCOSE BLDC GLUCOMTR-MCNC: 183 MG/DL — HIGH (ref 70–99)
GLUCOSE BLDC GLUCOMTR-MCNC: 296 MG/DL — HIGH (ref 70–99)
GLUCOSE SERPL-MCNC: 126 MG/DL — HIGH (ref 70–99)
HCT VFR BLD CALC: 29 % — LOW (ref 34.5–45)
HGB BLD-MCNC: 8.9 G/DL — LOW (ref 11.5–15.5)
INR BLD: 1.86 — HIGH (ref 0.88–1.17)
MAGNESIUM SERPL-MCNC: 1.8 MG/DL — SIGNIFICANT CHANGE UP (ref 1.6–2.6)
MCHC RBC-ENTMCNC: 29.2 PG — SIGNIFICANT CHANGE UP (ref 27–34)
MCHC RBC-ENTMCNC: 30.7 % — LOW (ref 32–36)
MCV RBC AUTO: 95.1 FL — SIGNIFICANT CHANGE UP (ref 80–100)
NRBC # FLD: 0 — SIGNIFICANT CHANGE UP
PHOSPHATE SERPL-MCNC: 4 MG/DL — SIGNIFICANT CHANGE UP (ref 2.5–4.5)
PLATELET # BLD AUTO: 191 K/UL — SIGNIFICANT CHANGE UP (ref 150–400)
PMV BLD: 9.8 FL — SIGNIFICANT CHANGE UP (ref 7–13)
POTASSIUM SERPL-MCNC: 4.1 MMOL/L — SIGNIFICANT CHANGE UP (ref 3.5–5.3)
POTASSIUM SERPL-SCNC: 4.1 MMOL/L — SIGNIFICANT CHANGE UP (ref 3.5–5.3)
PROTHROM AB SERPL-ACNC: 21.7 SEC — HIGH (ref 9.8–13.1)
RBC # BLD: 3.05 M/UL — LOW (ref 3.8–5.2)
RBC # FLD: 18.5 % — HIGH (ref 10.3–14.5)
SODIUM SERPL-SCNC: 136 MMOL/L — SIGNIFICANT CHANGE UP (ref 135–145)
WBC # BLD: 9.22 K/UL — SIGNIFICANT CHANGE UP (ref 3.8–10.5)
WBC # FLD AUTO: 9.22 K/UL — SIGNIFICANT CHANGE UP (ref 3.8–10.5)

## 2018-09-07 RX ORDER — FUROSEMIDE 40 MG
80 TABLET ORAL
Qty: 0 | Refills: 0 | Status: DISCONTINUED | OUTPATIENT
Start: 2018-09-08 | End: 2018-09-09

## 2018-09-07 RX ORDER — FUROSEMIDE 40 MG
80 TABLET ORAL ONCE
Qty: 0 | Refills: 0 | Status: COMPLETED | OUTPATIENT
Start: 2018-09-07 | End: 2018-09-07

## 2018-09-07 RX ORDER — ALBUMIN HUMAN 25 %
50 VIAL (ML) INTRAVENOUS
Qty: 0 | Refills: 0 | Status: DISCONTINUED | OUTPATIENT
Start: 2018-09-08 | End: 2018-09-09

## 2018-09-07 RX ORDER — ALBUMIN HUMAN 25 %
50 VIAL (ML) INTRAVENOUS ONCE
Qty: 0 | Refills: 0 | Status: COMPLETED | OUTPATIENT
Start: 2018-09-07 | End: 2018-09-07

## 2018-09-07 RX ORDER — WARFARIN SODIUM 2.5 MG/1
3 TABLET ORAL ONCE
Qty: 0 | Refills: 0 | Status: COMPLETED | OUTPATIENT
Start: 2018-09-07 | End: 2018-09-07

## 2018-09-07 RX ORDER — FUROSEMIDE 40 MG
80 TABLET ORAL DAILY
Qty: 0 | Refills: 0 | Status: DISCONTINUED | OUTPATIENT
Start: 2018-09-07 | End: 2018-09-07

## 2018-09-07 RX ADMIN — Medication 1 TABLET(S): at 14:44

## 2018-09-07 RX ADMIN — Medication 60 MILLIGRAM(S): at 06:49

## 2018-09-07 RX ADMIN — LIDOCAINE 1 PATCH: 4 CREAM TOPICAL at 13:00

## 2018-09-07 RX ADMIN — Medication 100 MILLIGRAM(S): at 06:49

## 2018-09-07 RX ADMIN — Medication 100 MILLIGRAM(S): at 15:15

## 2018-09-07 RX ADMIN — WARFARIN SODIUM 3 MILLIGRAM(S): 2.5 TABLET ORAL at 17:55

## 2018-09-07 RX ADMIN — SENNA PLUS 2 TABLET(S): 8.6 TABLET ORAL at 22:19

## 2018-09-07 RX ADMIN — Medication 1 TABLET(S): at 17:54

## 2018-09-07 RX ADMIN — Medication 1: at 17:54

## 2018-09-07 RX ADMIN — Medication 100 MILLIGRAM(S): at 22:19

## 2018-09-07 RX ADMIN — Medication 3: at 13:00

## 2018-09-07 RX ADMIN — PANTOPRAZOLE SODIUM 40 MILLIGRAM(S): 20 TABLET, DELAYED RELEASE ORAL at 06:49

## 2018-09-07 RX ADMIN — INSULIN GLARGINE 12 UNIT(S): 100 INJECTION, SOLUTION SUBCUTANEOUS at 22:19

## 2018-09-07 RX ADMIN — Medication 50 MILLILITER(S): at 15:15

## 2018-09-07 RX ADMIN — Medication 80 MILLIGRAM(S): at 15:14

## 2018-09-07 RX ADMIN — CHLORHEXIDINE GLUCONATE 1 APPLICATION(S): 213 SOLUTION TOPICAL at 09:15

## 2018-09-07 NOTE — PROGRESS NOTE ADULT - SUBJECTIVE AND OBJECTIVE BOX
INTERVAL HPI/OVERNIGHT EVENTS:    son present  received RT this morning  no abdominal complaints  tolerating PO intake  no n/v  last BM 2 days ago; (+)flatus    MEDICATIONS  (STANDING):  ceFAZolin   IVPB 2000 milliGRAM(s) IV Intermittent every 24 hours  chlorhexidine 4% Liquid 1 Application(s) Topical <User Schedule>  dextrose 5%. 1000 milliLiter(s) (50 mL/Hr) IV Continuous <Continuous>  dextrose 50% Injectable 12.5 Gram(s) IV Push once  dextrose 50% Injectable 25 Gram(s) IV Push once  dextrose 50% Injectable 25 Gram(s) IV Push once  docusate sodium 100 milliGRAM(s) Oral three times a day  fentaNYL   Patch  12 MICROgram(s)/Hr 1 Patch Transdermal every 72 hours  furosemide    Tablet 80 milliGRAM(s) Oral daily  insulin glargine Injectable (LANTUS) 12 Unit(s) SubCutaneous at bedtime  insulin lispro (HumaLOG) corrective regimen sliding scale   SubCutaneous three times a day before meals  lactobacillus acidophilus 1 Tablet(s) Oral three times a day with meals  lidocaine   Patch 1 Patch Transdermal daily  pantoprazole    Tablet 40 milliGRAM(s) Oral before breakfast  propranolol LA 60 milliGRAM(s) Oral daily  senna 2 Tablet(s) Oral at bedtime    MEDICATIONS  (PRN):  ALBUTerol    90 MICROgram(s) HFA Inhaler 2 Puff(s) Inhalation every 6 hours PRN Shortness of Breath and/or Wheezing  dextrose 40% Gel 15 Gram(s) Oral once PRN Blood Glucose LESS THAN 70 milliGRAM(s)/deciLiter  glucagon  Injectable 1 milliGRAM(s) IntraMuscular once PRN Glucose <70 milliGRAM(s)/deciLiter  oxyCODONE    IR 5 milliGRAM(s) Oral every 4 hours PRN Moderate and Severe pain  polyethylene glycol 3350 17 Gram(s) Oral two times a day PRN Constipation      Allergies    Levaquin (Other)  ramipril (Other)  tetracycline (Hives; Rash)    Intolerances        Review of Systems:    General:  No wt loss, fevers, chills, night sweats, fatigue   Eyes:  Good vision, no reported pain  ENT:  No sore throat, pain, runny nose, dysphagia  CV:  No pain, palpitations, hypo/hypertension  Resp:  No dyspnea, cough, tachypnea, wheezing  GI:  No pain, No nausea, No vomiting, No diarrhea, No constipation, No weight loss, No fever, No pruritis, No rectal bleeding, No melena, No dysphagia  :  No pain, bleeding, incontinence, nocturia  Muscle:  No pain, weakness  Neuro:  No weakness, tingling, memory problems  Psych:  No fatigue, insomnia, mood problems, depression  Endocrine:  No polyuria, polydypsia, cold/heat intolerance  Heme:  No petechiae, ecchymosis, easy bruisability  Skin:  No rash, tattoos, scars, edema      Vital Signs Last 24 Hrs  T(C): 36.3 (07 Sep 2018 04:58), Max: 36.6 (06 Sep 2018 21:13)  T(F): 97.3 (07 Sep 2018 04:58), Max: 97.9 (06 Sep 2018 21:13)  HR: 72 (07 Sep 2018 04:58) (72 - 79)  BP: 115/88 (07 Sep 2018 04:58) (90/55 - 123/60)  BP(mean): --  RR: 18 (07 Sep 2018 04:58) (18 - 20)  SpO2: 100% (07 Sep 2018 04:58) (100% - 100%)    PHYSICAL EXAM:    Constitutional: NAD  HEENT: EOMI, throat clear  Neck: No LAD, supple  Respiratory: CTA and P  Cardiovascular: S1 and S2, RRR, no M  Gastrointestinal: BS+, soft, NT/ND, neg HSM,  Extremities: No peripheral edema, neg clubbing, cyanosis  Vascular: 2+ peripheral pulses  Neurological: A/O x 3, no focal deficits  Psychiatric: Normal mood, normal affect  Skin: No rashes      LABS:                        8.9    9.22  )-----------( 191      ( 07 Sep 2018 07:05 )             29.0     09-07    136  |  93<L>  |  85<H>  ----------------------------<  126<H>  4.1   |  26  |  2.67<H>    Ca    9.9      07 Sep 2018 07:05  Phos  4.0     09-07  Mg     1.8     09-07      PT/INR - ( 07 Sep 2018 07:05 )   PT: 21.7 SEC;   INR: 1.86                RADIOLOGY & ADDITIONAL TESTS:

## 2018-09-07 NOTE — PROGRESS NOTE ADULT - SUBJECTIVE AND OBJECTIVE BOX
Patient is a 89y old  Female who presents with a chief complaint of transferred from [place] for rad-onc evaluation (07 Sep 2018 11:16)      Any change in ROS: Doing ok : still with some SOB : the elder son is at bedside:     MEDICATIONS  (STANDING):  ceFAZolin   IVPB 2000 milliGRAM(s) IV Intermittent every 24 hours  chlorhexidine 4% Liquid 1 Application(s) Topical <User Schedule>  dextrose 5%. 1000 milliLiter(s) (50 mL/Hr) IV Continuous <Continuous>  dextrose 50% Injectable 12.5 Gram(s) IV Push once  dextrose 50% Injectable 25 Gram(s) IV Push once  dextrose 50% Injectable 25 Gram(s) IV Push once  docusate sodium 100 milliGRAM(s) Oral three times a day  fentaNYL   Patch  12 MICROgram(s)/Hr 1 Patch Transdermal every 72 hours  furosemide    Tablet 80 milliGRAM(s) Oral daily  insulin glargine Injectable (LANTUS) 12 Unit(s) SubCutaneous at bedtime  insulin lispro (HumaLOG) corrective regimen sliding scale   SubCutaneous three times a day before meals  lactobacillus acidophilus 1 Tablet(s) Oral three times a day with meals  lidocaine   Patch 1 Patch Transdermal daily  pantoprazole    Tablet 40 milliGRAM(s) Oral before breakfast  propranolol LA 60 milliGRAM(s) Oral daily  senna 2 Tablet(s) Oral at bedtime    MEDICATIONS  (PRN):  ALBUTerol    90 MICROgram(s) HFA Inhaler 2 Puff(s) Inhalation every 6 hours PRN Shortness of Breath and/or Wheezing  dextrose 40% Gel 15 Gram(s) Oral once PRN Blood Glucose LESS THAN 70 milliGRAM(s)/deciLiter  glucagon  Injectable 1 milliGRAM(s) IntraMuscular once PRN Glucose <70 milliGRAM(s)/deciLiter  oxyCODONE    IR 5 milliGRAM(s) Oral every 4 hours PRN Moderate and Severe pain  polyethylene glycol 3350 17 Gram(s) Oral two times a day PRN Constipation    Vital Signs Last 24 Hrs  T(C): 36.3 (07 Sep 2018 04:58), Max: 36.6 (06 Sep 2018 21:13)  T(F): 97.3 (07 Sep 2018 04:58), Max: 97.9 (06 Sep 2018 21:13)  HR: 72 (07 Sep 2018 04:58) (72 - 79)  BP: 115/88 (07 Sep 2018 04:58) (90/55 - 123/60)  BP(mean): --  RR: 18 (07 Sep 2018 04:58) (18 - 20)  SpO2: 100% (07 Sep 2018 04:58) (100% - 100%)    I&O's Summary        Physical Exam:   GENERAL: NAD, well-groomed, well-developed  HEENT: JORGE/   Atraumatic, Normocephalic  ENMT: No tonsillar erythema, exudates, or enlargement; Moist mucous membranes, Good dentition, No lesions  NECK: Supple, No JVD, Normal thyroid  CHEST/LUNG: scattered crackles bilaterally+  CVS: Regular rate and rhythm; No murmurs, rubs, or gallops  GI: : Soft, Nontender, Nondistended; Bowel sounds present  NERVOUS SYSTEM:  Alert & Oriented X3  EXTREMITIES:  2+edema  LYMPH: No lymphadenopathy noted  SKIN: No rashes or lesions  ENDOCRINOLOGY: No Thyromegaly  PSYCH: Appropriate    Labs:                              8.9    9.22  )-----------( 191      ( 07 Sep 2018 07:05 )             29.0                         9.3    11.41 )-----------( 212      ( 06 Sep 2018 06:35 )             30.3                         8.9    12.65 )-----------( 196      ( 05 Sep 2018 05:45 )             28.3     09-07    136  |  93<L>  |  85<H>  ----------------------------<  126<H>  4.1   |  26  |  2.67<H>  09-06    136  |  95<L>  |  81<H>  ----------------------------<  105<H>  4.3   |  25  |  2.60<H>  09-05    132<L>  |  94<L>  |  79<H>  ----------------------------<  165<H>  5.0   |  23  |  2.65<H>  09-04    131<L>  |  93<L>  |  77<H>  ----------------------------<  190<H>  5.2   |  24  |  2.64<H>    Ca    9.9      07 Sep 2018 07:05  Ca    9.8      06 Sep 2018 06:35  Phos  4.0     09-07  Mg     1.8     09-07    TPro  6.4  /  Alb  x   /  TBili  x   /  DBili  x   /  AST  x   /  ALT  x   /  AlkPhos  x   09-04    CAPILLARY BLOOD GLUCOSE      POCT Blood Glucose.: 143 mg/dL (07 Sep 2018 08:32)  POCT Blood Glucose.: 121 mg/dL (06 Sep 2018 22:24)  POCT Blood Glucose.: 134 mg/dL (06 Sep 2018 18:08)  POCT Blood Glucose.: 190 mg/dL (06 Sep 2018 13:13)        PT/INR - ( 07 Sep 2018 07:05 )   PT: 21.7 SEC;   INR: 1.86          < from: CT Chest No Cont (09.05.18 @ 16:32) >  : Heart size is normal. No pericardial effusion.   MEDIASTINUM AND OZ: Anterior mediastinal lymph node unchanged from   2014, may be reactive. No hilar or mediastinal lesions evident,   evaluation limited due to lack of IV contrast.  CHEST WALL AND LOWER NECK: Anasarca. Subcutaneous left and right   abdominal wall edema.  VISUALIZED UPPER ABDOMEN: Small ascites, increased from 8/14/2018.   Heterogeneityof the liver with multiple hypodense lesions.  BONES: Degenerative changes of the spine. Subcentimeter lucent lesions in   the second left rib with associated small pathologic fracture, suspicious   for metastases. 1.3 cm lucent lesion in the fifth left rib with bony   destruction, suspicious for metastases.    IMPRESSION:    Interlobular septal thickening with superimposed groundglass opacities   and small to moderate bilateral bilateral pleural effusions, right   greater than left, consistent with pulmonary edema.    Small amount of ascites increased from 8/14/2018 and associated   subcutaneous edema.    Heterogeneity of the liver with hypodense lesions worrisome for   metastases.    Subcentimeter lucent lesions in the second left rib with associated small   pathologic fracture, suspicious for metastases. 1.3 cm lucent lesion in   the fifth left rib with bony destruction, also suspicious for metastases.              GAYATRI JUDGE M.D., RADIOLOGY RESIDENT  This document has been electronically signed.  HANK HAMM M.D. ATTENDING RADIOLOGIST  This document has been electronically signed. Sep  5 2018  5:45PM        < end of copied text >      Serum Pro-Brain Natriuretic Peptide: 9445 pg/mL (09-04 @ 13:37)        RECENT CULTURES:        RESPIRATORY CULTURES:          Studies  Chest X-RAY  CT SCAN Chest   Venous Dopplers: LE:   CT Abdomen  Others

## 2018-09-07 NOTE — PROGRESS NOTE ADULT - SUBJECTIVE AND OBJECTIVE BOX
SOB improved  Patient received radiation yesterday  No chest pain       MEDICATIONS  (STANDING):  ceFAZolin   IVPB 2000 milliGRAM(s) IV Intermittent every 24 hours  chlorhexidine 4% Liquid 1 Application(s) Topical <User Schedule>  docusate sodium 100 milliGRAM(s) Oral three times a day  fentaNYL   Patch  12 MICROgram(s)/Hr 1 Patch Transdermal every 72 hours  insulin glargine Injectable (LANTUS) 12 Unit(s) SubCutaneous at bedtime  insulin lispro (HumaLOG) corrective regimen sliding scale   SubCutaneous three times a day before meals  lactobacillus acidophilus 1 Tablet(s) Oral three times a day with meals  lidocaine   Patch 1 Patch Transdermal daily  pantoprazole    Tablet 40 milliGRAM(s) Oral before breakfast  propranolol LA 60 milliGRAM(s) Oral daily  senna 2 Tablet(s) Oral at bedtime    MEDICATIONS  (PRN):  ALBUTerol    90 MICROgram(s) HFA Inhaler 2 Puff(s) Inhalation every 6 hours PRN Shortness of Breath and/or Wheezing  dextrose 40% Gel 15 Gram(s) Oral once PRN Blood Glucose LESS THAN 70 milliGRAM(s)/deciLiter  glucagon  Injectable 1 milliGRAM(s) IntraMuscular once PRN Glucose <70 milliGRAM(s)/deciLiter  oxyCODONE    IR 5 milliGRAM(s) Oral every 4 hours PRN Moderate and Severe pain  polyethylene glycol 3350 17 Gram(s) Oral two times a day PRN Constipation      LABS:                        8.9    9.22  )-----------( 191      ( 07 Sep 2018 07:05 )             29.0     Hemoglobin: 8.9 g/dL (09-07 @ 07:05)  Hemoglobin: 9.3 g/dL (09-06 @ 06:35)  Hemoglobin: 8.9 g/dL (09-05 @ 05:45)  Hemoglobin: 9.8 g/dL (09-03 @ 05:44)    09-07    136  |  93<L>  |  85<H>  ----------------------------<  126<H>  4.1   |  26  |  2.67<H>    Ca    9.9      07 Sep 2018 07:05  Phos  4.0     09-07  Mg     1.8     09-07      Creatinine Trend: 2.67<--, 2.60<--, 2.65<--, 2.64<--, 2.70<--, 2.47<--   PT/INR - ( 07 Sep 2018 07:05 )   PT: 21.7 SEC;   INR: 1.86                  PHYSICAL EXAM  Vital Signs Last 24 Hrs  T(C): 36.3 (07 Sep 2018 04:58), Max: 36.6 (06 Sep 2018 21:13)  T(F): 97.3 (07 Sep 2018 04:58), Max: 97.9 (06 Sep 2018 21:13)  HR: 72 (07 Sep 2018 04:58) (72 - 79)  BP: 115/88 (07 Sep 2018 04:58) (90/55 - 123/60)  BP(mean): --  RR: 18 (07 Sep 2018 04:58) (18 - 20)  SpO2: 100% (07 Sep 2018 04:58) (100% - 100%)      HEENT: Normal Oral mucosa, PERRL, EOMI  Cardiovascular: Normal S1S2, JVD 12, 1/6 STEFFANY  Respiratory: decreased BS BL bases  Gastrointestinal: Abdomen soft, ND, NT, +BS  Ext: Cool B/L LE with 2+ edema      DIAGNOSTIC DATA    Xray Chest 1 View- PORTABLE-Urgent (08.17.18 @ 21:23) >  There are low lung volumes resulting in crowding of the bronchovascular   markings at the lung bases.  There is minimal blunting at the right costophrenic angle either   representing trace pleural effusion and/or pleural thickening.  There is subsegmental atelectasis at both lung bases.      < from: TTE Echo Doppler w/o Cont (08.17.18 @ 10:38) >  Technically difficult and limited study.  Mitral Valve: Calcified mitral annulus and mitral valve leaflets. Normal   opening of the mitral valve leaflets. Trace mitral regurgitation.  Aortic Valve/Aorta: Not well visualized  Tricuspid Valve: Calcified tricuspid annulus with normally opening valve.   Trace tricuspid regurgitation.  Pulmonic Valve: The pulmonic valve is not well visualized. Probably   normal.  Left Atrium: Moderate left atrial enlargement  Right Atrium: Normal  Left Ventricle: The endocardium is not well-visualized. Overall there is   preserved left ventricular systolic function. Unable to rule out wall   motion abnormalities. The EF is approximately 65%.  Right Ventricle: Normal right ventricular size and function.  Pericardium/Pleura: No pericardial effusion noted.  Pulmonary/RV Pressure: The right ventricular systolic pressure is   estimated to be 35mmHg, assuming that the right atrial pressure is   estimated to be8 mmHg. This is consistent with normal pulmonary   pressures.  LV Diastolic Function: Stage 2 diastolic dysfunction    < end of copied text >      ASSESSMENT AND PLAN:  89y Female  multiple comorbidities preserved LV and RV fx, Afib on coumadin, reported CAD ? remote MI with no intervention follows with Dr. Diaz with annual stress tests being medically managed for CAD with overall preserved LV function on recent TTE presented with several weeks h/o of bony pain and recent inability to ambulate,  ct scan revealed destructive bony lesions concerning for metastasis and liver lesions (primary possibly upper GI/pancreo-biliary) :     - would resume Lasix if ok with renal   - Cont coumadin INR 2-3 if bleeding risk is acceptable  --Vascular eval  - HD stable with no evidence CHF, monitor closely  - could Obtain records from Dr. Skinny Diaz's office  - radiation per onc - no plans for surgical intervention at this time     - patient/family  refusing aggressive chemo  - can consider repeating echo if within GOC  -palliative noted - plan for rehab/hospice pending SOB improved  Patient received radiation yesterday  No chest pain       MEDICATIONS  (STANDING):  ceFAZolin   IVPB 2000 milliGRAM(s) IV Intermittent every 24 hours  chlorhexidine 4% Liquid 1 Application(s) Topical <User Schedule>  docusate sodium 100 milliGRAM(s) Oral three times a day  fentaNYL   Patch  12 MICROgram(s)/Hr 1 Patch Transdermal every 72 hours  insulin glargine Injectable (LANTUS) 12 Unit(s) SubCutaneous at bedtime  insulin lispro (HumaLOG) corrective regimen sliding scale   SubCutaneous three times a day before meals  lactobacillus acidophilus 1 Tablet(s) Oral three times a day with meals  lidocaine   Patch 1 Patch Transdermal daily  pantoprazole    Tablet 40 milliGRAM(s) Oral before breakfast  propranolol LA 60 milliGRAM(s) Oral daily  senna 2 Tablet(s) Oral at bedtime    MEDICATIONS  (PRN):  ALBUTerol    90 MICROgram(s) HFA Inhaler 2 Puff(s) Inhalation every 6 hours PRN Shortness of Breath and/or Wheezing  dextrose 40% Gel 15 Gram(s) Oral once PRN Blood Glucose LESS THAN 70 milliGRAM(s)/deciLiter  glucagon  Injectable 1 milliGRAM(s) IntraMuscular once PRN Glucose <70 milliGRAM(s)/deciLiter  oxyCODONE    IR 5 milliGRAM(s) Oral every 4 hours PRN Moderate and Severe pain  polyethylene glycol 3350 17 Gram(s) Oral two times a day PRN Constipation      LABS:                        8.9    9.22  )-----------( 191      ( 07 Sep 2018 07:05 )             29.0     Hemoglobin: 8.9 g/dL (09-07 @ 07:05)  Hemoglobin: 9.3 g/dL (09-06 @ 06:35)  Hemoglobin: 8.9 g/dL (09-05 @ 05:45)  Hemoglobin: 9.8 g/dL (09-03 @ 05:44)    09-07    136  |  93<L>  |  85<H>  ----------------------------<  126<H>  4.1   |  26  |  2.67<H>    Ca    9.9      07 Sep 2018 07:05  Phos  4.0     09-07  Mg     1.8     09-07      Creatinine Trend: 2.67<--, 2.60<--, 2.65<--, 2.64<--, 2.70<--, 2.47<--   PT/INR - ( 07 Sep 2018 07:05 )   PT: 21.7 SEC;   INR: 1.86                  PHYSICAL EXAM  Vital Signs Last 24 Hrs  T(C): 36.3 (07 Sep 2018 04:58), Max: 36.6 (06 Sep 2018 21:13)  T(F): 97.3 (07 Sep 2018 04:58), Max: 97.9 (06 Sep 2018 21:13)  HR: 72 (07 Sep 2018 04:58) (72 - 79)  BP: 115/88 (07 Sep 2018 04:58) (90/55 - 123/60)  BP(mean): --  RR: 18 (07 Sep 2018 04:58) (18 - 20)  SpO2: 100% (07 Sep 2018 04:58) (100% - 100%)      HEENT: Normal Oral mucosa, PERRL, EOMI  Cardiovascular: Normal S1S2, JVD 12, 1/6 STEFFANY  Respiratory: decreased BS BL bases  Gastrointestinal: Abdomen soft, ND, NT, +BS  Ext: Cool B/L LE with 2+ edema      DIAGNOSTIC DATA    Xray Chest 1 View- PORTABLE-Urgent (08.17.18 @ 21:23) >  There are low lung volumes resulting in crowding of the bronchovascular   markings at the lung bases.  There is minimal blunting at the right costophrenic angle either   representing trace pleural effusion and/or pleural thickening.  There is subsegmental atelectasis at both lung bases.      < from: TTE Echo Doppler w/o Cont (08.17.18 @ 10:38) >  Technically difficult and limited study.  Mitral Valve: Calcified mitral annulus and mitral valve leaflets. Normal   opening of the mitral valve leaflets. Trace mitral regurgitation.  Aortic Valve/Aorta: Not well visualized  Tricuspid Valve: Calcified tricuspid annulus with normally opening valve.   Trace tricuspid regurgitation.  Pulmonic Valve: The pulmonic valve is not well visualized. Probably   normal.  Left Atrium: Moderate left atrial enlargement  Right Atrium: Normal  Left Ventricle: The endocardium is not well-visualized. Overall there is   preserved left ventricular systolic function. Unable to rule out wall   motion abnormalities. The EF is approximately 65%.  Right Ventricle: Normal right ventricular size and function.  Pericardium/Pleura: No pericardial effusion noted.  Pulmonary/RV Pressure: The right ventricular systolic pressure is   estimated to be 35mmHg, assuming that the right atrial pressure is   estimated to be8 mmHg. This is consistent with normal pulmonary   pressures.  LV Diastolic Function: Stage 2 diastolic dysfunction    < end of copied text >      ASSESSMENT AND PLAN:  89y Female  multiple comorbidities preserved LV and RV fx, Afib on coumadin, reported CAD ? remote MI with no intervention follows with Dr. Diaz with annual stress tests being medically managed for CAD with overall preserved LV function on recent TTE presented with several weeks h/o of bony pain and recent inability to ambulate,  ct scan revealed destructive bony lesions concerning for metastasis and liver lesions (primary possibly upper GI/pancreo-biliary)     - would resume standing dose of lasix as patient still mildly volume overloaded  - Cont coumadin INR 2-3 for afib  --Vascular eval  - HD stable with no evidence CHF, monitor closely  - radiation per onc - no plans for surgical intervention at this time     - patient/family  refusing aggressive chemo  - can consider repeating echo if within GOC  -palliative noted - plan for rehab/hospice pending

## 2018-09-07 NOTE — PROGRESS NOTE ADULT - PROBLEM SELECTOR PLAN 1
Patient with TRISH during admission at OSH with mild rise in Scr during current admission due to intravascular depletion with possible ATN given granular casts on UA. Scr now stable. Continue with IV albumin assisted diuresis. F/U pending serologies (serum DOMINGA, SPEP). Avoid nephrotoxins.

## 2018-09-07 NOTE — PROGRESS NOTE ADULT - SUBJECTIVE AND OBJECTIVE BOX
Infectious Diseases progress note:    Subjective: No new fevers, no acute o/n events.      ROS:  CONSTITUTIONAL:  No fever, chills, rigors  CARDIOVASCULAR:  No chest pain or palpitations  RESPIRATORY:   No SOB, cough, dyspnea on exertion.  No wheezing  GASTROINTESTINAL:  No abd pain, N/V, diarrhea/constipation  EXTREMITIES:  No swelling or joint pain  GENITOURINARY:  No burning on urination, increased frequency or urgency.  No flank pain  NEUROLOGIC:  No HA, visual disturbances  SKIN: No rashes    Allergies    Levaquin (Other)  ramipril (Other)  tetracycline (Hives; Rash)    Intolerances        ANTIBIOTICS/RELEVANT:  antimicrobials  ceFAZolin   IVPB 2000 milliGRAM(s) IV Intermittent every 24 hours    immunologic:    OTHER:  ALBUTerol    90 MICROgram(s) HFA Inhaler 2 Puff(s) Inhalation every 6 hours PRN  chlorhexidine 4% Liquid 1 Application(s) Topical <User Schedule>  dextrose 40% Gel 15 Gram(s) Oral once PRN  dextrose 5%. 1000 milliLiter(s) IV Continuous <Continuous>  dextrose 50% Injectable 12.5 Gram(s) IV Push once  dextrose 50% Injectable 25 Gram(s) IV Push once  dextrose 50% Injectable 25 Gram(s) IV Push once  docusate sodium 100 milliGRAM(s) Oral three times a day  fentaNYL   Patch  12 MICROgram(s)/Hr 1 Patch Transdermal every 72 hours  glucagon  Injectable 1 milliGRAM(s) IntraMuscular once PRN  insulin glargine Injectable (LANTUS) 12 Unit(s) SubCutaneous at bedtime  insulin lispro (HumaLOG) corrective regimen sliding scale   SubCutaneous three times a day before meals  lactobacillus acidophilus 1 Tablet(s) Oral three times a day with meals  lidocaine   Patch 1 Patch Transdermal daily  oxyCODONE    IR 5 milliGRAM(s) Oral every 4 hours PRN  pantoprazole    Tablet 40 milliGRAM(s) Oral before breakfast  polyethylene glycol 3350 17 Gram(s) Oral two times a day PRN  propranolol LA 60 milliGRAM(s) Oral daily  senna 2 Tablet(s) Oral at bedtime  warfarin 3 milliGRAM(s) Oral once      Objective:  Vital Signs Last 24 Hrs  T(C): 36.7 (07 Sep 2018 15:16), Max: 36.7 (07 Sep 2018 15:16)  T(F): 98 (07 Sep 2018 15:16), Max: 98 (07 Sep 2018 15:16)  HR: 72 (07 Sep 2018 15:16) (72 - 79)  BP: 122/56 (07 Sep 2018 15:16) (115/88 - 123/60)  BP(mean): --  RR: 18 (07 Sep 2018 15:16) (18 - 20)  SpO2: 100% (07 Sep 2018 15:16) (100% - 100%)    PHYSICAL EXAM:  Constitutional:NAD  Eyes:JORGE, EOMI  Ear/Nose/Throat: no thrush, mucositis.  Moist mucous membranes	  Neck:no JVD, no lymphadenopathy, supple  Respiratory: CTA ignacio  Cardiovascular: S1S2 RRR, no murmurs  Gastrointestinal:soft, nontender,  nondistended (+) BS  Extremities:no e/e/c  Skin:  no rashes, open wounds or ulcerations        LABS:                        8.9    9.22  )-----------( 191      ( 07 Sep 2018 07:05 )             29.0     09-07    136  |  93<L>  |  85<H>  ----------------------------<  126<H>  4.1   |  26  |  2.67<H>    Ca    9.9      07 Sep 2018 07:05  Phos  4.0     09-07  Mg     1.8     09-07      PT/INR - ( 07 Sep 2018 07:05 )   PT: 21.7 SEC;   INR: 1.86                Procalcitonin, Serum: 2.60 (08-16 @ 08:00)                    MICROBIOLOGY:          RADIOLOGY & ADDITIONAL STUDIES:

## 2018-09-07 NOTE — PROGRESS NOTE ADULT - SUBJECTIVE AND OBJECTIVE BOX
Chief complaint  Patient is a 89y old  Female who presents with a chief complaint of transferred from [place] for rad-onc evaluation (07 Sep 2018 16:26)   Review of systems  Patient in bed, looks comfortable, no fever, no hypoglycemia.    Labs and Fingersticks  CAPILLARY BLOOD GLUCOSE      POCT Blood Glucose.: 183 mg/dL (07 Sep 2018 17:33)  POCT Blood Glucose.: 296 mg/dL (07 Sep 2018 12:55)  POCT Blood Glucose.: 143 mg/dL (07 Sep 2018 08:32)  POCT Blood Glucose.: 121 mg/dL (06 Sep 2018 22:24)          Calcium, Total Serum: 9.9 (09-07 @ 07:05)  Calcium, Total Serum: 9.8 (09-06 @ 06:35)          09-07    136  |  93<L>  |  85<H>  ----------------------------<  126<H>  4.1   |  26  |  2.67<H>    Ca    9.9      07 Sep 2018 07:05  Phos  4.0     09-07  Mg     1.8     09-07                          8.9    9.22  )-----------( 191      ( 07 Sep 2018 07:05 )             29.0     Medications  MEDICATIONS  (STANDING):  ceFAZolin   IVPB 2000 milliGRAM(s) IV Intermittent every 24 hours  chlorhexidine 4% Liquid 1 Application(s) Topical <User Schedule>  dextrose 5%. 1000 milliLiter(s) (50 mL/Hr) IV Continuous <Continuous>  dextrose 50% Injectable 12.5 Gram(s) IV Push once  dextrose 50% Injectable 25 Gram(s) IV Push once  dextrose 50% Injectable 25 Gram(s) IV Push once  docusate sodium 100 milliGRAM(s) Oral three times a day  fentaNYL   Patch  12 MICROgram(s)/Hr 1 Patch Transdermal every 72 hours  insulin glargine Injectable (LANTUS) 12 Unit(s) SubCutaneous at bedtime  insulin lispro (HumaLOG) corrective regimen sliding scale   SubCutaneous three times a day before meals  lactobacillus acidophilus 1 Tablet(s) Oral three times a day with meals  lidocaine   Patch 1 Patch Transdermal daily  pantoprazole    Tablet 40 milliGRAM(s) Oral before breakfast  propranolol LA 60 milliGRAM(s) Oral daily  senna 2 Tablet(s) Oral at bedtime      Physical Exam  General: Patient comfortable in bed  Vital Signs Last 12 Hrs  T(F): 98 (09-07-18 @ 15:16), Max: 98 (09-07-18 @ 15:16)  HR: 72 (09-07-18 @ 15:16) (72 - 72)  BP: 122/56 (09-07-18 @ 15:16) (122/56 - 122/56)  BP(mean): --  RR: 18 (09-07-18 @ 15:16) (18 - 18)  SpO2: 100% (09-07-18 @ 15:16) (100% - 100%)  Neck: No palpable thyroid nodules.  CVS: S1S2, No murmurs  Respiratory: No wheezing, no crepitations  GI: Abdomen soft, bowel sounds positive  Musculoskeletal:  edema lower extremities.   Skin: No skin rashes, no ecchymosis    Diagnostics

## 2018-09-07 NOTE — PROGRESS NOTE ADULT - PROBLEM SELECTOR PLAN 4
Likely secondary to hypoalbuminemia as spot urine TP/CR not nephrotic range (1.7 from secondary membranous?). Continue with IV albumin assisted diuresis as ordered and change to lasix 80 mg PO daily on discharge. Monitor UO.

## 2018-09-07 NOTE — PROGRESS NOTE ADULT - SUBJECTIVE AND OBJECTIVE BOX
Patient is a 89y old  Female who presents with a chief complaint of transferred from [place] for rad-onc evaluation (07 Sep 2018 10:04)      INTERVAL HPI/OVERNIGHT EVENTS:  T(C): 36.3 (09-07-18 @ 04:58), Max: 36.6 (09-06-18 @ 21:13)  HR: 72 (09-07-18 @ 04:58) (72 - 79)  BP: 115/88 (09-07-18 @ 04:58) (90/55 - 123/60)  RR: 18 (09-07-18 @ 04:58) (18 - 20)  SpO2: 100% (09-07-18 @ 04:58) (100% - 100%)  Wt(kg): --  I&O's Summary      LABS:                        8.9    9.22  )-----------( 191      ( 07 Sep 2018 07:05 )             29.0     09-07    136  |  93<L>  |  85<H>  ----------------------------<  126<H>  4.1   |  26  |  2.67<H>    Ca    9.9      07 Sep 2018 07:05  Phos  4.0     09-07  Mg     1.8     09-07      PT/INR - ( 07 Sep 2018 07:05 )   PT: 21.7 SEC;   INR: 1.86              CAPILLARY BLOOD GLUCOSE      POCT Blood Glucose.: 143 mg/dL (07 Sep 2018 08:32)  POCT Blood Glucose.: 121 mg/dL (06 Sep 2018 22:24)  POCT Blood Glucose.: 134 mg/dL (06 Sep 2018 18:08)  POCT Blood Glucose.: 190 mg/dL (06 Sep 2018 13:13)            MEDICATIONS  (STANDING):  ceFAZolin   IVPB 2000 milliGRAM(s) IV Intermittent every 24 hours  chlorhexidine 4% Liquid 1 Application(s) Topical <User Schedule>  dextrose 5%. 1000 milliLiter(s) (50 mL/Hr) IV Continuous <Continuous>  dextrose 50% Injectable 12.5 Gram(s) IV Push once  dextrose 50% Injectable 25 Gram(s) IV Push once  dextrose 50% Injectable 25 Gram(s) IV Push once  docusate sodium 100 milliGRAM(s) Oral three times a day  fentaNYL   Patch  12 MICROgram(s)/Hr 1 Patch Transdermal every 72 hours  furosemide    Tablet 80 milliGRAM(s) Oral daily  insulin glargine Injectable (LANTUS) 12 Unit(s) SubCutaneous at bedtime  insulin lispro (HumaLOG) corrective regimen sliding scale   SubCutaneous three times a day before meals  lactobacillus acidophilus 1 Tablet(s) Oral three times a day with meals  lidocaine   Patch 1 Patch Transdermal daily  pantoprazole    Tablet 40 milliGRAM(s) Oral before breakfast  propranolol LA 60 milliGRAM(s) Oral daily  senna 2 Tablet(s) Oral at bedtime    MEDICATIONS  (PRN):  ALBUTerol    90 MICROgram(s) HFA Inhaler 2 Puff(s) Inhalation every 6 hours PRN Shortness of Breath and/or Wheezing  dextrose 40% Gel 15 Gram(s) Oral once PRN Blood Glucose LESS THAN 70 milliGRAM(s)/deciLiter  glucagon  Injectable 1 milliGRAM(s) IntraMuscular once PRN Glucose <70 milliGRAM(s)/deciLiter  oxyCODONE    IR 5 milliGRAM(s) Oral every 4 hours PRN Moderate and Severe pain  polyethylene glycol 3350 17 Gram(s) Oral two times a day PRN Constipation          PHYSICAL EXAM:  GENERAL: NAD, well-groomed, well-developed  HEAD:  Atraumatic, Normocephalic  CHEST/LUNG: Clear to percussion bilaterally; No rales, rhonchi, wheezing, or rubs  HEART: Regular rate and rhythm; No murmurs, rubs, or gallops  ABDOMEN: Soft, Nontender, Nondistended; Bowel sounds present  EXTREMITIES:  2+ Peripheral Pulses, No clubbing, cyanosis, or edema  LYMPH: No lymphadenopathy noted  SKIN: No rashes or lesions    Care Discussed with Consultants/Other Providers [ ] YES  [ ] NO

## 2018-09-07 NOTE — PROGRESS NOTE ADULT - SUBJECTIVE AND OBJECTIVE BOX
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 Nephrology consulted for elevated Scr.    Patient s/p lasix 80 mg IV X 2 doses yesterday with mild improvement in dyspnea.    REVIEW OF SYSTEMS:  Gen: no changes in weight, + weakness  Cards: no chest pain  Resp: + dyspnea with exertion improving  GI: no nausea or vomiting or diarrhea  Vascular: + LE edema improving    Levaquin (Other)  ramipril (Other)  tetracycline (Hives; Rash)      Hospital Medications: Medications reviewed      VITALS:  T(F): 97.3 (18 @ 04:58), Max: 97.9 (18 @ 21:13)  HR: 72 (18 @ 04:58)  BP: 115/88 (18 @ 04:58)  RR: 18 (18 @ 04:58)  SpO2: 100% (18 @ 04:58)  Wt(kg): --      PHYSICAL EXAM:    Gen: NAD, calm  Cards: RRR, +S1/S2, no M/G/R  Resp: Decreased BS @ bases B/L with rales in lower lobes  GI: soft, NT/ND, NABS  Vascular: 1+ LE edema B/L improving, cyanotic fingers      LABS:      136  |  93<L>  |  85<H>  ----------------------------<  126<H>  4.1   |  26  |  2.67<H>    Ca    9.9      07 Sep 2018 07:05  Phos  4.0       Mg     1.8           Creatinine Trend: 2.67 <--, 2.60 <--, 2.65 <--, 2.64 <--, 2.70 <--, 2.47 <--                        8.9    9.22  )-----------( 191      ( 07 Sep 2018 07:05 )             29.0     Urine Studies:  Urinalysis Basic - ( 02 Sep 2018 14:15 )    Color: YELLOW / Appearance: TURBID / S.025 / pH: 6.5  Gluc: NEGATIVE / Ketone: NEGATIVE  / Bili: SMALL / Urobili: NORMAL   Blood: LARGE / Protein: 100 / Nitrite: NEGATIVE   Leuk Esterase: MODERATE / RBC: 20-30 / WBC 10-20   Sq Epi:  / Non Sq Epi: SMALL / Bacteria: MODERATE      Sodium, Random Urine: < 20 mmol/L ( @ 14:15)  Creatinine, Random Urine: 78.70 mg/dL ( @ 14:15)  Protein, Random Urine: 134.5 mg/dL ( @ 14:15)

## 2018-09-08 LAB
BUN SERPL-MCNC: 91 MG/DL — HIGH (ref 7–23)
CALCIUM SERPL-MCNC: 9.8 MG/DL — SIGNIFICANT CHANGE UP (ref 8.4–10.5)
CHLORIDE SERPL-SCNC: 94 MMOL/L — LOW (ref 98–107)
CO2 SERPL-SCNC: 26 MMOL/L — SIGNIFICANT CHANGE UP (ref 22–31)
CREAT SERPL-MCNC: 2.72 MG/DL — HIGH (ref 0.5–1.3)
GLUCOSE BLDC GLUCOMTR-MCNC: 151 MG/DL — HIGH (ref 70–99)
GLUCOSE BLDC GLUCOMTR-MCNC: 165 MG/DL — HIGH (ref 70–99)
GLUCOSE BLDC GLUCOMTR-MCNC: 176 MG/DL — HIGH (ref 70–99)
GLUCOSE SERPL-MCNC: 151 MG/DL — HIGH (ref 70–99)
INR BLD: 2.55 — HIGH (ref 0.88–1.17)
MAGNESIUM SERPL-MCNC: 1.7 MG/DL — SIGNIFICANT CHANGE UP (ref 1.6–2.6)
PHOSPHATE SERPL-MCNC: 4 MG/DL — SIGNIFICANT CHANGE UP (ref 2.5–4.5)
POTASSIUM SERPL-MCNC: 4 MMOL/L — SIGNIFICANT CHANGE UP (ref 3.5–5.3)
POTASSIUM SERPL-SCNC: 4 MMOL/L — SIGNIFICANT CHANGE UP (ref 3.5–5.3)
PROTHROM AB SERPL-ACNC: 28.8 SEC — HIGH (ref 9.8–13.1)
SODIUM SERPL-SCNC: 135 MMOL/L — SIGNIFICANT CHANGE UP (ref 135–145)

## 2018-09-08 PROCEDURE — 93306 TTE W/DOPPLER COMPLETE: CPT | Mod: 26

## 2018-09-08 RX ORDER — WARFARIN SODIUM 2.5 MG/1
1 TABLET ORAL ONCE
Qty: 0 | Refills: 0 | Status: COMPLETED | OUTPATIENT
Start: 2018-09-08 | End: 2018-09-08

## 2018-09-08 RX ADMIN — Medication 100 MILLIGRAM(S): at 06:44

## 2018-09-08 RX ADMIN — LIDOCAINE 1 PATCH: 4 CREAM TOPICAL at 23:50

## 2018-09-08 RX ADMIN — LIDOCAINE 1 PATCH: 4 CREAM TOPICAL at 12:41

## 2018-09-08 RX ADMIN — CHLORHEXIDINE GLUCONATE 1 APPLICATION(S): 213 SOLUTION TOPICAL at 09:15

## 2018-09-08 RX ADMIN — Medication 80 MILLIGRAM(S): at 12:41

## 2018-09-08 RX ADMIN — Medication 100 MILLIGRAM(S): at 06:45

## 2018-09-08 RX ADMIN — Medication 100 MILLIGRAM(S): at 13:01

## 2018-09-08 RX ADMIN — Medication 1: at 13:01

## 2018-09-08 RX ADMIN — Medication 1 TABLET(S): at 12:41

## 2018-09-08 RX ADMIN — FENTANYL CITRATE 1 PATCH: 50 INJECTION INTRAVENOUS at 00:18

## 2018-09-08 RX ADMIN — Medication 1: at 09:15

## 2018-09-08 RX ADMIN — INSULIN GLARGINE 12 UNIT(S): 100 INJECTION, SOLUTION SUBCUTANEOUS at 22:37

## 2018-09-08 RX ADMIN — Medication 1 TABLET(S): at 09:15

## 2018-09-08 RX ADMIN — FENTANYL CITRATE 1 PATCH: 50 INJECTION INTRAVENOUS at 09:18

## 2018-09-08 RX ADMIN — LIDOCAINE 1 PATCH: 4 CREAM TOPICAL at 11:28

## 2018-09-08 RX ADMIN — Medication 1 TABLET(S): at 18:10

## 2018-09-08 RX ADMIN — WARFARIN SODIUM 1 MILLIGRAM(S): 2.5 TABLET ORAL at 18:23

## 2018-09-08 RX ADMIN — PANTOPRAZOLE SODIUM 40 MILLIGRAM(S): 20 TABLET, DELAYED RELEASE ORAL at 06:44

## 2018-09-08 RX ADMIN — Medication 1: at 18:10

## 2018-09-08 RX ADMIN — Medication 60 MILLIGRAM(S): at 06:44

## 2018-09-08 RX ADMIN — Medication 50 MILLILITER(S): at 12:41

## 2018-09-08 NOTE — PROGRESS NOTE ADULT - SUBJECTIVE AND OBJECTIVE BOX
SOB improved, No chest pain   Patient received radiation yesterday      MEDICATIONS  (STANDING):  albumin human 25% IVPB 50 milliLiter(s) IV Intermittent <User Schedule>  ceFAZolin   IVPB 2000 milliGRAM(s) IV Intermittent every 24 hours  chlorhexidine 4% Liquid 1 Application(s) Topical <User Schedule>  docusate sodium 100 milliGRAM(s) Oral three times a day  fentaNYL   Patch  12 MICROgram(s)/Hr 1 Patch Transdermal every 72 hours  furosemide   Injectable 80 milliGRAM(s) IV Push <User Schedule>  insulin glargine Injectable (LANTUS) 12 Unit(s) SubCutaneous at bedtime  insulin lispro (HumaLOG) corrective regimen sliding scale   SubCutaneous three times a day before meals  lactobacillus acidophilus 1 Tablet(s) Oral three times a day with meals  lidocaine   Patch 1 Patch Transdermal daily  pantoprazole    Tablet 40 milliGRAM(s) Oral before breakfast  propranolol LA 60 milliGRAM(s) Oral daily  senna 2 Tablet(s) Oral at bedtime    MEDICATIONS  (PRN):  ALBUTerol    90 MICROgram(s) HFA Inhaler 2 Puff(s) Inhalation every 6 hours PRN Shortness of Breath and/or Wheezing  dextrose 40% Gel 15 Gram(s) Oral once PRN Blood Glucose LESS THAN 70 milliGRAM(s)/deciLiter  glucagon  Injectable 1 milliGRAM(s) IntraMuscular once PRN Glucose <70 milliGRAM(s)/deciLiter  oxyCODONE    IR 5 milliGRAM(s) Oral every 4 hours PRN Moderate and Severe pain  polyethylene glycol 3350 17 Gram(s) Oral two times a day PRN Constipation      LABS:                        8.9    9.22  )-----------( 191      ( 07 Sep 2018 07:05 )             29.0     135  |  94<L>  |  91<H>  ----------------------------<  151<H>  4.0   |  26  |  2.72<H>    Ca    9.8      08 Sep 2018 06:30  Phos  4.0     09-08  Mg     1.7     09-08    Creatinine Trend: 2.72<--, 2.67<--, 2.60<--, 2.65<--, 2.64<--, 2.70<--   PT/INR - ( 08 Sep 2018 06:30 )   PT: 28.8 SEC;   INR: 2.55       PHYSICAL EXAM  Vital Signs Last 24 Hrs  T(C): 36.6 (08 Sep 2018 05:19), Max: 36.8 (07 Sep 2018 21:48)  T(F): 97.8 (08 Sep 2018 05:19), Max: 98.3 (07 Sep 2018 21:48)  HR: 86 (08 Sep 2018 12:49) (72 - 86)  BP: 126/72 (08 Sep 2018 12:49) (116/62 - 126/72)  RR: 18 (08 Sep 2018 05:19) (18 - 18)  SpO2: 100% (08 Sep 2018 05:19) (100% - 100%)    HEENT: Normal Oral mucosa, PERRL, EOMI  Cardiovascular: Normal S1S2, JVD 12, 1/6 STEFFANY  Respiratory: decreased BS BL bases  Gastrointestinal: Abdomen soft, ND, NT, +BS  Ext: trace edema, improved      DIAGNOSTIC DATA    Xray Chest 1 View- PORTABLE-Urgent (08.17.18 @ 21:23) >  There are low lung volumes resulting in crowding of the bronchovascular   markings at the lung bases.  There is minimal blunting at the right costophrenic angle either   representing trace pleural effusion and/or pleural thickening.  There is subsegmental atelectasis at both lung bases.      < from: TTE Echo Doppler w/o Cont (08.17.18 @ 10:38) >  Technically difficult and limited study.  Mitral Valve: Calcified mitral annulus and mitral valve leaflets. Normal   opening of the mitral valve leaflets. Trace mitral regurgitation.  Aortic Valve/Aorta: Not well visualized  Tricuspid Valve: Calcified tricuspid annulus with normally opening valve.   Trace tricuspid regurgitation.  Pulmonic Valve: The pulmonic valve is not well visualized. Probably   normal.  Left Atrium: Moderate left atrial enlargement  Right Atrium: Normal  Left Ventricle: The endocardium is not well-visualized. Overall there is   preserved left ventricular systolic function. Unable to rule out wall   motion abnormalities. The EF is approximately 65%.  Right Ventricle: Normal right ventricular size and function.  Pericardium/Pleura: No pericardial effusion noted.  Pulmonary/RV Pressure: The right ventricular systolic pressure is   estimated to be 35mmHg, assuming that the right atrial pressure is   estimated to be8 mmHg. This is consistent with normal pulmonary   pressures.  LV Diastolic Function: Stage 2 diastolic dysfunction    < end of copied text >      ASSESSMENT AND PLAN:  89y Female  multiple comorbidities preserved LV and RV fx, Afib on coumadin, reported CAD ? remote MI with no intervention follows with Dr. 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

## 2018-09-08 NOTE — PROGRESS NOTE ADULT - SUBJECTIVE AND OBJECTIVE BOX
Chief complaint  Patient is a 89y old  Female who presents with a chief complaint of transferred from [place] for rad-onc evaluation (08 Sep 2018 13:22)   Review of systems  Patient in bed, looks comfortable, no fever, no hypoglycemia.    Labs and Fingersticks  CAPILLARY BLOOD GLUCOSE      POCT Blood Glucose.: 151 mg/dL (08 Sep 2018 09:13)  POCT Blood Glucose.: 158 mg/dL (07 Sep 2018 22:09)  POCT Blood Glucose.: 183 mg/dL (07 Sep 2018 17:33)          Calcium, Total Serum: 9.8 (09-08 @ 06:30)  Calcium, Total Serum: 9.9 (09-07 @ 07:05)          09-08    135  |  94<L>  |  91<H>  ----------------------------<  151<H>  4.0   |  26  |  2.72<H>    Ca    9.8      08 Sep 2018 06:30  Phos  4.0     09-08  Mg     1.7     09-08                          8.9    9.22  )-----------( 191      ( 07 Sep 2018 07:05 )             29.0     Medications  MEDICATIONS  (STANDING):  albumin human 25% IVPB 50 milliLiter(s) IV Intermittent <User Schedule>  ceFAZolin   IVPB 2000 milliGRAM(s) IV Intermittent every 24 hours  chlorhexidine 4% Liquid 1 Application(s) Topical <User Schedule>  dextrose 5%. 1000 milliLiter(s) (50 mL/Hr) IV Continuous <Continuous>  dextrose 50% Injectable 12.5 Gram(s) IV Push once  dextrose 50% Injectable 25 Gram(s) IV Push once  dextrose 50% Injectable 25 Gram(s) IV Push once  docusate sodium 100 milliGRAM(s) Oral three times a day  fentaNYL   Patch  12 MICROgram(s)/Hr 1 Patch Transdermal every 72 hours  furosemide   Injectable 80 milliGRAM(s) IV Push <User Schedule>  insulin glargine Injectable (LANTUS) 12 Unit(s) SubCutaneous at bedtime  insulin lispro (HumaLOG) corrective regimen sliding scale   SubCutaneous three times a day before meals  lactobacillus acidophilus 1 Tablet(s) Oral three times a day with meals  lidocaine   Patch 1 Patch Transdermal daily  pantoprazole    Tablet 40 milliGRAM(s) Oral before breakfast  propranolol LA 60 milliGRAM(s) Oral daily  senna 2 Tablet(s) Oral at bedtime      Physical Exam  General: Patient comfortable in bed  Vital Signs Last 12 Hrs  T(F): 97.5 (09-08-18 @ 15:20), Max: 97.8 (09-08-18 @ 05:19)  HR: 76 (09-08-18 @ 15:20) (76 - 86)  BP: 113/57 (09-08-18 @ 15:20) (113/57 - 126/72)  BP(mean): --  RR: 18 (09-08-18 @ 15:20) (18 - 18)  SpO2: 100% (09-08-18 @ 15:20) (100% - 100%)  Neck: No palpable thyroid nodules.  CVS: S1S2, No murmurs  Respiratory: No wheezing, no crepitations  GI: Abdomen soft, bowel sounds positive  Musculoskeletal:  edema lower extremities.   Skin: No skin rashes, no ecchymosis    Diagnostics

## 2018-09-08 NOTE — PROGRESS NOTE ADULT - SUBJECTIVE AND OBJECTIVE BOX
INTERVAL HPI/OVERNIGHT EVENTS:    (+) brown bm x 2 overnight   denies n/v/d/c, abdominal pain, melena or brbpr     MEDICATIONS  (STANDING):  albumin human 25% IVPB 50 milliLiter(s) IV Intermittent <User Schedule>  ceFAZolin   IVPB 2000 milliGRAM(s) IV Intermittent every 24 hours  chlorhexidine 4% Liquid 1 Application(s) Topical <User Schedule>  dextrose 5%. 1000 milliLiter(s) (50 mL/Hr) IV Continuous <Continuous>  dextrose 50% Injectable 12.5 Gram(s) IV Push once  dextrose 50% Injectable 25 Gram(s) IV Push once  dextrose 50% Injectable 25 Gram(s) IV Push once  docusate sodium 100 milliGRAM(s) Oral three times a day  fentaNYL   Patch  12 MICROgram(s)/Hr 1 Patch Transdermal every 72 hours  furosemide   Injectable 80 milliGRAM(s) IV Push <User Schedule>  insulin glargine Injectable (LANTUS) 12 Unit(s) SubCutaneous at bedtime  insulin lispro (HumaLOG) corrective regimen sliding scale   SubCutaneous three times a day before meals  lactobacillus acidophilus 1 Tablet(s) Oral three times a day with meals  lidocaine   Patch 1 Patch Transdermal daily  pantoprazole    Tablet 40 milliGRAM(s) Oral before breakfast  propranolol LA 60 milliGRAM(s) Oral daily  senna 2 Tablet(s) Oral at bedtime    MEDICATIONS  (PRN):  ALBUTerol    90 MICROgram(s) HFA Inhaler 2 Puff(s) Inhalation every 6 hours PRN Shortness of Breath and/or Wheezing  dextrose 40% Gel 15 Gram(s) Oral once PRN Blood Glucose LESS THAN 70 milliGRAM(s)/deciLiter  glucagon  Injectable 1 milliGRAM(s) IntraMuscular once PRN Glucose <70 milliGRAM(s)/deciLiter  oxyCODONE    IR 5 milliGRAM(s) Oral every 4 hours PRN Moderate and Severe pain  polyethylene glycol 3350 17 Gram(s) Oral two times a day PRN Constipation      Allergies    Levaquin (Other)  ramipril (Other)  tetracycline (Hives; Rash)    Intolerances        Review of Systems:    General:  No wt loss, fevers, chills, night sweats, fatigue   Eyes:  Good vision, no reported pain  ENT:  No sore throat, pain, runny nose, dysphagia  CV:  No pain, palpitations, hypo/hypertension  Resp:  No dyspnea, cough, tachypnea, wheezing  GI:  No pain, No nausea, No vomiting, No diarrhea, No constipation, No weight loss, No fever, No pruritis, No rectal bleeding, No melena, No dysphagia  :  No pain, bleeding, incontinence, nocturia  Muscle:  No pain, weakness  Neuro:  No weakness, tingling, memory problems  Psych:  No fatigue, insomnia, mood problems, depression  Endocrine:  No polyuria, polydypsia, cold/heat intolerance  Heme:  No petechiae, ecchymosis, easy bruisability  Skin:  No rash, tattoos, scars, edema      Vital Signs Last 24 Hrs  T(C): 36.6 (08 Sep 2018 05:19), Max: 36.8 (07 Sep 2018 21:48)  T(F): 97.8 (08 Sep 2018 05:19), Max: 98.3 (07 Sep 2018 21:48)  HR: 81 (08 Sep 2018 05:19) (72 - 84)  BP: 120/53 (08 Sep 2018 05:19) (116/62 - 122/56)  BP(mean): --  RR: 18 (08 Sep 2018 05:19) (18 - 18)  SpO2: 100% (08 Sep 2018 05:19) (100% - 100%)    PHYSICAL EXAM:    Constitutional: NAD  HEENT: EOMI, throat clear  Neck: No LAD, supple  Respiratory: CTA and P  Cardiovascular: S1 and S2, RRR, no M  Gastrointestinal: BS+, soft, NT/ND, neg HSM,  Extremities: No peripheral edema, neg clubbing, cyanosis  Vascular: 2+ peripheral pulses  Neurological: A/O x 3, no focal deficits  Psychiatric: Normal mood, normal affect  Skin: No rashes      LABS:                        8.9    9.22  )-----------( 191      ( 07 Sep 2018 07:05 )             29.0     09-08    135  |  94<L>  |  91<H>  ----------------------------<  151<H>  4.0   |  26  |  2.72<H>    Ca    9.8      08 Sep 2018 06:30  Phos  4.0     09-08  Mg     1.7     09-08      PT/INR - ( 08 Sep 2018 06:30 )   PT: 28.8 SEC;   INR: 2.55                RADIOLOGY & ADDITIONAL TESTS:

## 2018-09-08 NOTE — PROGRESS NOTE ADULT - SUBJECTIVE AND OBJECTIVE BOX
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 Nephrology consulted for elevated Scr.    Patient now getting Lasix 80mg IV daily with albumin.    REVIEW OF SYSTEMS:  Gen:  + weakness  Cards: no chest pain  Resp: + dyspnea with exertion improving  GI: no nausea or vomiting or diarrhea  Vascular: + LE edema improving    Levaquin (Other)  ramipril (Other)  tetracycline (Hives; Rash)      Hospital Medications: Medications reviewed    VITALS:  T(F): 97.8 (18 @ 05:19), Max: 98.3 (18 @ 21:48)  HR: 86 (18 @ 12:49)  BP: 126/72 (18 @ 12:49)  RR: 18 (18 @ 05:19)  SpO2: 100% (18 @ 05:19)  Wt(kg): --    Gen: NAD, calm  Cards: RRR, +S1/S2, no M/G/R  Resp: Decreased BS @ bases B/L with rales in lower lobes  GI: soft, NT/ND, NABS  Vascular: 1+ LE edema B/L improving, cyanotic fingers  PHYSICAL EXAM:    Gen: NAD, calm  Cards: RRR, +S1/S2, no M/G/R  Resp: Decreased BS @ bases B/L with rales in lower lung fields  GI: soft, NT/ND, NABS  Vascular: 1+ LE edema B/L improving, cyanotic fingers        LABS:      135  |  94<L>  |  91<H>  ----------------------------<  151<H>  4.0   |  26  |  2.72<H>    Ca    9.8      08 Sep 2018 06:30  Phos  4.0       Mg     1.7           Creatinine Trend: 2.72 <--, 2.67 <--, 2.60 <--, 2.65 <--, 2.64 <--, 2.70 <--, 2.47 <--                        8.9    9.22  )-----------( 191      ( 07 Sep 2018 07:05 )             29.0     Urine Studies:  Urinalysis Basic - ( 02 Sep 2018 14:15 )    Color: YELLOW / Appearance: TURBID / S.025 / pH: 6.5  Gluc: NEGATIVE / Ketone: NEGATIVE  / Bili: SMALL / Urobili: NORMAL   Blood: LARGE / Protein: 100 / Nitrite: NEGATIVE   Leuk Esterase: MODERATE / RBC: 20-30 / WBC 10-20   Sq Epi:  / Non Sq Epi: SMALL / Bacteria: MODERATE      Sodium, Random Urine: < 20 mmol/L ( @ 14:15)  Creatinine, Random Urine: 78.70 mg/dL ( @ 14:15)  Protein, Random Urine: 134.5 mg/dL ( @ 14:15)

## 2018-09-08 NOTE — PROGRESS NOTE ADULT - SUBJECTIVE AND OBJECTIVE BOX
Patient is a 89y old  Female who presents with a chief complaint of transferred from [facility] for rad-onc evaluation (08 Sep 2018 13:02)      Any change in ROS: pt is do ing ok : seems to be depressed to ne: son at bedside: her breathing seems to have improved       MEDICATIONS  (STANDING):  albumin human 25% IVPB 50 milliLiter(s) IV Intermittent <User Schedule>  ceFAZolin   IVPB 2000 milliGRAM(s) IV Intermittent every 24 hours  chlorhexidine 4% Liquid 1 Application(s) Topical <User Schedule>  dextrose 5%. 1000 milliLiter(s) (50 mL/Hr) IV Continuous <Continuous>  dextrose 50% Injectable 12.5 Gram(s) IV Push once  dextrose 50% Injectable 25 Gram(s) IV Push once  dextrose 50% Injectable 25 Gram(s) IV Push once  docusate sodium 100 milliGRAM(s) Oral three times a day  fentaNYL   Patch  12 MICROgram(s)/Hr 1 Patch Transdermal every 72 hours  furosemide   Injectable 80 milliGRAM(s) IV Push <User Schedule>  insulin glargine Injectable (LANTUS) 12 Unit(s) SubCutaneous at bedtime  insulin lispro (HumaLOG) corrective regimen sliding scale   SubCutaneous three times a day before meals  lactobacillus acidophilus 1 Tablet(s) Oral three times a day with meals  lidocaine   Patch 1 Patch Transdermal daily  pantoprazole    Tablet 40 milliGRAM(s) Oral before breakfast  propranolol LA 60 milliGRAM(s) Oral daily  senna 2 Tablet(s) Oral at bedtime    MEDICATIONS  (PRN):  ALBUTerol    90 MICROgram(s) HFA Inhaler 2 Puff(s) Inhalation every 6 hours PRN Shortness of Breath and/or Wheezing  dextrose 40% Gel 15 Gram(s) Oral once PRN Blood Glucose LESS THAN 70 milliGRAM(s)/deciLiter  glucagon  Injectable 1 milliGRAM(s) IntraMuscular once PRN Glucose <70 milliGRAM(s)/deciLiter  oxyCODONE    IR 5 milliGRAM(s) Oral every 4 hours PRN Moderate and Severe pain  polyethylene glycol 3350 17 Gram(s) Oral two times a day PRN Constipation    Vital Signs Last 24 Hrs  T(C): 36.6 (08 Sep 2018 05:19), Max: 36.8 (07 Sep 2018 21:48)  T(F): 97.8 (08 Sep 2018 05:19), Max: 98.3 (07 Sep 2018 21:48)  HR: 86 (08 Sep 2018 12:49) (72 - 86)  BP: 126/72 (08 Sep 2018 12:49) (116/62 - 126/72)  BP(mean): --  RR: 18 (08 Sep 2018 05:19) (18 - 18)  SpO2: 100% (08 Sep 2018 05:19) (100% - 100%)    I&O's Summary        Physical Exam:   GENERAL: NAD, well-groomed, well-developed  HEENT: JORGE/   Atraumatic, Normocephalic  ENMT: No tonsillar erythema, exudates, or enlargement; Moist mucous membranes, Good dentition, No lesions  NECK: Supple, No JVD, Normal thyroid  CHEST/LUNG: Scattered crackles:   CVS: Regular rate and rhythm; No murmurs, rubs, or gallops  GI: : Soft, Nontender, Nondistended; Bowel sounds present  NERVOUS SYSTEM:  Alert & Oriented X3  EXTREMITIES: + edema  LYMPH: No lymphadenopathy noted  SKIN: No rashes or lesions  ENDOCRINOLOGY: No Thyromegaly  PSYCH: Depressed    Labs:                              8.9    9.22  )-----------( 191      ( 07 Sep 2018 07:05 )             29.0                         9.3    11.41 )-----------( 212      ( 06 Sep 2018 06:35 )             30.3                         8.9    12.65 )-----------( 196      ( 05 Sep 2018 05:45 )             28.3     09-08    135  |  94<L>  |  91<H>  ----------------------------<  151<H>  4.0   |  26  |  2.72<H>  09-07    136  |  93<L>  |  85<H>  ----------------------------<  126<H>  4.1   |  26  |  2.67<H>  09-06    136  |  95<L>  |  81<H>  ----------------------------<  105<H>  4.3   |  25  |  2.60<H>  09-05    132<L>  |  94<L>  |  79<H>  ----------------------------<  165<H>  5.0   |  23  |  2.65<H>  09-04    131<L>  |  93<L>  |  77<H>  ----------------------------<  190<H>  5.2   |  24  |  2.64<H>    Ca    9.8      08 Sep 2018 06:30  Ca    9.9      07 Sep 2018 07:05  Phos  4.0     09-08  Phos  4.0     09-07  Mg     1.7     09-08  Mg     1.8     09-07      CAPILLARY BLOOD GLUCOSE      POCT Blood Glucose.: 151 mg/dL (08 Sep 2018 09:13)  POCT Blood Glucose.: 158 mg/dL (07 Sep 2018 22:09)  POCT Blood Glucose.: 183 mg/dL (07 Sep 2018 17:33)        PT/INR - ( 08 Sep 2018 06:30 )   PT: 28.8 SEC;   INR: 2.55              < from: CT Chest No Cont (09.05.18 @ 16:32) >  Interlobular septal thickening with superimposed groundglass opacities.  PLEURA: Small to moderate bilateral pleural effusions right greater than   left demonstrating interval increase in size since August 14, 2018 with   associated bibasilar atelectasis.   VESSELS: Left PICC line with tip in the right atrial/SVC junction.   Coronary artery calcifications. Atherosclerotic changes of the aorta.  HEART: Heart size is normal. No pericardial effusion.   MEDIASTINUM AND OZ: Anterior mediastinal lymph node unchanged from   2014, may be reactive. No hilar or mediastinal lesions evident,   evaluation limited due to lack of IV contrast.  CHEST WALL AND LOWER NECK: Anasarca. Subcutaneous left and right   abdominal wall edema.  VISUALIZED UPPER ABDOMEN: Small ascites, increased from 8/14/2018.   Heterogeneityof the liver with multiple hypodense lesions.  BONES: Degenerative changes of the spine. Subcentimeter lucent lesions in   the second left rib with associated small pathologic fracture, suspicious   for metastases. 1.3 cm lucent lesion in the fifth left rib with bony   destruction, suspicious for metastases.    IMPRESSION:    Interlobular septal thickening with superimposed groundglass opacities   and small to moderate bilateral bilateral pleural effusions, right   greater than left, consistent with pulmonary edema.    Small amount of ascites increased from 8/14/2018 and associated   subcutaneous edema.    Heterogeneity of the liver with hypodense lesions worrisome for   metastases.    Subcentimeter lucent lesions in the second left rib with associated small   pathologic fracture, suspicious for metastases. 1.3 cm lucent lesion in   the fifth left rib with bony destruction, also suspicious for metastases.              GAYATRI JUDGE M.D., RADIOLOGY RESIDENT  This document has been electronically signed.  HANK HAMM M.D. ATTENDING RADIOLOGIST  This document has been electronically signed. Sep  5 2018  5:45PM    < end of copied text >        RECENT CULTURES:        RESPIRATORY CULTURES:          Studies  Chest X-RAY  CT SCAN Chest   Venous Dopplers: LE:   CT Abdomen  Others

## 2018-09-08 NOTE — PROGRESS NOTE ADULT - PROBLEM SELECTOR PLAN 1
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

## 2018-09-08 NOTE — PROGRESS NOTE ADULT - SUBJECTIVE AND OBJECTIVE BOX
Patient is a 89y old  Female who presents with a chief complaint of transferred from [place] for rad-onc evaluation (08 Sep 2018 17:14)      INTERVAL HPI/OVERNIGHT EVENTS:  T(C): 36.4 (09-08-18 @ 15:20), Max: 36.8 (09-07-18 @ 21:48)  HR: 76 (09-08-18 @ 15:20) (76 - 86)  BP: 113/57 (09-08-18 @ 15:20) (113/57 - 126/72)  RR: 18 (09-08-18 @ 15:20) (18 - 18)  SpO2: 100% (09-08-18 @ 15:20) (100% - 100%)  Wt(kg): --  I&O's Summary      LABS:                        8.9    9.22  )-----------( 191      ( 07 Sep 2018 07:05 )             29.0     09-08    135  |  94<L>  |  91<H>  ----------------------------<  151<H>  4.0   |  26  |  2.72<H>    Ca    9.8      08 Sep 2018 06:30  Phos  4.0     09-08  Mg     1.7     09-08      PT/INR - ( 08 Sep 2018 06:30 )   PT: 28.8 SEC;   INR: 2.55              CAPILLARY BLOOD GLUCOSE      POCT Blood Glucose.: 176 mg/dL (08 Sep 2018 18:08)  POCT Blood Glucose.: 151 mg/dL (08 Sep 2018 09:13)  POCT Blood Glucose.: 158 mg/dL (07 Sep 2018 22:09)            MEDICATIONS  (STANDING):  albumin human 25% IVPB 50 milliLiter(s) IV Intermittent <User Schedule>  ceFAZolin   IVPB 2000 milliGRAM(s) IV Intermittent every 24 hours  chlorhexidine 4% Liquid 1 Application(s) Topical <User Schedule>  dextrose 5%. 1000 milliLiter(s) (50 mL/Hr) IV Continuous <Continuous>  dextrose 50% Injectable 12.5 Gram(s) IV Push once  dextrose 50% Injectable 25 Gram(s) IV Push once  dextrose 50% Injectable 25 Gram(s) IV Push once  docusate sodium 100 milliGRAM(s) Oral three times a day  fentaNYL   Patch  12 MICROgram(s)/Hr 1 Patch Transdermal every 72 hours  furosemide   Injectable 80 milliGRAM(s) IV Push <User Schedule>  insulin glargine Injectable (LANTUS) 12 Unit(s) SubCutaneous at bedtime  insulin lispro (HumaLOG) corrective regimen sliding scale   SubCutaneous three times a day before meals  lactobacillus acidophilus 1 Tablet(s) Oral three times a day with meals  lidocaine   Patch 1 Patch Transdermal daily  pantoprazole    Tablet 40 milliGRAM(s) Oral before breakfast  propranolol LA 60 milliGRAM(s) Oral daily  senna 2 Tablet(s) Oral at bedtime    MEDICATIONS  (PRN):  ALBUTerol    90 MICROgram(s) HFA Inhaler 2 Puff(s) Inhalation every 6 hours PRN Shortness of Breath and/or Wheezing  dextrose 40% Gel 15 Gram(s) Oral once PRN Blood Glucose LESS THAN 70 milliGRAM(s)/deciLiter  glucagon  Injectable 1 milliGRAM(s) IntraMuscular once PRN Glucose <70 milliGRAM(s)/deciLiter  oxyCODONE    IR 5 milliGRAM(s) Oral every 4 hours PRN Moderate and Severe pain  polyethylene glycol 3350 17 Gram(s) Oral two times a day PRN Constipation          PHYSICAL EXAM:  GENERAL: NAD, well-groomed, well-developed  HEAD:  Atraumatic, Normocephalic  CHEST/LUNG: Clear to percussion bilaterally; No rales, rhonchi, wheezing, or rubs  HEART: Regular rate and rhythm; No murmurs, rubs, or gallops  ABDOMEN: Soft, Nontender, Nondistended; Bowel sounds present  EXTREMITIES:  2+ Peripheral Pulses, No clubbing, cyanosis, or edema  LYMPH: No lymphadenopathy noted  SKIN: No rashes or lesions    Care Discussed with Consultants/Other Providers [ ] YES  [ ] NO

## 2018-09-08 NOTE — PROGRESS NOTE ADULT - PROBLEM SELECTOR PLAN 1
secondary to fluid overload: CT scan chest reviewed: she is on oral lasix now: Cont diuresis!  9/8: seems to be getting better: on albumin with diuretics: The leg edema do seems to be better!

## 2018-09-09 LAB
GLUCOSE BLDC GLUCOMTR-MCNC: 125 MG/DL — HIGH (ref 70–99)
GLUCOSE BLDC GLUCOMTR-MCNC: 172 MG/DL — HIGH (ref 70–99)
GLUCOSE BLDC GLUCOMTR-MCNC: 190 MG/DL — HIGH (ref 70–99)
GLUCOSE BLDC GLUCOMTR-MCNC: 214 MG/DL — HIGH (ref 70–99)
HCT VFR BLD CALC: 29.4 % — LOW (ref 34.5–45)
HGB BLD-MCNC: 9.2 G/DL — LOW (ref 11.5–15.5)
INR BLD: 3.02 — HIGH (ref 0.88–1.17)
MCHC RBC-ENTMCNC: 29.1 PG — SIGNIFICANT CHANGE UP (ref 27–34)
MCHC RBC-ENTMCNC: 31.3 % — LOW (ref 32–36)
MCV RBC AUTO: 93 FL — SIGNIFICANT CHANGE UP (ref 80–100)
NRBC # FLD: 0 — SIGNIFICANT CHANGE UP
PLATELET # BLD AUTO: 183 K/UL — SIGNIFICANT CHANGE UP (ref 150–400)
PMV BLD: 9.7 FL — SIGNIFICANT CHANGE UP (ref 7–13)
PROTHROM AB SERPL-ACNC: 35.5 SEC — HIGH (ref 9.8–13.1)
RBC # BLD: 3.16 M/UL — LOW (ref 3.8–5.2)
RBC # FLD: 18.6 % — HIGH (ref 10.3–14.5)
WBC # BLD: 9.01 K/UL — SIGNIFICANT CHANGE UP (ref 3.8–10.5)
WBC # FLD AUTO: 9.01 K/UL — SIGNIFICANT CHANGE UP (ref 3.8–10.5)

## 2018-09-09 RX ADMIN — Medication 1: at 13:05

## 2018-09-09 RX ADMIN — Medication 1 TABLET(S): at 08:48

## 2018-09-09 RX ADMIN — LIDOCAINE 1 PATCH: 4 CREAM TOPICAL at 22:46

## 2018-09-09 RX ADMIN — Medication 1 TABLET(S): at 13:05

## 2018-09-09 RX ADMIN — SENNA PLUS 2 TABLET(S): 8.6 TABLET ORAL at 21:33

## 2018-09-09 RX ADMIN — CHLORHEXIDINE GLUCONATE 1 APPLICATION(S): 213 SOLUTION TOPICAL at 09:04

## 2018-09-09 RX ADMIN — Medication 60 MILLIGRAM(S): at 05:26

## 2018-09-09 RX ADMIN — PANTOPRAZOLE SODIUM 40 MILLIGRAM(S): 20 TABLET, DELAYED RELEASE ORAL at 06:27

## 2018-09-09 RX ADMIN — Medication 1: at 18:03

## 2018-09-09 RX ADMIN — Medication 100 MILLIGRAM(S): at 21:32

## 2018-09-09 RX ADMIN — Medication 1 TABLET(S): at 18:02

## 2018-09-09 RX ADMIN — Medication 80 MILLIGRAM(S): at 11:46

## 2018-09-09 RX ADMIN — Medication 100 MILLIGRAM(S): at 05:26

## 2018-09-09 RX ADMIN — LIDOCAINE 1 PATCH: 4 CREAM TOPICAL at 11:16

## 2018-09-09 RX ADMIN — Medication 100 MILLIGRAM(S): at 06:27

## 2018-09-09 RX ADMIN — Medication 50 MILLILITER(S): at 11:16

## 2018-09-09 RX ADMIN — INSULIN GLARGINE 12 UNIT(S): 100 INJECTION, SOLUTION SUBCUTANEOUS at 22:42

## 2018-09-09 RX ADMIN — Medication 100 MILLIGRAM(S): at 13:05

## 2018-09-09 NOTE — PROGRESS NOTE ADULT - SUBJECTIVE AND OBJECTIVE BOX
INTERVAL HPI/OVERNIGHT EVENTS:    sleeping/arousable to loud voice  reports having bowel movements  denying any abdominal pain or n/v  reports appetite is good; she ate breakfast without difficulty    MEDICATIONS  (STANDING):  albumin human 25% IVPB 50 milliLiter(s) IV Intermittent <User Schedule>  ceFAZolin   IVPB 2000 milliGRAM(s) IV Intermittent every 24 hours  chlorhexidine 4% Liquid 1 Application(s) Topical <User Schedule>  dextrose 5%. 1000 milliLiter(s) (50 mL/Hr) IV Continuous <Continuous>  dextrose 50% Injectable 12.5 Gram(s) IV Push once  dextrose 50% Injectable 25 Gram(s) IV Push once  dextrose 50% Injectable 25 Gram(s) IV Push once  docusate sodium 100 milliGRAM(s) Oral three times a day  fentaNYL   Patch  12 MICROgram(s)/Hr 1 Patch Transdermal every 72 hours  furosemide   Injectable 80 milliGRAM(s) IV Push <User Schedule>  insulin glargine Injectable (LANTUS) 12 Unit(s) SubCutaneous at bedtime  insulin lispro (HumaLOG) corrective regimen sliding scale   SubCutaneous three times a day before meals  lactobacillus acidophilus 1 Tablet(s) Oral three times a day with meals  lidocaine   Patch 1 Patch Transdermal daily  pantoprazole    Tablet 40 milliGRAM(s) Oral before breakfast  propranolol LA 60 milliGRAM(s) Oral daily  senna 2 Tablet(s) Oral at bedtime    MEDICATIONS  (PRN):  ALBUTerol    90 MICROgram(s) HFA Inhaler 2 Puff(s) Inhalation every 6 hours PRN Shortness of Breath and/or Wheezing  dextrose 40% Gel 15 Gram(s) Oral once PRN Blood Glucose LESS THAN 70 milliGRAM(s)/deciLiter  glucagon  Injectable 1 milliGRAM(s) IntraMuscular once PRN Glucose <70 milliGRAM(s)/deciLiter  oxyCODONE    IR 5 milliGRAM(s) Oral every 4 hours PRN Moderate and Severe pain  polyethylene glycol 3350 17 Gram(s) Oral two times a day PRN Constipation      Allergies    Levaquin (Other)  ramipril (Other)  tetracycline (Hives; Rash)    Intolerances        Review of Systems:    General:  No wt loss, fevers, chills, night sweats, fatigue   Eyes:  Good vision, no reported pain  ENT:  No sore throat, pain, runny nose, dysphagia  CV:  No pain, palpitations, hypo/hypertension  Resp:  No dyspnea, cough, tachypnea, wheezing  GI:  No pain, No nausea, No vomiting, No diarrhea, No constipation, No weight loss, No fever, No pruritis, No rectal bleeding, No melena, No dysphagia  :  No pain, bleeding, incontinence, nocturia  Muscle:  No pain, weakness  Neuro:  No weakness, tingling, memory problems  Psych:  No fatigue, insomnia, mood problems, depression  Endocrine:  No polyuria, polydypsia, cold/heat intolerance  Heme:  No petechiae, ecchymosis, easy bruisability  Skin:  No rash, tattoos, scars, edema      Vital Signs Last 24 Hrs  T(C): 36.3 (09 Sep 2018 05:32), Max: 36.7 (08 Sep 2018 21:47)  T(F): 97.4 (09 Sep 2018 05:32), Max: 98.1 (08 Sep 2018 21:47)  HR: 70 (09 Sep 2018 05:32) (70 - 86)  BP: 123/90 (09 Sep 2018 05:32) (113/57 - 126/72)  BP(mean): --  RR: 20 (09 Sep 2018 05:32) (18 - 20)  SpO2: 100% (09 Sep 2018 05:32) (100% - 100%)    PHYSICAL EXAM:    Constitutional: NAD  HEENT: EOMI, throat clear  Neck: No LAD, supple  Respiratory: CTA and P  Cardiovascular: S1 and S2, RRR, no M  Gastrointestinal: BS+, soft, NT/ND, neg HSM,  Extremities: No peripheral edema, neg clubbing, cyanosis  Vascular: 2+ peripheral pulses  Neurological: A/O x 3, no focal deficits  Psychiatric: Normal mood, normal affect  Skin: No rashes      LABS:                        9.2    9.01  )-----------( 183      ( 09 Sep 2018 05:20 )             29.4     09-08    135  |  94<L>  |  91<H>  ----------------------------<  151<H>  4.0   |  26  |  2.72<H>    Ca    9.8      08 Sep 2018 06:30  Phos  4.0     09-08  Mg     1.7     09-08      PT/INR - ( 09 Sep 2018 05:20 )   PT: 35.5 SEC;   INR: 3.02                RADIOLOGY & ADDITIONAL TESTS:

## 2018-09-09 NOTE — PROGRESS NOTE ADULT - PROBLEM SELECTOR PLAN 1
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

## 2018-09-09 NOTE — PROGRESS NOTE ADULT - SUBJECTIVE AND OBJECTIVE BOX
[facility]        - Patient seen and examined.  - Today, patient is without complaints.   pain improved. was eating breakfast.          *****MEDICATIONS: Current medication reviewed and documented.    MEDICATIONS  (STANDING):  albumin human 25% IVPB 50 milliLiter(s) IV Intermittent <User Schedule>  ceFAZolin   IVPB 2000 milliGRAM(s) IV Intermittent every 24 hours  chlorhexidine 4% Liquid 1 Application(s) Topical <User Schedule>  dextrose 5%. 1000 milliLiter(s) (50 mL/Hr) IV Continuous <Continuous>  dextrose 50% Injectable 12.5 Gram(s) IV Push once  dextrose 50% Injectable 25 Gram(s) IV Push once  dextrose 50% Injectable 25 Gram(s) IV Push once  docusate sodium 100 milliGRAM(s) Oral three times a day  fentaNYL   Patch  12 MICROgram(s)/Hr 1 Patch Transdermal every 72 hours  furosemide   Injectable 80 milliGRAM(s) IV Push <User Schedule>  insulin glargine Injectable (LANTUS) 12 Unit(s) SubCutaneous at bedtime  insulin lispro (HumaLOG) corrective regimen sliding scale   SubCutaneous three times a day before meals  lactobacillus acidophilus 1 Tablet(s) Oral three times a day with meals  lidocaine   Patch 1 Patch Transdermal daily  pantoprazole    Tablet 40 milliGRAM(s) Oral before breakfast  propranolol LA 60 milliGRAM(s) Oral daily  senna 2 Tablet(s) Oral at bedtime    MEDICATIONS  (PRN):  ALBUTerol    90 MICROgram(s) HFA Inhaler 2 Puff(s) Inhalation every 6 hours PRN Shortness of Breath and/or Wheezing  dextrose 40% Gel 15 Gram(s) Oral once PRN Blood Glucose LESS THAN 70 milliGRAM(s)/deciLiter  glucagon  Injectable 1 milliGRAM(s) IntraMuscular once PRN Glucose <70 milliGRAM(s)/deciLiter  oxyCODONE    IR 5 milliGRAM(s) Oral every 4 hours PRN Moderate and Severe pain  polyethylene glycol 3350 17 Gram(s) Oral two times a day PRN Constipation           ***** REVIEW OF SYSTEM:  GEN: no fever, no chills, no pain  RESP: no SOB, no cough, no sputum  CVS: no chest pain, no palpitations, no edema  GI: no abdominal pain, no nausea, no vomiting, no constipation, no diarrhea  : no dysurea, no frequency  NEURO: no headache, no diziness  PSYCH: no depression, not anxious  Derm : no itching, no rash         ***** VITAL SIGNS:  T(F): 97.4 (18 @ 05:32), Max: 98.1 (18 @ 21:47)  HR: 70 (18 @ 05:32) (70 - 86)  BP: 123/90 (18 @ 05:32) (113/57 - 126/72)  RR: 20 (18 @ 05:32) (18 - 20)  SpO2: 100% (18 @ 05:32) (100% - 100%)  Wt(kg): --  ,   I&O's Summary           *****PHYSICAL EXAM:   Alert oriented x 2   Attention comprehension are fair. Able to name, repeat, read without any difficulty.   Able to follow1-2 step commands.      EOMI fundi not visualized,  VFF to confrontration  No facial asymmetry   Tongue is midline   Palate elevates symmetrically     limited mvt of the r leg antigravity due to pain.      Gait : not assessed.                *****LAB AND IMAGIN.2    9.01  )-----------( 183      ( 09 Sep 2018 05:20 )             29.4               -    135  |  94<L>  |  91<H>  ----------------------------<  151<H>  4.0   |  26  |  2.72<H>    Ca    9.8      08 Sep 2018 06:30  Phos  4.0       Mg     1.7           PT/INR - ( 09 Sep 2018 05:20 )   PT: 35.5 SEC;   INR: 3.02                 Albumin, Serum: 1.9 g/dL (18 @ 05:40)                  [All pertinent recent Imaging/Reports reviewed]           *****A S S E S S M E N T   A N D   P L A N :       89 y.o woman with multiple comorbidities presented with several weeks h/o of bony pain and recent inability to ambulate due to pain in the foot. Ct scan revealed destructive bony lesions concerning for metastasis and liver lesions. 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 Noted to have staph aurues bacteremia likely from left hallux abscess.The primary source of malignancy is unclear,. 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 continue current management.  radiation should also alleviate the pain.  Pt's son was hesitant to start anything new due to her hx of renal dysfunction.   tolerating fentanyl/oxycodone and lidocaine patch.        Problem/Recommendations 2:osteomyelitis of the toe      ID on board, defer to id for further w/u and management   echo  without endocarditis, no cinthia done   blood cultures  neg.       Problem/Recommendation 3: Hyponatremia of unclear etiology, ? related to excess adh from pain vs volume contraction.  defer to medicine for management    Thank you for allowing me to participate in the care of this patient. Please do not hesitate to call me if you have any  questions.        ________________  Marcy Durant MD  [facility] ([facility]  [phone]     30 minutes spent on total encounter; more than 50 % of the visit was  spent counseling and or  coordinating care by the attending physician.   At the present time, [facility] does not  provide outpatient followup, best to call the your insurance to find a participating provider.  This was explained to you at the time of the visit. Alternatively, if your insurance allows it, you can follow up with a neurologist  Dr. Carmelo Burroughs([place]) [phone] or Dr. Phillip Gan ( [place]) [phone]

## 2018-09-09 NOTE — PROGRESS NOTE ADULT - PROBLEM SELECTOR PLAN 1
secondary to fluid overload: CT scan chest reviewed: she is on oral lasix now: Cont diuresis!  9/8: seems to be getting better: on albumin with diuretics: The leg edema do seems to be better!  9/9: Slowly improving: Cont diuretics: PT NEEDS pt/ot TO: SHE SEEMS PRETTY DECONDITIONED!

## 2018-09-09 NOTE — PROGRESS NOTE ADULT - SUBJECTIVE AND OBJECTIVE BOX
Patient denies CP, SOB Review of systems otherwise (-)    albumin human 25% IVPB 50 milliLiter(s) IV Intermittent <User Schedule>  ALBUTerol    90 MICROgram(s) HFA Inhaler 2 Puff(s) Inhalation every 6 hours PRN  ceFAZolin   IVPB 2000 milliGRAM(s) IV Intermittent every 24 hours  chlorhexidine 4% Liquid 1 Application(s) Topical <User Schedule>  dextrose 40% Gel 15 Gram(s) Oral once PRN  dextrose 5%. 1000 milliLiter(s) IV Continuous <Continuous>  dextrose 50% Injectable 12.5 Gram(s) IV Push once  dextrose 50% Injectable 25 Gram(s) IV Push once  dextrose 50% Injectable 25 Gram(s) IV Push once  docusate sodium 100 milliGRAM(s) Oral three times a day  fentaNYL   Patch  12 MICROgram(s)/Hr 1 Patch Transdermal every 72 hours  furosemide   Injectable 80 milliGRAM(s) IV Push <User Schedule>  glucagon  Injectable 1 milliGRAM(s) IntraMuscular once PRN  insulin glargine Injectable (LANTUS) 12 Unit(s) SubCutaneous at bedtime  insulin lispro (HumaLOG) corrective regimen sliding scale   SubCutaneous three times a day before meals  lactobacillus acidophilus 1 Tablet(s) Oral three times a day with meals  lidocaine   Patch 1 Patch Transdermal daily  oxyCODONE    IR 5 milliGRAM(s) Oral every 4 hours PRN  pantoprazole    Tablet 40 milliGRAM(s) Oral before breakfast  polyethylene glycol 3350 17 Gram(s) Oral two times a day PRN  propranolol LA 60 milliGRAM(s) Oral daily  senna 2 Tablet(s) Oral at bedtime                            9.2    9.01  )-----------( 183      ( 09 Sep 2018 05:20 )             29.4       Hemoglobin: 9.2 g/dL (09-09 @ 05:20)  Hemoglobin: 8.9 g/dL (09-07 @ 07:05)  Hemoglobin: 9.3 g/dL (09-06 @ 06:35)  Hemoglobin: 8.9 g/dL (09-05 @ 05:45)      09-08    135  |  94<L>  |  91<H>  ----------------------------<  151<H>  4.0   |  26  |  2.72<H>    Ca    9.8      08 Sep 2018 06:30  Phos  4.0     09-08  Mg     1.7     09-08      Creatinine Trend: 2.72<--, 2.67<--, 2.60<--, 2.65<--, 2.64<--, 2.70<--    COAGS: PT/INR - ( 09 Sep 2018 05:20 )   PT: 35.5 SEC;   INR: 3.02                    T(C): 36.3 (09-09-18 @ 05:32), Max: 36.7 (09-08-18 @ 21:47)  HR: 70 (09-09-18 @ 05:32) (70 - 84)  BP: 123/90 (09-09-18 @ 05:32) (113/57 - 123/90)  RR: 20 (09-09-18 @ 05:32) (18 - 20)  SpO2: 100% (09-09-18 @ 05:32) (100% - 100%)  Wt(kg): --    I&O's Summary    HEENT: Normal Oral mucosa, (-)icterus (-) pallor  Cardiovascular: Normal S1S2, JVD 12, 1/6 STEFFANY  Respiratory: decreased BS BL bases  Gastrointestinal: Abdomen soft, ND, NT, +BS  Ext: trace edema, improved      DIAGNOSTIC DATA    Xray Chest 1 View- PORTABLE-Urgent (08.17.18 @ 21:23) >  There are low lung volumes resulting in crowding of the bronchovascular   markings at the lung bases.  There is minimal blunting at the right costophrenic angle either   representing trace pleural effusion and/or pleural thickening.  There is subsegmental atelectasis at both lung bases.      < from: TTE Echo Doppler w/o Cont (08.17.18 @ 10:38) >  Technically difficult and limited study.  Mitral Valve: Calcified mitral annulus and mitral valve leaflets. Normal   opening of the mitral valve leaflets. Trace mitral regurgitation.  Aortic Valve/Aorta: Not well visualized  Tricuspid Valve: Calcified tricuspid annulus with normally opening valve.   Trace tricuspid regurgitation.  Pulmonic Valve: The pulmonic valve is not well visualized. Probably   normal.  Left Atrium: Moderate left atrial enlargement  Right Atrium: Normal  Left Ventricle: The endocardium is not well-visualized. Overall there is   preserved left ventricular systolic function. Unable to rule out wall   motion abnormalities. The EF is approximately 65%.  Right Ventricle: Normal right ventricular size and function.  Pericardium/Pleura: No pericardial effusion noted.  Pulmonary/RV Pressure: The right ventricular systolic pressure is   estimated to be 35mmHg, assuming that the right atrial pressure is   estimated to be8 mmHg. This is consistent with normal pulmonary   pressures.  LV Diastolic Function: Stage 2 diastolic dysfunction    < end of copied text >      ASSESSMENT AND PLAN:  89y Female  multiple comorbidities preserved LV and RV fx, Afib on coumadin, reported CAD ? remote MI with no intervention follows with Dr. Diaz with annual stress tests being medically managed for CAD with overall preserved LV function on recent TTE presented with several weeks h/o of bony pain and recent inability to ambulate,  ct scan revealed destructive bony lesions concerning for metastasis and liver lesions (primary possibly upper GI/pancreo-biliary)     - Cont coumadin INR 2-3 for afib  --Vascular eval noted  - radiation per onc - no plans for surgical intervention at this time     - patient/family  refusing aggressive chemo  -  echo with normal LV and RV fx  - Edema not cardiac in etiology, diuretics per renal.  moniotr crt  -palliative noted - plan for rehab/hospice pending      Elder Mathis MD, FACC

## 2018-09-09 NOTE — PROGRESS NOTE ADULT - SUBJECTIVE AND OBJECTIVE BOX
Patient is a 89y old  Female who presents with a chief complaint of transferred from [place] for rad-onc evaluation (09 Sep 2018 12:35)      Any change in ROS: She seems a little better today     MEDICATIONS  (STANDING):  albumin human 25% IVPB 50 milliLiter(s) IV Intermittent <User Schedule>  ceFAZolin   IVPB 2000 milliGRAM(s) IV Intermittent every 24 hours  chlorhexidine 4% Liquid 1 Application(s) Topical <User Schedule>  dextrose 5%. 1000 milliLiter(s) (50 mL/Hr) IV Continuous <Continuous>  dextrose 50% Injectable 12.5 Gram(s) IV Push once  dextrose 50% Injectable 25 Gram(s) IV Push once  dextrose 50% Injectable 25 Gram(s) IV Push once  docusate sodium 100 milliGRAM(s) Oral three times a day  fentaNYL   Patch  12 MICROgram(s)/Hr 1 Patch Transdermal every 72 hours  furosemide   Injectable 80 milliGRAM(s) IV Push <User Schedule>  insulin glargine Injectable (LANTUS) 12 Unit(s) SubCutaneous at bedtime  insulin lispro (HumaLOG) corrective regimen sliding scale   SubCutaneous three times a day before meals  lactobacillus acidophilus 1 Tablet(s) Oral three times a day with meals  lidocaine   Patch 1 Patch Transdermal daily  pantoprazole    Tablet 40 milliGRAM(s) Oral before breakfast  propranolol LA 60 milliGRAM(s) Oral daily  senna 2 Tablet(s) Oral at bedtime    MEDICATIONS  (PRN):  ALBUTerol    90 MICROgram(s) HFA Inhaler 2 Puff(s) Inhalation every 6 hours PRN Shortness of Breath and/or Wheezing  dextrose 40% Gel 15 Gram(s) Oral once PRN Blood Glucose LESS THAN 70 milliGRAM(s)/deciLiter  glucagon  Injectable 1 milliGRAM(s) IntraMuscular once PRN Glucose <70 milliGRAM(s)/deciLiter  oxyCODONE    IR 5 milliGRAM(s) Oral every 4 hours PRN Moderate and Severe pain  polyethylene glycol 3350 17 Gram(s) Oral two times a day PRN Constipation    Vital Signs Last 24 Hrs  T(C): 36.3 (09 Sep 2018 05:32), Max: 36.7 (08 Sep 2018 21:47)  T(F): 97.4 (09 Sep 2018 05:32), Max: 98.1 (08 Sep 2018 21:47)  HR: 70 (09 Sep 2018 05:32) (70 - 86)  BP: 123/90 (09 Sep 2018 05:32) (113/57 - 126/72)  BP(mean): --  RR: 20 (09 Sep 2018 05:32) (18 - 20)  SpO2: 100% (09 Sep 2018 05:32) (100% - 100%)    I&O's Summary        Physical Exam:   GENERAL: NAD, well-groomed, well-developed  HEENT: JORGE/   Atraumatic, Normocephalic  ENMT: No tonsillar erythema, exudates, or enlargement; Moist mucous membranes, Good dentition, No lesions  NECK: Supple, No JVD, Normal thyroid  CHEST/LUNG: Clear to auscultaion, ; No rales, rhonchi, wheezing, or rubs  CVS: Regular rate and rhythm; No murmurs, rubs, or gallops  GI: : Soft, Nontender, Nondistended; Bowel sounds present  NERVOUS SYSTEM:  Alert & Oriented X3  EXTREMITIES:  Much less edema  LYMPH: No lymphadenopathy noted  SKIN: No rashes or lesions  ENDOCRINOLOGY: No Thyromegaly  PSYCH: Appropriate    Labs:                              9.2    9.01  )-----------( 183      ( 09 Sep 2018 05:20 )             29.4                         8.9    9.22  )-----------( 191      ( 07 Sep 2018 07:05 )             29.0                         9.3    11.41 )-----------( 212      ( 06 Sep 2018 06:35 )             30.3     09-08    135  |  94<L>  |  91<H>  ----------------------------<  151<H>  4.0   |  26  |  2.72<H>  09-07    136  |  93<L>  |  85<H>  ----------------------------<  126<H>  4.1   |  26  |  2.67<H>  09-06    136  |  95<L>  |  81<H>  ----------------------------<  105<H>  4.3   |  25  |  2.60<H>    Ca    9.8      08 Sep 2018 06:30  Phos  4.0     09-08  Mg     1.7     09-08      CAPILLARY BLOOD GLUCOSE      POCT Blood Glucose.: 125 mg/dL (09 Sep 2018 08:30)  POCT Blood Glucose.: 165 mg/dL (08 Sep 2018 22:24)  POCT Blood Glucose.: 176 mg/dL (08 Sep 2018 18:08)        PT/INR - ( 09 Sep 2018 05:20 )   PT: 35.5 SEC;   INR: 3.02            < from: CT Chest No Cont (09.05.18 @ 16:32) >  FINDINGS:    LUNGS AND LARGE AIRWAYS: Patent central airways.  No pulmonary nodules.   Interlobular septal thickening with superimposed groundglass opacities.  PLEURA: Small to moderate bilateral pleural effusions right greater than   left demonstrating interval increase in size since August 14, 2018 with   associated bibasilar atelectasis.   VESSELS: Left PICC line with tip in the right atrial/SVC junction.   Coronary artery calcifications. Atherosclerotic changes of the aorta.  HEART: Heart size is normal. No pericardial effusion.   MEDIASTINUM AND OZ: Anterior mediastinal lymph node unchanged from   2014, may be reactive. No hilar or mediastinal lesions evident,   evaluation limited due to lack of IV contrast.  CHEST WALL AND LOWER NECK: Anasarca. Subcutaneous left and right   abdominal wall edema.  VISUALIZED UPPER ABDOMEN: Small ascites, increased from 8/14/2018.   Heterogeneityof the liver with multiple hypodense lesions.  BONES: Degenerative changes of the spine. Subcentimeter lucent lesions in   the second left rib with associated small pathologic fracture, suspicious   for metastases. 1.3 cm lucent lesion in the fifth left rib with bony   destruction, suspicious for metastases.    IMPRESSION:    Interlobular septal thickening with superimposed groundglass opacities   and small to moderate bilateral bilateral pleural effusions, right   greater than left, consistent with pulmonary edema.    Small amount of ascites increased from 8/14/2018 and associated   subcutaneous edema.    Heterogeneity of the liver with hypodense lesions worrisome for   metastases.    Subcentimeter lucent lesions in the second left rib with associated small   pathologic fracture, suspicious for metastases. 1.3 cm lucent lesion in   the fifth left rib with bony destruction, also suspicious for metastases.    < end of copied text >          RECENT CULTURES:        RESPIRATORY CULTURES:          Studies  Chest X-RAY  CT SCAN Chest   Venous Dopplers: LE:   CT Abdomen  Others

## 2018-09-09 NOTE — PROGRESS NOTE ADULT - SUBJECTIVE AND OBJECTIVE BOX
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 Nephrology consulted for elevated Scr.    Patient now getting Lasix 80mg IV daily with albumin, tolerating well.  Pt feels a little SOB today.    REVIEW OF SYSTEMS:  Gen:  + weakness  Cards: no chest pain  Resp: + dyspnea with exertion improving  GI: no nausea or vomiting or diarrhea  Vascular: + LE edema improving    Levaquin (Other)  ramipril (Other)  tetracycline (Hives; Rash)      Hospital Medications: Medications reviewed    VITALS:  T(F): 97.4 (18 @ 05:32), Max: 98.1 (18 @ 21:47)  HR: 70 (18 @ 05:32)  BP: 123/90 (18 @ 05:32)  RR: 20 (18 @ 05:32)  SpO2: 100% (18 @ 05:32)  Wt(kg): --      Gen: NAD, calm  Cards: RRR, +S1/S2, no M/G/R  Resp: Decreased BS @ bases B/L with rales in lower lobes  GI: soft, NT/ND, NABS  Vascular: trace-1+ LE edema B/L improving, cyanotic fingers      LABS:  Today's labs pending      135  |  94<L>  |  91<H>  ----------------------------<  151<H>  4.0   |  26  |  2.72<H>    Ca    9.8      08 Sep 2018 06:30  Phos  4.0       Mg     1.7           Creatinine Trend: 2.72 <--, 2.67 <--, 2.60 <--, 2.65 <--, 2.64 <--, 2.70 <--                        9.2    9.01  )-----------( 183      ( 09 Sep 2018 05:20 )             29.4     Urine Studies:  Urinalysis Basic - ( 02 Sep 2018 14:15 )    Color: YELLOW / Appearance: TURBID / S.025 / pH: 6.5  Gluc: NEGATIVE / Ketone: NEGATIVE  / Bili: SMALL / Urobili: NORMAL   Blood: LARGE / Protein: 100 / Nitrite: NEGATIVE   Leuk Esterase: MODERATE / RBC: 20-30 / WBC 10-20   Sq Epi:  / Non Sq Epi: SMALL / Bacteria: MODERATE      Sodium, Random Urine: < 20 mmol/L ( @ 14:15)  Creatinine, Random Urine: 78.70 mg/dL ( @ 14:15)  Protein, Random Urine: 134.5 mg/dL ( @ 14:15)

## 2018-09-09 NOTE — PROGRESS NOTE ADULT - SUBJECTIVE AND OBJECTIVE BOX
Patient is a 89y old  Female who presents with a chief complaint of transferred from [place] for rad-onc evaluation (09 Sep 2018 10:42)      INTERVAL HPI/OVERNIGHT EVENTS:  T(C): 36.3 (09-09-18 @ 05:32), Max: 36.7 (09-08-18 @ 21:47)  HR: 70 (09-09-18 @ 05:32) (70 - 86)  BP: 123/90 (09-09-18 @ 05:32) (113/57 - 126/72)  RR: 20 (09-09-18 @ 05:32) (18 - 20)  SpO2: 100% (09-09-18 @ 05:32) (100% - 100%)  Wt(kg): --  I&O's Summary      LABS:                        9.2    9.01  )-----------( 183      ( 09 Sep 2018 05:20 )             29.4     09-08    135  |  94<L>  |  91<H>  ----------------------------<  151<H>  4.0   |  26  |  2.72<H>    Ca    9.8      08 Sep 2018 06:30  Phos  4.0     09-08  Mg     1.7     09-08      PT/INR - ( 09 Sep 2018 05:20 )   PT: 35.5 SEC;   INR: 3.02              CAPILLARY BLOOD GLUCOSE      POCT Blood Glucose.: 125 mg/dL (09 Sep 2018 08:30)  POCT Blood Glucose.: 165 mg/dL (08 Sep 2018 22:24)  POCT Blood Glucose.: 176 mg/dL (08 Sep 2018 18:08)            MEDICATIONS  (STANDING):  albumin human 25% IVPB 50 milliLiter(s) IV Intermittent <User Schedule>  ceFAZolin   IVPB 2000 milliGRAM(s) IV Intermittent every 24 hours  chlorhexidine 4% Liquid 1 Application(s) Topical <User Schedule>  dextrose 5%. 1000 milliLiter(s) (50 mL/Hr) IV Continuous <Continuous>  dextrose 50% Injectable 12.5 Gram(s) IV Push once  dextrose 50% Injectable 25 Gram(s) IV Push once  dextrose 50% Injectable 25 Gram(s) IV Push once  docusate sodium 100 milliGRAM(s) Oral three times a day  fentaNYL   Patch  12 MICROgram(s)/Hr 1 Patch Transdermal every 72 hours  furosemide   Injectable 80 milliGRAM(s) IV Push <User Schedule>  insulin glargine Injectable (LANTUS) 12 Unit(s) SubCutaneous at bedtime  insulin lispro (HumaLOG) corrective regimen sliding scale   SubCutaneous three times a day before meals  lactobacillus acidophilus 1 Tablet(s) Oral three times a day with meals  lidocaine   Patch 1 Patch Transdermal daily  pantoprazole    Tablet 40 milliGRAM(s) Oral before breakfast  propranolol LA 60 milliGRAM(s) Oral daily  senna 2 Tablet(s) Oral at bedtime    MEDICATIONS  (PRN):  ALBUTerol    90 MICROgram(s) HFA Inhaler 2 Puff(s) Inhalation every 6 hours PRN Shortness of Breath and/or Wheezing  dextrose 40% Gel 15 Gram(s) Oral once PRN Blood Glucose LESS THAN 70 milliGRAM(s)/deciLiter  glucagon  Injectable 1 milliGRAM(s) IntraMuscular once PRN Glucose <70 milliGRAM(s)/deciLiter  oxyCODONE    IR 5 milliGRAM(s) Oral every 4 hours PRN Moderate and Severe pain  polyethylene glycol 3350 17 Gram(s) Oral two times a day PRN Constipation          PHYSICAL EXAM:  GENERAL: NAD, well-groomed, well-developed  HEAD:  Atraumatic, Normocephalic  CHEST/LUNG: Clear to percussion bilaterally; No rales, rhonchi, wheezing, or rubs  HEART: Regular rate and rhythm; No murmurs, rubs, or gallops  ABDOMEN: Soft, Nontender, Nondistended; Bowel sounds present  EXTREMITIES:  2+ Peripheral Pulses, No clubbing, cyanosis, or edema  LYMPH: No lymphadenopathy noted  SKIN: No rashes or lesions    Care Discussed with Consultants/Other Providers [ ] YES  [ ] NO

## 2018-09-09 NOTE — PROGRESS NOTE ADULT - SUBJECTIVE AND OBJECTIVE BOX
Chief complaint  Patient is a 89y old  Female who presents with a chief complaint of transferred from [place] for rad-onc evaluation (09 Sep 2018 13:16)   Review of systems  Patient in bed, looks comfortable, no fever, no hypoglycemia.    Labs and Fingersticks  CAPILLARY BLOOD GLUCOSE      POCT Blood Glucose.: 190 mg/dL (09 Sep 2018 17:53)  POCT Blood Glucose.: 172 mg/dL (09 Sep 2018 12:36)  POCT Blood Glucose.: 125 mg/dL (09 Sep 2018 08:30)  POCT Blood Glucose.: 165 mg/dL (08 Sep 2018 22:24)  POCT Blood Glucose.: 176 mg/dL (08 Sep 2018 18:08)          Calcium, Total Serum: 9.8 (09-08 @ 06:30)          09-08    135  |  94<L>  |  91<H>  ----------------------------<  151<H>  4.0   |  26  |  2.72<H>    Ca    9.8      08 Sep 2018 06:30  Phos  4.0     09-08  Mg     1.7     09-08                          9.2    9.01  )-----------( 183      ( 09 Sep 2018 05:20 )             29.4     Medications  MEDICATIONS  (STANDING):  albumin human 25% IVPB 50 milliLiter(s) IV Intermittent <User Schedule>  ceFAZolin   IVPB 2000 milliGRAM(s) IV Intermittent every 24 hours  chlorhexidine 4% Liquid 1 Application(s) Topical <User Schedule>  dextrose 5%. 1000 milliLiter(s) (50 mL/Hr) IV Continuous <Continuous>  dextrose 50% Injectable 12.5 Gram(s) IV Push once  dextrose 50% Injectable 25 Gram(s) IV Push once  dextrose 50% Injectable 25 Gram(s) IV Push once  docusate sodium 100 milliGRAM(s) Oral three times a day  fentaNYL   Patch  12 MICROgram(s)/Hr 1 Patch Transdermal every 72 hours  furosemide   Injectable 80 milliGRAM(s) IV Push <User Schedule>  insulin glargine Injectable (LANTUS) 12 Unit(s) SubCutaneous at bedtime  insulin lispro (HumaLOG) corrective regimen sliding scale   SubCutaneous three times a day before meals  lactobacillus acidophilus 1 Tablet(s) Oral three times a day with meals  lidocaine   Patch 1 Patch Transdermal daily  pantoprazole    Tablet 40 milliGRAM(s) Oral before breakfast  propranolol LA 60 milliGRAM(s) Oral daily  senna 2 Tablet(s) Oral at bedtime      Physical Exam  General: Patient comfortable in bed  Vital Signs Last 12 Hrs  T(F): 97.5 (09-09-18 @ 15:24), Max: 97.5 (09-09-18 @ 15:24)  HR: 66 (09-09-18 @ 15:24) (66 - 66)  BP: 107/61 (09-09-18 @ 15:24) (107/61 - 107/61)  BP(mean): --  RR: 20 (09-09-18 @ 15:24) (20 - 20)  SpO2: 100% (09-09-18 @ 15:24) (100% - 100%)  Neck: No palpable thyroid nodules.  CVS: S1S2, No murmurs  Respiratory: No wheezing, no crepitations  GI: Abdomen soft, bowel sounds positive  Musculoskeletal:  edema lower extremities.   Skin: No skin rashes, no ecchymosis    Diagnostics

## 2018-09-10 VITALS
OXYGEN SATURATION: 100 % | RESPIRATION RATE: 20 BRPM | SYSTOLIC BLOOD PRESSURE: 105 MMHG | HEART RATE: 70 BPM | TEMPERATURE: 98 F | DIASTOLIC BLOOD PRESSURE: 51 MMHG

## 2018-09-10 LAB
BUN SERPL-MCNC: 101 MG/DL — HIGH (ref 7–23)
CALCIUM SERPL-MCNC: 9.3 MG/DL — SIGNIFICANT CHANGE UP (ref 8.4–10.5)
CHLORIDE SERPL-SCNC: 92 MMOL/L — LOW (ref 98–107)
CO2 SERPL-SCNC: 26 MMOL/L — SIGNIFICANT CHANGE UP (ref 22–31)
CREAT SERPL-MCNC: 2.62 MG/DL — HIGH (ref 0.5–1.3)
GLUCOSE BLDC GLUCOMTR-MCNC: 152 MG/DL — HIGH (ref 70–99)
GLUCOSE BLDC GLUCOMTR-MCNC: 171 MG/DL — HIGH (ref 70–99)
GLUCOSE BLDC GLUCOMTR-MCNC: 179 MG/DL — HIGH (ref 70–99)
GLUCOSE SERPL-MCNC: 150 MG/DL — HIGH (ref 70–99)
INR BLD: 2.73 — HIGH (ref 0.88–1.17)
MAGNESIUM SERPL-MCNC: 1.6 MG/DL — SIGNIFICANT CHANGE UP (ref 1.6–2.6)
PHOSPHATE SERPL-MCNC: 3.9 MG/DL — SIGNIFICANT CHANGE UP (ref 2.5–4.5)
POTASSIUM SERPL-MCNC: 3.6 MMOL/L — SIGNIFICANT CHANGE UP (ref 3.5–5.3)
POTASSIUM SERPL-SCNC: 3.6 MMOL/L — SIGNIFICANT CHANGE UP (ref 3.5–5.3)
PROTHROM AB SERPL-ACNC: 30.9 SEC — HIGH (ref 9.8–13.1)
SODIUM SERPL-SCNC: 134 MMOL/L — LOW (ref 135–145)

## 2018-09-10 RX ORDER — MIRABEGRON 50 MG/1
1 TABLET, EXTENDED RELEASE ORAL
Qty: 0 | Refills: 0 | COMMUNITY

## 2018-09-10 RX ORDER — INFLUENZA VIRUS VACCINE 15; 15; 15; 15 UG/.5ML; UG/.5ML; UG/.5ML; UG/.5ML
0.5 SUSPENSION INTRAMUSCULAR ONCE
Qty: 0 | Refills: 0 | Status: DISCONTINUED | OUTPATIENT
Start: 2018-09-10 | End: 2018-09-10

## 2018-09-10 RX ORDER — WARFARIN SODIUM 2.5 MG/1
1 TABLET ORAL
Qty: 0 | Refills: 0 | COMMUNITY

## 2018-09-10 RX ORDER — PANTOPRAZOLE SODIUM 20 MG/1
1 TABLET, DELAYED RELEASE ORAL
Qty: 0 | Refills: 0 | COMMUNITY
Start: 2018-09-10

## 2018-09-10 RX ORDER — FUROSEMIDE 40 MG
1 TABLET ORAL
Qty: 0 | Refills: 0 | COMMUNITY

## 2018-09-10 RX ORDER — POTASSIUM CHLORIDE 20 MEQ
1 PACKET (EA) ORAL
Qty: 0 | Refills: 0 | COMMUNITY

## 2018-09-10 RX ORDER — OXYCODONE HYDROCHLORIDE 5 MG/1
1 TABLET ORAL
Qty: 0 | Refills: 0 | COMMUNITY
Start: 2018-09-10

## 2018-09-10 RX ORDER — EZETIMIBE 10 MG/1
1 TABLET ORAL
Qty: 0 | Refills: 0 | COMMUNITY

## 2018-09-10 RX ORDER — FUROSEMIDE 40 MG
80 TABLET ORAL DAILY
Qty: 0 | Refills: 0 | Status: DISCONTINUED | OUTPATIENT
Start: 2018-09-10 | End: 2018-09-10

## 2018-09-10 RX ORDER — WARFARIN SODIUM 2.5 MG/1
1 TABLET ORAL ONCE
Qty: 0 | Refills: 0 | Status: COMPLETED | OUTPATIENT
Start: 2018-09-10 | End: 2018-09-10

## 2018-09-10 RX ORDER — INSULIN LISPRO 100/ML
1 VIAL (ML) SUBCUTANEOUS
Qty: 0 | Refills: 0 | COMMUNITY

## 2018-09-10 RX ORDER — LACTOBACILLUS ACIDOPHILUS 100MM CELL
1 CAPSULE ORAL
Qty: 0 | Refills: 0 | COMMUNITY
Start: 2018-09-10

## 2018-09-10 RX ORDER — CHLORHEXIDINE GLUCONATE 213 G/1000ML
1 SOLUTION TOPICAL
Qty: 0 | Refills: 0 | COMMUNITY

## 2018-09-10 RX ORDER — INSULIN LISPRO 100/ML
4 VIAL (ML) SUBCUTANEOUS
Qty: 0 | Refills: 0 | COMMUNITY

## 2018-09-10 RX ADMIN — WARFARIN SODIUM 1 MILLIGRAM(S): 2.5 TABLET ORAL at 17:53

## 2018-09-10 RX ADMIN — Medication 60 MILLIGRAM(S): at 06:21

## 2018-09-10 RX ADMIN — Medication 1: at 13:25

## 2018-09-10 RX ADMIN — CHLORHEXIDINE GLUCONATE 1 APPLICATION(S): 213 SOLUTION TOPICAL at 09:01

## 2018-09-10 RX ADMIN — Medication 100 MILLIGRAM(S): at 13:25

## 2018-09-10 RX ADMIN — Medication 100 MILLIGRAM(S): at 06:28

## 2018-09-10 RX ADMIN — Medication 1 TABLET(S): at 13:25

## 2018-09-10 RX ADMIN — Medication 1: at 09:00

## 2018-09-10 RX ADMIN — Medication 1 TABLET(S): at 09:00

## 2018-09-10 RX ADMIN — Medication 100 MILLIGRAM(S): at 06:21

## 2018-09-10 RX ADMIN — PANTOPRAZOLE SODIUM 40 MILLIGRAM(S): 20 TABLET, DELAYED RELEASE ORAL at 06:21

## 2018-09-10 NOTE — PROGRESS NOTE ADULT - SUBJECTIVE AND OBJECTIVE BOX
INTERVAL HPI/OVERNIGHT EVENTS:    pt seen with family bedside  denies n/v/d/c, abdominal pain, melena or brbpr   has some discomfort on "my bottom bc i have been sitting on it so much"    MEDICATIONS  (STANDING):  ceFAZolin   IVPB 2000 milliGRAM(s) IV Intermittent every 24 hours  chlorhexidine 4% Liquid 1 Application(s) Topical <User Schedule>  dextrose 5%. 1000 milliLiter(s) (50 mL/Hr) IV Continuous <Continuous>  dextrose 50% Injectable 12.5 Gram(s) IV Push once  dextrose 50% Injectable 25 Gram(s) IV Push once  dextrose 50% Injectable 25 Gram(s) IV Push once  docusate sodium 100 milliGRAM(s) Oral three times a day  fentaNYL   Patch  12 MICROgram(s)/Hr 1 Patch Transdermal every 72 hours  influenza   Vaccine 0.5 milliLiter(s) IntraMuscular once  insulin glargine Injectable (LANTUS) 12 Unit(s) SubCutaneous at bedtime  insulin lispro (HumaLOG) corrective regimen sliding scale   SubCutaneous three times a day before meals  lactobacillus acidophilus 1 Tablet(s) Oral three times a day with meals  lidocaine   Patch 1 Patch Transdermal daily  pantoprazole    Tablet 40 milliGRAM(s) Oral before breakfast  propranolol LA 60 milliGRAM(s) Oral daily  senna 2 Tablet(s) Oral at bedtime  warfarin 1 milliGRAM(s) Oral once    MEDICATIONS  (PRN):  ALBUTerol    90 MICROgram(s) HFA Inhaler 2 Puff(s) Inhalation every 6 hours PRN Shortness of Breath and/or Wheezing  dextrose 40% Gel 15 Gram(s) Oral once PRN Blood Glucose LESS THAN 70 milliGRAM(s)/deciLiter  glucagon  Injectable 1 milliGRAM(s) IntraMuscular once PRN Glucose <70 milliGRAM(s)/deciLiter  oxyCODONE    IR 5 milliGRAM(s) Oral every 4 hours PRN Moderate and Severe pain  polyethylene glycol 3350 17 Gram(s) Oral two times a day PRN Constipation      Allergies    Levaquin (Other)  ramipril (Other)  tetracycline (Hives; Rash)    Intolerances        Review of Systems:    General:  No wt loss, fevers, chills, night sweats, fatigue   Eyes:  Good vision, no reported pain  ENT:  No sore throat, pain, runny nose, dysphagia  CV:  No pain, palpitations, hypo/hypertension  Resp:  No dyspnea, cough, tachypnea, wheezing  GI:  No pain, No nausea, No vomiting, No diarrhea, No constipation, No weight loss, No fever, No pruritis, No rectal bleeding, No melena, No dysphagia  :  No pain, bleeding, incontinence, nocturia  Muscle:  No pain, weakness  Neuro:  No weakness, tingling, memory problems  Psych:  No fatigue, insomnia, mood problems, depression  Endocrine:  No polyuria, polydypsia, cold/heat intolerance  Heme:  No petechiae, ecchymosis, easy bruisability  Skin:  No rash, tattoos, scars, edema      Vital Signs Last 24 Hrs  T(C): 36.4 (10 Sep 2018 06:33), Max: 36.7 (09 Sep 2018 21:23)  T(F): 97.6 (10 Sep 2018 06:33), Max: 98 (09 Sep 2018 21:23)  HR: 66 (10 Sep 2018 06:33) (66 - 70)  BP: 122/64 (10 Sep 2018 06:33) (103/62 - 122/64)  BP(mean): --  RR: 20 (10 Sep 2018 06:33) (20 - 20)  SpO2: 100% (10 Sep 2018 06:33) (100% - 100%)    PHYSICAL EXAM:    Constitutional: NAD  HEENT: EOMI, throat clear  Neck: No LAD, supple  Respiratory: CTA and P  Cardiovascular: S1 and S2, RRR, no M  Gastrointestinal: BS+, soft, NT/ND, neg HSM,  Extremities: No peripheral edema, neg clubbing, cyanosis  Vascular: 2+ peripheral pulses  Neurological: A/O x 3, no focal deficits  Psychiatric: Normal mood, normal affect  Skin: No rashes      LABS:                        9.2    9.01  )-----------( 183      ( 09 Sep 2018 05:20 )             29.4     09-10    134<L>  |  92<L>  |  101<H>  ----------------------------<  150<H>  3.6   |  26  |  2.62<H>    Ca    9.3      10 Sep 2018 06:14  Phos  3.9     09-10  Mg     1.6     09-10      PT/INR - ( 10 Sep 2018 06:14 )   PT: 30.9 SEC;   INR: 2.73                RADIOLOGY & ADDITIONAL TESTS: INTERVAL HPI/OVERNIGHT EVENTS:    pt seen with family bedside  reports having bowel movements  denies n/v/d/c, abdominal pain, melena or brbpr   has some discomfort on "my bottom bc i have been sitting on it so much"    MEDICATIONS  (STANDING):  ceFAZolin   IVPB 2000 milliGRAM(s) IV Intermittent every 24 hours  chlorhexidine 4% Liquid 1 Application(s) Topical <User Schedule>  dextrose 5%. 1000 milliLiter(s) (50 mL/Hr) IV Continuous <Continuous>  dextrose 50% Injectable 12.5 Gram(s) IV Push once  dextrose 50% Injectable 25 Gram(s) IV Push once  dextrose 50% Injectable 25 Gram(s) IV Push once  docusate sodium 100 milliGRAM(s) Oral three times a day  fentaNYL   Patch  12 MICROgram(s)/Hr 1 Patch Transdermal every 72 hours  influenza   Vaccine 0.5 milliLiter(s) IntraMuscular once  insulin glargine Injectable (LANTUS) 12 Unit(s) SubCutaneous at bedtime  insulin lispro (HumaLOG) corrective regimen sliding scale   SubCutaneous three times a day before meals  lactobacillus acidophilus 1 Tablet(s) Oral three times a day with meals  lidocaine   Patch 1 Patch Transdermal daily  pantoprazole    Tablet 40 milliGRAM(s) Oral before breakfast  propranolol LA 60 milliGRAM(s) Oral daily  senna 2 Tablet(s) Oral at bedtime  warfarin 1 milliGRAM(s) Oral once    MEDICATIONS  (PRN):  ALBUTerol    90 MICROgram(s) HFA Inhaler 2 Puff(s) Inhalation every 6 hours PRN Shortness of Breath and/or Wheezing  dextrose 40% Gel 15 Gram(s) Oral once PRN Blood Glucose LESS THAN 70 milliGRAM(s)/deciLiter  glucagon  Injectable 1 milliGRAM(s) IntraMuscular once PRN Glucose <70 milliGRAM(s)/deciLiter  oxyCODONE    IR 5 milliGRAM(s) Oral every 4 hours PRN Moderate and Severe pain  polyethylene glycol 3350 17 Gram(s) Oral two times a day PRN Constipation      Allergies    Levaquin (Other)  ramipril (Other)  tetracycline (Hives; Rash)    Intolerances        Review of Systems:    General:  No wt loss, fevers, chills, night sweats, fatigue   Eyes:  Good vision, no reported pain  ENT:  No sore throat, pain, runny nose, dysphagia  CV:  No pain, palpitations, hypo/hypertension  Resp:  No dyspnea, cough, tachypnea, wheezing  GI:  No pain, No nausea, No vomiting, No diarrhea, No constipation, No weight loss, No fever, No pruritis, No rectal bleeding, No melena, No dysphagia  :  No pain, bleeding, incontinence, nocturia  Muscle:  No pain, weakness  Neuro:  No weakness, tingling, memory problems  Psych:  No fatigue, insomnia, mood problems, depression  Endocrine:  No polyuria, polydypsia, cold/heat intolerance  Heme:  No petechiae, ecchymosis, easy bruisability  Skin:  No rash, tattoos, scars, edema      Vital Signs Last 24 Hrs  T(C): 36.4 (10 Sep 2018 06:33), Max: 36.7 (09 Sep 2018 21:23)  T(F): 97.6 (10 Sep 2018 06:33), Max: 98 (09 Sep 2018 21:23)  HR: 66 (10 Sep 2018 06:33) (66 - 70)  BP: 122/64 (10 Sep 2018 06:33) (103/62 - 122/64)  BP(mean): --  RR: 20 (10 Sep 2018 06:33) (20 - 20)  SpO2: 100% (10 Sep 2018 06:33) (100% - 100%)    PHYSICAL EXAM:    Constitutional: NAD  HEENT: EOMI, throat clear  Neck: No LAD, supple  Respiratory: CTA and P  Cardiovascular: S1 and S2, RRR, no M  Gastrointestinal: BS+, soft, NT/ND, neg HSM,  Extremities: No peripheral edema, neg clubbing, cyanosis  Vascular: 2+ peripheral pulses  Neurological: A/O x 3, no focal deficits  Psychiatric: Normal mood, normal affect  Skin: No rashes      LABS:                        9.2    9.01  )-----------( 183      ( 09 Sep 2018 05:20 )             29.4     09-10    134<L>  |  92<L>  |  101<H>  ----------------------------<  150<H>  3.6   |  26  |  2.62<H>    Ca    9.3      10 Sep 2018 06:14  Phos  3.9     09-10  Mg     1.6     09-10      PT/INR - ( 10 Sep 2018 06:14 )   PT: 30.9 SEC;   INR: 2.73                RADIOLOGY & ADDITIONAL TESTS:

## 2018-09-10 NOTE — PROGRESS NOTE ADULT - PROBLEM SELECTOR PLAN 1
Patient with TRISH during admission at OSH with mild rise in Scr during current admission due to intravascular depletion with possible ATN given granular casts on UA. Scr now stable. Avoid nephrotoxins.

## 2018-09-10 NOTE — PROGRESS NOTE ADULT - SUBJECTIVE AND OBJECTIVE BOX
[facility] NEPHROLOGY- PROGRESS NOTE    89y Female with history of CKD-3 presents with difficulty ambulating found to have R hip lesion secondary to malignancy and bacteremia. Nephrology consulted for elevated Scr.    REVIEW OF SYSTEMS:  Gen: no changes in weight, + weakness  Cards: no chest pain  Resp: + dyspnea with exertion improving  GI: no nausea or vomiting or diarrhea  Vascular: + LE edema improving    Levaquin (Other)  ramipril (Other)  tetracycline (Hives; Rash)      Hospital Medications: Medications reviewed      VITALS:  T(F): 97.6 (09-10-18 @ 06:33), Max: 98 (09-09-18 @ 21:23)  HR: 66 (09-10-18 @ 06:33)  BP: 122/64 (09-10-18 @ 06:33)  RR: 20 (09-10-18 @ 06:33)  SpO2: 100% (09-10-18 @ 06:33)  Wt(kg): --    09-09 @ 07:01  -  09-10 @ 07:00  --------------------------------------------------------  IN: 0 mL / OUT: 850 mL / NET: -850 mL    09-10 @ 07:01  -  09-10 @ 11:27  --------------------------------------------------------  IN: 0 mL / OUT: 400 mL / NET: -400 mL      PHYSICAL EXAM:    Gen: NAD, calm  Cards: RRR, +S1/S2, no M/G/R  Resp: Decreased BS @ bases B/L with rales in lower lobes  GI: soft, NT/ND, NABS  Vascular: trace RLE edema, 1+ LLE edema both significantly improved      LABS:  09-10    134<L>  |  92<L>  |  101<H>  ----------------------------<  150<H>  3.6   |  26  |  2.62<H>    Ca    9.3      10 Sep 2018 06:14  Phos  3.9     09-10  Mg     1.6     09-10      Creatinine Trend: 2.62 <--, 2.72 <--, 2.67 <--, 2.60 <--, 2.65 <--, 2.64 <--                        9.2    9.01  )-----------( 183      ( 09 Sep 2018 05:20 )             29.4

## 2018-09-10 NOTE — PROGRESS NOTE ADULT - PROBLEM SELECTOR PROBLEM 1
Adenocarcinoma of unknown primary
Adenocarcinoma of unknown primary
Acute kidney injury
Adenocarcinoma of unknown primary
Diabetes
SOB (shortness of breath)
Adenocarcinoma of unknown primary
Acute kidney injury
Acute kidney injury
Adenocarcinoma of unknown primary
Adenocarcinoma of unknown primary
Diabetes
SOB (shortness of breath)

## 2018-09-10 NOTE — PROGRESS NOTE ADULT - ASSESSMENT
89 y.o woman with multiple comorbidities presented with several weeks h/o of bony pain and recent inability to ambulate due to pain in the foot. Ct scan revealed destructive bony lesions concerning for metastasis and liver lesions. 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 Noted to have staph aurues bacteremia likely from left hallux abscess.The primary source of malignancy is unclear,. 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  Pt has been on Ancef and planned for total 6 week course to end on 9/30/18.  Pt had TTE performed on 8/17.   Repeat blood cultures have been negative.  Pt had been followed by Podiatry service at  (Dr. Killian).      Pt has been seen by Rad Onc for initiation of palliative radiation.  GI also following for possible pancreatobiliary/upper GI tract as primary source.  Patient wishes to hold off on further endoscopic evaluation until after the onset of radiation begins.  Pt wishes for more palliative options with palliative radiation to the hip.  Pt has increased liver enzymes likely in setting of bone mets vs primary source.  GI following.  Pt also seen by Onc Surgery - no intervention necessary at this time.        ID consult called for antibiotic managment.      Problem/Plan - 1:    ·	MSSA bacteremia    - Source likely L hallux OM/abscess, s/p amputation.  Intra-op cx also (+) MSSA.  pt s/p echo.  Repeat bcx cleared.  s/p PICC line.      - Cont cefazolin (renally dosed for Cr Cl 20) 2gm IV Qdaily.  Cont to monitor renal function.    - Complete abx course through 9/30 to complete 6 weeks (start date from date of toe amputation on 8/21)    - cont local wound care and management of sutures as per Podiatry      No new ID recs at this time.  cont present abx.        Will follow,    Shanda Reaves  [phone]
89yo woman transferred from [facility] where she was admitted for c/o painful ambulation for weeks and underwent hip bone biopsy which revealed Adenocarcinoma with unknown primary with pathology immunohistochemistry analysis noting possible pancreatobiliary/upper gi tract as primary source. CT Abd/Pelvis w/o contrast revealed destructive bony lesions concerning for metastasis and liver lesions
89yo woman transferred from [facility] where she was admitted for c/o painful ambulation for weeks and underwent hip bone biopsy which revealed Adenocarcinoma with unknown primary with pathology immunohistochemistry analysis noting possible pancreatobiliary/upper gi tract as primary source. CT Abd/Pelvis w/o contrast revealed destructive bony lesions concerning for metastasis and liver lesions
Problem/Plan - 1:  ·  Problem: Right hip pain.  Plan: Right hip lesion concerning for malignancy  S/P right hip IR biopsy -- adenocarcinoma: moderately to poorly differentiated of pancreatobiliary/UGI primary  rad- onc consult appreciated    Problem/Plan - 2:  ·  Problem: MSSA bacteremia.  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  Plan: cw earlier regimen      Problem/Plan - 4:·  Problem: Metastatic cancer.  Plan onc, and rad-onc consult appreciated    Problem/Plan - 5:  ·  Problem: Atrial fibrillation, unspecified type.  Plan: Continue current medications  dose coumadin daily.
88yo woman transferred from [facility] where she was admitted for c/o painful ambulation for weeks and underwent hip bone biopsy which revealed Adenocarcinoma with unknown primary with pathology immunohistochemistry analysis noting possible pancreatobiliary/upper gi tract as primary source. CT Abd/Pelvis w/o contrast revealed destructive bony lesions concerning for metastasis and liver lesions
88yo woman transferred from [facility] where she was admitted for c/o painful ambulation for weeks and underwent hip bone biopsy which revealed Adenocarcinoma with unknown primary with pathology immunohistochemistry analysis noting possible pancreatobiliary/upper gi tract as primary source. CT Abd/Pelvis w/o contrast revealed destructive bony lesions concerning for metastasis and liver lesions
88yo woman transferred from [facility] where she was admitted for c/o painful ambulation for weeks and underwent hip bone biopsy which revealed Adenocarcinoma with unknown primary with pathology immunohistochemistry analysis noting possible pancreatobiliary/upper gi tract as primary source. CT Abd/Pelvis w/o contrast revealed destructive bony lesions concerning for metastasis and liver lesions
89 y.o woman with multiple comorbidities presented with several weeks h/o of bony pain and recent inability to ambulate du to pain in the foot. Ct scan revealed destructive bony lesions concerning for metastasis and liver lesions. 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 Noted to have staph aurues bacteremia likely from left hallux abscess.The primary source of malignancy is unclear,. 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
89 y.o woman with multiple comorbidities presented with several weeks h/o of bony pain and recent inability to ambulate du to pain in the foot. Ct scan revealed destructive bony lesions concerning for metastasis and liver lesions. 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 Noted to have staph aurues bacteremia likely from left hallux abscess.The primary source of malignancy is unclear,. 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
89 y.o woman with multiple comorbidities presented with several weeks h/o of bony pain and recent inability to ambulate du to pain in the foot. Ct scan revealed destructive bony lesions concerning for metastasis and liver lesions. 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 Noted to have staph aurues bacteremia likely from left hallux abscess.The primary source of malignancy is unclear,. 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  Plan: cw earlier regimen      Problem/Plan - 4:·  Problem: Metastatic cancer.  Plan onc, and rad-onc consult appreciated    Problem/Plan - 5:  ·  Problem: Atrial fibrillation, unspecified type.  Plan: Continue current medications  dose coumadin daily.
89 y.o woman with multiple comorbidities presented with several weeks h/o of bony pain and recent inability to ambulate due to pain in the foot. Ct scan revealed destructive bony lesions concerning for metastasis and liver lesions. 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 Noted to have staph aurues bacteremia likely from left hallux abscess.The primary source of malignancy is unclear,. 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  Pt has been on Ancef and planned for total 6 week course to end on 9/30/18.  Pt had TTE performed on 8/17.   Repeat blood cultures have been negative.  Pt had been followed by Podiatry service at  (Dr. Killian).      Pt has been seen by Rad Onc for initiation of palliative radiation.  GI also following for possible pancreatobiliary/upper GI tract as primary source.  Patient wishes to hold off on further endoscopic evaluation until after the onset of radiation begins.  Pt wishes for more palliative options with palliative radiation to the hip.  Pt has increased liver enzymes likely in setting of bone mets vs primary source.  GI following.  Pt also seen by Onc Surgery - no intervention necessary at this time.        ID consult called for antibiotic managment.      Problem/Plan - 1:    ·	MSSA bacteremia    - Source likely L hallux OM/abscess, s/p amputation.  Intra-op cx also (+) MSSA.  pt s/p echo.  Repeat bcx cleared.  s/p PICC line.      - Cont cefazolin (renally dosed for Cr Cl 20) 2gm IV Qdaily.  Cont to monitor renal function.    - Complete abx course through 9/30 to complete 6 weeks (start date from date of toe amputation on 8/21)    - cont local wound care and management of sutures as per Podiatry    - CT chest with pulm edema - pt s/p IV lasix.  No infiltraties/consolidations s/o pna      No new ID recs at this time        Will follow,    Shanda Reaves  [phone]
89 y.o woman with multiple comorbidities presented with several weeks h/o of bony pain and recent inability to ambulate due to pain in the foot. Ct scan revealed destructive bony lesions concerning for metastasis and liver lesions. 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 Noted to have staph aurues bacteremia likely from left hallux abscess.The primary source of malignancy is unclear,. 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  Pt has been on Ancef and planned for total 6 week course to end on 9/30/18.  Pt had TTE performed on 8/17.   Repeat blood cultures have been negative.  Pt had been followed by Podiatry service at  (Dr. Killian).      Pt has been seen by Rad Onc for initiation of palliative radiation.  GI also following for possible pancreatobiliary/upper GI tract as primary source.  Patient wishes to hold off on further endoscopic evaluation until after the onset of radiation begins.  Pt wishes for more palliative options with palliative radiation to the hip.  Pt has increased liver enzymes likely in setting of bone mets vs primary source.  GI following.  Pt also seen by Onc Surgery - no intervention necessary at this time.        ID consult called for antibiotic managment.      Problem/Plan - 1:    ·	MSSA bacteremia    - Source likely L hallux OM/abscess, s/p amputation.  Intra-op cx also (+) MSSA.  pt s/p echo.  Repeat bcx cleared.  s/p PICC line.      - Cont cefazolin (renally dosed for Cr Cl 20) 2gm IV Qdaily.  Cont to monitor renal function.    - Monitor for leukocytosis, new fevers.      - Complete abx course through 9/30 to complete 6 weeks.    - cont local wound care and management of sutures as per Podiatry    - Probiotics    No new ID recs at this time    Will follow,    Shanda Reaves  [phone]
89 y.o woman with multiple comorbidities presented with several weeks h/o of bony pain and recent inability to ambulate due to pain in the foot. Ct scan revealed destructive bony lesions concerning for metastasis and liver lesions. 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 Noted to have staph aurues bacteremia likely from left hallux abscess.The primary source of malignancy is unclear,. 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  Pt has been on Ancef and planned for total 6 week course to end on 9/30/18.  Pt had TTE performed on 8/17.   Repeat blood cultures have been negative.  Pt had been followed by Podiatry service at  (Dr. Killian).      Pt has been seen by Rad Onc for initiation of palliative radiation.  GI also following for possible pancreatobiliary/upper GI tract as primary source.  Patient wishes to hold off on further endoscopic evaluation until after the onset of radiation begins.  Pt wishes for more palliative options with palliative radiation to the hip.  Pt has increased liver enzymes likely in setting of bone mets vs primary source.  GI following.  Pt also seen by Onc Surgery - no intervention necessary at this time.        ID consult called for antibiotic managment.      Problem/Plan - 1:    ·	MSSA bacteremia    - Source likely L hallux OM/abscess, s/p amputation.  Intra-op cx also (+) MSSA.  pt s/p echo.  Repeat bcx cleared.  s/p PICC line.      - Cont cefazolin (renally dosed for Cr Cl 20) 2gm IV Qdaily.  Cont to monitor renal function.    - Complete abx course through 9/30 to complete 6 weeks (start date from date of toe amputation on 8/21)    - cont local wound care and management of sutures as per Podiatry    - Probiotics        * Pt with sob, found to have pulm edema and pleural effusions on cxr, s/p lasix.  Also with cold R extremity with mottling, pending Vasc eval and US.  CT chest ordered.    * Palliative consulted        Will follow,    Shanda Reaves  [phone]
89 y.o woman with multiple comorbidities presented with several weeks h/o of bony pain and recent inability to ambulate due to pain in the foot. Ct scan revealed destructive bony lesions concerning for metastasis and liver lesions. 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 Noted to have staph aurues bacteremia likely from left hallux abscess.The primary source of malignancy is unclear,. 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  Pt has been on Ancef and planned for total 6 week course to end on 9/30/18.  Pt had TTE performed on 8/17.   Repeat blood cultures have been negative.  Pt had been followed by Podiatry service at  (Dr. Killian).      Pt has been seen by Rad Onc for initiation of palliative radiation.  GI also following for possible pancreatobiliary/upper GI tract as primary source.  Patient wishes to hold off on further endoscopic evaluation until after the onset of radiation begins.  Pt wishes for more palliative options with palliative radiation to the hip.  Pt has increased liver enzymes likely in setting of bone mets vs primary source.  GI following.  Pt also seen by Onc Surgery - no intervention necessary at this time.        ID consult called for antibiotic managment.      Problem/Plan - 1:    ·	MSSA bacteremia    - Source likely L hallux OM/abscess, s/p amputation.  Intra-op cx also (+) MSSA.  pt s/p echo.  Repeat bcx cleared.  s/p PICC line.      - Cont cefazolin (renally dosed for Cr Cl 20) 2gm IV Qdaily.  Cont to monitor renal function.    - Monitor for leukocytosis, new fevers.      - Complete abx course through 9/30 to complete 6 weeks.    - cont local wound care and management of sutures as per Podiatry.  Sutures intact, no wound dehiscence    Will follow,    Shanda Reaves  [phone]
89 y.o woman with multiple comorbidities presented with several weeks h/o of bony pain and recent inability to ambulate due to pain in the foot. Ct scan revealed destructive bony lesions concerning for metastasis and liver lesions. 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 Noted to have staph aurues bacteremia likely from left hallux abscess.The primary source of malignancy is unclear,. 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  Pt has been on Ancef and planned for total 6 week course to end on 9/30/18.  Pt had TTE performed on 8/17.   Repeat blood cultures have been negative.  Pt had been followed by Podiatry service at  (Dr. Killian).      Pt has been seen by Rad Onc for initiation of palliative radiation.  GI also following for possible pancreatobiliary/upper GI tract as primary source.  Patient wishes to hold off on further endoscopic evaluation until after the onset of radiation begins.  Pt wishes for more palliative options with palliative radiation to the hip.  Pt has increased liver enzymes likely in setting of bone mets vs primary source.  GI following.  Pt also seen by Onc Surgery - no intervention necessary at this time.        ID consult called for antibiotic managment.      Problem/Plan - 1:    ·	MSSA bacteremia    - Source likely L hallux OM/abscess, s/p amputation.  Intra-op cx also (+) MSSA.  pt s/p echo.  Repeat bcx cleared.  s/p PICC line.      - Cont cefazolin (renally dosed for Cr Cl 20) 2gm IV Qdaily.  Cont to monitor renal function.    - Monitor for leukocytosis, new fevers.      - Complete abx course through 9/30 to complete 6 weeks.    - cont local wound care and management of sutures as per Podiatry.  Sutures intact, no wound dehiscence    - Probiotics    Will follow,    Shanda Reaves  [phone]
89 year old F s/p left foot bunionectomy POD#8 secondary to osteomyelitis of hallux   - VSS, WBC trending down   - wound is not dehisced no surrounding erythema or edema  - sutures removed yesterday without incident.   - cont IV abx via PICC per ID  - dress w/ ACE bandage  - Patient is cleared to wash foot  - Follow up with home podiatrist on DC within 1 week   - pod to sign off. re-consult as needed.
89 year old F s/p left foot bunionectomy POD#8 secondary to osteomyelitis of hallux   - pt seen and eval  - VSS, WBC 10.38  - x-rays reveal no gas, no OM  - wound is not dehisced no surrounding erythema or edema, sutures intact.  - cont IV abx via PICC per ID  - Will remove sutures prior to DC - please page [phone] before DC
89 year old F s/p left foot bunionectomy POD#8 secondary to osteomyelitis of hallux   - pt seen and eval  - VSS, WBC trending down to 8.98  - wound is not dehisced no surrounding erythema or edema  - sutures removed without incident.   - cont IV abx via PICC per ID  - dress w/ dry sterile dressing.   - pod to sign off. re-consult as needed.
89F w/metastatic poorly differentiated adenocarcinoma, unknown primary, awaiting radiation therapy    - Currently awaiting radiation therapy as well as Medical oncology recommendations  - As the patient is not showing signs of being clinically obstructed or anemic [11/34] there remains no indicated surgical intervention at this moment  - no further diagnostic imaging / studies are required from a surgical oncology standpoint    Juan Aaron PGY3  [id number]
89y Female with history of CKD-3 presents with difficulty ambulating found to have R hip lesion secondary to malignancy and bacteremia. Nephrology consulted for elevated Scr.
89yo woman transferred from [facility] where she was admitted for c/o painful ambulation for weeks and underwent hip bone biopsy which revealed Adenocarcinoma with unknown primary with pathology immunohistochemistry analysis noting possible pancreatobiliary/upper gi tract as primary source. CT Abd/Pelvis w/o contrast revealed destructive bony lesions concerning for metastasis and liver lesions
89yo woman transferred from [facility] where she was admitted for c/o painful ambulation for weeks and underwent hip bone biopsy which revealed Adenocarcinoma with unknown primary with pathology immunohistochemistry analysis noting possible pancreatobiliary/upper gi tract as primary source. CT Abd/Pelvis w/o contrast revealed destructive bony lesions concerning for metastasis and liver lesions
89yo woman transferred from [facility] where she was admitted for c/o painful ambulation for weeks and underwent hip bone biopsy which revealed Adenocarcinoma with unknown primary with pathology immunohistochemistry analysis noting possible pancreatobiliary/upper gi tract as primary source. CT Abd/Pelvis w/o contrast revealed destructive bony lesions concerning for metastasis and liver lesions
89yo woman transferred from [facility] where she was admitted for c/o painful ambulation for weeks and underwent hip bone biopsy which revealed Adenocarcinoma with unknown primary with pathology immunohistochemistry analysis noting possible pancreatobiliary/upper gi tract as primary source. CT Abd/Pelvis w/o contrast revealed destructive bony lesions concerning for metastasis and liver lesions
89yo woman transferred from [facility] where she was admitted for c/o painful ambulation for weeks and underwent hip bone biopsy which revealed Adenocarcinoma with unknown primary with pathology immunohistochemistry analysis noting possible pancreatobiliary/upper gi tract as primary source. CT Abd/Pelvis w/o contrast revealed destructive bony lesions concerning for metastasis and liver lesions
Assessment  DMT2: 89y Female with DM T2 with hyperglycemia was started on basal bolus insulin recently in [facility], blood sugars in acceptable range now, no hypoglycemic episode,  eating meals, compliant with low carb diet.  CAD: On medications, stable, monitored.  Metastatic disease: On pain meds, stable.  HTN: Controlled, On med.
Assessment  DMT2: 89y Female with DM T2 with hyperglycemia was started on basal bolus insulin recently in [facility], blood sugars in acceptable range now, no hypoglycemic episode,  eating meals, compliant with low carb diet.  CAD: On medications, stable, monitored.  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  HTN: Controlled, On med.
Assessment  DMT2: 89y Female with DM T2 with hyperglycemia was started on basal bolus insulin recently in [facility], blood sugars in acceptable range now, no hypoglycemic episode,  eating meals, compliant with low carb diet.  CAD: On medications, stable, monitored.  Metastatic disease: On pain meds, stable.  HTN: Controlled, On med.
Assessment  DMT2: 89y Female with DM T2 with hyperglycemia was started on basal bolus insulin recently in [facility], blood sugars in acceptable range now, no hypoglycemic episode,  eating meals, compliant with low carb diet.  CAD: On medications, stable, monitored.  Metastatic disease: On pain meds, stable.  HTN: Controlled, On med.
Assessment  DMT2: 89y Female with DM T2 with hyperglycemia was started on basal bolus insulin recently in [facility], blood sugars in acceptable range now, no hypoglycemic episode,  eating meals, compliant with low carb diet.  CAD: On medications, stable, monitored.  Metastatic disease: On pain meds, stable.  HTN: Controlled, On med.
Assessment  DMT2: 89y Female with DM T2 with hyperglycemia was started on basal bolus insulin recently in [facility], blood sugars in acceptable range now, no hypoglycemic episode,  eating meals, compliant with low carb diet.  CAD: On medications, stable, monitored.  Metastatic disease: On pain meds, stable.  HTN: Controlled, On med.
Assessment  DMT2: 89y Female with DM T2 with hyperglycemia was started on basal bolus insulin recently in [facility], blood sugars in acceptable range now, no hypoglycemic episode,  eating meals, compliant with low carb diet.  CAD: On medications, stable, monitored.  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  HTN: Controlled, On med.
Assessment  DMT2: 89y Female with DM T2 with hyperglycemia was started on basal bolus insulin recently in [facility], blood sugars in acceptable range now, no hypoglycemic episode,  eating meals, compliant with low carb diet.  CAD: On medications, stable, monitored.  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  HTN: Controlled, On med.
Assessment  DMT2: 89y Female with DM T2 with hyperglycemia was started on basal bolus insulin recently in [facility], blood sugars in acceptable range now, no hypoglycemic episode,  eating meals, compliant with low carb diet.  CAD: On medications, stable, monitored.  Metastatic disease: On pain meds, stable.  HTN: Controlled, On med.
Assessment  DMT2: 89y Female with DM T2 with hyperglycemia was started on basal bolus insulin recently in [facility], blood sugars in acceptable range now, no hypoglycemic episode,  eating meals, compliant with low carb diet.  CAD: On medications, stable, monitored.  Metastatic disease: On pain meds, stable.  HTN: Controlled, On med.
Assessment  DMT2: 89y Female with DM T2 with hyperglycemia was started on basal bolus insulin was in [facility], blood sugars in acceptable range now, no hypoglycemic episode,  eating meals, compliant with low carb diet, poor PO intake, taking shakes.  CAD: On medications, stable, monitored.  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  HTN: Controlled, On med.
Assessment  DMT2: 89y Female with DM T2 with hyperglycemia was started on basal bolus insulin was in [facility], blood sugars in acceptable range now, no hypoglycemic episode,  eating meals, compliant with low carb diet.  CAD: On medications, stable, monitored.  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  HTN: Controlled, On med.
Problem/Plan - 1:  ·  Problem: Right hip pain.  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  Plan: cw earlier regimen      Problem/Plan - 4:·  Problem: Metastatic cancer.  Plan onc, and rad-onc consult appreciated    Problem/Plan - 5:  ·  Problem: Atrial fibrillation, unspecified type.  Plan: Continue current medications  dose coumadin daily.     family refusing any further soriano and treatment except radiation for pain control
Problem/Plan - 1:  ·  Problem: Right hip pain.  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  Plan: cw earlier regimen      Problem/Plan - 4:·  Problem: Metastatic cancer.  Plan onc, and rad-onc consult appreciated    Problem/Plan - 5:  ·  Problem: Atrial fibrillation, unspecified type.  Plan: Continue current medications  dose coumadin daily.
Problem/Plan - 1:  ·  Problem: Right hip pain.  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  Plan: cw earlier regimen      Problem/Plan - 4:·  Problem: Metastatic cancer.  Plan onc, and rad-onc consult appreciated    Problem/Plan - 5:  ·  Problem: Atrial fibrillation, unspecified type.  Plan: Continue current medications  dose coumadin daily.     dc planning after RT is finished don friday
Problem/Plan - 1:  ·  Problem: Right hip pain.  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  Plan: cw earlier regimen      Problem/Plan - 4:·  Problem: Metastatic cancer.  Plan onc, and rad-onc consult appreciated    Problem/Plan - 5:  ·  Problem: Atrial fibrillation, unspecified type.  Plan: Continue current medications  dose coumadin daily.     dc planning after RT is finished don friday
Problem/Plan - 1:  ·  Problem: Right hip pain.  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  Plan: cw earlier regimen      Problem/Plan - 4:·  Problem: Toe discoloration.  Plan vascular fu  check arterial dopplers  cannot do CT angio sec to TRISH    Problem/Plan - 5:  ·  Problem: Atrial fibrillation, unspecified type.  Plan: Continue current medications  dose coumadin daily.
Problem/Plan - 1:  ·  Problem: Right hip pain.  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  Plan: cw earlier regimen      Problem/Plan - 4:·  Problem: Toe discoloration.  Plan vascular fu  check arterial dopplers  cannot do CT angio sec to TRISH    Problem/Plan - 5:  ·  Problem: Atrial fibrillation, unspecified type.  Plan: Continue current medications  dose coumadin daily.
Problem/Plan - 1:  ·  Problem: Right hip pain.  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  Plan: cw earlier regimen      Problem/Plan - 4:·  Problem: Toe discoloration.  Plan vascular fu  check arterial dopplers  cannot do CT angio sec to TRISH    Problem/Plan - 5:  ·  Problem: Atrial fibrillation, unspecified type.  Plan: Continue current medications  dose coumadin daily.     Palliative care fu for GOC
Problem/Plan - 1:  ·  Problem: Right hip pain.  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  Plan: cw earlier regimen      Problem/Plan - 4:·  Problem: Toe discoloration.  Plan vascular fu  check arterial dopplers  cannot do CT angio sec to TRISH    Problem/Plan - 5:  ·  Problem: Atrial fibrillation, unspecified type.  Plan: Continue current medications  dose coumadin daily.     dc planning to rehab
89 y.o woman with multiple comorbidities presented with several weeks h/o of bony pain and recent inability to ambulate du to pain in the foot. Ct scan revealed destructive bony lesions concerning for metastasis and liver lesions. 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 Noted to have staph aurues bacteremia likely from left hallux abscess.The primary source of malignancy is unclear,. 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, primary unknown , now transfered to [facility] for rad-onc evaluation o the hip
89 y.o woman with multiple comorbidities presented with several weeks h/o of bony pain and recent inability to ambulate due to pain in the foot. Ct scan revealed destructive bony lesions concerning for metastasis and liver lesions. 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 Noted to have staph aurues bacteremia likely from left hallux abscess.The primary source of malignancy is unclear,. 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, primary possibly GI/pancreatic , now transfered to [facility] for rad-onc evaluation o the hip (28 Aug 2018 12:08)    Pt underwent left hallux amputation , pathology consistent with acute OM , intra op cs also MSSA.  Pt has been on Ancef and planned for total 6 week course to end on 9/30/18.  Pt had TTE performed on 8/17.   Repeat blood cultures have been negative.  Pt had been followed by Podiatry service at  (Dr. Killian).      Pt has been seen by Rad Onc for initiation of palliative radiation.  GI also following for possible pancreatobiliary/upper GI tract as primary source.  Patient wishes to hold off on further endoscopic evaluation until after the onset of radiation begins.  Pt wishes for more palliative options with palliative radiation to the hip.  Pt has increased liver enzymes likely in setting of bone mets vs primary source.  GI following.  Pt also seen by Onc Surgery - no intervention necessary at this time.        ID consult called for antibiotic managment.      Problem/Plan - 1:    ·	MSSA bacteremia    - Source likely L hallux OM/abscess, s/p amputation.  Intra-op cx also (+) MSSA.  pt s/p echo.  Repeat bcx cleared.  s/p PICC line.      - Cont cefazolin (renally dosed for Cr Cl 20) 2gm IV Qdaily.  Cont to monitor renal function.    - Complete abx course through 9/30 to complete 6 weeks (start date from date of toe amputation on 8/21)    - cont local wound care and management of sutures as per Podiatry    - Probiotics        * Pt with sob, found to have pulm edema and pleural effusions on cxr, s/p lasix.  Respiratory status improved.  Pt seen by Vascular surgery - no acute intervention deemed necessary at this time    * Palliative consulted        Will follow,    Shanda Reaves  [phone]
Problem/Plan - 1:  ·  Problem: Right hip pain.  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  Plan: cw earlier regimen      Problem/Plan - 4:·  Problem: Toe discoloration.  Plan vascular fu  check arterial dopplers  cannot do CT angio sec to TRISH    Problem/Plan - 5:  ·  Problem: Atrial fibrillation, unspecified type.  Plan: Continue current medications  dose coumadin daily.
89yo woman transferred from [facility] where she was admitted for c/o painful ambulation for weeks and underwent hip bone biopsy which revealed Adenocarcinoma with unknown primary with pathology immunohistochemistry analysis noting possible pancreatobiliary/upper gi tract as primary source. CT Abd/Pelvis w/o contrast revealed destructive bony lesions concerning for metastasis and liver lesions
89y Female with history of CKD-3 presents with difficulty ambulating found to have R hip lesion secondary to malignancy and bacteremia. Nephrology consulted for elevated Scr.
89y Female with history of CKD-3 presents with difficulty ambulating found to have R hip lesion secondary to malignancy and bacteremia. Nephrology consulted for elevated Scr.
88yo woman transferred from [facility] where she was admitted for c/o painful ambulation for weeks and underwent hip bone biopsy which revealed Adenocarcinoma with unknown primary with pathology immunohistochemistry analysis noting possible pancreatobiliary/upper gi tract as primary source. CT Abd/Pelvis w/o contrast revealed destructive bony lesions concerning for metastasis and liver lesions
89yo woman transferred from [facility] where she was admitted for c/o painful ambulation for weeks and underwent hip bone biopsy which revealed Adenocarcinoma with unknown primary with pathology immunohistochemistry analysis noting possible pancreatobiliary/upper gi tract as primary source. CT Abd/Pelvis w/o contrast revealed destructive bony lesions concerning for metastasis and liver lesions
Assessment  DMT2: 89y Female with DM T2 with hyperglycemia was started on basal bolus insulin recently in [facility], blood sugars in acceptable range now, no hypoglycemic episode,  eating meals, compliant with low carb diet.  CAD: On medications, stable, monitored.  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  HTN: Controlled, On med.
89 y.o woman with multiple comorbidities presented with several weeks h/o of bony pain and recent inability to ambulate du to pain in the foot. Ct scan revealed destructive bony lesions concerning for metastasis and liver lesions. 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 Noted to have staph aurues bacteremia likely from left hallux abscess.The primary source of malignancy is unclear,. 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, primary unknown , now transfered to [facility] for rad-onc evaluation o the hip

## 2018-09-10 NOTE — PROGRESS NOTE ADULT - PROBLEM SELECTOR PROBLEM 5
Hyponatremia
Hyponatremia
Metastatic cancer
Hyponatremia
Metastatic cancer

## 2018-09-10 NOTE — PROGRESS NOTE ADULT - PROVIDER SPECIALTY LIST ADULT
Cardiology
Endocrinology
Gastroenterology
Hospitalist
Infectious Disease
Nephrology
Neurology
Podiatry
Pulmonology
Pulmonology
Surgery
Cardiology
Pulmonology
Hospitalist
Infectious Disease
Infectious Disease
Neurology
Nephrology
Nephrology
Gastroenterology
Gastroenterology
Endocrinology
Gastroenterology
Pulmonology

## 2018-09-10 NOTE — PROGRESS NOTE ADULT - REASON FOR ADMISSION
transferred from [place] for rad-onc evaluation
transferred from [place] for rad-onc evaluation
transferred from [place] for rad-onc evaluation
transferred from [place] for rad-onc evaluation
transferred from [place] for rad-onc evaluation
transferred from [place] for rad-onc evaluation
transferred from [place] for rad-onc evaluation
transferred from [place] for rad-onc evaluation
transferred from [place] for rad-onc evaluation
transferred from [place] for rad-onc evaluation
transferred from [place] for rad-onc evaluation
transferred from [place] for rad-onc evaluation
transferred from [place] for rad-onc evaluation
transferred from [place] for rad-onc evaluation
transferred from [place] for rad-onc evaluation
transferred from [place] for rad-onc evaluation
transferred from [place] for rad-onc evaluation
transferred from [place] for rad-onc evaluation
transferred from [place] for rad-onc evaluation
transferred from [place] for rad-onc evaluation
transferred from [place] for rad-onc evaluation
transferred from [place] for rad-onc evaluation
transferred from [place] for rad-onc evaluation
transferred from [place] for rad-onc evaluation
transferred from [place] for rad-onc evaluation
transferred from [place] for rad-onc evaluation
transferred from [place] for rad-onc evaluation
transferred from [place] for rad-onc evaluation
transferred from [place] for rad-onc evaluation
transferred from [place] for rad-onc evaluation
transferred from [place] for rad-onc evaluation
transferred from [place] for rad-onc evaluation
transferred from [place] for rad-onc evaluation
transferred from [place] for rad-onc evaluation
transferred from [place] for rad-onc evaluation
transferred from [place] for rad-onc evaluation
transferred from [place] for rad-onc evaluation
transferred from [place] for rad-onc evaluation
transferred from [place] for rad-onc evaluation
transferred from [place] for rad-onc evaluation

## 2018-09-10 NOTE — PROGRESS NOTE ADULT - PROBLEM SELECTOR PLAN 3
BP acceptable. Monitor off of anti-hypertensive medications.
- intermittent   - no obstructive symptoms as stools at present soft  - keep on bowel regimen while on pain medications
on antibiotics!  9/9: Cont antibtiocs!  9/10; on antibiotics: her foot appearance has improved: no cyanosis!
- intermittent   - no obstructive symptoms as stools at present soft  - keep on bowel regimen while on pain medications
On medications, monitored and followed up by primary/cardiology team
- intermittent   - no obstructive symptoms as stools at present soft  - keep on bowel regimen while on pain medications
- intermittent   - no obstructive symptoms as stools at present soft  - keep on bowel regimen while on pain medications
- improved; having bms during admission   - no obstructive symptoms as stools at present soft  - keep on bowel regimen while on pain medications with senna/colace and miralax  - dulcolax suppository prn
- improved; having bms during admission   - no obstructive symptoms as stools at present soft  - keep on bowel regimen while on pain medications with senna/colace and miralax  - dulcolax suppository prn
- intermittent   - no obstructive symptoms as stools at present soft  - keep on bowel regimen while on pain medications
- intermittent   - no obstructive symptoms as stools at present soft  - keep on bowel regimen while on pain medications with senna/colace and miralax  - dulcolax suppository prn
- intermittent; having bms during admission   - no obstructive symptoms as stools at present soft  - keep on bowel regimen while on pain medications with senna/colace and miralax  - dulcolax suppository prn
BP acceptable. Monitor off of anti-hypertensive medications.
BP low normal. Monitor off of anti-hypertensive medications.
BP low normal. Monitor off of anti-hypertensive medications.
On medications, monitored and followed up by primary/cardiology team
on antibiotics!
on antibiotics!
on antibiotics!  9/9: Cont antibtiocs!
BP acceptable. Monitor off of anti-hypertensive medications.

## 2018-09-10 NOTE — PROGRESS NOTE ADULT - PROBLEM SELECTOR PLAN 6
cont lasix: has small to moderate bilateral pleural effusion: cont conservative management with lasix  9/8; cont diuretics!  9/9: remains on IV lasix  9/10: off lasix today because of rising BUN: she will be restarted on po lasix on dc
cont lasix: has small to moderate bilateral pleural effusion: cont conservative management with lasix
cont lasix: has small to moderate bilateral pleural effusion: cont conservative management with lasix  9/8; cont diuretics!
cont lasix: has small to moderate bilateral pleural effusion: cont conservative management with lasix  9/8; cont diuretics!  9/9: remains on IV lasix

## 2018-09-10 NOTE — PROGRESS NOTE ADULT - SUBJECTIVE AND OBJECTIVE BOX
Patient denies CP, SOB improved, c/o ongoing pain on her buttocks.  ROS otherwise negative      MEDICATIONS  (STANDING):  ceFAZolin   IVPB 2000 milliGRAM(s) IV Intermittent every 24 hours  chlorhexidine 4% Liquid 1 Application(s) Topical <User Schedule>  dextrose 5%. 1000 milliLiter(s) (50 mL/Hr) IV Continuous <Continuous>  dextrose 50% Injectable 12.5 Gram(s) IV Push once  dextrose 50% Injectable 25 Gram(s) IV Push once  dextrose 50% Injectable 25 Gram(s) IV Push once  docusate sodium 100 milliGRAM(s) Oral three times a day  fentaNYL   Patch  12 MICROgram(s)/Hr 1 Patch Transdermal every 72 hours  furosemide    Tablet 80 milliGRAM(s) Oral daily  influenza   Vaccine 0.5 milliLiter(s) IntraMuscular once  insulin glargine Injectable (LANTUS) 12 Unit(s) SubCutaneous at bedtime  insulin lispro (HumaLOG) corrective regimen sliding scale   SubCutaneous three times a day before meals  lactobacillus acidophilus 1 Tablet(s) Oral three times a day with meals  lidocaine   Patch 1 Patch Transdermal daily  pantoprazole    Tablet 40 milliGRAM(s) Oral before breakfast  propranolol LA 60 milliGRAM(s) Oral daily  senna 2 Tablet(s) Oral at bedtime  warfarin 1 milliGRAM(s) Oral once    MEDICATIONS  (PRN):  ALBUTerol    90 MICROgram(s) HFA Inhaler 2 Puff(s) Inhalation every 6 hours PRN Shortness of Breath and/or Wheezing  dextrose 40% Gel 15 Gram(s) Oral once PRN Blood Glucose LESS THAN 70 milliGRAM(s)/deciLiter  glucagon  Injectable 1 milliGRAM(s) IntraMuscular once PRN Glucose <70 milliGRAM(s)/deciLiter  oxyCODONE    IR 5 milliGRAM(s) Oral every 4 hours PRN Moderate and Severe pain  polyethylene glycol 3350 17 Gram(s) Oral two times a day PRN Constipation      LABS:                        9.2    9.01  )-----------( 183      ( 09 Sep 2018 05:20 )             29.4     134<L>  |  92<L>  |  101<H>  ----------------------------<  150<H>  3.6   |  26  |  2.62<H>    Ca    9.3      10 Sep 2018 06:14  Phos  3.9     09-10  Mg     1.6     09-10    Creatinine Trend: 2.62<--, 2.72<--, 2.67<--, 2.60<--, 2.65<--, 2.64<--   PT/INR - ( 10 Sep 2018 06:14 )   PT: 30.9 SEC;   INR: 2.73       PHYSICAL EXAM  Vital Signs Last 24 Hrs  T(C): 36.4 (10 Sep 2018 06:33), Max: 36.7 (09 Sep 2018 21:23)  T(F): 97.6 (10 Sep 2018 06:33), Max: 98 (09 Sep 2018 21:23)  HR: 66 (10 Sep 2018 06:33) (66 - 70)  BP: 122/64 (10 Sep 2018 06:33) (103/62 - 122/64)  RR: 20 (10 Sep 2018 06:33) (20 - 20)  SpO2: 100% (10 Sep 2018 06:33) (100% - 100%)    HEENT: Normal Oral mucosa, (-)icterus (-) pallor  Cardiovascular: Normal S1S2, JVD 12, 1/6 STEFFANY  Respiratory: decreased BS BL bases  Gastrointestinal: Abdomen soft, ND, NT, +BS  Ext: trace edema, improved    DIAGNOSTIC DATA    Xray Chest 1 View- PORTABLE-Urgent (08.17.18 @ 21:23) >  There are low lung volumes resulting in crowding of the bronchovascular   markings at the lung bases.  There is minimal blunting at the right costophrenic angle either   representing trace pleural effusion and/or pleural thickening.  There is subsegmental atelectasis at both lung bases.      < from: TTE Echo Doppler w/o Cont (08.17.18 @ 10:38) >  Technically difficult and limited study.  Mitral Valve: Calcified mitral annulus and mitral valve leaflets. Normal   opening of the mitral valve leaflets. Trace mitral regurgitation.  Aortic Valve/Aorta: Not well visualized  Tricuspid Valve: Calcified tricuspid annulus with normally opening valve.   Trace tricuspid regurgitation.  Pulmonic Valve: The pulmonic valve is not well visualized. Probably   normal.  Left Atrium: Moderate left atrial enlargement  Right Atrium: Normal  Left Ventricle: The endocardium is not well-visualized. Overall there is   preserved left ventricular systolic function. Unable to rule out wall   motion abnormalities. The EF is approximately 65%.  Right Ventricle: Normal right ventricular size and function.  Pericardium/Pleura: No pericardial effusion noted.  Pulmonary/RV Pressure: The right ventricular systolic pressure is   estimated to be 35mmHg, assuming that the right atrial pressure is   estimated to be8 mmHg. This is consistent with normal pulmonary   pressures.  LV Diastolic Function: Stage 2 diastolic dysfunction    < end of copied text >    < from: Transthoracic Echocardiogram (09.08.18 @ 08:05) >  CONCLUSIONS:  1. Grossly normal left ventricular internal dimensions and  wall thicknesses.  2. Endocardium not well visualized; grossly normal left  ventricular systolic function.  3. The right ventricle is not well visualized; grossly  normal right ventricular systolic function.  4. Estimated pulmonary artery systolic pressure equals 44  mm Hg, assuming right atrial pressure equals 10  mm Hg,  consistent with mild pulmonary hypertension.  *** No previous Echo exam.  ------------------------------------------------------------------------  Confirmed on  9/8/2018 - 14:46:04 by Jackson Burdick M.D.    < end of copied text >        ASSESSMENT AND PLAN:  89y Female  multiple comorbidities preserved LV and RV fx, Afib on coumadin, reported CAD ? remote MI with no intervention follows with Dr. Diaz with annual stress tests being medically managed for CAD with overall preserved LV function on recent TTE presented with several weeks h/o of bony pain and recent inability to ambulate,  ct scan revealed destructive bony lesions concerning for metastasis and liver lesions (primary possibly upper GI/pancreo-biliary)     - Cont coumadin INR 2-3 for afib  --Vascular eval noted  - radiation per onc - no plans for surgical intervention at this time     - patient/family refusing aggressive chemo  - Repeat echo with normal LV and RV fx  - Edema not cardiac in etiology, diuretics per renal (Lasix 80 PO daily per note 9/9)  -palliative noted - plan for rehab/hospice pending      Elder Mathis MD, [facility]

## 2018-09-10 NOTE — PROGRESS NOTE ADULT - ATTENDING COMMENTS
Agree with above.   -continue with ac if within goals of care    Magnus Kamara MD
CARDIOLOGY ATTENDING    Agree with above. No further inpatient cardiac workup needed.
CARDIOLOGY ATTENDING    Agree with above. No further inpatient cardiac workup needed.
Patient seen and examined.  Agree with above.   -AC for cva prevention if no contraindications    Magnus Kamara MD
Patient seen and examined.  Agree with above.   -continue with ac for cva prevention  -no further cardiac workup needed at this time    Magnus Kamara MD
Patient seen and examined.  Agree with above.   -pt. and pt. family want conservative cv care - do not want invasive cardiac procedures  -abx per id  -follow up id  -continue with ac if within goals of care    Magnus Kamara MD
Advanced care planning was discussed with patient and family.  Advanced care planning forms were reviewed and discussed.  Risks, benefits and alternatives of gastroenterologic procedures were discussed in detail and all questions were answered.    30 minutes spent.
Agree with above assessment and plan as outlined above.    - Cont Coumadin INR 2-3    Elder Mathis MD, FACC
Patient seen and examined.  Agree with above.   -continue with coumadin if within goc  -no further cardiac workup needed at this time    Magnus Kamara MD
Patient seen and examined.  Agree with above.   still volume overloaded  iv lasix to keep O > I  follow up vascular   palliative care consult    Magnus Kamara MD
CARDIOLOGY ATTENDING    Patient seen and examined. Agree with above. No further inpatient cardiac workup needed. Continue lifelong a/c with coumadin for afib, and continue standing dose of lasix for CHFpEF.
[facility] NEPHROLOGY  Doug Manjarrez M.D.  Uzair Prince D.O.  Becca Hanna M.D.  Vibha Bazan, MSN, ANP-C    Telephone: [phone]  Facsimile: [phone]    [address] [place] [address]
[facility] NEPHROLOGY  Doug Manjarrez M.D.  Uzair Prince D.O.  Becca Hanna M.D.  Vibha Bazan, MSN, ANP-C    Telephone: [phone]  Facsimile: [phone]    [address] [place] [address]
Advanced care planning was discussed with patient and family.  Advanced care planning forms were reviewed and discussed.  Risks, benefits and alternatives of gastroenterologic procedures were discussed in detail and all questions were answered.    30 minutes spent.
[facility] NEPHROLOGY  Doug Manjarrez M.D.  Uzair Prince D.O.  Becca Hanna M.D.  Vibha Bazan, MSN, ANP-C    Telephone: [phone]  Facsimile: [phone]    [address] [place] [address]
[facility] NEPHROLOGY  Doug Manjarrez M.D.  Uzair Prince D.O.  Becca Hanna M.D.  Vibha Bazan, MSN, ANP-C    Telephone: [phone]  Facsimile: [phone]    [address] [place] [address]
[facility] NEPHROLOGY  Doug Manjarrez M.D.  Uzair Prince D.O.  Becca Hanna M.D.  Vibha Bazan, MSN, ANP-C    Telephone: [phone]  Facsimile: [phone]    [address] [place] [address]
[facility] NEPHROLOGY  Doug Manjarrez M.D.  Uzair Prince D.O.  Becca Hanna M.D.  Vibha Bazan, MSN, ANP-C    Telephone: [phone]  Facsimile: [phone]    [address] [place] [address]
[facility] NEPHROLOGY  Doug Manjarrez M.D.  Uzair Prince D.O.  Becca Hanna M.D.  Vibha Bazan, MSN, ANP-C    Telephone: [phone]  Facsimile: [phone]    [address] [place] [address]
[facility] NEPHROLOGY  Doug Manjarrez M.D.  Uzair Prince D.O.  Becca Hanna M.D.  Vibha Bazan, MSN, ANP-C    Telephone: [phone]  Facsimile: [phone]    [address] [place] [address]
[facility] NEPHROLOGY  Doug Manjarrez M.D.  Uzair Prince D.O.  Becca Hanna M.D.  Vibha Bazan, MSN, ANP-C    Telephone: [phone]  Facsimile: [phone]    [address] [place] [address]

## 2018-09-10 NOTE — PROGRESS NOTE ADULT - SUBJECTIVE AND OBJECTIVE BOX
Patient is a 89y old  Female who presents with a chief complaint of transferred from [place] for rad-onc evaluation (10 Sep 2018 11:27)      Any change in ROS: family at bedside: she says she is not SOB lying down: cant really say her SOB has improved But clinically she looks comfortable:     MEDICATIONS  (STANDING):  ceFAZolin   IVPB 2000 milliGRAM(s) IV Intermittent every 24 hours  chlorhexidine 4% Liquid 1 Application(s) Topical <User Schedule>  dextrose 5%. 1000 milliLiter(s) (50 mL/Hr) IV Continuous <Continuous>  dextrose 50% Injectable 12.5 Gram(s) IV Push once  dextrose 50% Injectable 25 Gram(s) IV Push once  dextrose 50% Injectable 25 Gram(s) IV Push once  docusate sodium 100 milliGRAM(s) Oral three times a day  fentaNYL   Patch  12 MICROgram(s)/Hr 1 Patch Transdermal every 72 hours  influenza   Vaccine 0.5 milliLiter(s) IntraMuscular once  insulin glargine Injectable (LANTUS) 12 Unit(s) SubCutaneous at bedtime  insulin lispro (HumaLOG) corrective regimen sliding scale   SubCutaneous three times a day before meals  lactobacillus acidophilus 1 Tablet(s) Oral three times a day with meals  lidocaine   Patch 1 Patch Transdermal daily  pantoprazole    Tablet 40 milliGRAM(s) Oral before breakfast  propranolol LA 60 milliGRAM(s) Oral daily  senna 2 Tablet(s) Oral at bedtime  warfarin 1 milliGRAM(s) Oral once    MEDICATIONS  (PRN):  ALBUTerol    90 MICROgram(s) HFA Inhaler 2 Puff(s) Inhalation every 6 hours PRN Shortness of Breath and/or Wheezing  dextrose 40% Gel 15 Gram(s) Oral once PRN Blood Glucose LESS THAN 70 milliGRAM(s)/deciLiter  glucagon  Injectable 1 milliGRAM(s) IntraMuscular once PRN Glucose <70 milliGRAM(s)/deciLiter  oxyCODONE    IR 5 milliGRAM(s) Oral every 4 hours PRN Moderate and Severe pain  polyethylene glycol 3350 17 Gram(s) Oral two times a day PRN Constipation    Vital Signs Last 24 Hrs  T(C): 36.4 (10 Sep 2018 06:33), Max: 36.7 (09 Sep 2018 21:23)  T(F): 97.6 (10 Sep 2018 06:33), Max: 98 (09 Sep 2018 21:23)  HR: 66 (10 Sep 2018 06:33) (66 - 70)  BP: 122/64 (10 Sep 2018 06:33) (103/62 - 122/64)  BP(mean): --  RR: 20 (10 Sep 2018 06:33) (20 - 20)  SpO2: 100% (10 Sep 2018 06:33) (100% - 100%)    I&O's Summary    09 Sep 2018 07:01  -  10 Sep 2018 07:00  --------------------------------------------------------  IN: 0 mL / OUT: 850 mL / NET: -850 mL    10 Sep 2018 07:01  -  10 Sep 2018 12:16  --------------------------------------------------------  IN: 0 mL / OUT: 400 mL / NET: -400 mL          Physical Exam:   GENERAL: NAD, well-groomed, well-developed  HEENT: JORGE/   Atraumatic, Normocephalic  ENMT: No tonsillar erythema, exudates, or enlargement; Moist mucous membranes, Good dentition, No lesions  NECK: Supple, No JVD, Normal thyroid  CHEST/LUNG: Clear to auscultaion  CVS: Regular rate and rhythm; No murmurs, rubs, or gallops  GI: : Soft, Nontender, Nondistended; Bowel sounds present  NERVOUS SYSTEM:  Alert & Oriented X3  EXTREMITIES:  more  edema today especially on jermaine left side: pitting edema+  LYMPH: No lymphadenopathy noted  SKIN: No rashes or lesions  ENDOCRINOLOGY: No Thyromegaly  PSYCH: Appropriate    Labs:                              9.2    9.01  )-----------( 183      ( 09 Sep 2018 05:20 )             29.4                         8.9    9.22  )-----------( 191      ( 07 Sep 2018 07:05 )             29.0     09-10    134<L>  |  92<L>  |  101<H>  ----------------------------<  150<H>  3.6   |  26  |  2.62<H>  09-08    135  |  94<L>  |  91<H>  ----------------------------<  151<H>  4.0   |  26  |  2.72<H>  09-07    136  |  93<L>  |  85<H>  ----------------------------<  126<H>  4.1   |  26  |  2.67<H>    Ca    9.3      10 Sep 2018 06:14  Phos  3.9     09-10  Mg     1.6     09-10      CAPILLARY BLOOD GLUCOSE      POCT Blood Glucose.: 152 mg/dL (10 Sep 2018 08:53)  POCT Blood Glucose.: 214 mg/dL (09 Sep 2018 22:06)  POCT Blood Glucose.: 190 mg/dL (09 Sep 2018 17:53)  POCT Blood Glucose.: 172 mg/dL (09 Sep 2018 12:36)        PT/INR - ( 10 Sep 2018 06:14 )   PT: 30.9 SEC;   INR: 2.73              < from: CT Chest No Cont (09.05.18 @ 16:32) >  e fifth left rib with bony   destruction, suspicious for metastases.    IMPRESSION:    Interlobular septal thickening with superimposed groundglass opacities   and small to moderate bilateral bilateral pleural effusions, right   greater than left, consistent with pulmonary edema.    Small amount of ascites increased from 8/14/2018 and associated   subcutaneous edema.    Heterogeneity of the liver with hypodense lesions worrisome for   metastases.    Subcentimeter lucent lesions in the second left rib with associated small   pathologic fracture, suspicious for metastases. 1.3 cm lucent lesion in   the fifth left rib with bony destruction, also suspicious for metastases.              GAYATRI JUDGE M.D., RADIOLOGY RESIDENT  This document has been electronically signed.  HANK HAMM M.D. ATTENDING RADIOLOGIST  This document has been electronically signed. Sep  5 2018  5:45PM    < end of copied text >        RECENT CULTURES:        RESPIRATORY CULTURES:          Studies  Chest X-RAY  CT SCAN Chest   Venous Dopplers: LE:   CT Abdomen  Others

## 2018-09-10 NOTE — PROGRESS NOTE ADULT - SUBJECTIVE AND OBJECTIVE BOX
Infectious Diseases progress note:    Subjective: No new fevers, no leukocytosis.      ROS:  CONSTITUTIONAL:  No fever, chills, rigors  CARDIOVASCULAR:  No chest pain or palpitations  RESPIRATORY:   No SOB, cough, dyspnea on exertion.  No wheezing  GASTROINTESTINAL:  No abd pain, N/V, diarrhea/constipation  EXTREMITIES:  No swelling or joint pain  GENITOURINARY:  No burning on urination, increased frequency or urgency.  No flank pain  NEUROLOGIC:  No HA, visual disturbances  SKIN: No rashes    Allergies    Levaquin (Other)  ramipril (Other)  tetracycline (Hives; Rash)    Intolerances        ANTIBIOTICS/RELEVANT:  antimicrobials  ceFAZolin   IVPB 2000 milliGRAM(s) IV Intermittent every 24 hours    immunologic:  influenza   Vaccine 0.5 milliLiter(s) IntraMuscular once    OTHER:  ALBUTerol    90 MICROgram(s) HFA Inhaler 2 Puff(s) Inhalation every 6 hours PRN  chlorhexidine 4% Liquid 1 Application(s) Topical <User Schedule>  dextrose 40% Gel 15 Gram(s) Oral once PRN  dextrose 5%. 1000 milliLiter(s) IV Continuous <Continuous>  dextrose 50% Injectable 12.5 Gram(s) IV Push once  dextrose 50% Injectable 25 Gram(s) IV Push once  dextrose 50% Injectable 25 Gram(s) IV Push once  docusate sodium 100 milliGRAM(s) Oral three times a day  fentaNYL   Patch  12 MICROgram(s)/Hr 1 Patch Transdermal every 72 hours  glucagon  Injectable 1 milliGRAM(s) IntraMuscular once PRN  insulin glargine Injectable (LANTUS) 12 Unit(s) SubCutaneous at bedtime  insulin lispro (HumaLOG) corrective regimen sliding scale   SubCutaneous three times a day before meals  lactobacillus acidophilus 1 Tablet(s) Oral three times a day with meals  lidocaine   Patch 1 Patch Transdermal daily  oxyCODONE    IR 5 milliGRAM(s) Oral every 4 hours PRN  pantoprazole    Tablet 40 milliGRAM(s) Oral before breakfast  polyethylene glycol 3350 17 Gram(s) Oral two times a day PRN  propranolol LA 60 milliGRAM(s) Oral daily  senna 2 Tablet(s) Oral at bedtime  warfarin 1 milliGRAM(s) Oral once      Objective:  Vital Signs Last 24 Hrs  T(C): 36.7 (10 Sep 2018 14:22), Max: 36.7 (09 Sep 2018 21:23)  T(F): 98.1 (10 Sep 2018 14:22), Max: 98.1 (10 Sep 2018 14:22)  HR: 70 (10 Sep 2018 14:22) (66 - 70)  BP: 105/51 (10 Sep 2018 14:22) (103/62 - 122/64)  BP(mean): --  RR: 20 (10 Sep 2018 14:22) (20 - 20)  SpO2: 100% (10 Sep 2018 14:22) (100% - 100%)    PHYSICAL EXAM:  Constitutional:NAD  Eyes:JORGE, EOMI  Ear/Nose/Throat: no thrush, mucositis.  Moist mucous membranes	  Neck:no JVD, no lymphadenopathy, supple  Respiratory: CTA ignacio  Cardiovascular: S1S2 RRR, no murmurs  Gastrointestinal:soft, nontender,  nondistended (+) BS  Extremities:no e/e/c  Skin:  left ft dsg c/d/i        LABS:                        9.2    9.01  )-----------( 183      ( 09 Sep 2018 05:20 )             29.4     09-10    134<L>  |  92<L>  |  101<H>  ----------------------------<  150<H>  3.6   |  26  |  2.62<H>    Ca    9.3      10 Sep 2018 06:14  Phos  3.9     09-10  Mg     1.6     09-10      PT/INR - ( 10 Sep 2018 06:14 )   PT: 30.9 SEC;   INR: 2.73                Procalcitonin, Serum: 2.60 (08-16 @ 08:00)                    MICROBIOLOGY:      no new culture data    RADIOLOGY & ADDITIONAL STUDIES:

## 2018-09-10 NOTE — PROGRESS NOTE ADULT - PROBLEM SELECTOR PLAN 1
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

## 2018-09-10 NOTE — PROGRESS NOTE ADULT - PROBLEM SELECTOR PLAN 4
Likely secondary to hypoalbuminemia from malnutrition and not nephrotic syndrome as spot urine TP/CR not nephrotic range (1.7 from secondary membranous versus DM?). Given rising BUN, would hold lasix today and discharge on lasix 80 mg PO every other day. Monitor UO.

## 2018-09-10 NOTE — PROGRESS NOTE ADULT - PROBLEM SELECTOR PROBLEM 3
Bacteremia
Constipation due to opioid therapy
Heart failure
Constipation due to opioid therapy
Bacteremia
Bacteremia
Constipation due to opioid therapy
Heart failure
Hypertensive kidney disease with chronic kidney disease, stage 1 through stage 4 or unspecified 
Bacteremia
Constipation due to opioid therapy
Hypertensive kidney disease with chronic kidney disease, stage 1 through stage 4 or unspecified 
Hypertensive kidney disease with chronic kidney disease, stage 1 through stage 4 or unspecified

## 2018-09-10 NOTE — PROGRESS NOTE ADULT - SUBJECTIVE AND OBJECTIVE BOX
Chief complaint  Patient is a 89y old  Female who presents with a chief complaint of transferred from [place] for rad-onc evaluation (10 Sep 2018 13:32)   Review of systems  Patient in bed, looks comfortable, no fever, daughter at bed side, no hypoglycemia.    Labs and Fingersticks  CAPILLARY BLOOD GLUCOSE      POCT Blood Glucose.: 179 mg/dL (10 Sep 2018 12:55)  POCT Blood Glucose.: 152 mg/dL (10 Sep 2018 08:53)  POCT Blood Glucose.: 214 mg/dL (09 Sep 2018 22:06)  POCT Blood Glucose.: 190 mg/dL (09 Sep 2018 17:53)          Calcium, Total Serum: 9.3 (09-10 @ 06:14)          09-10    134<L>  |  92<L>  |  101<H>  ----------------------------<  150<H>  3.6   |  26  |  2.62<H>    Ca    9.3      10 Sep 2018 06:14  Phos  3.9     09-10  Mg     1.6     09-10                          9.2    9.01  )-----------( 183      ( 09 Sep 2018 05:20 )             29.4     Medications  MEDICATIONS  (STANDING):  ceFAZolin   IVPB 2000 milliGRAM(s) IV Intermittent every 24 hours  chlorhexidine 4% Liquid 1 Application(s) Topical <User Schedule>  dextrose 5%. 1000 milliLiter(s) (50 mL/Hr) IV Continuous <Continuous>  dextrose 50% Injectable 12.5 Gram(s) IV Push once  dextrose 50% Injectable 25 Gram(s) IV Push once  dextrose 50% Injectable 25 Gram(s) IV Push once  docusate sodium 100 milliGRAM(s) Oral three times a day  fentaNYL   Patch  12 MICROgram(s)/Hr 1 Patch Transdermal every 72 hours  influenza   Vaccine 0.5 milliLiter(s) IntraMuscular once  insulin glargine Injectable (LANTUS) 12 Unit(s) SubCutaneous at bedtime  insulin lispro (HumaLOG) corrective regimen sliding scale   SubCutaneous three times a day before meals  lactobacillus acidophilus 1 Tablet(s) Oral three times a day with meals  lidocaine   Patch 1 Patch Transdermal daily  pantoprazole    Tablet 40 milliGRAM(s) Oral before breakfast  propranolol LA 60 milliGRAM(s) Oral daily  senna 2 Tablet(s) Oral at bedtime  warfarin 1 milliGRAM(s) Oral once      Physical Exam  General: Patient comfortable in bed  Vital Signs Last 12 Hrs  T(F): 98.1 (09-10-18 @ 14:22), Max: 98.1 (09-10-18 @ 14:22)  HR: 70 (09-10-18 @ 14:22) (66 - 70)  BP: 105/51 (09-10-18 @ 14:22) (105/51 - 122/64)  BP(mean): --  RR: 20 (09-10-18 @ 14:22) (20 - 20)  SpO2: 100% (09-10-18 @ 14:22) (100% - 100%)  Neck: No palpable thyroid nodules.  CVS: S1S2, No murmurs  Respiratory: No wheezing, no crepitations  GI: Abdomen soft, bowel sounds positive  Musculoskeletal:  edema lower extremities.   Skin: No skin rashes, no ecchymosis    Diagnostics

## 2018-09-10 NOTE — PROGRESS NOTE ADULT - PROBLEM SELECTOR PLAN 2
CT scan chest noted: Has rib lesion, 2nd left rib: Likely metastatic pathological fracture: Not affecting her ventilation. ABG on 4th seems reasonable: cont conservative management: Pain control  9/8: stable: no chest pain  9/9: Stable!
- likely in setting of bone mets vs in setting of primary source of ca  - continue to trend   - avoid hepatotoxins  - alpha fetoprotein elevated  - CA 19-9,  both elevated; CEA is within normal range  - may consider checking MRI Abd/Liver/Pancreas if within palliative goc; however, patient and family appear to be wishing for more palliative management
Continue workup/treatment FU oncology.
- likely in setting of bone mets vs in setting of primary source of ca  - continue to trend   - avoid hepatotoxins  - alpha fetoprotein elevated  - CA 19-9,  both elevated; CEA is within normal range  - may consider checking MRI Abd/Liver/Pancreas if within palliative goc; however, patient and family appear to be wishing for more palliative management
- favor in setting of bone mets vs in setting of primary source of ca  - continue to trend   - avoid hepatotoxins  - alpha fetoprotein elevated  - CA 19-9,  both elevated; CEA is within normal range  - family wishing for conservative/palliative management; no further imaging necessary
- likely in setting of bone mets vs in setting of primary source of ca  - continue to trend   - avoid hepatotoxins  - alpha fetoprotein elevated  - CA 19-9,  both elevated; CEA is within normal range  - may consider checking MRI Abd/Liver/Pancreas if within palliative goc; however, patient and family appear to be wishing for more palliative management
CT scan chest noted: Has rib lesion, 2nd left rib: Likely metastatic pathological fracture: Not affecting her ventilation. ABG on 4th seems reasonable: cont conservative management: Pain control
CT scan chest noted: Has rib lesion, 2nd left rib: Likely metastatic pathological fracture: Not affecting her ventilation. ABG on 4th seems reasonable: cont conservative management: Pain control  9/8: stable: no chest pain
Continue workup, FU oncology.
Continue workup/treatment FU oncology.
Patient appears to have underlying CKD-3 (baseline 1.8) for which she follow with Dr. Cannon as an outpatient. Defer inpatient CKD work up. Monitor electrolytes.
Patient appears to have underlying CKD-3 with proteinuria (baseline 1.8) for which she follow with Dr. Cannon as an outpatient. CKD work up as above. Monitor electrolytes.
Patient appears to have underlying CKD-3 with proteinuria (baseline 1.8) for which she follow with Dr. Cannon as an outpatient. Defer inpatient CKD work up. Monitor electrolytes.
CT scan chest noted: Has rib lesion, 2nd left rib: Likely metastatic pathological fracture: Not affecting her ventilation. ABG on 4th seems reasonable: cont conservative management: Pain control  9/8: stable: no chest pain  9/9: Stable!
- likely in setting of bone mets vs in setting of primary source of ca  - continue to trend   - avoid hepatotoxins  - alpha fetoprotein elevated  - CA 19-9, ; (CEA is within normal range)  - may consider checking MRI Abd/Liver/Pancreas if within palliative goc
Patient appears to have underlying CKD-3 with proteinuria (baseline 1.8) for which she follow with Dr. Cannon as an outpatient. F/U serum DOMINGA and SPEP as part of CKD work up. Monitor electrolytes.
Patient appears to have underlying CKD-3 with proteinuria (baseline 1.8) for which she follow with Dr. Cannon as an outpatient. CKD work up as above. Monitor electrolytes.

## 2018-09-10 NOTE — PROGRESS NOTE ADULT - SUBJECTIVE AND OBJECTIVE BOX
[facility]        - Patient seen and examined.  - Today, patient is without complaints.         *****MEDICATIONS: Current medication reviewed and documented.    MEDICATIONS  (STANDING):  ceFAZolin   IVPB 2000 milliGRAM(s) IV Intermittent every 24 hours  chlorhexidine 4% Liquid 1 Application(s) Topical <User Schedule>  dextrose 5%. 1000 milliLiter(s) (50 mL/Hr) IV Continuous <Continuous>  dextrose 50% Injectable 12.5 Gram(s) IV Push once  dextrose 50% Injectable 25 Gram(s) IV Push once  dextrose 50% Injectable 25 Gram(s) IV Push once  docusate sodium 100 milliGRAM(s) Oral three times a day  fentaNYL   Patch  12 MICROgram(s)/Hr 1 Patch Transdermal every 72 hours  influenza   Vaccine 0.5 milliLiter(s) IntraMuscular once  insulin glargine Injectable (LANTUS) 12 Unit(s) SubCutaneous at bedtime  insulin lispro (HumaLOG) corrective regimen sliding scale   SubCutaneous three times a day before meals  lactobacillus acidophilus 1 Tablet(s) Oral three times a day with meals  lidocaine   Patch 1 Patch Transdermal daily  pantoprazole    Tablet 40 milliGRAM(s) Oral before breakfast  propranolol LA 60 milliGRAM(s) Oral daily  senna 2 Tablet(s) Oral at bedtime  warfarin 1 milliGRAM(s) Oral once    MEDICATIONS  (PRN):  ALBUTerol    90 MICROgram(s) HFA Inhaler 2 Puff(s) Inhalation every 6 hours PRN Shortness of Breath and/or Wheezing  dextrose 40% Gel 15 Gram(s) Oral once PRN Blood Glucose LESS THAN 70 milliGRAM(s)/deciLiter  glucagon  Injectable 1 milliGRAM(s) IntraMuscular once PRN Glucose <70 milliGRAM(s)/deciLiter  oxyCODONE    IR 5 milliGRAM(s) Oral every 4 hours PRN Moderate and Severe pain  polyethylene glycol 3350 17 Gram(s) Oral two times a day PRN Constipation           ***** REVIEW OF SYSTEM:  GEN: no fever, no chills, no pain  RESP: no SOB, no cough, no sputum  CVS: no chest pain, no palpitations, no edema  GI: no abdominal pain, no nausea, no vomiting, no constipation, no diarrhea  : no dysurea, no frequency  NEURO: no headache, no diziness  PSYCH: no depression, not anxious  Derm : no itching, no rash         ***** VITAL SIGNS:  T(F): 97.6 (09-10-18 @ 06:33), Max: 98 (18 @ 21:23)  HR: 66 (09-10-18 @ 06:33) (66 - 70)  BP: 122/64 (09-10-18 @ 06:33) (103/62 - 122/64)  RR: 20 (09-10-18 @ 06:33) (20 - 20)  SpO2: 100% (09-10-18 @ 06:33) (100% - 100%)  Wt(kg): --  ,   I&O's Summary    09 Sep 2018 07:  -  10 Sep 2018 07:00  --------------------------------------------------------  IN: 0 mL / OUT: 850 mL / NET: -850 mL    10 Sep 2018 07:  -  10 Sep 2018 13:32  --------------------------------------------------------  IN: 0 mL / OUT: 400 mL / NET: -400 mL             *****PHYSICAL EXAM: Alert oriented x 2   Attention comprehension are fair. Able to name, repeat, read without any difficulty.   Able to follow1-2 step commands.      EOMI fundi not visualized,  VFF to confrontration  No facial asymmetry   Tongue is midline   Palate elevates symmetrically     limited mvt of the r leg antigravity due to pain.      Gait : not assessed.          *****LAB AND IMAGIN.2    9.01  )-----------( 183      ( 09 Sep 2018 05:20 )             29.4               09-10    134<L>  |  92<L>  |  101<H>  ----------------------------<  150<H>  3.6   |  26  |  2.62<H>    Ca    9.3      10 Sep 2018 06:14  Phos  3.9     09-10  Mg     1.6     09-10      PT/INR - ( 10 Sep 2018 06:14 )   PT: 30.9 SEC;   INR: 2.73                                [All pertinent recent Imaging/Reports reviewed]           *****A S S E S S M E N T   A N D   P L A N :      89 y.o woman with multiple comorbidities presented with several weeks h/o of bony pain and recent inability to ambulate due to pain in the foot. Ct scan revealed destructive bony lesions concerning for metastasis and liver lesions. 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 Noted to have staph aurues bacteremia likely from left hallux abscess.The primary source of malignancy is unclear,. 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   Pt's son was hesitant to start anything new due to her hx of renal dysfunction.   tolerating fentanyl/oxycodone and lidocaine patch.        Problem/Recommendations 2:osteomyelitis of the toe      ID on board, defer to id for further w/u and management          Problem/Recommendation 3: Hyponatremia of unclear etiology, ? related to excess adh from pain vs volume contraction.  defer to medicine for management    Thank you for allowing me to participate in the care of this patient. Please do not hesitate to call me if you have any  questions.        ________________  Marcy Durant MD  [facility]  [phone]     30 minutes spent on total encounter; more than 50 % of the visit was  spent counseling and or  coordinating care by the attending physician.   At the present time, [facility] does not  provide outpatient followup, best to call the your insurance to find a participating provider.  This was explained to you at the time of the visit. Alternatively, if your insurance allows it, you can follow up with a neurologist  Dr. Carmelo Burroughs([place]) [phone] or Dr. Phillip Gan ( [place]) [phone]

## 2018-09-10 NOTE — PROGRESS NOTE ADULT - PROBLEM SELECTOR PROBLEM 2
Rib fracture
Adenocarcinoma of unknown primary
Increased liver enzymes
Adenocarcinoma of unknown primary
CKD (chronic kidney disease), stage III
Increased liver enzymes
Rib fracture
Increased liver enzymes
CKD (chronic kidney disease), stage III
CKD (chronic kidney disease), stage III

## 2018-09-10 NOTE — PROGRESS NOTE ADULT - PROBLEM SELECTOR PLAN 1
secondary to fluid overload: CT scan chest reviewed: she is on oral lasix now: Cont diuresis!  9/8: seems to be getting better: on albumin with diuretics: The leg edema do seems to be better!  9/9: Slowly improving: Cont diuretics: PT NEEDS pt/ot TO: SHE SEEMS PRETTY DECONDITIONED!  9/10: pt clinically is do ing ok : Her SOB is better: She would need rehab to increase her endurance: Her Lasix is off today: she would be reinstated on oral lasix from tomorrow: Clinically , she still seems fluid overloaded: CT chest reviewed:

## 2018-09-10 NOTE — PROGRESS NOTE ADULT - PROBLEM SELECTOR PROBLEM 4
Edema, lower extremity
Osteomyelitis
Hypertension
Edema, lower extremity
Hypertension
Osteomyelitis
Edema, lower extremity

## 2019-02-26 NOTE — PROGRESS NOTE ADULT - ASSESSMENT
Attempted to call patients' parent to confirm tomorrow's appt with peds endo; to no avail.  Left voice message to return call to confirm.   89 yo woman with multiple comorbidities presented with several weeks hx of hip/leg pain with  inability to ambulate due to pain in the foot. CT scan revealed destructive bony pelvic lesions in right lisa-pelvis concerning for bone metastasis and liver lesions. Dx also with osteomyelitis of left great toe.        Hip MRI: Mildly expansile metastatic lesion within the anterior wall of the right acetabulum with extension to the right superior pubic ramus. Additional metastatic lesions are noted within the right ischium and midportion of the right inferior pubic ramus. Nonspecific small lesions within the left femoral head and left femoral neck which may be related to additional metastatic foci.        Bone scan with  increased radiopharmaceutical accumulation in the lower right iliac bone extending through the acetabulum into the ischium. Focally increased uptake in the left great toe is nonspecific, but given the history of left foot ulcer, osteomyelitis needs to be considered.        CT abdomen and pelvis no contrast. Prior 4/26/2014.  Limited by lack of oral and IV contrast. Small layering basilar effusions. Prominent interlobular septa at the   lung bases suggests interstitial edema. Substantial coronary artery calcification.   Scattered poorly defined low-attenuation foci throughout the hepatic parenchyma concerning for metastases.    Patient was on Coumdin for A fib and is on antibiotics  for presumed osteomyelitis  of the left foot    Consult called for recommendation regarding further workup for lytic lesion.     Bone, metastatic adenocarcinoma, IHC pending.   Cr is stable since 2016. Ca is borderline.  Serum Immunofixation negative for monoclonal protein. CEA normal 3.8. .   (08/20/18); Right pelvic bone, CT-guided biopsy:  Metastatic adenocarcinoma, moderately differentiated to poorly differentiated, unknown primary. IHC pending   further oncology management pending pathology results, whether pt is physically fit.     Leukocytosis with osteomyelitis of the left great toe  reactive, improved on antibiotics  s/p surgical metatarsal amputation, by podiatry 08/21/18      PLAN:  Awaiting final report on path. Await IHC.   Await family decision regarding  proceeding  with any antineoplastic Tx ( could be eligible for immunotherapy in the right settings).  However, PS remains poor.   If family decides proceed with palliative tx, may need outpt radiation.    Resumption of anticoagulation per cardiology if warranted, if no planned surgery per ortho.   Continue pain meds.   Await further ortho evaluation to comment on plan for stabilization and ambulation for d/c planning.   DVT prophylaxis.

## 2019-05-14 NOTE — PROGRESS NOTE ADULT - SUBJECTIVE AND OBJECTIVE BOX
Patient seen in follow up for CKD. Bone biopsy and left hallux resection is scheduled for Monday. With pain in right femur. No SOB. Antibiotic changed to Ancef by I.D.    Past Medical History  OAB (overactive bladder)  GERD (gastroesophageal reflux disease)  Angina effort  Heart failure  Upper respiratory infection  Diabetes  Renal stones  Myocardial infarction  Spinal stenosis  CVA (cerebral infarction)  Afib  Raynaud disease  Acid reflux  Hypertension  Hyperlipidemia  Asthma  Cataract  S/P hysterectomy      MEDICATIONS  (STANDING):  ceFAZolin   IVPB 2000 milliGRAM(s) IV Intermittent every 12 hours  cholecalciferol 1000 Unit(s) Oral daily  dextrose 5%. 1000 milliLiter(s) (50 mL/Hr) IV Continuous <Continuous>  dextrose 50% Injectable 12.5 Gram(s) IV Push once  dextrose 50% Injectable 25 Gram(s) IV Push once  dextrose 50% Injectable 25 Gram(s) IV Push once  enoxaparin Injectable 30 milliGRAM(s) SubCutaneous daily  furosemide    Tablet 20 milliGRAM(s) Oral daily  insulin glargine Injectable (LANTUS) 12 Unit(s) SubCutaneous at bedtime  insulin lispro (HumaLOG) corrective regimen sliding scale   SubCutaneous three times a day before meals  insulin lispro (HumaLOG) corrective regimen sliding scale   SubCutaneous at bedtime  insulin lispro Injectable (HumaLOG) 4 Unit(s) SubCutaneous three times a day before meals  lactobacillus acidophilus 1 Tablet(s) Oral two times a day with meals  oxyCODONE  ER Tablet 10 milliGRAM(s) Oral <User Schedule>  pantoprazole    Tablet 40 milliGRAM(s) Oral before breakfast  propranolol  milliGRAM(s) Oral daily  simvastatin 10 milliGRAM(s) Oral at bedtime    MEDICATIONS  (PRN):  dextrose 40% Gel 15 Gram(s) Oral once PRN Blood Glucose LESS THAN 70 milliGRAM(s)/deciLiter  glucagon  Injectable 1 milliGRAM(s) IntraMuscular once PRN Glucose <70 milliGRAM(s)/deciLiter  morphine  - Injectable 2 milliGRAM(s) IV Push every 6 hours PRN Severe Pain (7 - 10)    T(C): 36.7 (08-17-18 @ 14:01), Max: 37.6 (08-16-18 @ 14:27)  HR: 84 (08-17-18 @ 14:01) (77 - 95)  BP: 103/61 (08-17-18 @ 14:01) (99/48 - 118/75)  RR: 17 (08-17-18 @ 14:01) (17 - 20)  SpO2: 96% (08-17-18 @ 14:01) (90% - 96%)  Wt(kg): --  I&O's Detail    16 Aug 2018 07:01  -  17 Aug 2018 07:00  --------------------------------------------------------  IN:    Solution: 100 mL  Total IN: 100 mL    OUT:    Voided: 250 mL  Total OUT: 250 mL    Total NET: -150 mL      17 Aug 2018 07:01  -  17 Aug 2018 16:56  --------------------------------------------------------  IN:    Solution: 100 mL  Total IN: 100 mL    OUT:  Total OUT: 0 mL    Total NET: 100 mL      PHYSICAL EXAM:  General: NAD, alert, conversant  No JVD  Respiratory: b/l air entry, clear  Cardiovascular: S1 S2  Gastrointestinal: soft  Extremities:  no edema    CBC Full  -  ( 16 Aug 2018 08:00 )  WBC Count : 11.69 K/uL  Hemoglobin : 11.2 g/dL  Hematocrit : 34.7 %  Platelet Count - Automated : 212 K/uL  Mean Cell Volume : 91.6 fl  Mean Cell Hemoglobin : 29.6 pg  Mean Cell Hemoglobin Concentration : 32.3 gm/dL  Auto Neutrophil # : 9.56 K/uL  Auto Lymphocyte # : 1.04 K/uL  Auto Monocyte # : 0.97 K/uL  Auto Eosinophil # : 0.04 K/uL  Auto Basophil # : 0.03 K/uL  Auto Neutrophil % : 81.8 %  Auto Lymphocyte % : 8.9 %  Auto Monocyte % : 8.3 %  Auto Eosinophil % : 0.3 %  Auto Basophil % : 0.3 %    08-16    134<L>  |  94<L>  |  40<H>  ----------------------------<  134<H>  4.4   |  31  |  1.80<H>    Ca    8.9      16 Aug 2018 08:00    TPro  x   /  Alb  3.0<L>  /  TBili  x   /  DBili  x   /  AST  x   /  ALT  x   /  AlkPhos  x   08-15        Sodium, Serum: 134 (08-16 @ 08:00)  Sodium, Serum: 134 (08-15 @ 09:19)    Creatinine, Serum: 1.80 (08-16 @ 08:00)  Creatinine, Serum: 1.70 (08-15 @ 09:19)    Potassium, Serum: 4.4 (08-16 @ 08:00)  Potassium, Serum: 3.9 (08-15 @ 09:19)    Hemoglobin: 11.2 (08-16 @ 08:00)  Hemoglobin: 11.6 (08-15 @ 09:19) [General Appearance - Well Developed] : well developed [Normal Conjunctiva] : the conjunctiva exhibited no abnormalities [General Appearance - Well Nourished] : well nourished [General Appearance - In No Acute Distress] : no acute distress [Normal Jugular Venous V Waves Present] : normal jugular venous V waves present [No Oral Pallor] : no oral pallor [Respiration, Rhythm And Depth] : normal respiratory rhythm and effort [Auscultation Breath Sounds / Voice Sounds] : lungs were clear to auscultation bilaterally [Heart Sounds] : normal S1 and S2 [Murmurs] : no murmurs present [Arterial Pulses Normal] : the arterial pulses were normal [Edema] : no peripheral edema present [Regular] : the rhythm was regular [FreeTextEntry1] : keloid scar formation in his mid sternum. [Abdomen Soft] : soft [Abnormal Walk] : normal gait [Bowel Sounds] : normal bowel sounds [Cyanosis, Localized] : no localized cyanosis [Skin Turgor] : normal skin turgor [Impaired Insight] : insight and judgment were intact [Oriented To Time, Place, And Person] : oriented to person, place, and time [Affect] : the affect was normal

## 2019-06-18 NOTE — PROGRESS NOTE ADULT - ASSESSMENT
89 F htn, dm, chol, af pvd, adm with hip pain with ?metastatic disease    - No evidence of acute ischemia  - No evidence of volume overload.     - AF controlled. Cont propanolol  - BP on low side, nitro patch was stopped and patient was inquiring about restarting. Risks vs benefits explained.   - monitor and replete lytes, keep K>4, Mg>2  - Hold AC for IR guided biopsy today   - CEA results pending   - Bone scan and CT abdomen revealing of possible metastasis in right pelvis and hepatic parenchyma.  - Her LFTs remain elevated on repeat labs  - WBC count trending higher  - Cultures positive for MSSA, patient is on Ancef. Repeat cultures negative to date  - Echo done to r/o vegitations overall preserved left ventricular systolic function. Stage II diastolic dysfunction. No   definitive vegetations noted on this study. Will likely need JOSE GUADALUPE to definitively r/o endocarditis   - All other workup per primary team  - Will follow     Carolyne Quinones NP-C  Cardiology 89 F htn, dm, chol, af pvd, adm with hip pain with ?metastatic disease    - No evidence of acute ischemia  - No evidence of volume overload.     - AF controlled. Cont propanolol  - BP on low side, nitro patch was stopped and patient was inquiring about restarting. Risks vs benefits explained.   - monitor and replete lytes, keep K>4, Mg>2  - Hold AC for IR guided biopsy today. INR today 1.26  - Restart anticoagulation as soon as possible per surgery  - CEA results pending   - Bone scan and CT abdomen revealing of possible metastasis in right pelvis and hepatic parenchyma.  - Her LFTs remain elevated on repeat labs  - WBC count trending higher  - Cultures positive for MSSA, patient is on Ancef. Repeat cultures negative to date  - Echo done to r/o vegetations overall preserved left ventricular systolic function. Stage II diastolic dysfunction. No   definitive vegetations noted on this study. Will likely need JOSE GUADALUPE to definitively r/o endocarditis   - Pt has no history of MI, active CAD, ADHF or severe valvular disease and in the setting of low risk  procedure, patient is optimized from cardiovascular standpoint to proceed with planned procedure with routine hemodynamic monitoring.   - All other workup per primary team  - Will follow 89 F htn, dm, chol, af pvd, adm with hip pain with ?metastatic disease now s/p iliac crest biopsy for malignancy  Stable  No evidence of acute ischemia.  No evidence of volume overload.     Bone scan and CT abdomen revealing of possible metastasis in right pelvis and hepatic parenchyma.      - Restart IV hepain with no bolus for now  - Proceed with planned foot surgery using routine hemodynamic monitoring  - AF controlled. Cont propanolol  - BP on low side, nitro patch was stopped and patient was inquiring about restarting. Risks vs benefits explained.   - monitor and replete lytes, keep K>4, Mg>2  - CEA results pending   - Her LFTs remain elevated on repeat labs  - WBC count trending higher  - Cultures positive for MSSA, patient is on Ancef. Repeat cultures negative to date  - Echo done to r/o vegetations overall preserved left ventricular systolic function. Stage II diastolic dysfunction. No   definitive vegetations noted on this study.  - Pt has no history of MI, active CAD, ADHF or severe valvular disease and in the setting of low risk  procedure, patient is optimized from cardiovascular standpoint to proceed with planned procedure with routine hemodynamic monitoring.   - All other workup per primary team  - Will follow 89 F htn, dm, chol, af pvd, adm with hip pain with ?metastatic disease now s/p iliac crest biopsy for malignancy  Stable  No evidence of acute ischemia.  No evidence of volume overload.     Bone scan and CT abdomen revealing of possible metastasis in right pelvis and hepatic parenchyma.      - Restart IV hepain with no bolus for now  - Proceed with planned foot surgery using routine hemodynamic monitoring  - AF controlled. Cont propanolol  - BP on low side, nitro patch was stopped and patient was inquiring about restarting. Risks vs benefits explained.   - monitor and replete lytes, keep K>4, Mg>2  - CEA results pending   - Her LFTs remain elevated on repeat labs  - WBC count trending higher  - Cultures positive for MSSA, patient is on Ancef. Repeat cultures negative to date  - Echo done to r/o vegetations overall preserved left ventricular systolic function. Stage II diastolic dysfunction. No   definitive vegetations noted on this study.  - Pt has no history of MI, active CAD, ADHF or severe valvular disease and in the setting of low risk  procedure, patient is optimized from cardiovascular standpoint to proceed with planned procedure with routine hemodynamic monitoring.   - All other workup per primary team  - Will follow     Addendum: Debridement procedure was canceled today by OR/Anesthesia. Patient started on IV heparin without a bolus in anticipation of surgery tomorrow. Will consider and discuss long term anticoagulation after procedure child(marcus)

## 2019-06-20 NOTE — GOALS OF CARE CONVERSATION - PERSONAL ADVANCE DIRECTIVE - NS PRO AD PATIENT TYPE
Living Will/Do Not Resuscitate (DNR)/Medical Orders for Life-Sustaining Treatment (MOLST)/Health Care Proxy (HCP) no

## 2019-08-22 NOTE — ED ADULT NURSE NOTE - SKIN INTEGRITY
HPI  Mr. Ludmila Tariq is a 80y.o. year old male, he is seen today for follow up HTN, COPD. Last visit for gout and patient wasn't using nebulizer - advised at least daily, better if bid. Says has been pretty good but continues to have cough with sputum - not changed. Some sob in extreme heat. No f/c or sweats. Cough is worse in morning, not much in middle of day. No wheezing but has \"rattling\" in throat. Sputum is clear. Uses neb in evening around 5pm. Coughs for about 30 minutes first thing in morning. No chest pain or HA. No edema. Chief Complaint   Patient presents with    Blood Pressure Check     Room 2A// NON fasting     COPD        Prior to Admission medications    Medication Sig Start Date End Date Taking? Authorizing Provider   tiotropium (SPIRIVA) 18 mcg inhalation capsule Take 1 Cap by inhalation daily. 8/22/19  Yes Indy Jackson MD   albuterol-ipratropium (DUO-NEB) 2.5 mg-0.5 mg/3 ml nebu 3 mL by Nebulization route every six (6) hours as needed (sob). 3/25/19  Yes Jl Gary, DO   fluticasone furoate-vilanterol (BREO ELLIPTA) 100-25 mcg/dose inhaler Take 1 Puff by inhalation daily. 3/25/19  Yes Jl Gary, DO   lisinopril (PRINIVIL, ZESTRIL) 2.5 mg tablet TAKE 1 TABLET BY MOUTH ONCE DAILY 3/19/19  Yes Rachel Connolly MD   Oxygen 3L continouus   Yes Provider, Historical   albuterol (PROVENTIL HFA, VENTOLIN HFA, PROAIR HFA) 90 mcg/actuation inhaler Use 2 puffs very 4 hours prn 2/18/19  Yes Manuel Muhammad MD   fluticasone [place]) 50 mcg/actuation nasal spray 2 Sprays by Both Nostrils route daily.  2/18/19  Yes Manuel Muhammad MD   atorvastatin (LIPITOR) 40 mg tablet TAKE 1 TABLET EVERY DAY 9/20/18  Yes Indy Jackson MD   famotidine (PEPCID) 20 mg tablet TAKE 1 TABLET EVERY DAY AS NEEDED  FOR  HEARTBURN 4/2/18  Yes Indy Jackson MD   metoprolol tartrate (LOPRESSOR) 50 mg tablet TAKE ONE TABLET BY MOUTH TWICE DAILY 4/2/18  Yes Rachel Connolly MD XTANDI 40 mg capsule Take 160 mg by mouth daily. 1/11/18  Yes Provider, Historical   aspirin 81 mg chewable tablet Take 1 Tab by mouth daily. RISK OF HEART ATTACK IF ANY MISSED DOSES 10/21/15  Yes Anita Pereira MD   colchicine (COLCRYS) 0.6 mg tablet 0.6mg po tid day one then 0.6mg bid until gout resolved 6/11/19   Jesenia Garza MD         No Known Allergies      REVIEW OF SYSTEMS:  Per HPI    PHYSICAL EXAM:  Visit Vitals  /70   Pulse 62   Temp 97.8 °F (36.6 °C) (Oral)   Resp 14   Ht 6' (1.829 m)   Wt 193 lb (87.5 kg)   SpO2 94%   BMI 26.18 kg/m²     Constitutional: Appears well-developed and well-nourished. No distress. NC O2 in place. HENT:   Head: Normocephalic and atraumatic. Eyes: No scleral icterus. Cardiovascular: Normal S1/S2, regular rhythm. No murmurs, rubs, or gallops. Pulmonary/Chest: Effort normal. No respiratory distress. +faint crackles left base without rhonchi or rales. Ext: No edema. Neurological: Alert. Psychiatric: Normal mood and affect. Behavior is normal.     Lab Results   Component Value Date/Time    Sodium 142 03/25/2019 04:05 AM    Potassium 3.7 03/25/2019 04:05 AM    Chloride 110 (H) 03/25/2019 04:05 AM    CO2 22 03/25/2019 04:05 AM    Anion gap 10 03/25/2019 04:05 AM    Glucose 90 03/25/2019 04:05 AM    BUN 23 (H) 03/25/2019 04:05 AM    Creatinine 1.14 03/25/2019 04:05 AM    BUN/Creatinine ratio 20 03/25/2019 04:05 AM    GFR est AA >60 03/25/2019 04:05 AM    GFR est non-AA >60 03/25/2019 04:05 AM    Calcium 8.9 03/25/2019 04:05 AM    Bilirubin, total 0.5 03/23/2019 06:56 AM    AST (SGOT) 13 (L) 03/23/2019 06:56 AM    Alk.  phosphatase 62 03/23/2019 06:56 AM    Protein, total 7.6 03/23/2019 06:56 AM    Albumin 3.6 03/23/2019 06:56 AM    Globulin 4.0 03/23/2019 06:56 AM    A-G Ratio 0.9 (L) 03/23/2019 06:56 AM    ALT (SGPT) 12 03/23/2019 06:56 AM     Lab Results   Component Value Date/Time    Hemoglobin A1c 6.0 (H) 12/12/2011 03:22 PM    Hemoglobin A1c 6.0 (H) 07/05/2011 01:44 PM      Lab Results   Component Value Date/Time    Cholesterol, total 73 (L) 04/25/2017 02:38 PM    HDL Cholesterol 31 (L) 04/25/2017 02:38 PM    LDL, calculated 19 04/25/2017 02:38 PM    VLDL, calculated 23 04/25/2017 02:38 PM    Triglyceride 116 04/25/2017 02:38 PM    CHOL/HDL Ratio 2.9 10/20/2015 04:06 AM          ASSESSMENT/PLAN  Diagnoses and all orders for this visit:    1. Mucopurulent chronic bronchitis (HCC)  -     tiotropium (SPIRIVA) 18 mcg inhalation capsule; Take 1 Cap by inhalation daily. Chronic cough - add spiriva  2. Essential hypertension  Controlled on current regimen, continue   3. PAD (peripheral artery disease) (HCC)  No symptoms        There are no preventive care reminders to display for this patient. Follow-up and Dispositions    · Return in about 3 months (around 11/22/2019) for copd. Reviewed plan of care. Patient has provided input and agrees with goals. The nurse provided the patient and/or family with advanced directive information if needed and encouraged the patient to provide a copy to the office when available. "diabetic ulcer on her left bunion"

## 2019-12-20 NOTE — PATIENT PROFILE ADULT. - AS SC BRADEN FRICTION
CHIEF COMPLAINT  The patient was sent for rheumatology consultation by Dr. Orestes Mendoza for evaluation of joint pain. HISTORY OF PRESENT ILLNESS  This is a 6 y.o.   female. Today, the patient complains of none pain in the joints. Location: none  Severity: 0  on a scale of 0-10  Timing: All day   Duration: 6 weeks    Modifying factors: NA  Context/Associated signs and symptoms: The patient was sent for evaluation of systemic juvenile arthritis. Six weeks ago the patient developed strep throat, GI issues, pneumonia, and an ear infection; treated with antibiotics twice. She has had a persistent daily fevers (averaging 102) since early November. Her mother additionally mentions two episodes of hive-like rashes and pruritis, the rashes were associated with fever episodes. She has had intermittent ankle pain since the summer. She has had diminished appetite, weight loss, skin pallor, weakness upon waking, and coughing. She denies daily morning stiffness, persistent joint swelling, or persistent joint pain. We reviewed her labs from 12/6 and 12/16 which were overall normal except for an elevated ESR (48).      RHEUMATOLOGY REVIEW OF SYSTEMS   Positives as per HPI  Negatives as follows:  CONSTITUTlONAL:    HEAD/EYES:   Denies eye redness, blurry vision or sudden loss of vision, dry eyes, HA  ENT:    Denies oral/nasal ulcers, recurrent sinus infections, dry mouth  RESPIRATORY:  No pleuritic pain, history of pleural effusions, hemoptysis, exertional dyspnea  CARDIOVASCULAR:  Denies chest pain, history of pericardial effusions  GASTRO:   Denies heartburn, esophageal dysmotility, abdominal pain, nausea, vomiting, diarrhea, blood in the stool  HEMATOLOGIC:  No easy bruising, purpura, swollen lymph nodes  SKIN:    Denies alopecia, ulcers, nodules, sun sensitivity, unexplained persistent rash   VASCULAR:   Denies edema, cyanosis, raynaud phenomenon  NEUROLOGIC:  Denies specific muscle weakness, paresthesias   PSYCHIATRIC: No sleep disturbance / snoring, depression, anxiety  MSK:    No morning stiffness >1 hour, SI joint pain, persistent joint swelling, persistent joint pain    MEDICAL  AND SOCIAL HISTORY  This was reviewed with the patient and reviewed in the medical records. History reviewed. No pertinent past medical history. History reviewed. No pertinent surgical history. Currently in grade   Sleep - Good, no issues  Diet - Good  Exercise/Sports - None     FAMILY HISTORY  No autoimmune disease in 1st degree relatives     MEDICATIONS  All the current medications were reviewed in detail. PHYSICAL EXAM  Blood pressure 97/67, pulse 86, temperature 98.1 °F (36.7 °C), temperature source Oral, resp. rate 16, height (!) 4' 5\" (1.346 m), weight 55 lb 3.2 oz (25 kg), SpO2 94 %. GENERAL APPEARANCE: Well-nourished child in no acute distress. EYES: No scleral erythema, conjunctival injection. ENT: No oral ulcer, parotid enlargement. Fluid in right ear. NECK: No adenopathy, thyroid enlargement. CARDIOVASCULAR: Heart rhythm is regular. No murmur, rub, gallop. CHEST: Normal vesicular breath sounds. No wheezes, rales, pleural friction rubs. ABDOMINAL: The abdomen is soft and nontender. Liver and spleen are nonpalpable. Bowel sounds are normal.  EXTREMITIES: There is no evidence of clubbing, cyanosis, edema. SKIN: No rash, palpable purpura, digital ulcer, abnormal thickening, isomorphic response  NEUROLOGICAL: Normal gait and station, full strength in upper and lower extremities, normal sensation to light touch. MUSCULOSKELETAL:   Upper extremities - full range of motion, no tenderness, no swelling, no synovial thickening and no deformity of joints. Lower extremities - full range of motion, no tenderness, no swelling, no synovial thickening and no deformity of joints.       LABS, RADIOLOGY AND PROCEDURES  Previous labs reviewed -Yes  Previous radiology reviewed -Yes  Previous procedures reviewed -Yes  Previous medical records reviewed/summarized -Yes    ASSESSMENT  1. 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 Within the differential is also malignacy, bacterial infection, viral infection. We will plan to check markers for leukemia/lymphoma, however based on her exam today and her normal blood counts I do not suspect this. Bacterial infection and viral infection are unlikely, her chest x-ray has cleared and has normal white counts. For children without inflammation on exam this is characteristic of a monocyclic course in response to infection, in which case the treatment is a short course of prescription strength non-steroidals. We discussed that SJIA has a spectrum of severity, in other cases steroids and further escalation of treatment is needed. I do not recommend a steroid at this time as it can mask symptoms. A clear indication of SJIA is an elevated ferritin although markers of inflammation may also be abnormal, I will check these today. For now I recommend she begin Naprosyn 10 mL BID and call in two weeks. She should continue taking this medication even if the fevers stop. Return for follow up in 1 month at [facility]. PLAN  1. Start Naproxen 250 mg daily   2. Check  markers of inflammation (ESR, CRP), ferratin, LDH, uric acid    3. Follow up at [facility] in 1 month    Natacha Lake MD  Adult and Pediatric Rheumatology     [address] [place] [address], Phone [phone], Fax [phone]   E-mail: [email or web address]    Visiting  of Pediatrics    Department of Pediatrics, [facility] of [address], [place], [address] [place] [address], Phone [phone], Fax [phone]  E-mail: [email or web address]    There are no Patient Instructions on file for this visit. cc:  No primary care provider on file. Written by Michel younger, as dictated by Sarah Ochoa.  Yomaira Lazar M.D. (2) potential problem

## 2020-01-01 NOTE — PROGRESS NOTE ADULT - PROBLEM SELECTOR PLAN 5
Overall improved with diuretic therapy. Continue with 1L FR and glucerna to increase oncotic pressure and osmolar intake. Monitor serum sodium.
Secondary to decreased EABV likely causing increase in ADH state. IV albumin as above. Continue with 1L FR and glucerna to increase oncotic pressure and osmolar intake. Monitor serum sodium.
for radiation per oncology: pt has refused for aggressive workup to determine the primary as well as she has refused for chemo  9/8: cont conservative management:  9/9: supportive treatment!  9/10: conservative management:
Resolving with diuresis. Continue with 1L FR and glucerna to increase oncotic pressure and osmolar intake. Monitor serum sodium.
Secondary to decreased EABV likely causing increase in ADH state. IV albumin as above. Continue with 1L FR and glucerna to increase oncotic pressure and osmolar intake. Monitor serum sodium.
Secondary to decreased EABV likely causing increase in ADH state. IV lasix and IV albumin as above. Continue with 1L FR and glucerna to increase oncotic pressure and osmolar intake. Monitor serum sodium.
Secondary to decreased EABV likely causing increase in ADH state. Will consider albumin assisted diuresis as above. Continue with 1L FR and glucerna to increase osmolar intake. Monitor serum sodium.
for radiation per oncology: pt has refused for aggressive workup to determine the primary as well as she has refused for chemo
for radiation per oncology: pt has refused for aggressive workup to determine the primary as well as she has refused for chemo  9/8: cont conservative management:
for radiation per oncology: pt has refused for aggressive workup to determine the primary as well as she has refused for chemo  9/8: cont conservative management:  9/9: supportive treatment!
[id number]

## 2020-06-26 NOTE — BRIEF OPERATIVE NOTE - PROCEDURE
<<-----Click on this checkbox to enter Procedure Bone biopsy  08/20/2018  rt pubic bone  Active  WFORMAN Detail Level: Detailed

## 2020-06-28 NOTE — PHYSICAL THERAPY INITIAL EVALUATION ADULT - OCCUPATION
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     male 68 kg 68.4 kg  Patient has no known allergies. Date  WBC BUN/CR UOP Drug/Dose Time   Given Level(s)   (Time) Comments     (#1) 8.5 143/10.8 -- Vancomycin 1,000 mg one time during last hour of dialysis   (#2) 7.3 65/6.7 0.68 mL/kg/hr Vancomycin 750 mg IV once 1634 Random AM level = 14.7 mcg/mL       (#3) 9.3 43/5.1 1.06 mL/kg/hr Vancomycin 750 mg IV once 1704 Random AM level = 20.9 mcg/mL      (#4) 9.2 37/4.6 1.24 mL/kg/hr Vancomycin 750 mg IV once <1530> Random AM level = 15.2 mcg/mL      (#5)            (#6)            (#7)            Estimated Creatinine Clearance: 19 mL/min (A) (based on SCr of 4.6 mg/dL (H)). UOP over the past 24 hours:       Intake/Output Summary (Last 24 hours) at 2020 1358  Last data filed at 2020 1320  Gross per 24 hour   Intake 2335 ml   Output 2050 ml   Net 285 ml       Temp max: Temp (24hrs), Av.2 °F (36.8 °C), Min:98 °F (36.7 °C), Max:98.3 °F (36.8 °C)      Antibiotic Regimen:  Antibiotic Dose Date Initiated   Zosyn   3.375 g IV Q12H      Cultures:  available culture and sensitivity results were reviewed in EPIC  Cultures sent and are pending. Culture Date Result    Blood cx #1    NGTD   Blood cx #2    NGTD     Assessment:  · Consulted by Dr. Aleisha Cosby to dose/monitor vancomycin  · Goal trough level:  15-20 mcg/mL  · Pt is a 56 y/o M who presented with concern of a possible skin infection to the buttock. Reports gradually worsening pain and swelling at site and some chills. Hx drug abuse but reports no recent use. Denies injecting into the buttock. Ct concerning for intramuscular abscess. No history of renal disease; presenting in ARF secondary to rhabdo  · Serum creatinine today: 4.6; CrCl ~ 19 mL/min; baseline Scr ~ 0.9  · 6/25: nephrology consulted; patient receiving 3 hours HD  · 6/26: Pre-dialysis level = 14.7 mcg/mL; receiving 3 hours HD. Patient is producing urine as well  · 6/27: Pre-dialysis level 20.9 mcg/mL; receiving another 3 hours dialysis today. · 6/28: No scheduled HD. Random am level = 15.2 mcg/mL.  With continued UOP and improving renal function, patient should be able to tolerate same dose as prior days despite not receiving any HD today    Plan:  · Vancomycin 750 mg one time  · Repeat random level tomorrow with morning labs  · Follow renal function; per nephrology, still to continue HD  · Pharmacist will follow HD schedule and monitor/adjust dosing as necessary      Thank you for the consult,    Jennifer Mas PharmD 6/28/2020 1:58 PM   [phone] retired

## 2020-07-13 NOTE — ED PROVIDER NOTE - MUSCULOSKELETAL, MLM
Letter sent to pt with results.   Spine appears normal, range of motion is not limited, no muscle or joint tenderness

## 2020-08-03 NOTE — H&P ADULT - GUM GEN PE MLT EXAM PC
Fiducial Markers Instructions    During the first few days you may have some bruising on the perineum (the place between your anus and your scrotum) or on the scrotum itself. This is caused by the needles (usually 2-3) which are used to place the Fiducial Markers. This will resolve on its own and usually does not cause any discomfort. For about a week after treatment, you may have some pain or swelling in the area between your scrotum and rectum, and your urine may be reddish-brown. Rarely a larger hematoma may form on the perineum and this will cause some discomfort with sitting. This should be brought to the doctors attention, but it will resolve on its own. You can alternate between an ice pack and heat pack for 10 minutes interval to assist with the reduction of inflammation and pain to perineum. Side Effects    Immediately post procedure: blood in the urine, bruising/tenderness/discoloration at the implant site, and/or swelling at the implant site. These symptoms should subside after a few days. Diet:      Regular, unless you are on a special diet for other reasons. Medication:     Patient may continue the following medications as directed by Physician:      Activity:     Avoid heavy lifting or strenuous physical activity for the first two days after the procedure. At that time you may return to your normal activity level if the urine is clear and you feel fine. If the urine is still bloody you should rest and drink plenty of fluids until it is clear, and then you may resume normal activity. Depending on the demands of your job, you may return to work any time during the week after the procedure, noting the precaution about prolonged sitting. You may return to a regular diet as tolerated following the procedure. You will most likely experience a reddish color in your ejaculate for a few weeks following the procedure.  This is normal and will improve as time  passes, if it persists, see your doctor. Follow up     Your follow up imaging will be at [facility]  Normal genitalia; no lesions; no discharge

## 2020-09-10 NOTE — PATIENT PROFILE ADULT. - EXTENSIONS OF SELF_ADULT
How Severe Is Your Skin Lesion?: moderate Has Your Skin Lesion Been Treated?: not been treated Is This A New Presentation, Or A Follow-Up?: Skin Lesion Eyeglasses/Hearing Aid/Dentures

## 2020-09-18 NOTE — PATIENT PROFILE ADULT. - PMH
I called the pt to schedule his surgery with Dr. Dejesus. He was not available, I left him a message to call me.    Acid reflux    Afib    Angina effort    Asthma    CVA (cerebral infarction)    Diabetes    GERD (gastroesophageal reflux disease)    Heart failure    Hyperlipidemia    Hypertension    Myocardial infarction  1981  OAB (overactive bladder)    Raynaud disease    Renal stones    Spinal stenosis    Upper respiratory infection

## 2020-10-28 NOTE — H&P ADULT. - RESPIRATORY RATE (BREATHS/MIN)
20 Consent 1/Introductory Paragraph: The rationale for Mohs was explained to the patient and consent was obtained. The risks, benefits and alternatives to therapy were discussed in detail. Specifically, the risks of infection, scarring, bleeding, prolonged wound healing, incomplete removal, allergy to anesthesia, nerve injury and recurrence were addressed. Prior to the procedure, the treatment site was clearly identified and confirmed by the patient. All components of Universal Protocol/PAUSE Rule completed.

## 2020-11-09 NOTE — PATIENT PROFILE ADULT. - PRO TOBACCO TYPE
Has elevated TPO in the past    - last TSH slightly low actually   - clinically, biochemically euthyroid   - monitor TSH yearly     cigarettes/quit smoke 1981

## 2021-03-05 NOTE — H&P ADULT. - BP NONINVASIVE SYSTOLIC (MM HG)
Talked to Frederico Osgood son of patient appointment made on 03/16/2021, per son of patient would like to know if Dr Mina Garcia can refill patient medication. 131

## 2021-11-02 NOTE — ED PROVIDER NOTE - CONSTITUTIONAL DISTRESS
11/02/21 0800   Vital Signs   Temp 97 °F (36.1 °C)   Temp Source Oral   Pulse 68   Heart Rate Source Monitor   Resp 16   /74   BP Location Right upper arm   Patient Position Supine   Level of Consciousness Alert (0)   MEWS Score 1   Patient Currently in Pain No   Pain Assessment   Pain Assessment 0-10   Pain Level 0   Oxygen Therapy   SpO2 97 %   O2 Device None (Room air)     Shift assessment complete, see flowsheet. Pt A&O x4, resting in bed. Charge RN notified of ordered stress test, will call radiology to make aware this a.m. No other needs expressed by pt at this time. Call light left within reach, bed in lowest position.
no apparent

## 2021-11-17 NOTE — PROGRESS NOTE ADULT - SUBJECTIVE AND OBJECTIVE BOX
ID Progress note     Name: DWAYNE DONAHUE  Age: 89y  Gender: Female  MRN: [id number]    Interval History-- No new events, Hematology follow up noted . Await family decision about future treatment and plan   Notes reviewed    Past Medical History--  OAB (overactive bladder)  GERD (gastroesophageal reflux disease)  Angina effort  Heart failure  Upper respiratory infection  Diabetes  Renal stones  Myocardial infarction  Spinal stenosis  CVA (cerebral infarction)  Afib  Raynaud disease  Acid reflux  Hypertension  Hyperlipidemia  Asthma  Cataract  S/P hysterectomy      For details regarding the patient's social history, family history, and other miscellaneous elements, please refer the initial infectious diseases consultation and/or the admitting history and physical examination for this admission.    Allergies--  Allergies    Levaquin (Other)  ramipril (Other)  tetracycline (Hives; Rash)    Intolerances        Medications--  Antibiotics:  ceFAZolin   IVPB 2000 milliGRAM(s) IV Intermittent every 24 hours    Immunologic:    Other:  ALBUTerol    90 MICROgram(s) HFA Inhaler PRN  bisacodyl  cholecalciferol  dextrose 40% Gel PRN  dextrose 5%.  dextrose 50% Injectable  dextrose 50% Injectable  dextrose 50% Injectable  docusate sodium  fentaNYL   Patch  12 MICROgram(s)/Hr  glucagon  Injectable PRN  glycerin Suppository - Adult  guaiFENesin    Syrup PRN  insulin glargine Injectable (LANTUS)  insulin lispro (HumaLOG) corrective regimen sliding scale  insulin lispro (HumaLOG) corrective regimen sliding scale  insulin lispro Injectable (HumaLOG)  lactobacillus acidophilus  lidocaine   Patch  melatonin  morphine  - Injectable PRN  multivitamin/minerals  oxyCODONE  ER Tablet  pantoprazole    Tablet  polyethylene glycol 3350  propranolol LA  psyllium Powder  senna  simvastatin  sodium chloride 0.9%.  sodium chloride 0.9%.      Review of Systems--  Review of systems unable to be obtained secondary to clinical condition.    Physical Examination--    Vital Signs: T(F): 97.2 (08-28-18 @ 04:29), Max: 98.4 (08-27-18 @ 13:31)  HR: 57 (08-28-18 @ 04:29)  BP: 124/57 (08-28-18 @ 04:29)  RR: 16 (08-28-18 @ 04:29)  SpO2: 95% (08-28-18 @ 04:29)  Wt(kg): --  General: Nontoxic-appearing Female in no acute distress.  HEENT: AT/NC. PERRL. EOMI. Anicteric. Conjunctiva pink and moist. Oropharynx clear. Dentition fair.  Neck: Not rigid. No sense of mass.  Nodes: None palpable.  Lungs: Clear bilaterally without rales, wheezing or rhonchi  Heart: Regular rate and rhythm. No Murmur. No rub. No gallop. No palpable thrill.  Abdomen: Bowel sounds present and normoactive. Soft. Nondistended. Nontender. No sense of mass. No organomegaly.  Back: No spinal tenderness. No costovertebral angle tenderness.   Extremities: No cyanosis or clubbing. No edema. left foot- dressin gin place, wound as per podiatry   Skin: Warm. Dry. Good turgor. No rash. No vasculitic stigmata.  Psychiatric: Appropriate affect and mood for situation.         Laboratory Studies--  CBC                        10.1   9.67  )-----------( 291      ( 27 Aug 2018 08:05 )             32.1       Chemistries  08-27    134<L>  |  96  |  59<H>  ----------------------------<  74  4.4   |  30  |  2.30<H>    Ca    8.9      27 Aug 2018 08:05  Mg     2.3     08-27        Culture Data    Culture - Tissue with Gram Stain (collected 21 Aug 2018 21:28)  Source: .Tissue left 1st metatarsal head  Gram Stain (22 Aug 2018 02:40):    No polymorphonuclear cells seen    No organisms seen  Final Report (26 Aug 2018 17:10):    Rare Staphylococcus aureus  Organism: Staphylococcus aureus (26 Aug 2018 17:10)  Organism: Staphylococcus aureus (26 Aug 2018 17:10)    Assessment--    89 y.o woman with multiple comorbidities presented with several weeks h/o of bony pain and recent inability to ambulate du to pain in the foot. Ct scan revealed destructive bony lesions concerning for metastasis and liver lesions. 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 Noted to have staph aurues bacteremia likely from left hallux abscess    The primary source of malignancy is unclear, she is to have biopsy of the hip lesion on Friday provided INR is below 1.5 . Anticoagulation is stopped . 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, primary unknown , await final report    she is s/p left hallux amputation , pathology consistent with acute OM , intra op cs also MSSA    Plan :   - will decrease Ancef 2 grams q daily from am as Cr clearance below 18, needs total 6 weeks to end 9/30/18   - no need for JOSE GUADALUPE as repeat blood cs negative and her source is the foot infection   - overall prognosis is poor   - goals of care conversation with her Son, does not want aggressive care   - dc planning, most likely needs GREGORIO  - orthopedic follow up noted   - dc plan as per family decision either transfer to [facility] for radiation therapy or go to [facility] , complete course of antibiotics and then decide further treatment     Continue with present regime .  Appropriate use of antibiotics and adverse effects reviewed.      I have discussed the above plan of care with patient and family in detail. They expressed understanding of the treatment plan . Risks, benefits and alternatives discussed in detail. I have asked if they have any questions or concerns and appropriately addressed them to the best of my ability .      > 35 minutes spent in direct patient care reviewing  the notes, lab data/ imaging , discussion with multidisciplinary team. All questions were addressed and answered to the best of my capacity .    Thank you for allowing me to participate in the care of your patient .        William Armendariz MD  [phone] None

## 2021-12-15 NOTE — PATIENT PROFILE ADULT. - --DESCRIBE SURGICAL SITE
Duration Of Freeze Thaw-Cycle (Seconds): 0 Detail Level: Simple Render Note In Bullet Format When Appropriate: No Post-Care Instructions: I reviewed with the patient in detail post-care instructions. Patient is to wear sunprotection, and avoid picking at any of the treated lesions. Pt may apply Vaseline to crusted or scabbing areas. Consent: The patient's consent was obtained including but not limited to risks of crusting, scabbing, blistering, scarring, darker or lighter pigmentary change, recurrence, incomplete removal and infection. Show Applicator Variable?: Yes Number Of Freeze-Thaw Cycles: 3 freeze-thaw cycles L foot, sutures intact

## 2022-01-25 NOTE — PROVIDER CONTACT NOTE (OTHER) - NAME OF MD/NP/PA/DO NOTIFIED:
Health Maintenance Due   Topic Date Due   • COVID-19 Vaccine (1) Never done       Patient is due for topics as listed above but is not proceeding with Immunization(s) COVID-19 at this time.    DR Go

## 2022-01-31 NOTE — ED PROVIDER NOTE - PROGRESS NOTE
%ETN=120; current body wt used for energy calculations as pt falls within % IBW; adjusted for age and current conditions
Stable.

## 2022-03-22 NOTE — PROGRESS NOTE ADULT - ASSESSMENT
2022    Aldo Doll  [address]  [place] [address]             [facility]  [address], [address]  [place] [address]  Phone: [phone] Dr. Ricks:    Mr. Aldo Doll (: 1939) was seen by me on 2022 for surgical clearance for left hip arhtoplasty. Mr. Luna has history of atrial fibrillation and congestive heart failure treated with oral anticoagulant and cardiac resynchronization therapy. Due to his hip pain, he has limited mobility. His blood pressure and heart rate are well controlled. From a medical standpoint, he would benefit from non invasive cardiovascular testing for ischemic heart disease given his inability to perform significant exertional exercise. He has arranged this through his treating cardiologist, Dr. Inder Collins. I have discussed with Mr. Doll the importance of early mobilization after surgery. He is medically cleared to hold his anticoagulation three days prior to his scheduled surgery. Resumption should be based on when bleeding risk in no longer considered high. I have also attached the requested pre operative labs and tests.     Please do not hesitate to contact me should you have any questions.      Very Respectfully          Jacskon eDnton      89yo female s/p left 1st metatarsal head resection (DOS 8/21/18)

## 2022-08-31 NOTE — PHYSICAL THERAPY INITIAL EVALUATION ADULT - WEIGHT-BEARING RESTRICTIONS: SIT/STAND, REHAB EVAL
LLE in post op shoe; RLE FWB/weight-bearing as tolerated Cimzia Pregnancy And Lactation Text: This medication crosses the placenta but can be considered safe in certain situations. Cimzia may be excreted in breast milk.

## 2022-09-06 NOTE — PHYSICAL THERAPY INITIAL EVALUATION ADULT - SITTING BALANCE: STATIC
good minus Transposition Flap Text: The defect edges were debeveled with a #15 scalpel blade.  Given the location of the defect and the proximity to free margins a transposition flap was deemed most appropriate.  Using a sterile surgical marker, an appropriate transposition flap was drawn incorporating the defect.    The area thus outlined was incised deep to adipose tissue with a #15 scalpel blade.  The skin margins were undermined to an appropriate distance in all directions utilizing iris scissors.

## 2023-01-10 NOTE — PROGRESS NOTE ADULT - SUBJECTIVE AND OBJECTIVE BOX
ID progress note     Name: DWAYNE DONAHUE  Age: 89y  Gender: Female  MRN: [id number]    Interval History-- Events noted, doing well had bone biopsy by IR today . Await podiatry to schedule left hallux surgery . Daughter at bedside .  Notes reviewed    Past Medical History--  OAB (overactive bladder)  GERD (gastroesophageal reflux disease)  Angina effort  Heart failure  Upper respiratory infection  Diabetes  Renal stones  Myocardial infarction  Spinal stenosis  CVA (cerebral infarction)  Afib  Raynaud disease  Acid reflux  Hypertension  Hyperlipidemia  Asthma  Cataract  S/P hysterectomy      For details regarding the patient's social history, family history, and other miscellaneous elements, please refer the initial infectious diseases consultation and/or the admitting history and physical examination for this admission.    Allergies--  Allergies    Levaquin (Other)  ramipril (Other)  tetracycline (Hives; Rash)    Intolerances        Medications--  Antibiotics:  ceFAZolin   IVPB 2000 milliGRAM(s) IV Intermittent every 12 hours    Immunologic:    Other:  cholecalciferol  dextrose 40% Gel PRN  dextrose 5%.  dextrose 50% Injectable  dextrose 50% Injectable  dextrose 50% Injectable  docusate sodium  furosemide    Tablet  glucagon  Injectable PRN  glycerin Suppository - Adult  guaiFENesin    Syrup PRN  heparin  Infusion.  heparin  Injectable PRN  heparin  Injectable PRN  insulin glargine Injectable (LANTUS)  insulin lispro (HumaLOG) corrective regimen sliding scale  insulin lispro (HumaLOG) corrective regimen sliding scale  insulin lispro Injectable (HumaLOG)  lactobacillus acidophilus  morphine  - Injectable PRN  oxyCODONE  ER Tablet  pantoprazole    Tablet  polyethylene glycol 3350  propranolol LA  psyllium Powder  senna  simvastatin      Review of Systems--  A 10-point review of systems was obtained.     Pertinent positives and negatives--  Constitutional: No fevers. No Chills. No Rigors.   Cardiovascular: No chest pain. No palpitations.  Respiratory: No shortness of breath. No cough.  Gastrointestinal: No nausea or vomiting. No diarrhea or constipation.   Psychiatric: Pleasant. Appropriate affect.    Review of systems otherwise negative except as previously noted.    Physical Examination--  Vital Signs: T(F): 98.9 (08-20-18 @ 12:23), Max: 99.1 (08-20-18 @ 10:35)  HR: 57 (08-20-18 @ 12:23)  BP: 117/56 (08-20-18 @ 12:23)  RR: 16 (08-20-18 @ 12:23)  SpO2: 96% (08-20-18 @ 12:23)  Wt(kg): --  General: Nontoxic-appearing Female in no acute distress.  HEENT: AT/NC. PERRL. EOMI. Anicteric. Conjunctiva pink and moist. Oropharynx clear. Dentition fair.  Neck: Not rigid. No sense of mass.  Nodes: None palpable.  Lungs: Clear bilaterally without rales, wheezing or rhonchi  Heart: Regular rate and rhythm. No Murmur. No rub. No gallop. No palpable thrill.  Abdomen: Bowel sounds present and normoactive. Soft. Nondistended. Nontender. No sense of mass. No organomegaly.  Back: No spinal tenderness. No costovertebral angle tenderness.   Extremities: No cyanosis or clubbing. left foot dressing in place   Skin: Warm. Dry. Good turgor. No rash. No vasculitic stigmata.  Psychiatric: Appropriate affect and mood for situation.         Laboratory Studies--  CBC                        11.2   12.16 )-----------( 240      ( 20 Aug 2018 07:13 )             35.3       Chemistries  08-20    134<L>  |  95<L>  |  47<H>  ----------------------------<  125<H>  4.6   |  31  |  1.70<H>    Ca    9.0      20 Aug 2018 07:13  Mg     2.2     08-19    TPro  6.3  /  Alb  1.8<L>  /  TBili  0.4  /  DBili  .30<H>  /  AST  134<H>  /  ALT  24  /  AlkPhos  415<H>  08-19      CAPILLARY BLOOD GLUCOSE      POCT Blood Glucose.: 130 mg/dL (20 Aug 2018 12:21)  POCT Blood Glucose.: 123 mg/dL (20 Aug 2018 05:55)  POCT Blood Glucose.: 170 mg/dL (19 Aug 2018 21:44)  POCT Blood Glucose.: 194 mg/dL (19 Aug 2018 17:01)    RECENT CULTURES    Culture - Urine (collected 18 Aug 2018 10:12)  Source: .Urine Clean Catch (Midstream)  Final Report (19 Aug 2018 11:19):    No growth    Culture - Blood (collected 17 Aug 2018 23:48)  Source: .Blood Blood-Peripheral  Preliminary Report (19 Aug 2018 01:03):    No growth to date.    Culture - Blood (collected 17 Aug 2018 23:48)  Source: .Blood Blood-Peripheral  Preliminary Report (19 Aug 2018 01:03):    No growth to date.    Radiology :  TTE Echo Doppler w/o Cont (08.17.18 @ 10:38) >    Conclusion:   Technically difficult and limited study. Overall preserved left   ventricular systolic function. Stage II diastolic dysfunction. No   definitive vegetations noted on this study. If clinically warranted would   recommend a JOSE GUADALUPE to rule out endocarditis        Assessment :     89 y.o woman with multiple comorbidities presented with several weeks h/o of bony pain and recent inability to ambulate du to pain in the foot. Ct scan revealed destructive bony lesions concerning for metastasis and liver lesions. 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 Noted to have staph aurues bacteremia likely from left hallux abscess    The primary source of malignancy is unclear, she is to have biopsy of the hip lesion on Friday provided INR is below 1.5 . Anticoagulation is stopped . She was on Sivextro at home which was covering MRSA     Plan :   - will continue with  Ancef 2 grams q 12 as blood and wound cs is MSSA  - she needs I& D and resection of the left 1st met head , no need for MRI as clinically she has OM   - no need for JOSE GUADALUPE as repeat blood cs negative and her source is the foot infection   - she is undergoing work up to find the primary cancer , she already has liver and destructive bone mets so she has advanced diseases  - overall prognosis is poor   - goals of care conversation with her Son, does not want aggressive care   - podiatry follow up to schedule surgery   - she will need long term IV abx     Continue with present regime .  Appropriate use of antibiotics and adverse effects reviewed.    I have discussed the above plan of care with patient and her daughter in detail. They expressed understanding of the treatment plan . Risks, benefits and alternatives discussed in detail. I have asked if they have any questions or concerns and appropriately addressed them to the best of my ability .      > 15 minutes spent in direct patient care reviewing  the notes, lab data/ imaging , discussion with multidisciplinary team. All questions were addressed and answered to the best of my capacity .    Thank you for allowing me to participate in the care of your patient .        William Armendariz MD  [phone] Quality 110: Preventive Care And Screening: Influenza Immunization: Influenza Immunization not Administered for Documented Reasons. Detail Level: Detailed Additional Notes: Patient declines vaccine

## 2023-04-10 NOTE — H&P ADULT. - GASTROINTESTINAL
negative detailed exam Rifampin Counseling: I discussed with the patient the risks of rifampin including but not limited to liver damage, kidney damage, red-orange body fluids, nausea/vomiting and severe allergy.

## 2023-05-08 NOTE — ED ADULT NURSE NOTE - CHEST MOVEMENT
Please let Manjeet know he had an adenoma. Follow up in 5 years is recommended accessory muscles used

## 2023-09-06 NOTE — PHYSICAL THERAPY INITIAL EVALUATION ADULT - MANUAL MUSCLE TESTING RESULTS, REHAB EVAL
Right LE NT. Sski Pregnancy And Lactation Text: This medication is Pregnancy Category D and isn't considered safe during pregnancy. It is excreted in breast milk.

## 2024-03-28 NOTE — PRE-OP CHECKLIST - WEIGHT IN KG
68

## 2024-05-26 NOTE — DIETITIAN INITIAL EVALUATION ADULT. - WEIGHT CHANGE
Patient presenting with pain in RLE that started yesterday night, per patient she has edema but is isn't painful. Pain is around inside part of ankle.  
no

## 2025-02-24 NOTE — ED ADULT NURSE NOTE - PAIN: PRESENCE, MLM
Continue low-fat diet and exercise 3-5 times a week.  Will continue Wegovy as prescribed.    Orders:    CBC and differential    Comprehensive metabolic panel    Lipid Panel with Direct LDL reflex    POCT hemoglobin A1c    Semaglutide-Weight Management (Wegovy) 2.4 MG/0.75ML; Inject 0.75 mL (2.4 mg total) under the skin once a week     complains of pain/discomfort

## 2025-05-30 NOTE — PATIENT PROFILE ADULT. - PRO INTERPRETER NEED 2
Follow all instructions carefully:      Restrictions: Do not drive or operate machinery for eighteen hours after sedation.    Diet:  soft       Medications: [unfilled]      Follow up Care:   Follow-up : repeat in 2 weeks    If problems with abdominal pain, nausea, vomiting, bleeding or any other concerns call Dr. Liu at [phone] or Answering Service [phone], If unable to reach me call [facility].    Gary Liu MD M.D.       English

## 2025-06-11 NOTE — DIETITIAN INITIAL EVALUATION ADULT. - NS AS NUTRI INTERV ED CONTENT
Yes - the patient is able to be screened Priority modifications/Heart failure nutrition therapy/Purpose of the nutrition education